# Patient Record
Sex: MALE | Race: WHITE | NOT HISPANIC OR LATINO | Employment: OTHER | URBAN - METROPOLITAN AREA
[De-identification: names, ages, dates, MRNs, and addresses within clinical notes are randomized per-mention and may not be internally consistent; named-entity substitution may affect disease eponyms.]

---

## 2019-12-31 ENCOUNTER — OFFICE VISIT (OUTPATIENT)
Dept: INTERNAL MEDICINE | Facility: CLINIC | Age: 79
End: 2019-12-31
Payer: MEDICARE

## 2019-12-31 ENCOUNTER — TELEPHONE (OUTPATIENT)
Dept: INTERNAL MEDICINE | Facility: CLINIC | Age: 79
End: 2019-12-31

## 2019-12-31 VITALS
BODY MASS INDEX: 23.83 KG/M2 | RESPIRATION RATE: 18 BRPM | WEIGHT: 175.94 LBS | HEIGHT: 72 IN | DIASTOLIC BLOOD PRESSURE: 64 MMHG | HEART RATE: 74 BPM | SYSTOLIC BLOOD PRESSURE: 151 MMHG | TEMPERATURE: 98 F

## 2019-12-31 DIAGNOSIS — M25.511 CHRONIC RIGHT SHOULDER PAIN: ICD-10-CM

## 2019-12-31 DIAGNOSIS — I73.9 PAD (PERIPHERAL ARTERY DISEASE): ICD-10-CM

## 2019-12-31 DIAGNOSIS — I65.23 CAROTID ATHEROSCLEROSIS, BILATERAL: ICD-10-CM

## 2019-12-31 DIAGNOSIS — E78.5 DYSLIPIDEMIA: ICD-10-CM

## 2019-12-31 DIAGNOSIS — G89.29 CHRONIC RIGHT SHOULDER PAIN: ICD-10-CM

## 2019-12-31 DIAGNOSIS — I10 HTN (HYPERTENSION), BENIGN: Primary | ICD-10-CM

## 2019-12-31 DIAGNOSIS — K21.9 GASTROESOPHAGEAL REFLUX DISEASE, ESOPHAGITIS PRESENCE NOT SPECIFIED: ICD-10-CM

## 2019-12-31 PROCEDURE — 99204 PR OFFICE/OUTPT VISIT, NEW, LEVL IV, 45-59 MIN: ICD-10-PCS | Mod: S$GLB,,, | Performed by: INTERNAL MEDICINE

## 2019-12-31 PROCEDURE — 1101F PR PT FALLS ASSESS DOC 0-1 FALLS W/OUT INJ PAST YR: ICD-10-PCS | Mod: CPTII,S$GLB,, | Performed by: INTERNAL MEDICINE

## 2019-12-31 PROCEDURE — 1159F PR MEDICATION LIST DOCUMENTED IN MEDICAL RECORD: ICD-10-PCS | Mod: S$GLB,,, | Performed by: INTERNAL MEDICINE

## 2019-12-31 PROCEDURE — 1126F AMNT PAIN NOTED NONE PRSNT: CPT | Mod: S$GLB,,, | Performed by: INTERNAL MEDICINE

## 2019-12-31 PROCEDURE — 99999 PR PBB SHADOW E&M-NEW PATIENT-LVL III: ICD-10-PCS | Mod: PBBFAC,,, | Performed by: INTERNAL MEDICINE

## 2019-12-31 PROCEDURE — 99499 RISK ADDL DX/OHS AUDIT: ICD-10-PCS | Mod: S$GLB,,, | Performed by: INTERNAL MEDICINE

## 2019-12-31 PROCEDURE — 1101F PT FALLS ASSESS-DOCD LE1/YR: CPT | Mod: CPTII,S$GLB,, | Performed by: INTERNAL MEDICINE

## 2019-12-31 PROCEDURE — 99204 OFFICE O/P NEW MOD 45 MIN: CPT | Mod: S$GLB,,, | Performed by: INTERNAL MEDICINE

## 2019-12-31 PROCEDURE — 99999 PR PBB SHADOW E&M-NEW PATIENT-LVL III: CPT | Mod: PBBFAC,,, | Performed by: INTERNAL MEDICINE

## 2019-12-31 PROCEDURE — 1126F PR PAIN SEVERITY QUANTIFIED, NO PAIN PRESENT: ICD-10-PCS | Mod: S$GLB,,, | Performed by: INTERNAL MEDICINE

## 2019-12-31 PROCEDURE — 1159F MED LIST DOCD IN RCRD: CPT | Mod: S$GLB,,, | Performed by: INTERNAL MEDICINE

## 2019-12-31 PROCEDURE — 99499 UNLISTED E&M SERVICE: CPT | Mod: S$GLB,,, | Performed by: INTERNAL MEDICINE

## 2019-12-31 RX ORDER — TRIAMTERENE AND HYDROCHLOROTHIAZIDE 37.5; 25 MG/1; MG/1
1 CAPSULE ORAL EVERY MORNING
COMMUNITY
End: 2020-10-21

## 2019-12-31 RX ORDER — AMOXICILLIN 500 MG
CAPSULE ORAL DAILY
COMMUNITY

## 2019-12-31 RX ORDER — OMEPRAZOLE 20 MG/1
20 CAPSULE, DELAYED RELEASE ORAL DAILY
COMMUNITY
End: 2020-12-17 | Stop reason: SDUPTHER

## 2019-12-31 RX ORDER — ASPIRIN 81 MG/1
81 TABLET ORAL DAILY
COMMUNITY

## 2019-12-31 RX ORDER — ATENOLOL 50 MG/1
50 TABLET ORAL DAILY
COMMUNITY
End: 2020-12-17 | Stop reason: SDUPTHER

## 2019-12-31 RX ORDER — ROSUVASTATIN CALCIUM 10 MG/1
10 TABLET, COATED ORAL DAILY
COMMUNITY
End: 2020-11-25 | Stop reason: DRUGHIGH

## 2019-12-31 RX ORDER — TRAMADOL HYDROCHLORIDE 50 MG/1
50 TABLET ORAL EVERY 8 HOURS PRN
Qty: 20 TABLET | Refills: 0 | Status: SHIPPED | OUTPATIENT
Start: 2019-12-31 | End: 2020-01-30 | Stop reason: SDUPTHER

## 2019-12-31 NOTE — TELEPHONE ENCOUNTER
Spoke with pt to advise of MD recommendations.    Verbalized understanding.    Please send to [facility] on [address].

## 2019-12-31 NOTE — TELEPHONE ENCOUNTER
----- Message from Carly Frederick sent at 12/31/2019 11:20 AM CST -----  Its a couple of weeks before pt can see ortho, can he be prescribed something for the pain.

## 2019-12-31 NOTE — PROGRESS NOTES
History of present illness:  79-year-old male 1st visit here at our institution here to establish primary care and follow up on a couple of chronic medical conditions.  The patient recently moved here from [place].  He reports being followed regularly there by primary care and other.  He has a history of hypertension, dyslipidemia, PA D, GERD, prior history of tobacco use.  He reports he was seen fairly recently there within the last several months and had updated laboratory data performed.  Current a primary concern is ongoing right shoulder pain for approximately 3 months.  This started after a fall during which she braced himself with his outstretched right upper extremity.  He has had right shoulder pain since then with use.  He was evaluated initially in [place] with plain x-rays and physical therapy was planned but was not executed due to his move here to [place].  There has been no change in his symptoms since onset.    Current medications:  Atenolol 50 mg daily.  Rosuvastatin 10 mg daily.  Dyazide 1 q.d..  Aspirin 81 mg daily.      Review of systems:  General: no fever, chills, generalized body aches. No unexpected weight loss.  Eyes:  No visual disturbances.  HEENT:  No hoarseness, dysphagia, ear pain.  Respiratory:  No cough, no shortness of breath.  Cardiovascular: no chest pain, palpitations, cough, exertional limb pain. No edema.  GI: no nausea, vomiting.  No abdominal pain. No change in bowel habits.  No melena, no hematochezia.  : no dysuria. No change in the color or character of the urine. No urinary frequency.  Neurologic:  No focal neurological complaints.  No headaches.  Skin:  No rashes or other concerns.  Psych:  No emotional issues    Past medical history:  Hypertension.  Dyslipidemia.  PA D, reports having stents in both lower extremities.  GERD controlled on PPI therapy.  Reports history of melanoma excision number of years ago.  Carotid atherosclerosis by history.  Adenomatous  colon polyps by patient history.    Past surgical history, family medical history social history is are noted reviewed entered and updated in the electronic medical record history sections.    Health screenings:  Colonoscopy 1 year ago.  He feels certain he has had both pneumococcal vaccines.      Physical examination:  General:  Alert appropriately groomed gentleman acute distress.  Vital signs:  Blood pressure taken manually by this examiner is 142/66.  Eyes:  Sclerae white not icteric.  HEENT:  Normocephalic.  Neck supple no masses no thyromegaly.  Lungs:  Clear to auscultation.  Cardiovascular:  Regular rate rhythm. No murmur click rub or gallop.  Carotids full bilaterally bruits.  No peripheral extremity edema no ischemic changes lower extremities.  Mental status:  Alert oriented affect mood all appropriate.      Impression:  Hypertension elevated systolic reading today though he reports home readings have been normal.  Dyslipidemia on statin therapy.  JILLIAN COLLINS history of stents both lower extremities currently asymptomatic.  GERD controlled on PPI therapy chronically.  First-degree relative with colon cancer up-to-date with surveillance colonoscopy.  History of carotid atherosclerosis.  History of melanoma excision.  Three months persistent right shoulder pain status post mechanical injury.    Plan:  Outside record release requests.  Update metabolic profile liver profile.  Referral to Sports Medicine regarding the right shoulder issue.  Return to clinic in about 8 weeks for follow-up blood pressure recheck reviewed lab and hopeful record review

## 2019-12-31 NOTE — TELEPHONE ENCOUNTER
We can give him short term Rx for tramadol with no refills.  It is not an option to continue longer term pain meds.  Need local pharmacy and will send in one time limited Rx

## 2020-01-13 ENCOUNTER — HOSPITAL ENCOUNTER (OUTPATIENT)
Dept: RADIOLOGY | Facility: HOSPITAL | Age: 80
Discharge: HOME | End: 2020-01-13
Attending: ORTHOPAEDIC SURGERY
Payer: MEDICARE

## 2020-01-13 ENCOUNTER — OFFICE VISIT (OUTPATIENT)
Dept: SPORTS MEDICINE | Facility: CLINIC | Age: 80
End: 2020-01-13
Payer: MEDICARE

## 2020-01-13 VITALS
BODY MASS INDEX: 23.7 KG/M2 | HEIGHT: 72 IN | HEART RATE: 78 BPM | WEIGHT: 175 LBS | SYSTOLIC BLOOD PRESSURE: 151 MMHG | DIASTOLIC BLOOD PRESSURE: 79 MMHG

## 2020-01-13 DIAGNOSIS — M25.511 RIGHT SHOULDER PAIN, UNSPECIFIED CHRONICITY: ICD-10-CM

## 2020-01-13 DIAGNOSIS — M25.511 RIGHT SHOULDER PAIN, UNSPECIFIED CHRONICITY: Primary | ICD-10-CM

## 2020-01-13 DIAGNOSIS — M25.511 CHRONIC RIGHT SHOULDER PAIN: ICD-10-CM

## 2020-01-13 DIAGNOSIS — G89.29 CHRONIC RIGHT SHOULDER PAIN: ICD-10-CM

## 2020-01-13 PROCEDURE — 3078F DIAST BP <80 MM HG: CPT | Mod: CPTII,S$GLB,, | Performed by: ORTHOPAEDIC SURGERY

## 2020-01-13 PROCEDURE — 3077F PR MOST RECENT SYSTOLIC BLOOD PRESSURE >= 140 MM HG: ICD-10-PCS | Mod: CPTII,S$GLB,, | Performed by: ORTHOPAEDIC SURGERY

## 2020-01-13 PROCEDURE — 73030 XR SHOULDER COMPLETE 2 OR MORE VIEWS RIGHT: ICD-10-PCS | Mod: 26,RT,, | Performed by: RADIOLOGY

## 2020-01-13 PROCEDURE — 1159F MED LIST DOCD IN RCRD: CPT | Mod: S$GLB,,, | Performed by: ORTHOPAEDIC SURGERY

## 2020-01-13 PROCEDURE — 99999 PR PBB SHADOW E&M-EST. PATIENT-LVL III: ICD-10-PCS | Mod: PBBFAC,,, | Performed by: ORTHOPAEDIC SURGERY

## 2020-01-13 PROCEDURE — 99999 PR PBB SHADOW E&M-EST. PATIENT-LVL III: CPT | Mod: PBBFAC,,, | Performed by: ORTHOPAEDIC SURGERY

## 2020-01-13 PROCEDURE — 1101F PR PT FALLS ASSESS DOC 0-1 FALLS W/OUT INJ PAST YR: ICD-10-PCS | Mod: CPTII,S$GLB,, | Performed by: ORTHOPAEDIC SURGERY

## 2020-01-13 PROCEDURE — 73030 X-RAY EXAM OF SHOULDER: CPT | Mod: 26,RT,, | Performed by: RADIOLOGY

## 2020-01-13 PROCEDURE — 3078F PR MOST RECENT DIASTOLIC BLOOD PRESSURE < 80 MM HG: ICD-10-PCS | Mod: CPTII,S$GLB,, | Performed by: ORTHOPAEDIC SURGERY

## 2020-01-13 PROCEDURE — 1101F PT FALLS ASSESS-DOCD LE1/YR: CPT | Mod: CPTII,S$GLB,, | Performed by: ORTHOPAEDIC SURGERY

## 2020-01-13 PROCEDURE — 1125F AMNT PAIN NOTED PAIN PRSNT: CPT | Mod: S$GLB,,, | Performed by: ORTHOPAEDIC SURGERY

## 2020-01-13 PROCEDURE — 1125F PR PAIN SEVERITY QUANTIFIED, PAIN PRESENT: ICD-10-PCS | Mod: S$GLB,,, | Performed by: ORTHOPAEDIC SURGERY

## 2020-01-13 PROCEDURE — 1159F PR MEDICATION LIST DOCUMENTED IN MEDICAL RECORD: ICD-10-PCS | Mod: S$GLB,,, | Performed by: ORTHOPAEDIC SURGERY

## 2020-01-13 PROCEDURE — 73030 X-RAY EXAM OF SHOULDER: CPT | Mod: TC,RT

## 2020-01-13 PROCEDURE — 99203 OFFICE O/P NEW LOW 30 MIN: CPT | Mod: S$GLB,,, | Performed by: ORTHOPAEDIC SURGERY

## 2020-01-13 PROCEDURE — 3077F SYST BP >= 140 MM HG: CPT | Mod: CPTII,S$GLB,, | Performed by: ORTHOPAEDIC SURGERY

## 2020-01-13 PROCEDURE — 99203 PR OFFICE/OUTPT VISIT, NEW, LEVL III, 30-44 MIN: ICD-10-PCS | Mod: S$GLB,,, | Performed by: ORTHOPAEDIC SURGERY

## 2020-01-13 NOTE — PROGRESS NOTES
CC: right shoulder pain, patient is retired, referred by Dr. Rishabh Mora      79 y.o. Male RHD reports that the pain is severe and not responding to any conservative care.      Pain has been present since September/October 2019 after a fall on an outstretched arm   He was originally seen by his PCP in [place] that prescribed physical therapy but he was not able to start due to his recent move to [place] about 3 weeks ago     He reports that the pain is worse with overhead activity. It also bothers him at night.  He notes his pain has worsened   He describes his pain as anterior/bicep     Is affecting ADLs.     He has taken Aleve and Tramadol for his pain     Review of Systems   Constitution: Negative. Negative for chills, fever and night sweats.   HENT: Negative for congestion and headaches.    Eyes: Negative for blurred vision, left vision loss and right vision loss.   Cardiovascular: Negative for chest pain and syncope.   Respiratory: Negative for cough and shortness of breath.    Endocrine: Negative for polydipsia, polyphagia and polyuria.   Hematologic/Lymphatic: Negative for bleeding problem. Does not bruise/bleed easily.   Skin: Negative for dry skin, itching and rash.   Musculoskeletal: Negative for falls and muscle weakness.   Gastrointestinal: Negative for abdominal pain and bowel incontinence.   Genitourinary: Negative for bladder incontinence and nocturia.   Neurological: Negative for disturbances in coordination, loss of balance and seizures.   Psychiatric/Behavioral: Negative for depression. The patient does not have insomnia.    Allergic/Immunologic: Negative for hives and persistent infections.     PAST MEDICAL HISTORY:   Past Medical History:   Diagnosis Date    Dyslipidemia     GERD (gastroesophageal reflux disease)     Hx of melanoma excision     Hypertension     PAD (peripheral artery disease)      PAST SURGICAL HISTORY:   Past Surgical History:   Procedure Laterality Date     BLEPHAROPLASTY      CATARACT EXTRACTION, BILATERAL      TONSILLECTOMY       FAMILY HISTORY:   Family History   Problem Relation Age of Onset    Diabetes Mother     Hyperlipidemia Mother     Heart disease Father     Colon cancer Sister      SOCIAL HISTORY:   Social History     Socioeconomic History    Marital status:      Spouse name: Not on file    Number of children: Not on file    Years of education: Not on file    Highest education level: Not on file   Occupational History    Not on file   Social Needs    Financial resource strain: Not on file    Food insecurity:     Worry: Not on file     Inability: Not on file    Transportation needs:     Medical: Not on file     Non-medical: Not on file   Tobacco Use    Smoking status: Former Smoker     Types: Cigarettes    Smokeless tobacco: Never Used   Substance and Sexual Activity    Alcohol use: Not on file    Drug use: Not on file    Sexual activity: Not on file   Lifestyle    Physical activity:     Days per week: Not on file     Minutes per session: Not on file    Stress: Not on file   Relationships    Social connections:     Talks on phone: Not on file     Gets together: Not on file     Attends Rastafari service: Not on file     Active member of club or organization: Not on file     Attends meetings of clubs or organizations: Not on file     Relationship status: Not on file   Other Topics Concern    Not on file   Social History Narrative    Not on file       MEDICATIONS:   Current Outpatient Medications:     aspirin (ECOTRIN) 81 MG EC tablet, Take 81 mg by mouth once daily., Disp: , Rfl:     atenolol (TENORMIN) 50 MG tablet, Take 50 mg by mouth once daily., Disp: , Rfl:     multivit-min-FA-lycopen-lutein (CENTRUM SILVER MEN) 300-600-300 mcg Tab, Take by mouth., Disp: , Rfl:     omega-3 fatty acids/fish oil (FISH OIL-OMEGA-3 FATTY ACIDS) 300-1,000 mg capsule, Take by mouth once daily., Disp: , Rfl:     omeprazole (PRILOSEC) 20 MG  capsule, Take 20 mg by mouth once daily., Disp: , Rfl:     rosuvastatin (CRESTOR) 10 MG tablet, Take 10 mg by mouth once daily., Disp: , Rfl:     traMADol (ULTRAM) 50 mg tablet, Take 1 tablet (50 mg total) by mouth every 8 (eight) hours as needed for Pain., Disp: 20 tablet, Rfl: 0    triamterene-hydrochlorothiazide 37.5-25 mg (DYAZIDE) 37.5-25 mg per capsule, Take 1 capsule by mouth every morning., Disp: , Rfl:   ALLERGIES:   Review of patient's allergies indicates:   Allergen Reactions    Clindamycin Nausea And Vomiting    Penicillins Hives       VITAL SIGNS: BP (!) 151/79   Pulse 78   Ht 6' (1.829 m)   Wt 79.4 kg (175 lb)   BMI 23.73 kg/m²      PHYSICAL EXAMINATION:  General:  The patient is alert and oriented x 3.  Mood is pleasant.  Observation of ears, eyes and nose reveal no gross abnormalities.  No labored breathing observed.  Gait is coordinated. Patient can toe walk and heel walk without difficulty.      right Shoulder / Upper Extremity Exam    OBSERVATION:     Swelling  none  Deformity  none   Discoloration  none   Scapular winging none   Scars   none  Atrophy  none    TENDERNESS / CREPITUS (T/C):          T/C      T/C   Clavicle   -/-  SUPRAspinatus    -/-     AC Jt.    -/-  INFRAspinatus  -/-    SC Jt.    -/-  Deltoid    -/-      G. Tuberosity  -/-  LH BICEP groove  +/-   Acromion:  -/-  Midline Neck   -/-     Scapular Spine -/-  Trapezium   -/-   SMA Scapula  -/-  GH jt. line - post  -/-     Scapulothoracic  -/-         ROM: (* = with pain)  Right shoulder   Left shoulder        AROM (PROM)   AROM (PROM)   FE    115° (165°)     170° (175°)     ER at 0°    45°  (65°)    60°  (65°)   ER at 90° ABD  90°  (90°)    90°  (90°)   IR at 90°  ABD   NA  (40°)     NA  (40°)      IR (spine level)   L2     T10    STRENGTH: (* = with pain) RIGHT SHOULDER  LEFT SHOULDER   SCAPTION at 0  4/5    5/5   SCAPTION at  30  5-/5    5/5    IR    5/5    5/5   ER    5/5    5/5   BICEPS   5/5    5/5   Deltoid    5/5    5/5     SIGNS:  Painful side       NEER   +    OENRIQUES  neg    GARZA   +    SPEEDS  neg     DROP ARM   neg   BELLY PRESS neg   Superior escape none    LIFT-OFF  neg   X-Body ADD    neg    MOVING VALGUS neg        STABILITY TESTING    RIGHT SHOULDER   LEFT SHOULDER     Translation     Anterior  up face     up face    Posterior  up face    up face    Sulcus   < 10mm    < 10 mm     Signs   Apprehension   neg      neg       Relocation   no change     no change      Jerk test  neg     neg    EXTREMITY NEURO-VASCULAR EXAM    Sensation grossly intact to light touch all dermatomal regions.    DTR 2+ Biceps, Triceps, BR and Negative Terrells sign   Grossly intact motor function at Elbow, Wrist and Hand   Distal pulses radial and ulnar 2+, brisk cap refill, symmetric.      NECK:  Painless FROM and spinous processes non-tender. Negative Spurlings sign.      OTHER FINDINGS:  + scapular dyskinesia  + bear hug     XRAYS:  Shoulder trauma series right,  were obtained and reviewed  No convincing fracture or dislocation is noted. The osseous structures appear well mineralized and well aligned        ASSESSMENT:   right:  1. Shoulder pain, likely cuff tear    2.  Scapular dyskinesia      ASSESSMENT:  shoulder pain.    I do think that this is likely a rotator cuff tear.    I have recommended we check an MRI of the shoulder to evaluate this.    Depending on the results of the MRI, we may consider a cortisone   injection and physical therapy and/or arthroscopic intervention and treatment   depending on what we find.

## 2020-01-16 ENCOUNTER — HOSPITAL ENCOUNTER (OUTPATIENT)
Dept: RADIOLOGY | Facility: HOSPITAL | Age: 80
Discharge: HOME | End: 2020-01-16
Attending: STUDENT IN AN ORGANIZED HEALTH CARE EDUCATION/TRAINING PROGRAM
Payer: MEDICARE

## 2020-01-16 DIAGNOSIS — M25.511 RIGHT SHOULDER PAIN, UNSPECIFIED CHRONICITY: ICD-10-CM

## 2020-01-16 PROCEDURE — 73221 MRI SHOULDER WITHOUT CONTRAST RIGHT: ICD-10-PCS | Mod: 26,RT,, | Performed by: RADIOLOGY

## 2020-01-16 PROCEDURE — 73221 MRI JOINT UPR EXTREM W/O DYE: CPT | Mod: TC,RT

## 2020-01-16 PROCEDURE — 73221 MRI JOINT UPR EXTREM W/O DYE: CPT | Mod: 26,RT,, | Performed by: RADIOLOGY

## 2020-01-22 ENCOUNTER — TELEPHONE (OUTPATIENT)
Dept: SPORTS MEDICINE | Facility: CLINIC | Age: 80
End: 2020-01-22

## 2020-01-22 NOTE — TELEPHONE ENCOUNTER
Called patient to discuss MRI results    right   1. Shoulder pain,possible RTC tear    2.  Possible insufficiency fracture greater tuberosity    MRI Right shoulder: Full thickness supraspinatus tear With retraction. SLAP, biceps tendinitis, + insufficiency fracture greater tuberosity  AC moderate hypertrophy, degenerative changes, subscapularis tear    ASSESSMENT:    Right Shoulder Rotator Cuff Tear, SLAP, biceps tendonitis.      he would benefit from an arthroscopy, given the above.       PLAN:   Right Shoulder rotator cuff tear     All questions were answered, pt will contact us for questions or concerns in the interim.  Had thorough discussion of non-operative vs operative intervention, and risks and benefits of both.     We have discussed the surgery and recovery of arthroscopic shoulder surgery. he understands that there may be limited mobility up to several weeks after surgery depending on procedures that are performed at the time of surgery.    The spectrum of treatment options were discussed with the patient, including nonoperative and operative options.  After thorough discussion, the patient has elected to undergo surgical treatment to include:    right   a. Shoulder arthroscopic rotator cuff repair   b. Shoulder arthroscopic SAD   c. Shoulder arthroscopic extensive debridement   d. Shoulder arthroscopic biceps tenodesis (vs. subpect tenodesis)   e. Shoulder arthroscopic possible microfracture   f.  Shoulder arthroscopic subscapularis repair   g.  Shoulder open reduction internal fixation greater tuberosity    The details of the surgical procedure were explained, including the location of probable incisions and a description of likely hardware and/or grafts to be used.  The patient understands the likely convalescence after surgery.  Also, we have thoroughly discussed the risks, benefits and alternatives to surgery, including, but not limited to, the risk of infection, joint stiffness, blood clot  (including DVT and/or pulmonary embolus), neurologic and vascular injury.  It was explained that, if tissue has been repaired or reconstructed, there is a chance of failure, which may require further management.  Consent form for surgery is signed and in chart.

## 2020-01-29 ENCOUNTER — TELEPHONE (OUTPATIENT)
Dept: SPORTS MEDICINE | Facility: CLINIC | Age: 80
End: 2020-01-29

## 2020-01-29 NOTE — TELEPHONE ENCOUNTER
I called the patient and answered his questions, he verbalized understanding. He was encouraged to call back with any further questions.

## 2020-01-29 NOTE — TELEPHONE ENCOUNTER
----- Message from Kerline High MA sent at 1/29/2020  1:34 PM CST -----  Contact: pt       ----- Message -----  From: Carlo Collado  Sent: 1/29/2020  12:08 PM CST  To: Fior Small Staff    Please call pt at [phone]    Patient is calling to schedule future surgery    Thank you

## 2020-01-29 NOTE — TELEPHONE ENCOUNTER
----- Message from Kayla Avina MA sent at 1/29/2020  2:40 PM CST -----  Contact: pt   I spoke to the patient and scheduled his surgery date. He has other questions concerning the surgery that I am not able to answer. Could you call him back at some point just to go over the procedure again.    Thanks,  Kayla Avina MA  Medical Assistant to Dr. Staci Childress    ----- Message -----  From: Kerline High MA  Sent: 1/29/2020   1:34 PM CST  To: Kayla Avina MA        ----- Message -----  From: Carlo Collado  Sent: 1/29/2020  12:08 PM CST  To: Fior Small Staff    Please call pt at [phone]    Patient is calling to schedule future surgery    Thank you

## 2020-01-30 DIAGNOSIS — M75.101 NONTRAUMATIC TEAR OF RIGHT ROTATOR CUFF, UNSPECIFIED TEAR EXTENT: Primary | ICD-10-CM

## 2020-01-30 DIAGNOSIS — M75.21 BICEPS TENDINITIS OF RIGHT UPPER EXTREMITY: ICD-10-CM

## 2020-01-30 DIAGNOSIS — S42.254A CLOSED NONDISPLACED FRACTURE OF GREATER TUBEROSITY OF RIGHT HUMERUS, INITIAL ENCOUNTER: ICD-10-CM

## 2020-01-30 DIAGNOSIS — S43.431A SUPERIOR GLENOID LABRUM LESION OF RIGHT SHOULDER, INITIAL ENCOUNTER: ICD-10-CM

## 2020-01-30 RX ORDER — TRAMADOL HYDROCHLORIDE 50 MG/1
50 TABLET ORAL EVERY 8 HOURS PRN
Qty: 20 TABLET | Refills: 0 | Status: SHIPPED | OUTPATIENT
Start: 2020-01-30 | End: 2020-01-31 | Stop reason: SDUPTHER

## 2020-01-30 NOTE — TELEPHONE ENCOUNTER
----- Message from Ned Hiro sent at 1/30/2020  1:09 PM CST -----  Contact: Self   Patient state he have surgery on 02/27 on the shoulder. Patient state he need clearance for surgery and was wondering if he can get the clearance on 02/19 when he see Dr. Mora for his follow up appointment. Please all and advise.      Patient is calling for an RX refill or new RX.  Is this a refill or new RX:    RX name and strength: traMADol (ULTRAM) 50 mg tablet  Directions (copy/paste from chart):  Take 1 tablet (50 mg total) by mouth every 8 (eight) hours as needed for Pain. - Oral  Is this a 30 day or 90 day RX:    Local pharmacy or mail order pharmacy:  Local  Pharmacy name and phone # (copy/paste from chart):     Comments:  Patient state he want the Rx sent to the [facility] on [address] and [address]. Please advise.

## 2020-01-31 RX ORDER — TRAMADOL HYDROCHLORIDE 50 MG/1
50 TABLET ORAL EVERY 8 HOURS PRN
Qty: 20 TABLET | Refills: 0 | Status: SHIPPED | OUTPATIENT
Start: 2020-01-31 | End: 2020-10-21

## 2020-01-31 NOTE — TELEPHONE ENCOUNTER
----- Message from Rock Franks sent at 1/31/2020  1:19 PM CST -----  Contact: Pt [phone]  The patient said you sent the traMADol (ULTRAM) 50 mg tablet to the mail order but, he wanted it sent to the local pharmacy. He couldn't cancel it because, it's already in the mail to him. He wants to know if you can call in a partial supply of 7 pills to the local pharmacy.    [facility]/pharmacy #[id number] - [place] - [address] [phone] (Phone) [phone] (Fax)

## 2020-02-10 ENCOUNTER — LAB VISIT (OUTPATIENT)
Dept: LAB | Facility: HOSPITAL | Age: 80
End: 2020-02-10
Attending: INTERNAL MEDICINE
Payer: MEDICARE

## 2020-02-10 DIAGNOSIS — I10 HTN (HYPERTENSION), BENIGN: ICD-10-CM

## 2020-02-10 DIAGNOSIS — E78.5 DYSLIPIDEMIA: ICD-10-CM

## 2020-02-10 LAB
ALBUMIN SERPL BCP-MCNC: 4 G/DL (ref 3.5–5.2)
ALP SERPL-CCNC: 55 U/L (ref 55–135)
ALT SERPL W/O P-5'-P-CCNC: 16 U/L (ref 10–44)
ANION GAP SERPL CALC-SCNC: 9 MMOL/L (ref 8–16)
AST SERPL-CCNC: 21 U/L (ref 10–40)
BILIRUB SERPL-MCNC: 1 MG/DL (ref 0.1–1)
BUN SERPL-MCNC: 36 MG/DL (ref 8–23)
CALCIUM SERPL-MCNC: 9.5 MG/DL (ref 8.7–10.5)
CHLORIDE SERPL-SCNC: 105 MMOL/L (ref 95–110)
CHOLEST SERPL-MCNC: 176 MG/DL (ref 120–199)
CHOLEST/HDLC SERPL: 2.8 {RATIO} (ref 2–5)
CO2 SERPL-SCNC: 28 MMOL/L (ref 23–29)
CREAT SERPL-MCNC: 1.6 MG/DL (ref 0.5–1.4)
EST. GFR  (AFRICAN AMERICAN): 47 ML/MIN/1.73 M^2
EST. GFR  (NON AFRICAN AMERICAN): 40.7 ML/MIN/1.73 M^2
GLUCOSE SERPL-MCNC: 104 MG/DL (ref 70–110)
HDLC SERPL-MCNC: 64 MG/DL (ref 40–75)
HDLC SERPL: 36.4 % (ref 20–50)
LDLC SERPL CALC-MCNC: 96 MG/DL (ref 63–159)
NONHDLC SERPL-MCNC: 112 MG/DL
POTASSIUM SERPL-SCNC: 4.3 MMOL/L (ref 3.5–5.1)
PROT SERPL-MCNC: 7 G/DL (ref 6–8.4)
SODIUM SERPL-SCNC: 142 MMOL/L (ref 136–145)
TRIGL SERPL-MCNC: 80 MG/DL (ref 30–150)

## 2020-02-10 PROCEDURE — 36415 COLL VENOUS BLD VENIPUNCTURE: CPT | Mod: PO

## 2020-02-10 PROCEDURE — 80061 LIPID PANEL: CPT

## 2020-02-10 PROCEDURE — 80053 COMPREHEN METABOLIC PANEL: CPT

## 2020-02-13 NOTE — ANESTHESIA PAT ROS NOTE
"                                                                                                             02/13/2020  Gus Sabillon is a 79 y.o., male.      Pre-op Assessment         Review of Systems  Anesthesia Hx:  No problems with previous Anesthesia    Social:  Alcohol Use, Former Smoker    Hematology/Oncology:  Hematology Normal   Oncology Normal     EENT/Dental:EENT/Dental Normal   Cardiovascular:   Exercise tolerance: good Hypertension  Denies Angina. PAD-stents bilat LE   Pulmonary:  Pulmonary Normal  Denies Shortness of breath.  Denies Recent URI.    Renal/:   Elevated BUN (36) on Dec labs- states a "kidney problem,b ut nothing worth treating"-moving from [place]-repeating labs in February   Hepatic/GI:   GERD    Musculoskeletal:  Musculoskeletal Normal    Neurological:  Neurology Normal    Endocrine:  Endocrine Normal    Dermatological:  Skin Normal    Psych:  Psychiatric Normal              Anesthesia Assessment: Preoperative EQUATION    Planned Procedure: Procedure(s) (LRB):  REPAIR, ROTATOR CUFF, ARTHROSCOPIC (Right)  DEBRIDEMENT, SHOULDER, ARTHROSCOPIC (Right)  FIXATION, TENDON, Biceps Tenodesis (Right)  ORIF, FRACTURE, HUMERUS, PROXIMAL (Right)  MICROFRACTURE (Right)  Requested Anesthesia Type:General  Surgeon: Staci Childress MD  Service: Orthopedics  Known or anticipated Date of Surgery:2/27/2020    Surgeon notes: reviewed    Electronic QUestionnaire Assessment completed via nurse interview with patient.        Triage considerations:     The patient has no apparent active cardiac condition (No unstable coronary Syndrome such as severe unstable angina or recent [<1 month] myocardial infarction, decompensated CHF, severe valvular   disease or significant arrhythmia)    Previous anesthesia records:No problems and Not available    Last PCP note: within 1 month , within [facility] , 2/19/2020 will be updated    2/19/2020 PCP note:  "He is considered a reasonable candidate for planned shoulder " "surgery and anesthesia"    Other important co-morbidities: GERD, HLD and HTN      Tests already available:  CBC repeated 2/19/2020   CMP 2/10/2020 BUN 36 Creatinine 1.6(not repeated)          Instructions given. (See in Nurse's note)    Optimization:           Plan:  Patient  has previously scheduled Medical Appointment:    Navigation: T                         Results will be tracked by Preop Clinic.                   "

## 2020-02-13 NOTE — DISCHARGE INSTRUCTIONS
Patient instructions given per surgical and medical guidelines.  NPO status reviewed with patient.  Patient verbalized understanding of both food (8 hours)and fluid intake (2hours)prior to surgery.  Reinforced ONLY CLEAR liquids to include water, apple juice, clear gatorade and such.  (No milk). Antibacterial scrub instructions given, per MD recommendations.  All questions answered.  Driving directions given for day of surgery. Voiced understanding that surgery will take place at the [facility] on [address].  Anticoagulants, OTC's and vitamins, and prescription medication instructions reviewed per protocol.  Nurse reviewed history as stated in chart.  Message sent to patient portal if available.

## 2020-02-19 ENCOUNTER — LAB VISIT (OUTPATIENT)
Dept: LAB | Facility: HOSPITAL | Age: 80
End: 2020-02-19
Attending: INTERNAL MEDICINE
Payer: MEDICARE

## 2020-02-19 ENCOUNTER — OFFICE VISIT (OUTPATIENT)
Dept: INTERNAL MEDICINE | Facility: CLINIC | Age: 80
End: 2020-02-19
Payer: MEDICARE

## 2020-02-19 VITALS
DIASTOLIC BLOOD PRESSURE: 40 MMHG | SYSTOLIC BLOOD PRESSURE: 110 MMHG | TEMPERATURE: 99 F | BODY MASS INDEX: 23.29 KG/M2 | RESPIRATION RATE: 19 BRPM | HEIGHT: 72 IN | HEART RATE: 76 BPM | WEIGHT: 171.94 LBS

## 2020-02-19 DIAGNOSIS — M75.100 TEAR OF ROTATOR CUFF, UNSPECIFIED LATERALITY, UNSPECIFIED TEAR EXTENT, UNSPECIFIED WHETHER TRAUMATIC: ICD-10-CM

## 2020-02-19 DIAGNOSIS — Z01.818 PREOP EXAM FOR INTERNAL MEDICINE: Primary | ICD-10-CM

## 2020-02-19 DIAGNOSIS — Z01.818 PREOP EXAM FOR INTERNAL MEDICINE: ICD-10-CM

## 2020-02-19 DIAGNOSIS — N18.30 BENIGN HYPERTENSION WITH CKD (CHRONIC KIDNEY DISEASE) STAGE III: ICD-10-CM

## 2020-02-19 DIAGNOSIS — E78.5 DYSLIPIDEMIA: ICD-10-CM

## 2020-02-19 DIAGNOSIS — I12.9 BENIGN HYPERTENSION WITH CKD (CHRONIC KIDNEY DISEASE) STAGE III: ICD-10-CM

## 2020-02-19 LAB
BASOPHILS # BLD AUTO: 0.05 K/UL (ref 0–0.2)
BASOPHILS NFR BLD: 0.6 % (ref 0–1.9)
DIFFERENTIAL METHOD: ABNORMAL
EOSINOPHIL # BLD AUTO: 0.2 K/UL (ref 0–0.5)
EOSINOPHIL NFR BLD: 2.6 % (ref 0–8)
ERYTHROCYTE [DISTWIDTH] IN BLOOD BY AUTOMATED COUNT: 12.6 % (ref 11.5–14.5)
HCT VFR BLD AUTO: 44.4 % (ref 40–54)
HGB BLD-MCNC: 13.8 G/DL (ref 14–18)
IMM GRANULOCYTES # BLD AUTO: 0.02 K/UL (ref 0–0.04)
IMM GRANULOCYTES NFR BLD AUTO: 0.2 % (ref 0–0.5)
LYMPHOCYTES # BLD AUTO: 3.4 K/UL (ref 1–4.8)
LYMPHOCYTES NFR BLD: 38.8 % (ref 18–48)
MCH RBC QN AUTO: 31.3 PG (ref 27–31)
MCHC RBC AUTO-ENTMCNC: 31.1 G/DL (ref 32–36)
MCV RBC AUTO: 101 FL (ref 82–98)
MONOCYTES # BLD AUTO: 0.9 K/UL (ref 0.3–1)
MONOCYTES NFR BLD: 10.1 % (ref 4–15)
NEUTROPHILS # BLD AUTO: 4.2 K/UL (ref 1.8–7.7)
NEUTROPHILS NFR BLD: 47.7 % (ref 38–73)
NRBC BLD-RTO: 0 /100 WBC
PLATELET # BLD AUTO: 194 K/UL (ref 150–350)
PMV BLD AUTO: 11.4 FL (ref 9.2–12.9)
RBC # BLD AUTO: 4.41 M/UL (ref 4.6–6.2)
WBC # BLD AUTO: 8.85 K/UL (ref 3.9–12.7)

## 2020-02-19 PROCEDURE — 1159F MED LIST DOCD IN RCRD: CPT | Mod: S$GLB,,, | Performed by: INTERNAL MEDICINE

## 2020-02-19 PROCEDURE — 3074F PR MOST RECENT SYSTOLIC BLOOD PRESSURE < 130 MM HG: ICD-10-PCS | Mod: CPTII,S$GLB,, | Performed by: INTERNAL MEDICINE

## 2020-02-19 PROCEDURE — 3078F PR MOST RECENT DIASTOLIC BLOOD PRESSURE < 80 MM HG: ICD-10-PCS | Mod: CPTII,S$GLB,, | Performed by: INTERNAL MEDICINE

## 2020-02-19 PROCEDURE — 1126F AMNT PAIN NOTED NONE PRSNT: CPT | Mod: S$GLB,,, | Performed by: INTERNAL MEDICINE

## 2020-02-19 PROCEDURE — 85025 COMPLETE CBC W/AUTO DIFF WBC: CPT

## 2020-02-19 PROCEDURE — 93010 EKG 12-LEAD: ICD-10-PCS | Mod: S$GLB,,, | Performed by: INTERNAL MEDICINE

## 2020-02-19 PROCEDURE — 1101F PT FALLS ASSESS-DOCD LE1/YR: CPT | Mod: CPTII,S$GLB,, | Performed by: INTERNAL MEDICINE

## 2020-02-19 PROCEDURE — 36415 COLL VENOUS BLD VENIPUNCTURE: CPT | Mod: PO

## 2020-02-19 PROCEDURE — 93010 ELECTROCARDIOGRAM REPORT: CPT | Mod: S$GLB,,, | Performed by: INTERNAL MEDICINE

## 2020-02-19 PROCEDURE — 1101F PR PT FALLS ASSESS DOC 0-1 FALLS W/OUT INJ PAST YR: ICD-10-PCS | Mod: CPTII,S$GLB,, | Performed by: INTERNAL MEDICINE

## 2020-02-19 PROCEDURE — 93005 EKG 12-LEAD: ICD-10-PCS | Mod: S$GLB,,, | Performed by: INTERNAL MEDICINE

## 2020-02-19 PROCEDURE — 3074F SYST BP LT 130 MM HG: CPT | Mod: CPTII,S$GLB,, | Performed by: INTERNAL MEDICINE

## 2020-02-19 PROCEDURE — 1126F PR PAIN SEVERITY QUANTIFIED, NO PAIN PRESENT: ICD-10-PCS | Mod: S$GLB,,, | Performed by: INTERNAL MEDICINE

## 2020-02-19 PROCEDURE — 99999 PR PBB SHADOW E&M-EST. PATIENT-LVL III: CPT | Mod: PBBFAC,,, | Performed by: INTERNAL MEDICINE

## 2020-02-19 PROCEDURE — 99999 PR PBB SHADOW E&M-EST. PATIENT-LVL III: ICD-10-PCS | Mod: PBBFAC,,, | Performed by: INTERNAL MEDICINE

## 2020-02-19 PROCEDURE — 93005 ELECTROCARDIOGRAM TRACING: CPT | Mod: S$GLB,,, | Performed by: INTERNAL MEDICINE

## 2020-02-19 PROCEDURE — 99214 OFFICE O/P EST MOD 30 MIN: CPT | Mod: S$GLB,,, | Performed by: INTERNAL MEDICINE

## 2020-02-19 PROCEDURE — 99214 PR OFFICE/OUTPT VISIT, EST, LEVL IV, 30-39 MIN: ICD-10-PCS | Mod: S$GLB,,, | Performed by: INTERNAL MEDICINE

## 2020-02-19 PROCEDURE — 1159F PR MEDICATION LIST DOCUMENTED IN MEDICAL RECORD: ICD-10-PCS | Mod: S$GLB,,, | Performed by: INTERNAL MEDICINE

## 2020-02-19 PROCEDURE — 3078F DIAST BP <80 MM HG: CPT | Mod: CPTII,S$GLB,, | Performed by: INTERNAL MEDICINE

## 2020-02-19 NOTE — PROGRESS NOTES
History of present illness:  [age] year male in today for preoperative medical evaluation for planned right laparoscopic shoulder surgery scheduled for next week with .  The patient currently states that he feels well with no chest pain, palpitations, syncope, cough, shortness of breath.  No URI symptoms.  Taking medications as directed.    Current medications:  Dyazide.  Rosuvastatin 10 mg daily.  Prilosec 20 mg daily.  Atenolol 50 mg daily.  Aspirin    Medication allergies:  Penicillin.  Clindamycin.    Review of systems:  Constitutional:  No fever chills.  HEENT:  No URI symptoms.  No hoarseness no dysphagia.  Respiratory:  No cough shortness of breath.  Cardiovascular:  No chest pain no palpitations no syncope.  No claudication.  GI:  No nausea no vomiting abdominal pain or diarrhea.  Neurologic:  No headaches or focal neurological symptoms.    Past medical history:  Hypertension  Dyslipidemia  CKD 3  PAD  GERD.    Past surgical history:  Tonsillectomy.  Blepharoplasty.  Cataract.    Social history:  Previous smoker.  No alcohol excess.    Physical examination:  General:  Pleasant alert appropriately groomed male no acute distress.  Vital signs:  Blood pressure taken manually is 124/60.  HEENT:  Normocephalic.  Neck supple no masses no thyromegaly.  Lungs:  Clear to auscultation.  Cardiovascular:  Regular rate and rhythm. Frequent ectopy.  No significant murmur.  Carotids full bilaterally bruits.  No overt ischemic changes of lower extremities.  GI:  The abdomen is soft benign no masses tenderness organomegaly.  Mental status:  Alert oriented affect mood all appropriate.    Data:  Reviewed recent chemistry profile, CBC , urinalysis and lipid profile.  ECG remarkable other than premature beats which is apparently chronic.    Impression:  79-year-old gentleman several chronic stable medical conditions including hypertension, CKD 3, PAD, dyslipidemia and GERD.  Rotator cuff  tear    Recommendation:  He is considered a reasonable candidate for planned shoulder surgery and anesthesia.

## 2020-02-21 ENCOUNTER — OFFICE VISIT (OUTPATIENT)
Dept: SPORTS MEDICINE | Facility: CLINIC | Age: 80
End: 2020-02-21
Payer: MEDICARE

## 2020-02-21 VITALS
BODY MASS INDEX: 23.16 KG/M2 | HEART RATE: 72 BPM | SYSTOLIC BLOOD PRESSURE: 123 MMHG | WEIGHT: 171 LBS | HEIGHT: 72 IN | DIASTOLIC BLOOD PRESSURE: 61 MMHG

## 2020-02-21 DIAGNOSIS — G89.18 POST-OPERATIVE PAIN: ICD-10-CM

## 2020-02-21 DIAGNOSIS — M75.101 NONTRAUMATIC TEAR OF RIGHT ROTATOR CUFF, UNSPECIFIED TEAR EXTENT: Primary | ICD-10-CM

## 2020-02-21 DIAGNOSIS — S43.431A SUPERIOR GLENOID LABRUM LESION OF RIGHT SHOULDER, INITIAL ENCOUNTER: ICD-10-CM

## 2020-02-21 DIAGNOSIS — M75.21 BICEPS TENDINITIS OF RIGHT UPPER EXTREMITY: ICD-10-CM

## 2020-02-21 PROCEDURE — 99499 UNLISTED E&M SERVICE: CPT | Mod: S$GLB,,, | Performed by: PHYSICIAN ASSISTANT

## 2020-02-21 PROCEDURE — 99999 PR PBB SHADOW E&M-EST. PATIENT-LVL IV: CPT | Mod: PBBFAC,,, | Performed by: PHYSICIAN ASSISTANT

## 2020-02-21 PROCEDURE — 99999 PR PBB SHADOW E&M-EST. PATIENT-LVL IV: ICD-10-PCS | Mod: PBBFAC,,, | Performed by: PHYSICIAN ASSISTANT

## 2020-02-21 PROCEDURE — 99499 NO LOS: ICD-10-PCS | Mod: S$GLB,,, | Performed by: PHYSICIAN ASSISTANT

## 2020-02-21 RX ORDER — PROMETHAZINE HYDROCHLORIDE 25 MG/1
25 TABLET ORAL EVERY 6 HOURS PRN
Qty: 12 TABLET | Refills: 0 | Status: SHIPPED | OUTPATIENT
Start: 2020-02-21 | End: 2020-10-21

## 2020-02-21 RX ORDER — TRAMADOL HYDROCHLORIDE 50 MG/1
50-100 TABLET ORAL EVERY 6 HOURS PRN
Qty: 21 TABLET | Refills: 0 | Status: SHIPPED | OUTPATIENT
Start: 2020-02-21 | End: 2020-10-21

## 2020-02-21 RX ORDER — OXYCODONE AND ACETAMINOPHEN 10; 325 MG/1; MG/1
TABLET ORAL
Qty: 21 TABLET | Refills: 0 | Status: SHIPPED | OUTPATIENT
Start: 2020-02-21 | End: 2020-10-21

## 2020-02-25 RX ORDER — SODIUM CHLORIDE 9 MG/ML
INJECTION, SOLUTION INTRAVENOUS CONTINUOUS
Status: CANCELLED | OUTPATIENT
Start: 2020-02-25

## 2020-02-26 ENCOUNTER — TELEPHONE (OUTPATIENT)
Dept: SPORTS MEDICINE | Facility: CLINIC | Age: 80
End: 2020-02-26

## 2020-02-26 NOTE — H&P
Gus Sabillon  is here for a completion of his perioperative paperwork. he  Is scheduled to undergo     right              a. Shoulder arthroscopic rotator cuff repair              b. Shoulder arthroscopic SAD              c. Shoulder arthroscopic extensive debridement              d. Shoulder arthroscopic biceps tenodesis (vs. subpect tenodesis)              e. Shoulder arthroscopic possible microfracture              f.  Shoulder arthroscopic subscapularis repair              g.  Shoulder open reduction internal fixation greater tuberosity on 2/27/20.      He is a healthy individual but does need clearance for this procedure which he has received from Dr. Mora, his PCP.     PAST MEDICAL HISTORY:   Past Medical History:   Diagnosis Date    CKD (chronic kidney disease) stage 3, GFR 30-59 ml/min     Dyslipidemia     GERD (gastroesophageal reflux disease)     Hx of melanoma excision     Hypertension     PAD (peripheral artery disease)      PAST SURGICAL HISTORY:   Past Surgical History:   Procedure Laterality Date    BLEPHAROPLASTY      CATARACT EXTRACTION, BILATERAL      TONSILLECTOMY       FAMILY HISTORY:   Family History   Problem Relation Age of Onset    Diabetes Mother     Hyperlipidemia Mother     Heart disease Father     Colon cancer Sister      SOCIAL HISTORY:   Social History     Socioeconomic History    Marital status:      Spouse name: Not on file    Number of children: Not on file    Years of education: Not on file    Highest education level: Not on file   Occupational History    Not on file   Social Needs    Financial resource strain: Not on file    Food insecurity:     Worry: Not on file     Inability: Not on file    Transportation needs:     Medical: Not on file     Non-medical: Not on file   Tobacco Use    Smoking status: Former Smoker     Types: Cigarettes    Smokeless tobacco: Never Used   Substance and Sexual Activity    Alcohol use: Not on file    Drug use: Not  on file    Sexual activity: Not on file   Lifestyle    Physical activity:     Days per week: Not on file     Minutes per session: Not on file    Stress: Not on file   Relationships    Social connections:     Talks on phone: Not on file     Gets together: Not on file     Attends R[employer] service: Not on file     Active member of club or organization: Not on file     Attends meetings of clubs or organizations: Not on file     Relationship status: Not on file   Other Topics Concern    Not on file   Social History Narrative    Not on file       MEDICATIONS:   Current Outpatient Medications:     aspirin (ECOTRIN) 81 MG EC tablet, Take 81 mg by mouth once daily., Disp: , Rfl:     atenolol (TENORMIN) 50 MG tablet, Take 50 mg by mouth once daily., Disp: , Rfl:     multivit-min-FA-lycopen-lutein (CENTRUM SILVER MEN) 300-600-300 mcg Tab, Take by mouth., Disp: , Rfl:     omega-3 fatty acids/fish oil (FISH OIL-OMEGA-3 FATTY ACIDS) 300-1,000 mg capsule, Take by mouth once daily., Disp: , Rfl:     omeprazole (PRILOSEC) 20 MG capsule, Take 20 mg by mouth once daily., Disp: , Rfl:     rosuvastatin (CRESTOR) 10 MG tablet, Take 10 mg by mouth once daily., Disp: , Rfl:     traMADol (ULTRAM) 50 mg tablet, Take 1 tablet (50 mg total) by mouth every 8 (eight) hours as needed for Pain., Disp: 20 tablet, Rfl: 0    triamterene-hydrochlorothiazide 37.5-25 mg (DYAZIDE) 37.5-25 mg per capsule, Take 1 capsule by mouth every morning., Disp: , Rfl:     oxyCODONE-acetaminophen (PERCOCET)  mg per tablet, Take 1 tablet by mouth every 4-6 hours as needed for pain. Take stool softener with this medication., Disp: 21 tablet, Rfl: 0    promethazine (PHENERGAN) 25 MG tablet, Take 1 tablet (25 mg total) by mouth every 6 (six) hours as needed for Nausea., Disp: 12 tablet, Rfl: 0    traMADol (ULTRAM) 50 mg tablet, Take 1-2 tablets ( mg total) by mouth every 6 (six) hours as needed., Disp: 21 tablet, Rfl: 0  ALLERGIES:   Review  of patient's allergies indicates:   Allergen Reactions    Clindamycin Nausea And Vomiting    Penicillins Hives       VITAL SIGNS: /61   Pulse 72   Ht 6' (1.829 m)   Wt 77.6 kg (171 lb)   BMI 23.19 kg/m²      Risks, indications and benefits of the surgical procedure were discussed with the patient. All questions with regard to surgery, rehab, expected return to functional activities, activities of daily living and recreational endeavors were answered to his satisfaction.    It was explained to the patient that there may be an increase in surgical risks if the patient has certain co-morbidities such as but not limited to: Obesity, Cardiovascular issues (CHF, CAD, Arrhythmias), chronic pulmonary issues, previous or current neurovascular/neurological issues, previous strokes, diabetes mellitus, previous wound healing issues, previous wound or skin infections, PVD, clotting disorders, if the patient uses chronic steroids, if the patient takes or has immune compromising medications or diseases, or has previously or currently used tobacco products.     The patient verbalized that he/she does not have any additional clotting, bleeding, or blood disorders, other than what is list in her chart on today's review.     Then a brief history and physical exam were performed.    Review of Systems   Constitution: Negative. Negative for chills, fever and night sweats.   HENT: Negative for congestion and headaches.    Eyes: Negative for blurred vision, left vision loss and right vision loss.   Cardiovascular: Negative for chest pain and syncope.   Respiratory: Negative for cough and shortness of breath.    Endocrine: Negative for polydipsia, polyphagia and polyuria.   Hematologic/Lymphatic: Negative for bleeding problem. Does not bruise/bleed easily.   Skin: Negative for dry skin, itching and rash.   Musculoskeletal: Negative for falls and muscle weakness.   Gastrointestinal: Negative for abdominal pain and bowel  incontinence.   Genitourinary: Negative for bladder incontinence and nocturia.   Neurological: Negative for disturbances in coordination, loss of balance and seizures.   Psychiatric/Behavioral: Negative for depression. The patient does not have insomnia.    Allergic/Immunologic: Negative for hives and persistent infections.     PHYSICAL EXAM:  GEN: A&Ox3, WD WN NAD  HEENT: WNL  CHEST: CTAB, no W/R/R  HEART: RRR, no M/R/G  ABD: Soft, NT ND, BS x4 QUADS  MS; See Epic  NEURO: CN II-XII intact       The surgical consent was then reviewed with the patient, who agreed with all the contents of the consent form and it was signed. he was then given the surgery packet to bring with him to surgery center for the anesthesia portion of his perioperative paperwork (if needed)   For all physicians except for Dr. Rush, we will email and possibly fax the consent forms and booking sheets to [facility].    The patient was given the opportunity to ask questions about the surgical plan and consent form, and once no other questions were asked, I proceeded with the pre-op appointment.    PHYSICAL THERAPY:  He was also instructed regarding physical therapy and will begin on likely 4 weeks post-op. He was given a copy of the original prescription to schedule. Another copy of this prescription was also faxed to [facility].    POST OP CARE:instructions were reviewed including care of the wound and dressing after surgery and when he can shower.     CRUTCHES OR WALKER: It was explained to the patient that if they are having a lower extremity surgery that they will require either a walker or crutches to ambulate safely with after surgery. It was explained that a cane or other assistive devices are not sufficient to safely ambulate with after surgery. I explained to the patient that I will place an order for them to receive either crutches or a walker after surgery to go home with. It was explained that if they have  crutches or a walker at home already, that they are REQUIRED to bring them to the hospital on the day of surgery. It was explained that if they do not have them at the hospital on the day of surgery that they WILL be provided a new pair or crutches or a walker to go home with to ensure ambulation will be safe if the patient needs to stop somewhere on the way home.      PAIN MANAGEMENT: Gus Sabillon was also given a pain management regime, which includes the TENS unit given to him by [employer] along with the education required for its use. He was also instructed regarding the Polar ice unit that will be in place after surgery and his postoperative pain medications.     PAIN MEDICATION:  Percocet 10/325mg 1 po q 4-6 hours prn pain  Ultram 50 mg Take 1-2 p.o. q.6 hours p.r.n. breakthrough pain,   Phenergan 25 mg one p.o. q.6 hours p.r.n. nausea and vomiting.    DVT prophylaxis was discussed with the patient today including risk factors for developing DVTs and history of DVTs. The patient was asked if any specific recommendations were given from the doctor/s that did pre-operative surgical clearance. The patient was then given an education sheet about DVTs and PE with warning signs and symptoms of both and steps to take if they suspect either of these.    This along with the Modified Caprini risk assessment model for VTE in general surgical patients was used to determine the patient's DVT risk.     From: Arin MARSHALL, Giorgio DA, Jasmyn MOORE, et al. Prevention of VTE in nonorthopedic surgical patients: antithrombotic therapy and prevention of thrombosis, 9th ed: American College of [employer]icians evidence-based clinical practical guidelines. Chest 2012; 141:e227S. Copyright © 2012. Reproduced with permission from the American College of Chest Physicians.    The below listed DVT prophylaxis regimen along with bilateral SHARMIN compression stockings will be used post-op. Length of treatment has been determined to be 10-42 days  post-op by the above noted Caprini assessment model.     The patient was instructed to buy and take:  Aspirin 81mg QD x 4 weeks for DVT prophylaxis starting on the morning after surgery.  Patient will also use bilateral TEDs on lower extremities, SCDs during surgery, and early ambulation post-op. If the patient was previously taking 81mg baby aspirin, they were told to not take it starting 5 days prior to surgery and to restart the 81mg aspirin after surgery.       Patient was also told to buy over the counter Prilosec medication if needed and take it once daily for GI protection as long as they are taking NSAIDs or Aspirin.    Patient denies history of seizures.     The patient was told that narcotic pain medications may make them drowsy and instructions were given to not sign legal documents, drive or operate heavy machinery, cars, or equipment while under the influence of narcotic medications. The patient was told and understands that narcotic pain medications should only be used as needed to control pain and that other options of pain control include TENs unit and ice packs/unit.     As there were no other questions to be asked, he was given my business card along with Staci Childress MD business card if he has any questions or concerns prior to surgery or in the postop period.

## 2020-02-26 NOTE — TELEPHONE ENCOUNTER
----- Message from Carlo Collado sent at 2/26/2020 12:48 PM CST -----  Contact: pt   Please call pt at [phone]    Patient is calling for the surgery arrival time    Thank you

## 2020-02-26 NOTE — H&P (VIEW-ONLY)
Gus Sabillon  is here for a completion of his perioperative paperwork. he  Is scheduled to undergo     right              a. Shoulder arthroscopic rotator cuff repair              b. Shoulder arthroscopic SAD              c. Shoulder arthroscopic extensive debridement              d. Shoulder arthroscopic biceps tenodesis (vs. subpect tenodesis)              e. Shoulder arthroscopic possible microfracture              f.  Shoulder arthroscopic subscapularis repair              g.  Shoulder open reduction internal fixation greater tuberosity on 2/27/20.      He is a healthy individual but does need clearance for this procedure which he has received from Dr. Mora, his PCP.     PAST MEDICAL HISTORY:   Past Medical History:   Diagnosis Date    CKD (chronic kidney disease) stage 3, GFR 30-59 ml/min     Dyslipidemia     GERD (gastroesophageal reflux disease)     Hx of melanoma excision     Hypertension     PAD (peripheral artery disease)      PAST SURGICAL HISTORY:   Past Surgical History:   Procedure Laterality Date    BLEPHAROPLASTY      CATARACT EXTRACTION, BILATERAL      TONSILLECTOMY       FAMILY HISTORY:   Family History   Problem Relation Age of Onset    Diabetes Mother     Hyperlipidemia Mother     Heart disease Father     Colon cancer Sister      SOCIAL HISTORY:   Social History     Socioeconomic History    Marital status:      Spouse name: Not on file    Number of children: Not on file    Years of education: Not on file    Highest education level: Not on file   Occupational History    Not on file   Social Needs    Financial resource strain: Not on file    Food insecurity:     Worry: Not on file     Inability: Not on file    Transportation needs:     Medical: Not on file     Non-medical: Not on file   Tobacco Use    Smoking status: Former Smoker     Types: Cigarettes    Smokeless tobacco: Never Used   Substance and Sexual Activity    Alcohol use: Not on file    Drug use: Not  on file    Sexual activity: Not on file   Lifestyle    Physical activity:     Days per week: Not on file     Minutes per session: Not on file    Stress: Not on file   Relationships    Social connections:     Talks on phone: Not on file     Gets together: Not on file     Attends [employer] service: Not on file     Active member of club or organization: Not on file     Attends meetings of clubs or organizations: Not on file     Relationship status: Not on file   Other Topics Concern    Not on file   Social History Narrative    Not on file       MEDICATIONS:   Current Outpatient Medications:     aspirin (ECOTRIN) 81 MG EC tablet, Take 81 mg by mouth once daily., Disp: , Rfl:     atenolol (TENORMIN) 50 MG tablet, Take 50 mg by mouth once daily., Disp: , Rfl:     multivit-min-FA-lycopen-lutein (CENTRUM SILVER MEN) 300-600-300 mcg Tab, Take by mouth., Disp: , Rfl:     omega-3 fatty acids/fish oil (FISH OIL-OMEGA-3 FATTY ACIDS) 300-1,000 mg capsule, Take by mouth once daily., Disp: , Rfl:     omeprazole (PRILOSEC) 20 MG capsule, Take 20 mg by mouth once daily., Disp: , Rfl:     rosuvastatin (CRESTOR) 10 MG tablet, Take 10 mg by mouth once daily., Disp: , Rfl:     traMADol (ULTRAM) 50 mg tablet, Take 1 tablet (50 mg total) by mouth every 8 (eight) hours as needed for Pain., Disp: 20 tablet, Rfl: 0    triamterene-hydrochlorothiazide 37.5-25 mg (DYAZIDE) 37.5-25 mg per capsule, Take 1 capsule by mouth every morning., Disp: , Rfl:     oxyCODONE-acetaminophen (PERCOCET)  mg per tablet, Take 1 tablet by mouth every 4-6 hours as needed for pain. Take stool softener with this medication., Disp: 21 tablet, Rfl: 0    promethazine (PHENERGAN) 25 MG tablet, Take 1 tablet (25 mg total) by mouth every 6 (six) hours as needed for Nausea., Disp: 12 tablet, Rfl: 0    traMADol (ULTRAM) 50 mg tablet, Take 1-2 tablets ( mg total) by mouth every 6 (six) hours as needed., Disp: 21 tablet, Rfl: 0  ALLERGIES:   Review  of patient's allergies indicates:   Allergen Reactions    Clindamycin Nausea And Vomiting    Penicillins Hives       VITAL SIGNS: /61   Pulse 72   Ht 6' (1.829 m)   Wt 77.6 kg (171 lb)   BMI 23.19 kg/m²      Risks, indications and benefits of the surgical procedure were discussed with the patient. All questions with regard to surgery, rehab, expected return to functional activities, activities of daily living and recreational endeavors were answered to his satisfaction.    It was explained to the patient that there may be an increase in surgical risks if the patient has certain co-morbidities such as but not limited to: Obesity, Cardiovascular issues (CHF, CAD, Arrhythmias), chronic pulmonary issues, previous or current neurovascular/neurological issues, previous strokes, diabetes mellitus, previous wound healing issues, previous wound or skin infections, PVD, clotting disorders, if the patient uses chronic steroids, if the patient takes or has immune compromising medications or diseases, or has previously or currently used tobacco products.     The patient verbalized that he/she does not have any additional clotting, bleeding, or blood disorders, other than what is list in her chart on today's review.     Then a brief history and physical exam were performed.    Review of Systems   Constitution: Negative. Negative for chills, fever and night sweats.   HENT: Negative for congestion and headaches.    Eyes: Negative for blurred vision, left vision loss and right vision loss.   Cardiovascular: Negative for chest pain and syncope.   Respiratory: Negative for cough and shortness of breath.    Endocrine: Negative for polydipsia, polyphagia and polyuria.   Hematologic/Lymphatic: Negative for bleeding problem. Does not bruise/bleed easily.   Skin: Negative for dry skin, itching and rash.   Musculoskeletal: Negative for falls and muscle weakness.   Gastrointestinal: Negative for abdominal pain and bowel  incontinence.   Genitourinary: Negative for bladder incontinence and nocturia.   Neurological: Negative for disturbances in coordination, loss of balance and seizures.   Psychiatric/Behavioral: Negative for depression. The patient does not have insomnia.    Allergic/Immunologic: Negative for hives and persistent infections.     PHYSICAL EXAM:  GEN: A&Ox3, WD WN NAD  HEENT: WNL  CHEST: CTAB, no W/R/R  HEART: RRR, no M/R/G  ABD: Soft, NT ND, BS x4 QUADS  MS; See Epic  NEURO: CN II-XII intact       The surgical consent was then reviewed with the patient, who agreed with all the contents of the consent form and it was signed. he was then given the surgery packet to bring with him to surgery center for the anesthesia portion of his perioperative paperwork (if needed)   For all physicians except for Dr. Rush, we will email and possibly fax the consent forms and booking sheets to [facility].    The patient was given the opportunity to ask questions about the surgical plan and consent form, and once no other questions were asked, I proceeded with the pre-op appointment.    PHYSICAL THERAPY:  He was also instructed regarding physical therapy and will begin on likely 4 weeks post-op. He was given a copy of the original prescription to schedule. Another copy of this prescription was also faxed to [facility].    POST OP CARE:instructions were reviewed including care of the wound and dressing after surgery and when he can shower.     CRUTCHES OR WALKER: It was explained to the patient that if they are having a lower extremity surgery that they will require either a walker or crutches to ambulate safely with after surgery. It was explained that a cane or other assistive devices are not sufficient to safely ambulate with after surgery. I explained to the patient that I will place an order for them to receive either crutches or a walker after surgery to go home with. It was explained that if they have  crutches or a walker at home already, that they are REQUIRED to bring them to the hospital on the day of surgery. It was explained that if they do not have them at the hospital on the day of surgery that they WILL be provided a new pair or crutches or a walker to go home with to ensure ambulation will be safe if the patient needs to stop somewhere on the way home.      PAIN MANAGEMENT: Gus Sabillon was also given a pain management regime, which includes the TENS unit given to him by [employer] along with the education required for its use. He was also instructed regarding the Polar ice unit that will be in place after surgery and his postoperative pain medications.     PAIN MEDICATION:  Percocet 10/325mg 1 po q 4-6 hours prn pain  Ultram 50 mg Take 1-2 p.o. q.6 hours p.r.n. breakthrough pain,   Phenergan 25 mg one p.o. q.6 hours p.r.n. nausea and vomiting.    DVT prophylaxis was discussed with the patient today including risk factors for developing DVTs and history of DVTs. The patient was asked if any specific recommendations were given from the doctor/s that did pre-operative surgical clearance. The patient was then given an education sheet about DVTs and PE with warning signs and symptoms of both and steps to take if they suspect either of these.    This along with the Modified Caprini risk assessment model for VTE in general surgical patients was used to determine the patient's DVT risk.     From: Arin MARSHALL, Giorgio DA, Jasmyn MOORE, et al. Prevention of VTE in nonorthopedic surgical patients: antithrombotic therapy and prevention of thrombosis, 9th ed: American College of Chest [employer]icians evidence-based clinical practical guidelines. Chest 2012; 141:e227S. Copyright © 2012. Reproduced with permission from the American College of Chest Physicians.    The below listed DVT prophylaxis regimen along with bilateral SHARMIN compression stockings will be used post-op. Length of treatment has been determined to be 10-42 days  post-op by the above noted Caprini assessment model.     The patient was instructed to buy and take:  Aspirin 81mg QD x 4 weeks for DVT prophylaxis starting on the morning after surgery.  Patient will also use bilateral TEDs on lower extremities, SCDs during surgery, and early ambulation post-op. If the patient was previously taking 81mg baby aspirin, they were told to not take it starting 5 days prior to surgery and to restart the 81mg aspirin after surgery.       Patient was also told to buy over the counter Prilosec medication if needed and take it once daily for GI protection as long as they are taking NSAIDs or Aspirin.    Patient denies history of seizures.     The patient was told that narcotic pain medications may make them drowsy and instructions were given to not sign legal documents, drive or operate heavy machinery, cars, or equipment while under the influence of narcotic medications. The patient was told and understands that narcotic pain medications should only be used as needed to control pain and that other options of pain control include TENs unit and ice packs/unit.     As there were no other questions to be asked, he was given my business card along with Staci Childress MD business card if he has any questions or concerns prior to surgery or in the postop period.

## 2020-02-27 ENCOUNTER — ANESTHESIA EVENT (OUTPATIENT)
Dept: SURGERY | Facility: HOSPITAL | Age: 80
End: 2020-02-27
Payer: MEDICARE

## 2020-02-27 ENCOUNTER — ANESTHESIA (OUTPATIENT)
Dept: SURGERY | Facility: HOSPITAL | Age: 80
End: 2020-02-27
Payer: MEDICARE

## 2020-02-27 ENCOUNTER — HOSPITAL ENCOUNTER (OUTPATIENT)
Facility: HOSPITAL | Age: 80
Discharge: HOME | End: 2020-02-27
Attending: ORTHOPAEDIC SURGERY | Admitting: ORTHOPAEDIC SURGERY
Payer: MEDICARE

## 2020-02-27 DIAGNOSIS — M75.101 NONTRAUMATIC TEAR OF RIGHT ROTATOR CUFF, UNSPECIFIED TEAR EXTENT: Primary | ICD-10-CM

## 2020-02-27 DIAGNOSIS — M75.21 BICEPS TENDINITIS OF RIGHT UPPER EXTREMITY: ICD-10-CM

## 2020-02-27 DIAGNOSIS — S43.431A SUPERIOR GLENOID LABRUM LESION OF RIGHT SHOULDER, INITIAL ENCOUNTER: ICD-10-CM

## 2020-02-27 PROCEDURE — 27800903 OPTIME MED/SURG SUP & DEVICES OTHER IMPLANTS: Performed by: ORTHOPAEDIC SURGERY

## 2020-02-27 PROCEDURE — 29999 UNLISTED PX ARTHROSCOPY: CPT | Mod: ,,, | Performed by: ORTHOPAEDIC SURGERY

## 2020-02-27 PROCEDURE — 29824 PR SHLDR ARTHROSCOP,SURG,DIS CLAVICULECTOMY: ICD-10-PCS | Mod: 51,RT,, | Performed by: ORTHOPAEDIC SURGERY

## 2020-02-27 PROCEDURE — D9220A PRA ANESTHESIA: ICD-10-PCS | Mod: ANES,,, | Performed by: ANESTHESIOLOGY

## 2020-02-27 PROCEDURE — 99900035 HC TECH TIME PER 15 MIN (STAT)

## 2020-02-27 PROCEDURE — 29827 PR SHLDR ARTHROSCOP,SURG,W/ROTAT CUFF REPR: ICD-10-PCS | Mod: 22,RT,, | Performed by: ORTHOPAEDIC SURGERY

## 2020-02-27 PROCEDURE — 25000003 PHARM REV CODE 250: Performed by: NURSE ANESTHETIST, CERTIFIED REGISTERED

## 2020-02-27 PROCEDURE — 71000015 HC POSTOP RECOV 1ST HR: Performed by: ORTHOPAEDIC SURGERY

## 2020-02-27 PROCEDURE — 37000009 HC ANESTHESIA EA ADD 15 MINS: Performed by: ORTHOPAEDIC SURGERY

## 2020-02-27 PROCEDURE — 29999 PR ARTHROSCOPIC MICROFRACTURE, ALL JOINTS: ICD-10-PCS | Mod: ,,, | Performed by: ORTHOPAEDIC SURGERY

## 2020-02-27 PROCEDURE — 94761 N-INVAS EAR/PLS OXIMETRY MLT: CPT

## 2020-02-27 PROCEDURE — 29823 SHO ARTHRS SRG XTNSV DBRDMT: CPT | Mod: 51,RT,, | Performed by: ORTHOPAEDIC SURGERY

## 2020-02-27 PROCEDURE — 31615 TRCHEOBRNCHSC EST TRACHS INC: CPT

## 2020-02-27 PROCEDURE — 25000003 PHARM REV CODE 250: Performed by: ORTHOPAEDIC SURGERY

## 2020-02-27 PROCEDURE — 63600175 PHARM REV CODE 636 W HCPCS: Performed by: PHYSICIAN ASSISTANT

## 2020-02-27 PROCEDURE — 29827 SHO ARTHRS SRG RT8TR CUF RPR: CPT | Mod: 22,RT,, | Performed by: ORTHOPAEDIC SURGERY

## 2020-02-27 PROCEDURE — 29824 SHO ARTHRS SRG DSTL CLAVICLC: CPT | Mod: 51,RT,, | Performed by: ORTHOPAEDIC SURGERY

## 2020-02-27 PROCEDURE — D9220A PRA ANESTHESIA: Mod: CRNA,,, | Performed by: NURSE ANESTHETIST, CERTIFIED REGISTERED

## 2020-02-27 PROCEDURE — C1713 ANCHOR/SCREW BN/BN,TIS/BN: HCPCS | Performed by: ORTHOPAEDIC SURGERY

## 2020-02-27 PROCEDURE — 36000711: Performed by: ORTHOPAEDIC SURGERY

## 2020-02-27 PROCEDURE — D9220A PRA ANESTHESIA: ICD-10-PCS | Mod: CRNA,,, | Performed by: NURSE ANESTHETIST, CERTIFIED REGISTERED

## 2020-02-27 PROCEDURE — 29828 PR ARTHROSCOPY SHOULDER SURGICAL BICEPS TENODESIS: ICD-10-PCS | Mod: 51,52,RT, | Performed by: ORTHOPAEDIC SURGERY

## 2020-02-27 PROCEDURE — 63600175 PHARM REV CODE 636 W HCPCS: Performed by: ORTHOPAEDIC SURGERY

## 2020-02-27 PROCEDURE — D9220A PRA ANESTHESIA: Mod: ANES,,, | Performed by: ANESTHESIOLOGY

## 2020-02-27 PROCEDURE — 29828 SHO ARTHRS SRG BICP TENODSIS: CPT | Mod: 51,52,RT, | Performed by: ORTHOPAEDIC SURGERY

## 2020-02-27 PROCEDURE — 63600175 PHARM REV CODE 636 W HCPCS

## 2020-02-27 PROCEDURE — 37000008 HC ANESTHESIA 1ST 15 MINUTES: Performed by: ORTHOPAEDIC SURGERY

## 2020-02-27 PROCEDURE — 29823 PR SHLDR ARTHROSCOP,EXTEN DEBRIDE: ICD-10-PCS | Mod: 51,RT,, | Performed by: ORTHOPAEDIC SURGERY

## 2020-02-27 PROCEDURE — 71000033 HC RECOVERY, INTIAL HOUR: Performed by: ORTHOPAEDIC SURGERY

## 2020-02-27 PROCEDURE — 36000710: Performed by: ORTHOPAEDIC SURGERY

## 2020-02-27 PROCEDURE — 27201423 OPTIME MED/SURG SUP & DEVICES STERILE SUPPLY: Performed by: ORTHOPAEDIC SURGERY

## 2020-02-27 PROCEDURE — 63600175 PHARM REV CODE 636 W HCPCS: Performed by: NURSE ANESTHETIST, CERTIFIED REGISTERED

## 2020-02-27 DEVICE — ANCHOR SU BC CORKSCREW 5.5MM: Type: IMPLANTABLE DEVICE | Site: SHOULDER | Status: FUNCTIONAL

## 2020-02-27 DEVICE — ANCHOR BIOCOMP SWVLLOK: Type: IMPLANTABLE DEVICE | Site: SHOULDER | Status: FUNCTIONAL

## 2020-02-27 RX ORDER — GLYCOPYRROLATE 0.2 MG/ML
INJECTION INTRAMUSCULAR; INTRAVENOUS
Status: DISCONTINUED | OUTPATIENT
Start: 2020-02-27 | End: 2020-02-27

## 2020-02-27 RX ORDER — NEOSTIGMINE METHYLSULFATE 1 MG/ML
INJECTION, SOLUTION INTRAVENOUS
Status: DISCONTINUED | OUTPATIENT
Start: 2020-02-27 | End: 2020-02-27

## 2020-02-27 RX ORDER — HYDROCODONE BITARTRATE AND ACETAMINOPHEN 5; 325 MG/1; MG/1
1 TABLET ORAL EVERY 4 HOURS PRN
Status: DISCONTINUED | OUTPATIENT
Start: 2020-02-27 | End: 2020-02-27 | Stop reason: HOSPADM

## 2020-02-27 RX ORDER — KETOROLAC TROMETHAMINE 30 MG/ML
INJECTION, SOLUTION INTRAMUSCULAR; INTRAVENOUS
Status: DISCONTINUED | OUTPATIENT
Start: 2020-02-27 | End: 2020-02-27 | Stop reason: HOSPADM

## 2020-02-27 RX ORDER — ROCURONIUM BROMIDE 10 MG/ML
INJECTION, SOLUTION INTRAVENOUS
Status: DISCONTINUED | OUTPATIENT
Start: 2020-02-27 | End: 2020-02-27

## 2020-02-27 RX ORDER — ONDANSETRON 4 MG/1
8 TABLET, ORALLY DISINTEGRATING ORAL EVERY 8 HOURS PRN
Status: DISCONTINUED | OUTPATIENT
Start: 2020-02-27 | End: 2020-02-27 | Stop reason: HOSPADM

## 2020-02-27 RX ORDER — FENTANYL CITRATE 50 UG/ML
INJECTION, SOLUTION INTRAMUSCULAR; INTRAVENOUS
Status: DISCONTINUED | OUTPATIENT
Start: 2020-02-27 | End: 2020-02-27

## 2020-02-27 RX ORDER — ONDANSETRON 2 MG/ML
INJECTION INTRAMUSCULAR; INTRAVENOUS
Status: DISCONTINUED | OUTPATIENT
Start: 2020-02-27 | End: 2020-02-27

## 2020-02-27 RX ORDER — EPINEPHRINE 1 MG/ML
INJECTION, SOLUTION INTRACARDIAC; INTRAMUSCULAR; INTRAVENOUS; SUBCUTANEOUS
Status: DISCONTINUED | OUTPATIENT
Start: 2020-02-27 | End: 2020-02-27 | Stop reason: HOSPADM

## 2020-02-27 RX ORDER — OXYCODONE HYDROCHLORIDE 5 MG/1
5 TABLET ORAL
Status: DISCONTINUED | OUTPATIENT
Start: 2020-02-27 | End: 2020-02-27 | Stop reason: HOSPADM

## 2020-02-27 RX ORDER — VANCOMYCIN HCL IN 5 % DEXTROSE 1G/250ML
1000 PLASTIC BAG, INJECTION (ML) INTRAVENOUS
Status: COMPLETED | OUTPATIENT
Start: 2020-02-27 | End: 2020-02-27

## 2020-02-27 RX ORDER — MORPHINE SULFATE 2 MG/ML
2 INJECTION, SOLUTION INTRAMUSCULAR; INTRAVENOUS EVERY 4 HOURS PRN
Status: DISCONTINUED | OUTPATIENT
Start: 2020-02-27 | End: 2020-02-27 | Stop reason: HOSPADM

## 2020-02-27 RX ORDER — FENTANYL CITRATE 50 UG/ML
25 INJECTION, SOLUTION INTRAMUSCULAR; INTRAVENOUS EVERY 5 MIN PRN
Status: DISCONTINUED | OUTPATIENT
Start: 2020-02-27 | End: 2020-02-27 | Stop reason: HOSPADM

## 2020-02-27 RX ORDER — KETAMINE HYDROCHLORIDE 100 MG/ML
INJECTION, SOLUTION INTRAMUSCULAR; INTRAVENOUS
Status: DISCONTINUED | OUTPATIENT
Start: 2020-02-27 | End: 2020-02-27

## 2020-02-27 RX ORDER — KETAMINE HYDROCHLORIDE 100 MG/ML
INJECTION, SOLUTION INTRAMUSCULAR; INTRAVENOUS
Status: DISCONTINUED | OUTPATIENT
Start: 2020-02-27 | End: 2020-02-27 | Stop reason: HOSPADM

## 2020-02-27 RX ORDER — PROPOFOL 10 MG/ML
VIAL (ML) INTRAVENOUS
Status: DISCONTINUED | OUTPATIENT
Start: 2020-02-27 | End: 2020-02-27

## 2020-02-27 RX ORDER — SODIUM CHLORIDE 0.9 % (FLUSH) 0.9 %
10 SYRINGE (ML) INJECTION
Status: DISCONTINUED | OUTPATIENT
Start: 2020-02-27 | End: 2020-02-27 | Stop reason: HOSPADM

## 2020-02-27 RX ORDER — MIDAZOLAM HYDROCHLORIDE 1 MG/ML
INJECTION, SOLUTION INTRAMUSCULAR; INTRAVENOUS
Status: DISCONTINUED | OUTPATIENT
Start: 2020-02-27 | End: 2020-02-27

## 2020-02-27 RX ORDER — NICARDIPINE HYDROCHLORIDE 0.2 MG/ML
INJECTION INTRAVENOUS
Status: DISCONTINUED | OUTPATIENT
Start: 2020-02-27 | End: 2020-02-27

## 2020-02-27 RX ORDER — ONDANSETRON 2 MG/ML
4 INJECTION INTRAMUSCULAR; INTRAVENOUS DAILY PRN
Status: DISCONTINUED | OUTPATIENT
Start: 2020-02-27 | End: 2020-02-27 | Stop reason: HOSPADM

## 2020-02-27 RX ORDER — LIDOCAINE HYDROCHLORIDE 20 MG/ML
INJECTION INTRAVENOUS
Status: DISCONTINUED | OUTPATIENT
Start: 2020-02-27 | End: 2020-02-27

## 2020-02-27 RX ORDER — DEXAMETHASONE SODIUM PHOSPHATE 4 MG/ML
INJECTION, SOLUTION INTRA-ARTICULAR; INTRALESIONAL; INTRAMUSCULAR; INTRAVENOUS; SOFT TISSUE
Status: DISCONTINUED | OUTPATIENT
Start: 2020-02-27 | End: 2020-02-27

## 2020-02-27 RX ORDER — PHENYLEPHRINE HYDROCHLORIDE 10 MG/ML
INJECTION INTRAVENOUS
Status: DISCONTINUED | OUTPATIENT
Start: 2020-02-27 | End: 2020-02-27

## 2020-02-27 RX ORDER — VANCOMYCIN HCL IN 5 % DEXTROSE 1G/250ML
PLASTIC BAG, INJECTION (ML) INTRAVENOUS
Status: COMPLETED
Start: 2020-02-27 | End: 2020-02-27

## 2020-02-27 RX ORDER — EPHEDRINE SULFATE 50 MG/ML
INJECTION, SOLUTION INTRAVENOUS
Status: DISCONTINUED | OUTPATIENT
Start: 2020-02-27 | End: 2020-02-27

## 2020-02-27 RX ORDER — ESMOLOL HYDROCHLORIDE 10 MG/ML
INJECTION INTRAVENOUS
Status: DISCONTINUED | OUTPATIENT
Start: 2020-02-27 | End: 2020-02-27

## 2020-02-27 RX ORDER — SODIUM CHLORIDE 9 MG/ML
INJECTION, SOLUTION INTRAVENOUS CONTINUOUS
Status: DISCONTINUED | OUTPATIENT
Start: 2020-02-27 | End: 2020-02-27 | Stop reason: HOSPADM

## 2020-02-27 RX ADMIN — ESMOLOL HYDROCHLORIDE 20 MG: 100 INJECTION, SOLUTION INTRAVENOUS at 11:02

## 2020-02-27 RX ADMIN — EPHEDRINE SULFATE 5 MG: 50 INJECTION INTRAVENOUS at 11:02

## 2020-02-27 RX ADMIN — SODIUM CHLORIDE: 0.9 INJECTION, SOLUTION INTRAVENOUS at 08:02

## 2020-02-27 RX ADMIN — NEOSTIGMINE METHYLSULFATE 3 MG: 1 INJECTION INTRAVENOUS at 12:02

## 2020-02-27 RX ADMIN — PROPOFOL 60 MG: 10 INJECTION, EMULSION INTRAVENOUS at 11:02

## 2020-02-27 RX ADMIN — GLYCOPYRROLATE 0.4 MG: 0.2 INJECTION, SOLUTION INTRAMUSCULAR; INTRAVENOUS at 12:02

## 2020-02-27 RX ADMIN — NICARDIPINE HYDROCHLORIDE 0.2 MG: 0.2 INJECTION, SOLUTION INTRAVENOUS at 11:02

## 2020-02-27 RX ADMIN — LIDOCAINE HYDROCHLORIDE 100 MG: 20 INJECTION, SOLUTION INTRAVENOUS at 10:02

## 2020-02-27 RX ADMIN — NICARDIPINE HYDROCHLORIDE 0.4 MG: 0.2 INJECTION, SOLUTION INTRAVENOUS at 11:02

## 2020-02-27 RX ADMIN — SODIUM CHLORIDE, SODIUM GLUCONATE, SODIUM ACETATE, POTASSIUM CHLORIDE, MAGNESIUM CHLORIDE, SODIUM PHOSPHATE, DIBASIC, AND POTASSIUM PHOSPHATE: .53; .5; .37; .037; .03; .012; .00082 INJECTION, SOLUTION INTRAVENOUS at 10:02

## 2020-02-27 RX ADMIN — MIDAZOLAM 2 MG: 1 INJECTION INTRAMUSCULAR; INTRAVENOUS at 10:02

## 2020-02-27 RX ADMIN — NICARDIPINE HYDROCHLORIDE 0.4 MG: 0.2 INJECTION, SOLUTION INTRAVENOUS at 12:02

## 2020-02-27 RX ADMIN — VANCOMYCIN HYDROCHLORIDE 1000 MG: 1 INJECTION, POWDER, LYOPHILIZED, FOR SOLUTION INTRAVENOUS at 08:02

## 2020-02-27 RX ADMIN — DEXAMETHASONE SODIUM PHOSPHATE 8 MG: 4 INJECTION, SOLUTION INTRAMUSCULAR; INTRAVENOUS at 10:02

## 2020-02-27 RX ADMIN — PHENYLEPHRINE HYDROCHLORIDE 150 MCG: 10 INJECTION INTRAVENOUS at 10:02

## 2020-02-27 RX ADMIN — GLYCOPYRROLATE 0.2 MG: 0.2 INJECTION, SOLUTION INTRAMUSCULAR; INTRAVENOUS at 10:02

## 2020-02-27 RX ADMIN — KETAMINE HYDROCHLORIDE 50 MG: 100 INJECTION, SOLUTION, CONCENTRATE INTRAMUSCULAR; INTRAVENOUS at 10:02

## 2020-02-27 RX ADMIN — PHENYLEPHRINE HYDROCHLORIDE 50 MCG: 10 INJECTION INTRAVENOUS at 11:02

## 2020-02-27 RX ADMIN — FENTANYL CITRATE 100 MCG: 50 INJECTION, SOLUTION INTRAMUSCULAR; INTRAVENOUS at 10:02

## 2020-02-27 RX ADMIN — PROPOFOL 40 MG: 10 INJECTION, EMULSION INTRAVENOUS at 10:02

## 2020-02-27 RX ADMIN — SODIUM CHLORIDE, SODIUM GLUCONATE, SODIUM ACETATE, POTASSIUM CHLORIDE, MAGNESIUM CHLORIDE, SODIUM PHOSPHATE, DIBASIC, AND POTASSIUM PHOSPHATE: .53; .5; .37; .037; .03; .012; .00082 INJECTION, SOLUTION INTRAVENOUS at 12:02

## 2020-02-27 RX ADMIN — ESMOLOL HYDROCHLORIDE 10 MG: 100 INJECTION, SOLUTION INTRAVENOUS at 11:02

## 2020-02-27 RX ADMIN — ROCURONIUM BROMIDE 50 MG: 10 INJECTION, SOLUTION INTRAVENOUS at 10:02

## 2020-02-27 RX ADMIN — PROPOFOL 100 MG: 10 INJECTION, EMULSION INTRAVENOUS at 10:02

## 2020-02-27 RX ADMIN — Medication 1000 MG: at 08:02

## 2020-02-27 RX ADMIN — ONDANSETRON 4 MG: 2 INJECTION INTRAMUSCULAR; INTRAVENOUS at 12:02

## 2020-02-27 NOTE — PLAN OF CARE
Patient ready to be discharged. VSS and in no distress.    Instructions given, patient verbalizes understanding. Pain assessed and addressed. Tolerates liquids by mouth with no nausea and vomiting.

## 2020-02-27 NOTE — ANESTHESIA PREPROCEDURE EVALUATION
02/27/2020  Gus Sabillon is a 79 y.o., male.    Procedure Summary     Case: [id number] Date/Time: 02/27/20 0945   Procedures:       REPAIR, ROTATOR CUFF, ARTHROSCOPIC (Right Shoulder)      DEBRIDEMENT, SHOULDER, ARTHROSCOPIC (Right )      FIXATION, TENDON, Biceps Tenodesis (Right ) - FIXATION, TENDON, Biceps Tenodesis      ORIF, FRACTURE, HUMERUS, PROXIMAL (Right Arm Upper)      MICROFRACTURE (Right ) - MICROFRACTURE   Anesthesia type: General   Diagnosis:       Nontraumatic tear of right rotator cuff, unspecified tear extent [M75.101]      Biceps tendinitis of right upper extremity [M75.21]      Superior glenoid labrum lesion of right shoulder, initial encounter [S43.575A]      Closed nondisplaced fracture of greater tuberosity of right humerus, initial encounter [S42.573A]     Patient Active Problem List   Diagnosis    HTN (hypertension), benign    Dyslipidemia    PAD (peripheral artery disease)    Gastroesophageal reflux disease    Carotid atherosclerosis, bilateral    Nontraumatic tear of right rotator cuff     Past Surgical History:   Procedure Laterality Date    BLEPHAROPLASTY      CATARACT EXTRACTION, BILATERAL      TONSILLECTOMY       Current Discharge Medication List      CONTINUE these medications which have NOT CHANGED    Details   aspirin (ECOTRIN) 81 MG EC tablet Take 81 mg by mouth once daily.      atenolol (TENORMIN) 50 MG tablet Take 50 mg by mouth once daily.      multivit-min-FA-lycopen-lutein (CENTRUM SILVER MEN) 300-600-300 mcg Tab Take by mouth.      omega-3 fatty acids/fish oil (FISH OIL-OMEGA-3 FATTY ACIDS) 300-1,000 mg capsule Take by mouth once daily.      omeprazole (PRILOSEC) 20 MG capsule Take 20 mg by mouth once daily.      rosuvastatin (CRESTOR) 10 MG tablet Take 10 mg by mouth once daily.      !! traMADol (ULTRAM) 50 mg tablet Take 1 tablet (50 mg total) by mouth  every 8 (eight) hours as needed for Pain.  Qty: 20 tablet, Refills: 0    Comments: Quantity prescribed more than 7 day supply? No      triamterene-hydrochlorothiazide 37.5-25 mg (DYAZIDE) 37.5-25 mg per capsule Take 1 capsule by mouth every morning.      oxyCODONE-acetaminophen (PERCOCET)  mg per tablet Take 1 tablet by mouth every 4-6 hours as needed for pain. Take stool softener with this medication.  Qty: 21 tablet, Refills: 0    Comments: Quantity prescribed more than 7 day supply? No  Associated Diagnoses: Nontraumatic tear of right rotator cuff, unspecified tear extent; Biceps tendinitis of right upper extremity; Superior glenoid labrum lesion of right shoulder, initial encounter; Post-operative pain      promethazine (PHENERGAN) 25 MG tablet Take 1 tablet (25 mg total) by mouth every 6 (six) hours as needed for Nausea.  Qty: 12 tablet, Refills: 0    Associated Diagnoses: Nontraumatic tear of right rotator cuff, unspecified tear extent; Biceps tendinitis of right upper extremity; Superior glenoid labrum lesion of right shoulder, initial encounter; Post-operative pain      !! traMADol (ULTRAM) 50 mg tablet Take 1-2 tablets ( mg total) by mouth every 6 (six) hours as needed.  Qty: 21 tablet, Refills: 0    Comments: Quantity prescribed more than 7 day supply? No  Associated Diagnoses: Nontraumatic tear of right rotator cuff, unspecified tear extent; Biceps tendinitis of right upper extremity; Superior glenoid labrum lesion of right shoulder, initial encounter; Post-operative pain       !! - Potential duplicate medications found. Please discuss with provider.          Anesthesia Evaluation    I have reviewed the Patient Summary Reports.    I have reviewed the Nursing Notes.   I have reviewed the Medications.     Review of Systems  Anesthesia Hx:  No problems with previous Anesthesia  Denies Family Hx of Anesthesia complications.   Denies Personal Hx of Anesthesia complications.   Social:  Alcohol Use,  "Former Smoker    Hematology/Oncology:  Hematology Normal   Oncology Normal     EENT/Dental:EENT/Dental Normal   Cardiovascular:   Exercise tolerance: good Hypertension  Denies Angina. hyperlipidemia PAD-stents bilat LE   Pulmonary:  Pulmonary Normal  Denies Shortness of breath.  Denies Recent URI.    Renal/:   Chronic Renal Disease, CRI Elevated BUN (36) on Dec labs- states a "kidney problem,b ut nothing worth treating"-moving from [place]-repeating labs in February   Hepatic/GI:   GERD    Musculoskeletal:  Musculoskeletal Normal    Neurological:  Neurology Normal    Endocrine:  Endocrine Normal    Dermatological:  Skin Normal    Psych:  Psychiatric Normal           Physical Exam  General:  Well nourished, Obesity    Airway/Jaw/Neck:  Airway Findings: Mouth Opening: Normal Tongue: Normal  General Airway Assessment: Adult  Mallampati: II  TM Distance: Normal, at least 6 cm      Dental:  Dental Findings: In tact               Anesthesia Plan  Type of Anesthesia, risks & benefits discussed:  Anesthesia Type:  general  Patient's Preference:   Intra-op Monitoring Plan: standard ASA monitors  Intra-op Monitoring Plan Comments:   Post Op Pain Control Plan: multimodal analgesia, peripheral nerve block and per primary service following discharge from PACU  Post Op Pain Control Plan Comments:   Induction:   IV  Beta Blocker:  Patient is on a Beta-Blocker and has received one dose within the past 24 hours (No further documentation required).       Informed Consent: Patient understands risks and agrees with Anesthesia plan.  Questions answered. Anesthesia consent signed with patient.  ASA Score: 2     Day of Surgery Review of History & Physical:            Ready For Surgery From Anesthesia Perspective.       "

## 2020-02-27 NOTE — OP NOTE
DATE OF PROCEDURE: 02/27/2020    SURGEON: Staci Childress M.D.    ASSISTANT: GUALBERTO Ko MD PGY6  ASSISTANT: SMA Prince      PREOPERATIVE DIAGNOSES:   right  1. Shoulder rotator cuff tear   2. Shoulder biceps tendonitis  3. Shoulder synovitis  4. Shoulder SLAP  5. Shoulder impingement, bursitis  6. Shoulder greater tuberosity insufficiency fracture    POSTOPERATIVE DIAGNOSES:   right  1. Shoulder rotator cuff tear   2. Shoulder biceps tendonitis  3. Shoulder synovitis  4. Shoulder SLAP  5. Shoulder impingement, bursitis  6. Shoulder adhesions  7. Shoulder greater tuberosity insufficiency fracture    OPERATION:   right  1. Shoulder arthroscopic rotator cuff repair 45 x 25 mm, large, double row (CPT 43927) (complex, -22 modifier)  2. Shoulder subpectoral biceps tenodesis, arthroscopic-assisted (CPT 11302)  3. Shoulder arthroscopic subacromial decompression, bursectomy   4. Shoulder arthroscopic extensive debridement (anterior, posterior glenohumeral joint, subacromial space) (CPT 28269)  5. Shoulder arthroscopic microfracture (Crimson duvet technique) (CPT 83260)  6. Shoulder arthroscopic lysis of adhesions (CPT 44955)  7. Shoulder ORIF greater tuberosity (CPT 31956)    ANESTHESIA:  General with intra-op suprascapular nerve block with local    ESTIMATED BLOOD LOSS:  Minimal.    TOURNIQUET TIME:  None.    DRAINS:  None.    COMPLICATIONS:  None.  The patient was moved to the recovery room in stable condition with compartments soft and cap refill less than a second in all digits.    COMPLEX PROCEDURE:   There was an altered surgical field. There was abnormal anatomy. There was scarring to the area, of the cuff tear. Complexity of the service was much greater than the normative procedure. There was increased time, intensity and technical difficulty of the procedure, severity of the patient's condition and mental effort required.  This was a highly complicated repair and tear pattern that required advanced arthroscopic  skill in order to safely and technically perform it.  Nevertheless the repair achieved was excellent. This tear pattern neccessistated margin convergence suture supplementation.    INDICATIONS/MEDICAL NECESSITY: The patient is a 79 y.o. year-old male who has history and physical examination findings consistent with the above.  Nonoperative versus operative options were discussed.  The risks and benefits were discussed with the patient.  The patient acknowledged understanding and wished to proceed with operative intervention.  Informed consent was obtained prior to the procedure.  Reasonable expectations and potential complications were discussed and acknowledged, including but not limited to infection, bleeding, blood clots, (DVT and/or PE), nerve injury, tear, instability, continued pain and stiffness.  They agreed and understood and wished to proceed.    EXAMINATION UNDER ANESTHESIA of the right SHOULDER: Forward elevation 175 degrees, External rotation at 0 60 degrees, External rotation at 90 90 degrees, Internal rotation at 90 60 degrees.  Translation testing: anterior grade 1, posterior grade 1, sulcus sign grade 1 corrects to 0 on external rotation.    EXAMINATION UNDER ANESTHESIA of the left SHOULDER: Forward elevation 175 degrees, External rotation at 0 60 degrees, External rotation at 90 90 degrees, Internal rotation at 90 60 degrees.  Translation testing: anterior grade 1, posterior grade 1, sulcus sign grade 1 corrects to 0 on external rotation.    PROCEDURE IN DETAIL:  After the correct operative site was marked by the operating surgeon. The patient was then taken to the operating room and placed supine on the operating room table, where the patient  underwent general anesthesia by the anesthesia team.  The patient was then rolled into the lateral decubitus position with the operative side up.  A well-padded axillary roll, beanbag and pillows were placed.  All pressure points were carefully padded and  checked.  The upper extremities and both lower extremities were placed in comfortable positions and were also well-padded.  The operative upper extremity was then prepped and draped in the usual sterile fashion.    Suprascapular nerve block was performed in the standard fashion with 5cc local anesthetic.    The Spider arm positioner was implemented with balanced suspension and appropriate landmarks were noted on the skin.  A posterior followed by venkatesh-superior portals were created and systematic examination of the joint revealed the following:      There was no evidence of any significant chondral lesions to the glenoid or humeral head.     Tenosynovitis and SLAP type II was noted to the biceps tendon on ramp sign.    There was some synovitis in the labrum that was debrided as needed.  Biceps release with auto-tenodesis was performed using thermal device.  Part of superior labrum was included to allow the biceps tendon to auto-tenodese.  Attention was then turned to the rotator cuff.  The rotator cuff undersurface was then visualized and debrided as needed using a shaver.      Attention was then turned to the subacromial space where a significant hypertrophic bursa was encountered.  The bursa itself was thickened and hypertrophic.  A lateral portal was created to assist with the bursectomy.  Subacromial decompression was completed using three-portal technique in the standard fashion with a 4.5 mm ruthann without difficulty.  The anterior osteophyte was flattened.  Confirmation of adequate resection was confirmed while viewing from the lateral portal.      Next, attention was then turned to the distal clavicle excision.  A thermal device was used to clear the soft tissue off the length of the AC joint approximately 1 cm medial to the end of the distal end of the clavicle.  The anterior portal which was established earlier was used to perform the AC resection at the level of the AC joint.  The adequacy of the  resection was confirmed while viewing from the anterior portal.  Care was taken to resect enough postero-superiorly.  Approximately 9 mm was resected.     COMPLEX PROCEDURE:   There was an altered surgical field. There was abnormal anatomy. There was scarring to the area, of the cuff tear. Complexity of the service was much greater than the normative procedure. There was increased time, intensity and technical difficulty of the procedure, severity of the patient's condition and mental effort required.  This was a highly complicated repair and tear pattern that required advanced arthroscopic skill in order to safely and technically perform it.  Nevertheless the repair achieved was excellent. This tear pattern neccessistated margin convergence suture supplementation.    The surface of the rotator cuff was then gently debrided and mobilized.  We did this using a shaver while viewing through the posterior portal and the shaver used through the lateral portal.  We were then able to mobilize the cuff tear which was located at the supraspinatus extending to the infraspinatus.  The cuff was able to be mobilized adequately laterally.  The cuff tear dimensions were: 45 mm AP and 25 mm medial to lateral.  The undersurface edge of the cuff was debrided as needed using the shaver.  A 7 mm cannula was placed in the lateral and venkatesh-superior portals.  The bony bed of the greater tuberosity was prepared with the ruthann.  This left a nice surface for the articular surface of the cuff to be repaired to and heal to.  Adequate debridement was performed, moving the arm as needed with internal/external rotation.     Shoulder arthroscopic lysis of adhesions (CPT 68772):  With the arthroscope in the subacromial space, an extensive lysis of adhesions needed to be performed with lysis of adhesions in the lateral gutter, posterior gutter, and anterior gutter where there was extensive scarring from the chronic nature of the rotator cuff tear.   The rotator cuff was retracted and there was a large tear noted.  After the lysis of adhesions, the mobility was restored to the rotator cuff tissue and shoulder.    Tactoset bone graft substitute (hydroxyapatite) was inserted in the standard fashion into site of bone marrow edema/ insufficiency fracture at greater tuberosity with MRI correlation, subsequent anchor fixation was good.    A 3 mm incision was made to place the 2 anchors through.  This was done in a central location using internal and external rotation to place the two 5.5mm Ar[employer] Bio-corkscrew anchors; one  anteriorly and one posteriorly.  The anterior anchor was placed first and the posterior anchor was placed second.  Scorpion suture-passing device was used to place two horizontal mattress sutures through the cuff starting anteriorly and proceeding posteriorly.  Next, the posterior anchor was placed and two more horizontal mattress sutures were passed.  After the two anchors were placed we had four horizontal mattress sutures proceeding from anterior to posterior that were running through the cuff.  The cuff tear was a crecentic shaped tear.  The sutures were then tied using modified Da knots proceeding from anterior to posterior.  All knots had good loop and knot security using modified Da knots.  After knots were applied from anterior to posterior, the cuff was reapproximated down to the greater tuberosity with good compression and knots on the superior side of the cuff.  The shoulder was cycled through full internal/external rotation.  No suture breakage was noted and the cuff was noted to remain nicely reapproximated throughout the entire rotation of the joint.  The scope was then placed in the joint on the articular side of the cuff.  The cuff was reapproximated nicely.     Prior to the lateral cuff fixation, 'crimson duvet' microfracture technique was performed using an awl to the greater tuberosity in the standard fashion using awl  punch device. There was confirmation of marrow elements extravasating out of the end of the microfracture holes upon decrease of pump pressure.     Double row cuff fixation was then performed using a 4.75 x 19.1 mm Swivelock bio-composite anchor x 2.  This gave good compression of the rotator cuff tissue lateral to the medial row down onto the greater tuberosity, which was previously repaired with a bur.      Suprascapular nerve block was performed in the standard fashion with 5cc local anesthetic, and 5cc subacromially for local.  Local was placed about portals and incision(s) after irrigation, as described below, was completed. The shoulder and subacromial space were then irrigated and fluid was extravasated using suction. All portals were reapproximated using inverted 4-0 Monocryl suture in the subcutaneous tissue of the portals. Mastisol and Steri-strips were placed with xeroform, 4x4s, abd pad, and Medi-pore tape.  TENS unit pads were placed which were medically necessary for pain relief.  An iceman was secured in the shoulder burden.  A sling with an abduction pillow was secured.  The patient was then moved to supine, extubated and taken to the recovery room where the patient arrived in stable condition with the compartments of the arm and forearm soft and cap refill less than a second in all digits.     POSTOPERATIVE PLAN: We will follow the arthroscopic rotator cuff repair guidelines for a large size rotator cuff tear.  We discussed with the patient's family after surgery.  The patient will remain in a sling for 6 weeks.  PT to start at 4 weeks.    Quality of tissue: good     Quality of the repair: good      Size of tear: 45 x 25 mm

## 2020-02-27 NOTE — TRANSFER OF CARE
Anesthesia Transfer of Care Note    Patient: Gus Sabillon    Procedure(s) Performed: Procedure(s) (LRB):  REPAIR, ROTATOR CUFF, ARTHROSCOPIC (Right)  EXTENSIVE DEBRIDEMENT, SHOULDER, ARTHROSCOPIC (Right)  FIXATION, TENDON, Biceps Tenodesis (Right)  ORIF, FRACTURE, GREATER TUBEROSITY (Right)  MICROFRACTURE (Right)  ARTHROSCOPY, SHOULDER, WITH SUBACROMIAL SPACE DECOMPRESSION (Right)  [id number] (Right)    Patient location: PACU    Anesthesia Type: general    Transport from OR: Transported from OR on 6-10 L/min O2 by face mask with adequate spontaneous ventilation    Post pain: adequate analgesia    Post assessment: no apparent anesthetic complications and tolerated procedure well    Post vital signs: stable    Level of consciousness: sedated    Nausea/Vomiting: no nausea/vomiting    Complications: none    Transfer of care protocol was followed      Last vitals:   Visit Vitals  BP (!) 140/63 (BP Location: Left arm, Patient Position: Lying)   Pulse 74   Temp 36.4 °C (97.5 °F) (Temporal)   Resp 18   Ht 6' (1.829 m)   Wt 79.4 kg (175 lb)   SpO2 96%   BMI 23.73 kg/m²

## 2020-02-27 NOTE — BRIEF OP NOTE
[facility]  Brief Operative Note    Surgery Date: 2/27/2020     Surgeon(s) and Role:     * Staci Childress MD - Primary    Assisting Surgeon: None    Pre-op Diagnosis:  Nontraumatic tear of right rotator cuff, unspecified tear extent [M75.101]  Biceps tendinitis of right upper extremity [M75.21]  Superior glenoid labrum lesion of right shoulder, initial encounter [S43.431A]  Closed nondisplaced fracture of greater tuberosity of right humerus, initial encounter [S42.254A]    Post-op Diagnosis:  Post-Op Diagnosis Codes:     * Nontraumatic tear of right rotator cuff, unspecified tear extent [M75.101]     * Biceps tendinitis of right upper extremity [M75.21]     * Superior glenoid labrum lesion of right shoulder, initial encounter [S43.431A]     * Closed nondisplaced fracture of greater tuberosity of right humerus, initial encounter [S42.254A]    Procedure(s) (LRB):  REPAIR, ROTATOR CUFF, ARTHROSCOPIC (Right)  EXTENSIVE DEBRIDEMENT, SHOULDER, ARTHROSCOPIC (Right)  FIXATION, TENDON, Biceps Tenodesis (Right)  ORIF, FRACTURE, GREATER TUBEROSITY (Right)  MICROFRACTURE (Right)  ARTHROSCOPY, SHOULDER, WITH SUBACROMIAL SPACE DECOMPRESSION (Right)  [id number] (Right)    Anesthesia: General    Description of the findings of the procedure(s): please op report    Estimated Blood Loss: * No values recorded between 2/27/2020 10:54 AM and 2/27/2020 12:51 PM *         Specimens:   Specimen (12h ago, onward)    None            Discharge Note    OUTCOME: Patient tolerated treatment/procedure well without complication and is now ready for discharge.    DISPOSITION: Home or Self Care    FINAL DIAGNOSIS:  Nontraumatic tear of right rotator cuff    FOLLOWUP: In clinic    DISCHARGE INSTRUCTIONS:    Discharge Procedure Orders   Diet general     Call MD for:  temperature >100.4     Call MD for:  persistent nausea and vomiting     Call MD for:  severe uncontrolled pain     Call MD for:  difficulty breathing,  headache or visual disturbances     Call MD for:  redness, tenderness, or signs of infection (pain, swelling, redness, odor or green/yellow discharge around incision site)     Call MD for:  hives     Call MD for:  persistent dizziness or light-headedness     Call MD for:  extreme fatigue     No driving, operating heavy equipment or signing legal documents while taking pain medication     Remove dressing in 72 hours

## 2020-02-28 VITALS
HEIGHT: 72 IN | HEART RATE: 86 BPM | WEIGHT: 175 LBS | OXYGEN SATURATION: 94 % | TEMPERATURE: 98 F | RESPIRATION RATE: 21 BRPM | DIASTOLIC BLOOD PRESSURE: 75 MMHG | BODY MASS INDEX: 23.7 KG/M2 | SYSTOLIC BLOOD PRESSURE: 149 MMHG

## 2020-03-02 NOTE — ANESTHESIA POSTPROCEDURE EVALUATION
Anesthesia Post Evaluation    Patient: Gus Sabillon    Procedure(s) Performed: Procedure(s) (LRB):  REPAIR, ROTATOR CUFF, ARTHROSCOPIC (Right)  EXTENSIVE DEBRIDEMENT, SHOULDER, ARTHROSCOPIC (Right)  FIXATION, TENDON, Biceps Tenodesis (Right)  ORIF, FRACTURE, GREATER TUBEROSITY (Right)  MICROFRACTURE (Right)  ARTHROSCOPY, SHOULDER, WITH SUBACROMIAL SPACE DECOMPRESSION (Right)  [id number] (Right)    Final Anesthesia Type: general    Patient location during evaluation: PACU  Patient participation: Yes- Able to Participate  Level of consciousness: awake and alert and oriented  Post-procedure vital signs: reviewed and stable  Pain management: adequate  Airway patency: patent    PONV status at discharge: No PONV  Anesthetic complications: no      Cardiovascular status: hemodynamically stable  Respiratory status: nasal cannula  Hydration status: euvolemic  Follow-up not needed.          Vitals Value Taken Time   /75 2/27/2020  2:00 PM   Temp 36.6 °C (97.8 °F) 2/27/2020  2:00 PM   Pulse 86 2/27/2020  2:00 PM   Resp 21 2/27/2020  2:00 PM   SpO2 94 % 2/27/2020  2:00 PM         Event Time     Out of Recovery 13:30:00          Pain/Alexia Score: No data recorded

## 2020-03-13 ENCOUNTER — HOSPITAL ENCOUNTER (OUTPATIENT)
Dept: RADIOLOGY | Facility: HOSPITAL | Age: 80
Discharge: HOME | End: 2020-03-13
Attending: PHYSICIAN ASSISTANT
Payer: MEDICARE

## 2020-03-13 ENCOUNTER — OFFICE VISIT (OUTPATIENT)
Dept: SPORTS MEDICINE | Facility: CLINIC | Age: 80
End: 2020-03-13
Payer: MEDICARE

## 2020-03-13 VITALS
BODY MASS INDEX: 23.7 KG/M2 | WEIGHT: 175 LBS | SYSTOLIC BLOOD PRESSURE: 133 MMHG | HEIGHT: 72 IN | DIASTOLIC BLOOD PRESSURE: 68 MMHG | HEART RATE: 71 BPM

## 2020-03-13 DIAGNOSIS — M75.101 NONTRAUMATIC TEAR OF RIGHT ROTATOR CUFF, UNSPECIFIED TEAR EXTENT: Primary | ICD-10-CM

## 2020-03-13 DIAGNOSIS — M75.101 NONTRAUMATIC TEAR OF RIGHT ROTATOR CUFF, UNSPECIFIED TEAR EXTENT: ICD-10-CM

## 2020-03-13 DIAGNOSIS — M75.21 BICEPS TENDINITIS OF RIGHT UPPER EXTREMITY: ICD-10-CM

## 2020-03-13 PROCEDURE — 73030 X-RAY EXAM OF SHOULDER: CPT | Mod: 26,RT,, | Performed by: RADIOLOGY

## 2020-03-13 PROCEDURE — 99999 PR PBB SHADOW E&M-EST. PATIENT-LVL III: CPT | Mod: PBBFAC,,, | Performed by: PHYSICIAN ASSISTANT

## 2020-03-13 PROCEDURE — 99024 POSTOP FOLLOW-UP VISIT: CPT | Mod: S$GLB,,, | Performed by: PHYSICIAN ASSISTANT

## 2020-03-13 PROCEDURE — 99024 PR POST-OP FOLLOW-UP VISIT: ICD-10-PCS | Mod: S$GLB,,, | Performed by: PHYSICIAN ASSISTANT

## 2020-03-13 PROCEDURE — 99999 PR PBB SHADOW E&M-EST. PATIENT-LVL III: ICD-10-PCS | Mod: PBBFAC,,, | Performed by: PHYSICIAN ASSISTANT

## 2020-03-13 PROCEDURE — 73030 X-RAY EXAM OF SHOULDER: CPT | Mod: TC,RT

## 2020-03-13 PROCEDURE — 73030 XR SHOULDER COMPLETE 2 OR MORE VIEWS RIGHT: ICD-10-PCS | Mod: 26,RT,, | Performed by: RADIOLOGY

## 2020-03-13 NOTE — PROGRESS NOTES
Chief Complaint: 2 week shoulder surgery f/u    HISTORY OF PRESENT ILLNESS:   Pt is here today for first post-operative followup of shoulder arthroscopy.  he is doing well.  We have reviewed patient's findings and discussed plan of care and future treatment options.      Tolerating pain well.   Wearing sling which is in place.  No issues reported.     DATE OF PROCEDURE: 02/27/2020     SURGEON: Staci Childress M.D.     OPERATION:   right  1. Shoulder arthroscopic rotator cuff repair 45 x 25 mm, large, double row (CPT 51534) (complex, -22 modifier)  2. Shoulder subpectoral biceps tenodesis, arthroscopic-assisted (CPT 85023)  3. Shoulder arthroscopic subacromial decompression, bursectomy   4. Shoulder arthroscopic extensive debridement (anterior, posterior glenohumeral joint, subacromial space) (CPT 68777)  5. Shoulder arthroscopic microfracture (Crimson duvet technique) (CPT 52957)  6. Shoulder arthroscopic lysis of adhesions (CPT 43774)  7. Shoulder ORIF greater tuberosity (CPT 87653)                                                                                  PHYSICAL EXAMINATION:     Incision sites healed well  No evidence of any erythema, infection or induration  elbow Range of motion full   Minimal effusion  2+ Radial pulses  No swelling                                                                               ASSESSMENT:                                                                                                                                               1. Status post above, doing well.                                                                                                                               PLAN:                                                                                                                                                     1. PT to start in 2 weeks; wean narcotics  2. Emphasized scapular function.  3. I have discussed return to activity in detail.  4. Patient will  see us back in 4 weeks.                                      5. All questions were answered and the patient should contact us if he  has any questions or concerns in the interim.

## 2020-03-20 ENCOUNTER — TELEPHONE (OUTPATIENT)
Dept: SPORTS MEDICINE | Facility: CLINIC | Age: 80
End: 2020-03-20

## 2020-03-24 ENCOUNTER — TELEPHONE (OUTPATIENT)
Dept: SPORTS MEDICINE | Facility: CLINIC | Age: 80
End: 2020-03-24

## 2020-03-24 NOTE — TELEPHONE ENCOUNTER
----- Message from Anamika Guillen sent at 3/24/2020  9:25 AM CDT -----  Contact: [employer]  RE-PT Gus Sabillon  MRN# [id number]   42     [employer] - looking for paperwork for approval of patients TENS unit. Today is the LAST DAY before denial.    Please contact [employer] (Alfredito) @#[phone]  RE-PT Gus Sabillon  MRN# [id number]   42  States is at least the 7th time she has tried to contact to get information    Please contact back TODAY, at your earliest opportunity, (Alfred[place]) @#[phone]    Today is the LAST DAY before denial.

## 2020-04-07 ENCOUNTER — TELEPHONE (OUTPATIENT)
Dept: REHABILITATION | Facility: HOSPITAL | Age: 80
End: 2020-04-07

## 2020-04-07 NOTE — TELEPHONE ENCOUNTER
Patient does not want to come into the Physical Therapy clinic due to concerns over COVID - 19. Patient would like to have his home exercises updated so that he is able to continue to progress independently. He has been performing his current exercises without issue and feels that he is recovering well. Exchanged e-mail information with the patient and will be sending an updated home exercise program.

## 2020-04-09 ENCOUNTER — PATIENT OUTREACH (OUTPATIENT)
Dept: ADMINISTRATIVE | Facility: OTHER | Age: 80
End: 2020-04-09

## 2020-04-10 NOTE — PROGRESS NOTES
Care Everywhere: n/a  Immunization: no profile in links  Health Maintenance: n/a  Media Review: n/a  Legacy Review: n/a  Order placed: n/a  Upcoming appts:n/a

## 2020-04-13 ENCOUNTER — DOCUMENTATION ONLY (OUTPATIENT)
Dept: SPORTS MEDICINE | Facility: CLINIC | Age: 80
End: 2020-04-13

## 2020-04-13 ENCOUNTER — TELEPHONE (OUTPATIENT)
Dept: REHABILITATION | Facility: HOSPITAL | Age: 80
End: 2020-04-13

## 2020-04-13 NOTE — PROGRESS NOTES
Chief Complaint: 6 week shoulder surgery f/u    HISTORY OF PRESENT ILLNESS:   Pt is virtual for first post-operative followup of shoulder arthroscopy.  he is doing well.  We have reviewed patient's findings and discussed plan of care and future treatment options.      Tolerating pain well.   Wearing sling which is in place.  No issues reported.     DATE OF PROCEDURE: 02/27/2020  SURGEON: Staci Childress M.D.  OPERATION:   right  1. Shoulder arthroscopic rotator cuff repair 45 x 25 mm, large, double row (CPT 10421) (complex, -22 modifier)  2. Shoulder subpectoral biceps tenodesis, arthroscopic-assisted (CPT 92932)  3. Shoulder arthroscopic subacromial decompression, bursectomy   4. Shoulder arthroscopic extensive debridement (anterior, posterior glenohumeral joint, subacromial space) (CPT 32057)  5. Shoulder arthroscopic microfracture (Crimson duvet technique) (CPT 81804)  6. Shoulder arthroscopic lysis of adhesions (CPT 92082)  7. Shoulder ORIF greater tuberosity (CPT 75389)                                                                                                                                                               ASSESSMENT:                                                                                                                                               1. Status post above, doing well.                                                                                                                               1. Continue PT virtual/HEP x 4-8 weeks ok, with weekly virtual/notes to PT  2. Emphasized scapular function.  3. I have discussed return to activity in detail.  4. Will see us back in 8-10 weeks.                                      5. All questions were answered and she should contact us if she  has any questions or concerns in the interim.

## 2020-04-14 ENCOUNTER — CLINICAL SUPPORT (OUTPATIENT)
Dept: REHABILITATION | Facility: HOSPITAL | Age: 80
End: 2020-04-14
Attending: PHYSICIAN ASSISTANT
Payer: MEDICARE

## 2020-04-14 DIAGNOSIS — M25.511 CHRONIC RIGHT SHOULDER PAIN: ICD-10-CM

## 2020-04-14 DIAGNOSIS — G89.29 CHRONIC RIGHT SHOULDER PAIN: ICD-10-CM

## 2020-04-14 DIAGNOSIS — G47.9 SLEEPING DIFFICULTY: ICD-10-CM

## 2020-04-14 DIAGNOSIS — R52 PAIN AGGRAVATED BY LIFTING: ICD-10-CM

## 2020-04-14 DIAGNOSIS — M75.101 NONTRAUMATIC TEAR OF RIGHT ROTATOR CUFF, UNSPECIFIED TEAR EXTENT: ICD-10-CM

## 2020-04-14 DIAGNOSIS — M75.21 BICEPS TENDINITIS OF RIGHT UPPER EXTREMITY: ICD-10-CM

## 2020-04-14 DIAGNOSIS — S43.431A SUPERIOR GLENOID LABRUM LESION OF RIGHT SHOULDER, INITIAL ENCOUNTER: ICD-10-CM

## 2020-04-14 DIAGNOSIS — M25.619 LIMITED RANGE OF MOTION (ROM) OF SHOULDER: ICD-10-CM

## 2020-04-14 PROCEDURE — 97110 THERAPEUTIC EXERCISES: CPT | Mod: 95 | Performed by: PHYSICAL THERAPIST

## 2020-04-14 NOTE — PLAN OF CARE
Physical Therapy Daily Treatment Note     Name: Gus Sabillon  Clinic Number: [id number]    Patient unsafe for in-person office visit due to high risk co-morbidities, probability of infection, to minimize exposure, due to pt expressing symptoms, and/or due to government mandated quarantine.  This patient: (1) was informed that telehealth visits are now available congruent with guidelines set by state practice acts & CMS; (2) initiated scheduling of this type of visit; and (3) gave verbal consent to participate in such.  The patient & provider used [employer] using both video & audio synchronous services to complete the visit.      Therapy Diagnosis:   Encounter Diagnoses   Name Primary?    Nontraumatic tear of right rotator cuff, unspecified tear extent     Biceps tendinitis of right upper extremity     Superior glenoid labrum lesion of right shoulder, initial encounter     Chronic right shoulder pain     Limited range of motion (ROM) of shoulder     Pain aggravated by lifting     Sleeping difficulty      Physician: Evangelist Khanna III, *    Visit Date: 4/14/2020  Patient Location:  Visit type: Virtual visit with synchronous audio and video through [employer]    Physician Orders: PT Eval and Treat  Medical Diagnosis:   M75.101 (ICD-10-CM) - Nontraumatic tear of right rotator cuff, unspecified tear extent   M75.21 (ICD-10-CM) - Biceps tendinitis of right upper extremity   S43.431A (ICD-10-CM) - Superior glenoid labrum lesion of right shoulder, initial encounter       Evaluation Date: 4/14/2020  Authorization Period Expiration: 2/20/2021  Plan of Care Certification Period: 5/19/2020  Visit #/Visits authorized: 1/ 1     Time Connection Initiated: 1015  Time Connection Terminated: 1040  Total Billable Time: 25 minutes    Precautions: Standard  POSTOPERATIVE PLAN: We will follow the arthroscopic rotator cuff repair guidelines for a large size rotator cuff tear.  We discussed with the patient's family after  surgery.  The patient will remain in a sling for 6 weeks.  PT to start at 4 weeks.       Subjective     Pt reports: Patient reports he received his HEP from Kyle Melchor, PT last week. He has been performing the exercises and gets relief from the pendulums. He notes not wearing the sling often around the house and letting the arm hang with relief. He notes sleeping with the sling on every night. He is pleased with his progress thus far and continues to quarantine at home.  He was compliant with home exercise program.  Response to previous treatment: relief from HEP  Functional change: No changes     Pain: 2/10  Location: right shoulder      Objective     Zeeshan received therapeutic exercises to develop ROM and flexibility for 10 minutes including:  Table slides flexion and scaption  Pendulums 4 way  Shoulder shrugs  Scapular retractions  Wrist flexion/extension/pronation/supination    Patient was demonstrated exercises and educated on proper exercise form extensively.      Home Exercises Provided and Patient Education Provided     Education provided:   - Instructed on protocol and proper exercise form. All of patient's questions were answered    Written Home Exercises Provided: yes.  Exercises were reviewed and Zeeshan was able to demonstrate them prior to the end of the session.  Zeeshan demonstrated good  understanding of the education provided.     See EMR under Patient Instructions for exercises provided 4/14/2020.    Assessment     Zeeshan is progressing well following his RCR. He has been compliant with HEP sent last week and was emailed new exercises to add to his home exercises. He notes compliance with sling at night but not wearing it during the day. We reviewed his protocol and continuing to not perform AROM or any strengthening to R arm.  Zeeshan is progressing well towards his goals.   Pt prognosis is Good.     Pt will continue to benefit from skilled outpatient physical therapy to address the deficits listed in  the problem list box on initial evaluation, provide pt/family education and to maximize pt's level of independence in the home and community environment.     Pt's spiritual, cultural and educational needs considered and pt agreeable to plan of care and goals.     Anticipated barriers to physical therapy: COVID-19    GOALS: Short Term Goals:  6 weeks  1.Report decreased right shoulder pain < / =  3/10  to increase tolerance for sleeping and ADLs at home  2. Increase PROM flexion and ER within protocol guidelines    3. Patient to sleep through the night without shoulder pain  4. Pt to tolerate HEP to improve ROM and independence with ADL's    Long Term Goals: 14 weeks  1.Report decreased R shoulder pain  < / =  0 /10  to increase tolerance for completing ADLs   2.Increase AROM to 140 deg to restore functional reach  3.Increase strength to >/= 4/5 in R shoulder to increase tolerance for ADL and work activities.  4. Pt goal: return to completing ADLs with right shoulder without pain      Plan     Progress with PROM within protocol    Armen Croft, PT

## 2020-04-14 NOTE — PROGRESS NOTES
Physical Therapy Daily Treatment Note     Name: Gus Sabillon  Clinic Number: [id number]    Patient unsafe for in-person office visit due to high risk co-morbidities, probability of infection, to minimize exposure, due to pt expressing symptoms, and/or due to government mandated quarantine.  This patient: (1) was informed that telehealth visits are now available congruent with guidelines set by state practice acts & CMS; (2) initiated scheduling of this type of visit; and (3) gave verbal consent to participate in such.  The patient & provider used Epic [employer] using both video & audio synchronous services to complete the visit.      Therapy Diagnosis:   Encounter Diagnoses   Name Primary?    Nontraumatic tear of right rotator cuff, unspecified tear extent     Biceps tendinitis of right upper extremity     Superior glenoid labrum lesion of right shoulder, initial encounter     Chronic right shoulder pain     Limited range of motion (ROM) of shoulder     Pain aggravated by lifting     Sleeping difficulty      Physician: Evangelist Khanna III, *    Visit Date: 4/14/2020  Patient Location:  Visit type: Virtual visit with synchronous audio and video through [employer]    Physician Orders: PT Eval and Treat  Medical Diagnosis:   M75.101 (ICD-10-CM) - Nontraumatic tear of right rotator cuff, unspecified tear extent   M75.21 (ICD-10-CM) - Biceps tendinitis of right upper extremity   S43.431A (ICD-10-CM) - Superior glenoid labrum lesion of right shoulder, initial encounter       Evaluation Date: 4/14/2020  Authorization Period Expiration: 2/20/2021  Plan of Care Certification Period: 5/19/2020  Visit #/Visits authorized: 1/ 1     Time Connection Initiated: 1015  Time Connection Terminated: 1040  Total Billable Time: 25 minutes    Precautions: Standard  POSTOPERATIVE PLAN: We will follow the arthroscopic rotator cuff repair guidelines for a large size rotator cuff tear.  We discussed with the patient's family after  surgery.  The patient will remain in a sling for 6 weeks.  PT to start at 4 weeks.       Subjective     Pt reports: Patient reports he received his HEP from Kyle Melchor, PT last week. He has been performing the exercises and gets relief from the pendulums. He notes not wearing the sling often around the house and letting the arm hang with relief. He notes sleeping with the sling on every night. He is pleased with his progress thus far and continues to quarantine at home.  He was compliant with home exercise program.  Response to previous treatment: relief from HEP  Functional change: No changes     Pain: 2/10  Location: right shoulder      Objective     Zeeshan received therapeutic exercises to develop ROM and flexibility for 10 minutes including:  Table slides flexion and scaption  Pendulums 4 way  Shoulder shrugs  Scapular retractions  Wrist flexion/extension/pronation/supination    Patient was demonstrated exercises and educated on proper exercise form extensively.      Home Exercises Provided and Patient Education Provided     Education provided:   - Instructed on protocol and proper exercise form. All of patient's questions were answered    Written Home Exercises Provided: yes.  Exercises were reviewed and Zeeshan was able to demonstrate them prior to the end of the session.  Zeeshan demonstrated good  understanding of the education provided.     See EMR under Patient Instructions for exercises provided 4/14/2020.    Assessment     Zeeshan is progressing well following his RCR. He has been compliant with HEP sent last week and was emailed new exercises to add to his home exercises. He notes compliance with sling at night but not wearing it during the day. We reviewed his protocol and continuing to not perform AROM or any strengthening to R arm.  Zeeshan is progressing well towards his goals.   Pt prognosis is Good.     Pt will continue to benefit from skilled outpatient physical therapy to address the deficits listed in  the problem list box on initial evaluation, provide pt/family education and to maximize pt's level of independence in the home and community environment.     Pt's spiritual, cultural and educational needs considered and pt agreeable to plan of care and goals.     Anticipated barriers to physical therapy: COVID-19    GOALS: Short Term Goals:  6 weeks  1.Report decreased right shoulder pain < / =  3/10  to increase tolerance for sleeping and ADLs at home  2. Increase PROM flexion and ER within protocol guidelines    3. Patient to sleep through the night without shoulder pain  4. Pt to tolerate HEP to improve ROM and independence with ADL's    Long Term Goals: 14 weeks  1.Report decreased R shoulder pain  < / =  0 /10  to increase tolerance for completing ADLs   2.Increase AROM to 140 deg to restore functional reach  3.Increase strength to >/= 4/5 in R shoulder to increase tolerance for ADL and work activities.  4. Pt goal: return to completing ADLs with right shoulder without pain      Plan     Progress with PROM within protocol    Armen Croft, PT

## 2020-04-29 ENCOUNTER — CLINICAL SUPPORT (OUTPATIENT)
Dept: REHABILITATION | Facility: HOSPITAL | Age: 80
End: 2020-04-29
Attending: INTERNAL MEDICINE
Payer: MEDICARE

## 2020-04-29 DIAGNOSIS — R52 PAIN AGGRAVATED BY LIFTING: ICD-10-CM

## 2020-04-29 DIAGNOSIS — M25.511 CHRONIC RIGHT SHOULDER PAIN: ICD-10-CM

## 2020-04-29 DIAGNOSIS — G89.29 CHRONIC RIGHT SHOULDER PAIN: ICD-10-CM

## 2020-04-29 DIAGNOSIS — G47.9 SLEEPING DIFFICULTY: ICD-10-CM

## 2020-04-29 DIAGNOSIS — M25.619 LIMITED RANGE OF MOTION (ROM) OF SHOULDER: ICD-10-CM

## 2020-04-29 PROCEDURE — 97110 THERAPEUTIC EXERCISES: CPT | Performed by: PHYSICAL THERAPIST

## 2020-04-29 NOTE — PROGRESS NOTES
Physical Therapy Daily Treatment Note     Name: Gus Sabillon  Clinic Number: [id number]    Patient unsafe for in-person office visit due to high risk co-morbidities, probability of infection, to minimize exposure, due to pt expressing symptoms, and/or due to government mandated quarantine.  This patient: (1) was informed that telehealth visits are now available congruent with guidelines set by state practice acts & CMS; (2) initiated scheduling of this type of visit; and (3) gave verbal consent to participate in such.  The patient & provider used Epic [employer] using both video & audio synchronous services to complete the visit.      Therapy Diagnosis:   Encounter Diagnoses   Name Primary?    Chronic right shoulder pain     Limited range of motion (ROM) of shoulder     Pain aggravated by lifting     Sleeping difficulty      Physician: Staci Childress MD    Visit Date: 4/29/2020  Patient Location:  Visit type: Virtual visit with synchronous audio and video through [employer]    Physician Orders: PT Eval and Treat  Medical Diagnosis:   M75.101 (ICD-10-CM) - Nontraumatic tear of right rotator cuff, unspecified tear extent   M75.21 (ICD-10-CM) - Biceps tendinitis of right upper extremity   S43.431A (ICD-10-CM) - Superior glenoid labrum lesion of right shoulder, initial encounter       Evaluation Date: 4/14/2020  Authorization Period Expiration: 2/20/2021  Plan of Care Certification Period: 5/19/2020  Visit #/Visits authorized: 1/ 1     Time Connection Initiated: 1106  Time Connection Terminated: 1126  Total Billable Time: 20 minutes    Precautions: Standard  POSTOPERATIVE PLAN: We will follow the arthroscopic rotator cuff repair guidelines for a large size rotator cuff tear.  We discussed with the patient's family after surgery.  The patient will remain in a sling for 6 weeks.  PT to start at 4 weeks.       Subjective     Pt reports: The last 2 weeks my shoulder has been feeling much better. The table slides were  difficult at first, but now I can do them with ease. I feel like my shoulder motion is better and hoping to start some strengthening. Really happy with progress past couple weeks. [place] staying [place]    He was compliant with home exercise program.  Response to previous treatment: difficult at first, but improved  Functional change: Improved motion, only sleeping in the sling    Pain: 2/10  Location: right shoulder      Objective     4/29: Patient performed AAROM to 75% normal flexion with assist from GABO. ER at his side 50% normal compared to other extremity.     Zeeshan received therapeutic exercises to develop ROM and flexibility for 20 minutes including:  AAROM flexion in standing  Seated ER eblows by side   Isometrics towel at wall 5 directions  Wand flexion/ER    Patient was demonstrated exercises and educated on proper exercise form extensively.      Home Exercises Provided and Patient Education Provided     Education provided:   - Importance of continuing to restore his ROM and submax contractions of muscle. Should not be doing any heavy lifting at all with the affected shoulder. How to perform isometric and AAROM exercises    Written Home Exercises Provided: yes.  Exercises were reviewed and Zeeshan was able to demonstrate them prior to the end of the session.  Zeeshan demonstrated good  understanding of the education provided.     See EMR under Patient Instructions for exercises provided 4/29/20.    Assessment     Zeeshan was progressed today to AAROM and isometrics per protocol as he will be 9 weeks s/p RCR tomorrow. He notes the shoulder feeling much better and the motion improvements he has seen from the past HEP. He is pleased with virtual visits and finds them effective, requesting another visit in 2 weeks. He was emailed an extensive HEP which he will begin today. All questions were answered and he was educated extensively on safety following his surgery.        Zeeshan is progressing well towards his goals.   Pt prognosis is Good.     Pt will continue to benefit from skilled outpatient physical therapy to address the deficits listed in the problem list box on initial evaluation, provide pt/family education and to maximize pt's level of independence in the home and community environment.     Pt's spiritual, cultural and educational needs considered and pt agreeable to plan of care and goals.     Anticipated barriers to physical therapy: COVID-19      GOALS: Short Term Goals:  6 weeks  1.Report decreased right shoulder pain < / =  3/10  to increase tolerance for sleeping and ADLs at home (met)  2. Increase PROM flexion and ER within protocol guidelines   (met)  3. Patient to sleep through the night without shoulder pain  (met)  4. Pt to tolerate HEP to improve ROM and independence with ADL's  (met)    Long Term Goals: 14 weeks  1.Report decreased R shoulder pain  < / =  0 /10  to increase tolerance for completing ADLs  (progressing, not met)  2.Increase AROM to 140 deg to restore functional reach  (progressing, not met)  3.Increase strength to >/= 4/5 in R shoulder to increase tolerance for ADL and work activities.  (progressing, not met)  4. Pt goal: return to completing ADLs with right shoulder without pain  (progressing, not met)      Plan     Progress within protocol    Armen Croft, PT

## 2020-05-11 ENCOUNTER — PATIENT MESSAGE (OUTPATIENT)
Dept: INTERNAL MEDICINE | Facility: CLINIC | Age: 80
End: 2020-05-11

## 2020-05-11 ENCOUNTER — TELEPHONE (OUTPATIENT)
Dept: REHABILITATION | Facility: HOSPITAL | Age: 80
End: 2020-05-11

## 2020-05-11 NOTE — TELEPHONE ENCOUNTER
Called and spoke with patient who reports some impingement in the shoulder. We modified his exercises to avoid above 90 degrees and will review his scapular stability on Wednesday virtual visit.

## 2020-05-12 ENCOUNTER — PATIENT MESSAGE (OUTPATIENT)
Dept: INTERNAL MEDICINE | Facility: CLINIC | Age: 80
End: 2020-05-12

## 2020-05-12 DIAGNOSIS — L98.9 SKIN LESION: Primary | ICD-10-CM

## 2020-05-12 NOTE — TELEPHONE ENCOUNTER
I cannot determine what this is on the images. If it has remained unchanged for that length of time it should be looked at, probably best by Derm.  Will enter referral if agreeable

## 2020-05-13 ENCOUNTER — CLINICAL SUPPORT (OUTPATIENT)
Dept: REHABILITATION | Facility: HOSPITAL | Age: 80
End: 2020-05-13
Attending: PHYSICIAN ASSISTANT
Payer: MEDICARE

## 2020-05-13 DIAGNOSIS — M25.619 LIMITED RANGE OF MOTION (ROM) OF SHOULDER: ICD-10-CM

## 2020-05-13 DIAGNOSIS — G89.29 CHRONIC RIGHT SHOULDER PAIN: ICD-10-CM

## 2020-05-13 DIAGNOSIS — G47.9 SLEEPING DIFFICULTY: ICD-10-CM

## 2020-05-13 DIAGNOSIS — M25.511 CHRONIC RIGHT SHOULDER PAIN: ICD-10-CM

## 2020-05-13 DIAGNOSIS — R52 PAIN AGGRAVATED BY LIFTING: ICD-10-CM

## 2020-05-13 PROCEDURE — 97110 THERAPEUTIC EXERCISES: CPT | Performed by: PHYSICAL THERAPIST

## 2020-05-13 NOTE — PROGRESS NOTES
Physical Therapy Daily Treatment Note     Name: Gus Sabillon  Clinic Number: [id number]    Patient unsafe for in-person office visit due to high risk co-morbidities, probability of infection, to minimize exposure, due to pt expressing symptoms, and/or due to government mandated quarantine.  This patient: (1) was informed that telehealth visits are now available congruent with guidelines set by state practice acts & CMS; (2) initiated scheduling of this type of visit; and (3) gave verbal consent to participate in such.  The patient & provider used Epic Suk[employer]inder using both video & audio synchronous services to complete the visit.      Therapy Diagnosis:   Encounter Diagnoses   Name Primary?    Chronic right shoulder pain     Limited range of motion (ROM) of shoulder     Pain aggravated by lifting     Sleeping difficulty      Physician: Staci Childress MD    Visit Date: 5/13/2020  Patient Location:  Visit type: Virtual visit with synchronous audio and video through [employer]    Physician Orders: PT Eval and Treat  Medical Diagnosis:   M75.101 (ICD-10-CM) - Nontraumatic tear of right rotator cuff, unspecified tear extent   M75.21 (ICD-10-CM) - Biceps tendinitis of right upper extremity   S43.431A (ICD-10-CM) - Superior glenoid labrum lesion of right shoulder, initial encounter       Evaluation Date: 4/14/2020  Authorization Period Expiration: 2/20/2021  Plan of Care Certification Period: 5/19/2020  Visit #/Visits authorized: 1/ 1     Time Connection Initiated: 1106  Time Connection Terminated: 1131  Total Billable Time: 25 minutes    Precautions: Standard  POSTOPERATIVE PLAN: We will follow the arthroscopic rotator cuff repair guidelines for a large size rotator cuff tear.  We discussed with the patient's family after surgery.  The patient will remain in a sling for 6 weeks.  PT to start at 4 weeks.       Subjective     Pt reports: I started the new exercises and got some pain on the side of my shoulder. After we  spoke Monday I adjusted how I was doing my exercises and it helped with the pain down the arm.    He was compliant with home exercise program.  Response to previous treatment:   Functional change: Improved motion in arm    Pain: 2/10  Location: right shoulder      Objective     5/13: Scaption to shoulder height with shoulder hiking. ER at his side 40 degrees, cues for scap retraction to reduce anterior shoulder pain.    Zeeshan received therapeutic exercises to develop ROM and flexibility for 25 minutes including:    Scaptions 2 x 10  ER at his side 15x  Towel slide up wall 20x  Assisted shoulder flexion with LUE assist- cues for right hand pushing down into hand     Patient was demonstrated exercises and educated on proper exercise form extensively. Difficulty with scap control but good following of cues.       Home Exercises Provided and Patient Education Provided     Education provided:   - Importance of continuing to restore his ROM and submax contractions of muscle. Should not be doing any heavy lifting at all with the affected shoulder. How to perform isometric and AAROM exercises    Written Home Exercises Provided: yes.  Exercises were reviewed and Zeeshan was able to demonstrate them prior to the end of the session.  Zeeshan demonstrated good  understanding of the education provided.     See EMR under Patient Instructions for exercises provided 4/29/20.    Assessment     Zeeshan was experiencing impingement signs due to shoulder hiking with overhead activity, he notes near to no pain following verbal cues to hold scap down towards the opposite pocket. Also sits with shoulder rounded forward and benefited from retraction cues with ER. His motion continues to improve each week and we plan to mail or  a resistance band to begin isometrics next week. Will continue with virtual visits out of an abundance of caution for patient.    Zeeshan is progressing well towards his goals.   Pt prognosis is Good.     Pt will  continue to benefit from skilled outpatient physical therapy to address the deficits listed in the problem list box on initial evaluation, provide pt/family education and to maximize pt's level of independence in the home and community environment.     Pt's spiritual, cultural and educational needs considered and pt agreeable to plan of care and goals.     Anticipated barriers to physical therapy: COVID-19      GOALS: Short Term Goals:  6 weeks  1.Report decreased right shoulder pain < / =  3/10  to increase tolerance for sleeping and ADLs at home (met)  2. Increase PROM flexion and ER within protocol guidelines   (met)  3. Patient to sleep through the night without shoulder pain  (met)  4. Pt to tolerate HEP to improve ROM and independence with ADL's  (met)    Long Term Goals: 14 weeks  1.Report decreased R shoulder pain  < / =  0 /10  to increase tolerance for completing ADLs  (progressing, not met)  2.Increase AROM to 140 deg to restore functional reach  (progressing, not met)  3.Increase strength to >/= 4/5 in R shoulder to increase tolerance for ADL and work activities.  (progressing, not met)  4. Pt goal: return to completing ADLs with right shoulder without pain  (progressing, not met)      Plan     Progress within protocol, AAROM and isometric strengthening.    Armen Croft, PT

## 2020-05-18 ENCOUNTER — PATIENT MESSAGE (OUTPATIENT)
Dept: DERMATOLOGY | Facility: CLINIC | Age: 80
End: 2020-05-18

## 2020-05-18 ENCOUNTER — PATIENT OUTREACH (OUTPATIENT)
Dept: ADMINISTRATIVE | Facility: OTHER | Age: 80
End: 2020-05-18

## 2020-05-18 ENCOUNTER — OFFICE VISIT (OUTPATIENT)
Dept: DERMATOLOGY | Facility: CLINIC | Age: 80
End: 2020-05-18
Payer: MEDICARE

## 2020-05-18 VITALS — WEIGHT: 175 LBS | BODY MASS INDEX: 23.73 KG/M2

## 2020-05-18 DIAGNOSIS — Z85.828 HISTORY OF SKIN CANCER: ICD-10-CM

## 2020-05-18 DIAGNOSIS — D48.5 NEOPLASM OF UNCERTAIN BEHAVIOR OF SKIN: Primary | ICD-10-CM

## 2020-05-18 DIAGNOSIS — L98.9 SKIN LESION: ICD-10-CM

## 2020-05-18 DIAGNOSIS — Z98.890 HISTORY OF MELANOMA EXCISION: ICD-10-CM

## 2020-05-18 DIAGNOSIS — Z85.820 HISTORY OF MELANOMA EXCISION: ICD-10-CM

## 2020-05-18 DIAGNOSIS — L85.8 KERATOACANTHOMA OF FOREARM: ICD-10-CM

## 2020-05-18 PROCEDURE — 1101F PR PT FALLS ASSESS DOC 0-1 FALLS W/OUT INJ PAST YR: ICD-10-PCS | Mod: CPTII,95,, | Performed by: DERMATOLOGY

## 2020-05-18 PROCEDURE — 99201 PR OFFICE/OUTPT VISIT,NEW,LEVL I: ICD-10-PCS | Mod: 95,,, | Performed by: DERMATOLOGY

## 2020-05-18 PROCEDURE — 1101F PT FALLS ASSESS-DOCD LE1/YR: CPT | Mod: CPTII,95,, | Performed by: DERMATOLOGY

## 2020-05-18 PROCEDURE — 1126F PR PAIN SEVERITY QUANTIFIED, NO PAIN PRESENT: ICD-10-PCS | Mod: 95,,, | Performed by: DERMATOLOGY

## 2020-05-18 PROCEDURE — 1159F MED LIST DOCD IN RCRD: CPT | Mod: 95,,, | Performed by: DERMATOLOGY

## 2020-05-18 PROCEDURE — 1126F AMNT PAIN NOTED NONE PRSNT: CPT | Mod: 95,,, | Performed by: DERMATOLOGY

## 2020-05-18 PROCEDURE — 99201 PR OFFICE/OUTPT VISIT,NEW,LEVL I: CPT | Mod: 95,,, | Performed by: DERMATOLOGY

## 2020-05-18 PROCEDURE — 1159F PR MEDICATION LIST DOCUMENTED IN MEDICAL RECORD: ICD-10-PCS | Mod: 95,,, | Performed by: DERMATOLOGY

## 2020-05-18 NOTE — LETTER
May 18, 2020      MATHEUS Mora MD  [address]  [place] [address]           [place] [facility]  [address]  [place] [address]  Phone: [phone]  Fax: [phone]          Patient: Gus Sabillon   MR Number: [id number]   YOB: 1940   Date of Visit: 5/18/2020       Dear Dr. MATHEUS Mora:    Thank you for referring Gus Sabillon to me for evaluation. Attached you will find relevant portions of my assessment and plan of care.    If you have questions, please do not hesitate to call me. I look forward to following Gus Sabillon along with you.    Sincerely,    Nettie Tang MD    Enclosure  CC:  No Recipients    If you would like to receive this communication electronically, please contact [email or web address] or [phone] to request more information on [facility] access.    For providers and/or their staff who would like to refer a patient to [facility], please contact us through our one-stop-shop provider referral line, [facility], at [phone].    If you feel you have received this communication in error or would no longer like to receive these types of communications, please e-mail [email or web address]

## 2020-05-18 NOTE — PROGRESS NOTES
Subjective:       Patient ID:  Gus Sabillon is a 79 y.o. male who presents for   Chief Complaint   Patient presents with    Skin Check     The patient location is: home  The chief complaint leading to consultation is: spot on arm    Visit type: audiovisual    Face to Face time with patient: 10min   minutes of total time spent on the encounter, which includes face to face time and non-face to face time preparing to see the patient (eg, review of tests), Obtaining and/or reviewing separately obtained history, Documenting clinical information in the electronic or other health record, Independently interpreting results (not separately reported) and communicating results to the patient/family/caregiver, or Care coordination (not separately reported).         Each patient to whom he or she provides medical services by telemedicine is:  (1) informed of the relationship between the physician and patient and the respective role of any other health care provider with respect to management of the patient; and (2) notified that he or she may decline to receive medical services by telemedicine and may withdraw from such care at any time.    Notes: moved here from [place] in December, has a history of skin cancers (multiple scc) and of melanoma (on back 2017) has not had a skin check in a while not sure when last one was  Now has a lesion on his arm for about a month which has grown rapidly and is sore to touch no tx        Review of Systems   Constitutional: Negative for fever.   Skin: Negative for itching and rash.   Hematologic/Lymphatic: Does not bruise/bleed easily.        Objective:    Physical Exam   Skin:   Areas Examined (abnormalities noted in diagram):   RUE Inspected                      Diagram Legend     Erythematous scaling macule/papule c/w actinic keratosis       Vascular papule c/w angioma      Pigmented verrucoid papule/plaque c/w seborrheic keratosis      Yellow umbilicated papule c/w sebaceous  hyperplasia      Irregularly shaped tan macule c/w lentigo     1-2 mm smooth white papules consistent with Milia      Movable subcutaneous cyst with punctum c/w epidermal inclusion cyst      Subcutaneous movable cyst c/w pilar cyst      Firm pink to brown papule c/w dermatofibroma      Pedunculated fleshy papule(s) c/w skin tag(s)      Evenly pigmented macule c/w junctional nevus     Mildly variegated pigmented, slightly irregular-bordered macule c/w mildly atypical nevus      Flesh colored to evenly pigmented papule c/w intradermal nevus       Pink pearly papule/plaque c/w basal cell carcinoma      Erythematous hyperkeratotic cursted plaque c/w SCC      Surgical scar with no sign of skin cancer recurrence      Open and closed comedones      Inflammatory papules and pustules      Verrucoid papule consistent consistent with wart     Erythematous eczematous patches and plaques     Dystrophic onycholytic nail with subungual debris c/w onychomycosis     Umbilicated papule    Erythematous-base heme-crusted tan verrucoid plaque consistent with inflamed seborrheic keratosis     Erythematous Silvery Scaling Plaque c/w Psoriasis     See annotation      Assessment / Plan:        Neoplasm of uncertain behavior of skin, 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

## 2020-05-22 ENCOUNTER — CLINICAL SUPPORT (OUTPATIENT)
Dept: REHABILITATION | Facility: HOSPITAL | Age: 80
End: 2020-05-22
Attending: PHYSICIAN ASSISTANT
Payer: MEDICARE

## 2020-05-22 DIAGNOSIS — G89.29 CHRONIC RIGHT SHOULDER PAIN: ICD-10-CM

## 2020-05-22 DIAGNOSIS — G47.9 SLEEPING DIFFICULTY: ICD-10-CM

## 2020-05-22 DIAGNOSIS — M25.511 CHRONIC RIGHT SHOULDER PAIN: ICD-10-CM

## 2020-05-22 DIAGNOSIS — R52 PAIN AGGRAVATED BY LIFTING: ICD-10-CM

## 2020-05-22 DIAGNOSIS — M25.619 LIMITED RANGE OF MOTION (ROM) OF SHOULDER: ICD-10-CM

## 2020-05-22 PROCEDURE — 97110 THERAPEUTIC EXERCISES: CPT | Mod: 95 | Performed by: PHYSICAL THERAPIST

## 2020-05-22 NOTE — PROGRESS NOTES
Physical Therapy Daily Treatment Note     Name: Gus Sabillon  Clinic Number: [id number]    Patient unsafe for in-person office visit due to high risk co-morbidities, probability of infection, to minimize exposure, due to pt expressing symptoms, and/or due to government mandated quarantine.  This patient: (1) was informed that telehealth visits are now available congruent with guidelines set by state practice acts & CMS; (2) initiated scheduling of this type of visit; and (3) gave verbal consent to participate in such.  The patient & provider used Epic Sukhjinder using both video & audio synchronous services to complete the visit.      Therapy Diagnosis:   Encounter Diagnoses   Name Primary?    Chronic right shoulder pain     Limited range of motion (ROM) of shoulder     Pain aggravated by lifting     Sleeping difficulty      Physician: Staci Childress MD    Visit Date: 5/22/2020  Patient Location:  Visit type: Virtual visit with synchronous audio and video through [email or web address]    Physician Orders: PT Eval and Treat  Medical Diagnosis:   M75.101 (ICD-10-CM) - Nontraumatic tear of right rotator cuff, unspecified tear extent   M75.21 (ICD-10-CM) - Biceps tendinitis of right upper extremity   S43.431A (ICD-10-CM) - Superior glenoid labrum lesion of right shoulder, initial encounter       Evaluation Date: 4/14/2020  Authorization Period Expiration: 6/5/2020  New Plan of Care Certification Period: 6/26/2020  Visit #/Visits authorized: 1/ 6    Time Connection Initiated: 1050  Time Connection Terminated: 1124  Total Billable Time: 34 minutes    Precautions: Standard  POSTOPERATIVE PLAN: We will follow the arthroscopic rotator cuff repair guidelines for a large size rotator cuff tear.  We discussed with the patient's family after surgery.  The patient will remain in a sling for 6 weeks.  PT to start at 4 weeks.       Subjective     Pt reports: Ever since you gave me the cue to hold my shoulder down and back the pain has gone  away with exercise. I am able to use my arm more to perform ADLs.    He was compliant with home exercise program.  Response to previous treatment: decrease shoulder pain  Functional change: Improved motion in arm    Pain: 2/10  Location: right shoulder      Objective     5/22: Scaption to shoulder height with limited hiking using visual cues at home in mirror. ER at his side 70% compared to the unaffected side. Active shoulder flexion to above the head (75%). Unable reach behind his back or head at this time.      Zeeshan received therapeutic exercises to develop ROM and flexibility for 34 minutes including:    Scaptions 2 x 10- unable perform with theraband  No money OTB 2 x 15  Rows OTB 2 x 15  IR/ER side stepping OTB 2 x 15- cues for hand position and towel under arm  Shoulder extensions OTB 2 x 15    Patient was demonstrated exercises and educated on proper exercise form extensively. Difficulty with scap control but good following of cues with mirror at home. Requires educated on hand position with new exercises to maintain thumb up towards ceiling      Home Exercises Provided and Patient Education Provided     Education provided:   - Importance of continuing to restore his ROM and submax contractions of muscle. Should not be doing any heavy lifting at all with the affected shoulder. How to perform isometric and AAROM exercises    Written Home Exercises Provided: yes.  Exercises were reviewed and Zeeshan was able to demonstrate them prior to the end of the session.  Zeeshan demonstrated good  understanding of the education provided.     See EMR under Patient Instructions for exercises provided 4/29/20.    Assessment     Zeeshan was progressed to isometric strengthening with therabands he picked up from the clinic earlier this week. He is still not being treated in the clinic for safety concerns. He follows cues well and is benefitting well from using his mirror. His AAROM has improved with towel slides, wandara, and GABO  assist. He will add a can of soup for improved strength. Isometric scap stability was added today and demonstrated virtually. He will email me with any questions or concerns until next virtual visit in 10 days.    Zeeshan is progressing well towards his goals.   Pt prognosis is Good.     Pt will continue to benefit from skilled outpatient physical therapy to address the deficits listed in the problem list box on initial evaluation, provide pt/family education and to maximize pt's level of independence in the home and community environment.     Pt's spiritual, cultural and educational needs considered and pt agreeable to plan of care and goals.     Anticipated barriers to physical therapy: COVID-19      GOALS: Short Term Goals:  6 weeks  1.Report decreased right shoulder pain < / =  3/10  to increase tolerance for sleeping and ADLs at home (met)  2. Increase PROM flexion and ER within protocol guidelines   (met)  3. Patient to sleep through the night without shoulder pain  (met)  4. Pt to tolerate HEP to improve ROM and independence with ADL's  (met)    Long Term Goals: 14 weeks  1.Report decreased R shoulder pain  < / =  0 /10  to increase tolerance for completing ADLs  (progressing, not met)  2.Increase AROM to 140 deg to restore functional reach  (progressing, not met)  3.Increase strength to >/= 4/5 in R shoulder to increase tolerance for ADL and work activities.  (progressing, not met)  4. Pt goal: return to completing ADLs with right shoulder without pain  (progressing, not met)      Plan     Progress within protocol, AAROM and isometric strengthening.    Armen Croft, PT

## 2020-06-03 ENCOUNTER — CLINICAL SUPPORT (OUTPATIENT)
Dept: REHABILITATION | Facility: HOSPITAL | Age: 80
End: 2020-06-03
Attending: PHYSICIAN ASSISTANT
Payer: MEDICARE

## 2020-06-03 DIAGNOSIS — M25.619 LIMITED RANGE OF MOTION (ROM) OF SHOULDER: ICD-10-CM

## 2020-06-03 DIAGNOSIS — M25.511 CHRONIC RIGHT SHOULDER PAIN: ICD-10-CM

## 2020-06-03 DIAGNOSIS — G47.9 SLEEPING DIFFICULTY: ICD-10-CM

## 2020-06-03 DIAGNOSIS — G89.29 CHRONIC RIGHT SHOULDER PAIN: ICD-10-CM

## 2020-06-03 DIAGNOSIS — R52 PAIN AGGRAVATED BY LIFTING: ICD-10-CM

## 2020-06-03 PROCEDURE — 97110 THERAPEUTIC EXERCISES: CPT | Performed by: PHYSICAL THERAPIST

## 2020-06-03 NOTE — PROGRESS NOTES
Physical Therapy Daily Treatment Note     Name: Gus Sabillon  Clinic Number: [id number]    Patient unsafe for in-person office visit due to high risk co-morbidities, probability of infection, to minimize exposure, due to pt expressing symptoms, and/or due to government mandated quarantine.  This patient: (1) was informed that telehealth visits are now available congruent with guidelines set by state practice acts & CMS; (2) initiated scheduling of this type of visit; and (3) gave verbal consent to participate in such.  The patient & provider used Epic Suk[employer]inder using both video & audio synchronous services to complete the visit.      Therapy Diagnosis:   Encounter Diagnoses   Name Primary?    Chronic right shoulder pain     Limited range of motion (ROM) of shoulder     Pain aggravated by lifting     Sleeping difficulty      Physician: Staci Childress MD    Visit Date: 6/3/2020  Patient Location:  Visit type: Virtual visit with synchronous audio and video through [employer]    Physician Orders: PT Eval and Treat  Medical Diagnosis:   M75.101 (ICD-10-CM) - Nontraumatic tear of right rotator cuff, unspecified tear extent   M75.21 (ICD-10-CM) - Biceps tendinitis of right upper extremity   S43.431A (ICD-10-CM) - Superior glenoid labrum lesion of right shoulder, initial encounter       Evaluation Date: 4/14/2020  Authorization Period Expiration: 6/5/2020  New Plan of Care Certification Period: 6/26/2020  Visit #/Visits authorized: 2/ 6    Time Connection Initiated: 1348  Time Connection Terminated: 1428  Total Billable Time: 40 minutes    Precautions: Standard  POSTOPERATIVE PLAN: We will follow the arthroscopic rotator cuff repair guidelines for a large size rotator cuff tear.  We discussed with the patient's family after surgery.  The patient will remain in a sling for 6 weeks.  PT to start at 4 weeks.    6/3: 13 weeks, 6 days        Subjective     Pt reports: My motion seems to be improving with the new exercises. No  "questions about the new exercises. I have a skin infection on the same arm, seeing dermatology on Friday    He was compliant with home exercise program.  Response to previous treatment: decrease shoulder pain  Functional change: Improved motion in arm    Pain: 2/10  Location: right shoulder      Objective     6/3:Able to perform overhead flexion to 80% the height compared to the LUE. Notes minimal hiking on shoulder now with overhead. Minimal pain down right arm with controlled lowering of arm. Reaches behind his head to T1 ER at his side 90% on RUE compared to LUE with dull pain. LUE behind his back to T6, RUE to L2. Notes this is the "least developed" but has drastically improved since he began using bands for strengthening.      5/22 Scaption to shoulder height with limited hiking using visual cues at home in mirror. ER at his side 70% compared to the unaffected side. Active shoulder flexion to above the head (75%). Unable reach behind his back or head at this time.      Zeeshan received therapeutic exercises to develop ROM and flexibility for 40 minutes including:    Scaptions 2 x 10- unable perform with theraband  SL ER towel under arm- using can of soup for resistance  IR/ER 2 x 15 OTB and towel under arm  Serratus press OTB 2 x 15  Barrel hugs OTB 15x  Supine serratus press OTB under shoulders 15x    NT:  No money OTB 2 x 15  Rows OTB 2 x 15  IR/ER side stepping OTB 2 x 15- cues for hand position and towel under arm  Shoulder extensions OTB 2 x 15    Patient was demonstrated exercises and educated on proper exercise form extensively. Difficulty with scap control but good following of cues with mirror at home. Requires educated on hand position with new exercises to maintain thumb up towards ceiling      Home Exercises Provided and Patient Education Provided     Education provided:   - Importance of continuing to restore his ROM and submax contractions of muscle. Should not be doing any heavy lifting at all with " the affected shoulder. How to perform light strengthening to RC     Written Home Exercises Provided: yes.  Exercises were reviewed and Zeeshan was able to demonstrate them prior to the end of the session.  Zeeshan demonstrated good  understanding of the education provided.     See EMR under Patient Instructions for exercises provided 4/29/20.    Assessment     Zeeshan is progressing very well 13 weeks s/p rotator cuff repair. He notes being very pleased with his progress the past 2 weeks since beginning isometric strengthening. Functionally, he can now reach behind his back and behind his head which he was unable to perform 2 weeks ago. He was progressed per protocol with serratus strengthening and stability, as well as very light resistance rotator cuff strengthening with theraband. He is scheduled to see MD next week and will progress with HEP in 2 weeks    Zeeshan is progressing well towards his goals.   Pt prognosis is Good.     Pt will continue to benefit from skilled outpatient physical therapy to address the deficits listed in the problem list box on initial evaluation, provide pt/family education and to maximize pt's level of independence in the home and community environment.     Pt's spiritual, cultural and educational needs considered and pt agreeable to plan of care and goals.     Anticipated barriers to physical therapy: COVID-19      GOALS: Short Term Goals:  6 weeks  1.Report decreased right shoulder pain < / =  3/10  to increase tolerance for sleeping and ADLs at home (met)  2. Increase PROM flexion and ER within protocol guidelines   (met)  3. Patient to sleep through the night without shoulder pain  (met)  4. Pt to tolerate HEP to improve ROM and independence with ADL's  (met)    Long Term Goals: 14 weeks  1.Report decreased R shoulder pain  < / =  0 /10  to increase tolerance for completing ADLs  (progressing, not met)  2.Increase AROM to 140 deg to restore functional reach  (met)  3.Increase strength to  >/= 4/5 in R shoulder to increase tolerance for ADL and work activities.  (progressing, not met)  4. Pt goal: return to completing ADLs with right shoulder without pain  (progressing, not met)      Plan     Progress within protocol, AAROM and light rotator cuff strengthening    Armen Croft, PT

## 2020-06-03 NOTE — PROGRESS NOTES
Physical Therapy Daily Treatment Note     Name: Gus Sabillon  Clinic Number: [id number]    Patient unsafe for in-person office visit due to high risk co-morbidities, probability of infection, to minimize exposure, due to pt expressing symptoms, and/or due to government mandated quarantine.  This patient: (1) was informed that telehealth visits are now available congruent with guidelines set by state practice acts & CMS; (2) initiated scheduling of this type of visit; and (3) gave verbal consent to participate in such.  The patient & provider used Epic [employer] using both video & audio synchronous services to complete the visit.      Therapy Diagnosis:   Encounter Diagnoses   Name Primary?    Chronic right shoulder pain     Limited range of motion (ROM) of shoulder     Pain aggravated by lifting     Sleeping difficulty      Physician: Staci Childress MD    Visit Date: 6/3/2020  Patient Location:  Visit type: Virtual visit with synchronous audio and video through [employer]    Physician Orders: PT Eval and Treat  Medical Diagnosis:   M75.101 (ICD-10-CM) - Nontraumatic tear of right rotator cuff, unspecified tear extent   M75.21 (ICD-10-CM) - Biceps tendinitis of right upper extremity   S43.431A (ICD-10-CM) - Superior glenoid labrum lesion of right shoulder, initial encounter       Evaluation Date: 4/14/2020  Authorization Period Expiration: 6/5/2020  New Plan of Care Certification Period: 6/26/2020  Visit #/Visits authorized: 2/ 6    Time Connection Initiated: 1348  Time Connection Terminated: 1428  Total Billable Time: 40 minutes    Precautions: Standard  POSTOPERATIVE PLAN: We will follow the arthroscopic rotator cuff repair guidelines for a large size rotator cuff tear.  We discussed with the patient's family after surgery.  The patient will remain in a sling for 6 weeks.  PT to start at 4 weeks.    6/3: 13 weeks, 6 days        Subjective     Pt reports: My motion seems to be improving with the new exercises. No  "questions about the new exercises. I have a skin infection on the same arm, seeing dermatology on Friday    He was compliant with home exercise program.  Response to previous treatment: decrease shoulder pain  Functional change: Improved motion in arm    Pain: 2/10  Location: right shoulder      Objective     6/3:Able to perform overhead flexion to 80% the height compared to the LUE. Notes minimal hiking on shoulder now with overhead. Minimal pain down right arm with controlled lowering of arm. Reaches behind his head to T1 ER at his side 90% on RUE compared to LUE with dull pain. LUE behind his back to T6, RUE to L2. Notes this is the "least developed" but has drastically improved since he began using bands for strengthening.      5/22 Scaption to shoulder height with limited hiking using visual cues at home in mirror. ER at his side 70% compared to the unaffected side. Active shoulder flexion to above the head (75%). Unable reach behind his back or head at this time.      Zeeshan received therapeutic exercises to develop ROM and flexibility for 40 minutes including:    Scaptions 2 x 10- unable perform with theraband  SL ER towel under arm- using can of soup for resistance  IR/ER 2 x 15 OTB and towel under arm  Serratus press OTB 2 x 15  Barrel hugs OTB 15x  Supine serratus press OTB under shoulders 15x    NT:  No money OTB 2 x 15  Rows OTB 2 x 15  IR/ER side stepping OTB 2 x 15- cues for hand position and towel under arm  Shoulder extensions OTB 2 x 15    Patient was demonstrated exercises and educated on proper exercise form extensively. Difficulty with scap control but good following of cues with mirror at home. Requires educated on hand position with new exercises to maintain thumb up towards ceiling      Home Exercises Provided and Patient Education Provided     Education provided:   - Importance of continuing to restore his ROM and submax contractions of muscle. Should not be doing any heavy lifting at all with " the affected shoulder. How to perform light strengthening to RC     Written Home Exercises Provided: yes.  Exercises were reviewed and Zeeshan was able to demonstrate them prior to the end of the session.  Zeeshan demonstrated good  understanding of the education provided.     See EMR under Patient Instructions for exercises provided 4/29/20.    Assessment     Zeeshan is progressing very well 13 weeks s/p rotator cuff repair. He notes being very pleased with his progress the past 2 weeks since beginning isometric strengthening. Functionally, he can now reach behind his back and behind his head which he was unable to perform 2 weeks ago. He was progressed per protocol with serratus strengthening and stability, as well as very light resistance rotator cuff strengthening with theraband. He is scheduled to see MD next week and will progress with HEP in 2 weeks    Zeeshan is progressing well towards his goals.   Pt prognosis is Good.     Pt will continue to benefit from skilled outpatient physical therapy to address the deficits listed in the problem list box on initial evaluation, provide pt/family education and to maximize pt's level of independence in the home and community environment.     Pt's spiritual, cultural and educational needs considered and pt agreeable to plan of care and goals.     Anticipated barriers to physical therapy: COVID-19      GOALS: Short Term Goals:  6 weeks  1.Report decreased right shoulder pain < / =  3/10  to increase tolerance for sleeping and ADLs at home (met)  2. Increase PROM flexion and ER within protocol guidelines   (met)  3. Patient to sleep through the night without shoulder pain  (met)  4. Pt to tolerate HEP to improve ROM and independence with ADL's  (met)    Long Term Goals: 14 weeks  1.Report decreased R shoulder pain  < / =  0 /10  to increase tolerance for completing ADLs  (progressing, not met)  2.Increase AROM to 140 deg to restore functional reach  (met)  3.Increase strength to  >/= 4/5 in R shoulder to increase tolerance for ADL and work activities.  (progressing, not met)  4. Pt goal: return to completing ADLs with right shoulder without pain  (progressing, not met)      Plan     Progress within protocol, AAROM and light rotator cuff strengthening    Armen Croft, PT

## 2020-06-04 ENCOUNTER — PATIENT OUTREACH (OUTPATIENT)
Dept: ADMINISTRATIVE | Facility: OTHER | Age: 80
End: 2020-06-04

## 2020-06-05 ENCOUNTER — OFFICE VISIT (OUTPATIENT)
Dept: DERMATOLOGY | Facility: CLINIC | Age: 80
End: 2020-06-05
Payer: MEDICARE

## 2020-06-05 VITALS — BODY MASS INDEX: 23.73 KG/M2 | WEIGHT: 175 LBS

## 2020-06-05 DIAGNOSIS — Z85.820 HISTORY OF MELANOMA EXCISION: ICD-10-CM

## 2020-06-05 DIAGNOSIS — Z98.890 HISTORY OF MELANOMA EXCISION: ICD-10-CM

## 2020-06-05 DIAGNOSIS — D18.01 CHERRY ANGIOMA: ICD-10-CM

## 2020-06-05 DIAGNOSIS — D48.5 NEOPLASM OF UNCERTAIN BEHAVIOR OF SKIN: Primary | ICD-10-CM

## 2020-06-05 DIAGNOSIS — L82.1 SEBORRHEIC KERATOSES: ICD-10-CM

## 2020-06-05 DIAGNOSIS — Z85.828 HISTORY OF SKIN CANCER: ICD-10-CM

## 2020-06-05 DIAGNOSIS — Z12.83 SKIN EXAM, SCREENING FOR CANCER: ICD-10-CM

## 2020-06-05 DIAGNOSIS — L81.4 LENTIGINES: ICD-10-CM

## 2020-06-05 PROCEDURE — 11103 PR TANGENTIAL BIOPSY, SKIN, EA ADDTL LESION: ICD-10-PCS | Mod: S$GLB,,, | Performed by: DERMATOLOGY

## 2020-06-05 PROCEDURE — 88305 TISSUE EXAM BY PATHOLOGIST: CPT | Mod: 26,,, | Performed by: PATHOLOGY

## 2020-06-05 PROCEDURE — 99214 OFFICE O/P EST MOD 30 MIN: CPT | Mod: 25,S$GLB,, | Performed by: DERMATOLOGY

## 2020-06-05 PROCEDURE — 1101F PR PT FALLS ASSESS DOC 0-1 FALLS W/OUT INJ PAST YR: ICD-10-PCS | Mod: CPTII,S$GLB,, | Performed by: DERMATOLOGY

## 2020-06-05 PROCEDURE — 99999 PR PBB SHADOW E&M-EST. PATIENT-LVL III: CPT | Mod: PBBFAC,,, | Performed by: DERMATOLOGY

## 2020-06-05 PROCEDURE — 99999 PR PBB SHADOW E&M-EST. PATIENT-LVL III: ICD-10-PCS | Mod: PBBFAC,,, | Performed by: DERMATOLOGY

## 2020-06-05 PROCEDURE — 1126F PR PAIN SEVERITY QUANTIFIED, NO PAIN PRESENT: ICD-10-PCS | Mod: S$GLB,,, | Performed by: DERMATOLOGY

## 2020-06-05 PROCEDURE — 1101F PT FALLS ASSESS-DOCD LE1/YR: CPT | Mod: CPTII,S$GLB,, | Performed by: DERMATOLOGY

## 2020-06-05 PROCEDURE — 88305 TISSUE EXAM BY PATHOLOGIST: CPT | Mod: 59 | Performed by: PATHOLOGY

## 2020-06-05 PROCEDURE — 1159F MED LIST DOCD IN RCRD: CPT | Mod: S$GLB,,, | Performed by: DERMATOLOGY

## 2020-06-05 PROCEDURE — 11103 TANGNTL BX SKIN EA SEP/ADDL: CPT | Mod: S$GLB,,, | Performed by: DERMATOLOGY

## 2020-06-05 PROCEDURE — 1159F PR MEDICATION LIST DOCUMENTED IN MEDICAL RECORD: ICD-10-PCS | Mod: S$GLB,,, | Performed by: DERMATOLOGY

## 2020-06-05 PROCEDURE — 1126F AMNT PAIN NOTED NONE PRSNT: CPT | Mod: S$GLB,,, | Performed by: DERMATOLOGY

## 2020-06-05 PROCEDURE — 99214 PR OFFICE/OUTPT VISIT, EST, LEVL IV, 30-39 MIN: ICD-10-PCS | Mod: 25,S$GLB,, | Performed by: DERMATOLOGY

## 2020-06-05 PROCEDURE — 11102 TANGNTL BX SKIN SINGLE LES: CPT | Mod: S$GLB,,, | Performed by: DERMATOLOGY

## 2020-06-05 PROCEDURE — 88305 TISSUE EXAM BY PATHOLOGIST: ICD-10-PCS | Mod: 26,,, | Performed by: PATHOLOGY

## 2020-06-05 PROCEDURE — 11102 PR TANGENTIAL BIOPSY, SKIN, SINGLE LESION: ICD-10-PCS | Mod: S$GLB,,, | Performed by: DERMATOLOGY

## 2020-06-05 NOTE — PROGRESS NOTES
"  Subjective:       Patient ID:  Gus Sabillon is a 80 y.o. male who presents for   Chief Complaint   Patient presents with    Lesion     right arm. lower hand, raised     See video visit 5/18"Notes: moved here from [place] in December, has a history of skin cancers (multiple scc) and of melanoma (on back 2017) has not had a skin check in a while not sure when last one was  Now has a lesion on his arm for about a month which has grown rapidly and is sore to touch no tx"    Here for exam and bx of lesion, also new lesion on right hand.   This is a high risk patient here to check for the development of new lesions.      Lesion  - Initial  Affected locations: right hand and right arm  Signs / symptoms: asymptomatic  Aggravated by: nothing        Review of Systems   Constitutional: Negative for fever, chills, weight loss, weight gain, fatigue, night sweats and malaise.   Skin: Negative for activity-related sunscreen use, sensitivity to antibiotic ointment and wears hat.   Hematologic/Lymphatic: Does not bruise/bleed easily.        Objective:    Physical Exam   Constitutional: He appears well-developed and well-nourished. No distress.   Neurological: He is alert and oriented to person, place, and time. He is not disoriented.   Psychiatric: He has a normal mood and affect.   Skin:   Areas Examined (abnormalities noted in diagram):   Scalp / Hair Palpated and Inspected  Head / Face Inspection Performed  Neck Inspection Performed  Chest / Axilla Inspection Performed  Abdomen Inspection Performed  Genitals / Buttocks / Groin Inspection Performed  Back Inspection Performed  RUE Inspected  LUE Inspection Performed  RLE Inspected  LLE Inspection Performed  Nails and Digits Inspection Performed              Diagram Legend     Erythematous scaling macule/papule c/w actinic keratosis       Vascular papule c/w angioma      Pigmented verrucoid papule/plaque c/w seborrheic keratosis      Yellow umbilicated papule c/w " sebaceous hyperplasia      Irregularly shaped tan macule c/w lentigo     1-2 mm smooth white papules consistent with Milia      Movable subcutaneous cyst with punctum c/w epidermal inclusion cyst      Subcutaneous movable cyst c/w pilar cyst      Firm pink to brown papule c/w dermatofibroma      Pedunculated fleshy papule(s) c/w skin tag(s)      Evenly pigmented macule c/w junctional nevus     Mildly variegated pigmented, slightly irregular-bordered macule c/w mildly atypical nevus      Flesh colored to evenly pigmented papule c/w intradermal nevus       Pink pearly papule/plaque c/w basal cell carcinoma      Erythematous hyperkeratotic cursted plaque c/w SCC      Surgical scar with no sign of skin cancer recurrence      Open and closed comedones      Inflammatory papules and pustules      Verrucoid papule consistent consistent with wart     Erythematous eczematous patches and plaques     Dystrophic onycholytic nail with subungual debris c/w onychomycosis     Umbilicated papule    Erythematous-base heme-crusted tan verrucoid plaque consistent with inflamed seborrheic keratosis     Erythematous Silvery Scaling Plaque c/w Psoriasis     See annotation      Assessment / Plan:      Pathology Orders:     Normal Orders This Visit    Specimen to Pathology, Dermatology     Questions:    Procedure Type:  Dermatology and skin neoplasms    Number of Specimens:  3    ------------------------:  -------------------------    Spec 1 Procedure:  Biopsy    Spec 1 Clinical Impression:  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 Area prepped with alcohol, anesthetized with 1% lidocaine with epinephrine. . Hemostasis achieved with monsels. No complications. Dressing applied. Wound care explained.          Right forearm  Shave biopsy performed after verbal consent including risk of infection, scar, recurrence, need for additional treatment of site. Area prepped with alcohol, anesthetized with 1% lidocaine with epinephrine. . Hemostasis achieved with monsels. No complications. Dressing applied. Wound care explained. Will need further rx if + ca          Right hand  Shave biopsy performed after verbal consent including risk of infection, scar, recurrence, need for additional treatment of site. Area prepped with alcohol, anesthetized with 1% lidocaine with epinephrine. . Hemostasis achieved with monsels. No complications. Dressing applied. Wound care explained. Will need further rx if + ca            Seborrheic keratoses  reassurance      Lentigines  The "ABCD" rules to observe pigmented lesions were reviewed.      [place] angiomas  reassurance      Skin exam, screening for cancer  Area of previous melanoma examined. Site well healed with no signs of recurrence.    Total body skin examination performed today including at least 12 points as noted in physical examination. No lesions suspicious for malignancy noted other than bx s.    History of melanoma excision  Comments:  left back 2017    History of skin cancer  Comments:  [facility] [place]             Follow up in about 6 months (around 12/5/2020).  "

## 2020-06-10 ENCOUNTER — PATIENT OUTREACH (OUTPATIENT)
Dept: ADMINISTRATIVE | Facility: OTHER | Age: 80
End: 2020-06-10

## 2020-06-10 ENCOUNTER — TELEPHONE (OUTPATIENT)
Dept: SPORTS MEDICINE | Facility: CLINIC | Age: 80
End: 2020-06-10

## 2020-06-11 LAB
FINAL PATHOLOGIC DIAGNOSIS: NORMAL
GROSS: NORMAL
MICROSCOPIC EXAM: NORMAL

## 2020-06-16 ENCOUNTER — PATIENT OUTREACH (OUTPATIENT)
Dept: ADMINISTRATIVE | Facility: OTHER | Age: 80
End: 2020-06-16

## 2020-06-17 ENCOUNTER — OFFICE VISIT (OUTPATIENT)
Dept: SPORTS MEDICINE | Facility: CLINIC | Age: 80
End: 2020-06-17
Payer: MEDICARE

## 2020-06-17 VITALS
DIASTOLIC BLOOD PRESSURE: 72 MMHG | SYSTOLIC BLOOD PRESSURE: 134 MMHG | WEIGHT: 175 LBS | HEART RATE: 73 BPM | BODY MASS INDEX: 23.7 KG/M2 | HEIGHT: 72 IN

## 2020-06-17 DIAGNOSIS — Z98.890 STATUS POST RIGHT ROTATOR CUFF REPAIR: Primary | ICD-10-CM

## 2020-06-17 PROCEDURE — 1126F PR PAIN SEVERITY QUANTIFIED, NO PAIN PRESENT: ICD-10-PCS | Mod: S$GLB,,, | Performed by: ORTHOPAEDIC SURGERY

## 2020-06-17 PROCEDURE — 3078F PR MOST RECENT DIASTOLIC BLOOD PRESSURE < 80 MM HG: ICD-10-PCS | Mod: CPTII,S$GLB,, | Performed by: ORTHOPAEDIC SURGERY

## 2020-06-17 PROCEDURE — 99999 PR PBB SHADOW E&M-EST. PATIENT-LVL III: ICD-10-PCS | Mod: PBBFAC,,, | Performed by: ORTHOPAEDIC SURGERY

## 2020-06-17 PROCEDURE — 1101F PR PT FALLS ASSESS DOC 0-1 FALLS W/OUT INJ PAST YR: ICD-10-PCS | Mod: CPTII,S$GLB,, | Performed by: ORTHOPAEDIC SURGERY

## 2020-06-17 PROCEDURE — 3075F SYST BP GE 130 - 139MM HG: CPT | Mod: CPTII,S$GLB,, | Performed by: ORTHOPAEDIC SURGERY

## 2020-06-17 PROCEDURE — 99214 PR OFFICE/OUTPT VISIT, EST, LEVL IV, 30-39 MIN: ICD-10-PCS | Mod: S$GLB,,, | Performed by: ORTHOPAEDIC SURGERY

## 2020-06-17 PROCEDURE — 1159F MED LIST DOCD IN RCRD: CPT | Mod: S$GLB,,, | Performed by: ORTHOPAEDIC SURGERY

## 2020-06-17 PROCEDURE — 1101F PT FALLS ASSESS-DOCD LE1/YR: CPT | Mod: CPTII,S$GLB,, | Performed by: ORTHOPAEDIC SURGERY

## 2020-06-17 PROCEDURE — 3078F DIAST BP <80 MM HG: CPT | Mod: CPTII,S$GLB,, | Performed by: ORTHOPAEDIC SURGERY

## 2020-06-17 PROCEDURE — 1126F AMNT PAIN NOTED NONE PRSNT: CPT | Mod: S$GLB,,, | Performed by: ORTHOPAEDIC SURGERY

## 2020-06-17 PROCEDURE — 3075F PR MOST RECENT SYSTOLIC BLOOD PRESS GE 130-139MM HG: ICD-10-PCS | Mod: CPTII,S$GLB,, | Performed by: ORTHOPAEDIC SURGERY

## 2020-06-17 PROCEDURE — 1159F PR MEDICATION LIST DOCUMENTED IN MEDICAL RECORD: ICD-10-PCS | Mod: S$GLB,,, | Performed by: ORTHOPAEDIC SURGERY

## 2020-06-17 PROCEDURE — 99999 PR PBB SHADOW E&M-EST. PATIENT-LVL III: CPT | Mod: PBBFAC,,, | Performed by: ORTHOPAEDIC SURGERY

## 2020-06-17 PROCEDURE — 99214 OFFICE O/P EST MOD 30 MIN: CPT | Mod: S$GLB,,, | Performed by: ORTHOPAEDIC SURGERY

## 2020-06-17 NOTE — PROGRESS NOTES
CC right shoulder pain    HISTORY OF PRESENT ILLNESS:   Pt is here today for first post-operative followup of his shoulder arthroscopy.  he is doing well.  We have reviewed his findings and discussed plan of care and future treatment options.          DATE OF PROCEDURE: 02/27/2020  SURGEON: Staci Childress M.D.  OPERATION:   right  1. Shoulder arthroscopic rotator cuff repair 45 x 25 mm, large, double row (CPT 72401) (complex, -22 modifier)  2. Shoulder subpectoral biceps tenodesis, arthroscopic-assisted (CPT 30955)  3. Shoulder arthroscopic subacromial decompression, bursectomy   4. Shoulder arthroscopic extensive debridement (anterior, posterior glenohumeral joint, subacromial space) (CPT 45413)  5. Shoulder arthroscopic microfracture (Crimson duvet technique) (CPT 42631)  6. Shoulder arthroscopic lysis of adhesions (CPT 16377)  7. Shoulder ORIF greater tuberosity (CPT 31802)                                   Review of Systems   Constitution: Negative. Negative for chills, fever and night sweats.   HENT: Negative for congestion and headaches.    Eyes: Negative for blurred vision, left vision loss and right vision loss.   Cardiovascular: Negative for chest pain and syncope.   Respiratory: Negative for cough and shortness of breath.    Endocrine: Negative for polydipsia, polyphagia and polyuria.   Hematologic/Lymphatic: Negative for bleeding problem. Does not bruise/bleed easily.   Skin: Negative for dry skin, itching and rash.   Musculoskeletal: Negative for falls and muscle weakness.   Gastrointestinal: Negative for abdominal pain and bowel incontinence.   Genitourinary: Negative for bladder incontinence and nocturia.   Neurological: Negative for disturbances in coordination, loss of balance and seizures.   Psychiatric/Behavioral: Negative for depression. The patient does not have insomnia.    Allergic/Immunologic: Negative for hives and persistent infections.       PAST MEDICAL HISTORY:   Past Medical History:    Diagnosis Date    CKD (chronic kidney disease) stage 3, GFR 30-59 ml/min     Dyslipidemia     GERD (gastroesophageal reflux disease)     Hx of melanoma excision     Hypertension     PAD (peripheral artery disease)      PAST SURGICAL HISTORY:   Past Surgical History:   Procedure Laterality Date    ARTHROSCOPIC DEBRIDEMENT OF SHOULDER Right 2/27/2020    Procedure: EXTENSIVE DEBRIDEMENT, SHOULDER, ARTHROSCOPIC;  Surgeon: Staci Childress MD;  Location: [facility] OR;  Service: Orthopedics;  Laterality: Right;    ARTHROSCOPIC REPAIR OF ROTATOR CUFF OF SHOULDER Right 2/27/2020    Procedure: REPAIR, ROTATOR CUFF, ARTHROSCOPIC;  Surgeon: Staci Childress MD;  Location: [facility] OR;  Service: Orthopedics;  Laterality: Right;    ARTHROSCOPY OF SHOULDER WITH DECOMPRESSION OF SUBACROMIAL SPACE Right 2/27/2020    Procedure: ARTHROSCOPY, SHOULDER, WITH SUBACROMIAL SPACE DECOMPRESSION;  Surgeon: Staci Childress MD;  Location: [facility] OR;  Service: Orthopedics;  Laterality: Right;    BLEPHAROPLASTY      CATARACT EXTRACTION, BILATERAL      FIXATION OF TENDON Right 2/27/2020    Procedure: FIXATION, TENDON, Biceps Tenodesis;  Surgeon: Staci Childress MD;  Location: [facility] OR;  Service: Orthopedics;  Laterality: Right;  FIXATION, TENDON, Biceps Tenodesis    OPEN REDUCTION AND INTERNAL FIXATION (ORIF) OF FRACTURE OF PROXIMAL HUMERUS Right 2/27/2020    Procedure: ORIF, FRACTURE, GREATER TUBEROSITY;  Surgeon: Staci Childress MD;  Location: [facility] OR;  Service: Orthopedics;  Laterality: Right;    TONSILLECTOMY       FAMILY HISTORY:   Family History   Problem Relation Age of Onset    Diabetes Mother     Hyperlipidemia Mother     Heart disease Father     Colon cancer Sister      SOCIAL HISTORY:   Social History     Socioeconomic History    Marital status:      Spouse name: Not on file    Number of children: Not on file    Years of education: Not on file    Highest education level: Not on file   Occupational History    Not on file   Social Needs     Financial resource strain: Not on file    Food insecurity     Worry: Not on file     Inability: Not on file    Transportation needs     Medical: Not on file     Non-medical: Not on file   Tobacco Use    Smoking status: Former Smoker     Types: Cigarettes    Smokeless tobacco: Never Used   Substance and Sexual Activity    Alcohol use: Not on file    Drug use: Not on file    Sexual activity: Not on file   Lifestyle    Physical activity     Days per week: Not on file     Minutes per session: Not on file    Stress: Not on file   Relationships    Social connections     Talks on phone: Not on file     Gets together: Not on file     Attends Confucianism service: Not on file     Active member of club or organization: Not on file     Attends meetings of clubs or organizations: Not on file     Relationship status: Not on file   Other Topics Concern    Not on file   Social History Narrative    Not on file       MEDICATIONS:   Current Outpatient Medications:     aspirin (ECOTRIN) 81 MG EC tablet, Take 81 mg by mouth once daily., Disp: , Rfl:     atenolol (TENORMIN) 50 MG tablet, Take 50 mg by mouth once daily., Disp: , Rfl:     multivit-min-FA-lycopen-lutein (CENTRUM SILVER MEN) 300-600-300 mcg Tab, Take by mouth., Disp: , Rfl:     omega-3 fatty acids/fish oil (FISH OIL-OMEGA-3 FATTY ACIDS) 300-1,000 mg capsule, Take by mouth once daily., Disp: , Rfl:     omeprazole (PRILOSEC) 20 MG capsule, Take 20 mg by mouth once daily., Disp: , Rfl:     oxyCODONE-acetaminophen (PERCOCET)  mg per tablet, Take 1 tablet by mouth every 4-6 hours as needed for pain. Take stool softener with this medication., Disp: 21 tablet, Rfl: 0    promethazine (PHENERGAN) 25 MG tablet, Take 1 tablet (25 mg total) by mouth every 6 (six) hours as needed for Nausea., Disp: 12 tablet, Rfl: 0    rosuvastatin (CRESTOR) 10 MG tablet, Take 10 mg by mouth once daily., Disp: , Rfl:     traMADol (ULTRAM) 50 mg tablet, Take 1 tablet (50 mg  total) by mouth every 8 (eight) hours as needed for Pain., Disp: 20 tablet, Rfl: 0    traMADol (ULTRAM) 50 mg tablet, Take 1-2 tablets ( mg total) by mouth every 6 (six) hours as needed., Disp: 21 tablet, Rfl: 0    triamterene-hydrochlorothiazide 37.5-25 mg (DYAZIDE) 37.5-25 mg per capsule, Take 1 capsule by mouth every morning., Disp: , Rfl:   ALLERGIES:   Review of patient's allergies indicates:   Allergen Reactions    Clindamycin Nausea And Vomiting    Penicillins Hives       VITAL SIGNS: /72   Pulse 73   Ht 6' (1.829 m)   Wt 79.4 kg (175 lb)   BMI 23.73 kg/m²                                                PHYSICAL EXAMINATION:     Incision sites healed well  No evidence of any erythema, infection or induration  elbow Range of motion full   No effusion  2+ DP pulse  No swelling               Forward Flexion: 110  ER: 35  IR: L5    Strength:   Scaption at 0: 5/5  Scaption at 30: 5/5  ER: 5/5                                                                    ASSESSMENT:                                                                                                                                               1. Status post above, doing well.                                                                                                                               PLAN:                                                                                                                                                     1. Continue PT  2. Emphasized scapular function.  3. I have discussed return to activity in detail.  4. he will see us back in 8 weeks.                                      5. All questions were answered and he should contact us if he  has any questions or concerns in the interim.

## 2020-06-18 ENCOUNTER — CLINICAL SUPPORT (OUTPATIENT)
Dept: REHABILITATION | Facility: HOSPITAL | Age: 80
End: 2020-06-18
Attending: PHYSICIAN ASSISTANT
Payer: MEDICARE

## 2020-06-18 DIAGNOSIS — M25.619 LIMITED RANGE OF MOTION (ROM) OF SHOULDER: ICD-10-CM

## 2020-06-18 DIAGNOSIS — G47.9 SLEEPING DIFFICULTY: ICD-10-CM

## 2020-06-18 DIAGNOSIS — G89.29 CHRONIC RIGHT SHOULDER PAIN: ICD-10-CM

## 2020-06-18 DIAGNOSIS — M25.511 CHRONIC RIGHT SHOULDER PAIN: ICD-10-CM

## 2020-06-18 DIAGNOSIS — R52 PAIN AGGRAVATED BY LIFTING: ICD-10-CM

## 2020-06-18 PROCEDURE — 97110 THERAPEUTIC EXERCISES: CPT | Mod: 95 | Performed by: PHYSICAL THERAPIST

## 2020-06-18 NOTE — PROGRESS NOTES
Physical Therapy Daily Treatment Note     Name: Gus Sabillon  Clinic Number: [id number]    Patient unsafe for in-person office visit due to high risk co-morbidities, probability of infection, to minimize exposure, due to pt expressing symptoms, and/or due to government mandated quarantine.  This patient: (1) was informed that telehealth visits are now available congruent with guidelines set by state practice acts & CMS; (2) initiated scheduling of this type of visit; and (3) gave verbal consent to participate in such.  The patient & provider used Epic S[employer] using both video & audio synchronous services to complete the visit.      Therapy Diagnosis:   Encounter Diagnoses   Name Primary?    Chronic right shoulder pain     Limited range of motion (ROM) of shoulder     Pain aggravated by lifting     Sleeping difficulty      Physician: Staci Childress MD    Visit Date: 6/3/2020  Patient Location:  Visit type: Virtual visit with synchronous audio and video through [employer]    Physician Orders: PT Eval and Treat  Medical Diagnosis:   M75.101 (ICD-10-CM) - Nontraumatic tear of right rotator cuff, unspecified tear extent   M75.21 (ICD-10-CM) - Biceps tendinitis of right upper extremity   S43.431A (ICD-10-CM) - Superior glenoid labrum lesion of right shoulder, initial encounter       Evaluation Date: 4/14/2020  Authorization Period Expiration: 6/5/2020  New Plan of Care Certification Period: 6/26/2020  Visit #/Visits authorized: 3/ 6    Time Connection Initiated: 1428  Time Connection Terminated: 1506  Total Billable Time: 38 minutes    Precautions: Standard  POSTOPERATIVE PLAN: We will follow the arthroscopic rotator cuff repair guidelines for a large size rotator cuff tear.  We discussed with the patient's family after surgery.  The patient will remain in a sling for 6 weeks.  PT to start at 4 weeks.    6/3: 16 weeks, 0 days        Subjective     Pt reports: Saw Dr. Childress yesterday and he was delighted with my  progress. Only trouble is with the bear hug exercise I fatigue quickly. Patient notes wanting to continue virtual visit and not enter the clinic at this time    He was compliant with home exercise program.  Response to previous treatment: decrease shoulder pain  Functional change: Improved motion in arm    Pain: 2/10  Location: right shoulder      Objective     6/18: Scaption with hiking on R at 120, lacks inferior glide to humerus and was educated on manual therapy helping this condition.     Zeeshan received therapeutic exercises to develop ROM and flexibility for 38 minutes including:    Overhead IR/ER walkouts OTB 2 x 15   Serratus slides up wall OTB 2 x 10  Wall clock OTB 5x  D1/D2 high to low/low to high OTB   Patient education    NT:  No money OTB 2 x 15  Rows OTB 2 x 15  IR/ER side stepping OTB 2 x 15- cues for hand position and towel under arm  Shoulder extensions OTB 2 x 15    Patient was demonstrated exercises and educated on proper exercise form extensively. Difficulty with scap control but good following of cues with mirror at home. Requires educated on hand position with new exercises to maintain thumb up towards ceiling      Home Exercises Provided and Patient Education Provided     Education provided:   - Proper overhead exercise form with new exercises, emailed new HEP    Written Home Exercises Provided: yes.  Exercises were reviewed and Zeeshan was able to demonstrate them prior to the end of the session.  Zeeshan demonstrated good  understanding of the education provided.     See EMR under Patient Instructions for exercises provided 6/18/2020.    Assessment     Zeeshan continues to do very well post operative RCR. He notes the MD was pleased with progress, hinted at returning to clinic but patient is still uncomfortable at this time. Patient was educated he could be treated in a private treatment room. Patient notes some exercises getting easier and was progressed to next level theraband. He was  introduced to light overhead isometric strengthening and wall exercises for serratus. Appears to lack inferior glide with overhead flexion.    Zeeshan is progressing well towards his goals.   Pt prognosis is Good.     Pt will continue to benefit from skilled outpatient physical therapy to address the deficits listed in the problem list box on initial evaluation, provide pt/family education and to maximize pt's level of independence in the home and community environment.     Pt's spiritual, cultural and educational needs considered and pt agreeable to plan of care and goals.     Anticipated barriers to physical therapy: COVID-19      GOALS: Short Term Goals:  6 weeks  1.Report decreased right shoulder pain < / =  3/10  to increase tolerance for sleeping and ADLs at home (met)  2. Increase PROM flexion and ER within protocol guidelines   (met)  3. Patient to sleep through the night without shoulder pain  (met)  4. Pt to tolerate HEP to improve ROM and independence with ADL's  (met)    Long Term Goals: 14 weeks  1.Report decreased R shoulder pain  < / =  0 /10  to increase tolerance for completing ADLs  (progressing, not met)  2.Increase AROM to 140 deg to restore functional reach  (met)  3.Increase strength to >/= 4/5 in R shoulder to increase tolerance for ADL and work activities.  (progressing, not met)  4. Pt goal: return to completing ADLs with right shoulder without pain  (progressing, not met)      Plan     Progress with scap stability and improving functional reach    Armen Croft, PT

## 2020-07-01 ENCOUNTER — INITIAL CONSULT (OUTPATIENT)
Dept: DERMATOLOGY | Facility: CLINIC | Age: 80
End: 2020-07-01
Payer: MEDICARE

## 2020-07-01 VITALS
WEIGHT: 175 LBS | SYSTOLIC BLOOD PRESSURE: 131 MMHG | HEART RATE: 70 BPM | HEIGHT: 72 IN | BODY MASS INDEX: 23.7 KG/M2 | DIASTOLIC BLOOD PRESSURE: 62 MMHG

## 2020-07-01 DIAGNOSIS — C44.622 SQUAMOUS CELL CARCINOMA OF RIGHT HAND: Primary | ICD-10-CM

## 2020-07-01 PROCEDURE — 1159F PR MEDICATION LIST DOCUMENTED IN MEDICAL RECORD: ICD-10-PCS | Mod: S$GLB,,, | Performed by: DERMATOLOGY

## 2020-07-01 PROCEDURE — 1159F MED LIST DOCD IN RCRD: CPT | Mod: S$GLB,,, | Performed by: DERMATOLOGY

## 2020-07-01 PROCEDURE — 1101F PR PT FALLS ASSESS DOC 0-1 FALLS W/OUT INJ PAST YR: ICD-10-PCS | Mod: CPTII,S$GLB,, | Performed by: DERMATOLOGY

## 2020-07-01 PROCEDURE — 1126F AMNT PAIN NOTED NONE PRSNT: CPT | Mod: S$GLB,,, | Performed by: DERMATOLOGY

## 2020-07-01 PROCEDURE — 3078F PR MOST RECENT DIASTOLIC BLOOD PRESSURE < 80 MM HG: ICD-10-PCS | Mod: CPTII,S$GLB,, | Performed by: DERMATOLOGY

## 2020-07-01 PROCEDURE — 3075F PR MOST RECENT SYSTOLIC BLOOD PRESS GE 130-139MM HG: ICD-10-PCS | Mod: CPTII,S$GLB,, | Performed by: DERMATOLOGY

## 2020-07-01 PROCEDURE — 99999 PR PBB SHADOW E&M-EST. PATIENT-LVL IV: CPT | Mod: PBBFAC,,, | Performed by: DERMATOLOGY

## 2020-07-01 PROCEDURE — 99214 PR OFFICE/OUTPT VISIT, EST, LEVL IV, 30-39 MIN: ICD-10-PCS | Mod: S$GLB,,, | Performed by: DERMATOLOGY

## 2020-07-01 PROCEDURE — 1101F PT FALLS ASSESS-DOCD LE1/YR: CPT | Mod: CPTII,S$GLB,, | Performed by: DERMATOLOGY

## 2020-07-01 PROCEDURE — 99999 PR PBB SHADOW E&M-EST. PATIENT-LVL IV: ICD-10-PCS | Mod: PBBFAC,,, | Performed by: DERMATOLOGY

## 2020-07-01 PROCEDURE — 3078F DIAST BP <80 MM HG: CPT | Mod: CPTII,S$GLB,, | Performed by: DERMATOLOGY

## 2020-07-01 PROCEDURE — 1126F PR PAIN SEVERITY QUANTIFIED, NO PAIN PRESENT: ICD-10-PCS | Mod: S$GLB,,, | Performed by: DERMATOLOGY

## 2020-07-01 PROCEDURE — 3075F SYST BP GE 130 - 139MM HG: CPT | Mod: CPTII,S$GLB,, | Performed by: DERMATOLOGY

## 2020-07-01 PROCEDURE — 99214 OFFICE O/P EST MOD 30 MIN: CPT | Mod: S$GLB,,, | Performed by: DERMATOLOGY

## 2020-07-01 NOTE — PROGRESS NOTES
REFERRING MD:  Nettie Tang M.D.    CHIEF COMPLAINT:  New patient being consulted for Mohs' surgery evaluation.    HISTORY OF PRESENT ILLNESS:  80 y.o. male presents with a 2.5 month(s) history of growths on the R forearm and R hand. (+) h/o melanoma and multiple NMSC.  Recently moved here from [place]. States since the biopsy the right forearm lesion has decreased in size and gotten better but not the right hand lesion.  Currently getting physical therapy for his right shoulder s/p rotator cuff surgery.    Positive for scabbing.- R hand.  Negative for crusting.  Negative for bleeding.  Negative for itching.    Biopsy consistent with squamous cell carcinoma.     No prior treatment to either sites.    Pacemaker: No  Defibrillator: No  Artificial joints: No  Artificial heart valves: No    PAST MEDICAL HISTORY:  Past Medical History:   Diagnosis Date    CKD (chronic kidney disease) stage 3, GFR 30-59 ml/min     Dyslipidemia     GERD (gastroesophageal reflux disease)     Hx of melanoma excision     Hypertension     PAD (peripheral artery disease)     Squamous cell carcinoma of skin        PAST SURGICAL HISTORY:  Past Surgical History:   Procedure Laterality Date    ARTHROSCOPIC DEBRIDEMENT OF SHOULDER Right 2/27/2020    Procedure: EXTENSIVE DEBRIDEMENT, SHOULDER, ARTHROSCOPIC;  Surgeon: Staci Childress MD;  Location: [facility] OR;  Service: Orthopedics;  Laterality: Right;    ARTHROSCOPIC REPAIR OF ROTATOR CUFF OF SHOULDER Right 2/27/2020    Procedure: REPAIR, ROTATOR CUFF, ARTHROSCOPIC;  Surgeon: Staci Childress MD;  Location: [facility] OR;  Service: Orthopedics;  Laterality: Right;    ARTHROSCOPY OF SHOULDER WITH DECOMPRESSION OF SUBACROMIAL SPACE Right 2/27/2020    Procedure: ARTHROSCOPY, SHOULDER, WITH SUBACROMIAL SPACE DECOMPRESSION;  Surgeon: Staci Childress MD;  Location: [facility] OR;  Service: Orthopedics;  Laterality: Right;    BLEPHAROPLASTY      CATARACT EXTRACTION, BILATERAL      FIXATION OF TENDON Right 2/27/2020     Procedure: FIXATION, TENDON, Biceps Tenodesis;  Surgeon: Staci Childress MD;  Location: [facility] OR;  Service: Orthopedics;  Laterality: Right;  FIXATION, TENDON, Biceps Tenodesis    OPEN REDUCTION AND INTERNAL FIXATION (ORIF) OF FRACTURE OF PROXIMAL HUMERUS Right 2/27/2020    Procedure: ORIF, FRACTURE, GREATER TUBEROSITY;  Surgeon: Staci Childress MD;  Location: [facility] OR;  Service: Orthopedics;  Laterality: Right;    TONSILLECTOMY          SOCIAL HISTORY:  Dependencies:  former smoker    PERTINENT MEDICATIONS:  See medications list.  aspirin and fish oil    ALLERGIES:  Clindamycin and Penicillins    ROS:  Skin: See HPI  Constitutional: No fatigue, fever, malaise, weight loss, or night sweats.  Cardiovascular: No chest pain, palpitations, or edema.  Respiratory: No coughing, wheezing, SOB, or sputum production.    PE:  Physical Exam   Skin:                        General: Mood and affect normal. Alert and orient X3. Normal appearance.  RUE: R hand with 9 x 10 mm pink pearly papule located 1 cm proximally from the right 3rd MCP and 1 cm radially.   R forearm with a 9 x 10 mm scaly flat plaque located 16 cm proximally from the left ulnar base of wrist and 3 cm radially.  Lymph nodes: right epitrochlear are not enlarged    IMPRESSION:  Biopsy proven squamous cell carcinoma, R forearm and R hand, path# [id number].    PLAN:  The diagnosis and the pathology report were discussed in detail with the patient. Treatment options were reviewed, including Mohs Micrographic Surgery, radiation, topical therapy, and standard excision.  After careful review of patient's history and physical exam, and after discussion of treatment options, the decision was made to perform Mohs micrographic surgery.    Scheduled patient for Mohs Micrographic Surgery.- for the R hand as it continues to grow.  Risks, benefits, and alternatives of Mohs' surgery discussed with the patient. Discussed repair options including complex closure, skin flap, skin graft  and second intention healing with the patient. Pre-operative instructions provided to the patient. Okay to stay on aspirin. Stop fish oil a week prior to procedure. Patient is currently getting physical therapy for the R shoulder and would like to wait on scheduling Mohs surgery of the R forearm as it has gotten smaller on its own.  Patient even inclined to watch it and not do anything on it at present since he said he's had so many skin cancer removals in the past.    Consulting report is sent to the consulting provider.

## 2020-07-07 ENCOUNTER — TELEPHONE (OUTPATIENT)
Dept: DERMATOLOGY | Facility: CLINIC | Age: 80
End: 2020-07-07

## 2020-07-07 NOTE — TELEPHONE ENCOUNTER
Pt was confirmed appt that is scheduled for 7/9/2020 at 12:30 for two sites per Les SCC right hand and right forearm. Pt verbally confirmed appt date and time.

## 2020-07-07 NOTE — TELEPHONE ENCOUNTER
Called pt to confirm Mohs procedure for SCC right forearm and SCC right hand for 7/9/2020 at 12:30. Did Covid 19 screening, negative. Okay to proceed with Mohs as planned.

## 2020-07-07 NOTE — TELEPHONE ENCOUNTER
----- Message from Joon Fregoso sent at 7/7/2020  1:45 PM CDT -----  Regarding: appt changes to 2        The Pt would like to change the appt to be able to have both spots taken care of.  The spot on his right hand and his right forearm.    Phone # [phone]

## 2020-07-08 ENCOUNTER — CLINICAL SUPPORT (OUTPATIENT)
Dept: REHABILITATION | Facility: HOSPITAL | Age: 80
End: 2020-07-08
Attending: PHYSICIAN ASSISTANT
Payer: MEDICARE

## 2020-07-08 DIAGNOSIS — R52 PAIN AGGRAVATED BY LIFTING: ICD-10-CM

## 2020-07-08 DIAGNOSIS — G47.9 SLEEPING DIFFICULTY: ICD-10-CM

## 2020-07-08 DIAGNOSIS — M25.619 LIMITED RANGE OF MOTION (ROM) OF SHOULDER: ICD-10-CM

## 2020-07-08 DIAGNOSIS — G89.29 CHRONIC RIGHT SHOULDER PAIN: ICD-10-CM

## 2020-07-08 DIAGNOSIS — M25.511 CHRONIC RIGHT SHOULDER PAIN: ICD-10-CM

## 2020-07-08 PROCEDURE — 97110 THERAPEUTIC EXERCISES: CPT | Mod: 95 | Performed by: PHYSICAL THERAPIST

## 2020-07-08 NOTE — PROGRESS NOTES
Physical Therapy Daily Treatment Note     Name: Gus Sabillon  Clinic Number: [id number]    Patient unsafe for in-person office visit due to high risk co-morbidities, probability of infection, to minimize exposure, due to pt expressing symptoms, and/or due to government mandated quarantine.  This patient: (1) was informed that telehealth visits are now available congruent with guidelines set by state practice acts & CMS; (2) initiated scheduling of this type of visit; and (3) gave verbal consent to participate in such.  The patient & provider used Epic [employer] using both video & audio synchronous services to complete the visit.      Therapy Diagnosis:   Encounter Diagnoses   Name Primary?    Chronic right shoulder pain     Limited range of motion (ROM) of shoulder     Pain aggravated by lifting     Sleeping difficulty      Physician: Staci Childress MD    Visit Date: 6/3/2020  Patient Location:  Visit type: Virtual visit with synchronous audio and video through [employer]    Physician Orders: PT Eval and Treat  Medical Diagnosis:   M75.101 (ICD-10-CM) - Nontraumatic tear of right rotator cuff, unspecified tear extent   M75.21 (ICD-10-CM) - Biceps tendinitis of right upper extremity   S43.431A (ICD-10-CM) - Superior glenoid labrum lesion of right shoulder, initial encounter       Evaluation Date: 4/14/2020  Authorization Period Expiration: 7/30/2020  New Plan of Care Certification Period: 8/12/2020  Visit #/Visits authorized: 2/ 12    Time Connection Initiated: 1134  Time Connection Terminated: 1155  Total Billable Time: 21 minutes    Precautions: Standard  POSTOPERATIVE PLAN: We will follow the arthroscopic rotator cuff repair guidelines for a large size rotator cuff tear.  We discussed with the patient's family after surgery.  The patient will remain in a sling for 6 weeks.  PT to start at 4 weeks.    Subjective     Pt reports: Patient reports he is doing well the past few weeks. Notes that he is using the RUE  "without thinking about it as much. Has been more busy around the house doing dishes and doing "more than an old man should." Notes a trip to [place] in august coming up. Has not been as diligent with his HEP    He was compliant with home exercise program.  Response to previous treatment: feeling stronger and using it more  Functional change: sleeping on R side, doing dishes, more independent ADLs    Pain: 0/10  Location: right shoulder      Objective     7/8: Shoulder flexion actively equal to the L seen visually on virtual. Can reach behind his head easily. Most difficulty with behind the back reaching to the back pocket. ER at his side equal to the LUE.    Zeeshan received therapeutic exercises to develop ROM and flexibility for 21 minutes including:    Sleeper stretch 20 sec 5x  Towel IR behind back 20 sec 5x  Towel slides cross body 30x    NT:  No money OTB 2 x 15  Rows OTB 2 x 15  IR/ER side stepping OTB 2 x 15- cues for hand position and towel under arm  Shoulder extensions OTB 2 x 15    Patient was demonstrated exercises and educated on proper exercise form extensively.Difficulty with IR behind the back. All questions were answered and he verbally states he is pleased with his progress    Home Exercises Provided and Patient Education Provided     Education provided:   - Importance of stretching posterior capsule to restore IR and improve reach behind his back    Written Home Exercises Provided: yes.  Exercises were reviewed and Zeeshan was able to demonstrate them prior to the end of the session.  Zeeshan demonstrated good  understanding of the education provided.     See EMR under Patient Instructions for exercises provided 6/18/2020.    Assessment     Zeeshan presents today with limited IR but his active flexion and ER at his side have improved. Minimal scap hiking noted and he reports feeling stronger. Not as compliant with HEP and educated on stretching daily to restore his IR. Emailed the new exercises and he " verbalized understanding. Will continue to progress as tolerated.    Zeeshan is progressing well towards his goals.   Pt prognosis is Good.     Pt will continue to benefit from skilled outpatient physical therapy to address the deficits listed in the problem list box on initial evaluation, provide pt/family education and to maximize pt's level of independence in the home and community environment.     Pt's spiritual, cultural and educational needs considered and pt agreeable to plan of care and goals.     Anticipated barriers to physical therapy: COVID-19      GOALS: Short Term Goals:  6 weeks  1.Report decreased right shoulder pain < / =  3/10  to increase tolerance for sleeping and ADLs at home (met)  2. Increase PROM flexion and ER within protocol guidelines   (met)  3. Patient to sleep through the night without shoulder pain  (met)  4. Pt to tolerate HEP to improve ROM and independence with ADL's  (met)    Long Term Goals: 14 weeks  1.Report decreased R shoulder pain  < / =  0 /10  to increase tolerance for completing ADLs  (met)  2.Increase AROM to 140 deg to restore functional reach  (met)  3.Increase strength to >/= 4/5 in R shoulder to increase tolerance for ADL and work activities.  (progressing, not met)  4. Pt goal: return to completing ADLs with right shoulder without pain  (met)      Plan     Certification Period: 7/8/20 to 8/12/2020  Recommended Treatment Plan: 1 times per week for 12 weeks: Manual Therapy, Moist Heat/ Ice, Neuromuscular Re-ed, Patient Education, Self Care, Therapeutic Activites and Therapeutic Exercise  Other Recommendations: modalities prn, ASTYM prn    Therapist: Armen Croft, PT, DPT    I CERTIFY THE NEED FOR THESE SERVICES FURNISHED UNDER THIS PLAN OF TREATMENT AND WHILE UNDER MY CARE    Physician's comments: ________________________________________________________________________________________________________________________________________________      Physician's Name:  ___________________________________    Armen Croft PT

## 2020-07-08 NOTE — PLAN OF CARE
Physical Therapy Daily Treatment Note     Name: Gus Sabillon  Clinic Number: [id number]    Patient unsafe for in-person office visit due to high risk co-morbidities, probability of infection, to minimize exposure, due to pt expressing symptoms, and/or due to government mandated quarantine.  This patient: (1) was informed that telehealth visits are now available congruent with guidelines set by state practice acts & CMS; (2) initiated scheduling of this type of visit; and (3) gave verbal consent to participate in such.  The patient & provider used Epic S[employer]inder using both video & audio synchronous services to complete the visit.      Therapy Diagnosis:   Encounter Diagnoses   Name Primary?    Chronic right shoulder pain     Limited range of motion (ROM) of shoulder     Pain aggravated by lifting     Sleeping difficulty      Physician: Staci Childress MD    Visit Date: 6/3/2020  Patient Location:  Visit type: Virtual visit with synchronous audio and video through [employer]    Physician Orders: PT Eval and Treat  Medical Diagnosis:   M75.101 (ICD-10-CM) - Nontraumatic tear of right rotator cuff, unspecified tear extent   M75.21 (ICD-10-CM) - Biceps tendinitis of right upper extremity   S43.431A (ICD-10-CM) - Superior glenoid labrum lesion of right shoulder, initial encounter       Evaluation Date: 4/14/2020  Authorization Period Expiration: 7/30/2020  New Plan of Care Certification Period: 8/12/2020  Visit #/Visits authorized: 2/ 12    Time Connection Initiated: 1134  Time Connection Terminated: 1155  Total Billable Time: 21 minutes    Precautions: Standard  POSTOPERATIVE PLAN: We will follow the arthroscopic rotator cuff repair guidelines for a large size rotator cuff tear.  We discussed with the patient's family after surgery.  The patient will remain in a sling for 6 weeks.  PT to start at 4 weeks.    Subjective     Pt reports: Patient reports he is doing well the past few weeks. Notes that he is using the RUE  "without thinking about it as much. Has been more busy around the house doing dishes and doing "more than an old man should." Notes a trip to [place] in august coming up. Has not been as diligent with his HEP    He was compliant with home exercise program.  Response to previous treatment: feeling stronger and using it more  Functional change: sleeping on R side, doing dishes, more independent ADLs    Pain: 0/10  Location: right shoulder      Objective     7/8: Shoulder flexion actively equal to the L seen visually on virtual. Can reach behind his head easily. Most difficulty with behind the back reaching to the back pocket. ER at his side equal to the LUE.    Zeeshan received therapeutic exercises to develop ROM and flexibility for 21 minutes including:    Sleeper stretch 20 sec 5x  Towel IR behind back 20 sec 5x  Towel slides cross body 30x    NT:  No money OTB 2 x 15  Rows OTB 2 x 15  IR/ER side stepping OTB 2 x 15- cues for hand position and towel under arm  Shoulder extensions OTB 2 x 15    Patient was demonstrated exercises and educated on proper exercise form extensively.Difficulty with IR behind the back. All questions were answered and he verbally states he is pleased with his progress    Home Exercises Provided and Patient Education Provided     Education provided:   - Importance of stretching posterior capsule to restore IR and improve reach behind his back    Written Home Exercises Provided: yes.  Exercises were reviewed and Zeeshan was able to demonstrate them prior to the end of the session.  Zeeshan demonstrated good  understanding of the education provided.     See EMR under Patient Instructions for exercises provided 6/18/2020.    Assessment     Zeeshan presents today with limited IR but his active flexion and ER at his side have improved. Minimal scap hiking noted and he reports feeling stronger. Not as compliant with HEP and educated on stretching daily to restore his IR. Emailed the new exercises and he " verbalized understanding. Will continue to progress as tolerated.    Zeeshan is progressing well towards his goals.   Pt prognosis is Good.     Pt will continue to benefit from skilled outpatient physical therapy to address the deficits listed in the problem list box on initial evaluation, provide pt/family education and to maximize pt's level of independence in the home and community environment.     Pt's spiritual, cultural and educational needs considered and pt agreeable to plan of care and goals.     Anticipated barriers to physical therapy: COVID-19      GOALS: Short Term Goals:  6 weeks  1.Report decreased right shoulder pain < / =  3/10  to increase tolerance for sleeping and ADLs at home (met)  2. Increase PROM flexion and ER within protocol guidelines   (met)  3. Patient to sleep through the night without shoulder pain  (met)  4. Pt to tolerate HEP to improve ROM and independence with ADL's  (met)    Long Term Goals: 14 weeks  1.Report decreased R shoulder pain  < / =  0 /10  to increase tolerance for completing ADLs  (met)  2.Increase AROM to 140 deg to restore functional reach  (met)  3.Increase strength to >/= 4/5 in R shoulder to increase tolerance for ADL and work activities.  (progressing, not met)  4. Pt goal: return to completing ADLs with right shoulder without pain  (met)      Plan     Certification Period: 7/8/20 to 8/12/2020  Recommended Treatment Plan: 1 times per week for 12 weeks: Manual Therapy, Moist Heat/ Ice, Neuromuscular Re-ed, Patient Education, Self Care, Therapeutic Activites and Therapeutic Exercise  Other Recommendations: modalities prn, ASTYM prn    Therapist: Armen Croft, PT, DPT    I CERTIFY THE NEED FOR THESE SERVICES FURNISHED UNDER THIS PLAN OF TREATMENT AND WHILE UNDER MY CARE    Physician's comments: ________________________________________________________________________________________________________________________________________________      Physician's Name:  ___________________________________    Armen Croft PT

## 2020-07-09 ENCOUNTER — PROCEDURE VISIT (OUTPATIENT)
Dept: DERMATOLOGY | Facility: CLINIC | Age: 80
End: 2020-07-09
Payer: MEDICARE

## 2020-07-09 VITALS
HEART RATE: 64 BPM | DIASTOLIC BLOOD PRESSURE: 66 MMHG | HEIGHT: 72 IN | BODY MASS INDEX: 23.7 KG/M2 | WEIGHT: 175 LBS | SYSTOLIC BLOOD PRESSURE: 142 MMHG

## 2020-07-09 DIAGNOSIS — C44.622 SQUAMOUS CELL CARCINOMA OF SKIN OF RIGHT ARM, INCLUDING SHOULDER: ICD-10-CM

## 2020-07-09 DIAGNOSIS — C44.622 SQUAMOUS CELL CARCINOMA OF SKIN OF RIGHT HAND AND FINGERS: Primary | ICD-10-CM

## 2020-07-09 PROCEDURE — 17313 MOHS 1 STAGE T/A/L: CPT | Mod: 51,S$GLB,, | Performed by: DERMATOLOGY

## 2020-07-09 PROCEDURE — 13121 CMPLX RPR S/A/L 2.6-7.5 CM: CPT | Mod: S$GLB,,, | Performed by: DERMATOLOGY

## 2020-07-09 PROCEDURE — 17313: ICD-10-PCS | Mod: 51,S$GLB,, | Performed by: DERMATOLOGY

## 2020-07-09 PROCEDURE — 13121 PR RECMPL WND SCALP,EXTR 2.6-7.5 CM: ICD-10-PCS | Mod: S$GLB,,, | Performed by: DERMATOLOGY

## 2020-07-09 PROCEDURE — 17312: ICD-10-PCS | Mod: S$GLB,,, | Performed by: DERMATOLOGY

## 2020-07-09 PROCEDURE — 17311: ICD-10-PCS | Mod: S$GLB,,, | Performed by: DERMATOLOGY

## 2020-07-09 PROCEDURE — 13132 CMPLX RPR F/C/C/M/N/AX/G/H/F: CPT | Mod: S$GLB,,, | Performed by: DERMATOLOGY

## 2020-07-09 PROCEDURE — 17311 MOHS 1 STAGE H/N/HF/G: CPT | Mod: S$GLB,,, | Performed by: DERMATOLOGY

## 2020-07-09 PROCEDURE — 13132 PR RECMPL WND HEAD,FAC,HAND 2.6-7.5 CM: ICD-10-PCS | Mod: S$GLB,,, | Performed by: DERMATOLOGY

## 2020-07-09 PROCEDURE — 17312 MOHS ADDL STAGE: CPT | Mod: S$GLB,,, | Performed by: DERMATOLOGY

## 2020-07-09 RX ORDER — DOXYCYCLINE HYCLATE 100 MG
100 TABLET ORAL 2 TIMES DAILY
Qty: 20 TABLET | Refills: 0 | Status: SHIPPED | OUTPATIENT
Start: 2020-07-09 | End: 2020-10-21 | Stop reason: SDUPTHER

## 2020-07-09 NOTE — PROGRESS NOTES
PROCEDURE: Mohs' Micrographic Surgery    INDICATION: Biopsy-proven skin cancer of cosmetically and functionally important areas, including head, neck, genital, hand, foot, or areas known for having difficulty in healing, such as the lower anterior legs. Tumors with aggressive clinical behavior (rapidly growing, greater than 1 cm in diameter).    REFERRING MD: Nettie Tang M.D.    CASE NUMBER:     ANESTHETIC: 3 cc 0.5% Lidocaine with Epi 1:200,000 mixed 1:1 with 0.5% Bupivacaine    SURGICAL PREP: Hibiclens    SURGEON: Les Saldana MD    ASSISTANTS: Ximena Lawson PA-C, Cathy Clifford MA and Corina Bernstein, Surg Tech    PREOPERATIVE DIAGNOSIS: squamous cell carcinoma    POSTOPERATIVE DIAGNOSIS: squamous cell carcinoma    PATHOLOGIC DIAGNOSIS: squamous cell carcinoma- invasive, well differentiated    HISTOLOGY OF SPECIMENS IN FIRST STAGE:   Tumor Type: Tumor seen. Invasive squamous cell carcinoma: Proliferation of squamous cells exhibiting atypia and infiltrating within the dermis.  Well-differentiated squamous cell carcinoma: Proliferation of squamous cells exhibiting atypia and infiltrating within the dermis.   Depth of Invasion: epidermis and dermis  Perineural Invasion: No    HISTOLOGY OF SPECIMENS IN SUBSEQUENT STAGES:  · Tumor Type: No tumor seen.    STAGES OF MOHS' SURGERY PERFORMED: 2    TUMOR-FREE PLANE ACHIEVED: Yes    HEMOSTASIS: electrocoagulation     SPECIMENS: 3 (2 in stage A and 1 in stage B)    LOCATION: right hand. Patient verified location.    INITIAL LESION SIZE: 1.0 x 1.1 cm    FINAL DEFECT SIZE: 1.3 x 1.7 cm    WOUND REPAIR/DISPOSITION: The patient tolerated Mohs' Micrographic Surgery for a squamous cell carcinoma very well. When the tumor was completely removed, a repair of the surgical defect was undertaken.       PROCEDURE: Complex Linear Repair    INDICATION: Status post Mohs' Micrographic Surgery for squamous cell carcinoma.    CASE NUMBER:     SURGEON: Les Saldana  MD    ASSISTANTS: Ximena Lawson PA-C and Mary Dias    ANESTHETIC: 1 cc 1% Lidocaine with Epinephrine 1:100,000    SURGICAL PREP: Hibiclens, prepped by Mary Dias    LOCATION: right hand    DEFECT SIZE: 1.3  1.7 cm    WOUND REPAIR/DISPOSITION:  After the patient's carcinoma had been completely removed with Mohs' Micrographic Surgery, a repair of the surgical defect was undertaken. The patient was returned to the operating suite where the area of right hand was prepped, draped, and anesthetized in the usual sterile fashion. The wound was widely undermined in all directions. Then, electrocoagulation was used to obtain meticulous hemostasis. 4-0 Vicryl buried vertical mattress sutures were placed into the subcutaneous and dermal plane to close the wound and sadie the cutaneous wound edge. Bilateral dog ears were identified and were removed by a standard Burow's triangle technique. The cutaneous wound edges were closed using interrupted 4-0 Prolene suture.    The patient tolerated the procedure well.    The area was cleaned and dressed appropriately and the patient was given wound care instructions, as well as appointment for follow-up evaluation and suture removal in two weeks. Patient already has pain medication at home and was placed on doxycycline 100 mg BID x 10 days. Advised patient to take doxycycline with food, not one hour prior to bedtime, and apply additional sunscreen/wear hat when outdoors as he is more photosensitive while taking the medication.    LENGTH OF REPAIR: 3.4 cm    Vitals:    07/09/20 1231 07/09/20 1432   BP: (!) 124/59 (!) 142/66   BP Location: Left arm Left arm   Patient Position: Sitting Lying   BP Method: Small (Automatic) Small (Automatic)   Pulse: 64 64   Weight: 79.4 kg (175 lb)    Height: 6' (1.829 m)          PROCEDURE: Mohs' Micrographic Surgery    INDICATION: Tumor with ill-defined borders.    REFERRING MD: Nettie Tang M.D.    CASE NUMBER:     ANESTHETIC:  4 cc 0.5% Lidocaine with Epi 1:200,000 mixed 1:1 with 0.5% Bupivacaine    SURGICAL PREP: Hibiclens    SURGEON: Les Saldana MD    ASSISTANTS: Ximena Lawson PA-C and Cathy Clifford MA    PREOPERATIVE DIAGNOSIS: squamous cell carcinoma    POSTOPERATIVE DIAGNOSIS: squamous cell carcinoma    PATHOLOGIC DIAGNOSIS: squamous cell carcinoma    HISTOLOGY OF SPECIMENS IN FIRST STAGE:   Tumor Type: No tumor seen.    STAGES OF MOHS' SURGERY PERFORMED: 1    TUMOR-FREE PLANE ACHIEVED: Yes    HEMOSTASIS: electrocoagulation     SPECIMENS: 2    LOCATION: right forearm. Patient verified location. Also verified with photo in Epic.    INITIAL LESION SIZE: 0.9 x 1.0 cm    FINAL DEFECT SIZE: 1.1 x 1.4 cm    WOUND REPAIR/DISPOSITION: The patient tolerated Mohs' Micrographic Surgery for a squamous cell carcinoma very well. When the tumor was completely removed, a repair of the surgical defect was undertaken.      PROCEDURE: Complex Linear Repair    INDICATION: Status post Mohs' Micrographic Surgery for squamous cell carcinoma.    CASE NUMBER:     SURGEON: Les Saldana MD    ASSISTANTS: Ximena Lawson PA-C and Corina Bernstein Surg Tech    ANESTHETIC: 2 cc 1% Lidocaine with Epinephrine 1:100,000    SURGICAL PREP: Hibiclens, prepped by Corina Bernstein Surg Tech    LOCATION: right forearm    DEFECT SIZE: 1.1 x 1.4 cm    WOUND REPAIR/DISPOSITION:  After the patient's carcinoma had been completely removed with Mohs' Micrographic Surgery, a repair of the surgical defect was undertaken. The patient was returned to the operating suite where the area of right forearm was prepped, draped, and anesthetized in the usual sterile fashion. The wound was widely undermined in all directions. Then, electrocoagulation was used to obtain meticulous hemostasis. 4-0 Vicryl buried vertical mattress sutures were placed into the subcutaneous and dermal plane to close the wound and sadie the cutaneous wound edge. Bilateral dog ears were identified and were removed  by a standard Burow's triangle technique. The cutaneous wound edges were closed using interrupted 4-0 Prolene suture.    The patient tolerated the procedure well.    The area was cleaned and dressed appropriately and the patient was given wound care instructions, as well as appointment for follow-up evaluation ans suture removal in two weeks. Patient already has pain medication at home and was placed on doxycycline 100 mg BID x 10 days. Advised patient to take doxycycline with food, not one hour prior to bedtime, and apply additional sunscreen/wear hat when outdoors as he is more photosensitive while taking the medication.    LENGTH OF REPAIR: 3.3 cm    Vitals:    07/09/20 1231 07/09/20 1432   BP: (!) 124/59 (!) 142/66   BP Location: Left arm Left arm   Patient Position: Sitting Lying   BP Method: Small (Automatic) Small (Automatic)   Pulse: 64 64   Weight: 79.4 kg (175 lb)    Height: 6' (1.829 m)

## 2020-07-23 ENCOUNTER — OFFICE VISIT (OUTPATIENT)
Dept: DERMATOLOGY | Facility: CLINIC | Age: 80
End: 2020-07-23
Payer: MEDICARE

## 2020-07-23 DIAGNOSIS — Z09 POSTOP CHECK: Primary | ICD-10-CM

## 2020-07-23 PROCEDURE — 99024 POSTOP FOLLOW-UP VISIT: CPT | Mod: S$GLB,,, | Performed by: DERMATOLOGY

## 2020-07-23 PROCEDURE — 99024 PR POST-OP FOLLOW-UP VISIT: ICD-10-PCS | Mod: S$GLB,,, | Performed by: DERMATOLOGY

## 2020-07-23 PROCEDURE — 99999 PR PBB SHADOW E&M-EST. PATIENT-LVL III: CPT | Mod: PBBFAC,,, | Performed by: DERMATOLOGY

## 2020-07-23 PROCEDURE — 99999 PR PBB SHADOW E&M-EST. PATIENT-LVL III: ICD-10-PCS | Mod: PBBFAC,,, | Performed by: DERMATOLOGY

## 2020-07-23 NOTE — PROGRESS NOTES
80 y.o. male patient is here for suture removal following Mohs' surgery.    Patient reports no problems.    WOUND PE:  The R hand and R forearm sutures intact. Wound healing well. Good skin edges. No signs or symptoms of infection.    IMPRESSION:  Healing operative site from Mohs' surgery, SCC R hand and R forearm s/p Mohs with CLC, postop day #14.    PLAN:  Sutures removed today. Steri-strips applied.  Continue wound care.  Keep moist with Aquaphor.    RTC:  In 3-6 months with Nettie Tang M.D. for skin check or sooner if new concern arises.

## 2020-07-28 ENCOUNTER — CLINICAL SUPPORT (OUTPATIENT)
Dept: REHABILITATION | Facility: HOSPITAL | Age: 80
End: 2020-07-28
Attending: PHYSICIAN ASSISTANT
Payer: MEDICARE

## 2020-07-28 DIAGNOSIS — M25.619 LIMITED RANGE OF MOTION (ROM) OF SHOULDER: ICD-10-CM

## 2020-07-28 DIAGNOSIS — M25.511 CHRONIC RIGHT SHOULDER PAIN: ICD-10-CM

## 2020-07-28 DIAGNOSIS — G47.9 SLEEPING DIFFICULTY: ICD-10-CM

## 2020-07-28 DIAGNOSIS — R52 PAIN AGGRAVATED BY LIFTING: ICD-10-CM

## 2020-07-28 DIAGNOSIS — G89.29 CHRONIC RIGHT SHOULDER PAIN: ICD-10-CM

## 2020-07-28 PROCEDURE — 97110 THERAPEUTIC EXERCISES: CPT | Mod: 95 | Performed by: PHYSICAL THERAPIST

## 2020-07-28 NOTE — PROGRESS NOTES
Physical Therapy Daily Treatment Note     Name: Gus Sabillon  Clinic Number: [id number]    Patient unsafe for in-person office visit due to high risk co-morbidities, probability of infection, to minimize exposure, due to pt expressing symptoms, and/or due to government mandated quarantine.  This patient: (1) was informed that telehealth visits are now available congruent with guidelines set by state practice acts & CMS; (2) initiated scheduling of this type of visit; and (3) gave verbal consent to participate in such.  The patient & provider used Epic [employer] using both video & audio synchronous services to complete the visit.      Therapy Diagnosis:   Encounter Diagnoses   Name Primary?    Chronic right shoulder pain     Limited range of motion (ROM) of shoulder     Pain aggravated by lifting     Sleeping difficulty      Physician: Staci Childress MD    Visit Date: 6/3/2020  Patient Location:  Visit type: Virtual visit with synchronous audio and video through [employer]    Physician Orders: PT Eval and Treat  Medical Diagnosis:   M75.101 (ICD-10-CM) - Nontraumatic tear of right rotator cuff, unspecified tear extent   M75.21 (ICD-10-CM) - Biceps tendinitis of right upper extremity   S43.431A (ICD-10-CM) - Superior glenoid labrum lesion of right shoulder, initial encounter       Evaluation Date: 4/14/2020  Authorization Period Expiration: 7/30/2020  New Plan of Care Certification Period: 8/12/2020  Visit #/Visits authorized: 3/ 12    Time Connection Initiated: 1357  Time Connection Terminated: 1421  Total Billable Time: 24 minutes    Precautions: Standard  POSTOPERATIVE PLAN: We will follow the arthroscopic rotator cuff repair guidelines for a large size rotator cuff tear.  We discussed with the patient's family after surgery.  The patient will remain in a sling for 6 weeks.  PT to start at 4 weeks.    Subjective     Pt reports: Patient had surgery on his RUE so has not been exercising per precautions the past  couple of weeks. Hard to  my progress but maybe 75% recovered. Notes going on a trip to [place] and will return to see MD 9/2. Throughout the day he is using the RUE but still favoring it    He was compliant with home exercise program.  Response to previous treatment: feeling stronger and using it more  Functional change: sleeping on R side, doing dishes, more independent ADLs    Pain: 0/10  Location: right shoulder      Objective     7/28: Visually lacking approximately 10% of overhead flexion with hiking in shoulder, most limited reach behind his back but he notes improvement with sleeper stretch. Pain down the deltoid with overhead impingement corrected with verbal cues for scap positioning    Zeeshan received therapeutic exercises to develop ROM and flexibility for 24 minutes including:    Prone extensions 20x  Prone row20x  Prone mid trap 20x T  Scaptions 20x  ER no money with cues to retraction shoulder 20x      NT:  No money OTB 2 x 15  Rows OTB 2 x 15  IR/ER side stepping OTB 2 x 15- cues for hand position and towel under arm  Shoulder extensions OTB 2 x 15    Patient was demonstrated exercises and educated on proper exercise form extensively.Patient able to replicate exercises and emailed HEP    Home Exercises Provided and Patient Education Provided     Education provided:   - Importance of stretching posterior capsule to restore IR and improve reach behind his back    Written Home Exercises Provided: yes.  Exercises were reviewed and Zeeshan was able to demonstrate them prior to the end of the session.  Zeeshan demonstrated good  understanding of the education provided.     See EMR under Patient Instructions for exercises provided 6/18/2020.    Assessment     Zeeshan has made minimal progress this past month due to his skin surgery. He will return for therapy in person on 9/2 before seeing MD later in the day. Hiking with overhead flexion and will benefit from humeral mobilizations. Given scap  stabilization HEP and he is motivated to get back to work on the shoulder    Zeeshan is progressing well towards his goals.   Pt prognosis is Good.     Pt will continue to benefit from skilled outpatient physical therapy to address the deficits listed in the problem list box on initial evaluation, provide pt/family education and to maximize pt's level of independence in the home and community environment.     Pt's spiritual, cultural and educational needs considered and pt agreeable to plan of care and goals.     Anticipated barriers to physical therapy: COVID-19      GOALS: Short Term Goals:  6 weeks  1.Report decreased right shoulder pain < / =  3/10  to increase tolerance for sleeping and ADLs at home (met)  2. Increase PROM flexion and ER within protocol guidelines   (met)  3. Patient to sleep through the night without shoulder pain  (met)  4. Pt to tolerate HEP to improve ROM and independence with ADL's  (met)    Long Term Goals: 14 weeks  1.Report decreased R shoulder pain  < / =  0 /10  to increase tolerance for completing ADLs  (met)  2.Increase AROM to 140 deg to restore functional reach  (met)  3.Increase strength to >/= 4/5 in R shoulder to increase tolerance for ADL and work activities.  (progressing, not met)  4. Pt goal: return to completing ADLs with right shoulder without pain  (met)      Plan     Certification Period: 7/8/20 to 8/12/2020  Recommended Treatment Plan: 1 times per week for 12 weeks: Manual Therapy, Moist Heat/ Ice, Neuromuscular Re-ed, Patient Education, Self Care, Therapeutic Activites and Therapeutic Exercise  Other Recommendations: modalities prn, ASTYM prn      Improve scap stability    Therapist: Armen Croft, PT, DPT

## 2020-09-01 ENCOUNTER — PATIENT OUTREACH (OUTPATIENT)
Dept: ADMINISTRATIVE | Facility: OTHER | Age: 80
End: 2020-09-01

## 2020-09-02 ENCOUNTER — OFFICE VISIT (OUTPATIENT)
Dept: SPORTS MEDICINE | Facility: CLINIC | Age: 80
End: 2020-09-02
Payer: MEDICARE

## 2020-09-02 ENCOUNTER — CLINICAL SUPPORT (OUTPATIENT)
Dept: REHABILITATION | Facility: HOSPITAL | Age: 80
End: 2020-09-02
Attending: PHYSICIAN ASSISTANT
Payer: MEDICARE

## 2020-09-02 VITALS
WEIGHT: 175 LBS | DIASTOLIC BLOOD PRESSURE: 68 MMHG | BODY MASS INDEX: 23.7 KG/M2 | SYSTOLIC BLOOD PRESSURE: 126 MMHG | HEIGHT: 72 IN | HEART RATE: 79 BPM

## 2020-09-02 DIAGNOSIS — G89.29 CHRONIC RIGHT SHOULDER PAIN: ICD-10-CM

## 2020-09-02 DIAGNOSIS — M25.511 CHRONIC RIGHT SHOULDER PAIN: ICD-10-CM

## 2020-09-02 DIAGNOSIS — M75.101 NONTRAUMATIC TEAR OF RIGHT ROTATOR CUFF, UNSPECIFIED TEAR EXTENT: Primary | ICD-10-CM

## 2020-09-02 DIAGNOSIS — G47.9 SLEEPING DIFFICULTY: ICD-10-CM

## 2020-09-02 DIAGNOSIS — R52 PAIN AGGRAVATED BY LIFTING: ICD-10-CM

## 2020-09-02 DIAGNOSIS — M25.619 LIMITED RANGE OF MOTION (ROM) OF SHOULDER: ICD-10-CM

## 2020-09-02 PROCEDURE — 99024 PR POST-OP FOLLOW-UP VISIT: ICD-10-PCS | Mod: S$GLB,,, | Performed by: ORTHOPAEDIC SURGERY

## 2020-09-02 PROCEDURE — 97110 THERAPEUTIC EXERCISES: CPT | Performed by: PHYSICAL THERAPIST

## 2020-09-02 PROCEDURE — 97140 MANUAL THERAPY 1/> REGIONS: CPT | Performed by: PHYSICAL THERAPIST

## 2020-09-02 PROCEDURE — 99999 PR PBB SHADOW E&M-EST. PATIENT-LVL III: ICD-10-PCS | Mod: PBBFAC,,, | Performed by: ORTHOPAEDIC SURGERY

## 2020-09-02 PROCEDURE — 99024 POSTOP FOLLOW-UP VISIT: CPT | Mod: S$GLB,,, | Performed by: ORTHOPAEDIC SURGERY

## 2020-09-02 PROCEDURE — 99999 PR PBB SHADOW E&M-EST. PATIENT-LVL III: CPT | Mod: PBBFAC,,, | Performed by: ORTHOPAEDIC SURGERY

## 2020-09-02 NOTE — PROGRESS NOTES
Physical Therapy Treatment Note     Name: Gus Sabillon  [facility] Number: [id number]    Therapy Diagnosis:   Encounter Diagnoses   Name Primary?    Chronic right shoulder pain     Limited range of motion (ROM) of shoulder     Pain aggravated by lifting     Sleeping difficulty      Physician: Evangelist Khanna III, *    Visit Date: 9/2/2020    Physician Orders: PT Eval and Treat  Medical Diagnosis:   M75.101 (ICD-10-CM) - Nontraumatic tear of right rotator cuff, unspecified tear extent   M75.21 (ICD-10-CM) - Biceps tendinitis of right upper extremity   S43.431A (ICD-10-CM) - Superior glenoid labrum lesion of right shoulder, initial encounter         Evaluation Date: 4/14/2020  Authorization Period Expiration: 9/2/2021  New Plan of Care Certification Period: 10/6/2020  Visit #/Visits authorized: 1/ 5    First in person visit, 7 virtuals    Time In: 1245  Time Out: 1330  Total Billable Time: 45 minutes    Precautions: Standard    Subjective     Pt reports: His shoulder is doing well. Went to [place] for 2 weeks and did not exercise much. The motion is good, but it is still not as strong as the left. Notes traveling was not too difficulty on the arm though  He was compliant with home exercise program.  Response to previous treatment: no problems reported  Functional change: Improved motion and functional use arm    Pain: 1/10  Location: right shoulder      Objective     9/2: Patient attends therapy in person for first therapy visit. Equal ER bilaterally in sitting. Limited behind the back reach to belt line compared to inferior angle on the L. Active flexion bilaterally 155 degrees. Right shoulder abduction/anterior tilt with weakness to serratus    Strength: 4/5 ER, 4-/5 flexion and abduction    Zeeshan received therapeutic exercises to develop strength, endurance, ROM and flexibility for 30 minutes including:    Sleeper stretch 5x 15 sec ea  Scaptions 2# 2 x 15  Prone row with ER 2 x 10  Ball on the wall  serratus press 20x CW/CCW    Zeeshan received the following manual therapy techniques: Joint mobilizations and Soft tissue Mobilization were applied to the: Right shoulder for 15 minutes, including:    Posterior capsule stretch cross body blocking scapula  Flexion blocking scapula  End range IR stretch with posterior humeral glide  Inferior glide arm abducted 20 deg    Home Exercises Provided and Patient Education Provided     Education provided:   - Proper exercise form, posterior capsule stretch    Written Home Exercises Provided: yes.  Exercises were reviewed and Zeeshan was able to demonstrate them prior to the end of the session.  Zeeshan demonstrated good  understanding of the education provided.     See EMR under Patient Instructions for exercises provided 9/2/2020.    Assessment     Zeeshan was most limited today with IR behind his back. Tight posterior capsule, given sleeper stretch and cross body at the wall. Non compliant HEP last few weeks with trip to [place], and has a trip planned to [facility] for his daughter's celebration of life ceremony. Will progress with scap stabilization to decrease anterior tilt to R scap    Zeeshan is progressing well towards his goals.   Pt prognosis is Good.     Pt will continue to benefit from skilled outpatient physical therapy to address the deficits listed in the problem list box on initial evaluation, provide pt/family education and to maximize pt's level of independence in the home and community environment.     Pt's spiritual, cultural and educational needs considered and pt agreeable to plan of care and goals.     Anticipated barriers to physical therapy: covid, not attending in person therapy until 6 months post op    GOALS: Short Term Goals:  6 weeks  1.Report decreased right shoulder pain < / =  3/10  to increase tolerance for sleeping and ADLs at home (met)  2. Increase PROM flexion and ER within protocol guidelines   (met)  3. Patient to sleep through the night  without shoulder pain  (met)  4. Pt to tolerate HEP to improve ROM and independence with ADL's  (met)     Long Term Goals: 14 weeks  1.Report decreased R shoulder pain  < / =  0 /10  to increase tolerance for completing ADLs  (met)  2.Increase AROM to 140 deg to restore functional reach  (met)  3.Increase strength to >/= 4/5 in R shoulder to increase tolerance for ADL and work activities.  (progressing, not met)  4. Pt goal: return to completing ADLs with right shoulder without pain  (met)    Plan     Certification Period 9/2/2020 to 10/6/2020  Recommended Treatment Plan: 2 times per week for 6 weeks: Manual Therapy, Moist Heat/ Ice, Neuromuscular Re-ed, Patient Education, Self Care, Therapeutic Activites and Therapeutic Exercise  Other Recommendations: modalities prn, ASTYM prn    Therapist: Armen Croft PT, DPT    I CERTIFY THE NEED FOR THESE SERVICES FURNISHED UNDER THIS PLAN OF TREATMENT AND WHILE UNDER MY CARE    Physician's comments: ________________________________________________________________________________________________________________________________________________      Physician's Name: ___________________________________    Armen Croft PT

## 2020-09-02 NOTE — PROGRESS NOTES
Care Everywhere:   Immunization: no profile in links  Health Maintenance: updated  Media Review:   Legacy Review:   Order placed:   Upcoming appts:

## 2020-09-02 NOTE — PROGRESS NOTES
CC right shoulder pain    HISTORY OF PRESENT ILLNESS:   Pt is here today for first post-operative followup of his shoulder arthroscopy.  he is doing well.  We have reviewed his findings and discussed plan of care and future treatment options.          [place] preop 41    DATE OF PROCEDURE: 02/27/2020  SURGEON: Staci Childress M.D.  OPERATION:   right  1. Shoulder arthroscopic rotator cuff repair 45 x 25 mm, large, double row (CPT 92083) (complex, -22 modifier)  2. Shoulder subpectoral biceps tenodesis, arthroscopic-assisted (CPT 65733)  3. Shoulder arthroscopic subacromial decompression, bursectomy   4. Shoulder arthroscopic extensive debridement (anterior, posterior glenohumeral joint, subacromial space) (CPT 81988)  5. Shoulder arthroscopic microfracture (Crimson duvet technique) (CPT 91312)  6. Shoulder arthroscopic lysis of adhesions (CPT 96396)  7. Shoulder ORIF greater tuberosity (CPT 60660)                                   Review of Systems   Constitution: Negative. Negative for chills, fever and night sweats.   HENT: Negative for congestion and headaches.    Eyes: Negative for blurred vision, left vision loss and right vision loss.   Cardiovascular: Negative for chest pain and syncope.   Respiratory: Negative for cough and shortness of breath.    Endocrine: Negative for polydipsia, polyphagia and polyuria.   Hematologic/Lymphatic: Negative for bleeding problem. Does not bruise/bleed easily.   Skin: Negative for dry skin, itching and rash.   Musculoskeletal: Negative for falls and muscle weakness.   Gastrointestinal: Negative for abdominal pain and bowel incontinence.   Genitourinary: Negative for bladder incontinence and nocturia.   Neurological: Negative for disturbances in coordination, loss of balance and seizures.   Psychiatric/Behavioral: Negative for depression. The patient does not have insomnia.    Allergic/Immunologic: Negative for hives and persistent infections.       PAST MEDICAL HISTORY:   Past Medical  History:   Diagnosis Date    CKD (chronic kidney disease) stage 3, GFR 30-59 ml/min     Dyslipidemia     GERD (gastroesophageal reflux disease)     Hx of melanoma excision     Hypertension     PAD (peripheral artery disease)     Squamous cell carcinoma of skin      PAST SURGICAL HISTORY:   Past Surgical History:   Procedure Laterality Date    ARTHROSCOPIC DEBRIDEMENT OF SHOULDER Right 2/27/2020    Procedure: EXTENSIVE DEBRIDEMENT, SHOULDER, ARTHROSCOPIC;  Surgeon: Staci Childress MD;  [facility]: [facility];  Service: Orthopedics;  Laterality: Right;    ARTHROSCOPIC REPAIR OF ROTATOR CUFF OF SHOULDER Right 2/27/2020    Procedure: REPAIR, ROTATOR CUFF, ARTHROSCOPIC;  Surgeon: Staci Childress MD;  Location: [facility];  Service: Orthopedics;  Laterality: Right;    ARTHROSCOPY OF SHOULDER WITH DECOMPRESSION OF SUBACROMIAL SPACE Right 2/27/2020    Procedure: ARTHROSCOPY, SHOULDER, WITH SUBACROMIAL SPACE DECOMPRESSION;  Surgeon: Staci Childress MD;  Location: [facility];  Service: Orthopedics;  Laterality: Right;    BLEPHAROPLASTY      CATARACT EXTRACTION, BILATERAL      FIXATION OF TENDON Right 2/27/2020    Procedure: FIXATION, TENDON, Biceps Tenodesis;  Surgeon: Staci Childress MD;  Location: [facility];  Service: Orthopedics;  Laterality: Right;  FIXATION, TENDON, Biceps Tenodesis    OPEN REDUCTION AND INTERNAL FIXATION (ORIF) OF FRACTURE OF PROXIMAL HUMERUS Right 2/27/2020    Procedure: ORIF, FRACTURE, GREATER TUBEROSITY;  Surgeon: Staci Childress MD;  Location: [facility];  Service: Orthopedics;  Laterality: Right;    TONSILLECTOMY       FAMILY HISTORY:   Family History   Problem Relation Age of Onset    Diabetes Mother     Hyperlipidemia Mother     Heart disease Father     Colon cancer Sister      SOCIAL HISTORY:   Social History     Socioeconomic History    Marital status:      Spouse name: Not on file    Number of children: Not on file    Years of education: Not on file    Highest education level: Not on file    Occupational History    Not on file   Social Needs    Financial resource strain: Not on file    Food insecurity     Worry: Not on file     Inability: Not on file    Transportation needs     Medical: Not on file     Non-medical: Not on file   Tobacco Use    Smoking status: Former Smoker     Types: Cigarettes    Smokeless tobacco: Never Used   Substance and Sexual Activity    Alcohol use: Not on file    Drug use: Not on file    Sexual activity: Not on file   Lifestyle    Physical activity     Days per week: Not on file     Minutes per session: Not on file    Stress: Not on file   Relationships    Social connections     Talks on phone: Not on file     Gets together: Not on file     Attends Zoroastrianism service: Not on file     Active member of club or organization: Not on file     Attends meetings of clubs or organizations: Not on file     Relationship status: Not on file   Other Topics Concern    Not on file   Social History Narrative    Not on file       MEDICATIONS:   Current Outpatient Medications:     aspirin (ECOTRIN) 81 MG EC tablet, Take 81 mg by mouth once daily., Disp: , Rfl:     atenolol (TENORMIN) 50 MG tablet, Take 50 mg by mouth once daily., Disp: , Rfl:     multivit-min-FA-lycopen-lutein (CENTRUM SILVER MEN) 300-600-300 mcg Tab, Take by mouth., Disp: , Rfl:     omega-3 fatty acids/fish oil (FISH OIL-OMEGA-3 FATTY ACIDS) 300-1,000 mg capsule, Take by mouth once daily., Disp: , Rfl:     omeprazole (PRILOSEC) 20 MG capsule, Take 20 mg by mouth once daily., Disp: , Rfl:     rosuvastatin (CRESTOR) 10 MG tablet, Take 10 mg by mouth once daily., Disp: , Rfl:     traMADol (ULTRAM) 50 mg tablet, Take 1-2 tablets ( mg total) by mouth every 6 (six) hours as needed., Disp: 21 tablet, Rfl: 0    triamterene-hydrochlorothiazide 37.5-25 mg (DYAZIDE) 37.5-25 mg per capsule, Take 1 capsule by mouth every morning., Disp: , Rfl:     oxyCODONE-acetaminophen (PERCOCET)  mg per tablet, Take 1  tablet by mouth every 4-6 hours as needed for pain. Take stool softener with this medication., Disp: 21 tablet, Rfl: 0    promethazine (PHENERGAN) 25 MG tablet, Take 1 tablet (25 mg total) by mouth every 6 (six) hours as needed for Nausea., Disp: 12 tablet, Rfl: 0    traMADol (ULTRAM) 50 mg tablet, Take 1 tablet (50 mg total) by mouth every 8 (eight) hours as needed for Pain., Disp: 20 tablet, Rfl: 0  ALLERGIES:   Review of patient's allergies indicates:   Allergen Reactions    Clindamycin Nausea And Vomiting    Penicillins Hives       VITAL SIGNS: /68   Pulse 79   Ht 6' (1.829 m)   Wt 79.4 kg (175 lb)   BMI 23.73 kg/m²                                                PHYSICAL EXAMINATION:     Incision sites healed well  No evidence of any erythema, infection or induration  elbow Range of motion full   No effusion  2+ DP pulse  No swelling               Forward Flexion: 120  ER: 35  IR: L5    Strength:   Scaption at 0: 5/5  Scaption at 30: 5/5  ER: 5/5                                                                    ASSESSMENT:                                                                                                                                               1. Status post above, doing well.                                                                                                                               PLAN:                                                                                                                                                     1. Continue PT  2. Emphasized scapular function.  3. I have discussed return to activity in detail.  4. he will see us back in 8 weeks.                                      5. All questions were answered and he should contact us if he  has any questions or concerns in the interim.

## 2020-09-14 ENCOUNTER — LAB VISIT (OUTPATIENT)
Dept: URGENT CARE | Facility: CLINIC | Age: 80
End: 2020-09-14
Payer: MEDICARE

## 2020-09-14 DIAGNOSIS — Z03.818 ENCOUNTER FOR OBSERVATION FOR SUSPECTED EXPOSURE TO OTHER BIOLOGICAL AGENTS RULED OUT: ICD-10-CM

## 2020-09-14 PROCEDURE — U0003 INFECTIOUS AGENT DETECTION BY NUCLEIC ACID (DNA OR RNA); SEVERE ACUTE RESPIRATORY SYNDROME CORONAVIRUS 2 (SARS-COV-2) (CORONAVIRUS DISEASE [COVID-19]), AMPLIFIED PROBE TECHNIQUE, MAKING USE OF HIGH THROUGHPUT TECHNOLOGIES AS DESCRIBED BY CMS-2020-01-R: HCPCS

## 2020-09-15 LAB — SARS-COV-2 RNA RESP QL NAA+PROBE: NOT DETECTED

## 2020-09-25 ENCOUNTER — CLINICAL SUPPORT (OUTPATIENT)
Dept: REHABILITATION | Facility: HOSPITAL | Age: 80
End: 2020-09-25
Payer: MEDICARE

## 2020-09-25 DIAGNOSIS — R52 PAIN AGGRAVATED BY LIFTING: ICD-10-CM

## 2020-09-25 DIAGNOSIS — M25.619 LIMITED RANGE OF MOTION (ROM) OF SHOULDER: ICD-10-CM

## 2020-09-25 DIAGNOSIS — G47.9 SLEEPING DIFFICULTY: ICD-10-CM

## 2020-09-25 DIAGNOSIS — M25.511 CHRONIC RIGHT SHOULDER PAIN: ICD-10-CM

## 2020-09-25 DIAGNOSIS — G89.29 CHRONIC RIGHT SHOULDER PAIN: ICD-10-CM

## 2020-09-25 PROCEDURE — 97110 THERAPEUTIC EXERCISES: CPT | Performed by: PHYSICAL THERAPIST

## 2020-09-25 PROCEDURE — 97140 MANUAL THERAPY 1/> REGIONS: CPT | Performed by: PHYSICAL THERAPIST

## 2020-09-25 NOTE — PROGRESS NOTES
Physical Therapy Treatment Note     Name: Gus Sabillon  [facility] Number: [id number]    Therapy Diagnosis:   Encounter Diagnoses   Name Primary?    Chronic right shoulder pain     Limited range of motion (ROM) of shoulder     Pain aggravated by lifting     Sleeping difficulty      Physician: Evangelist Khanna III, *    Visit Date: 9/25/2020    Physician Orders: PT Eval and Treat  Medical Diagnosis:   M75.101 (ICD-10-CM) - Nontraumatic tear of right rotator cuff, unspecified tear extent   M75.21 (ICD-10-CM) - Biceps tendinitis of right upper extremity   S43.431A (ICD-10-CM) - Superior glenoid labrum lesion of right shoulder, initial encounter         Evaluation Date: 4/14/2020  Authorization Period Expiration: 9/2/2021  New Plan of Care Certification Period: 10/6/2020  Visit #/Visits authorized: 2/ 5 7 virtuals    Time In: 1115  Time Out: 1200  Total Billable Time: 45 minutes    Precautions: Standard    Subjective     Pt reports: He felt great after going swimming. Has not done HEP due to being gone for daughters celebration of life    He was not compliant with home exercise program.  Response to previous treatment: no problems reported  Functional change: return to swimming    Pain: 1/10  Location: right shoulder      Objective     9/2: Patient attends therapy in person for first therapy visit. Equal ER bilaterally in sitting. Limited behind the back reach to belt line compared to inferior angle on the L. Active flexion bilaterally 155 degrees. Right shoulder abduction/anterior tilt with weakness to serratus    Strength: 4/5 ER, 4-/5 flexion and abduction    Zeeshan received therapeutic exercises to develop strength, endurance, ROM and flexibility for 30 minutes including:    Sidelying ER 2# 2 x 15  Sidelying shoulder flexion 2# 2 x 15  Serratus press in pushup position 2 x 15  Serratus punch GTB 2 x 15    NT  Sleeper stretch 5x 15 sec ea  Scaptions 2# 2 x 15  Prone row with ER 2 x 10  Ball on the wall  serratus press 20x CW/CCW    Zeeshan received the following manual therapy techniques: Joint mobilizations and Soft tissue Mobilization were applied to the: Right shoulder for 15 minutes, including:    Posterior capsule stretch cross body blocking scapula  Flexion blocking scapula  End range IR stretch with posterior humeral glide  Inferior glide arm abducted 20 deg    Home Exercises Provided and Patient Education Provided     Education provided:   - Proper exercise form, posterior capsule stretch. Serratus strengthening    Written Home Exercises Provided: yes.  Exercises were reviewed and Zeeshan was able to demonstrate them prior to the end of the session.  Zeeshan demonstrated good  understanding of the education provided.     See EMR under Patient Instructions for exercises provided 9/2/2020.    Assessment     Zeeshan did not show much progression from last visit due to lack of compliance. He is pain free with passive motion and responds well to tactile cues for scap retraction. He continues be weakest at serratus which was addressed in session.    Zeeshan is progressing well towards his goals.   Pt prognosis is Good.     Pt will continue to benefit from skilled outpatient physical therapy to address the deficits listed in the problem list box on initial evaluation, provide pt/family education and to maximize pt's level of independence in the home and community environment.     Pt's spiritual, cultural and educational needs considered and pt agreeable to plan of care and goals.     Anticipated barriers to physical therapy: covid, not attending in person therapy until 6 months post op    GOALS: Short Term Goals:  6 weeks  1.Report decreased right shoulder pain < / =  3/10  to increase tolerance for sleeping and ADLs at home (met)  2. Increase PROM flexion and ER within protocol guidelines   (met)  3. Patient to sleep through the night without shoulder pain  (met)  4. Pt to tolerate HEP to improve ROM and independence with  ADL's  (met)     Long Term Goals: 14 weeks  1.Report decreased R shoulder pain  < / =  0 /10  to increase tolerance for completing ADLs  (met)  2.Increase AROM to 140 deg to restore functional reach  (met)  3.Increase strength to >/= 4/5 in R shoulder to increase tolerance for ADL and work activities.  (progressing, not met)  4. Pt goal: return to completing ADLs with right shoulder without pain  (met)    Plan     Certification Period 9/2/2020 to 10/6/2020  Recommended Treatment Plan: 2 times per week for 6 weeks: Manual Therapy, Moist Heat/ Ice, Neuromuscular Re-ed, Patient Education, Self Care, Therapeutic Activites and Therapeutic Exercise  Other Recommendations: modalities prn, ASTYM prn    Improve serratus strength    Therapist: Armen Croft, PT, DPT

## 2020-10-21 ENCOUNTER — OFFICE VISIT (OUTPATIENT)
Dept: INTERNAL MEDICINE | Facility: CLINIC | Age: 80
End: 2020-10-21
Payer: MEDICARE

## 2020-10-21 ENCOUNTER — TELEPHONE (OUTPATIENT)
Dept: INTERNAL MEDICINE | Facility: CLINIC | Age: 80
End: 2020-10-21

## 2020-10-21 VITALS
SYSTOLIC BLOOD PRESSURE: 110 MMHG | TEMPERATURE: 98 F | HEART RATE: 82 BPM | BODY MASS INDEX: 23.47 KG/M2 | WEIGHT: 173.31 LBS | HEIGHT: 72 IN | DIASTOLIC BLOOD PRESSURE: 70 MMHG

## 2020-10-21 DIAGNOSIS — R51.9 HEAD ACHE: ICD-10-CM

## 2020-10-21 DIAGNOSIS — R05.9 COUGH: Primary | ICD-10-CM

## 2020-10-21 DIAGNOSIS — J01.90 ACUTE SINUSITIS, RECURRENCE NOT SPECIFIED, UNSPECIFIED LOCATION: ICD-10-CM

## 2020-10-21 PROCEDURE — 1126F AMNT PAIN NOTED NONE PRSNT: CPT | Mod: S$GLB,,, | Performed by: INTERNAL MEDICINE

## 2020-10-21 PROCEDURE — 99999 PR PBB SHADOW E&M-EST. PATIENT-LVL III: ICD-10-PCS | Mod: PBBFAC,,, | Performed by: INTERNAL MEDICINE

## 2020-10-21 PROCEDURE — 1159F MED LIST DOCD IN RCRD: CPT | Mod: S$GLB,,, | Performed by: INTERNAL MEDICINE

## 2020-10-21 PROCEDURE — 99213 OFFICE O/P EST LOW 20 MIN: CPT | Mod: S$GLB,,, | Performed by: INTERNAL MEDICINE

## 2020-10-21 PROCEDURE — 1126F PR PAIN SEVERITY QUANTIFIED, NO PAIN PRESENT: ICD-10-PCS | Mod: S$GLB,,, | Performed by: INTERNAL MEDICINE

## 2020-10-21 PROCEDURE — 3074F SYST BP LT 130 MM HG: CPT | Mod: CPTII,S$GLB,, | Performed by: INTERNAL MEDICINE

## 2020-10-21 PROCEDURE — 1159F PR MEDICATION LIST DOCUMENTED IN MEDICAL RECORD: ICD-10-PCS | Mod: S$GLB,,, | Performed by: INTERNAL MEDICINE

## 2020-10-21 PROCEDURE — 1101F PT FALLS ASSESS-DOCD LE1/YR: CPT | Mod: CPTII,S$GLB,, | Performed by: INTERNAL MEDICINE

## 2020-10-21 PROCEDURE — 99213 PR OFFICE/OUTPT VISIT, EST, LEVL III, 20-29 MIN: ICD-10-PCS | Mod: S$GLB,,, | Performed by: INTERNAL MEDICINE

## 2020-10-21 PROCEDURE — 1101F PR PT FALLS ASSESS DOC 0-1 FALLS W/OUT INJ PAST YR: ICD-10-PCS | Mod: CPTII,S$GLB,, | Performed by: INTERNAL MEDICINE

## 2020-10-21 PROCEDURE — 3078F PR MOST RECENT DIASTOLIC BLOOD PRESSURE < 80 MM HG: ICD-10-PCS | Mod: CPTII,S$GLB,, | Performed by: INTERNAL MEDICINE

## 2020-10-21 PROCEDURE — 99999 PR PBB SHADOW E&M-EST. PATIENT-LVL III: CPT | Mod: PBBFAC,,, | Performed by: INTERNAL MEDICINE

## 2020-10-21 PROCEDURE — U0003 INFECTIOUS AGENT DETECTION BY NUCLEIC ACID (DNA OR RNA); SEVERE ACUTE RESPIRATORY SYNDROME CORONAVIRUS 2 (SARS-COV-2) (CORONAVIRUS DISEASE [COVID-19]), AMPLIFIED PROBE TECHNIQUE, MAKING USE OF HIGH THROUGHPUT TECHNOLOGIES AS DESCRIBED BY CMS-2020-01-R: HCPCS

## 2020-10-21 PROCEDURE — 3074F PR MOST RECENT SYSTOLIC BLOOD PRESSURE < 130 MM HG: ICD-10-PCS | Mod: CPTII,S$GLB,, | Performed by: INTERNAL MEDICINE

## 2020-10-21 PROCEDURE — 3078F DIAST BP <80 MM HG: CPT | Mod: CPTII,S$GLB,, | Performed by: INTERNAL MEDICINE

## 2020-10-21 RX ORDER — TRIAMTERENE/HYDROCHLOROTHIAZID 37.5-25 MG
1 TABLET ORAL DAILY
COMMUNITY
Start: 2020-10-19 | End: 2020-12-17 | Stop reason: SDUPTHER

## 2020-10-21 RX ORDER — DOXYCYCLINE HYCLATE 100 MG
100 TABLET ORAL 2 TIMES DAILY
Qty: 20 TABLET | Refills: 0 | Status: SHIPPED | OUTPATIENT
Start: 2020-10-21 | End: 2020-10-31

## 2020-10-21 NOTE — PROGRESS NOTES
CC: sinusitis  HPI:  The patient is a 80 y.o. year old male who presents to the office for sinusitis.  he complains of nasal congestion, postnasal drip, rhinorrhea and headache.  Symptoms started Saturday.  he also reports green and brown nasal discharge and cough.  He denies any fever, but complains of teeth pain.  The patient has taken sinus medication.    PAST MEDICAL HISTORY:  Past Medical History:   Diagnosis Date    CKD (chronic kidney disease) stage 3, GFR 30-59 ml/min     Dyslipidemia     GERD (gastroesophageal reflux disease)     Hx of melanoma excision     Hypertension     PAD (peripheral artery disease)     Squamous cell carcinoma of skin        SURGICAL HISTORY:  Past Surgical History:   Procedure Laterality Date    ARTHROSCOPIC DEBRIDEMENT OF SHOULDER Right 2/27/2020    Procedure: EXTENSIVE DEBRIDEMENT, SHOULDER, ARTHROSCOPIC;  Surgeon: Staci Childress MD;  Location: [facility] OR;  Service: Orthopedics;  Laterality: Right;    ARTHROSCOPIC REPAIR OF ROTATOR CUFF OF SHOULDER Right 2/27/2020    Procedure: REPAIR, ROTATOR CUFF, ARTHROSCOPIC;  Surgeon: Staci Childress MD;  Location: [facility] OR;  Service: Orthopedics;  Laterality: Right;    ARTHROSCOPY OF SHOULDER WITH DECOMPRESSION OF SUBACROMIAL SPACE Right 2/27/2020    Procedure: ARTHROSCOPY, SHOULDER, WITH SUBACROMIAL SPACE DECOMPRESSION;  Surgeon: Staci Childress MD;  Location: [facility] OR;  Service: Orthopedics;  Laterality: Right;    BLEPHAROPLASTY      CATARACT EXTRACTION, BILATERAL      FIXATION OF TENDON Right 2/27/2020    Procedure: FIXATION, TENDON, Biceps Tenodesis;  Surgeon: Staci Childress MD;  Location: [facility] OR;  Service: Orthopedics;  Laterality: Right;  FIXATION, TENDON, Biceps Tenodesis    OPEN REDUCTION AND INTERNAL FIXATION (ORIF) OF FRACTURE OF PROXIMAL HUMERUS Right 2/27/2020    Procedure: ORIF, FRACTURE, GREATER TUBEROSITY;  Surgeon: Staci Childress MD;  Location: [facility] OR;  Service: Orthopedics;  Laterality: Right;    TONSILLECTOMY         MEDS:   lettrs reviewed and updated    ALLERGIES: Allergy Card reviewed and updated    SOCIAL HISTORY:   The patient is a nonsmoker.    PE:   APPEARANCE: Well nourished, well developed, in no acute distress.    EARS: TM's intact. No retraction or perforation.    NOSE: Mucosa pink. Airway clear.  Positive tenderness to the maxillary sinuses bilaterally.  MOUTH & THROAT: No tonsillar enlargement. No pharyngeal erythema or exudate. No stridor.  CHEST: Lungs clear to auscultation with unlabored respirations.  CARDIOVASCULAR: Normal S1, S2. No murmurs. No carotid bruits. No pedal edema.  ABDOMEN: Bowel sounds normal. Not distended. Soft. No tenderness or masses.   PSYCHIATRIC: The patient is oriented to person, place, and time and has a pleasant affect.        ASSESSMENT/PLAN:  Gus was seen today for cough, sore throat, sinus problem and generalized body aches.    Diagnoses and all orders for this visit:    Cough  -     COVID-19 Routine Screening; Future  -     COVID-19 Routine Screening    Acute sinusitis, recurrence not specified, unspecified location  -     prescribed doxycycline    Head ache  -     COVID-19 Routine Screening    Other orders  -     doxycycline (VIBRA-TABS) 100 MG tablet; Take 1 tablet (100 mg total) by mouth 2 (two) times daily. for 10 days

## 2020-10-22 LAB — SARS-COV-2 RNA RESP QL NAA+PROBE: NOT DETECTED

## 2020-11-13 ENCOUNTER — OFFICE VISIT (OUTPATIENT)
Dept: INTERNAL MEDICINE | Facility: CLINIC | Age: 80
End: 2020-11-13
Payer: MEDICARE

## 2020-11-13 VITALS
HEIGHT: 73 IN | RESPIRATION RATE: 12 BRPM | HEART RATE: 62 BPM | WEIGHT: 175.94 LBS | SYSTOLIC BLOOD PRESSURE: 128 MMHG | DIASTOLIC BLOOD PRESSURE: 52 MMHG | OXYGEN SATURATION: 97 % | BODY MASS INDEX: 23.32 KG/M2 | TEMPERATURE: 97 F

## 2020-11-13 DIAGNOSIS — I10 HTN (HYPERTENSION), BENIGN: ICD-10-CM

## 2020-11-13 DIAGNOSIS — R42 DIZZINESS: Primary | ICD-10-CM

## 2020-11-13 DIAGNOSIS — N52.9 ERECTILE DYSFUNCTION, UNSPECIFIED ERECTILE DYSFUNCTION TYPE: ICD-10-CM

## 2020-11-13 DIAGNOSIS — I65.23 CAROTID ATHEROSCLEROSIS, BILATERAL: ICD-10-CM

## 2020-11-13 PROCEDURE — 93010 ELECTROCARDIOGRAM REPORT: CPT | Mod: S$GLB,,, | Performed by: INTERNAL MEDICINE

## 2020-11-13 PROCEDURE — 1100F PR PT FALLS ASSESS DOC 2+ FALLS/FALL W/INJURY/YR: ICD-10-PCS | Mod: CPTII,S$GLB,, | Performed by: INTERNAL MEDICINE

## 2020-11-13 PROCEDURE — 3288F FALL RISK ASSESSMENT DOCD: CPT | Mod: CPTII,S$GLB,, | Performed by: INTERNAL MEDICINE

## 2020-11-13 PROCEDURE — 1159F MED LIST DOCD IN RCRD: CPT | Mod: S$GLB,,, | Performed by: INTERNAL MEDICINE

## 2020-11-13 PROCEDURE — 99999 PR PBB SHADOW E&M-EST. PATIENT-LVL IV: ICD-10-PCS | Mod: PBBFAC,,, | Performed by: INTERNAL MEDICINE

## 2020-11-13 PROCEDURE — 93010 EKG 12-LEAD: ICD-10-PCS | Mod: S$GLB,,, | Performed by: INTERNAL MEDICINE

## 2020-11-13 PROCEDURE — 1159F PR MEDICATION LIST DOCUMENTED IN MEDICAL RECORD: ICD-10-PCS | Mod: S$GLB,,, | Performed by: INTERNAL MEDICINE

## 2020-11-13 PROCEDURE — 3074F SYST BP LT 130 MM HG: CPT | Mod: CPTII,S$GLB,, | Performed by: INTERNAL MEDICINE

## 2020-11-13 PROCEDURE — 1100F PTFALLS ASSESS-DOCD GE2>/YR: CPT | Mod: CPTII,S$GLB,, | Performed by: INTERNAL MEDICINE

## 2020-11-13 PROCEDURE — 99214 OFFICE O/P EST MOD 30 MIN: CPT | Mod: S$GLB,,, | Performed by: INTERNAL MEDICINE

## 2020-11-13 PROCEDURE — 1126F PR PAIN SEVERITY QUANTIFIED, NO PAIN PRESENT: ICD-10-PCS | Mod: S$GLB,,, | Performed by: INTERNAL MEDICINE

## 2020-11-13 PROCEDURE — 3078F PR MOST RECENT DIASTOLIC BLOOD PRESSURE < 80 MM HG: ICD-10-PCS | Mod: CPTII,S$GLB,, | Performed by: INTERNAL MEDICINE

## 2020-11-13 PROCEDURE — 99999 PR PBB SHADOW E&M-EST. PATIENT-LVL IV: CPT | Mod: PBBFAC,,, | Performed by: INTERNAL MEDICINE

## 2020-11-13 PROCEDURE — 3288F PR FALLS RISK ASSESSMENT DOCUMENTED: ICD-10-PCS | Mod: CPTII,S$GLB,, | Performed by: INTERNAL MEDICINE

## 2020-11-13 PROCEDURE — 93005 EKG 12-LEAD: ICD-10-PCS | Mod: S$GLB,,, | Performed by: INTERNAL MEDICINE

## 2020-11-13 PROCEDURE — 93005 ELECTROCARDIOGRAM TRACING: CPT | Mod: S$GLB,,, | Performed by: INTERNAL MEDICINE

## 2020-11-13 PROCEDURE — 1126F AMNT PAIN NOTED NONE PRSNT: CPT | Mod: S$GLB,,, | Performed by: INTERNAL MEDICINE

## 2020-11-13 PROCEDURE — 3074F PR MOST RECENT SYSTOLIC BLOOD PRESSURE < 130 MM HG: ICD-10-PCS | Mod: CPTII,S$GLB,, | Performed by: INTERNAL MEDICINE

## 2020-11-13 PROCEDURE — 99214 PR OFFICE/OUTPT VISIT, EST, LEVL IV, 30-39 MIN: ICD-10-PCS | Mod: S$GLB,,, | Performed by: INTERNAL MEDICINE

## 2020-11-13 PROCEDURE — 3078F DIAST BP <80 MM HG: CPT | Mod: CPTII,S$GLB,, | Performed by: INTERNAL MEDICINE

## 2020-11-13 NOTE — PROGRESS NOTES
History of present illness:  80-year-old gentleman hypertension, dyslipidemia, PA D in today with dizziness complaint.  He reports 2 weeks ago he had a feeling of fairly typical vertigo which lasted 30 min.  This was not associated with any other symptoms such as headache, ear pain, focal neurological symptomatologies or other.  Since that time he is describing feeling just slightly and easy morning when he 1st arises out of bed.  This resolved over half an hour or so.  No further dizzy spells.  No headaches.  No focal symptoms.  No gait disturbance.  No bowel urinary continence issues.    Current medications:  All medications are noted and reviewed in the electronic medical record medication list.    Review of systems:  Constitutional:  No fever no chills no general body aches.  Respiratory:  No cough shortness of breath.  Cardiovascular:  See HPI.  No chest pain or palpitations no syncope no presyncope.  No edema.  :  He discussed his issues with erectile dysfunction.  He would like to try Cialis.    Past medical history, past surgical history, family medical history social history is are all noted and reviewed in the electronic medical record history sections.    Physical examination:  General:  Pleasant alert appropriately groomed gentleman no acute distress.  Vital signs:  All noted and reviewed.  Blood pressure 136/50.  Frequent ectopy.  HEENT:  Normocephalic.  Neck supple no masses no thyromegaly.  Lungs:  Clear to auscultation.  Cardiovascular:  Regular rate and rhythm with frequent ectopy.  No significant murmur.  Carotids full bilaterally no current bruits.  No peripheral extremity edema.  Neurologic:  No gross focal motor deficits.  Gait normal.  Negative Romberg.  Mental status:  Alert oriented affect appropriate.      Data:  Reviewed lab data from February of this year.  ECG in the office today normal other than frequent PVCs.    Impression:  Symptoms seem clinically related to an episode of vertigo  of probable inner year.  Obviously cannot rule out other CNS issue but clinically does not seen to suggest of such.  Hypertension controlled.  Patient interested in potentially decreasing his beta blocker usage due to fatigue and ED issues.    Plan:  Discussed potentially imaging his brain to rule out CNS event which seems unlikely.  He will decide that the no.  Trial of Cialis.  With his history of carotid atherosclerotic disease from his previous evaluations we will update a carotid Doppler study.  He was told at that time that he had 50-60% blockage.  Return to clinic in February 2021 for health assessment or before if needed

## 2020-11-17 ENCOUNTER — PATIENT MESSAGE (OUTPATIENT)
Dept: INTERNAL MEDICINE | Facility: CLINIC | Age: 80
End: 2020-11-17

## 2020-11-17 DIAGNOSIS — I10 HTN (HYPERTENSION), BENIGN: ICD-10-CM

## 2020-11-17 DIAGNOSIS — I49.3 FREQUENT PVCS: Primary | ICD-10-CM

## 2020-11-17 DIAGNOSIS — Z01.00 ROUTINE EYE EXAM: ICD-10-CM

## 2020-11-17 RX ORDER — TADALAFIL 20 MG/1
20 TABLET ORAL
Qty: 20 TABLET | Refills: 6 | Status: SHIPPED | OUTPATIENT
Start: 2020-11-17 | End: 2020-11-20 | Stop reason: SDUPTHER

## 2020-11-17 NOTE — TELEPHONE ENCOUNTER
Noted regarding no imaging testing.  A prescription for cialis has been sent to [facility].  He should receive Flu vaccine--advise options.  He can schedule appt with our EYE Dept for routine eye exam.  If he has Dental Ins he should check with them regarding provider options.  We are not involved with the Dental referrals.  Because of the frequent premature beats on his ECG I do not want to change his atenolol yet.  I do rec a referral to our  cardiology Dept to evaluate that issue.--ref entered.  Some labs would be reasonable at this time--bmp, tsh entered

## 2020-11-18 ENCOUNTER — PATIENT MESSAGE (OUTPATIENT)
Dept: INTERNAL MEDICINE | Facility: CLINIC | Age: 80
End: 2020-11-18

## 2020-11-18 NOTE — TELEPHONE ENCOUNTER
cialis is tadalafil (generic) , that is the medication we decided on during OV.   All pharmacies should offer the generic.  We can send the rx wherever he would like. certainly jim will give good prices

## 2020-11-20 ENCOUNTER — HOSPITAL ENCOUNTER (OUTPATIENT)
Dept: CARDIOLOGY | Facility: HOSPITAL | Age: 80
Discharge: HOME | End: 2020-11-20
Attending: INTERNAL MEDICINE
Payer: MEDICARE

## 2020-11-20 ENCOUNTER — TELEPHONE (OUTPATIENT)
Dept: INTERNAL MEDICINE | Facility: CLINIC | Age: 80
End: 2020-11-20

## 2020-11-20 DIAGNOSIS — I65.23 CAROTID ATHEROSCLEROSIS, BILATERAL: ICD-10-CM

## 2020-11-20 LAB
LEFT CBA DIAS: 10 CM/S
LEFT CBA SYS: 84 CM/S
LEFT CCA DIST DIAS: 11 CM/S
LEFT CCA DIST SYS: 100 CM/S
LEFT CCA MID DIAS: 16 CM/S
LEFT CCA MID SYS: 85 CM/S
LEFT CCA PROX DIAS: 12 CM/S
LEFT CCA PROX SYS: 58 CM/S
LEFT ECA DIAS: 25 CM/S
LEFT ECA SYS: 361 CM/S
LEFT ICA DIST DIAS: 21 CM/S
LEFT ICA DIST SYS: 103 CM/S
LEFT ICA MID DIAS: 30 CM/S
LEFT ICA MID SYS: 166 CM/S
LEFT ICA PROX DIAS: 31 CM/S
LEFT ICA PROX SYS: 386 CM/S
LEFT VERTEBRAL DIAS: 9 CM/S
LEFT VERTEBRAL SYS: 65 CM/S
OHS CV CAROTID RIGHT ICA EDV HIGHEST: 57
OHS CV CAROTID ULTRASOUND LEFT ICA/CCA RATIO: 3.86
OHS CV CAROTID ULTRASOUND RIGHT ICA/CCA RATIO: 10.71
OHS CV PV CAROTID LEFT HIGHEST CCA: 100
OHS CV PV CAROTID LEFT HIGHEST ICA: 386
OHS CV PV CAROTID RIGHT HIGHEST CCA: 108
OHS CV PV CAROTID RIGHT HIGHEST ICA: 482
OHS CV US CAROTID LEFT HIGHEST EDV: 31
RIGHT ARM DIASTOLIC BLOOD PRESSURE: 80 MMHG
RIGHT ARM SYSTOLIC BLOOD PRESSURE: 120 MMHG
RIGHT CBA DIAS: 14 CM/S
RIGHT CBA SYS: 47 CM/S
RIGHT CCA DIST DIAS: 9 CM/S
RIGHT CCA DIST SYS: 45 CM/S
RIGHT CCA MID DIAS: 9 CM/S
RIGHT CCA MID SYS: 108 CM/S
RIGHT CCA PROX DIAS: 10 CM/S
RIGHT CCA PROX SYS: 70 CM/S
RIGHT ECA DIAS: 18 CM/S
RIGHT ECA SYS: 233 CM/S
RIGHT ICA DIST DIAS: 15 CM/S
RIGHT ICA DIST SYS: 97 CM/S
RIGHT ICA MID DIAS: 57 CM/S
RIGHT ICA MID SYS: 482 CM/S
RIGHT ICA PROX DIAS: 18 CM/S
RIGHT ICA PROX SYS: 72 CM/S
RIGHT VERTEBRAL DIAS: 8 CM/S
RIGHT VERTEBRAL SYS: 65 CM/S

## 2020-11-20 PROCEDURE — 93880 EXTRACRANIAL BILAT STUDY: CPT | Mod: 26,,, | Performed by: INTERNAL MEDICINE

## 2020-11-20 PROCEDURE — 93880 EXTRACRANIAL BILAT STUDY: CPT

## 2020-11-20 PROCEDURE — 93880 CV US DOPPLER CAROTID (CUPID ONLY): ICD-10-PCS | Mod: 26,,, | Performed by: INTERNAL MEDICINE

## 2020-11-20 RX ORDER — TADALAFIL 20 MG/1
20 TABLET ORAL
Qty: 30 TABLET | Refills: 6 | Status: SHIPPED | OUTPATIENT
Start: 2020-11-20 | End: 2021-12-06 | Stop reason: SDUPTHER

## 2020-11-20 NOTE — TELEPHONE ENCOUNTER
Spoke with pt who requested to have flu vaccine on 11/21/20 at 11:15 am.    Verbalized understanding of date, time, and location.

## 2020-11-20 NOTE — TELEPHONE ENCOUNTER
----- Message from Farheen Villalobos sent at 11/20/2020  2:13 PM CST -----  Contact: [phone]  Pt is calling to schedule a Saturday flu shot. Please call and advise

## 2020-11-21 ENCOUNTER — PATIENT MESSAGE (OUTPATIENT)
Dept: INTERNAL MEDICINE | Facility: CLINIC | Age: 80
End: 2020-11-21

## 2020-11-21 ENCOUNTER — CLINICAL SUPPORT (OUTPATIENT)
Dept: INTERNAL MEDICINE | Facility: CLINIC | Age: 80
End: 2020-11-21
Payer: MEDICARE

## 2020-11-21 DIAGNOSIS — Z23 NEED FOR IMMUNIZATION AGAINST INFLUENZA: Primary | ICD-10-CM

## 2020-11-21 DIAGNOSIS — I10 HTN (HYPERTENSION), BENIGN: Primary | ICD-10-CM

## 2020-11-21 PROCEDURE — G0008 ADMIN INFLUENZA VIRUS VAC: HCPCS | Mod: S$GLB,,, | Performed by: INTERNAL MEDICINE

## 2020-11-21 PROCEDURE — 90694 VACC AIIV4 NO PRSRV 0.5ML IM: CPT | Mod: S$GLB,,, | Performed by: INTERNAL MEDICINE

## 2020-11-21 PROCEDURE — G0008 FLU VACCINE - QUADRIVALENT - ADJUVANTED: ICD-10-PCS | Mod: S$GLB,,, | Performed by: INTERNAL MEDICINE

## 2020-11-21 PROCEDURE — 90694 FLU VACCINE - QUADRIVALENT - ADJUVANTED: ICD-10-PCS | Mod: S$GLB,,, | Performed by: INTERNAL MEDICINE

## 2020-11-23 ENCOUNTER — PATIENT OUTREACH (OUTPATIENT)
Dept: ADMINISTRATIVE | Facility: OTHER | Age: 80
End: 2020-11-23

## 2020-11-23 NOTE — PROGRESS NOTES
Care Everywhere:   Immunization: updated, links delay   Health Maintenance: updated  Media Review:   Legacy Review:   Order placed:   Upcoming appts:

## 2020-11-23 NOTE — TELEPHONE ENCOUNTER
This # is essentially unchanged from earlier this year.    It reflects kidney function, sometimes related to medication.  I want him to stop the fluid pill for now.  Lab in 1 month--BMP ordered nonfasting.

## 2020-11-25 ENCOUNTER — OFFICE VISIT (OUTPATIENT)
Dept: CARDIOLOGY | Facility: CLINIC | Age: 80
End: 2020-11-25
Payer: MEDICARE

## 2020-11-25 ENCOUNTER — LAB VISIT (OUTPATIENT)
Dept: LAB | Facility: HOSPITAL | Age: 80
End: 2020-11-25
Attending: INTERNAL MEDICINE
Payer: MEDICARE

## 2020-11-25 VITALS
WEIGHT: 176.13 LBS | HEIGHT: 73 IN | DIASTOLIC BLOOD PRESSURE: 54 MMHG | BODY MASS INDEX: 23.34 KG/M2 | SYSTOLIC BLOOD PRESSURE: 115 MMHG | HEART RATE: 65 BPM

## 2020-11-25 DIAGNOSIS — I65.23 BILATERAL CAROTID ARTERY STENOSIS: ICD-10-CM

## 2020-11-25 DIAGNOSIS — I49.3 FREQUENT PVCS: ICD-10-CM

## 2020-11-25 DIAGNOSIS — K21.00 GASTROESOPHAGEAL REFLUX DISEASE WITH ESOPHAGITIS WITHOUT HEMORRHAGE: ICD-10-CM

## 2020-11-25 DIAGNOSIS — I73.9 PAD (PERIPHERAL ARTERY DISEASE): ICD-10-CM

## 2020-11-25 DIAGNOSIS — E78.5 DYSLIPIDEMIA: Primary | ICD-10-CM

## 2020-11-25 DIAGNOSIS — R42 DIZZINESS: ICD-10-CM

## 2020-11-25 DIAGNOSIS — I10 HTN (HYPERTENSION), BENIGN: ICD-10-CM

## 2020-11-25 PROBLEM — I65.29 CAROTID STENOSIS: Status: ACTIVE | Noted: 2020-11-25

## 2020-11-25 LAB
ALBUMIN SERPL BCP-MCNC: 4.1 G/DL (ref 3.5–5.2)
ALP SERPL-CCNC: 54 U/L (ref 55–135)
ALT SERPL W/O P-5'-P-CCNC: 13 U/L (ref 10–44)
ANION GAP SERPL CALC-SCNC: 8 MMOL/L (ref 8–16)
AST SERPL-CCNC: 16 U/L (ref 10–40)
BILIRUB SERPL-MCNC: 1 MG/DL (ref 0.1–1)
BUN SERPL-MCNC: 26 MG/DL (ref 8–23)
CALCIUM SERPL-MCNC: 9.2 MG/DL (ref 8.7–10.5)
CHLORIDE SERPL-SCNC: 103 MMOL/L (ref 95–110)
CO2 SERPL-SCNC: 28 MMOL/L (ref 23–29)
CREAT SERPL-MCNC: 1.5 MG/DL (ref 0.5–1.4)
EST. GFR  (AFRICAN AMERICAN): 50.5 ML/MIN/1.73 M^2
EST. GFR  (NON AFRICAN AMERICAN): 43.7 ML/MIN/1.73 M^2
GLUCOSE SERPL-MCNC: 92 MG/DL (ref 70–110)
POTASSIUM SERPL-SCNC: 4.4 MMOL/L (ref 3.5–5.1)
PROT SERPL-MCNC: 7.1 G/DL (ref 6–8.4)
SODIUM SERPL-SCNC: 139 MMOL/L (ref 136–145)

## 2020-11-25 PROCEDURE — 3288F FALL RISK ASSESSMENT DOCD: CPT | Mod: CPTII,S$GLB,, | Performed by: INTERNAL MEDICINE

## 2020-11-25 PROCEDURE — 99205 PR OFFICE/OUTPT VISIT, NEW, LEVL V, 60-74 MIN: ICD-10-PCS | Mod: S$GLB,,, | Performed by: INTERNAL MEDICINE

## 2020-11-25 PROCEDURE — 3288F PR FALLS RISK ASSESSMENT DOCUMENTED: ICD-10-PCS | Mod: CPTII,S$GLB,, | Performed by: INTERNAL MEDICINE

## 2020-11-25 PROCEDURE — 1126F AMNT PAIN NOTED NONE PRSNT: CPT | Mod: S$GLB,,, | Performed by: INTERNAL MEDICINE

## 2020-11-25 PROCEDURE — 1159F PR MEDICATION LIST DOCUMENTED IN MEDICAL RECORD: ICD-10-PCS | Mod: S$GLB,,, | Performed by: INTERNAL MEDICINE

## 2020-11-25 PROCEDURE — 3074F PR MOST RECENT SYSTOLIC BLOOD PRESSURE < 130 MM HG: ICD-10-PCS | Mod: CPTII,S$GLB,, | Performed by: INTERNAL MEDICINE

## 2020-11-25 PROCEDURE — 36415 COLL VENOUS BLD VENIPUNCTURE: CPT | Mod: PO

## 2020-11-25 PROCEDURE — 80053 COMPREHEN METABOLIC PANEL: CPT

## 2020-11-25 PROCEDURE — 1101F PR PT FALLS ASSESS DOC 0-1 FALLS W/OUT INJ PAST YR: ICD-10-PCS | Mod: CPTII,S$GLB,, | Performed by: INTERNAL MEDICINE

## 2020-11-25 PROCEDURE — 1159F MED LIST DOCD IN RCRD: CPT | Mod: S$GLB,,, | Performed by: INTERNAL MEDICINE

## 2020-11-25 PROCEDURE — 3078F PR MOST RECENT DIASTOLIC BLOOD PRESSURE < 80 MM HG: ICD-10-PCS | Mod: CPTII,S$GLB,, | Performed by: INTERNAL MEDICINE

## 2020-11-25 PROCEDURE — 3074F SYST BP LT 130 MM HG: CPT | Mod: CPTII,S$GLB,, | Performed by: INTERNAL MEDICINE

## 2020-11-25 PROCEDURE — 99999 PR PBB SHADOW E&M-EST. PATIENT-LVL IV: CPT | Mod: PBBFAC,,, | Performed by: INTERNAL MEDICINE

## 2020-11-25 PROCEDURE — 3078F DIAST BP <80 MM HG: CPT | Mod: CPTII,S$GLB,, | Performed by: INTERNAL MEDICINE

## 2020-11-25 PROCEDURE — 1101F PT FALLS ASSESS-DOCD LE1/YR: CPT | Mod: CPTII,S$GLB,, | Performed by: INTERNAL MEDICINE

## 2020-11-25 PROCEDURE — 99205 OFFICE O/P NEW HI 60 MIN: CPT | Mod: S$GLB,,, | Performed by: INTERNAL MEDICINE

## 2020-11-25 PROCEDURE — 99999 PR PBB SHADOW E&M-EST. PATIENT-LVL IV: ICD-10-PCS | Mod: PBBFAC,,, | Performed by: INTERNAL MEDICINE

## 2020-11-25 PROCEDURE — 1126F PR PAIN SEVERITY QUANTIFIED, NO PAIN PRESENT: ICD-10-PCS | Mod: S$GLB,,, | Performed by: INTERNAL MEDICINE

## 2020-11-25 RX ORDER — ROSUVASTATIN CALCIUM 20 MG/1
20 TABLET, COATED ORAL DAILY
Qty: 90 TABLET | Refills: 3 | Status: SHIPPED | OUTPATIENT
Start: 2020-11-25 | End: 2020-12-17 | Stop reason: SDUPTHER

## 2020-11-25 NOTE — PROGRESS NOTES
[facility]      Chief Complaint   Patient presents with    frequent PVCs    Abnormal ECG     11/13/2020 and Carotid US       Patient ID: Gus Sabillon is a 80 y.o. male with PAD s/p BLE PTA/stent placement, HTN, HLD, CKD, GERD, who presents for an initial appointment.  Pertinent history/events are as follows:     -Pt kindly referred by Dr. Mora for evaluation of frequent PVC's.      HPI:  Mr. Sabillon reports an episode of dizziness approximately 3 weeks ago.  Pt was walking in his home when the event occurred. He did not lose consciousness.  Reports a similar event 16 months ago while walking in his office.  He has no chest pain, SOB, or LE edema.  Formerly smoked 3-4 packs a day for15 years. Quit in 1975.  EKG on 11/13/2020 shows sinus rhythm with frequent Premature ventricular complexes.  Carotid Ultrasound on 11/20/2020 revealed 70-79% right Internal Carotid Stenosis and 70-79% left Internal Carotid Stenosis.  There is 50% bilateral ECA stenosis.    Past Medical History:   Diagnosis Date    CKD (chronic kidney disease) stage 3, GFR 30-59 ml/min     Dyslipidemia     GERD (gastroesophageal reflux disease)     Hx of melanoma excision     Hypertension     PAD (peripheral artery disease)     Squamous cell carcinoma of skin      Past Surgical History:   Procedure Laterality Date    ARTHROSCOPIC DEBRIDEMENT OF SHOULDER Right 2/27/2020    Procedure: EXTENSIVE DEBRIDEMENT, SHOULDER, ARTHROSCOPIC;  Surgeon: Staci Childress MD;  Location: [facility] OR;  Service: Orthopedics;  Laterality: Right;    ARTHROSCOPIC REPAIR OF ROTATOR CUFF OF SHOULDER Right 2/27/2020    Procedure: REPAIR, ROTATOR CUFF, ARTHROSCOPIC;  Surgeon: Staci Childress MD;  Location: [facility] OR;  Service: Orthopedics;  Laterality: Right;    ARTHROSCOPY OF SHOULDER WITH DECOMPRESSION OF SUBACROMIAL SPACE Right 2/27/2020    Procedure: ARTHROSCOPY, SHOULDER, WITH SUBACROMIAL SPACE DECOMPRESSION;  Surgeon: Staci Childress MD;  Location: [facility] OR;   Service: Orthopedics;  Laterality: Right;    BLEPHAROPLASTY      CATARACT EXTRACTION, BILATERAL      FIXATION OF TENDON Right 2/27/2020    Procedure: FIXATION, TENDON, Biceps Tenodesis;  Surgeon: Staci Childress MD;  Location: [facility] OR;  Service: Orthopedics;  Laterality: Right;  FIXATION, TENDON, Biceps Tenodesis    OPEN REDUCTION AND INTERNAL FIXATION (ORIF) OF FRACTURE OF PROXIMAL HUMERUS Right 2/27/2020    Procedure: ORIF, FRACTURE, GREATER TUBEROSITY;  Surgeon: Staci Childress MD;  Location: [facility] OR;  Service: Orthopedics;  Laterality: Right;    TONSILLECTOMY       Social History     Socioeconomic History    Marital status:      Spouse name: Not on file    Number of children: Not on file    Years of education: Not on file    Highest education level: Not on file   Occupational History    Not on file   Social Needs    Financial resource strain: Not hard at all    Food insecurity     Worry: Never true     Inability: Never true    Transportation needs     Medical: No     Non-medical: No   Tobacco Use    Smoking status: Former Smoker     Types: Cigarettes    Smokeless tobacco: Never Used   Substance and Sexual Activity    Alcohol Use     Frequency: 4 or more times a week     Drinks per session: 3 or 4     Binge frequency: Less than monthly    Drug use: Not on file    Sexual activity: Not on file   Lifestyle    Physical activity     Days per week: 0 days     Minutes per session: Not on file    Stress: Not at all   Relationships    Social connections     Talks on phone: More than three times a week     Gets together: More than three times a week     Attends Gnosticism service: Not on file     Active member of club or organization: No     Attends meetings of clubs or organizations: Never     Relationship status:    Other Topics Concern    Not on file   Social History Narrative    Not on file     Family History   Problem Relation Age of Onset    Diabetes Mother     Hyperlipidemia Mother      "Heart disease Father     Colon cancer Sister        Review of patient's allergies indicates:   Allergen Reactions    Clindamycin Nausea And Vomiting    Penicillins Hives       Medication List with Changes/Refills   Current Medications    ASPIRIN (ECOTRIN) 81 MG EC TABLET    Take 81 mg by mouth once daily.    ATENOLOL (TENORMIN) 50 MG TABLET    Take 50 mg by mouth once daily.    MULTIVIT-MIN-FA-LYCOPEN-LUTEIN (CENTRUM SILVER MEN) 300-600-300 MCG TAB    Take by mouth.    OMEGA-3 FATTY ACIDS/FISH OIL (FISH OIL-OMEGA-3 FATTY ACIDS) 300-1,000 MG CAPSULE    Take by mouth once daily.    OMEPRAZOLE (PRILOSEC) 20 MG CAPSULE    Take 20 mg by mouth once daily.    ROSUVASTATIN (CRESTOR) 10 MG TABLET    Take 10 mg by mouth once daily.    TADALAFIL (CIALIS) 20 MG TAB    Take 1 tablet (20 mg total) by mouth every 72 hours as needed.    TRIAMTERENE-HYDROCHLOROTHIAZIDE 37.5-25 MG (MAXZIDE-25) 37.5-25 MG PER TABLET    Take 1 tablet by mouth once daily.        Review of Systems  Constitution: Denies chills, fever, and sweats.  HENT: Denies headaches or blurry vision.  Cardiovascular: Denies chest pain or irregular heart beat.  Respiratory: Denies cough or shortness of breath.  Gastrointestinal: Denies abdominal pain, nausea, or vomiting.  Musculoskeletal: Denies muscle cramps.  Neurological: Denies dizziness or focal weakness.  Psychiatric/Behavioral: Normal mental status.  Hematologic/Lymphatic: Denies bleeding problem or easy bruising/bleeding.  Skin: Denies rash or suspicious lesions    Physical Examination  BP (!) 115/54   Pulse 65   Ht 6' 1" (1.854 m)   Wt 79.9 kg (176 lb 2.4 oz)   BMI 23.24 kg/m²     Constitutional: No acute distress, conversant  HEENT: Sclera anicteric, Pupils equal, round and reactive to light, extraocular motions intact, Oropharynx clear  Neck: No JVD, no carotid bruits  Cardiovascular: regular rate and rhythm, no murmur, rubs or gallops, normal S1/S2  Pulmonary: Clear to auscultation " bilaterally  Abdominal: Abdomen soft, nontender, nondistended, positive bowel sounds  Extremities: No lower extremity edema,   Pulses:  Carotid pulses are 2+ on the right side, and 2+ on the left side.  Radial pulses are 2+ on the right side, and 2+ on the left side.   Femoral pulses are 2+ on the right side, and 2+ on the left side.  Popliteal pulses are 2+ on the right side, and 2+ on the left side.   Dorsalis pedis pulses are 2+ on the right side, and 2+ on the left side.   Posterior tibial pulses are 2+ on the right side, and 2+ on the left side.    Skin: No ecchymosis, erythema, or ulcers  Psych: Alert and oriented x 3, appropriate affect  Neuro: CNII-XII intact, no focal deficits    Labs:  Most Recent Data  CBC:   Lab Results   Component Value Date    WBC 8.85 02/19/2020    HGB 13.8 (L) 02/19/2020    HCT 44.4 02/19/2020     02/19/2020     (H) 02/19/2020    RDW 12.6 02/19/2020     BMP:   Lab Results   Component Value Date     11/20/2020    K 4.3 11/20/2020     11/20/2020    CO2 26 11/20/2020    BUN 24 (H) 11/20/2020    CREATININE 1.8 (H) 11/20/2020     11/20/2020    CALCIUM 8.7 11/20/2020     LFTS;   Lab Results   Component Value Date    PROT 7.0 02/10/2020    ALBUMIN 4.0 02/10/2020    BILITOT 1.0 02/10/2020    AST 21 02/10/2020    ALKPHOS 55 02/10/2020    ALT 16 02/10/2020     COAGS: No results found for: INR, PROTIME, PTT  FLP:   Lab Results   Component Value Date    CHOL 176 02/10/2020    HDL 64 02/10/2020    LDLCALC 96.0 02/10/2020    TRIG 80 02/10/2020    CHOLHDL 36.4 02/10/2020     CARDIAC: No results found for: TROPONINI, CKMB, BNP      EKG 11/13/2020:  Sinus rhythm with frequent Premature ventricular complexes    Carotid Ultrasound 11/20/2020:  · There is 70-79% right Internal Carotid Stenosis.  · There is 70-79% left Internal Carotid Stenosis.  · >50% bilateral ECA stenosis.    Assessment/Plan:  Gus Sabillon is a 80 y.o. male with PAD s/p BLE PTA/stent placement,  HTN, HLD, CKD, GERD, who presents for an initial appointment.    1. Dizziness- Etiology unclear.  Check 30 day event monitor and echo to evaluate further.       2. Carotid Artery Disease- Mr. Sabillon has no amourosis fugax or TIA symptoms.  Dizziness is not typically related to symptomatic carotid disease.  Carotid Ultrasound on 11/20/2020 revealed 70-79% right Internal Carotid Stenosis and 70-79% left Internal Carotid Stenosis.  There is 50% bilateral ECA stenosis.  Ideally would check CTA neck to evaluate carotid disease further, but we are limited by pt's CKD with serum creatinine of 1.6 on 2/10/2020.  Continue ASA.  Increase rosuvastatin to 20 mg daily.      3. PAD- Stable.  Continue ASA.  Increase rosuvastatin to 20 mg daily.      4. HTN- Continue current medications.      5. HLD- LDL not at goal of less than 70.  Check cmp today.  ncrease rosuvastatin to 20 mg daily.      Follow up in 6 weeks    Total duration of face to face visit time 60 minutes.  Total time spent counseling greater than fifty percent of total visit time.  Counseling included discussion regarding imaging findings, diagnosis, possibilities, treatment options, risks and benefits.  The patient had many questions regarding the options and long-term effects.    Rishabh Arredondo MD, PhD  Interventional Cardiology

## 2020-12-12 ENCOUNTER — PATIENT MESSAGE (OUTPATIENT)
Dept: CARDIOLOGY | Facility: CLINIC | Age: 80
End: 2020-12-12

## 2020-12-17 ENCOUNTER — PATIENT MESSAGE (OUTPATIENT)
Dept: INTERNAL MEDICINE | Facility: CLINIC | Age: 80
End: 2020-12-17

## 2020-12-17 RX ORDER — OMEPRAZOLE 20 MG/1
20 CAPSULE, DELAYED RELEASE ORAL DAILY
Qty: 90 CAPSULE | Refills: 3 | Status: SHIPPED | OUTPATIENT
Start: 2020-12-17 | End: 2021-09-07 | Stop reason: SDUPTHER

## 2020-12-17 RX ORDER — ATENOLOL 50 MG/1
50 TABLET ORAL DAILY
Qty: 90 TABLET | Refills: 3 | Status: SHIPPED | OUTPATIENT
Start: 2020-12-17 | End: 2021-09-07 | Stop reason: SDUPTHER

## 2020-12-17 RX ORDER — TRIAMTERENE/HYDROCHLOROTHIAZID 37.5-25 MG
1 TABLET ORAL DAILY
Qty: 90 TABLET | Refills: 3 | Status: SHIPPED | OUTPATIENT
Start: 2020-12-17 | End: 2021-09-07 | Stop reason: SDUPTHER

## 2020-12-17 RX ORDER — ROSUVASTATIN CALCIUM 20 MG/1
20 TABLET, COATED ORAL DAILY
Qty: 90 TABLET | Refills: 3 | Status: SHIPPED | OUTPATIENT
Start: 2020-12-17 | End: 2021-02-24 | Stop reason: SDUPTHER

## 2020-12-22 ENCOUNTER — PATIENT MESSAGE (OUTPATIENT)
Dept: SPORTS MEDICINE | Facility: CLINIC | Age: 80
End: 2020-12-22

## 2020-12-24 ENCOUNTER — LAB VISIT (OUTPATIENT)
Dept: LAB | Facility: HOSPITAL | Age: 80
End: 2020-12-24
Attending: INTERNAL MEDICINE
Payer: MEDICARE

## 2020-12-24 DIAGNOSIS — R42 DIZZINESS: ICD-10-CM

## 2020-12-24 DIAGNOSIS — I49.3 FREQUENT PVCS: ICD-10-CM

## 2020-12-24 DIAGNOSIS — I10 HTN (HYPERTENSION), BENIGN: ICD-10-CM

## 2020-12-24 LAB
ALBUMIN SERPL BCP-MCNC: 3.7 G/DL (ref 3.5–5.2)
ALP SERPL-CCNC: 58 U/L (ref 55–135)
ALT SERPL W/O P-5'-P-CCNC: 9 U/L (ref 10–44)
ANION GAP SERPL CALC-SCNC: 8 MMOL/L (ref 8–16)
ANION GAP SERPL CALC-SCNC: 8 MMOL/L (ref 8–16)
AST SERPL-CCNC: 14 U/L (ref 10–40)
BILIRUB SERPL-MCNC: 0.9 MG/DL (ref 0.1–1)
BUN SERPL-MCNC: 26 MG/DL (ref 8–23)
BUN SERPL-MCNC: 26 MG/DL (ref 8–23)
CALCIUM SERPL-MCNC: 9.1 MG/DL (ref 8.7–10.5)
CALCIUM SERPL-MCNC: 9.1 MG/DL (ref 8.7–10.5)
CHLORIDE SERPL-SCNC: 104 MMOL/L (ref 95–110)
CHLORIDE SERPL-SCNC: 104 MMOL/L (ref 95–110)
CO2 SERPL-SCNC: 27 MMOL/L (ref 23–29)
CO2 SERPL-SCNC: 27 MMOL/L (ref 23–29)
CREAT SERPL-MCNC: 1.5 MG/DL (ref 0.5–1.4)
CREAT SERPL-MCNC: 1.5 MG/DL (ref 0.5–1.4)
EST. GFR  (AFRICAN AMERICAN): 50.5 ML/MIN/1.73 M^2
EST. GFR  (AFRICAN AMERICAN): 50.5 ML/MIN/1.73 M^2
EST. GFR  (NON AFRICAN AMERICAN): 43.7 ML/MIN/1.73 M^2
EST. GFR  (NON AFRICAN AMERICAN): 43.7 ML/MIN/1.73 M^2
GLUCOSE SERPL-MCNC: 167 MG/DL (ref 70–110)
GLUCOSE SERPL-MCNC: 167 MG/DL (ref 70–110)
POTASSIUM SERPL-SCNC: 4.3 MMOL/L (ref 3.5–5.1)
POTASSIUM SERPL-SCNC: 4.3 MMOL/L (ref 3.5–5.1)
PROT SERPL-MCNC: 6.7 G/DL (ref 6–8.4)
SODIUM SERPL-SCNC: 139 MMOL/L (ref 136–145)
SODIUM SERPL-SCNC: 139 MMOL/L (ref 136–145)

## 2020-12-24 PROCEDURE — 36415 COLL VENOUS BLD VENIPUNCTURE: CPT | Mod: PO

## 2020-12-24 PROCEDURE — 80053 COMPREHEN METABOLIC PANEL: CPT

## 2020-12-30 ENCOUNTER — HOSPITAL ENCOUNTER (OUTPATIENT)
Dept: CARDIOLOGY | Facility: HOSPITAL | Age: 80
Discharge: HOME | End: 2020-12-30
Attending: INTERNAL MEDICINE
Payer: MEDICARE

## 2020-12-30 VITALS
WEIGHT: 176 LBS | HEART RATE: 87 BPM | SYSTOLIC BLOOD PRESSURE: 140 MMHG | DIASTOLIC BLOOD PRESSURE: 70 MMHG | BODY MASS INDEX: 23.84 KG/M2 | HEIGHT: 72 IN

## 2020-12-30 DIAGNOSIS — R42 DIZZINESS: ICD-10-CM

## 2020-12-30 DIAGNOSIS — I49.3 FREQUENT PVCS: ICD-10-CM

## 2020-12-30 LAB
ASCENDING AORTA: 3.6 CM
AV INDEX (PROSTH): 0.89
AV MEAN GRADIENT: 4 MMHG
AV PEAK GRADIENT: 8 MMHG
AV VALVE AREA: 3.08 CM2
AV VELOCITY RATIO: 0.89
BSA FOR ECHO PROCEDURE: 2.01 M2
CV ECHO LV RWT: 0.48 CM
DOP CALC AO PEAK VEL: 1.4 M/S
DOP CALC AO VTI: 27.07 CM
DOP CALC LVOT AREA: 3.5 CM2
DOP CALC LVOT DIAMETER: 2.1 CM
DOP CALC LVOT PEAK VEL: 1.24 M/S
DOP CALC LVOT STROKE VOLUME: 83.36 CM3
DOP CALC RVOT PEAK VEL: 0.79 M/S
DOP CALC RVOT VTI: 16.88 CM
DOP CALCLVOT PEAK VEL VTI: 24.08 CM
E WAVE DECELERATION TIME: 183.52 MSEC
E/A RATIO: 1.3
E/E' RATIO: 12.73 M/S
ECHO LV POSTERIOR WALL: 1.1 CM (ref 0.6–1.1)
FRACTIONAL SHORTENING: 39 % (ref 28–44)
INTERVENTRICULAR SEPTUM: 1.1 CM (ref 0.6–1.1)
IVRT: 99.9 MSEC
LA MAJOR: 5.54 CM
LA MINOR: 5.56 CM
LA WIDTH: 4.08 CM
LEFT ATRIUM SIZE: 3.97 CM
LEFT ATRIUM VOLUME INDEX: 37.9 ML/M2
LEFT ATRIUM VOLUME: 76.41 CM3
LEFT INTERNAL DIMENSION IN SYSTOLE: 2.82 CM (ref 2.1–4)
LEFT VENTRICLE DIASTOLIC VOLUME INDEX: 48.99 ML/M2
LEFT VENTRICLE DIASTOLIC VOLUME: 98.85 ML
LEFT VENTRICLE MASS INDEX: 91 G/M2
LEFT VENTRICLE SYSTOLIC VOLUME INDEX: 14.8 ML/M2
LEFT VENTRICLE SYSTOLIC VOLUME: 29.95 ML
LEFT VENTRICULAR INTERNAL DIMENSION IN DIASTOLE: 4.63 CM (ref 3.5–6)
LEFT VENTRICULAR MASS: 183.11 G
LV LATERAL E/E' RATIO: 11.67 M/S
LV SEPTAL E/E' RATIO: 14 M/S
MV PEAK A VEL: 0.54 M/S
MV PEAK E VEL: 0.7 M/S
PISA TR MAX VEL: 2.79 M/S
PV MEAN GRADIENT: 2 MMHG
PV PEAK VELOCITY: 0.04 CM/S
RA MAJOR: 5.31 CM
RA PRESSURE: 3 MMHG
RA WIDTH: 3.6 CM
RIGHT VENTRICULAR END-DIASTOLIC DIMENSION: 2.19 CM
SINUS: 4.13 CM
STJ: 2.9 CM
TDI LATERAL: 0.06 M/S
TDI SEPTAL: 0.05 M/S
TDI: 0.06 M/S
TR MAX PG: 31 MMHG
TRICUSPID ANNULAR PLANE SYSTOLIC EXCURSION: 2.37 CM
TV REST PULMONARY ARTERY PRESSURE: 34 MMHG

## 2020-12-30 PROCEDURE — 93306 ECHO (CUPID ONLY): ICD-10-PCS | Mod: 26,,, | Performed by: INTERNAL MEDICINE

## 2020-12-30 PROCEDURE — 93306 TTE W/DOPPLER COMPLETE: CPT

## 2020-12-30 PROCEDURE — 93306 TTE W/DOPPLER COMPLETE: CPT | Mod: 26,,, | Performed by: INTERNAL MEDICINE

## 2021-01-03 ENCOUNTER — PATIENT OUTREACH (OUTPATIENT)
Dept: ADMINISTRATIVE | Facility: OTHER | Age: 81
End: 2021-01-03

## 2021-01-06 ENCOUNTER — OFFICE VISIT (OUTPATIENT)
Dept: CARDIOLOGY | Facility: CLINIC | Age: 81
End: 2021-01-06
Payer: MEDICARE

## 2021-01-06 VITALS
BODY MASS INDEX: 23.61 KG/M2 | SYSTOLIC BLOOD PRESSURE: 124 MMHG | WEIGHT: 178.13 LBS | HEIGHT: 73 IN | HEART RATE: 73 BPM | DIASTOLIC BLOOD PRESSURE: 58 MMHG

## 2021-01-06 DIAGNOSIS — I65.23 CAROTID ATHEROSCLEROSIS, BILATERAL: Primary | ICD-10-CM

## 2021-01-06 DIAGNOSIS — I73.9 PAD (PERIPHERAL ARTERY DISEASE): ICD-10-CM

## 2021-01-06 DIAGNOSIS — I65.23 BILATERAL CAROTID ARTERY STENOSIS: ICD-10-CM

## 2021-01-06 DIAGNOSIS — I10 HTN (HYPERTENSION), BENIGN: ICD-10-CM

## 2021-01-06 DIAGNOSIS — E78.5 DYSLIPIDEMIA: ICD-10-CM

## 2021-01-06 DIAGNOSIS — R42 DIZZINESS: ICD-10-CM

## 2021-01-06 PROCEDURE — 3078F DIAST BP <80 MM HG: CPT | Mod: CPTII,S$GLB,, | Performed by: INTERNAL MEDICINE

## 2021-01-06 PROCEDURE — 1159F MED LIST DOCD IN RCRD: CPT | Mod: S$GLB,,, | Performed by: INTERNAL MEDICINE

## 2021-01-06 PROCEDURE — 99215 PR OFFICE/OUTPT VISIT, EST, LEVL V, 40-54 MIN: ICD-10-PCS | Mod: S$GLB,,, | Performed by: INTERNAL MEDICINE

## 2021-01-06 PROCEDURE — 1126F AMNT PAIN NOTED NONE PRSNT: CPT | Mod: S$GLB,,, | Performed by: INTERNAL MEDICINE

## 2021-01-06 PROCEDURE — 1159F PR MEDICATION LIST DOCUMENTED IN MEDICAL RECORD: ICD-10-PCS | Mod: S$GLB,,, | Performed by: INTERNAL MEDICINE

## 2021-01-06 PROCEDURE — 3288F PR FALLS RISK ASSESSMENT DOCUMENTED: ICD-10-PCS | Mod: CPTII,S$GLB,, | Performed by: INTERNAL MEDICINE

## 2021-01-06 PROCEDURE — 3074F SYST BP LT 130 MM HG: CPT | Mod: CPTII,S$GLB,, | Performed by: INTERNAL MEDICINE

## 2021-01-06 PROCEDURE — 99999 PR PBB SHADOW E&M-EST. PATIENT-LVL IV: CPT | Mod: PBBFAC,,, | Performed by: INTERNAL MEDICINE

## 2021-01-06 PROCEDURE — 3288F FALL RISK ASSESSMENT DOCD: CPT | Mod: CPTII,S$GLB,, | Performed by: INTERNAL MEDICINE

## 2021-01-06 PROCEDURE — 3078F PR MOST RECENT DIASTOLIC BLOOD PRESSURE < 80 MM HG: ICD-10-PCS | Mod: CPTII,S$GLB,, | Performed by: INTERNAL MEDICINE

## 2021-01-06 PROCEDURE — 1101F PR PT FALLS ASSESS DOC 0-1 FALLS W/OUT INJ PAST YR: ICD-10-PCS | Mod: CPTII,S$GLB,, | Performed by: INTERNAL MEDICINE

## 2021-01-06 PROCEDURE — 99999 PR PBB SHADOW E&M-EST. PATIENT-LVL IV: ICD-10-PCS | Mod: PBBFAC,,, | Performed by: INTERNAL MEDICINE

## 2021-01-06 PROCEDURE — 3074F PR MOST RECENT SYSTOLIC BLOOD PRESSURE < 130 MM HG: ICD-10-PCS | Mod: CPTII,S$GLB,, | Performed by: INTERNAL MEDICINE

## 2021-01-06 PROCEDURE — 1101F PT FALLS ASSESS-DOCD LE1/YR: CPT | Mod: CPTII,S$GLB,, | Performed by: INTERNAL MEDICINE

## 2021-01-06 PROCEDURE — 1126F PR PAIN SEVERITY QUANTIFIED, NO PAIN PRESENT: ICD-10-PCS | Mod: S$GLB,,, | Performed by: INTERNAL MEDICINE

## 2021-01-06 PROCEDURE — 99215 OFFICE O/P EST HI 40 MIN: CPT | Mod: S$GLB,,, | Performed by: INTERNAL MEDICINE

## 2021-01-09 ENCOUNTER — IMMUNIZATION (OUTPATIENT)
Dept: INTERNAL MEDICINE | Facility: CLINIC | Age: 81
End: 2021-01-09
Payer: MEDICARE

## 2021-01-09 DIAGNOSIS — Z23 NEED FOR VACCINATION: ICD-10-CM

## 2021-01-09 PROCEDURE — 91300 COVID-19, MRNA, LNP-S, PF, 30 MCG/0.3 ML DOSE VACCINE: CPT | Mod: PBBFAC | Performed by: FAMILY MEDICINE

## 2021-01-30 ENCOUNTER — IMMUNIZATION (OUTPATIENT)
Dept: INTERNAL MEDICINE | Facility: CLINIC | Age: 81
End: 2021-01-30
Payer: MEDICARE

## 2021-01-30 DIAGNOSIS — Z23 NEED FOR VACCINATION: Primary | ICD-10-CM

## 2021-01-30 PROCEDURE — 0002A COVID-19, MRNA, LNP-S, PF, 30 MCG/0.3 ML DOSE VACCINE: CPT | Mod: PBBFAC | Performed by: FAMILY MEDICINE

## 2021-01-30 PROCEDURE — 91300 COVID-19, MRNA, LNP-S, PF, 30 MCG/0.3 ML DOSE VACCINE: CPT | Mod: PBBFAC | Performed by: FAMILY MEDICINE

## 2021-02-15 ENCOUNTER — LAB VISIT (OUTPATIENT)
Dept: LAB | Facility: HOSPITAL | Age: 81
End: 2021-02-15
Attending: INTERNAL MEDICINE
Payer: MEDICARE

## 2021-02-15 DIAGNOSIS — E78.5 DYSLIPIDEMIA: ICD-10-CM

## 2021-02-15 DIAGNOSIS — I65.23 CAROTID ATHEROSCLEROSIS, BILATERAL: ICD-10-CM

## 2021-02-15 PROCEDURE — 82565 ASSAY OF CREATININE: CPT

## 2021-02-15 PROCEDURE — 80061 LIPID PANEL: CPT

## 2021-02-15 PROCEDURE — 36415 COLL VENOUS BLD VENIPUNCTURE: CPT | Mod: PO

## 2021-02-16 LAB
CHOLEST SERPL-MCNC: 190 MG/DL (ref 120–199)
CHOLEST/HDLC SERPL: 3.1 {RATIO} (ref 2–5)
CREAT SERPL-MCNC: 1.5 MG/DL (ref 0.5–1.4)
EST. GFR  (AFRICAN AMERICAN): 50.5 ML/MIN/1.73 M^2
EST. GFR  (NON AFRICAN AMERICAN): 43.7 ML/MIN/1.73 M^2
HDLC SERPL-MCNC: 62 MG/DL (ref 40–75)
HDLC SERPL: 32.6 % (ref 20–50)
LDLC SERPL CALC-MCNC: 104.4 MG/DL (ref 63–159)
NONHDLC SERPL-MCNC: 128 MG/DL
TRIGL SERPL-MCNC: 118 MG/DL (ref 30–150)

## 2021-02-17 ENCOUNTER — HOSPITAL ENCOUNTER (OUTPATIENT)
Dept: RADIOLOGY | Facility: HOSPITAL | Age: 81
Discharge: HOME | End: 2021-02-17
Attending: INTERNAL MEDICINE
Payer: MEDICARE

## 2021-02-17 DIAGNOSIS — I65.23 CAROTID ATHEROSCLEROSIS, BILATERAL: ICD-10-CM

## 2021-02-17 PROCEDURE — 70498 CTA NECK: ICD-10-PCS | Mod: 26,,, | Performed by: RADIOLOGY

## 2021-02-17 PROCEDURE — 70498 CT ANGIOGRAPHY NECK: CPT | Mod: TC

## 2021-02-17 PROCEDURE — 25500020 PHARM REV CODE 255: Performed by: INTERNAL MEDICINE

## 2021-02-17 PROCEDURE — 70498 CT ANGIOGRAPHY NECK: CPT | Mod: 26,,, | Performed by: RADIOLOGY

## 2021-02-17 RX ADMIN — IOHEXOL 100 ML: 350 INJECTION, SOLUTION INTRAVENOUS at 11:02

## 2021-02-23 ENCOUNTER — PATIENT OUTREACH (OUTPATIENT)
Dept: ADMINISTRATIVE | Facility: OTHER | Age: 81
End: 2021-02-23

## 2021-02-24 ENCOUNTER — OFFICE VISIT (OUTPATIENT)
Dept: CARDIOLOGY | Facility: CLINIC | Age: 81
End: 2021-02-24
Payer: MEDICARE

## 2021-02-24 VITALS
DIASTOLIC BLOOD PRESSURE: 70 MMHG | HEART RATE: 60 BPM | WEIGHT: 175.06 LBS | SYSTOLIC BLOOD PRESSURE: 152 MMHG | BODY MASS INDEX: 23.2 KG/M2 | HEIGHT: 73 IN

## 2021-02-24 DIAGNOSIS — E78.5 DYSLIPIDEMIA: ICD-10-CM

## 2021-02-24 DIAGNOSIS — I10 HTN (HYPERTENSION), BENIGN: ICD-10-CM

## 2021-02-24 DIAGNOSIS — K21.9 GASTROESOPHAGEAL REFLUX DISEASE WITHOUT ESOPHAGITIS: ICD-10-CM

## 2021-02-24 DIAGNOSIS — I73.9 PAD (PERIPHERAL ARTERY DISEASE): ICD-10-CM

## 2021-02-24 DIAGNOSIS — I65.23 CAROTID ATHEROSCLEROSIS, BILATERAL: Primary | ICD-10-CM

## 2021-02-24 DIAGNOSIS — R42 DIZZINESS: ICD-10-CM

## 2021-02-24 PROCEDURE — 3077F PR MOST RECENT SYSTOLIC BLOOD PRESSURE >= 140 MM HG: ICD-10-PCS | Mod: CPTII,S$GLB,, | Performed by: INTERNAL MEDICINE

## 2021-02-24 PROCEDURE — 3078F PR MOST RECENT DIASTOLIC BLOOD PRESSURE < 80 MM HG: ICD-10-PCS | Mod: CPTII,S$GLB,, | Performed by: INTERNAL MEDICINE

## 2021-02-24 PROCEDURE — 3288F PR FALLS RISK ASSESSMENT DOCUMENTED: ICD-10-PCS | Mod: CPTII,S$GLB,, | Performed by: INTERNAL MEDICINE

## 2021-02-24 PROCEDURE — 1126F PR PAIN SEVERITY QUANTIFIED, NO PAIN PRESENT: ICD-10-PCS | Mod: S$GLB,,, | Performed by: INTERNAL MEDICINE

## 2021-02-24 PROCEDURE — 1101F PT FALLS ASSESS-DOCD LE1/YR: CPT | Mod: CPTII,S$GLB,, | Performed by: INTERNAL MEDICINE

## 2021-02-24 PROCEDURE — 1159F MED LIST DOCD IN RCRD: CPT | Mod: S$GLB,,, | Performed by: INTERNAL MEDICINE

## 2021-02-24 PROCEDURE — 99999 PR PBB SHADOW E&M-EST. PATIENT-LVL IV: CPT | Mod: PBBFAC,,, | Performed by: INTERNAL MEDICINE

## 2021-02-24 PROCEDURE — 3077F SYST BP >= 140 MM HG: CPT | Mod: CPTII,S$GLB,, | Performed by: INTERNAL MEDICINE

## 2021-02-24 PROCEDURE — 1101F PR PT FALLS ASSESS DOC 0-1 FALLS W/OUT INJ PAST YR: ICD-10-PCS | Mod: CPTII,S$GLB,, | Performed by: INTERNAL MEDICINE

## 2021-02-24 PROCEDURE — 1126F AMNT PAIN NOTED NONE PRSNT: CPT | Mod: S$GLB,,, | Performed by: INTERNAL MEDICINE

## 2021-02-24 PROCEDURE — 3078F DIAST BP <80 MM HG: CPT | Mod: CPTII,S$GLB,, | Performed by: INTERNAL MEDICINE

## 2021-02-24 PROCEDURE — 3288F FALL RISK ASSESSMENT DOCD: CPT | Mod: CPTII,S$GLB,, | Performed by: INTERNAL MEDICINE

## 2021-02-24 PROCEDURE — 1159F PR MEDICATION LIST DOCUMENTED IN MEDICAL RECORD: ICD-10-PCS | Mod: S$GLB,,, | Performed by: INTERNAL MEDICINE

## 2021-02-24 PROCEDURE — 99999 PR PBB SHADOW E&M-EST. PATIENT-LVL IV: ICD-10-PCS | Mod: PBBFAC,,, | Performed by: INTERNAL MEDICINE

## 2021-02-24 PROCEDURE — 99215 PR OFFICE/OUTPT VISIT, EST, LEVL V, 40-54 MIN: ICD-10-PCS | Mod: S$GLB,,, | Performed by: INTERNAL MEDICINE

## 2021-02-24 PROCEDURE — 99215 OFFICE O/P EST HI 40 MIN: CPT | Mod: S$GLB,,, | Performed by: INTERNAL MEDICINE

## 2021-02-24 RX ORDER — ROSUVASTATIN CALCIUM 40 MG/1
40 TABLET, COATED ORAL NIGHTLY
Qty: 90 TABLET | Refills: 3 | Status: SHIPPED | OUTPATIENT
Start: 2021-02-24 | End: 2021-05-05

## 2021-03-08 ENCOUNTER — TELEPHONE (OUTPATIENT)
Dept: VASCULAR SURGERY | Facility: CLINIC | Age: 81
End: 2021-03-08

## 2021-03-10 ENCOUNTER — PATIENT MESSAGE (OUTPATIENT)
Dept: CARDIOLOGY | Facility: CLINIC | Age: 81
End: 2021-03-10

## 2021-03-10 ENCOUNTER — PATIENT OUTREACH (OUTPATIENT)
Dept: ADMINISTRATIVE | Facility: HOSPITAL | Age: 81
End: 2021-03-10

## 2021-03-10 DIAGNOSIS — I65.29 STENOSIS OF CAROTID ARTERY, UNSPECIFIED LATERALITY: Primary | ICD-10-CM

## 2021-03-10 DIAGNOSIS — I77.1 STRICTURE OF ARTERY: ICD-10-CM

## 2021-03-10 DIAGNOSIS — E78.2 MIXED HYPERLIPIDEMIA: Primary | ICD-10-CM

## 2021-03-10 RX ORDER — BEMPEDOIC ACID 180 MG/1
180 TABLET, FILM COATED ORAL DAILY
Qty: 30 TABLET | Refills: 11 | Status: SHIPPED | OUTPATIENT
Start: 2021-03-10 | End: 2021-03-16 | Stop reason: SDUPTHER

## 2021-03-15 ENCOUNTER — TELEPHONE (OUTPATIENT)
Dept: CARDIOLOGY | Facility: CLINIC | Age: 81
End: 2021-03-15

## 2021-03-16 DIAGNOSIS — Z78.9 STATIN INTOLERANCE: Primary | ICD-10-CM

## 2021-03-16 DIAGNOSIS — E78.2 MIXED HYPERLIPIDEMIA: ICD-10-CM

## 2021-03-16 RX ORDER — BEMPEDOIC ACID 180 MG/1
180 TABLET, FILM COATED ORAL DAILY
Qty: 30 TABLET | Refills: 11 | Status: SHIPPED | OUTPATIENT
Start: 2021-03-16 | End: 2021-05-05 | Stop reason: SDUPTHER

## 2021-03-17 ENCOUNTER — HOSPITAL ENCOUNTER (OUTPATIENT)
Dept: VASCULAR SURGERY | Facility: CLINIC | Age: 81
Discharge: HOME | End: 2021-03-17
Attending: SURGERY
Payer: MEDICARE

## 2021-03-17 ENCOUNTER — INITIAL CONSULT (OUTPATIENT)
Dept: VASCULAR SURGERY | Facility: CLINIC | Age: 81
End: 2021-03-17
Payer: MEDICARE

## 2021-03-17 VITALS
HEIGHT: 73 IN | BODY MASS INDEX: 22.73 KG/M2 | SYSTOLIC BLOOD PRESSURE: 132 MMHG | DIASTOLIC BLOOD PRESSURE: 70 MMHG | TEMPERATURE: 99 F | HEART RATE: 60 BPM | WEIGHT: 171.5 LBS

## 2021-03-17 DIAGNOSIS — I77.1 STRICTURE OF ARTERY: ICD-10-CM

## 2021-03-17 DIAGNOSIS — I65.29 STENOSIS OF CAROTID ARTERY, UNSPECIFIED LATERALITY: ICD-10-CM

## 2021-03-17 DIAGNOSIS — I65.23 CAROTID ATHEROSCLEROSIS, BILATERAL: ICD-10-CM

## 2021-03-17 DIAGNOSIS — I73.9 PAD (PERIPHERAL ARTERY DISEASE): Primary | ICD-10-CM

## 2021-03-17 PROCEDURE — 1159F MED LIST DOCD IN RCRD: CPT | Mod: S$GLB,,, | Performed by: SURGERY

## 2021-03-17 PROCEDURE — 99204 OFFICE O/P NEW MOD 45 MIN: CPT | Mod: S$GLB,,, | Performed by: SURGERY

## 2021-03-17 PROCEDURE — 99204 PR OFFICE/OUTPT VISIT, NEW, LEVL IV, 45-59 MIN: ICD-10-PCS | Mod: S$GLB,,, | Performed by: SURGERY

## 2021-03-17 PROCEDURE — 3075F SYST BP GE 130 - 139MM HG: CPT | Mod: CPTII,S$GLB,, | Performed by: SURGERY

## 2021-03-17 PROCEDURE — 93880 EXTRACRANIAL BILAT STUDY: CPT | Mod: S$GLB,,, | Performed by: SURGERY

## 2021-03-17 PROCEDURE — 93880 PR DUPLEX SCAN EXTRACRANIAL,BILAT: ICD-10-PCS | Mod: S$GLB,,, | Performed by: SURGERY

## 2021-03-17 PROCEDURE — 99999 PR PBB SHADOW E&M-EST. PATIENT-LVL IV: ICD-10-PCS | Mod: PBBFAC,,, | Performed by: SURGERY

## 2021-03-17 PROCEDURE — 3075F PR MOST RECENT SYSTOLIC BLOOD PRESS GE 130-139MM HG: ICD-10-PCS | Mod: CPTII,S$GLB,, | Performed by: SURGERY

## 2021-03-17 PROCEDURE — 99499 UNLISTED E&M SERVICE: CPT | Mod: S$GLB,,, | Performed by: SURGERY

## 2021-03-17 PROCEDURE — 3078F DIAST BP <80 MM HG: CPT | Mod: CPTII,S$GLB,, | Performed by: SURGERY

## 2021-03-17 PROCEDURE — 1159F PR MEDICATION LIST DOCUMENTED IN MEDICAL RECORD: ICD-10-PCS | Mod: S$GLB,,, | Performed by: SURGERY

## 2021-03-17 PROCEDURE — 99999 PR PBB SHADOW E&M-EST. PATIENT-LVL IV: CPT | Mod: PBBFAC,,, | Performed by: SURGERY

## 2021-03-17 PROCEDURE — 99499 RISK ADDL DX/OHS AUDIT: ICD-10-PCS | Mod: S$GLB,,, | Performed by: SURGERY

## 2021-03-17 PROCEDURE — 3078F PR MOST RECENT DIASTOLIC BLOOD PRESSURE < 80 MM HG: ICD-10-PCS | Mod: CPTII,S$GLB,, | Performed by: SURGERY

## 2021-03-22 ENCOUNTER — PATIENT MESSAGE (OUTPATIENT)
Dept: CARDIOLOGY | Facility: CLINIC | Age: 81
End: 2021-03-22

## 2021-03-25 ENCOUNTER — TELEPHONE (OUTPATIENT)
Dept: PHARMACY | Facility: CLINIC | Age: 81
End: 2021-03-25

## 2021-03-29 ENCOUNTER — HOSPITAL ENCOUNTER (OUTPATIENT)
Dept: VASCULAR SURGERY | Facility: CLINIC | Age: 81
Discharge: HOME | End: 2021-03-29
Attending: SURGERY
Payer: MEDICARE

## 2021-03-29 DIAGNOSIS — I73.9 PAD (PERIPHERAL ARTERY DISEASE): ICD-10-CM

## 2021-03-29 PROCEDURE — 93923 UPR/LXTR ART STDY 3+ LVLS: CPT | Mod: S$GLB,,, | Performed by: SURGERY

## 2021-03-29 PROCEDURE — 93978 VASCULAR STUDY: CPT | Mod: S$GLB,,, | Performed by: SURGERY

## 2021-03-29 PROCEDURE — 93923 PR NON-INVASIVE PHYSIOLOGIC STUDY EXTREMITY 3 LEVELS: ICD-10-PCS | Mod: S$GLB,,, | Performed by: SURGERY

## 2021-03-29 PROCEDURE — 93925 PR DUPLEX LO EXTREM ART BILAT: ICD-10-PCS | Mod: S$GLB,,, | Performed by: SURGERY

## 2021-03-29 PROCEDURE — 93925 LOWER EXTREMITY STUDY: CPT | Mod: S$GLB,,, | Performed by: SURGERY

## 2021-03-29 PROCEDURE — 93978 PR DUPLEX LARGE VESSEL(S),COMPLETE: ICD-10-PCS | Mod: S$GLB,,, | Performed by: SURGERY

## 2021-03-31 ENCOUNTER — DOCUMENTATION ONLY (OUTPATIENT)
Dept: VASCULAR SURGERY | Facility: CLINIC | Age: 81
End: 2021-03-31

## 2021-03-31 ENCOUNTER — OFFICE VISIT (OUTPATIENT)
Dept: VASCULAR SURGERY | Facility: CLINIC | Age: 81
End: 2021-03-31
Payer: MEDICARE

## 2021-03-31 VITALS
BODY MASS INDEX: 23.32 KG/M2 | SYSTOLIC BLOOD PRESSURE: 148 MMHG | DIASTOLIC BLOOD PRESSURE: 67 MMHG | WEIGHT: 175.94 LBS | HEIGHT: 73 IN | HEART RATE: 64 BPM | TEMPERATURE: 98 F

## 2021-03-31 DIAGNOSIS — Z01.818 PRE-OP TESTING: ICD-10-CM

## 2021-03-31 DIAGNOSIS — I73.9 PAD (PERIPHERAL ARTERY DISEASE): Primary | ICD-10-CM

## 2021-03-31 DIAGNOSIS — I65.29 STENOSIS OF CAROTID ARTERY, UNSPECIFIED LATERALITY: Primary | ICD-10-CM

## 2021-03-31 PROCEDURE — 3288F FALL RISK ASSESSMENT DOCD: CPT | Mod: CPTII,S$GLB,, | Performed by: SURGERY

## 2021-03-31 PROCEDURE — 3078F PR MOST RECENT DIASTOLIC BLOOD PRESSURE < 80 MM HG: ICD-10-PCS | Mod: CPTII,S$GLB,, | Performed by: SURGERY

## 2021-03-31 PROCEDURE — 3078F DIAST BP <80 MM HG: CPT | Mod: CPTII,S$GLB,, | Performed by: SURGERY

## 2021-03-31 PROCEDURE — 1126F PR PAIN SEVERITY QUANTIFIED, NO PAIN PRESENT: ICD-10-PCS | Mod: S$GLB,,, | Performed by: SURGERY

## 2021-03-31 PROCEDURE — 99999 PR PBB SHADOW E&M-EST. PATIENT-LVL III: CPT | Mod: PBBFAC,,, | Performed by: SURGERY

## 2021-03-31 PROCEDURE — 99499 RISK ADDL DX/OHS AUDIT: ICD-10-PCS | Mod: S$GLB,,, | Performed by: SURGERY

## 2021-03-31 PROCEDURE — 99214 PR OFFICE/OUTPT VISIT, EST, LEVL IV, 30-39 MIN: ICD-10-PCS | Mod: S$GLB,,, | Performed by: SURGERY

## 2021-03-31 PROCEDURE — 1126F AMNT PAIN NOTED NONE PRSNT: CPT | Mod: S$GLB,,, | Performed by: SURGERY

## 2021-03-31 PROCEDURE — 99499 UNLISTED E&M SERVICE: CPT | Mod: S$GLB,,, | Performed by: SURGERY

## 2021-03-31 PROCEDURE — 1101F PR PT FALLS ASSESS DOC 0-1 FALLS W/OUT INJ PAST YR: ICD-10-PCS | Mod: CPTII,S$GLB,, | Performed by: SURGERY

## 2021-03-31 PROCEDURE — 1159F MED LIST DOCD IN RCRD: CPT | Mod: S$GLB,,, | Performed by: SURGERY

## 2021-03-31 PROCEDURE — 3077F PR MOST RECENT SYSTOLIC BLOOD PRESSURE >= 140 MM HG: ICD-10-PCS | Mod: CPTII,S$GLB,, | Performed by: SURGERY

## 2021-03-31 PROCEDURE — 3077F SYST BP >= 140 MM HG: CPT | Mod: CPTII,S$GLB,, | Performed by: SURGERY

## 2021-03-31 PROCEDURE — 3288F PR FALLS RISK ASSESSMENT DOCUMENTED: ICD-10-PCS | Mod: CPTII,S$GLB,, | Performed by: SURGERY

## 2021-03-31 PROCEDURE — 99999 PR PBB SHADOW E&M-EST. PATIENT-LVL III: ICD-10-PCS | Mod: PBBFAC,,, | Performed by: SURGERY

## 2021-03-31 PROCEDURE — 99214 OFFICE O/P EST MOD 30 MIN: CPT | Mod: S$GLB,,, | Performed by: SURGERY

## 2021-03-31 PROCEDURE — 1101F PT FALLS ASSESS-DOCD LE1/YR: CPT | Mod: CPTII,S$GLB,, | Performed by: SURGERY

## 2021-03-31 PROCEDURE — 1159F PR MEDICATION LIST DOCUMENTED IN MEDICAL RECORD: ICD-10-PCS | Mod: S$GLB,,, | Performed by: SURGERY

## 2021-04-01 ENCOUNTER — TELEPHONE (OUTPATIENT)
Dept: VASCULAR SURGERY | Facility: CLINIC | Age: 81
End: 2021-04-01

## 2021-04-07 ENCOUNTER — ANESTHESIA EVENT (OUTPATIENT)
Dept: SURGERY | Facility: HOSPITAL | Age: 81
DRG: 039 | End: 2021-04-07
Payer: MEDICARE

## 2021-04-09 ENCOUNTER — TELEPHONE (OUTPATIENT)
Dept: VASCULAR SURGERY | Facility: CLINIC | Age: 81
End: 2021-04-09

## 2021-04-16 ENCOUNTER — ANESTHESIA (OUTPATIENT)
Dept: SURGERY | Facility: HOSPITAL | Age: 81
DRG: 039 | End: 2021-04-16
Payer: MEDICARE

## 2021-04-25 ENCOUNTER — PATIENT MESSAGE (OUTPATIENT)
Dept: CARDIOLOGY | Facility: CLINIC | Age: 81
End: 2021-04-25

## 2021-04-30 ENCOUNTER — PATIENT OUTREACH (OUTPATIENT)
Dept: ADMINISTRATIVE | Facility: OTHER | Age: 81
End: 2021-04-30

## 2021-04-30 DIAGNOSIS — E78.5 DYSLIPIDEMIA: Primary | ICD-10-CM

## 2021-05-03 ENCOUNTER — LAB VISIT (OUTPATIENT)
Dept: LAB | Facility: HOSPITAL | Age: 81
End: 2021-05-03
Attending: INTERNAL MEDICINE
Payer: MEDICARE

## 2021-05-03 DIAGNOSIS — E78.5 DYSLIPIDEMIA: ICD-10-CM

## 2021-05-03 LAB
CHOLEST SERPL-MCNC: 142 MG/DL (ref 120–199)
CHOLEST/HDLC SERPL: 2.7 {RATIO} (ref 2–5)
HDLC SERPL-MCNC: 53 MG/DL (ref 40–75)
HDLC SERPL: 37.3 % (ref 20–50)
LDLC SERPL CALC-MCNC: 73 MG/DL (ref 63–159)
NONHDLC SERPL-MCNC: 89 MG/DL
TRIGL SERPL-MCNC: 80 MG/DL (ref 30–150)

## 2021-05-03 PROCEDURE — 36415 COLL VENOUS BLD VENIPUNCTURE: CPT | Mod: PO | Performed by: INTERNAL MEDICINE

## 2021-05-03 PROCEDURE — 80061 LIPID PANEL: CPT | Performed by: INTERNAL MEDICINE

## 2021-05-05 ENCOUNTER — OFFICE VISIT (OUTPATIENT)
Dept: CARDIOLOGY | Facility: CLINIC | Age: 81
End: 2021-05-05
Payer: MEDICARE

## 2021-05-05 VITALS
BODY MASS INDEX: 23.09 KG/M2 | WEIGHT: 174.19 LBS | HEART RATE: 72 BPM | SYSTOLIC BLOOD PRESSURE: 106 MMHG | OXYGEN SATURATION: 98 % | DIASTOLIC BLOOD PRESSURE: 62 MMHG | HEIGHT: 73 IN

## 2021-05-05 DIAGNOSIS — I73.9 PAD (PERIPHERAL ARTERY DISEASE): ICD-10-CM

## 2021-05-05 DIAGNOSIS — I10 HTN (HYPERTENSION), BENIGN: ICD-10-CM

## 2021-05-05 DIAGNOSIS — E78.2 MIXED HYPERLIPIDEMIA: ICD-10-CM

## 2021-05-05 DIAGNOSIS — Z78.9 STATIN INTOLERANCE: ICD-10-CM

## 2021-05-05 DIAGNOSIS — I65.23 CAROTID ATHEROSCLEROSIS, BILATERAL: Primary | ICD-10-CM

## 2021-05-05 PROCEDURE — 1159F PR MEDICATION LIST DOCUMENTED IN MEDICAL RECORD: ICD-10-PCS | Mod: S$GLB,,, | Performed by: INTERNAL MEDICINE

## 2021-05-05 PROCEDURE — 3288F FALL RISK ASSESSMENT DOCD: CPT | Mod: CPTII,S$GLB,, | Performed by: INTERNAL MEDICINE

## 2021-05-05 PROCEDURE — 1159F MED LIST DOCD IN RCRD: CPT | Mod: S$GLB,,, | Performed by: INTERNAL MEDICINE

## 2021-05-05 PROCEDURE — 1101F PT FALLS ASSESS-DOCD LE1/YR: CPT | Mod: CPTII,S$GLB,, | Performed by: INTERNAL MEDICINE

## 2021-05-05 PROCEDURE — 1126F AMNT PAIN NOTED NONE PRSNT: CPT | Mod: S$GLB,,, | Performed by: INTERNAL MEDICINE

## 2021-05-05 PROCEDURE — 1126F PR PAIN SEVERITY QUANTIFIED, NO PAIN PRESENT: ICD-10-PCS | Mod: S$GLB,,, | Performed by: INTERNAL MEDICINE

## 2021-05-05 PROCEDURE — 99999 PR PBB SHADOW E&M-EST. PATIENT-LVL IV: CPT | Mod: PBBFAC,,, | Performed by: INTERNAL MEDICINE

## 2021-05-05 PROCEDURE — 99999 PR PBB SHADOW E&M-EST. PATIENT-LVL IV: ICD-10-PCS | Mod: PBBFAC,,, | Performed by: INTERNAL MEDICINE

## 2021-05-05 PROCEDURE — 99215 PR OFFICE/OUTPT VISIT, EST, LEVL V, 40-54 MIN: ICD-10-PCS | Mod: S$GLB,,, | Performed by: INTERNAL MEDICINE

## 2021-05-05 PROCEDURE — 3288F PR FALLS RISK ASSESSMENT DOCUMENTED: ICD-10-PCS | Mod: CPTII,S$GLB,, | Performed by: INTERNAL MEDICINE

## 2021-05-05 PROCEDURE — 1101F PR PT FALLS ASSESS DOC 0-1 FALLS W/OUT INJ PAST YR: ICD-10-PCS | Mod: CPTII,S$GLB,, | Performed by: INTERNAL MEDICINE

## 2021-05-05 PROCEDURE — 99215 OFFICE O/P EST HI 40 MIN: CPT | Mod: S$GLB,,, | Performed by: INTERNAL MEDICINE

## 2021-05-05 RX ORDER — ROSUVASTATIN CALCIUM 20 MG/1
20 TABLET, COATED ORAL DAILY
COMMUNITY
Start: 2021-03-01 | End: 2021-05-05 | Stop reason: SDUPTHER

## 2021-05-05 RX ORDER — ROSUVASTATIN CALCIUM 20 MG/1
20 TABLET, COATED ORAL DAILY
Qty: 90 TABLET | Refills: 3 | Status: SHIPPED | OUTPATIENT
Start: 2021-05-05 | End: 2021-09-07 | Stop reason: SDUPTHER

## 2021-05-05 RX ORDER — BEMPEDOIC ACID 180 MG/1
180 TABLET, FILM COATED ORAL DAILY
Qty: 90 TABLET | Refills: 3 | Status: SHIPPED | OUTPATIENT
Start: 2021-05-05 | End: 2022-02-17 | Stop reason: SDUPTHER

## 2021-05-18 ENCOUNTER — PATIENT MESSAGE (OUTPATIENT)
Dept: INTERNAL MEDICINE | Facility: CLINIC | Age: 81
End: 2021-05-18

## 2021-05-25 ENCOUNTER — PATIENT MESSAGE (OUTPATIENT)
Dept: INTERNAL MEDICINE | Facility: CLINIC | Age: 81
End: 2021-05-25

## 2021-05-25 RX ORDER — PROMETHAZINE HYDROCHLORIDE AND CODEINE PHOSPHATE 6.25; 1 MG/5ML; MG/5ML
5 SOLUTION ORAL EVERY 4 HOURS PRN
Qty: 120 ML | Refills: 0 | Status: SHIPPED | OUTPATIENT
Start: 2021-05-25 | End: 2021-06-04

## 2021-05-31 ENCOUNTER — PATIENT OUTREACH (OUTPATIENT)
Dept: ADMINISTRATIVE | Facility: OTHER | Age: 81
End: 2021-05-31

## 2021-06-02 ENCOUNTER — OFFICE VISIT (OUTPATIENT)
Dept: SPORTS MEDICINE | Facility: CLINIC | Age: 81
End: 2021-06-02
Payer: MEDICARE

## 2021-06-02 ENCOUNTER — HOSPITAL ENCOUNTER (OUTPATIENT)
Dept: RADIOLOGY | Facility: HOSPITAL | Age: 81
Discharge: HOME | End: 2021-06-02
Attending: ORTHOPAEDIC SURGERY
Payer: MEDICARE

## 2021-06-02 VITALS
HEART RATE: 78 BPM | SYSTOLIC BLOOD PRESSURE: 128 MMHG | BODY MASS INDEX: 22.93 KG/M2 | HEIGHT: 73 IN | DIASTOLIC BLOOD PRESSURE: 74 MMHG | WEIGHT: 173 LBS

## 2021-06-02 DIAGNOSIS — G89.29 CHRONIC LEFT SHOULDER PAIN: Primary | ICD-10-CM

## 2021-06-02 DIAGNOSIS — M25.512 CHRONIC LEFT SHOULDER PAIN: Primary | ICD-10-CM

## 2021-06-02 DIAGNOSIS — M25.562 LEFT KNEE PAIN, UNSPECIFIED CHRONICITY: ICD-10-CM

## 2021-06-02 PROCEDURE — 99999 PR PBB SHADOW E&M-EST. PATIENT-LVL IV: CPT | Mod: PBBFAC,,, | Performed by: ORTHOPAEDIC SURGERY

## 2021-06-02 PROCEDURE — 1126F AMNT PAIN NOTED NONE PRSNT: CPT | Mod: S$GLB,,, | Performed by: ORTHOPAEDIC SURGERY

## 2021-06-02 PROCEDURE — 3288F PR FALLS RISK ASSESSMENT DOCUMENTED: ICD-10-PCS | Mod: CPTII,S$GLB,, | Performed by: ORTHOPAEDIC SURGERY

## 2021-06-02 PROCEDURE — 99214 OFFICE O/P EST MOD 30 MIN: CPT | Mod: S$GLB,,, | Performed by: ORTHOPAEDIC SURGERY

## 2021-06-02 PROCEDURE — 99999 PR PBB SHADOW E&M-EST. PATIENT-LVL IV: ICD-10-PCS | Mod: PBBFAC,,, | Performed by: ORTHOPAEDIC SURGERY

## 2021-06-02 PROCEDURE — 73030 XR SHOULDER COMPLETE 2 OR MORE VIEWS LEFT: ICD-10-PCS | Mod: 26,LT,, | Performed by: RADIOLOGY

## 2021-06-02 PROCEDURE — 1101F PR PT FALLS ASSESS DOC 0-1 FALLS W/OUT INJ PAST YR: ICD-10-PCS | Mod: CPTII,S$GLB,, | Performed by: ORTHOPAEDIC SURGERY

## 2021-06-02 PROCEDURE — 1159F PR MEDICATION LIST DOCUMENTED IN MEDICAL RECORD: ICD-10-PCS | Mod: S$GLB,,, | Performed by: ORTHOPAEDIC SURGERY

## 2021-06-02 PROCEDURE — 3288F FALL RISK ASSESSMENT DOCD: CPT | Mod: CPTII,S$GLB,, | Performed by: ORTHOPAEDIC SURGERY

## 2021-06-02 PROCEDURE — 1159F MED LIST DOCD IN RCRD: CPT | Mod: S$GLB,,, | Performed by: ORTHOPAEDIC SURGERY

## 2021-06-02 PROCEDURE — 73030 X-RAY EXAM OF SHOULDER: CPT | Mod: 26,LT,, | Performed by: RADIOLOGY

## 2021-06-02 PROCEDURE — 73030 X-RAY EXAM OF SHOULDER: CPT | Mod: TC,LT

## 2021-06-02 PROCEDURE — 99214 PR OFFICE/OUTPT VISIT, EST, LEVL IV, 30-39 MIN: ICD-10-PCS | Mod: S$GLB,,, | Performed by: ORTHOPAEDIC SURGERY

## 2021-06-02 PROCEDURE — 1126F PR PAIN SEVERITY QUANTIFIED, NO PAIN PRESENT: ICD-10-PCS | Mod: S$GLB,,, | Performed by: ORTHOPAEDIC SURGERY

## 2021-06-02 PROCEDURE — 1101F PT FALLS ASSESS-DOCD LE1/YR: CPT | Mod: CPTII,S$GLB,, | Performed by: ORTHOPAEDIC SURGERY

## 2021-06-14 ENCOUNTER — CLINICAL SUPPORT (OUTPATIENT)
Dept: REHABILITATION | Facility: HOSPITAL | Age: 81
End: 2021-06-14
Payer: MEDICARE

## 2021-06-14 DIAGNOSIS — S49.92XA ARM INJURIES, LEFT, INITIAL ENCOUNTER: ICD-10-CM

## 2021-06-14 DIAGNOSIS — M25.512 CHRONIC LEFT SHOULDER PAIN: ICD-10-CM

## 2021-06-14 DIAGNOSIS — G89.29 CHRONIC LEFT SHOULDER PAIN: ICD-10-CM

## 2021-06-14 PROCEDURE — 97161 PT EVAL LOW COMPLEX 20 MIN: CPT | Performed by: PHYSICAL THERAPIST

## 2021-06-14 PROCEDURE — 97110 THERAPEUTIC EXERCISES: CPT | Performed by: PHYSICAL THERAPIST

## 2021-06-25 ENCOUNTER — OFFICE VISIT (OUTPATIENT)
Dept: DERMATOLOGY | Facility: CLINIC | Age: 81
End: 2021-06-25
Payer: MEDICARE

## 2021-06-25 VITALS — BODY MASS INDEX: 22.82 KG/M2 | WEIGHT: 173 LBS

## 2021-06-25 DIAGNOSIS — L81.4 LENTIGINES: ICD-10-CM

## 2021-06-25 DIAGNOSIS — L82.1 SEBORRHEIC KERATOSES: ICD-10-CM

## 2021-06-25 DIAGNOSIS — Z85.820 HISTORY OF MELANOMA EXCISION: ICD-10-CM

## 2021-06-25 DIAGNOSIS — L57.0 ACTINIC KERATOSIS: Primary | ICD-10-CM

## 2021-06-25 DIAGNOSIS — Z98.890 HISTORY OF MELANOMA EXCISION: ICD-10-CM

## 2021-06-25 DIAGNOSIS — Z85.828 HISTORY OF SKIN CANCER: ICD-10-CM

## 2021-06-25 PROCEDURE — 1159F PR MEDICATION LIST DOCUMENTED IN MEDICAL RECORD: ICD-10-PCS | Mod: S$GLB,,, | Performed by: DERMATOLOGY

## 2021-06-25 PROCEDURE — 1159F MED LIST DOCD IN RCRD: CPT | Mod: S$GLB,,, | Performed by: DERMATOLOGY

## 2021-06-25 PROCEDURE — 1101F PR PT FALLS ASSESS DOC 0-1 FALLS W/OUT INJ PAST YR: ICD-10-PCS | Mod: CPTII,S$GLB,, | Performed by: DERMATOLOGY

## 2021-06-25 PROCEDURE — 17000 PR DESTRUCTION(LASER SURGERY,CRYOSURGERY,CHEMOSURGERY),PREMALIGNANT LESIONS,FIRST LESION: ICD-10-PCS | Mod: S$GLB,,, | Performed by: DERMATOLOGY

## 2021-06-25 PROCEDURE — 1126F AMNT PAIN NOTED NONE PRSNT: CPT | Mod: S$GLB,,, | Performed by: DERMATOLOGY

## 2021-06-25 PROCEDURE — 3288F FALL RISK ASSESSMENT DOCD: CPT | Mod: CPTII,S$GLB,, | Performed by: DERMATOLOGY

## 2021-06-25 PROCEDURE — 3288F PR FALLS RISK ASSESSMENT DOCUMENTED: ICD-10-PCS | Mod: CPTII,S$GLB,, | Performed by: DERMATOLOGY

## 2021-06-25 PROCEDURE — 17000 DESTRUCT PREMALG LESION: CPT | Mod: S$GLB,,, | Performed by: DERMATOLOGY

## 2021-06-25 PROCEDURE — 1101F PT FALLS ASSESS-DOCD LE1/YR: CPT | Mod: CPTII,S$GLB,, | Performed by: DERMATOLOGY

## 2021-06-25 PROCEDURE — 99999 PR PBB SHADOW E&M-EST. PATIENT-LVL III: CPT | Mod: PBBFAC,,, | Performed by: DERMATOLOGY

## 2021-06-25 PROCEDURE — 99999 PR PBB SHADOW E&M-EST. PATIENT-LVL III: ICD-10-PCS | Mod: PBBFAC,,, | Performed by: DERMATOLOGY

## 2021-06-25 PROCEDURE — 1126F PR PAIN SEVERITY QUANTIFIED, NO PAIN PRESENT: ICD-10-PCS | Mod: S$GLB,,, | Performed by: DERMATOLOGY

## 2021-06-25 PROCEDURE — 99213 PR OFFICE/OUTPT VISIT, EST, LEVL III, 20-29 MIN: ICD-10-PCS | Mod: 25,S$GLB,, | Performed by: DERMATOLOGY

## 2021-06-25 PROCEDURE — 99213 OFFICE O/P EST LOW 20 MIN: CPT | Mod: 25,S$GLB,, | Performed by: DERMATOLOGY

## 2021-07-19 DIAGNOSIS — I77.1 STRICTURE OF ARTERY: ICD-10-CM

## 2021-07-19 DIAGNOSIS — I65.29 STENOSIS OF CAROTID ARTERY, UNSPECIFIED LATERALITY: Primary | ICD-10-CM

## 2021-07-29 DIAGNOSIS — Z01.818 PRE-OP TESTING: Primary | ICD-10-CM

## 2021-08-03 ENCOUNTER — PATIENT OUTREACH (OUTPATIENT)
Dept: ADMINISTRATIVE | Facility: OTHER | Age: 81
End: 2021-08-03

## 2021-08-04 ENCOUNTER — OFFICE VISIT (OUTPATIENT)
Dept: SPORTS MEDICINE | Facility: CLINIC | Age: 81
End: 2021-08-04
Payer: MEDICARE

## 2021-08-04 VITALS — TEMPERATURE: 98 F | BODY MASS INDEX: 23.33 KG/M2 | WEIGHT: 176 LBS | HEIGHT: 73 IN

## 2021-08-04 DIAGNOSIS — M25.512 CHRONIC LEFT SHOULDER PAIN: Primary | ICD-10-CM

## 2021-08-04 DIAGNOSIS — G89.29 CHRONIC LEFT SHOULDER PAIN: Primary | ICD-10-CM

## 2021-08-04 PROCEDURE — 1101F PT FALLS ASSESS-DOCD LE1/YR: CPT | Mod: CPTII,S$GLB,, | Performed by: ORTHOPAEDIC SURGERY

## 2021-08-04 PROCEDURE — 99999 PR PBB SHADOW E&M-EST. PATIENT-LVL III: ICD-10-PCS | Mod: PBBFAC,,, | Performed by: ORTHOPAEDIC SURGERY

## 2021-08-04 PROCEDURE — 3288F PR FALLS RISK ASSESSMENT DOCUMENTED: ICD-10-PCS | Mod: CPTII,S$GLB,, | Performed by: ORTHOPAEDIC SURGERY

## 2021-08-04 PROCEDURE — 1101F PR PT FALLS ASSESS DOC 0-1 FALLS W/OUT INJ PAST YR: ICD-10-PCS | Mod: CPTII,S$GLB,, | Performed by: ORTHOPAEDIC SURGERY

## 2021-08-04 PROCEDURE — 99999 PR PBB SHADOW E&M-EST. PATIENT-LVL III: CPT | Mod: PBBFAC,,, | Performed by: ORTHOPAEDIC SURGERY

## 2021-08-04 PROCEDURE — 1159F PR MEDICATION LIST DOCUMENTED IN MEDICAL RECORD: ICD-10-PCS | Mod: CPTII,S$GLB,, | Performed by: ORTHOPAEDIC SURGERY

## 2021-08-04 PROCEDURE — 99214 PR OFFICE/OUTPT VISIT, EST, LEVL IV, 30-39 MIN: ICD-10-PCS | Mod: S$GLB,,, | Performed by: ORTHOPAEDIC SURGERY

## 2021-08-04 PROCEDURE — 1125F PR PAIN SEVERITY QUANTIFIED, PAIN PRESENT: ICD-10-PCS | Mod: CPTII,S$GLB,, | Performed by: ORTHOPAEDIC SURGERY

## 2021-08-04 PROCEDURE — 99214 OFFICE O/P EST MOD 30 MIN: CPT | Mod: S$GLB,,, | Performed by: ORTHOPAEDIC SURGERY

## 2021-08-04 PROCEDURE — 1159F MED LIST DOCD IN RCRD: CPT | Mod: CPTII,S$GLB,, | Performed by: ORTHOPAEDIC SURGERY

## 2021-08-04 PROCEDURE — 3288F FALL RISK ASSESSMENT DOCD: CPT | Mod: CPTII,S$GLB,, | Performed by: ORTHOPAEDIC SURGERY

## 2021-08-04 PROCEDURE — 1125F AMNT PAIN NOTED PAIN PRSNT: CPT | Mod: CPTII,S$GLB,, | Performed by: ORTHOPAEDIC SURGERY

## 2021-09-06 ENCOUNTER — PATIENT MESSAGE (OUTPATIENT)
Dept: INTERNAL MEDICINE | Facility: CLINIC | Age: 81
End: 2021-09-06

## 2021-09-07 RX ORDER — OMEPRAZOLE 20 MG/1
20 CAPSULE, DELAYED RELEASE ORAL DAILY
Qty: 90 CAPSULE | Refills: 3 | Status: SHIPPED | OUTPATIENT
Start: 2021-09-07 | End: 2021-12-16

## 2021-09-07 RX ORDER — ATENOLOL 50 MG/1
50 TABLET ORAL DAILY
Qty: 90 TABLET | Refills: 3 | Status: SHIPPED | OUTPATIENT
Start: 2021-09-07 | End: 2022-07-14

## 2021-09-07 RX ORDER — TRIAMTERENE/HYDROCHLOROTHIAZID 37.5-25 MG
1 TABLET ORAL DAILY
Qty: 90 TABLET | Refills: 3 | Status: SHIPPED | OUTPATIENT
Start: 2021-09-07 | End: 2022-07-14

## 2021-09-07 RX ORDER — ROSUVASTATIN CALCIUM 20 MG/1
20 TABLET, COATED ORAL DAILY
Qty: 90 TABLET | Refills: 3 | Status: SHIPPED | OUTPATIENT
Start: 2021-09-07 | End: 2022-07-14

## 2021-09-10 ENCOUNTER — TELEPHONE (OUTPATIENT)
Dept: VASCULAR SURGERY | Facility: CLINIC | Age: 81
End: 2021-09-10

## 2021-09-14 DIAGNOSIS — Z01.818 PRE-OP TESTING: Primary | ICD-10-CM

## 2021-09-27 DIAGNOSIS — Z01.818 PRE-OP TESTING: Primary | ICD-10-CM

## 2021-10-12 ENCOUNTER — LAB VISIT (OUTPATIENT)
Dept: SPORTS MEDICINE | Facility: CLINIC | Age: 81
DRG: 039 | End: 2021-10-12
Payer: MEDICARE

## 2021-10-12 ENCOUNTER — TELEPHONE (OUTPATIENT)
Dept: VASCULAR SURGERY | Facility: CLINIC | Age: 81
End: 2021-10-12

## 2021-10-12 DIAGNOSIS — Z01.818 PRE-OP TESTING: ICD-10-CM

## 2021-10-12 PROCEDURE — U0003 INFECTIOUS AGENT DETECTION BY NUCLEIC ACID (DNA OR RNA); SEVERE ACUTE RESPIRATORY SYNDROME CORONAVIRUS 2 (SARS-COV-2) (CORONAVIRUS DISEASE [COVID-19]), AMPLIFIED PROBE TECHNIQUE, MAKING USE OF HIGH THROUGHPUT TECHNOLOGIES AS DESCRIBED BY CMS-2020-01-R: HCPCS | Performed by: SURGERY

## 2021-10-12 PROCEDURE — U0005 INFEC AGEN DETEC AMPLI PROBE: HCPCS | Performed by: SURGERY

## 2021-10-13 ENCOUNTER — HOSPITAL ENCOUNTER (OUTPATIENT)
Dept: RADIOLOGY | Facility: HOSPITAL | Age: 81
Discharge: HOME | DRG: 039 | End: 2021-10-13
Attending: SURGERY
Payer: MEDICARE

## 2021-10-13 ENCOUNTER — DOCUMENTATION ONLY (OUTPATIENT)
Dept: VASCULAR SURGERY | Facility: CLINIC | Age: 81
End: 2021-10-13

## 2021-10-13 ENCOUNTER — HOSPITAL ENCOUNTER (OUTPATIENT)
Dept: CARDIOLOGY | Facility: CLINIC | Age: 81
Discharge: HOME | DRG: 039 | End: 2021-10-13
Payer: MEDICARE

## 2021-10-13 ENCOUNTER — HOSPITAL ENCOUNTER (OUTPATIENT)
Dept: VASCULAR SURGERY | Facility: CLINIC | Age: 81
Discharge: HOME | DRG: 039 | End: 2021-10-13
Attending: SURGERY
Payer: MEDICARE

## 2021-10-13 ENCOUNTER — OFFICE VISIT (OUTPATIENT)
Dept: VASCULAR SURGERY | Facility: CLINIC | Age: 81
DRG: 039 | End: 2021-10-13
Payer: MEDICARE

## 2021-10-13 VITALS
HEIGHT: 73 IN | DIASTOLIC BLOOD PRESSURE: 65 MMHG | SYSTOLIC BLOOD PRESSURE: 144 MMHG | TEMPERATURE: 99 F | HEART RATE: 73 BPM | BODY MASS INDEX: 23.09 KG/M2 | WEIGHT: 174.19 LBS

## 2021-10-13 DIAGNOSIS — I65.23 BILATERAL CAROTID ARTERY STENOSIS: ICD-10-CM

## 2021-10-13 DIAGNOSIS — I65.29 STENOSIS OF CAROTID ARTERY, UNSPECIFIED LATERALITY: ICD-10-CM

## 2021-10-13 DIAGNOSIS — I77.1 STRICTURE OF ARTERY: ICD-10-CM

## 2021-10-13 DIAGNOSIS — Z01.818 PRE-OP TESTING: ICD-10-CM

## 2021-10-13 DIAGNOSIS — Z01.818 PRE-OP TESTING: Primary | ICD-10-CM

## 2021-10-13 DIAGNOSIS — I65.29 STENOSIS OF CAROTID ARTERY, UNSPECIFIED LATERALITY: Primary | ICD-10-CM

## 2021-10-13 LAB
SARS-COV-2 RNA RESP QL NAA+PROBE: NOT DETECTED
SARS-COV-2- CYCLE NUMBER: NORMAL

## 2021-10-13 PROCEDURE — 1159F MED LIST DOCD IN RCRD: CPT | Mod: CPTII,S$GLB,, | Performed by: SURGERY

## 2021-10-13 PROCEDURE — 93010 ELECTROCARDIOGRAM REPORT: CPT | Mod: S$GLB,,, | Performed by: INTERNAL MEDICINE

## 2021-10-13 PROCEDURE — 1159F PR MEDICATION LIST DOCUMENTED IN MEDICAL RECORD: ICD-10-PCS | Mod: CPTII,S$GLB,, | Performed by: SURGERY

## 2021-10-13 PROCEDURE — 3288F FALL RISK ASSESSMENT DOCD: CPT | Mod: CPTII,S$GLB,, | Performed by: SURGERY

## 2021-10-13 PROCEDURE — 99999 PR PBB SHADOW E&M-EST. PATIENT-LVL III: ICD-10-PCS | Mod: PBBFAC,,, | Performed by: SURGERY

## 2021-10-13 PROCEDURE — 93010 EKG 12-LEAD: ICD-10-PCS | Mod: S$GLB,,, | Performed by: INTERNAL MEDICINE

## 2021-10-13 PROCEDURE — 3077F PR MOST RECENT SYSTOLIC BLOOD PRESSURE >= 140 MM HG: ICD-10-PCS | Mod: CPTII,S$GLB,, | Performed by: SURGERY

## 2021-10-13 PROCEDURE — 1101F PR PT FALLS ASSESS DOC 0-1 FALLS W/OUT INJ PAST YR: ICD-10-PCS | Mod: CPTII,S$GLB,, | Performed by: SURGERY

## 2021-10-13 PROCEDURE — 93880 EXTRACRANIAL BILAT STUDY: CPT | Mod: S$GLB,,, | Performed by: SURGERY

## 2021-10-13 PROCEDURE — 71046 X-RAY EXAM CHEST 2 VIEWS: CPT | Mod: 26,,, | Performed by: RADIOLOGY

## 2021-10-13 PROCEDURE — 3078F PR MOST RECENT DIASTOLIC BLOOD PRESSURE < 80 MM HG: ICD-10-PCS | Mod: CPTII,S$GLB,, | Performed by: SURGERY

## 2021-10-13 PROCEDURE — 93005 EKG 12-LEAD: ICD-10-PCS | Mod: S$GLB,,, | Performed by: SURGERY

## 2021-10-13 PROCEDURE — 3077F SYST BP >= 140 MM HG: CPT | Mod: CPTII,S$GLB,, | Performed by: SURGERY

## 2021-10-13 PROCEDURE — 99999 PR PBB SHADOW E&M-EST. PATIENT-LVL III: CPT | Mod: PBBFAC,,, | Performed by: SURGERY

## 2021-10-13 PROCEDURE — 93005 ELECTROCARDIOGRAM TRACING: CPT | Mod: S$GLB,,, | Performed by: SURGERY

## 2021-10-13 PROCEDURE — 1126F AMNT PAIN NOTED NONE PRSNT: CPT | Mod: CPTII,S$GLB,, | Performed by: SURGERY

## 2021-10-13 PROCEDURE — 93880 PR DUPLEX SCAN EXTRACRANIAL,BILAT: ICD-10-PCS | Mod: S$GLB,,, | Performed by: SURGERY

## 2021-10-13 PROCEDURE — 99214 PR OFFICE/OUTPT VISIT, EST, LEVL IV, 30-39 MIN: ICD-10-PCS | Mod: S$GLB,,, | Performed by: SURGERY

## 2021-10-13 PROCEDURE — 3288F PR FALLS RISK ASSESSMENT DOCUMENTED: ICD-10-PCS | Mod: CPTII,S$GLB,, | Performed by: SURGERY

## 2021-10-13 PROCEDURE — 71046 XR CHEST PA AND LATERAL: ICD-10-PCS | Mod: 26,,, | Performed by: RADIOLOGY

## 2021-10-13 PROCEDURE — 3078F DIAST BP <80 MM HG: CPT | Mod: CPTII,S$GLB,, | Performed by: SURGERY

## 2021-10-13 PROCEDURE — 99214 OFFICE O/P EST MOD 30 MIN: CPT | Mod: S$GLB,,, | Performed by: SURGERY

## 2021-10-13 PROCEDURE — 71046 X-RAY EXAM CHEST 2 VIEWS: CPT | Mod: TC

## 2021-10-13 PROCEDURE — 99499 UNLISTED E&M SERVICE: CPT | Mod: S$GLB,,, | Performed by: SURGERY

## 2021-10-13 PROCEDURE — 99499 RISK ADDL DX/OHS AUDIT: ICD-10-PCS | Mod: S$GLB,,, | Performed by: SURGERY

## 2021-10-13 PROCEDURE — 1101F PT FALLS ASSESS-DOCD LE1/YR: CPT | Mod: CPTII,S$GLB,, | Performed by: SURGERY

## 2021-10-13 PROCEDURE — 1126F PR PAIN SEVERITY QUANTIFIED, NO PAIN PRESENT: ICD-10-PCS | Mod: CPTII,S$GLB,, | Performed by: SURGERY

## 2021-10-14 ENCOUNTER — TELEPHONE (OUTPATIENT)
Dept: VASCULAR SURGERY | Facility: CLINIC | Age: 81
End: 2021-10-14

## 2021-10-15 ENCOUNTER — HOSPITAL ENCOUNTER (INPATIENT)
Facility: HOSPITAL | Age: 81
LOS: 1 days | Discharge: HOME | DRG: 039 | End: 2021-10-16
Attending: SURGERY | Admitting: SURGERY
Payer: MEDICARE

## 2021-10-15 DIAGNOSIS — I65.21 STENOSIS OF RIGHT CAROTID ARTERY: Primary | ICD-10-CM

## 2021-10-15 DIAGNOSIS — I65.29 CAROTID ARTERY STENOSIS: ICD-10-CM

## 2021-10-15 LAB
POC ACTIVATED CLOTTING TIME K: 125 SEC (ref 74–137)
POC ACTIVATED CLOTTING TIME K: 208 SEC (ref 74–137)
POC ACTIVATED CLOTTING TIME K: 213 SEC (ref 74–137)
POC ACTIVATED CLOTTING TIME K: 224 SEC (ref 74–137)
POC ACTIVATED CLOTTING TIME K: 241 SEC (ref 74–137)
SAMPLE: ABNORMAL
SAMPLE: NORMAL

## 2021-10-15 PROCEDURE — 20600001 HC STEP DOWN PRIVATE ROOM

## 2021-10-15 PROCEDURE — 25000003 PHARM REV CODE 250: Performed by: STUDENT IN AN ORGANIZED HEALTH CARE EDUCATION/TRAINING PROGRAM

## 2021-10-15 PROCEDURE — 36620 INSERTION CATHETER ARTERY: CPT | Mod: 59,,, | Performed by: ANESTHESIOLOGY

## 2021-10-15 PROCEDURE — 63600175 PHARM REV CODE 636 W HCPCS: Performed by: STUDENT IN AN ORGANIZED HEALTH CARE EDUCATION/TRAINING PROGRAM

## 2021-10-15 PROCEDURE — 71000033 HC RECOVERY, INTIAL HOUR: Performed by: SURGERY

## 2021-10-15 PROCEDURE — 36000707: Performed by: SURGERY

## 2021-10-15 PROCEDURE — 36000706: Performed by: SURGERY

## 2021-10-15 PROCEDURE — 37000008 HC ANESTHESIA 1ST 15 MINUTES: Performed by: SURGERY

## 2021-10-15 PROCEDURE — 27201037 HC PRESSURE MONITORING SET UP

## 2021-10-15 PROCEDURE — 63600175 PHARM REV CODE 636 W HCPCS: Performed by: ANESTHESIOLOGY

## 2021-10-15 PROCEDURE — 71000016 HC POSTOP RECOV ADDL HR: Performed by: SURGERY

## 2021-10-15 PROCEDURE — 25000003 PHARM REV CODE 250: Performed by: SURGERY

## 2021-10-15 PROCEDURE — 35301 RECHANNELING OF ARTERY: CPT | Mod: RT,,, | Performed by: SURGERY

## 2021-10-15 PROCEDURE — 63600175 PHARM REV CODE 636 W HCPCS: Performed by: SURGERY

## 2021-10-15 PROCEDURE — 94799 UNLISTED PULMONARY SVC/PX: CPT

## 2021-10-15 PROCEDURE — C1768 GRAFT, VASCULAR: HCPCS | Performed by: SURGERY

## 2021-10-15 PROCEDURE — C1729 CATH, DRAINAGE: HCPCS | Performed by: SURGERY

## 2021-10-15 PROCEDURE — 35301 PR THROMBOENDARTECTMY NECK,NECK INCIS: ICD-10-PCS | Mod: RT,,, | Performed by: SURGERY

## 2021-10-15 PROCEDURE — D9220A PRA ANESTHESIA: Mod: ,,, | Performed by: ANESTHESIOLOGY

## 2021-10-15 PROCEDURE — 71000015 HC POSTOP RECOV 1ST HR: Performed by: SURGERY

## 2021-10-15 PROCEDURE — 37000009 HC ANESTHESIA EA ADD 15 MINS: Performed by: SURGERY

## 2021-10-15 PROCEDURE — D9220A PRA ANESTHESIA: ICD-10-PCS | Mod: ,,, | Performed by: ANESTHESIOLOGY

## 2021-10-15 PROCEDURE — A4216 STERILE WATER/SALINE, 10 ML: HCPCS | Performed by: STUDENT IN AN ORGANIZED HEALTH CARE EDUCATION/TRAINING PROGRAM

## 2021-10-15 PROCEDURE — 27201423 OPTIME MED/SURG SUP & DEVICES STERILE SUPPLY: Performed by: SURGERY

## 2021-10-15 PROCEDURE — 36620 ARTERIAL: ICD-10-PCS | Mod: 59,,, | Performed by: ANESTHESIOLOGY

## 2021-10-15 PROCEDURE — 94761 N-INVAS EAR/PLS OXIMETRY MLT: CPT

## 2021-10-15 DEVICE — GRAFT VAS GUARD 0.8X8CM BOVINE: Type: IMPLANTABLE DEVICE | Site: CAROTID | Status: FUNCTIONAL

## 2021-10-15 RX ORDER — LIDOCAINE HYDROCHLORIDE 10 MG/ML
1 INJECTION, SOLUTION EPIDURAL; INFILTRATION; INTRACAUDAL; PERINEURAL ONCE
Status: DISCONTINUED | OUTPATIENT
Start: 2021-10-15 | End: 2021-10-15 | Stop reason: HOSPADM

## 2021-10-15 RX ORDER — LIDOCAINE HYDROCHLORIDE 10 MG/ML
INJECTION, SOLUTION EPIDURAL; INFILTRATION; INTRACAUDAL; PERINEURAL
Status: DISCONTINUED | OUTPATIENT
Start: 2021-10-15 | End: 2021-10-15 | Stop reason: HOSPADM

## 2021-10-15 RX ORDER — CEFAZOLIN SODIUM 1 G/3ML
2 INJECTION, POWDER, FOR SOLUTION INTRAMUSCULAR; INTRAVENOUS
Status: COMPLETED | OUTPATIENT
Start: 2021-10-15 | End: 2021-10-16

## 2021-10-15 RX ORDER — OXYCODONE HYDROCHLORIDE 5 MG/1
5 TABLET ORAL EVERY 4 HOURS PRN
Status: DISCONTINUED | OUTPATIENT
Start: 2021-10-15 | End: 2021-10-16 | Stop reason: HOSPADM

## 2021-10-15 RX ORDER — HEPARIN SODIUM 1000 [USP'U]/ML
INJECTION, SOLUTION INTRAVENOUS; SUBCUTANEOUS
Status: DISCONTINUED | OUTPATIENT
Start: 2021-10-15 | End: 2021-10-15

## 2021-10-15 RX ORDER — MUPIROCIN 20 MG/G
OINTMENT TOPICAL 2 TIMES DAILY
Status: DISCONTINUED | OUTPATIENT
Start: 2021-10-15 | End: 2021-10-16 | Stop reason: HOSPADM

## 2021-10-15 RX ORDER — ONDANSETRON 2 MG/ML
4 INJECTION INTRAMUSCULAR; INTRAVENOUS EVERY 12 HOURS PRN
Status: DISCONTINUED | OUTPATIENT
Start: 2021-10-15 | End: 2021-10-15 | Stop reason: HOSPADM

## 2021-10-15 RX ORDER — LIDOCAINE HYDROCHLORIDE 20 MG/ML
INJECTION, SOLUTION EPIDURAL; INFILTRATION; INTRACAUDAL; PERINEURAL
Status: DISCONTINUED | OUTPATIENT
Start: 2021-10-15 | End: 2021-10-15

## 2021-10-15 RX ORDER — ATORVASTATIN CALCIUM 20 MG/1
80 TABLET, FILM COATED ORAL DAILY
Status: CANCELLED | OUTPATIENT
Start: 2021-10-15

## 2021-10-15 RX ORDER — NAPROXEN SODIUM 220 MG/1
81 TABLET, FILM COATED ORAL DAILY
Status: CANCELLED | OUTPATIENT
Start: 2021-10-15

## 2021-10-15 RX ORDER — SODIUM CHLORIDE 0.9 % (FLUSH) 0.9 %
10 SYRINGE (ML) INJECTION
Status: DISCONTINUED | OUTPATIENT
Start: 2021-10-15 | End: 2021-10-16 | Stop reason: HOSPADM

## 2021-10-15 RX ORDER — PROTAMINE SULFATE 10 MG/ML
INJECTION, SOLUTION INTRAVENOUS
Status: DISCONTINUED | OUTPATIENT
Start: 2021-10-15 | End: 2021-10-15

## 2021-10-15 RX ORDER — ASPIRIN 81 MG/1
81 TABLET ORAL DAILY
Status: DISCONTINUED | OUTPATIENT
Start: 2021-10-15 | End: 2021-10-16 | Stop reason: HOSPADM

## 2021-10-15 RX ORDER — PANTOPRAZOLE SODIUM 40 MG/1
40 TABLET, DELAYED RELEASE ORAL DAILY
Refills: 3 | Status: DISCONTINUED | OUTPATIENT
Start: 2021-10-16 | End: 2021-10-16 | Stop reason: HOSPADM

## 2021-10-15 RX ORDER — ATORVASTATIN CALCIUM 20 MG/1
80 TABLET, FILM COATED ORAL DAILY
Status: DISCONTINUED | OUTPATIENT
Start: 2021-10-15 | End: 2021-10-16 | Stop reason: HOSPADM

## 2021-10-15 RX ORDER — HEPARIN SODIUM 1000 [USP'U]/ML
INJECTION, SOLUTION INTRAVENOUS; SUBCUTANEOUS
Status: DISCONTINUED | OUTPATIENT
Start: 2021-10-15 | End: 2021-10-15 | Stop reason: HOSPADM

## 2021-10-15 RX ORDER — ACETAMINOPHEN 325 MG/1
650 TABLET ORAL EVERY 4 HOURS PRN
Status: DISCONTINUED | OUTPATIENT
Start: 2021-10-15 | End: 2021-10-16 | Stop reason: HOSPADM

## 2021-10-15 RX ORDER — CEFAZOLIN SODIUM 1 G/3ML
INJECTION, POWDER, FOR SOLUTION INTRAMUSCULAR; INTRAVENOUS
Status: DISCONTINUED | OUTPATIENT
Start: 2021-10-15 | End: 2021-10-15

## 2021-10-15 RX ORDER — PROPOFOL 10 MG/ML
VIAL (ML) INTRAVENOUS CONTINUOUS PRN
Status: DISCONTINUED | OUTPATIENT
Start: 2021-10-15 | End: 2021-10-15

## 2021-10-15 RX ORDER — OXYCODONE HYDROCHLORIDE 10 MG/1
10 TABLET ORAL EVERY 4 HOURS PRN
Status: DISCONTINUED | OUTPATIENT
Start: 2021-10-15 | End: 2021-10-16 | Stop reason: HOSPADM

## 2021-10-15 RX ORDER — MUPIROCIN 20 MG/G
OINTMENT TOPICAL
Status: DISCONTINUED | OUTPATIENT
Start: 2021-10-15 | End: 2021-10-15 | Stop reason: HOSPADM

## 2021-10-15 RX ORDER — SODIUM CITRATE AND CITRIC ACID MONOHYDRATE 334; 500 MG/5ML; MG/5ML
30 SOLUTION ORAL
Status: COMPLETED | OUTPATIENT
Start: 2021-10-15 | End: 2021-10-15

## 2021-10-15 RX ORDER — DEXMEDETOMIDINE HYDROCHLORIDE 100 UG/ML
INJECTION, SOLUTION INTRAVENOUS
Status: DISCONTINUED | OUTPATIENT
Start: 2021-10-15 | End: 2021-10-15

## 2021-10-15 RX ORDER — HYDROMORPHONE HYDROCHLORIDE 1 MG/ML
0.2 INJECTION, SOLUTION INTRAMUSCULAR; INTRAVENOUS; SUBCUTANEOUS EVERY 5 MIN PRN
Status: DISCONTINUED | OUTPATIENT
Start: 2021-10-15 | End: 2021-10-15 | Stop reason: HOSPADM

## 2021-10-15 RX ORDER — FENTANYL CITRATE 50 UG/ML
INJECTION, SOLUTION INTRAMUSCULAR; INTRAVENOUS
Status: DISCONTINUED | OUTPATIENT
Start: 2021-10-15 | End: 2021-10-15

## 2021-10-15 RX ORDER — PROPOFOL 10 MG/ML
VIAL (ML) INTRAVENOUS
Status: DISCONTINUED | OUTPATIENT
Start: 2021-10-15 | End: 2021-10-15

## 2021-10-15 RX ORDER — ROPIVACAINE HYDROCHLORIDE 5 MG/ML
INJECTION, SOLUTION EPIDURAL; INFILTRATION; PERINEURAL
Status: COMPLETED | OUTPATIENT
Start: 2021-10-15 | End: 2021-10-15

## 2021-10-15 RX ORDER — ONDANSETRON 2 MG/ML
4 INJECTION INTRAMUSCULAR; INTRAVENOUS EVERY 6 HOURS PRN
Status: DISCONTINUED | OUTPATIENT
Start: 2021-10-15 | End: 2021-10-16 | Stop reason: HOSPADM

## 2021-10-15 RX ORDER — ACETAMINOPHEN 500 MG
1000 TABLET ORAL
Status: COMPLETED | OUTPATIENT
Start: 2021-10-15 | End: 2021-10-15

## 2021-10-15 RX ORDER — CLINDAMYCIN PHOSPHATE 900 MG/50ML
900 INJECTION, SOLUTION INTRAVENOUS
Status: DISCONTINUED | OUTPATIENT
Start: 2021-10-15 | End: 2021-10-15 | Stop reason: HOSPADM

## 2021-10-15 RX ORDER — ATENOLOL 50 MG/1
50 TABLET ORAL DAILY
Status: DISCONTINUED | OUTPATIENT
Start: 2021-10-16 | End: 2021-10-16 | Stop reason: HOSPADM

## 2021-10-15 RX ADMIN — FENTANYL CITRATE 50 MCG: 50 INJECTION INTRAMUSCULAR; INTRAVENOUS at 07:10

## 2021-10-15 RX ADMIN — PROTAMINE SULFATE 5 MG: 10 INJECTION, SOLUTION INTRAVENOUS at 09:10

## 2021-10-15 RX ADMIN — HEPARIN SODIUM 2000 UNITS: 1000 INJECTION, SOLUTION INTRAVENOUS; SUBCUTANEOUS at 08:10

## 2021-10-15 RX ADMIN — FENTANYL CITRATE 25 MCG: 50 INJECTION INTRAMUSCULAR; INTRAVENOUS at 07:10

## 2021-10-15 RX ADMIN — PROPOFOL 20 MG: 10 INJECTION, EMULSION INTRAVENOUS at 08:10

## 2021-10-15 RX ADMIN — LIDOCAINE HYDROCHLORIDE 20 MG: 20 INJECTION, SOLUTION EPIDURAL; INFILTRATION; INTRACAUDAL at 07:10

## 2021-10-15 RX ADMIN — HEPARIN SODIUM 7000 UNITS: 1000 INJECTION, SOLUTION INTRAVENOUS; SUBCUTANEOUS at 08:10

## 2021-10-15 RX ADMIN — CEFAZOLIN 2 G: 330 INJECTION, POWDER, FOR SOLUTION INTRAMUSCULAR; INTRAVENOUS at 07:10

## 2021-10-15 RX ADMIN — ACETAMINOPHEN 650 MG: 325 TABLET ORAL at 11:10

## 2021-10-15 RX ADMIN — DEXMEDETOMIDINE HYDROCHLORIDE 8 MCG: 100 INJECTION, SOLUTION, CONCENTRATE INTRAVENOUS at 07:10

## 2021-10-15 RX ADMIN — CEFAZOLIN 2 G: 330 INJECTION, POWDER, FOR SOLUTION INTRAMUSCULAR; INTRAVENOUS at 03:10

## 2021-10-15 RX ADMIN — HEPARIN SODIUM 1000 UNITS: 1000 INJECTION, SOLUTION INTRAVENOUS; SUBCUTANEOUS at 09:10

## 2021-10-15 RX ADMIN — MUPIROCIN: 20 OINTMENT TOPICAL at 11:10

## 2021-10-15 RX ADMIN — ACETAMINOPHEN 1000 MG: 500 TABLET ORAL at 06:10

## 2021-10-15 RX ADMIN — PROPOFOL 20 MG: 10 INJECTION, EMULSION INTRAVENOUS at 07:10

## 2021-10-15 RX ADMIN — Medication 25 MCG/KG/MIN: at 08:10

## 2021-10-15 RX ADMIN — ACETAMINOPHEN 650 MG: 325 TABLET ORAL at 03:10

## 2021-10-15 RX ADMIN — MUPIROCIN: 20 OINTMENT TOPICAL at 08:10

## 2021-10-15 RX ADMIN — SODIUM CHLORIDE 0.25 MCG/KG/MIN: 9 INJECTION, SOLUTION INTRAVENOUS at 08:10

## 2021-10-15 RX ADMIN — PROPOFOL 20 MG: 10 INJECTION, EMULSION INTRAVENOUS at 09:10

## 2021-10-15 RX ADMIN — SODIUM CITRATE AND CITRIC ACID MONOHYDRATE 30 ML: 500; 334 SOLUTION ORAL at 06:10

## 2021-10-15 RX ADMIN — PROTAMINE SULFATE 20 MG: 10 INJECTION, SOLUTION INTRAVENOUS at 09:10

## 2021-10-15 RX ADMIN — ACETAMINOPHEN 650 MG: 325 TABLET ORAL at 08:10

## 2021-10-15 RX ADMIN — ATORVASTATIN CALCIUM 80 MG: 20 TABLET, FILM COATED ORAL at 08:10

## 2021-10-15 RX ADMIN — ROPIVACAINE HYDROCHLORIDE 30 ML: 5 INJECTION, SOLUTION EPIDURAL; INFILTRATION; PERINEURAL at 07:10

## 2021-10-15 RX ADMIN — ASPIRIN 81 MG: 81 TABLET, COATED ORAL at 11:10

## 2021-10-15 RX ADMIN — SODIUM CHLORIDE: 9 INJECTION, SOLUTION INTRAVENOUS at 06:10

## 2021-10-15 RX ADMIN — CEFAZOLIN 2 G: 330 INJECTION, POWDER, FOR SOLUTION INTRAMUSCULAR; INTRAVENOUS at 11:10

## 2021-10-15 RX ADMIN — DEXMEDETOMIDINE HYDROCHLORIDE 0.2 MCG/KG/HR: 100 INJECTION, SOLUTION, CONCENTRATE INTRAVENOUS at 07:10

## 2021-10-16 VITALS
HEIGHT: 73 IN | TEMPERATURE: 98 F | HEART RATE: 83 BPM | BODY MASS INDEX: 23.19 KG/M2 | RESPIRATION RATE: 18 BRPM | SYSTOLIC BLOOD PRESSURE: 135 MMHG | DIASTOLIC BLOOD PRESSURE: 53 MMHG | WEIGHT: 175 LBS | OXYGEN SATURATION: 99 %

## 2021-10-16 LAB
ANION GAP SERPL CALC-SCNC: 14 MMOL/L (ref 8–16)
BASOPHILS # BLD AUTO: 0.04 K/UL (ref 0–0.2)
BASOPHILS NFR BLD: 0.4 % (ref 0–1.9)
BUN SERPL-MCNC: 17 MG/DL (ref 8–23)
CALCIUM SERPL-MCNC: 8.4 MG/DL (ref 8.7–10.5)
CHLORIDE SERPL-SCNC: 108 MMOL/L (ref 95–110)
CO2 SERPL-SCNC: 19 MMOL/L (ref 23–29)
CREAT SERPL-MCNC: 1.2 MG/DL (ref 0.5–1.4)
DIFFERENTIAL METHOD: ABNORMAL
EOSINOPHIL # BLD AUTO: 0.2 K/UL (ref 0–0.5)
EOSINOPHIL NFR BLD: 1.5 % (ref 0–8)
ERYTHROCYTE [DISTWIDTH] IN BLOOD BY AUTOMATED COUNT: 13.2 % (ref 11.5–14.5)
EST. GFR  (AFRICAN AMERICAN): >60 ML/MIN/1.73 M^2
EST. GFR  (NON AFRICAN AMERICAN): 56.8 ML/MIN/1.73 M^2
GLUCOSE SERPL-MCNC: 115 MG/DL (ref 70–110)
HCT VFR BLD AUTO: 34.7 % (ref 40–54)
HGB BLD-MCNC: 11.6 G/DL (ref 14–18)
IMM GRANULOCYTES # BLD AUTO: 0.03 K/UL (ref 0–0.04)
IMM GRANULOCYTES NFR BLD AUTO: 0.3 % (ref 0–0.5)
LYMPHOCYTES # BLD AUTO: 2.8 K/UL (ref 1–4.8)
LYMPHOCYTES NFR BLD: 28.6 % (ref 18–48)
MCH RBC QN AUTO: 31.4 PG (ref 27–31)
MCHC RBC AUTO-ENTMCNC: 33.4 G/DL (ref 32–36)
MCV RBC AUTO: 94 FL (ref 82–98)
MONOCYTES # BLD AUTO: 0.9 K/UL (ref 0.3–1)
MONOCYTES NFR BLD: 8.9 % (ref 4–15)
NEUTROPHILS # BLD AUTO: 5.9 K/UL (ref 1.8–7.7)
NEUTROPHILS NFR BLD: 60.3 % (ref 38–73)
NRBC BLD-RTO: 0 /100 WBC
PLATELET # BLD AUTO: 177 K/UL (ref 150–450)
PMV BLD AUTO: 11.5 FL (ref 9.2–12.9)
POTASSIUM SERPL-SCNC: 3.5 MMOL/L (ref 3.5–5.1)
RBC # BLD AUTO: 3.69 M/UL (ref 4.6–6.2)
SODIUM SERPL-SCNC: 141 MMOL/L (ref 136–145)
WBC # BLD AUTO: 9.79 K/UL (ref 3.9–12.7)

## 2021-10-16 PROCEDURE — 25000003 PHARM REV CODE 250: Performed by: STUDENT IN AN ORGANIZED HEALTH CARE EDUCATION/TRAINING PROGRAM

## 2021-10-16 PROCEDURE — 80048 BASIC METABOLIC PNL TOTAL CA: CPT | Performed by: STUDENT IN AN ORGANIZED HEALTH CARE EDUCATION/TRAINING PROGRAM

## 2021-10-16 PROCEDURE — 25000003 PHARM REV CODE 250

## 2021-10-16 PROCEDURE — 85025 COMPLETE CBC W/AUTO DIFF WBC: CPT | Performed by: STUDENT IN AN ORGANIZED HEALTH CARE EDUCATION/TRAINING PROGRAM

## 2021-10-16 PROCEDURE — 63600175 PHARM REV CODE 636 W HCPCS: Performed by: STUDENT IN AN ORGANIZED HEALTH CARE EDUCATION/TRAINING PROGRAM

## 2021-10-16 PROCEDURE — 36415 COLL VENOUS BLD VENIPUNCTURE: CPT | Performed by: STUDENT IN AN ORGANIZED HEALTH CARE EDUCATION/TRAINING PROGRAM

## 2021-10-16 RX ORDER — HYDROCODONE BITARTRATE AND ACETAMINOPHEN 5; 325 MG/1; MG/1
1 TABLET ORAL EVERY 6 HOURS PRN
Qty: 15 TABLET | Refills: 0 | Status: SHIPPED | OUTPATIENT
Start: 2021-10-16 | End: 2021-12-16

## 2021-10-16 RX ORDER — HYDROCODONE BITARTRATE AND ACETAMINOPHEN 5; 325 MG/1; MG/1
1 TABLET ORAL EVERY 6 HOURS PRN
Qty: 15 TABLET | Refills: 0 | OUTPATIENT
Start: 2021-10-16 | End: 2021-10-16 | Stop reason: HOSPADM

## 2021-10-16 RX ORDER — HYDROCODONE BITARTRATE AND ACETAMINOPHEN 5; 325 MG/1; MG/1
1 TABLET ORAL EVERY 6 HOURS PRN
Qty: 15 TABLET | Refills: 0 | Status: SHIPPED | OUTPATIENT
Start: 2021-10-16 | End: 2021-10-16 | Stop reason: HOSPADM

## 2021-10-16 RX ORDER — POTASSIUM CHLORIDE 20 MEQ/1
40 TABLET, EXTENDED RELEASE ORAL ONCE
Status: COMPLETED | OUTPATIENT
Start: 2021-10-16 | End: 2021-10-16

## 2021-10-16 RX ORDER — HYDROCODONE BITARTRATE AND ACETAMINOPHEN 5; 325 MG/1; MG/1
1 TABLET ORAL EVERY 6 HOURS PRN
Qty: 15 TABLET | Refills: 0 | Status: SHIPPED | OUTPATIENT
Start: 2021-10-16 | End: 2021-10-16 | Stop reason: SDUPTHER

## 2021-10-16 RX ADMIN — ATENOLOL 50 MG: 50 TABLET ORAL at 08:10

## 2021-10-16 RX ADMIN — POTASSIUM CHLORIDE 40 MEQ: 1500 TABLET, EXTENDED RELEASE ORAL at 08:10

## 2021-10-16 RX ADMIN — ASPIRIN 81 MG: 81 TABLET, COATED ORAL at 08:10

## 2021-10-16 RX ADMIN — PANTOPRAZOLE SODIUM 40 MG: 40 TABLET, DELAYED RELEASE ORAL at 08:10

## 2021-10-16 RX ADMIN — MUPIROCIN: 20 OINTMENT TOPICAL at 08:10

## 2021-10-16 RX ADMIN — OXYCODONE 5 MG: 5 TABLET ORAL at 03:10

## 2021-10-16 RX ADMIN — OXYCODONE 10 MG: 10 TABLET ORAL at 08:10

## 2021-10-16 RX ADMIN — CEFAZOLIN 2 G: 330 INJECTION, POWDER, FOR SOLUTION INTRAMUSCULAR; INTRAVENOUS at 08:10

## 2021-10-18 DIAGNOSIS — I77.1 STRICTURE OF ARTERY: ICD-10-CM

## 2021-10-18 DIAGNOSIS — Z98.890 S/P CAROTID ENDARTERECTOMY: Primary | ICD-10-CM

## 2021-11-22 ENCOUNTER — HOSPITAL ENCOUNTER (OUTPATIENT)
Dept: VASCULAR SURGERY | Facility: CLINIC | Age: 81
Discharge: HOME | End: 2021-11-22
Attending: SURGERY
Payer: MEDICARE

## 2021-11-22 DIAGNOSIS — I77.1 STRICTURE OF ARTERY: ICD-10-CM

## 2021-11-22 DIAGNOSIS — Z98.890 S/P CAROTID ENDARTERECTOMY: ICD-10-CM

## 2021-11-22 PROCEDURE — 93880 PR DUPLEX SCAN EXTRACRANIAL,BILAT: ICD-10-PCS | Mod: S$GLB,,, | Performed by: SURGERY

## 2021-11-22 PROCEDURE — 93880 EXTRACRANIAL BILAT STUDY: CPT | Mod: S$GLB,,, | Performed by: SURGERY

## 2021-11-24 ENCOUNTER — OFFICE VISIT (OUTPATIENT)
Dept: VASCULAR SURGERY | Facility: CLINIC | Age: 81
End: 2021-11-24
Payer: MEDICARE

## 2021-11-24 VITALS
SYSTOLIC BLOOD PRESSURE: 142 MMHG | DIASTOLIC BLOOD PRESSURE: 60 MMHG | WEIGHT: 171.94 LBS | HEIGHT: 73 IN | TEMPERATURE: 99 F | HEART RATE: 75 BPM | BODY MASS INDEX: 22.79 KG/M2

## 2021-11-24 DIAGNOSIS — Z98.890 S/P CAROTID ENDARTERECTOMY: Primary | ICD-10-CM

## 2021-11-24 DIAGNOSIS — R60.0 LEG EDEMA, LEFT: ICD-10-CM

## 2021-11-24 DIAGNOSIS — I77.1 STRICTURE OF ARTERY: ICD-10-CM

## 2021-11-24 DIAGNOSIS — I73.9 PAD (PERIPHERAL ARTERY DISEASE): ICD-10-CM

## 2021-11-24 PROCEDURE — 99999 PR PBB SHADOW E&M-EST. PATIENT-LVL III: CPT | Mod: PBBFAC,,, | Performed by: SURGERY

## 2021-11-24 PROCEDURE — 99499 UNLISTED E&M SERVICE: CPT | Mod: S$GLB,,, | Performed by: SURGERY

## 2021-11-24 PROCEDURE — 99024 PR POST-OP FOLLOW-UP VISIT: ICD-10-PCS | Mod: S$GLB,,, | Performed by: SURGERY

## 2021-11-24 PROCEDURE — 99999 PR PBB SHADOW E&M-EST. PATIENT-LVL III: ICD-10-PCS | Mod: PBBFAC,,, | Performed by: SURGERY

## 2021-11-24 PROCEDURE — 99499 RISK ADDL DX/OHS AUDIT: ICD-10-PCS | Mod: S$GLB,,, | Performed by: SURGERY

## 2021-11-24 PROCEDURE — 99024 POSTOP FOLLOW-UP VISIT: CPT | Mod: S$GLB,,, | Performed by: SURGERY

## 2021-11-30 ENCOUNTER — PATIENT OUTREACH (OUTPATIENT)
Dept: ADMINISTRATIVE | Facility: HOSPITAL | Age: 81
End: 2021-11-30
Payer: MEDICARE

## 2021-11-30 ENCOUNTER — PATIENT MESSAGE (OUTPATIENT)
Dept: ADMINISTRATIVE | Facility: HOSPITAL | Age: 81
End: 2021-11-30
Payer: MEDICARE

## 2021-12-01 ENCOUNTER — TELEPHONE (OUTPATIENT)
Dept: INTERNAL MEDICINE | Facility: CLINIC | Age: 81
End: 2021-12-01
Payer: MEDICARE

## 2021-12-01 ENCOUNTER — PATIENT OUTREACH (OUTPATIENT)
Dept: ADMINISTRATIVE | Facility: HOSPITAL | Age: 81
End: 2021-12-01
Payer: MEDICARE

## 2021-12-02 ENCOUNTER — TELEPHONE (OUTPATIENT)
Dept: INTERNAL MEDICINE | Facility: CLINIC | Age: 81
End: 2021-12-02
Payer: MEDICARE

## 2021-12-02 DIAGNOSIS — E78.5 DYSLIPIDEMIA: ICD-10-CM

## 2021-12-02 DIAGNOSIS — I10 HTN (HYPERTENSION), BENIGN: Primary | ICD-10-CM

## 2021-12-06 ENCOUNTER — PATIENT MESSAGE (OUTPATIENT)
Dept: INTERNAL MEDICINE | Facility: CLINIC | Age: 81
End: 2021-12-06
Payer: MEDICARE

## 2021-12-06 RX ORDER — TADALAFIL 20 MG/1
20 TABLET ORAL
Qty: 30 TABLET | Refills: 6 | Status: SHIPPED | OUTPATIENT
Start: 2021-12-06 | End: 2021-12-08

## 2021-12-07 ENCOUNTER — PATIENT OUTREACH (OUTPATIENT)
Dept: ADMINISTRATIVE | Facility: OTHER | Age: 81
End: 2021-12-07
Payer: MEDICARE

## 2021-12-08 ENCOUNTER — OFFICE VISIT (OUTPATIENT)
Dept: SPORTS MEDICINE | Facility: CLINIC | Age: 81
End: 2021-12-08
Payer: MEDICARE

## 2021-12-08 ENCOUNTER — PATIENT MESSAGE (OUTPATIENT)
Dept: INTERNAL MEDICINE | Facility: CLINIC | Age: 81
End: 2021-12-08
Payer: MEDICARE

## 2021-12-08 ENCOUNTER — LAB VISIT (OUTPATIENT)
Dept: LAB | Facility: HOSPITAL | Age: 81
End: 2021-12-08
Attending: INTERNAL MEDICINE
Payer: MEDICARE

## 2021-12-08 VITALS
SYSTOLIC BLOOD PRESSURE: 145 MMHG | DIASTOLIC BLOOD PRESSURE: 75 MMHG | BODY MASS INDEX: 22.66 KG/M2 | WEIGHT: 171 LBS | HEART RATE: 76 BPM | HEIGHT: 73 IN

## 2021-12-08 DIAGNOSIS — M25.512 CHRONIC LEFT SHOULDER PAIN: Primary | ICD-10-CM

## 2021-12-08 DIAGNOSIS — G89.29 CHRONIC LEFT SHOULDER PAIN: Primary | ICD-10-CM

## 2021-12-08 DIAGNOSIS — I10 HTN (HYPERTENSION), BENIGN: ICD-10-CM

## 2021-12-08 DIAGNOSIS — E78.5 DYSLIPIDEMIA: ICD-10-CM

## 2021-12-08 LAB
ALBUMIN SERPL BCP-MCNC: 4.1 G/DL (ref 3.5–5.2)
ALP SERPL-CCNC: 48 U/L (ref 55–135)
ALT SERPL W/O P-5'-P-CCNC: 10 U/L (ref 10–44)
ANION GAP SERPL CALC-SCNC: 13 MMOL/L (ref 8–16)
AST SERPL-CCNC: 20 U/L (ref 10–40)
BASOPHILS # BLD AUTO: 0.06 K/UL (ref 0–0.2)
BASOPHILS NFR BLD: 0.6 % (ref 0–1.9)
BILIRUB SERPL-MCNC: 0.8 MG/DL (ref 0.1–1)
BUN SERPL-MCNC: 33 MG/DL (ref 8–23)
CALCIUM SERPL-MCNC: 9.9 MG/DL (ref 8.7–10.5)
CHLORIDE SERPL-SCNC: 101 MMOL/L (ref 95–110)
CHOLEST SERPL-MCNC: 161 MG/DL (ref 120–199)
CHOLEST/HDLC SERPL: 2.9 {RATIO} (ref 2–5)
CO2 SERPL-SCNC: 25 MMOL/L (ref 23–29)
CREAT SERPL-MCNC: 1.6 MG/DL (ref 0.5–1.4)
DIFFERENTIAL METHOD: ABNORMAL
EOSINOPHIL # BLD AUTO: 0.3 K/UL (ref 0–0.5)
EOSINOPHIL NFR BLD: 2.4 % (ref 0–8)
ERYTHROCYTE [DISTWIDTH] IN BLOOD BY AUTOMATED COUNT: 12.9 % (ref 11.5–14.5)
EST. GFR  (AFRICAN AMERICAN): 46.3 ML/MIN/1.73 M^2
EST. GFR  (NON AFRICAN AMERICAN): 40.1 ML/MIN/1.73 M^2
GLUCOSE SERPL-MCNC: 110 MG/DL (ref 70–110)
HCT VFR BLD AUTO: 40.4 % (ref 40–54)
HDLC SERPL-MCNC: 56 MG/DL (ref 40–75)
HDLC SERPL: 34.8 % (ref 20–50)
HGB BLD-MCNC: 13.3 G/DL (ref 14–18)
IMM GRANULOCYTES # BLD AUTO: 0.04 K/UL (ref 0–0.04)
IMM GRANULOCYTES NFR BLD AUTO: 0.4 % (ref 0–0.5)
LDLC SERPL CALC-MCNC: 87.6 MG/DL (ref 63–159)
LYMPHOCYTES # BLD AUTO: 5.5 K/UL (ref 1–4.8)
LYMPHOCYTES NFR BLD: 52.6 % (ref 18–48)
MCH RBC QN AUTO: 32 PG (ref 27–31)
MCHC RBC AUTO-ENTMCNC: 32.9 G/DL (ref 32–36)
MCV RBC AUTO: 97 FL (ref 82–98)
MONOCYTES # BLD AUTO: 0.7 K/UL (ref 0.3–1)
MONOCYTES NFR BLD: 6.7 % (ref 4–15)
NEUTROPHILS # BLD AUTO: 3.9 K/UL (ref 1.8–7.7)
NEUTROPHILS NFR BLD: 37.3 % (ref 38–73)
NONHDLC SERPL-MCNC: 105 MG/DL
NRBC BLD-RTO: 0 /100 WBC
PLATELET # BLD AUTO: 279 K/UL (ref 150–450)
PMV BLD AUTO: 11.1 FL (ref 9.2–12.9)
POTASSIUM SERPL-SCNC: 4.1 MMOL/L (ref 3.5–5.1)
PROT SERPL-MCNC: 7.2 G/DL (ref 6–8.4)
RBC # BLD AUTO: 4.15 M/UL (ref 4.6–6.2)
SODIUM SERPL-SCNC: 139 MMOL/L (ref 136–145)
TRIGL SERPL-MCNC: 87 MG/DL (ref 30–150)
TSH SERPL DL<=0.005 MIU/L-ACNC: 1.54 UIU/ML (ref 0.4–4)
WBC # BLD AUTO: 10.4 K/UL (ref 3.9–12.7)

## 2021-12-08 PROCEDURE — 99214 OFFICE O/P EST MOD 30 MIN: CPT | Mod: S$GLB,,, | Performed by: ORTHOPAEDIC SURGERY

## 2021-12-08 PROCEDURE — 36415 COLL VENOUS BLD VENIPUNCTURE: CPT | Mod: PO | Performed by: INTERNAL MEDICINE

## 2021-12-08 PROCEDURE — 99999 PR PBB SHADOW E&M-EST. PATIENT-LVL III: CPT | Mod: PBBFAC,,, | Performed by: ORTHOPAEDIC SURGERY

## 2021-12-08 PROCEDURE — 80061 LIPID PANEL: CPT | Performed by: INTERNAL MEDICINE

## 2021-12-08 PROCEDURE — 99999 PR PBB SHADOW E&M-EST. PATIENT-LVL III: ICD-10-PCS | Mod: PBBFAC,,, | Performed by: ORTHOPAEDIC SURGERY

## 2021-12-08 PROCEDURE — 80053 COMPREHEN METABOLIC PANEL: CPT | Performed by: INTERNAL MEDICINE

## 2021-12-08 PROCEDURE — 85025 COMPLETE CBC W/AUTO DIFF WBC: CPT | Performed by: INTERNAL MEDICINE

## 2021-12-08 PROCEDURE — 84443 ASSAY THYROID STIM HORMONE: CPT | Performed by: INTERNAL MEDICINE

## 2021-12-08 PROCEDURE — 99214 PR OFFICE/OUTPT VISIT, EST, LEVL IV, 30-39 MIN: ICD-10-PCS | Mod: S$GLB,,, | Performed by: ORTHOPAEDIC SURGERY

## 2021-12-08 RX ORDER — TADALAFIL 5 MG/1
TABLET ORAL
Qty: 20 TABLET | Refills: 6 | Status: SHIPPED | OUTPATIENT
Start: 2021-12-08 | End: 2021-12-09 | Stop reason: SDUPTHER

## 2021-12-09 ENCOUNTER — IMMUNIZATION (OUTPATIENT)
Dept: INTERNAL MEDICINE | Facility: CLINIC | Age: 81
End: 2021-12-09
Payer: MEDICARE

## 2021-12-09 DIAGNOSIS — Z23 NEED FOR VACCINATION: Primary | ICD-10-CM

## 2021-12-09 PROCEDURE — 0004A COVID-19, MRNA, LNP-S, PF, 30 MCG/0.3 ML DOSE VACCINE: CPT | Mod: PBBFAC | Performed by: INTERNAL MEDICINE

## 2021-12-09 RX ORDER — TADALAFIL 5 MG/1
5 TABLET ORAL DAILY
Qty: 30 TABLET | Refills: 6 | Status: SHIPPED | OUTPATIENT
Start: 2021-12-09 | End: 2022-08-01 | Stop reason: SDUPTHER

## 2021-12-16 ENCOUNTER — OFFICE VISIT (OUTPATIENT)
Dept: INTERNAL MEDICINE | Facility: CLINIC | Age: 81
End: 2021-12-16
Payer: MEDICARE

## 2021-12-16 VITALS
WEIGHT: 170.44 LBS | OXYGEN SATURATION: 99 % | BODY MASS INDEX: 22.59 KG/M2 | SYSTOLIC BLOOD PRESSURE: 128 MMHG | DIASTOLIC BLOOD PRESSURE: 62 MMHG | TEMPERATURE: 98 F | HEIGHT: 73 IN | HEART RATE: 72 BPM

## 2021-12-16 DIAGNOSIS — Z85.828 HISTORY OF SKIN CANCER: ICD-10-CM

## 2021-12-16 DIAGNOSIS — I10 HTN (HYPERTENSION), BENIGN: ICD-10-CM

## 2021-12-16 DIAGNOSIS — K21.9 GASTROESOPHAGEAL REFLUX DISEASE, UNSPECIFIED WHETHER ESOPHAGITIS PRESENT: ICD-10-CM

## 2021-12-16 DIAGNOSIS — Z00.00 ANNUAL PHYSICAL EXAM: Primary | ICD-10-CM

## 2021-12-16 DIAGNOSIS — E78.2 MIXED HYPERLIPIDEMIA: ICD-10-CM

## 2021-12-16 PROCEDURE — 99214 OFFICE O/P EST MOD 30 MIN: CPT | Mod: GC,S$GLB,,

## 2021-12-16 PROCEDURE — 99214 PR OFFICE/OUTPT VISIT, EST, LEVL IV, 30-39 MIN: ICD-10-PCS | Mod: GC,S$GLB,,

## 2021-12-16 PROCEDURE — 99999 PR PBB SHADOW E&M-EST. PATIENT-LVL V: CPT | Mod: PBBFAC,GC,,

## 2021-12-16 PROCEDURE — 99999 PR PBB SHADOW E&M-EST. PATIENT-LVL V: ICD-10-PCS | Mod: PBBFAC,GC,,

## 2021-12-16 RX ORDER — OMEPRAZOLE 20 MG/1
40 CAPSULE, DELAYED RELEASE ORAL DAILY
Qty: 90 CAPSULE | Refills: 3 | Status: SHIPPED | OUTPATIENT
Start: 2021-12-16 | End: 2022-05-24 | Stop reason: SDUPTHER

## 2022-01-05 ENCOUNTER — HOSPITAL ENCOUNTER (OUTPATIENT)
Dept: RADIOLOGY | Facility: HOSPITAL | Age: 82
Discharge: HOME | End: 2022-01-05
Attending: STUDENT IN AN ORGANIZED HEALTH CARE EDUCATION/TRAINING PROGRAM
Payer: MEDICARE

## 2022-01-05 DIAGNOSIS — M25.512 CHRONIC LEFT SHOULDER PAIN: ICD-10-CM

## 2022-01-05 DIAGNOSIS — G89.29 CHRONIC LEFT SHOULDER PAIN: ICD-10-CM

## 2022-01-05 PROCEDURE — 73221 MRI SHOULDER WITHOUT CONTRAST LEFT: ICD-10-PCS | Mod: 26,LT,, | Performed by: RADIOLOGY

## 2022-01-05 PROCEDURE — 73221 MRI JOINT UPR EXTREM W/O DYE: CPT | Mod: TC,LT

## 2022-01-05 PROCEDURE — 73221 MRI JOINT UPR EXTREM W/O DYE: CPT | Mod: 26,LT,, | Performed by: RADIOLOGY

## 2022-01-07 ENCOUNTER — TELEPHONE (OUTPATIENT)
Dept: ORTHOPEDICS | Facility: HOSPITAL | Age: 82
End: 2022-01-07
Payer: MEDICARE

## 2022-01-07 NOTE — TELEPHONE ENCOUNTER
Called patient to discuss L shoulder MRI results.    MRI Left shoulder: Full thickness supraspinatus tear without atrophy or retraction. SLAP, biceps tendinitis.     ASSESSMENT:    Left Shoulder Rotator Cuff Tear, SLAP, biceps tendonitis.      he would benefit from an arthroscopy, given the above.       PLAN:   Left Shoulder rotator cuff tear     Had thorough discussion of non-operative vs operative intervention, and risks and benefits of both.     We have discussed the surgery and recovery of arthroscopic shoulder surgery. he understands that there may be limited mobility up to several weeks after surgery depending on procedures that are performed at the time of surgery.    The spectrum of treatment options were discussed with the patient, including nonoperative and operative options.  After thorough discussion, the patient has elected to not undergo surgical treatment to include:    left   a. Shoulder arthroscopic rotator cuff repair   b. Shoulder arthroscopic SAD   c. Shoulder arthroscopic extensive debridement   d. Shoulder arthroscopic biceps tenodesis (vs. subpect tenodesis)   e. Shoulder arthroscopic possible microfracture   f.  Shoulder arthroscopic possible subscapularis repair   g. Shoulder arthroscopic possible suprascapular nerve decompression    The details of the surgical procedure were explained, including the location of probable incisions and a description of likely hardware and/or grafts to be used.  The patient understands the likely convalescence after surgery.  Also, we have thoroughly discussed the risks, benefits and alternatives to surgery, including, but not limited to, the risk of infection, joint stiffness, blood clot (including DVT and/or pulmonary embolus), neurologic and vascular injury.  It was explained that, if tissue has been repaired or reconstructed, there is a chance of failure, which may require further management.  Consent form for surgery is signed and in chart.    These risks  include but are not limited to: bleeding, infection, scarring, re-tear of repair, irreparability of the tear, damage to neurovascular structures, damage to cartilage, stiffness, blood clots, pulmonary embolism, swelling, compartment syndrome, need for further surgery, and the risks of anesthesia. She verbalized her understanding of these risks and wished to proceed with surgery.   Time was spent face-to-face with the patient during this encounter on counseling about treatment options including surgery and coordination of her care for preoperative visits, surgery and post-operative rehab.     Patient elected to not undergo surgery at this time and undergo HEP. Risks of foregoing surgery were reviewed. He will call with any questions or concerns.

## 2022-01-12 ENCOUNTER — HOSPITAL ENCOUNTER (OUTPATIENT)
Dept: VASCULAR SURGERY | Facility: CLINIC | Age: 82
Discharge: HOME | End: 2022-01-12
Attending: SURGERY
Payer: MEDICARE

## 2022-01-12 ENCOUNTER — OFFICE VISIT (OUTPATIENT)
Dept: VASCULAR SURGERY | Facility: CLINIC | Age: 82
End: 2022-01-12
Payer: MEDICARE

## 2022-01-12 VITALS
WEIGHT: 176.38 LBS | TEMPERATURE: 98 F | DIASTOLIC BLOOD PRESSURE: 62 MMHG | BODY MASS INDEX: 23.37 KG/M2 | HEART RATE: 66 BPM | SYSTOLIC BLOOD PRESSURE: 144 MMHG | HEIGHT: 73 IN

## 2022-01-12 DIAGNOSIS — Z98.890 S/P CAROTID ENDARTERECTOMY: ICD-10-CM

## 2022-01-12 DIAGNOSIS — I77.1 STRICTURE OF ARTERY: ICD-10-CM

## 2022-01-12 DIAGNOSIS — I73.9 PAD (PERIPHERAL ARTERY DISEASE): ICD-10-CM

## 2022-01-12 DIAGNOSIS — R60.0 LEG EDEMA, LEFT: ICD-10-CM

## 2022-01-12 DIAGNOSIS — I65.23 BILATERAL CAROTID ARTERY STENOSIS: ICD-10-CM

## 2022-01-12 DIAGNOSIS — I73.9 PAD (PERIPHERAL ARTERY DISEASE): Primary | ICD-10-CM

## 2022-01-12 DIAGNOSIS — Z98.890 S/P CAROTID ENDARTERECTOMY: Primary | ICD-10-CM

## 2022-01-12 PROCEDURE — 3078F DIAST BP <80 MM HG: CPT | Mod: CPTII,S$GLB,, | Performed by: SURGERY

## 2022-01-12 PROCEDURE — 99499 RISK ADDL DX/OHS AUDIT: ICD-10-PCS | Mod: S$GLB,,, | Performed by: SURGERY

## 2022-01-12 PROCEDURE — 1101F PT FALLS ASSESS-DOCD LE1/YR: CPT | Mod: CPTII,S$GLB,, | Performed by: SURGERY

## 2022-01-12 PROCEDURE — 93925 PR DUPLEX LO EXTREM ART BILAT: ICD-10-PCS | Mod: S$GLB,,, | Performed by: SURGERY

## 2022-01-12 PROCEDURE — 99024 PR POST-OP FOLLOW-UP VISIT: ICD-10-PCS | Mod: S$GLB,,, | Performed by: SURGERY

## 2022-01-12 PROCEDURE — 93970 PR US DUPLEX, UPPER OR LOWER EXT VENOUS,COMPLETE BILAT: ICD-10-PCS | Mod: S$GLB,,, | Performed by: SURGERY

## 2022-01-12 PROCEDURE — 3288F FALL RISK ASSESSMENT DOCD: CPT | Mod: CPTII,S$GLB,, | Performed by: SURGERY

## 2022-01-12 PROCEDURE — 99999 PR PBB SHADOW E&M-EST. PATIENT-LVL III: ICD-10-PCS | Mod: PBBFAC,,, | Performed by: SURGERY

## 2022-01-12 PROCEDURE — 3077F PR MOST RECENT SYSTOLIC BLOOD PRESSURE >= 140 MM HG: ICD-10-PCS | Mod: CPTII,S$GLB,, | Performed by: SURGERY

## 2022-01-12 PROCEDURE — 99999 PR PBB SHADOW E&M-EST. PATIENT-LVL III: CPT | Mod: PBBFAC,,, | Performed by: SURGERY

## 2022-01-12 PROCEDURE — 3288F PR FALLS RISK ASSESSMENT DOCUMENTED: ICD-10-PCS | Mod: CPTII,S$GLB,, | Performed by: SURGERY

## 2022-01-12 PROCEDURE — 93925 LOWER EXTREMITY STUDY: CPT | Mod: S$GLB,,, | Performed by: SURGERY

## 2022-01-12 PROCEDURE — 1125F AMNT PAIN NOTED PAIN PRSNT: CPT | Mod: CPTII,S$GLB,, | Performed by: SURGERY

## 2022-01-12 PROCEDURE — 93970 EXTREMITY STUDY: CPT | Mod: S$GLB,,, | Performed by: SURGERY

## 2022-01-12 PROCEDURE — 1159F MED LIST DOCD IN RCRD: CPT | Mod: CPTII,S$GLB,, | Performed by: SURGERY

## 2022-01-12 PROCEDURE — 1101F PR PT FALLS ASSESS DOC 0-1 FALLS W/OUT INJ PAST YR: ICD-10-PCS | Mod: CPTII,S$GLB,, | Performed by: SURGERY

## 2022-01-12 PROCEDURE — 93880 EXTRACRANIAL BILAT STUDY: CPT | Mod: S$GLB,,, | Performed by: SURGERY

## 2022-01-12 PROCEDURE — 3077F SYST BP >= 140 MM HG: CPT | Mod: CPTII,S$GLB,, | Performed by: SURGERY

## 2022-01-12 PROCEDURE — 99499 UNLISTED E&M SERVICE: CPT | Mod: S$GLB,,, | Performed by: SURGERY

## 2022-01-12 PROCEDURE — 99024 POSTOP FOLLOW-UP VISIT: CPT | Mod: S$GLB,,, | Performed by: SURGERY

## 2022-01-12 PROCEDURE — 1125F PR PAIN SEVERITY QUANTIFIED, PAIN PRESENT: ICD-10-PCS | Mod: CPTII,S$GLB,, | Performed by: SURGERY

## 2022-01-12 PROCEDURE — 3078F PR MOST RECENT DIASTOLIC BLOOD PRESSURE < 80 MM HG: ICD-10-PCS | Mod: CPTII,S$GLB,, | Performed by: SURGERY

## 2022-01-12 PROCEDURE — 1159F PR MEDICATION LIST DOCUMENTED IN MEDICAL RECORD: ICD-10-PCS | Mod: CPTII,S$GLB,, | Performed by: SURGERY

## 2022-01-12 PROCEDURE — 93880 PR DUPLEX SCAN EXTRACRANIAL,BILAT: ICD-10-PCS | Mod: S$GLB,,, | Performed by: SURGERY
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
## 2022-01-19 ENCOUNTER — PATIENT OUTREACH (OUTPATIENT)
Dept: ADMINISTRATIVE | Facility: OTHER | Age: 82
End: 2022-01-19
Payer: MEDICARE

## 2022-01-19 NOTE — PROGRESS NOTES
Health Maintenance Due   Topic Date Due    Pneumococcal Vaccines (Age 65+) (1 of 4 - PCV13) Never done    TETANUS VACCINE  Never done    Shingles Vaccine (1 of 2) Never done     Updates were requested from care everywhere.  Chart was reviewed for overdue Proactive [facility] Encounters (MIKAYLA) topics (CRS, Breast Cancer Screening, Eye exam)  Health Maintenance has been updated.  LINKS immunization registry triggered.  Immunizations were reconciled.

## 2022-01-20 ENCOUNTER — OFFICE VISIT (OUTPATIENT)
Dept: GASTROENTEROLOGY | Facility: CLINIC | Age: 82
End: 2022-01-20
Payer: MEDICARE

## 2022-01-20 VITALS — WEIGHT: 176.56 LBS | BODY MASS INDEX: 23.4 KG/M2 | HEIGHT: 73 IN

## 2022-01-20 DIAGNOSIS — R13.12 OROPHARYNGEAL DYSPHAGIA: ICD-10-CM

## 2022-01-20 DIAGNOSIS — K21.9 GASTROESOPHAGEAL REFLUX DISEASE, UNSPECIFIED WHETHER ESOPHAGITIS PRESENT: Primary | ICD-10-CM

## 2022-01-20 DIAGNOSIS — R19.5 LOOSE STOOLS: ICD-10-CM

## 2022-01-20 PROCEDURE — 1159F MED LIST DOCD IN RCRD: CPT | Mod: CPTII,S$GLB,, | Performed by: INTERNAL MEDICINE

## 2022-01-20 PROCEDURE — 99999 PR PBB SHADOW E&M-EST. PATIENT-LVL III: ICD-10-PCS | Mod: PBBFAC,,, | Performed by: INTERNAL MEDICINE

## 2022-01-20 PROCEDURE — 1126F PR PAIN SEVERITY QUANTIFIED, NO PAIN PRESENT: ICD-10-PCS | Mod: CPTII,S$GLB,, | Performed by: INTERNAL MEDICINE

## 2022-01-20 PROCEDURE — 99999 PR PBB SHADOW E&M-EST. PATIENT-LVL III: CPT | Mod: PBBFAC,,, | Performed by: INTERNAL MEDICINE

## 2022-01-20 PROCEDURE — 99204 PR OFFICE/OUTPT VISIT, NEW, LEVL IV, 45-59 MIN: ICD-10-PCS | Mod: S$GLB,,, | Performed by: INTERNAL MEDICINE

## 2022-01-20 PROCEDURE — 1159F PR MEDICATION LIST DOCUMENTED IN MEDICAL RECORD: ICD-10-PCS | Mod: CPTII,S$GLB,, | Performed by: INTERNAL MEDICINE

## 2022-01-20 PROCEDURE — 99204 OFFICE O/P NEW MOD 45 MIN: CPT | Mod: S$GLB,,, | Performed by: INTERNAL MEDICINE

## 2022-01-20 PROCEDURE — 1126F AMNT PAIN NOTED NONE PRSNT: CPT | Mod: CPTII,S$GLB,, | Performed by: INTERNAL MEDICINE

## 2022-01-20 NOTE — PROGRESS NOTES
GASTROENTEROLOGY CLINIC NOTE    Reason for visit: The primary encounter diagnosis was Gastroesophageal reflux disease, unspecified whether esophagitis present. Diagnoses of Oropharyngeal dysphagia and Loose stools were also pertinent to this visit.  Referring provider/PCP: DEB Mora MD    HPI:  Gus Sabillon is a 81 y.o. male here today for reflux, dysphagia, new patient.    Ongoing symptoms that are intermittent in nature not very severe.  Ongoing for years.  He states initially he thought the trouble swallowing was after his 1st EGD maybe 10 years ago.  He states symptoms have been very intermittent.  It feels like some things stick in the back of the throat, there is some associated coughing and sometimes feels like choking at times.  He points to below the mandible area.  He does not regurgitate but he does have reflux symptoms.  He is currently on Prilosec 20 mg twice a day.  He does feel like this helps his reflux symptoms to a certain degree    Also mentions early AM diarrhea, usually loose stool. Then forms up later in day. No blood. No significant new meds. Doesn't take heavy nsaids. Does drink about 3 -4 glasses wine / night, wants to try to reduce. No vomiting.  Also mentions early am leg cramping and trying to drink more water.    Hx of PUD and gerd. Hx of tonsillectomy.     Prior Endoscopy:  EGD: / Colon:  > 10 years ago    (Portions of this note were dictated using voice recognition software and may contain dictation related errors in spelling/grammar/syntax not found on text review)    ROS    Past Medical History: has a past medical history of CKD (chronic kidney disease) stage 3, GFR 30-59 ml/min, Dyslipidemia, GERD (gastroesophageal reflux disease), melanoma excision, Hypertension, PAD (peripheral artery disease), and Squamous cell carcinoma of skin.    Past Surgical History: has a past surgical history that includes Cataract extraction, bilateral; Tonsillectomy; Blepharoplasty;  "Arthroscopic repair of rotator cuff of shoulder (Right, 2/27/2020); Arthroscopic debridement of shoulder (Right, 2/27/2020); Fixation of tendon (Right, 2/27/2020); Open reduction and internal fixation (ORIF) of fracture of proximal humerus (Right, 2/27/2020); Arthroscopy of shoulder with decompression of subacromial space (Right, 2/27/2020); and Carotid endarterectomy (Right, 10/15/2021).    Family History:family history includes Colon cancer in his sister; Diabetes in his mother; Heart disease in his father; Hyperlipidemia in his mother.    Allergies:   Review of patient's allergies indicates:   Allergen Reactions    Clindamycin Nausea And Vomiting    Penicillins Hives       Social History: reports that he has quit smoking. His smoking use included cigarettes. He has never used smokeless tobacco. He reports current alcohol use.    Home medications:   Current Outpatient Medications on File Prior to Visit   Medication Sig Dispense Refill    aspirin (ECOTRIN) 81 MG EC tablet Take 81 mg by mouth once daily.      atenoloL (TENORMIN) 50 MG tablet Take 1 tablet (50 mg total) by mouth once daily. 90 tablet 3    bempedoic acid (NEXLETOL) 180 mg Tab Take 1 tablet (180 mg) by mouth once daily. 90 tablet 3    multivit-min-FA-lycopen-lutein 300-600-300 mcg Tab Take by mouth.      omega-3 fatty acids/fish oil (FISH OIL-OMEGA-3 FATTY ACIDS) 300-1,000 mg capsule Take by mouth once daily.      omeprazole (PRILOSEC) 20 MG capsule Take 2 capsules (40 mg total) by mouth once daily. 90 capsule 3    rosuvastatin (CRESTOR) 20 MG tablet Take 1 tablet (20 mg total) by mouth once daily. 90 tablet 3    tadalafiL (CIALIS) 5 MG tablet Take 1 tablet (5 mg total) by mouth once daily. 30 tablet 6    triamterene-hydrochlorothiazide 37.5-25 mg (MAXZIDE-25) 37.5-25 mg per tablet Take 1 tablet by mouth once daily. 90 tablet 3     No current facility-administered medications on file prior to visit.       Vital signs:  Ht 6' 1" (1.854 m)   " Wt 80.1 kg (176 lb 9.4 oz)   BMI 23.30 kg/m²     Physical Exam    I have reviewed prior labs, imaging, notes from last month    Assessment:  1. Gastroesophageal reflux disease, unspecified whether esophagitis present    2. Oropharyngeal dysphagia    3. Loose stools      Ongoing and chronic reflux now on full-dose PPI and doing somewhat better.  He also has dysphagia, it is difficult to say if there is an oropharyngeal component of this is esophageal but ultimately it seems more oropharyngeal given the choking and coughing episodes.  Ultimately I offered to plan an EGD but he wishes were he now to avoid invasive procedures. I offered to refer to speech for further oropharyngeal eval but he will defer for now.    Plan:       Continue full dose prilosec    Offered to refer to speech for possible oropharyngeal dysphagia but he wants to wait    Use pickle juice for cramps    Try metamucil at night  Limit etoh intake at night.      RTC prn    Villa Lange MD  [facility] [place]

## 2022-01-27 ENCOUNTER — TELEPHONE (OUTPATIENT)
Dept: DERMATOLOGY | Facility: CLINIC | Age: 82
End: 2022-01-27
Payer: MEDICARE

## 2022-02-14 ENCOUNTER — PES CALL (OUTPATIENT)
Dept: ADMINISTRATIVE | Facility: CLINIC | Age: 82
End: 2022-02-14
Payer: MEDICARE

## 2022-03-16 ENCOUNTER — OFFICE VISIT (OUTPATIENT)
Dept: INTERNAL MEDICINE | Facility: CLINIC | Age: 82
End: 2022-03-16
Payer: MEDICARE

## 2022-03-16 VITALS
HEIGHT: 73 IN | HEART RATE: 83 BPM | OXYGEN SATURATION: 97 % | DIASTOLIC BLOOD PRESSURE: 72 MMHG | SYSTOLIC BLOOD PRESSURE: 114 MMHG | BODY MASS INDEX: 22.9 KG/M2 | WEIGHT: 172.81 LBS

## 2022-03-16 DIAGNOSIS — I65.21 STENOSIS OF RIGHT CAROTID ARTERY: ICD-10-CM

## 2022-03-16 DIAGNOSIS — I10 HTN (HYPERTENSION), BENIGN: ICD-10-CM

## 2022-03-16 DIAGNOSIS — E78.2 MIXED HYPERLIPIDEMIA: ICD-10-CM

## 2022-03-16 DIAGNOSIS — I73.9 PAD (PERIPHERAL ARTERY DISEASE): ICD-10-CM

## 2022-03-16 DIAGNOSIS — N18.30 STAGE 3 CHRONIC KIDNEY DISEASE, UNSPECIFIED WHETHER STAGE 3A OR 3B CKD: ICD-10-CM

## 2022-03-16 DIAGNOSIS — Z00.00 ENCOUNTER FOR PREVENTIVE HEALTH EXAMINATION: Primary | ICD-10-CM

## 2022-03-16 DIAGNOSIS — K21.9 GASTROESOPHAGEAL REFLUX DISEASE, UNSPECIFIED WHETHER ESOPHAGITIS PRESENT: ICD-10-CM

## 2022-03-16 DIAGNOSIS — I65.23 CAROTID ATHEROSCLEROSIS, BILATERAL: ICD-10-CM

## 2022-03-16 DIAGNOSIS — Z98.890 S/P CAROTID ENDARTERECTOMY: ICD-10-CM

## 2022-03-16 DIAGNOSIS — I70.0 AORTIC ATHEROSCLEROSIS: ICD-10-CM

## 2022-03-16 PROBLEM — N18.9 CKD (CHRONIC KIDNEY DISEASE): Status: ACTIVE | Noted: 2022-03-16

## 2022-03-16 PROBLEM — M25.619 LIMITED RANGE OF MOTION (ROM) OF SHOULDER: Status: RESOLVED | Noted: 2020-04-14 | Resolved: 2022-03-16

## 2022-03-16 PROBLEM — R52 PAIN AGGRAVATED BY LIFTING: Status: RESOLVED | Noted: 2020-04-14 | Resolved: 2022-03-16

## 2022-03-16 PROBLEM — S49.92XA: Status: RESOLVED | Noted: 2021-06-14 | Resolved: 2022-03-16

## 2022-03-16 PROBLEM — R42 DIZZINESS: Status: RESOLVED | Noted: 2020-11-25 | Resolved: 2022-03-16

## 2022-03-16 PROCEDURE — 99999 PR PBB SHADOW E&M-EST. PATIENT-LVL IV: CPT | Mod: PBBFAC,,, | Performed by: NURSE PRACTITIONER

## 2022-03-16 PROCEDURE — 3074F SYST BP LT 130 MM HG: CPT | Mod: CPTII,S$GLB,, | Performed by: NURSE PRACTITIONER

## 2022-03-16 PROCEDURE — 1101F PR PT FALLS ASSESS DOC 0-1 FALLS W/OUT INJ PAST YR: ICD-10-PCS | Mod: CPTII,S$GLB,, | Performed by: NURSE PRACTITIONER

## 2022-03-16 PROCEDURE — 1170F PR FUNCTIONAL STATUS ASSESSED: ICD-10-PCS | Mod: CPTII,S$GLB,, | Performed by: NURSE PRACTITIONER

## 2022-03-16 PROCEDURE — 1170F FXNL STATUS ASSESSED: CPT | Mod: CPTII,S$GLB,, | Performed by: NURSE PRACTITIONER

## 2022-03-16 PROCEDURE — 3078F PR MOST RECENT DIASTOLIC BLOOD PRESSURE < 80 MM HG: ICD-10-PCS | Mod: CPTII,S$GLB,, | Performed by: NURSE PRACTITIONER

## 2022-03-16 PROCEDURE — 1160F RVW MEDS BY RX/DR IN RCRD: CPT | Mod: CPTII,S$GLB,, | Performed by: NURSE PRACTITIONER

## 2022-03-16 PROCEDURE — 99999 PR PBB SHADOW E&M-EST. PATIENT-LVL IV: ICD-10-PCS | Mod: PBBFAC,,, | Performed by: NURSE PRACTITIONER

## 2022-03-16 PROCEDURE — 1160F PR REVIEW ALL MEDS BY PRESCRIBER/CLIN PHARMACIST DOCUMENTED: ICD-10-PCS | Mod: CPTII,S$GLB,, | Performed by: NURSE PRACTITIONER

## 2022-03-16 PROCEDURE — 1126F PR PAIN SEVERITY QUANTIFIED, NO PAIN PRESENT: ICD-10-PCS | Mod: CPTII,S$GLB,, | Performed by: NURSE PRACTITIONER

## 2022-03-16 PROCEDURE — 1126F AMNT PAIN NOTED NONE PRSNT: CPT | Mod: CPTII,S$GLB,, | Performed by: NURSE PRACTITIONER

## 2022-03-16 PROCEDURE — G0439 PPPS, SUBSEQ VISIT: HCPCS | Mod: S$GLB,,, | Performed by: NURSE PRACTITIONER

## 2022-03-16 PROCEDURE — 1101F PT FALLS ASSESS-DOCD LE1/YR: CPT | Mod: CPTII,S$GLB,, | Performed by: NURSE PRACTITIONER

## 2022-03-16 PROCEDURE — G0439 PR MEDICARE ANNUAL WELLNESS SUBSEQUENT VISIT: ICD-10-PCS | Mod: S$GLB,,, | Performed by: NURSE PRACTITIONER

## 2022-03-16 PROCEDURE — 1159F PR MEDICATION LIST DOCUMENTED IN MEDICAL RECORD: ICD-10-PCS | Mod: CPTII,S$GLB,, | Performed by: NURSE PRACTITIONER

## 2022-03-16 PROCEDURE — 3074F PR MOST RECENT SYSTOLIC BLOOD PRESSURE < 130 MM HG: ICD-10-PCS | Mod: CPTII,S$GLB,, | Performed by: NURSE PRACTITIONER

## 2022-03-16 PROCEDURE — 3288F PR FALLS RISK ASSESSMENT DOCUMENTED: ICD-10-PCS | Mod: CPTII,S$GLB,, | Performed by: NURSE PRACTITIONER

## 2022-03-16 PROCEDURE — 3078F DIAST BP <80 MM HG: CPT | Mod: CPTII,S$GLB,, | Performed by: NURSE PRACTITIONER

## 2022-03-16 PROCEDURE — 1159F MED LIST DOCD IN RCRD: CPT | Mod: CPTII,S$GLB,, | Performed by: NURSE PRACTITIONER

## 2022-03-16 PROCEDURE — 3288F FALL RISK ASSESSMENT DOCD: CPT | Mod: CPTII,S$GLB,, | Performed by: NURSE PRACTITIONER

## 2022-03-16 NOTE — PROGRESS NOTES
"  Gus Sabillon presented for a  Medicare AWV and comprehensive Health Risk Assessment today. The following components were reviewed and updated:    · Medical history  · Family History  · Social history  · Allergies and Current Medications  · Health Risk Assessment  · Health Maintenance  · Care Team         ** See Completed Assessments for Annual Wellness Visit within the encounter summary.**         The following assessments were completed:  · Living Situation  · CAGE  · Depression Screening  · Timed Get Up and Go  · Whisper Test  · Cognitive Function Screening      ·   · Nutrition Screening  · ADL Screening  · PAQ Screening        Vitals:    03/16/22 1406 03/16/22 1414   BP:  114/72   BP Location:  Right arm   Patient Position:  Sitting   Pulse:  83   SpO2:  97%   Weight: 78.4 kg (172 lb 13.5 oz)    Height: 6' 1" (1.854 m)      Body mass index is 22.8 kg/m².  Physical Exam  Vitals and nursing note reviewed.   Constitutional:       Appearance: He is well-developed.   HENT:      Head: Normocephalic.   Cardiovascular:      Rate and Rhythm: Normal rate and regular rhythm.   Pulmonary:      Effort: Pulmonary effort is normal.      Breath sounds: Normal breath sounds.   Abdominal:      General: Bowel sounds are normal.      Palpations: Abdomen is soft.   Musculoskeletal:         General: Normal range of motion.   Skin:     General: Skin is warm and dry.   Neurological:      Mental Status: He is alert and oriented to person, place, and time.      Motor: No abnormal muscle tone.   Psychiatric:         Mood and Affect: Mood normal.               Diagnoses and health risks identified today and associated recommendations/orders:    1. Encounter for preventive health examination  Here for Health Risk Assessment/Annual Wellness Visit.  Health maintenance reviewed and updated. Follow up in one year.  Reports he obtained pneumonia and tetanus vaccines in [place].  Discussed Shingrix - he declines at present.    2. HTN " (hypertension), benign  Chronic, stable on current medications. Followed by PCP.    3. Aortic atherosclerosis  Chronic, stable on current medications. Noted CTA neck 2/17/21. Followed by PCP, Cardiology.    4. Carotid atherosclerosis, bilateral  Chronic, stable on current medications/S/P CEA. Followed by PCP, Cardiology, Vascular Surgery.    5. Stenosis of right carotid artery  S/P CEA. Followed by PCP, Vascular Surgery, Cardiology.    6. S/P carotid endarterectomy  Stable. Followed by Vascular Surgery.    7. PAD (peripheral artery disease)  Chronic, stable on current medications. Followed by PCP.    8. Mixed hyperlipidemia  Chronic, stable on current medications. Followed by PCP.    9. Gastroesophageal reflux disease, unspecified whether esophagitis present  Chronic, stable on current medication. Followed by PCP, Gastroenterology.    10. Stage 3 chronic kidney disease, unspecified whether stage 3a or 3b CKD  Chronic, stable on current medications. Followed by PCP.      Provided Gus with a 5-10 year written screening schedule and personal prevention plan. Recommendations were developed using the USPSTF age appropriate recommendations. Education, counseling, and referrals were provided as needed. After Visit Summary printed and given to patient which includes a list of additional screenings\tests needed.    Follow up in about 9 months (around 12/16/2022).with PCP    Corinne Norman NP

## 2022-03-16 NOTE — PATIENT INSTRUCTIONS
Counseling and Referral of Other Preventative  (Italic type indicates deductible and co-insurance are waived)    Patient Name: Gus Sabillon  Today's Date: 3/16/2022    Health Maintenance         Date Due Completion Date    Pneumococcal Vaccines (Age 65+) (1 of 4 - PCV13) Never done ---    TETANUS VACCINE Never done ---    Shingles Vaccine (1 of 2) Never done ---    Lipid Panel 12/08/2022 12/8/2021          No orders of the defined types were placed in this encounter.    The following information is provided to all patients.  This information is to help you find resources for any of the problems found today that may be affecting your health:                Living healthy guide: www.[facility].[place].[facility]      Understanding Diabetes: www.diabetes.org      Eating healthy: www.cdc.gov/healthyweight      CDC home safety checklist: www.cdc.gov/steadi/patient.html      Agency on Aging: www.[place]      Alcoholics anonymous (AA): www.aa.org      Physical Activity: www.epi.nih.gov/xc0ntcz      Tobacco use: www.quitGleeMasterusla.org       Counseling and Referral of Other Preventative  (Italic type indicates deductible and co-insurance are waived)    Patient Name: Gus Sabillon  Today's Date: 3/16/2022    Health Maintenance       Date Due Completion Date    Pneumococcal Vaccines (Age 65+) (1 of 4 - PCV13) Never done ---    TETANUS VACCINE Never done ---    Shingles Vaccine (1 of 2) Never done ---    Lipid Panel 12/08/2022 12/8/2021        No orders of the defined types were placed in this encounter.    The following information is provided to all patients.  This information is to help you find resources for any of the problems found today that may be affecting your health:                Living healthy guide: www.[employer].[email or web address].[facility]      Understanding Diabetes: www.diabetes.org      Eating healthy: www.cdc.gov/healthyweight      CDC home safety checklist: www.cdc.gov/steadi/patient.html      Agency on Aging:  www.goea.[place].[facility]      Alcoholics anonymous (AA): www.aa.org      Physical Activity: www.epi.nih.gov/fm7equi      Tobacco use: www.quitwithusla.org

## 2022-03-16 NOTE — PROGRESS NOTES
I offered to discuss advanced care planning, including how to pick a person who would make decisions for you if you were unable to make them for yourself, called a health care power of , and what kind of decisions you might make such as use of life sustaining treatments such as ventilators and tube feeding when faced with a life limiting illness recorded on a living will that they will need to know. (How you want to be cared for as you near the end of your natural life)     X  Patient has advanced directives written and agrees to provide copies to the institution.

## 2022-04-13 ENCOUNTER — OFFICE VISIT (OUTPATIENT)
Dept: DERMATOLOGY | Facility: CLINIC | Age: 82
End: 2022-04-13
Payer: MEDICARE

## 2022-04-13 DIAGNOSIS — Z12.83 SKIN CANCER SCREENING: Primary | ICD-10-CM

## 2022-04-13 DIAGNOSIS — Z85.820 HISTORY OF MELANOMA: ICD-10-CM

## 2022-04-13 DIAGNOSIS — D48.5 NEOPLASM OF UNCERTAIN BEHAVIOR OF SKIN: ICD-10-CM

## 2022-04-13 DIAGNOSIS — L57.0 AK (ACTINIC KERATOSIS): ICD-10-CM

## 2022-04-13 DIAGNOSIS — D18.01 CHERRY ANGIOMA: ICD-10-CM

## 2022-04-13 DIAGNOSIS — L82.1 SK (SEBORRHEIC KERATOSIS): ICD-10-CM

## 2022-04-13 DIAGNOSIS — L72.0 EIC (EPIDERMAL INCLUSION CYST): ICD-10-CM

## 2022-04-13 DIAGNOSIS — L81.4 LENTIGINES: ICD-10-CM

## 2022-04-13 DIAGNOSIS — L57.0 MULTIPLE ACTINIC KERATOSES: ICD-10-CM

## 2022-04-13 PROCEDURE — 1101F PR PT FALLS ASSESS DOC 0-1 FALLS W/OUT INJ PAST YR: ICD-10-PCS | Mod: CPTII,S$GLB,, | Performed by: DERMATOLOGY

## 2022-04-13 PROCEDURE — 11102 PR TANGENTIAL BIOPSY, SKIN, SINGLE LESION: ICD-10-PCS | Mod: S$GLB,,, | Performed by: DERMATOLOGY

## 2022-04-13 PROCEDURE — 17003 DESTRUCT PREMALG LES 2-14: CPT | Mod: 59,S$GLB,, | Performed by: DERMATOLOGY

## 2022-04-13 PROCEDURE — 11103 TANGNTL BX SKIN EA SEP/ADDL: CPT | Mod: S$GLB,,, | Performed by: DERMATOLOGY

## 2022-04-13 PROCEDURE — 88305 TISSUE EXAM BY PATHOLOGIST: CPT | Mod: 26,,, | Performed by: PATHOLOGY

## 2022-04-13 PROCEDURE — 1126F AMNT PAIN NOTED NONE PRSNT: CPT | Mod: CPTII,S$GLB,, | Performed by: DERMATOLOGY

## 2022-04-13 PROCEDURE — 11102 TANGNTL BX SKIN SINGLE LES: CPT | Mod: S$GLB,,, | Performed by: DERMATOLOGY

## 2022-04-13 PROCEDURE — 1160F PR REVIEW ALL MEDS BY PRESCRIBER/CLIN PHARMACIST DOCUMENTED: ICD-10-PCS | Mod: CPTII,S$GLB,, | Performed by: DERMATOLOGY

## 2022-04-13 PROCEDURE — 17000 PR DESTRUCTION(LASER SURGERY,CRYOSURGERY,CHEMOSURGERY),PREMALIGNANT LESIONS,FIRST LESION: ICD-10-PCS | Mod: 59,S$GLB,, | Performed by: DERMATOLOGY

## 2022-04-13 PROCEDURE — 99999 PR PBB SHADOW E&M-EST. PATIENT-LVL III: ICD-10-PCS | Mod: PBBFAC,,, | Performed by: DERMATOLOGY

## 2022-04-13 PROCEDURE — 1101F PT FALLS ASSESS-DOCD LE1/YR: CPT | Mod: CPTII,S$GLB,, | Performed by: DERMATOLOGY

## 2022-04-13 PROCEDURE — 88342 IMHCHEM/IMCYTCHM 1ST ANTB: CPT | Mod: 26,,, | Performed by: PATHOLOGY

## 2022-04-13 PROCEDURE — 99999 PR PBB SHADOW E&M-EST. PATIENT-LVL III: CPT | Mod: PBBFAC,,, | Performed by: DERMATOLOGY

## 2022-04-13 PROCEDURE — 1126F PR PAIN SEVERITY QUANTIFIED, NO PAIN PRESENT: ICD-10-PCS | Mod: CPTII,S$GLB,, | Performed by: DERMATOLOGY

## 2022-04-13 PROCEDURE — 11103 PR TANGENTIAL BIOPSY, SKIN, EA ADDTL LESION: ICD-10-PCS | Mod: S$GLB,,, | Performed by: DERMATOLOGY

## 2022-04-13 PROCEDURE — 17000 DESTRUCT PREMALG LESION: CPT | Mod: 59,S$GLB,, | Performed by: DERMATOLOGY

## 2022-04-13 PROCEDURE — 88342 IMHCHEM/IMCYTCHM 1ST ANTB: CPT | Mod: 59 | Performed by: PATHOLOGY

## 2022-04-13 PROCEDURE — 88342 CHG IMMUNOCYTOCHEMISTRY: ICD-10-PCS | Mod: 26,,, | Performed by: PATHOLOGY

## 2022-04-13 PROCEDURE — 99214 OFFICE O/P EST MOD 30 MIN: CPT | Mod: 25,S$GLB,, | Performed by: DERMATOLOGY

## 2022-04-13 PROCEDURE — 1159F PR MEDICATION LIST DOCUMENTED IN MEDICAL RECORD: ICD-10-PCS | Mod: CPTII,S$GLB,, | Performed by: DERMATOLOGY

## 2022-04-13 PROCEDURE — 3288F FALL RISK ASSESSMENT DOCD: CPT | Mod: CPTII,S$GLB,, | Performed by: DERMATOLOGY

## 2022-04-13 PROCEDURE — 17003 DESTRUCTION, PREMALIGNANT LESIONS; SECOND THROUGH 14 LESIONS: ICD-10-PCS | Mod: 59,S$GLB,, | Performed by: DERMATOLOGY

## 2022-04-13 PROCEDURE — 99214 PR OFFICE/OUTPT VISIT, EST, LEVL IV, 30-39 MIN: ICD-10-PCS | Mod: 25,S$GLB,, | Performed by: DERMATOLOGY

## 2022-04-13 PROCEDURE — 1159F MED LIST DOCD IN RCRD: CPT | Mod: CPTII,S$GLB,, | Performed by: DERMATOLOGY

## 2022-04-13 PROCEDURE — 88305 TISSUE EXAM BY PATHOLOGIST: ICD-10-PCS | Mod: 26,,, | Performed by: PATHOLOGY

## 2022-04-13 PROCEDURE — 1160F RVW MEDS BY RX/DR IN RCRD: CPT | Mod: CPTII,S$GLB,, | Performed by: DERMATOLOGY

## 2022-04-13 PROCEDURE — 88305 TISSUE EXAM BY PATHOLOGIST: CPT | Mod: 59 | Performed by: PATHOLOGY

## 2022-04-13 PROCEDURE — 3288F PR FALLS RISK ASSESSMENT DOCUMENTED: ICD-10-PCS | Mod: CPTII,S$GLB,, | Performed by: DERMATOLOGY

## 2022-04-13 RX ORDER — FLUOROURACIL 50 MG/G
CREAM TOPICAL
Qty: 40 G | Refills: 1 | Status: SHIPPED | OUTPATIENT
Start: 2022-04-13

## 2022-04-13 NOTE — PATIENT INSTRUCTIONS
Shave Biopsy Wound Care    Your doctor has performed a shave biopsy today.  A band aid and vaseline ointment has been placed over the site.  This should remain in place for NO LONGER THAN 48 hours.  It is fine to remove the bandaid after 24 hours, if the area is no longer bleeding. It is recommended that you keep the area dry (do not wet)) for the first 24 hours.  After 24 hours, wash the area with warm soap and water and apply Vaseline jelly.  Many patients prefer to use Neosporin or Bacitracin ointment.  This is acceptable; however, know that you can develop an allergy to this medication even if you have used it safely for years.  It is important to keep the area moist.  Letting it dry out and get air slows healing time, and will worsen the scar.        If you notice increasing redness, tenderness, pain, or yellow drainage at the biopsy site, please notify your doctor.  These are signs of an infection.    If your biopsy site is bleeding, apply firm pressure for 15 minutes straight.  Repeat for another 15 minutes, if it is still bleeding.   If the surgical site continues to bleed, then please contact your doctor.      For [facility] users:   You will receive your biopsy results in [facility] as soon as they are available. Please be assured that your physician/provider will review your results and will then determine what further treatment, evaluation, or planning is required. You should be contacted by your physician's/provider's office within 5 business days of receiving your results; If not, please reach out to directly. This is one more way [employer] [facility] putting you first.     [facility] [place] [address]/ [phone] FAX/ [email or web address]         CRYOSURGERY      Your doctor has used a method called cryosurgery to treat your skin condition. Cryosurgery refers to the use of very cold substances to treat a variety of skin conditions such as warts, pre-skin cancers, molluscum  contagiosum, sun spots, and several benign growths. The substance we use in cryosurgery is liquid nitrogen and is so cold (-195 degrees Celsius) that is burns when administered.     Following treatment in the office, the skin may immediately burn and become red. You may find the area around the lesion is affected as well. It is sometimes necessary to treat not only the lesion, but a small area of the surrounding normal skin to achieve a good response.     A blister, and even a blood filled blister, may form after treatment.   This is a normal response. If the blister is painful, it is acceptable to sterilize a needle and with rubbing alcohol and gently pop the blister. It is important that you gently wash the area with soap and warm water as the blister fluid may contain wart virus if a wart was treated. Do no remove the roof of the blister.     The area treated can take anywhere from 1-3 weeks to heal. Healing time depends on the kind skin lesion treated, the location, and how aggressively the lesion was treated. It is recommended that the areas treated are covered with Vaseline or bacitracin ointment and a band-aid. If a band-aid is not practical, just ointment applied several times per day will do. Keeping these areas moist will speed the healing time.    Treatment with liquid nitrogen can leave a scar. In dark skin, it may be a light or dark scar, in light skin it may be a white or pink scar. These will generally fade with time, but may never go away completely.     If you have any concerns after your treatment, please feel free to call the office.       [facility] [place] [address]/ [phone] FAX/ [email or web address]         Sun Protection      The [facility] would like to remind you of the importance of sun protection all year round and particularly during the summer when the suns rays are the strongest. It has been proven that both acute and chronic sun  exposure damages our cells and leads to skin cancer. Beyond skin cancer, the sun causes 90% of the symptoms of premature skin aging, including wrinkles, lentigines (brown spots), and thin, easily bruised skin. Proper sun protection can help prevent these unwanted conditions.    Many patients report that they dont go in the sun. It has been shown that the average person receives 18 hours of incidental sun exposure per week during activities such as walking through parking lots, driving, or sitting next to windows. This accumulates to several bad sunburns per year!    In choosing sunscreen, you want one that protects against both UVA and UVB rays (broad spectrum). It is recommended that you use one of SPF 30 or higher. It is important to apply the sunscreen about 20 minutes prior to sun exposure. Most sunscreens are chemical sunscreens and a reaction must take place in the skin so that they are effective. If they are applied and then you are immediately exposed to the sun or start sweating, this reaction has not had time to take place and you are therefore unprotected. Sunscreen needs to be reapplied every 2 hours if you are participating in water sports or sweating. We recommend Elta MD or CeraVe sunscreens for daily use; however there are many options and it is most important for you to find one that you will use on a consistent basis.    If you have sensitive skin, you may do best with a sunscreen that contains only physical blockers in the active ingredient section. The only physical blockers available in the [place] currently are titanium dioxide or zinc oxide. These are typically thicker and harder to apply, however they afford very good protection. Neutrogena Sensitive Skin, Blue Lizard Sensitive Skin (pink top) or Neutrogena Pure and Free are popular ones.     Aside from sunscreen, clothes with UV protection (UPF), wide brimmed hats, and sunglasses are other means of sun protection that we recommend.      Based  on a recent study (6/2021) and out of an abundance of caution, we are recommending that you AVOID the following sunscreens as they may contain the carcinogen, benzene:    Spray and gel sunscreens  Any [facility] or [facility] brands as well as Max Block and TopCare brands   Neutrogena Ultra Sheer Dry-touch Water Resistant Sunscreen LOTION SPF 70   Neutrogena Sheer Zinc Dry-touch Face Sunscreen LOTION SPF 50   5.   Aveeno Baby Continuous Protection Sensitive Skin Sunscreen LOTION - Broad Spectrum SPF 50    Please note that Benzene is not an ingredient or the degradation product of any ingredient in any sunscreen. This study suggested that the findings are a result of contamination in the manufacturing process. At this point, we don't know how effectively Benzene gets through the skin, if it gets absorbed systemically, and what effects it may have.     We do know that ultraviolet radiation is a well-established carcinogen. Please use daily sun protection/avoidance and use of at least SPF 30, broad-spectrum sunscreen not listed above.                       [facility] - DERMATOLOGY [address]  [address]  [facility] [address]  Dept: [phone]  Dept Fax: [phone]

## 2022-04-13 NOTE — PROGRESS NOTES
Subjective:       Patient ID:  Gus Sabillon is a 81 y.o. male who presents for   Chief Complaint   Patient presents with    Skin Check     TBSE     HPI  Pt of Dr. Tang here today for TBSE.   Has a hx of NMSC (multiple SCCs) and melanoma on back in 2017. Melanoma only required excision for treatment.  Pt c/o dry, scaly areas on arms x yrs. Asymptomatic and no prior treatment.    Review of Systems   Constitutional: Negative for fever, chills, weight loss, weight gain, fatigue, night sweats and malaise.   Skin: Positive for activity-related sunscreen use. Negative for daily sunscreen use and recent sunburn.   Hematologic/Lymphatic: Does not bruise/bleed easily.        Objective:    Physical Exam   Constitutional: He appears well-developed and well-nourished. No distress.   Lymphadenopathy: No supraclavicular adenopathy is present.     He has no cervical adenopathy.     He has no axillary adenopathy.     He has no inguinal adenopathy.   Neurological: He is alert and oriented to person, place, and time. He is not disoriented.   Psychiatric: He has a normal mood and affect.   Skin:   Areas Examined (abnormalities noted in diagram):   Scalp / Hair Palpated and Inspected  Head / Face Inspection Performed  Neck Inspection Performed  Chest / Axilla Inspection Performed  Abdomen Inspection Performed  Genitals / Buttocks / Groin Inspection Performed  Back Inspection Performed  RUE Inspected  LUE Inspection Performed  RLE Inspected  LLE Inspection Performed  Nails and Digits Inspection Performed                               Diagram Legend     Erythematous scaling macule/papule c/w actinic keratosis       Vascular papule c/w angioma      Pigmented verrucoid papule/plaque c/w seborrheic keratosis      Yellow umbilicated papule c/w sebaceous hyperplasia      Irregularly shaped tan macule c/w lentigo     1-2 mm smooth white papules consistent with Milia      Movable subcutaneous cyst with punctum c/w epidermal inclusion  cyst      Subcutaneous movable cyst c/w pilar cyst      Firm pink to brown papule c/w dermatofibroma      Pedunculated fleshy papule(s) c/w skin tag(s)      Evenly pigmented macule c/w junctional nevus     Mildly variegated pigmented, slightly irregular-bordered macule c/w mildly atypical nevus      Flesh colored to evenly pigmented papule c/w intradermal nevus       Pink pearly papule/plaque c/w basal cell carcinoma      Erythematous hyperkeratotic cursted plaque c/w SCC      Surgical scar with no sign of skin cancer recurrence      Open and closed comedones      Inflammatory papules and pustules      Verrucoid papule consistent consistent with wart     Erythematous eczematous patches and plaques     Dystrophic onycholytic nail with subungual debris c/w onychomycosis     Umbilicated papule    Erythematous-base heme-crusted tan verrucoid plaque consistent with inflamed seborrheic keratosis     Erythematous Silvery Scaling Plaque c/w Psoriasis     See annotation      Assessment / Plan:    Neoplasm of uncertain behavior of skin  Shave biopsy procedure note:    Shave biopsy performed after verbal consent including risk of infection, scar, recurrence, need for additional treatment of site. Area prepped with alcohol, anesthetized with approximately 1.0cc of 1% lidocaine with epinephrine. Lesional tissue shaved with razor blade. Hemostasis achieved with application of aluminum chloride followed by hyfrecation. No complications. Dressing applied. Wound care explained.    -     Specimen to Pathology, Dermatology    Pathology Orders:     Normal Orders This Visit    Specimen to Pathology, Dermatology     Comments:    Number of Specimens:->2  ------------------------->-------------------------  Spec 1 Procedure:->Biopsy  Spec 1 Clinical Impression:->r/o MM vs atypical nevus vs  other  Spec 1 Source:->upper chest  ------------------------->-------------------------  Spec 2 Procedure:->Biopsy  Spec 2 Clinical Impression:->r/o MM  vs atypical nevus vs  other  Spec 2 Source:->R upper back    Questions:    Procedure Type: Dermatology and skin neoplasms    Number of Specimens: 2    ------------------------: -------------------------    Spec 1 Procedure: Biopsy    Spec 1 Clinical Impression: r/o MM vs atypical nevus vs other    Spec 1 Source: upper chest    ------------------------: -------------------------    Spec 2 Procedure: Biopsy    Spec 2 Clinical Impression: r/o MM vs atypical nevus vs other    Spec 2 Source: R upper back    Release to patient:         Skin cancer screening  History of melanoma  No LAD.  Total body skin examination performed today including at least 12 points as noted in physical examination. Suspicious lesions noted above.  Patient instructed in importance of daily broad spectrum sunscreen use with spf at least 30. Sun avoidance and topical protection/protective clothing discussed.    Lentigines  These are benign sun spots which should be monitored for changes. Patient instructed in importance of daily broad spectrum sunscreen use with spf at least 30. Sun avoidance and topical protection/protective clothing discussed.    [place] angioma  This is a benign vascular lesion. Reassurance given. No treatment required. Treatment of benign, asymptomatic lesions may be considered cosmetic.    SK (seborrheic keratosis)  These are benign inherited growths without a malignant potential. Reassurance given to patient. No treatment is necessary.   Treatment of benign, asymptomatic lesions may be considered cosmetic.  Warned about risk of hypo- or hyperpigmentation with treatment and risk of recurrence.    AK (actinic keratosis)  Cryosurgery Procedure Note    Verbal consent from the patient is obtained including, but not limited to, risk of hypopigmentation/hyperpigmentation, scar, recurrence of lesion. The patient is aware of the precancerous quality and need for treatment of these lesions. Liquid nitrogen cryosurgery is applied to the  8 actinic keratoses, as detailed in the physical exam, to produce a freeze injury. The patient is aware that blisters may form and is instructed on wound care with gentle cleansing and use of vaseline ointment to keep moist until healed. The patient is supplied a handout on cryosurgery and is instructed to call if lesions do not completely resolve.    EIC (epidermal inclusion cyst)  This is a benign cyst of the hair follicle. Reassurance provided. No treatment is necessary unless it is symptomatic.     Multiple actinic keratoses  -     fluorouraciL (EFUDEX) 5 % cream; AAA on both arms BID x 2-3 weeks. Stop if blistered, oozing, or bleeding. Use daily sun protection.  Dispense: 40 g; Refill: 1  Counseled extensively regarding the proper use and side effects of efudex. Pt will discontinue use if areas begin to ulcerate, bleed, blister, etc.  Patient instructed in importance of daily broad spectrum sunscreen use with spf at least 30. Sun avoidance and topical protection/protective clothing discussed.      Follow up in about 6 months (around 10/13/2022) for skin check or sooner for any concerns.

## 2022-04-14 ENCOUNTER — PATIENT MESSAGE (OUTPATIENT)
Dept: DERMATOLOGY | Facility: CLINIC | Age: 82
End: 2022-04-14
Payer: MEDICARE

## 2022-04-21 LAB
FINAL PATHOLOGIC DIAGNOSIS: NORMAL
GROSS: NORMAL
Lab: NORMAL
MICROSCOPIC EXAM: NORMAL

## 2022-05-23 ENCOUNTER — PATIENT MESSAGE (OUTPATIENT)
Dept: INTERNAL MEDICINE | Facility: CLINIC | Age: 82
End: 2022-05-23
Payer: MEDICARE

## 2022-05-23 ENCOUNTER — OFFICE VISIT (OUTPATIENT)
Dept: SPORTS MEDICINE | Facility: CLINIC | Age: 82
End: 2022-05-23
Payer: MEDICARE

## 2022-05-23 VITALS
BODY MASS INDEX: 22.89 KG/M2 | DIASTOLIC BLOOD PRESSURE: 65 MMHG | WEIGHT: 172.69 LBS | HEIGHT: 73 IN | SYSTOLIC BLOOD PRESSURE: 129 MMHG | HEART RATE: 68 BPM

## 2022-05-23 DIAGNOSIS — M25.512 CHRONIC LEFT SHOULDER PAIN: Primary | ICD-10-CM

## 2022-05-23 DIAGNOSIS — G89.29 CHRONIC LEFT SHOULDER PAIN: Primary | ICD-10-CM

## 2022-05-23 PROCEDURE — 1125F AMNT PAIN NOTED PAIN PRSNT: CPT | Mod: CPTII,S$GLB,, | Performed by: ORTHOPAEDIC SURGERY

## 2022-05-23 PROCEDURE — 99214 OFFICE O/P EST MOD 30 MIN: CPT | Mod: S$GLB,,, | Performed by: ORTHOPAEDIC SURGERY

## 2022-05-23 PROCEDURE — 1125F PR PAIN SEVERITY QUANTIFIED, PAIN PRESENT: ICD-10-PCS | Mod: CPTII,S$GLB,, | Performed by: ORTHOPAEDIC SURGERY

## 2022-05-23 PROCEDURE — 1100F PR PT FALLS ASSESS DOC 2+ FALLS/FALL W/INJURY/YR: ICD-10-PCS | Mod: CPTII,S$GLB,, | Performed by: ORTHOPAEDIC SURGERY

## 2022-05-23 PROCEDURE — 99999 PR PBB SHADOW E&M-EST. PATIENT-LVL III: CPT | Mod: PBBFAC,,, | Performed by: ORTHOPAEDIC SURGERY

## 2022-05-23 PROCEDURE — 3288F FALL RISK ASSESSMENT DOCD: CPT | Mod: CPTII,S$GLB,, | Performed by: ORTHOPAEDIC SURGERY

## 2022-05-23 PROCEDURE — 1159F MED LIST DOCD IN RCRD: CPT | Mod: CPTII,S$GLB,, | Performed by: ORTHOPAEDIC SURGERY

## 2022-05-23 PROCEDURE — 99214 PR OFFICE/OUTPT VISIT, EST, LEVL IV, 30-39 MIN: ICD-10-PCS | Mod: S$GLB,,, | Performed by: ORTHOPAEDIC SURGERY

## 2022-05-23 PROCEDURE — 3078F DIAST BP <80 MM HG: CPT | Mod: CPTII,S$GLB,, | Performed by: ORTHOPAEDIC SURGERY

## 2022-05-23 PROCEDURE — 3074F SYST BP LT 130 MM HG: CPT | Mod: CPTII,S$GLB,, | Performed by: ORTHOPAEDIC SURGERY

## 2022-05-23 PROCEDURE — 99999 PR PBB SHADOW E&M-EST. PATIENT-LVL III: ICD-10-PCS | Mod: PBBFAC,,, | Performed by: ORTHOPAEDIC SURGERY

## 2022-05-23 PROCEDURE — 1100F PTFALLS ASSESS-DOCD GE2>/YR: CPT | Mod: CPTII,S$GLB,, | Performed by: ORTHOPAEDIC SURGERY

## 2022-05-23 PROCEDURE — 1159F PR MEDICATION LIST DOCUMENTED IN MEDICAL RECORD: ICD-10-PCS | Mod: CPTII,S$GLB,, | Performed by: ORTHOPAEDIC SURGERY

## 2022-05-23 PROCEDURE — 3074F PR MOST RECENT SYSTOLIC BLOOD PRESSURE < 130 MM HG: ICD-10-PCS | Mod: CPTII,S$GLB,, | Performed by: ORTHOPAEDIC SURGERY

## 2022-05-23 PROCEDURE — 3288F PR FALLS RISK ASSESSMENT DOCUMENTED: ICD-10-PCS | Mod: CPTII,S$GLB,, | Performed by: ORTHOPAEDIC SURGERY

## 2022-05-23 PROCEDURE — 3078F PR MOST RECENT DIASTOLIC BLOOD PRESSURE < 80 MM HG: ICD-10-PCS | Mod: CPTII,S$GLB,, | Performed by: ORTHOPAEDIC SURGERY

## 2022-05-23 NOTE — PROGRESS NOTES
CC: left Shoulder pain. Referral: prior patient    81 y.o. RHD Male reports left shoulder atraumatic pain.   Returns to clinic for f/u of left shoulder pain. He has done some PT but had to stop due to need for carotid artery occlusion treated surgically by Dr. Farooq.   Has not been able to do HEP. Intermittent pain. No medication needs right now. Returned to pre-therapy level of function.     Left shoulder fell may 15, 2022, pain since feels like is worse: SANE 20 now        SANE:    Left shoulder: 50 previously, 75, 50 12/8    Right shoulder: 100    DATE OF PROCEDURE: 02/27/2020  SURGEON: Staci Childress M.D.  OPERATION:   right  1. Shoulder arthroscopic rotator cuff repair 45 x 25 mm, large, double row (CPT 10594) (complex, -22 modifier)  2. Shoulder subpectoral biceps tenodesis, arthroscopic-assisted (CPT 31697)  3. Shoulder arthroscopic subacromial decompression, bursectomy   4. Shoulder arthroscopic extensive debridement (anterior, posterior glenohumeral joint, subacromial space) (CPT 79777)  5. Shoulder arthroscopic microfracture (Crimson duvet technique) (CPT 49416)  6. Shoulder arthroscopic lysis of adhesions (CPT 97198)  7. Shoulder ORIF greater tuberosity (CPT 46992)      PAST MEDICAL HISTORY:   Past Medical History:   Diagnosis Date    CKD (chronic kidney disease) stage 3, GFR 30-59 ml/min     Dyslipidemia     GERD (gastroesophageal reflux disease)     Hx of melanoma excision     Hypertension     Melanoma     PAD (peripheral artery disease)     Squamous cell carcinoma of skin      PAST SURGICAL HISTORY:   Past Surgical History:   Procedure Laterality Date    ARTHROSCOPIC DEBRIDEMENT OF SHOULDER Right 2/27/2020    Procedure: EXTENSIVE DEBRIDEMENT, SHOULDER, ARTHROSCOPIC;  Surgeon: Staci Childress MD;  Location: [facility];  Service: Orthopedics;  Laterality: Right;    ARTHROSCOPIC REPAIR OF ROTATOR CUFF OF SHOULDER Right 2/27/2020    Procedure: REPAIR, ROTATOR CUFF, ARTHROSCOPIC;  Surgeon: Staci Childress MD;   Location: [facility] OR;  Service: Orthopedics;  Laterality: Right;    ARTHROSCOPY OF SHOULDER WITH DECOMPRESSION OF SUBACROMIAL SPACE Right 2020    Procedure: ARTHROSCOPY, SHOULDER, WITH SUBACROMIAL SPACE DECOMPRESSION;  Surgeon: Staci Childress MD;  Location: [facility] OR;  Service: Orthopedics;  Laterality: Right;    BLEPHAROPLASTY      CAROTID ENDARTERECTOMY Right 10/15/2021    Procedure: ENDARTERECTOMY-CAROTID;  Surgeon: Imer Farooq MD;  Location: [address];  Service: Peripheral Vascular;  Laterality: Right;  AWAKE CERVICAL BLOCK    CATARACT EXTRACTION, BILATERAL      FIXATION OF TENDON Right 2020    Procedure: FIXATION, TENDON, Biceps Tenodesis;  Surgeon: Staci Childress MD;  Location: [facility] OR;  Service: Orthopedics;  Laterality: Right;  FIXATION, TENDON, Biceps Tenodesis    OPEN REDUCTION AND INTERNAL FIXATION (ORIF) OF FRACTURE OF PROXIMAL HUMERUS Right 2020    Procedure: ORIF, FRACTURE, GREATER TUBEROSITY;  Surgeon: Staci Childress MD;  Location: [facility] OR;  Service: Orthopedics;  Laterality: Right;    TONSILLECTOMY       FAMILY HISTORY:   Family History   Problem Relation Age of Onset    Diabetes Mother     Hyperlipidemia Mother     Heart disease Father     Cancer Sister         ? colon or uterine    Colon cancer Sister      SOCIAL HISTORY:   Social History     Socioeconomic History    Marital status:    Tobacco Use    Smoking status: Former Smoker     Types: Cigarettes     Quit date:      Years since quittin.4    Smokeless tobacco: Never Used   Substance and Sexual Activity    Alcohol use: Yes     Alcohol/week: 14.0 standard drinks     Types: 14 Glasses of wine per week     Comment: 2 glasses of wine daily    Drug use: Never    Sexual activity: Not Currently     Social Determinants of Health     Financial Resource Strain: Low Risk     Difficulty of Paying Living Expenses: Not hard at all   Food Insecurity: No Food Insecurity    Worried About Running Out of Food in the  Last Year: Never true    Ran Out of Food in the Last Year: Never true   Transportation Needs: No Transportation Needs    Lack of Transportation (Medical): No    Lack of Transportation (Non-Medical): No   Physical Activity: Sufficiently Active    Days of Exercise per Week: 6 days    Minutes of Exercise per Session: 40 min   Stress: No Stress Concern Present    Feeling of Stress : Not at all   Social Connections: Socially Isolated    Frequency of Communication with Friends and Family: Once a week    Frequency of Social Gatherings with Friends and Family: Once a week    Attends Temple Services: Never    Active Member of Clubs or Organizations: No    Attends Club or Organization Meetings: Never    Marital Status:    Housing Stability: Low Risk     Unable to Pay for Housing in the Last Year: No    Number of Places Lived in the Last Year: 1    Unstable Housing in the Last Year: No       MEDICATIONS:   Current Outpatient Medications:     aspirin (ECOTRIN) 81 MG EC tablet, Take 81 mg by mouth once daily., Disp: , Rfl:     atenoloL (TENORMIN) 50 MG tablet, Take 1 tablet (50 mg total) by mouth once daily., Disp: 90 tablet, Rfl: 3    bempedoic acid (NEXLETOL) 180 mg Tab, Take 1 tablet (180 mg) by mouth once daily., Disp: 90 tablet, Rfl: 3    collagen/biotin/ascorbic acid (COLLAGEN 1500 PLUS C ORAL), Take by mouth Daily. 11 G of powder daily, Disp: , Rfl:     fluorouraciL (EFUDEX) 5 % cream, AAA on both arms BID x 2-3 weeks. Stop if blistered, oozing, or bleeding. Use daily sun protection., Disp: 40 g, Rfl: 1    multivit-min-FA-lycopen-lutein 300-600-300 mcg Tab, Take by mouth., Disp: , Rfl:     omega-3 fatty acids/fish oil (FISH OIL-OMEGA-3 FATTY ACIDS) 300-1,000 mg capsule, Take by mouth once daily., Disp: , Rfl:     omeprazole (PRILOSEC) 20 MG capsule, Take 2 capsules (40 mg total) by mouth once daily., Disp: 90 capsule, Rfl: 3    rosuvastatin (CRESTOR) 20 MG tablet, Take 1 tablet (20 mg  "total) by mouth once daily., Disp: 90 tablet, Rfl: 3    tadalafiL (CIALIS) 5 MG tablet, Take 1 tablet (5 mg total) by mouth once daily., Disp: 30 tablet, Rfl: 6    triamterene-hydrochlorothiazide 37.5-25 mg (MAXZIDE-25) 37.5-25 mg per tablet, Take 1 tablet by mouth once daily., Disp: 90 tablet, Rfl: 3  ALLERGIES:   Review of patient's allergies indicates:   Allergen Reactions    Clindamycin Nausea And Vomiting    Penicillins Hives       VITAL SIGNS: /65   Pulse 68   Ht 6' 1" (1.854 m)   Wt 78.3 kg (172 lb 11.2 oz)   BMI 22.79 kg/m²      Review of Systems   Constitution: Negative. Negative for chills, fever and night sweats.   HENT: Negative for congestion and headaches.    Eyes: Negative for blurred vision, left vision loss and right vision loss.   Cardiovascular: Negative for chest pain and syncope.   Respiratory: Negative for cough and shortness of breath.    Endocrine: Negative for polydipsia, polyphagia and polyuria.   Hematologic/Lymphatic: Negative for bleeding problem. Does not bruise/bleed easily.   Skin: Negative for dry skin, itching and rash.   Musculoskeletal: Negative for falls and muscle weakness.   Gastrointestinal: Negative for abdominal pain and bowel incontinence.   Genitourinary: Negative for bladder incontinence and nocturia.   Neurological: Negative for disturbances in coordination, loss of balance and seizures.   Psychiatric/Behavioral: Negative for depression. The patient does not have insomnia.    Allergic/Immunologic: Negative for hives and persistent infections.       PHYSICAL EXAMINATION:  General:  The patient is alert and oriented x 3.  Mood is pleasant.  Observation of ears, eyes and nose reveal no gross abnormalities.  HEENT: NCAT, sclera nonicteric  Lungs: Respirations are equal and unlabored.  Gait is coordinated. Patient can toe walk and heel walk without difficulty.  Cardiovascular: There are no swelling or varicosities present.   Lymphatic: Negative for adenopathy "       left Shoulder / Upper Extremity Exam    OBSERVATION:     Swelling  none  Deformity  none   Discoloration  none   Scapular winging none   Scars   none  Atrophy  none    TENDERNESS / CREPITUS (T/C):          T/C      T/C   Clavicle   -/-  SUPRAspinatus    -/-   AC Jt.    +/-  INFRAspinatus  -/-   SC Jt.    -/-  Deltoid    -/-   G. Tuberosity  -/-  LH BICEP groove  +/-   Acromion:  -/-  Midline Neck   -/-   Scapular Spine -/-  Trapezium   -/-   SMA Scapula  -/-  GH jt. line - post  -/-   Scapulothoracic  -/-         ROM: (* = with pain)  Right shoulder   Left shoulder        AROM (PROM)   AROM (PROM)   FE    170° (175°)     170° (175°)     ER at 0°    60°  (65°)    60°  (65°)   ER at 90° ABD  90°  (90°)    90°  (90°)   IR at 90°  ABD   NA  (40°)     NA  (40°)      IR (spine level)   T10     T10    STRENGTH: (* = with pain) RIGHT SHOULDER  LEFT SHOULDER   SCAPTION at 0   5/5    3/5*    SCAPTION at 30   5/5    4/5*    IR    5/5    5/5   ER    5/5    5/5   BICEPS   5/5    5/5   Deltoid    5/5    5/5     SIGNS:  Painful side       NEER   +   OENRIQUES  +   GARZA   +   SPEEDS  +   DROP ARM   +   BELLY PRESS Neg   Superior escape none    LIFT-OFF  Neg   X-Body ADD    neg    MOVING VALGUS Neg      STABILITY TESTING    RIGHT SHOULDER   LEFT SHOULDER       Translation    Anterior  up face     up face    Posterior  up face    up face    Sulcus   < 10mm    < 10 mm    Signs    Apprehension   neg      Neg    Relocation   no change     no change    Jerk test  neg     Neg      EXTREMITY NEURO-VASCULAR EXAM    Sensation grossly intact to light touch all dermatomal regions.    DTR 2+ Biceps, Triceps, BR and Negative Terrells sign   Grossly intact motor function at Elbow, Wrist and Hand   Distal pulses radial and ulnar 2+, brisk cap refill, symmetric.      NECK:  Painless FROM and spinous processes non-tender. Negative Spurlings sign.      OTHER FINDINGS:  Scapular dyskinesia    XRAYS:  Shoulder trauma series left,  were  ordered and reviewed by me. No convincing fracture or dislocation is noted. The osseous structures appear well mineralized and well aligned. Mild osteoarthritis noted.    1. Shoulder pain, left     Plan:       ASSESSMENT:  Left shoulder pain. Scapular dyskinesia. Concern for worsening rotator cuff tear. S/p right RCR doing well    - Progression of symptoms and debility, will order MRI,  R/o cuff tear propgagation s/p fall  -Continue physical therapy for cuff strengthening & periscapular stabilization as able  -NSAIDs prn, must be cleared by vascular surgery       All questions answered    right   1. Shoulder pain,possible RTC tear    2.  Possible insufficiency fracture greater tuberosity     MRI Right shoulder: Full thickness supraspinatus tear With retraction. SLAP, biceps tendinitis, + insufficiency fracture greater tuberosity  AC moderate hypertrophy, degenerative changes, subscapularis tear     ASSESSMENT:    Right Shoulder Rotator Cuff Tear, SLAP, biceps tendonitis.       he would benefit from an arthroscopy, given the above.         PLAN:   left Shoulder rotator cuff tear      All questions were answered, pt will contact us for questions or concerns in the interim.  Had thorough discussion of non-operative vs operative intervention, and risks and benefits of both.      We have discussed the surgery and recovery of arthroscopic shoulder surgery. he understands that there may be limited mobility up to several weeks after surgery depending on procedures that are performed at the time of surgery.     The spectrum of treatment options were discussed with the patient, including nonoperative and operative options.  After thorough discussion, the patient has elected to undergo surgical treatment to include:     left              a. Shoulder arthroscopic rotator cuff repair with cuffmend              b. Shoulder arthroscopic SAD   c'. Shoulder arthroscopic DCE              c. Shoulder arthroscopic extensive  debridement              d. Shoulder arthroscopic biceps tenodesis (vs. subpect tenodesis)              e. Shoulder arthroscopic possible microfracture              f.  Shoulder arthroscopic possible subscapularis repair              g.  Shoulder open reduction internal fixation greater tuberosity     The details of the surgical procedure were explained, including the location of probable incisions and a description of likely hardware and/or grafts to be used.  The patient understands the likely convalescence after surgery.  Also, we have thoroughly discussed the risks, benefits and alternatives to surgery, including, but not limited to, the risk of infection, joint stiffness, blood clot (including DVT and/or pulmonary embolus), neurologic and vascular injury.  It was explained that, if tissue has been repaired or reconstructed, there is a chance of failure, which may require further management.  Consent form for surgery is signed and in chart.

## 2022-05-24 DIAGNOSIS — K21.9 GASTROESOPHAGEAL REFLUX DISEASE, UNSPECIFIED WHETHER ESOPHAGITIS PRESENT: ICD-10-CM

## 2022-05-24 RX ORDER — OMEPRAZOLE 20 MG/1
40 CAPSULE, DELAYED RELEASE ORAL DAILY
Qty: 180 CAPSULE | Refills: 3 | Status: SHIPPED | OUTPATIENT
Start: 2022-05-24 | End: 2023-04-26

## 2022-05-24 NOTE — TELEPHONE ENCOUNTER
Faxed rx.  Refilled 90 x 3  In December.  [facility] requesting 90 day supply.    lov 12/1/21  Annual mukesh/ ellyn

## 2022-05-24 NOTE — TELEPHONE ENCOUNTER
Not for insurance purposes but if surgery is planned and if he needs medical clearance we will need to see him

## 2022-05-27 ENCOUNTER — PATIENT MESSAGE (OUTPATIENT)
Dept: VASCULAR SURGERY | Facility: CLINIC | Age: 82
End: 2022-05-27
Payer: MEDICARE

## 2022-05-27 ENCOUNTER — PATIENT MESSAGE (OUTPATIENT)
Dept: INTERNAL MEDICINE | Facility: CLINIC | Age: 82
End: 2022-05-27
Payer: MEDICARE

## 2022-05-27 ENCOUNTER — PATIENT MESSAGE (OUTPATIENT)
Dept: SURGERY | Facility: HOSPITAL | Age: 82
End: 2022-05-27
Payer: MEDICARE

## 2022-05-27 DIAGNOSIS — M19.019 ACROMIOCLAVICULAR JOINT ARTHRITIS, UNSPECIFIED LATERALITY: ICD-10-CM

## 2022-05-27 DIAGNOSIS — M75.102 NONTRAUMATIC TEAR OF LEFT ROTATOR CUFF, UNSPECIFIED TEAR EXTENT: Primary | ICD-10-CM

## 2022-05-27 DIAGNOSIS — S43.432A SUPERIOR GLENOID LABRUM LESION OF LEFT SHOULDER, INITIAL ENCOUNTER: ICD-10-CM

## 2022-05-27 DIAGNOSIS — M75.20 BICEPS TENDINITIS, UNSPECIFIED LATERALITY: ICD-10-CM

## 2022-05-27 DIAGNOSIS — S42.255A CLOSED NONDISPLACED FRACTURE OF GREATER TUBEROSITY OF LEFT HUMERUS, INITIAL ENCOUNTER: ICD-10-CM

## 2022-06-02 ENCOUNTER — OFFICE VISIT (OUTPATIENT)
Dept: INTERNAL MEDICINE | Facility: CLINIC | Age: 82
End: 2022-06-02
Payer: MEDICARE

## 2022-06-02 ENCOUNTER — LAB VISIT (OUTPATIENT)
Dept: LAB | Facility: HOSPITAL | Age: 82
End: 2022-06-02
Attending: INTERNAL MEDICINE
Payer: MEDICARE

## 2022-06-02 ENCOUNTER — PATIENT MESSAGE (OUTPATIENT)
Dept: INTERNAL MEDICINE | Facility: CLINIC | Age: 82
End: 2022-06-02
Payer: MEDICARE

## 2022-06-02 VITALS
RESPIRATION RATE: 18 BRPM | SYSTOLIC BLOOD PRESSURE: 130 MMHG | HEIGHT: 73 IN | TEMPERATURE: 98 F | HEART RATE: 62 BPM | WEIGHT: 171.75 LBS | DIASTOLIC BLOOD PRESSURE: 62 MMHG | BODY MASS INDEX: 22.76 KG/M2 | OXYGEN SATURATION: 97 %

## 2022-06-02 DIAGNOSIS — I73.9 PAD (PERIPHERAL ARTERY DISEASE): ICD-10-CM

## 2022-06-02 DIAGNOSIS — I10 HTN (HYPERTENSION), BENIGN: ICD-10-CM

## 2022-06-02 DIAGNOSIS — Z01.818 PREOP EXAM FOR INTERNAL MEDICINE: Primary | ICD-10-CM

## 2022-06-02 DIAGNOSIS — Z01.818 PREOP EXAM FOR INTERNAL MEDICINE: ICD-10-CM

## 2022-06-02 DIAGNOSIS — I65.23 CAROTID ATHEROSCLEROSIS, BILATERAL: ICD-10-CM

## 2022-06-02 LAB
ANION GAP SERPL CALC-SCNC: 9 MMOL/L (ref 8–16)
BASOPHILS # BLD AUTO: 0.04 K/UL (ref 0–0.2)
BASOPHILS NFR BLD: 0.5 % (ref 0–1.9)
BUN SERPL-MCNC: 37 MG/DL (ref 8–23)
CALCIUM SERPL-MCNC: 9.5 MG/DL (ref 8.7–10.5)
CHLORIDE SERPL-SCNC: 103 MMOL/L (ref 95–110)
CO2 SERPL-SCNC: 28 MMOL/L (ref 23–29)
CREAT SERPL-MCNC: 1.4 MG/DL (ref 0.5–1.4)
DIFFERENTIAL METHOD: ABNORMAL
EOSINOPHIL # BLD AUTO: 0.2 K/UL (ref 0–0.5)
EOSINOPHIL NFR BLD: 2.9 % (ref 0–8)
ERYTHROCYTE [DISTWIDTH] IN BLOOD BY AUTOMATED COUNT: 12.8 % (ref 11.5–14.5)
EST. GFR  (AFRICAN AMERICAN): 54.1 ML/MIN/1.73 M^2
EST. GFR  (NON AFRICAN AMERICAN): 46.8 ML/MIN/1.73 M^2
GLUCOSE SERPL-MCNC: 89 MG/DL (ref 70–110)
HCT VFR BLD AUTO: 40.1 % (ref 40–54)
HGB BLD-MCNC: 12.9 G/DL (ref 14–18)
IMM GRANULOCYTES # BLD AUTO: 0.03 K/UL (ref 0–0.04)
IMM GRANULOCYTES NFR BLD AUTO: 0.4 % (ref 0–0.5)
LYMPHOCYTES # BLD AUTO: 3.8 K/UL (ref 1–4.8)
LYMPHOCYTES NFR BLD: 50.3 % (ref 18–48)
MCH RBC QN AUTO: 31.9 PG (ref 27–31)
MCHC RBC AUTO-ENTMCNC: 32.2 G/DL (ref 32–36)
MCV RBC AUTO: 99 FL (ref 82–98)
MONOCYTES # BLD AUTO: 0.6 K/UL (ref 0.3–1)
MONOCYTES NFR BLD: 7.8 % (ref 4–15)
NEUTROPHILS # BLD AUTO: 2.9 K/UL (ref 1.8–7.7)
NEUTROPHILS NFR BLD: 38.1 % (ref 38–73)
NRBC BLD-RTO: 0 /100 WBC
PLATELET # BLD AUTO: 229 K/UL (ref 150–450)
PMV BLD AUTO: 11.6 FL (ref 9.2–12.9)
POTASSIUM SERPL-SCNC: 4.4 MMOL/L (ref 3.5–5.1)
RBC # BLD AUTO: 4.05 M/UL (ref 4.6–6.2)
SODIUM SERPL-SCNC: 140 MMOL/L (ref 136–145)
WBC # BLD AUTO: 7.59 K/UL (ref 3.9–12.7)

## 2022-06-02 PROCEDURE — 1101F PT FALLS ASSESS-DOCD LE1/YR: CPT | Mod: CPTII,S$GLB,, | Performed by: INTERNAL MEDICINE

## 2022-06-02 PROCEDURE — 93005 EKG 12-LEAD: ICD-10-PCS | Mod: S$GLB,,, | Performed by: INTERNAL MEDICINE

## 2022-06-02 PROCEDURE — 99214 OFFICE O/P EST MOD 30 MIN: CPT | Mod: S$GLB,,, | Performed by: INTERNAL MEDICINE

## 2022-06-02 PROCEDURE — 80048 BASIC METABOLIC PNL TOTAL CA: CPT | Performed by: INTERNAL MEDICINE

## 2022-06-02 PROCEDURE — 99499 RISK ADDL DX/OHS AUDIT: ICD-10-PCS | Mod: S$GLB,,, | Performed by: INTERNAL MEDICINE

## 2022-06-02 PROCEDURE — 85025 COMPLETE CBC W/AUTO DIFF WBC: CPT | Performed by: INTERNAL MEDICINE

## 2022-06-02 PROCEDURE — 3075F PR MOST RECENT SYSTOLIC BLOOD PRESS GE 130-139MM HG: ICD-10-PCS | Mod: CPTII,S$GLB,, | Performed by: INTERNAL MEDICINE

## 2022-06-02 PROCEDURE — 93010 ELECTROCARDIOGRAM REPORT: CPT | Mod: S$GLB,,, | Performed by: INTERNAL MEDICINE

## 2022-06-02 PROCEDURE — 3078F DIAST BP <80 MM HG: CPT | Mod: CPTII,S$GLB,, | Performed by: INTERNAL MEDICINE

## 2022-06-02 PROCEDURE — 93005 ELECTROCARDIOGRAM TRACING: CPT | Mod: S$GLB,,, | Performed by: INTERNAL MEDICINE

## 2022-06-02 PROCEDURE — 3288F PR FALLS RISK ASSESSMENT DOCUMENTED: ICD-10-PCS | Mod: CPTII,S$GLB,, | Performed by: INTERNAL MEDICINE

## 2022-06-02 PROCEDURE — 3075F SYST BP GE 130 - 139MM HG: CPT | Mod: CPTII,S$GLB,, | Performed by: INTERNAL MEDICINE

## 2022-06-02 PROCEDURE — 3288F FALL RISK ASSESSMENT DOCD: CPT | Mod: CPTII,S$GLB,, | Performed by: INTERNAL MEDICINE

## 2022-06-02 PROCEDURE — 1126F AMNT PAIN NOTED NONE PRSNT: CPT | Mod: CPTII,S$GLB,, | Performed by: INTERNAL MEDICINE

## 2022-06-02 PROCEDURE — 36415 COLL VENOUS BLD VENIPUNCTURE: CPT | Mod: PO | Performed by: INTERNAL MEDICINE

## 2022-06-02 PROCEDURE — 99999 PR PBB SHADOW E&M-EST. PATIENT-LVL IV: ICD-10-PCS | Mod: PBBFAC,,, | Performed by: INTERNAL MEDICINE

## 2022-06-02 PROCEDURE — 93010 EKG 12-LEAD: ICD-10-PCS | Mod: S$GLB,,, | Performed by: INTERNAL MEDICINE

## 2022-06-02 PROCEDURE — 1126F PR PAIN SEVERITY QUANTIFIED, NO PAIN PRESENT: ICD-10-PCS | Mod: CPTII,S$GLB,, | Performed by: INTERNAL MEDICINE

## 2022-06-02 PROCEDURE — 3078F PR MOST RECENT DIASTOLIC BLOOD PRESSURE < 80 MM HG: ICD-10-PCS | Mod: CPTII,S$GLB,, | Performed by: INTERNAL MEDICINE

## 2022-06-02 PROCEDURE — 1159F MED LIST DOCD IN RCRD: CPT | Mod: CPTII,S$GLB,, | Performed by: INTERNAL MEDICINE

## 2022-06-02 PROCEDURE — 99214 PR OFFICE/OUTPT VISIT, EST, LEVL IV, 30-39 MIN: ICD-10-PCS | Mod: S$GLB,,, | Performed by: INTERNAL MEDICINE

## 2022-06-02 PROCEDURE — 99999 PR PBB SHADOW E&M-EST. PATIENT-LVL IV: CPT | Mod: PBBFAC,,, | Performed by: INTERNAL MEDICINE

## 2022-06-02 PROCEDURE — 99499 UNLISTED E&M SERVICE: CPT | Mod: S$GLB,,, | Performed by: INTERNAL MEDICINE

## 2022-06-02 PROCEDURE — 1101F PR PT FALLS ASSESS DOC 0-1 FALLS W/OUT INJ PAST YR: ICD-10-PCS | Mod: CPTII,S$GLB,, | Performed by: INTERNAL MEDICINE

## 2022-06-02 PROCEDURE — 1159F PR MEDICATION LIST DOCUMENTED IN MEDICAL RECORD: ICD-10-PCS | Mod: CPTII,S$GLB,, | Performed by: INTERNAL MEDICINE

## 2022-06-02 NOTE — PROGRESS NOTES
History of present illness:  82-year-old gentleman in today for preoperative medical evaluation for planned right rotator cuff arthroscopic surgery scheduled for June 21, 2022--Dr Childress.  Patient has a history of hypertension, PA D, dyslipidemia, CKD.  He reports currently overall doing well.  No chest pain, palpitations, syncope.  No cough no shortness of breath.  No PND, no orthopnea.  Takes all medications as directed.  He has had several orthopedic procedures in the last couple of years without difficulty.      Current medications:  Atenolol 50 mg daily.  Rosuvastatin 20 mg daily.  bempedoic acid 180 mg daily.  Prilosec 40mg daily.  Dyazide one q.d..  Aspirin 81 mg daily    Review of systems:  Constitutional:  No fever no chills no generalized body aches.  HEENT:  Normocephalic.  Neck supple no masses no thyromegaly.  Respiratory:  No cough no shortness of breath.  Cardiovascular:  No chest pain, no palpitations, no syncope.  No PND.  No orthopnea  GI:  No nausea no vomiting abdominal pain.  Neurologic:  No new focal neurological symptomatologies.      Physical examination:  General:  Alert male no acute distress.  Vital signs:  Blood pressure 130/62.  Pulse 60 and regular.  HEENT:  Normocephalic.  Neck supple no masses no thyromegaly.  Lungs:  Clear to auscultation.  Cardiovascular:  Regular rate rhythm.  Occasional ectopy.  1/6 systolic murmur.  No JVD.  No significant peripheral extremity edema.  No overt ischemic changes of lower extremities.  Abdomen:  Soft benign no masses no tenderness no organomegaly.  Neurologic:  No gross focal motor deficits.  Gait normal.  Mental status:  Alert oriented affect mood all appropriate.    Data:  CBC and basic metabolic profile reviewed.  ECG reviewed    Revised cardiovascular risk index 0.4%.  Mets greater than equal to 4.        Impression:  82-year-old gentleman with several chronic medical conditions all of which are stable.    PA D clinically stable.    Carotid  atherosclerotic disease stable.    Hypertension controlled.    Dyslipidemia.    CKD three stable        Plan:  As per evaluation and assessed data it is reasonable to proceed with the planned arthroscopic shoulder surgery

## 2022-06-06 ENCOUNTER — PATIENT MESSAGE (OUTPATIENT)
Dept: SURGERY | Facility: HOSPITAL | Age: 82
End: 2022-06-06
Payer: MEDICARE

## 2022-06-06 RX ORDER — KETOCONAZOLE 20 MG/G
CREAM TOPICAL 2 TIMES DAILY
Qty: 30 G | Refills: 2 | Status: SHIPPED | OUTPATIENT
Start: 2022-06-06

## 2022-06-13 ENCOUNTER — HOSPITAL ENCOUNTER (OUTPATIENT)
Dept: RADIOLOGY | Facility: HOSPITAL | Age: 82
Discharge: HOME | End: 2022-06-13
Attending: ORTHOPAEDIC SURGERY
Payer: MEDICARE

## 2022-06-13 ENCOUNTER — OFFICE VISIT (OUTPATIENT)
Dept: SPORTS MEDICINE | Facility: CLINIC | Age: 82
End: 2022-06-13
Payer: MEDICARE

## 2022-06-13 VITALS
HEART RATE: 70 BPM | DIASTOLIC BLOOD PRESSURE: 62 MMHG | WEIGHT: 171 LBS | SYSTOLIC BLOOD PRESSURE: 130 MMHG | HEIGHT: 73 IN | TEMPERATURE: 97 F | BODY MASS INDEX: 22.66 KG/M2

## 2022-06-13 DIAGNOSIS — M75.102 NONTRAUMATIC TEAR OF LEFT ROTATOR CUFF, UNSPECIFIED TEAR EXTENT: Primary | ICD-10-CM

## 2022-06-13 DIAGNOSIS — M25.512 CHRONIC LEFT SHOULDER PAIN: ICD-10-CM

## 2022-06-13 DIAGNOSIS — G89.29 CHRONIC LEFT SHOULDER PAIN: ICD-10-CM

## 2022-06-13 PROCEDURE — 1160F PR REVIEW ALL MEDS BY PRESCRIBER/CLIN PHARMACIST DOCUMENTED: ICD-10-PCS | Mod: CPTII,S$GLB,, | Performed by: PHYSICIAN ASSISTANT

## 2022-06-13 PROCEDURE — 3075F SYST BP GE 130 - 139MM HG: CPT | Mod: CPTII,S$GLB,, | Performed by: PHYSICIAN ASSISTANT

## 2022-06-13 PROCEDURE — 1126F AMNT PAIN NOTED NONE PRSNT: CPT | Mod: CPTII,S$GLB,, | Performed by: PHYSICIAN ASSISTANT

## 2022-06-13 PROCEDURE — 1101F PT FALLS ASSESS-DOCD LE1/YR: CPT | Mod: CPTII,S$GLB,, | Performed by: PHYSICIAN ASSISTANT

## 2022-06-13 PROCEDURE — 99499 NO LOS: ICD-10-PCS | Mod: S$GLB,,, | Performed by: PHYSICIAN ASSISTANT

## 2022-06-13 PROCEDURE — 99999 PR PBB SHADOW E&M-EST. PATIENT-LVL IV: CPT | Mod: PBBFAC,,, | Performed by: PHYSICIAN ASSISTANT

## 2022-06-13 PROCEDURE — 3078F PR MOST RECENT DIASTOLIC BLOOD PRESSURE < 80 MM HG: ICD-10-PCS | Mod: CPTII,S$GLB,, | Performed by: PHYSICIAN ASSISTANT

## 2022-06-13 PROCEDURE — 3288F PR FALLS RISK ASSESSMENT DOCUMENTED: ICD-10-PCS | Mod: CPTII,S$GLB,, | Performed by: PHYSICIAN ASSISTANT

## 2022-06-13 PROCEDURE — 1126F PR PAIN SEVERITY QUANTIFIED, NO PAIN PRESENT: ICD-10-PCS | Mod: CPTII,S$GLB,, | Performed by: PHYSICIAN ASSISTANT

## 2022-06-13 PROCEDURE — 1101F PR PT FALLS ASSESS DOC 0-1 FALLS W/OUT INJ PAST YR: ICD-10-PCS | Mod: CPTII,S$GLB,, | Performed by: PHYSICIAN ASSISTANT

## 2022-06-13 PROCEDURE — 3288F FALL RISK ASSESSMENT DOCD: CPT | Mod: CPTII,S$GLB,, | Performed by: PHYSICIAN ASSISTANT

## 2022-06-13 PROCEDURE — 73221 MRI JOINT UPR EXTREM W/O DYE: CPT | Mod: 26,LT,, | Performed by: RADIOLOGY

## 2022-06-13 PROCEDURE — 3075F PR MOST RECENT SYSTOLIC BLOOD PRESS GE 130-139MM HG: ICD-10-PCS | Mod: CPTII,S$GLB,, | Performed by: PHYSICIAN ASSISTANT

## 2022-06-13 PROCEDURE — 1160F RVW MEDS BY RX/DR IN RCRD: CPT | Mod: CPTII,S$GLB,, | Performed by: PHYSICIAN ASSISTANT

## 2022-06-13 PROCEDURE — 99499 UNLISTED E&M SERVICE: CPT | Mod: S$GLB,,, | Performed by: PHYSICIAN ASSISTANT

## 2022-06-13 PROCEDURE — 1159F PR MEDICATION LIST DOCUMENTED IN MEDICAL RECORD: ICD-10-PCS | Mod: CPTII,S$GLB,, | Performed by: PHYSICIAN ASSISTANT

## 2022-06-13 PROCEDURE — 1159F MED LIST DOCD IN RCRD: CPT | Mod: CPTII,S$GLB,, | Performed by: PHYSICIAN ASSISTANT

## 2022-06-13 PROCEDURE — 3078F DIAST BP <80 MM HG: CPT | Mod: CPTII,S$GLB,, | Performed by: PHYSICIAN ASSISTANT

## 2022-06-13 PROCEDURE — 73221 MRI JOINT UPR EXTREM W/O DYE: CPT | Mod: TC,LT

## 2022-06-13 PROCEDURE — 99999 PR PBB SHADOW E&M-EST. PATIENT-LVL IV: ICD-10-PCS | Mod: PBBFAC,,, | Performed by: PHYSICIAN ASSISTANT

## 2022-06-13 PROCEDURE — 73221 MRI SHOULDER WITHOUT CONTRAST LEFT: ICD-10-PCS | Mod: 26,LT,, | Performed by: RADIOLOGY

## 2022-06-13 RX ORDER — PREGABALIN 75 MG/1
75 CAPSULE ORAL
Status: CANCELLED | OUTPATIENT
Start: 2022-06-13 | End: 2022-06-13

## 2022-06-13 RX ORDER — PROMETHAZINE HYDROCHLORIDE 25 MG/1
25 TABLET ORAL EVERY 6 HOURS PRN
Qty: 8 TABLET | Refills: 0 | Status: SHIPPED | OUTPATIENT
Start: 2022-06-13 | End: 2023-12-06

## 2022-06-13 RX ORDER — TRAMADOL HYDROCHLORIDE 50 MG/1
50-100 TABLET ORAL EVERY 6 HOURS PRN
Qty: 20 TABLET | Refills: 0 | Status: SHIPPED | OUTPATIENT
Start: 2022-06-13 | End: 2023-12-06

## 2022-06-13 RX ORDER — SODIUM CHLORIDE 9 MG/ML
INJECTION, SOLUTION INTRAVENOUS CONTINUOUS
Status: CANCELLED | OUTPATIENT
Start: 2022-06-13

## 2022-06-13 RX ORDER — OXYCODONE AND ACETAMINOPHEN 10; 325 MG/1; MG/1
TABLET ORAL
Qty: 15 TABLET | Refills: 0 | Status: SHIPPED | OUTPATIENT
Start: 2022-06-13 | End: 2023-12-06

## 2022-06-13 NOTE — H&P (VIEW-ONLY)
Gus Sabillon  is here for a completion of his perioperative paperwork. he  Is scheduled to undergo     left              a. Shoulder arthroscopic rotator cuff repair with cuffmend              b. Shoulder arthroscopic SAD              c'. Shoulder arthroscopic DCE              c. Shoulder arthroscopic extensive debridement              d. Shoulder arthroscopic biceps tenodesis (vs. subpect tenodesis)              e. Shoulder arthroscopic possible microfracture              f.  Shoulder arthroscopic possible subscapularis repair              g.  Shoulder open reduction internal fixation greater tuberosity on 6/21/22.     [facility] sleeve.     He is a healthy individual but does need clearance for this procedure which he has received from PCP, Dr. Mora.     PAST MEDICAL HISTORY:   Past Medical History:   Diagnosis Date    CKD (chronic kidney disease) stage 3, GFR 30-59 ml/min     Dyslipidemia     GERD (gastroesophageal reflux disease)     Hx of melanoma excision     Hypertension     Melanoma     PAD (peripheral artery disease)     Squamous cell carcinoma of skin      PAST SURGICAL HISTORY:   Past Surgical History:   Procedure Laterality Date    ARTHROSCOPIC DEBRIDEMENT OF SHOULDER Right 2/27/2020    Procedure: EXTENSIVE DEBRIDEMENT, SHOULDER, ARTHROSCOPIC;  Surgeon: Staci Childress MD;  Location: [facility] OR;  Service: Orthopedics;  Laterality: Right;    ARTHROSCOPIC REPAIR OF ROTATOR CUFF OF SHOULDER Right 2/27/2020    Procedure: REPAIR, ROTATOR CUFF, ARTHROSCOPIC;  Surgeon: Staci Childress MD;  Location: [facility] OR;  Service: Orthopedics;  Laterality: Right;    ARTHROSCOPY OF SHOULDER WITH DECOMPRESSION OF SUBACROMIAL SPACE Right 2/27/2020    Procedure: ARTHROSCOPY, SHOULDER, WITH SUBACROMIAL SPACE DECOMPRESSION;  Surgeon: Staci Childress MD;  Location: [facility] OR;  Service: Orthopedics;  Laterality: Right;    BLEPHAROPLASTY      CAROTID ENDARTERECTOMY Right 10/15/2021    Procedure: ENDARTERECTOMY-CAROTID;  Surgeon:  Imer Farooq MD;  Location: [address];  Service: Peripheral Vascular;  Laterality: Right;  AWAKE CERVICAL BLOCK    CATARACT EXTRACTION, BILATERAL      FIXATION OF TENDON Right 2020    Procedure: FIXATION, TENDON, Biceps Tenodesis;  Surgeon: Staci Childress MD;  Location: [facility] OR;  Service: Orthopedics;  Laterality: Right;  FIXATION, TENDON, Biceps Tenodesis    OPEN REDUCTION AND INTERNAL FIXATION (ORIF) OF FRACTURE OF PROXIMAL HUMERUS Right 2020    Procedure: ORIF, FRACTURE, GREATER TUBEROSITY;  Surgeon: Staci Childress MD;  Location: [facility] OR;  Service: Orthopedics;  Laterality: Right;    TONSILLECTOMY       FAMILY HISTORY:   Family History   Problem Relation Age of Onset    Diabetes Mother     Hyperlipidemia Mother     Heart disease Father     Cancer Sister         ? colon or uterine    Colon cancer Sister      SOCIAL HISTORY:   Social History     Socioeconomic History    Marital status:    Tobacco Use    Smoking status: Former Smoker     Types: Cigarettes     Quit date:      Years since quittin.4    Smokeless tobacco: Never Used   Substance and Sexual Activity    Alcohol use: Yes     Alcohol/week: 14.0 standard drinks     Types: 14 Glasses of wine per week     Comment: 2 glasses of wine daily    Drug use: Never    Sexual activity: Not Currently     Social Determinants of Health     Financial Resource Strain: Low Risk     Difficulty of Paying Living Expenses: Not hard at all   Food Insecurity: No Food Insecurity    Worried About Running Out of Food in the Last Year: Never true    Ran Out of Food in the Last Year: Never true   Transportation Needs: No Transportation Needs    Lack of Transportation (Medical): No    Lack of Transportation (Non-Medical): No   Physical Activity: Sufficiently Active    Days of Exercise per Week: 6 days    Minutes of Exercise per Session: 30 min   Stress: No Stress Concern Present    Feeling of Stress : Only a little   Social Connections:  Moderately Isolated    Frequency of Communication with Friends and Family: More than three times a week    Frequency of Social Gatherings with Friends and Family: More than three times a week    Attends Shinto Services: Never    Active Member of Clubs or Organizations: No    Attends Club or Organization Meetings: Never    Marital Status:    Housing Stability: Low Risk     Unable to Pay for Housing in the Last Year: No    Number of Places Lived in the Last Year: 1    Unstable Housing in the Last Year: No       MEDICATIONS:   Current Outpatient Medications:     aspirin (ECOTRIN) 81 MG EC tablet, Take 81 mg by mouth once daily., Disp: , Rfl:     atenoloL (TENORMIN) 50 MG tablet, Take 1 tablet (50 mg total) by mouth once daily., Disp: 90 tablet, Rfl: 3    bempedoic acid (NEXLETOL) 180 mg Tab, Take 1 tablet (180 mg) by mouth once daily., Disp: 90 tablet, Rfl: 3    collagen/biotin/ascorbic acid (COLLAGEN 1500 PLUS C ORAL), Take by mouth Daily. 11 G of powder daily, Disp: , Rfl:     fluorouraciL (EFUDEX) 5 % cream, AAA on both arms BID x 2-3 weeks. Stop if blistered, oozing, or bleeding. Use daily sun protection., Disp: 40 g, Rfl: 1    ketoconazole (NIZORAL) 2 % cream, Apply topically 2 (two) times daily., Disp: 30 g, Rfl: 2    multivit-min-FA-lycopen-lutein 300-600-300 mcg Tab, Take by mouth., Disp: , Rfl:     omega-3 fatty acids/fish oil (FISH OIL-OMEGA-3 FATTY ACIDS) 300-1,000 mg capsule, Take by mouth once daily., Disp: , Rfl:     omeprazole (PRILOSEC) 20 MG capsule, Take 2 capsules (40 mg total) by mouth once daily., Disp: 180 capsule, Rfl: 3    rosuvastatin (CRESTOR) 20 MG tablet, Take 1 tablet (20 mg total) by mouth once daily., Disp: 90 tablet, Rfl: 3    tadalafiL (CIALIS) 5 MG tablet, Take 1 tablet (5 mg total) by mouth once daily., Disp: 30 tablet, Rfl: 6    triamterene-hydrochlorothiazide 37.5-25 mg (MAXZIDE-25) 37.5-25 mg per tablet, Take 1 tablet by mouth once daily., Disp: 90  "tablet, Rfl: 3    oxyCODONE-acetaminophen (PERCOCET)  mg per tablet, Take 1 tablet by mouth every 4-6 hours as needed for pain. Take stool softener with this medication., Disp: 15 tablet, Rfl: 0    promethazine (PHENERGAN) 25 MG tablet, Take 1 tablet (25 mg total) by mouth every 6 (six) hours as needed for Nausea., Disp: 8 tablet, Rfl: 0    traMADoL (ULTRAM) 50 mg tablet, Take 1-2 tablets ( mg total) by mouth every 6 (six) hours as needed for Pain., Disp: 20 tablet, Rfl: 0  ALLERGIES:   Review of patient's allergies indicates:   Allergen Reactions    Clindamycin Nausea And Vomiting    Penicillins Hives       VITAL SIGNS: /62 (BP Location: Left arm, Patient Position: Sitting, BP Method: Large (Automatic))   Pulse 70   Temp 97.1 °F (36.2 °C) (Oral)   Ht 6' 1" (1.854 m)   Wt 77.6 kg (171 lb)   BMI 22.56 kg/m²      Risks, indications and benefits of the surgical procedure were discussed with the patient. All questions with regard to surgery, rehab, expected return to functional activities, activities of daily living and recreational endeavors were answered to his satisfaction.    It was explained to the patient that there may be an increase in surgical risks if the patient has certain co-morbidities such as but not limited to: Obesity, Cardiovascular issues (CHF, CAD, Arrhythmias), chronic pulmonary issues, previous or current neurovascular/neurological issues, previous strokes, diabetes mellitus, previous wound healing issues, previous wound or skin infections, PVD, clotting disorders, if the patient uses chronic steroids, if the patient takes or has immune compromising medications or diseases, or has previously or currently used tobacco products.     The patient verbalized that he/she does not have any additional clotting, bleeding, or blood disorders, other than what is list in her chart on today's review.     Then a brief history and physical exam were performed.    Review of Systems "   Constitution: Negative. Negative for chills, fever and night sweats.   HENT: Negative for congestion and headaches.    Eyes: Negative for blurred vision, left vision loss and right vision loss.   Cardiovascular: Negative for chest pain and syncope.   Respiratory: Negative for cough and shortness of breath.    Endocrine: Negative for polydipsia, polyphagia and polyuria.   Hematologic/Lymphatic: Negative for bleeding problem. Does not bruise/bleed easily.   Skin: Negative for dry skin, itching and rash.   Musculoskeletal: Negative for falls and muscle weakness.   Gastrointestinal: Negative for abdominal pain and bowel incontinence.   Genitourinary: Negative for bladder incontinence and nocturia.   Neurological: Negative for disturbances in coordination, loss of balance and seizures.   Psychiatric/Behavioral: Negative for depression. The patient does not have insomnia.    Allergic/Immunologic: Negative for hives and persistent infections.     PHYSICAL EXAM:  GEN: A&Ox3, WD WN NAD  HEENT: WNL  CHEST: CTAB, no W/R/R  HEART: RRR, no M/R/G  ABD: Soft, NT ND, BS x4 QUADS  MS; See Epic  NEURO: CN II-XII intact       The surgical consent was then reviewed with the patient, who agreed with all the contents of the consent form and it was signed. he was then given the surgery packet to bring with him to surgery center for the anesthesia portion of his perioperative paperwork (if needed)   For all physicians except for Dr. Rush, we will email and possibly fax the consent forms and booking sheets to [facility].    The patient was given the opportunity to ask questions about the surgical plan and consent form, and once no other questions were asked, I proceeded with the pre-op appointment.    PHYSICAL THERAPY:  He was also instructed regarding physical therapy and will begin on  Likely 4 weeks post-op. He was given a copy of the original prescription to schedule. Another copy of this prescription was also faxed  to [facility].    POST OP CARE:instructions were reviewed including care of the wound and dressing after surgery and when he can shower. Patient was told not sleep or lay on there surgical extremity following surgery as this could cause repair damage, tissue damage, or nerve injury.    An extensive amount of time was spent on discussion of the following information based on what type of surgery the patient was having. Patient expressed understanding of the material below:    Shoulder surgery or upper extremity surgery requiring post-op sling:  It was explained to the patient that they should remove their arm from the sling approximately 6 times per day to do full elbow ROM (flexion and extension) and full supination and pronation of the elbow for approximately 5 minutes at a time to help prevent elbow stiffness, nerve pain or problems, or nerve injury. They were told to contact us if they begin having numbness and tingling of there surgical extremity that persists longer then 1 day without relief.     Extremity surgery requiring a splint:   It was explained to patient on how to properly elevated position there extremity to prevent pressure ulcers from occurring. I made sure that the patient understood that that surgical site may be numb following surgery and prevent them from feeling pressure pain that they would normal feel if a pressure injury was occurring. Pressure ulcers and there causes were discussed with the patient today.     Post-operative splint:  It was explain to the patient that they can contact us at anytime if they feel that there is a problem with their splint or under their splint that needs evaluation. If there is concern, questions, or discomfort with the splint then they can present to either our clinic or the [facility] ED for removal, evaluation, and replacement of the splint.    CRUTCHES OR WALKER: It was explained to the patient that if they are having a lower extremity  surgery that they will require either a walker or crutches to ambulate safely with after surgery. It was explained that a cane or other assistive devices are not sufficient to safely ambulate with after surgery. I explained to the patient that I will place an order for them to receive either crutches or a walker after surgery to go home with. It was explained that if they have crutches or a walker at home already, that they are REQUIRED to bring them to the hospital on the day of surgery. It was explained that if they do not have them at the hospital on the day of surgery that they WILL be provided a new pair or crutches or a walker to go home with to ensure ambulation will be safe if the patient needs to stop somewhere on the way home.      PAIN MANAGEMENT: Gus Sabillon was also given a pain management regime, which includes the TENS unit given to him by [facility] along with the education required for its use. He was also instructed regarding the Polar ice unit that will be in place after surgery and his postoperative pain medications.     PAIN MEDICATION:  Percocet 10/325mg 1 po q 4-6 hours prn pain  Ultram 50 mg Take 1-2 p.o. q.6 hours p.r.n. breakthrough pain,   Phenergan 25 mg one p.o. q.6 hours p.r.n. nausea and vomiting.    DVT prophylaxis was discussed with the patient today including risk factors for developing DVTs and history of DVTs. The patient was asked if any specific recommendations were given from the doctor/s that did pre-operative surgical clearance. The patient was then given an education sheet about DVTs and PE with warning signs and symptoms of both and steps to take if they suspect either of these.    This along with the Modified Caprini risk assessment model for VTE in general surgical patients was used to determine the patient's DVT risk.     From: Arin MARSHALL, Giorgio DA, Jasmyn SM, et al. Prevention of VTE in nonorthopedic surgical patients: antithrombotic therapy and prevention of  thrombosis, 9th ed: American College of Chest Physicians evidence-based clinical practical guidelines. Chest 2012; 141:e227S. Copyright © 2012. Reproduced with permission from the American College of Chest Physicians.    The below listed DVT prophylaxis regimen along with bilateral SHARMIN compression stockings will be used post-op. Length of treatment has been determined to be 10-42 days post-op by the above noted Caprini assessment model.     The patient was instructed to buy and take:  Aspirin 81mg QD x 4 weeks for DVT prophylaxis starting on the morning after surgery.  Patient will also use bilateral TEDs on lower extremities, SCDs during surgery, and early ambulation post-op. If the patient was previously taking 81mg baby aspirin, they were told to not take it starting 5 days prior to surgery and to restart the 81mg aspirin after surgery.       Patient was also told to buy over the counter Prilosec medication if needed and take it once daily for GI protection as long as they are taking NSAIDs or Aspirin.    Patient denies history of seizures.     The patient was told that narcotic pain medications may make them drowsy and instructions were given to not sign legal documents, drive or operate heavy machinery, cars, or equipment while under the influence of narcotic medications. The patient was told and understands that narcotic pain medications should only be used as needed to control pain and that other options of pain control include TENs unit and ice packs/unit.     As there were no other questions to be asked, he was given my business card along with Staci Childress MD business card if he has any questions or concerns prior to surgery or in the postop period.

## 2022-06-15 ENCOUNTER — PATIENT MESSAGE (OUTPATIENT)
Dept: PREADMISSION TESTING | Facility: HOSPITAL | Age: 82
End: 2022-06-15
Payer: MEDICARE

## 2022-06-15 ENCOUNTER — PATIENT MESSAGE (OUTPATIENT)
Dept: SPORTS MEDICINE | Facility: CLINIC | Age: 82
End: 2022-06-15
Payer: MEDICARE

## 2022-06-15 NOTE — ANESTHESIA PAT ROS NOTE
06/15/2022  Gus Sabillon is a 82 y.o., male.  All information is gathered per Chart review via Epic system only.  Pre-op Assessment          Review of Systems  Anesthesia Hx:  No problems with previous Anesthesia 2/2020 [facility]  Method of Intubation: Direct laryngoscopy Inserted by: Respiratory Therapist Airway Device: Endotracheal Tube Mask Ventilation: Easy Intubated: Postinduction Blade: Cheryl #4 Airway Device Size: 7.5 Style: Cuffed Cuff Inflation: Minimal occlusive pressure Inflation Amount (mL): 6 Placement Verified By: Auscultation;Capnometry;ETT Condensation Grade: Grade I Complicating Factors: Anterior larynx Findings Post-Intubation: Positive EtCO2;Bilateral breath sounds;Atraumatic/Condition of teeth unchanged Depth of Insertion (cm): 24 Secured at: Lips Complications: Soft tissue , bottom lip small abrasion  Breath  Denies Personal Hx of Anesthesia complications.   Social:  Alcohol Use, Former Smoker ~14 drinks a week   Cardiovascular:   Hypertension hyperlipidemia  Peripheral Arterial Disease  PAD-stents bilat LE  Carotoid Artery Disease, s/p carotid endarectomy  Other Cardiac Conditions R carotid endartectomy 10/2021  Renal/:   Chronic Renal Disease, CRI ~stage 3   Hepatic/GI:   GERD       Planned Procedure: Procedure(s) (LRB):  REPAIR, ROTATOR CUFF, ARTHROSCOPIC - DISPENSE POLAR SLEEVE (Left)  DEBRIDEMENT, SHOULDER, ARTHROSCOPIC (Left)  ARTHROSCOPY, SHOULDER, WITH DISTAL CLAVICLE EXCISION (Left)  ARTHROSCOPY,SHOULDER,WITH BICEPS TENODESIS (Left)  ORIF, FRACTURE, HUMERUS (Left)  MICROFRACTURE (Left)  Requested Anesthesia Type:General  Surgeon: Staci Childress MD  Service: Orthopedics  Known or anticipated Date of Surgery:6/21/2022    Previous anesthesia records:GETA, MAC and No problems    Last PCP note: within 1 month , within [facility]   Cleared for surgery    Subspecialty notes:  Dermatology, Vascular Surgery    Past Surgical History:   Procedure Laterality Date    ARTHROSCOPIC DEBRIDEMENT OF SHOULDER Right 2/27/2020    Procedure: EXTENSIVE DEBRIDEMENT, SHOULDER, ARTHROSCOPIC;  Surgeon: Staci Childress MD;  Location: [facility] OR;  Service: Orthopedics;  Laterality: Right;    ARTHROSCOPIC REPAIR OF ROTATOR CUFF OF SHOULDER Right 2/27/2020    Procedure: REPAIR, ROTATOR CUFF, ARTHROSCOPIC;  Surgeon: Staci Childress MD;  Location: [facility] OR;  Service: Orthopedics;  Laterality: Right;    ARTHROSCOPY OF SHOULDER WITH DECOMPRESSION OF SUBACROMIAL SPACE Right 2/27/2020    Procedure: ARTHROSCOPY, SHOULDER, WITH SUBACROMIAL SPACE DECOMPRESSION;  Surgeon: Staci Childress MD;  Location: [facility] OR;  Service: Orthopedics;  Laterality: Right;    BLEPHAROPLASTY      CAROTID ENDARTERECTOMY Right 10/15/2021    Procedure: ENDARTERECTOMY-CAROTID;  Surgeon: Imer Farooq MD;  Location: [address];  Service: Peripheral Vascular;  Laterality: Right;  AWAKE CERVICAL BLOCK    CATARACT EXTRACTION, BILATERAL      FIXATION OF TENDON Right 2/27/2020    Procedure: FIXATION, TENDON, Biceps Tenodesis;  Surgeon: Staci Childress MD;  Location: [facility] OR;  Service: Orthopedics;  Laterality: Right;  FIXATION, TENDON, Biceps Tenodesis    OPEN REDUCTION AND INTERNAL FIXATION (ORIF) OF FRACTURE OF PROXIMAL HUMERUS Right 2/27/2020    Procedure: ORIF, FRACTURE, GREATER TUBEROSITY;  Surgeon: Staci Childress MD;  Location: [facility] OR;  Service: Orthopedics;  Laterality: Right;    TONSILLECTOMY       Past Medical History:   Diagnosis Date    CKD (chronic kidney disease) stage 3, GFR 30-59 ml/min     Dyslipidemia     GERD (gastroesophageal reflux disease)     Hx of melanoma excision     Hypertension     Melanoma     PAD (peripheral artery disease)     Squamous cell carcinoma of skin          6/22/22  BUN 37, GFR 54    6/2/22  Vent. Rate : 058 BPM     Atrial Rate : 058 BPM      P-R Int : 212 ms          QRS Dur : 094 ms       QT Int : 434 ms        P-R-T Axes : 039 -08 059 degrees      QTc Int : 426 ms   Sinus bradycardia with 1st degree A-V block with occasional Premature   ventricular complexes   Otherwise normal ECG   When compared with ECG of 13-OCT-2021 11:16,   Premature ventricular complexes are now Present   T wave amplitude has decreased in Lateral leads     11/2020 TTE  · The left ventricle is normal in size with concentric remodeling and normal systolic function. The estimated ejection fraction is 60%.  · Normal right ventricular size with normal right ventricular systolic function.  · Indeterminate left ventricular diastolic function.  · Mild left atrial enlargement.  · Mild aortic regurgitation.  · Trivial pericardial effusion.  · The estimated PA systolic pressure is 34 mmHg.  · Normal central venous pressure (3 mmHg).    6'1  171#  vaccinated

## 2022-06-21 ENCOUNTER — ANESTHESIA (OUTPATIENT)
Dept: SURGERY | Facility: HOSPITAL | Age: 82
End: 2022-06-21
Payer: MEDICARE

## 2022-06-21 ENCOUNTER — HOSPITAL ENCOUNTER (OUTPATIENT)
Facility: HOSPITAL | Age: 82
Discharge: HOME | End: 2022-06-21
Attending: ORTHOPAEDIC SURGERY | Admitting: ORTHOPAEDIC SURGERY
Payer: MEDICARE

## 2022-06-21 ENCOUNTER — ANESTHESIA EVENT (OUTPATIENT)
Dept: SURGERY | Facility: HOSPITAL | Age: 82
End: 2022-06-21
Payer: MEDICARE

## 2022-06-21 VITALS
TEMPERATURE: 97 F | HEIGHT: 73 IN | HEART RATE: 77 BPM | WEIGHT: 170 LBS | OXYGEN SATURATION: 97 % | BODY MASS INDEX: 22.53 KG/M2 | DIASTOLIC BLOOD PRESSURE: 57 MMHG | SYSTOLIC BLOOD PRESSURE: 140 MMHG | RESPIRATION RATE: 26 BRPM

## 2022-06-21 DIAGNOSIS — M75.102 NONTRAUMATIC TEAR OF LEFT ROTATOR CUFF, UNSPECIFIED TEAR EXTENT: Primary | ICD-10-CM

## 2022-06-21 PROCEDURE — 71000015 HC POSTOP RECOV 1ST HR: Performed by: ORTHOPAEDIC SURGERY

## 2022-06-21 PROCEDURE — 37000009 HC ANESTHESIA EA ADD 15 MINS: Performed by: ORTHOPAEDIC SURGERY

## 2022-06-21 PROCEDURE — D9220A PRA ANESTHESIA: Mod: ANES,,, | Performed by: ANESTHESIOLOGY

## 2022-06-21 PROCEDURE — 27201423 OPTIME MED/SURG SUP & DEVICES STERILE SUPPLY: Performed by: ORTHOPAEDIC SURGERY

## 2022-06-21 PROCEDURE — 29826 SHO ARTHRS SRG DECOMPRESSION: CPT | Mod: LT,,, | Performed by: ORTHOPAEDIC SURGERY

## 2022-06-21 PROCEDURE — 29827 SHO ARTHRS SRG RT8TR CUF RPR: CPT | Mod: 22,LT,, | Performed by: ORTHOPAEDIC SURGERY

## 2022-06-21 PROCEDURE — 71000039 HC RECOVERY, EACH ADD'L HOUR: Performed by: ORTHOPAEDIC SURGERY

## 2022-06-21 PROCEDURE — 36000711: Performed by: ORTHOPAEDIC SURGERY

## 2022-06-21 PROCEDURE — 25000003 PHARM REV CODE 250: Performed by: ANESTHESIOLOGY

## 2022-06-21 PROCEDURE — 25000003 PHARM REV CODE 250: Performed by: NURSE ANESTHETIST, CERTIFIED REGISTERED

## 2022-06-21 PROCEDURE — 63600175 PHARM REV CODE 636 W HCPCS: Performed by: ORTHOPAEDIC SURGERY

## 2022-06-21 PROCEDURE — 25000003 PHARM REV CODE 250: Performed by: PHYSICIAN ASSISTANT

## 2022-06-21 PROCEDURE — 36000710: Performed by: ORTHOPAEDIC SURGERY

## 2022-06-21 PROCEDURE — 71000033 HC RECOVERY, INTIAL HOUR: Performed by: ORTHOPAEDIC SURGERY

## 2022-06-21 PROCEDURE — 29824 SHO ARTHRS SRG DSTL CLAVICLC: CPT | Mod: 51,LT,, | Performed by: ORTHOPAEDIC SURGERY

## 2022-06-21 PROCEDURE — 63600175 PHARM REV CODE 636 W HCPCS: Performed by: PHYSICIAN ASSISTANT

## 2022-06-21 PROCEDURE — 99900035 HC TECH TIME PER 15 MIN (STAT)

## 2022-06-21 PROCEDURE — D9220A PRA ANESTHESIA: ICD-10-PCS | Mod: ANES,,, | Performed by: ANESTHESIOLOGY

## 2022-06-21 PROCEDURE — 29999 PR ARTHROSCOPIC MICROFRACTURE, ALL JOINTS: ICD-10-PCS | Mod: ,,, | Performed by: ORTHOPAEDIC SURGERY

## 2022-06-21 PROCEDURE — D9220A PRA ANESTHESIA: Mod: CRNA,,, | Performed by: NURSE ANESTHETIST, CERTIFIED REGISTERED

## 2022-06-21 PROCEDURE — 37000008 HC ANESTHESIA 1ST 15 MINUTES: Performed by: ORTHOPAEDIC SURGERY

## 2022-06-21 PROCEDURE — 63600175 PHARM REV CODE 636 W HCPCS: Performed by: NURSE ANESTHETIST, CERTIFIED REGISTERED

## 2022-06-21 PROCEDURE — 25000003 PHARM REV CODE 250: Performed by: STUDENT IN AN ORGANIZED HEALTH CARE EDUCATION/TRAINING PROGRAM

## 2022-06-21 PROCEDURE — C1713 ANCHOR/SCREW BN/BN,TIS/BN: HCPCS | Performed by: ORTHOPAEDIC SURGERY

## 2022-06-21 PROCEDURE — 29828 PR ARTHROSCOPY SHOULDER SURGICAL BICEPS TENODESIS: ICD-10-PCS | Mod: 52,51,LT, | Performed by: ORTHOPAEDIC SURGERY

## 2022-06-21 PROCEDURE — 94761 N-INVAS EAR/PLS OXIMETRY MLT: CPT

## 2022-06-21 PROCEDURE — 29826 PR SHLDR ARTHROSCOP,PART ACROMIOPLAS: ICD-10-PCS | Mod: LT,,, | Performed by: ORTHOPAEDIC SURGERY

## 2022-06-21 PROCEDURE — 29824 PR SHLDR ARTHROSCOP,SURG,DIS CLAVICULECTOMY: ICD-10-PCS | Mod: 51,LT,, | Performed by: ORTHOPAEDIC SURGERY

## 2022-06-21 PROCEDURE — D9220A PRA ANESTHESIA: ICD-10-PCS | Mod: CRNA,,, | Performed by: NURSE ANESTHETIST, CERTIFIED REGISTERED

## 2022-06-21 PROCEDURE — 29999 UNLISTED PX ARTHROSCOPY: CPT | Mod: ,,, | Performed by: ORTHOPAEDIC SURGERY

## 2022-06-21 PROCEDURE — 25000003 PHARM REV CODE 250: Performed by: ORTHOPAEDIC SURGERY

## 2022-06-21 PROCEDURE — 29828 SHO ARTHRS SRG BICP TENODSIS: CPT | Mod: 52,51,LT, | Performed by: ORTHOPAEDIC SURGERY

## 2022-06-21 PROCEDURE — 29827 PR SHLDR ARTHROSCOP,SURG,W/ROTAT CUFF REPR: ICD-10-PCS | Mod: 22,LT,, | Performed by: ORTHOPAEDIC SURGERY

## 2022-06-21 DEVICE — ANCHOR SWIVELOCK KNTLS BLUE #2: Type: IMPLANTABLE DEVICE | Site: SHOULDER | Status: FUNCTIONAL

## 2022-06-21 DEVICE — ANCHOR TISSUETAK TENDON: Type: IMPLANTABLE DEVICE | Site: SHOULDER | Status: FUNCTIONAL

## 2022-06-21 DEVICE — PATCH ARTHROFLEX 1.5X20X25MM: Type: IMPLANTABLE DEVICE | Site: SHOULDER | Status: FUNCTIONAL

## 2022-06-21 DEVICE — ANCHOR SU BC CORKSCREW 5.5MM: Type: IMPLANTABLE DEVICE | Site: SHOULDER | Status: FUNCTIONAL

## 2022-06-21 RX ORDER — HALOPERIDOL 5 MG/ML
0.5 INJECTION INTRAMUSCULAR EVERY 10 MIN PRN
Status: DISCONTINUED | OUTPATIENT
Start: 2022-06-21 | End: 2022-06-21 | Stop reason: HOSPADM

## 2022-06-21 RX ORDER — DEXMEDETOMIDINE HYDROCHLORIDE 100 UG/ML
INJECTION, SOLUTION INTRAVENOUS
Status: DISCONTINUED | OUTPATIENT
Start: 2022-06-21 | End: 2022-06-21

## 2022-06-21 RX ORDER — LIDOCAINE HYDROCHLORIDE 10 MG/ML
INJECTION, SOLUTION INTRAVENOUS
Status: DISCONTINUED | OUTPATIENT
Start: 2022-06-21 | End: 2022-06-21

## 2022-06-21 RX ORDER — VANCOMYCIN HCL IN 5 % DEXTROSE 1G/250ML
1000 PLASTIC BAG, INJECTION (ML) INTRAVENOUS
Status: COMPLETED | OUTPATIENT
Start: 2022-06-21 | End: 2022-06-21

## 2022-06-21 RX ORDER — EPHEDRINE SULFATE 50 MG/ML
INJECTION, SOLUTION INTRAVENOUS
Status: DISCONTINUED | OUTPATIENT
Start: 2022-06-21 | End: 2022-06-21

## 2022-06-21 RX ORDER — ONDANSETRON 2 MG/ML
4 INJECTION INTRAMUSCULAR; INTRAVENOUS EVERY 12 HOURS PRN
Status: DISCONTINUED | OUTPATIENT
Start: 2022-06-21 | End: 2022-06-21 | Stop reason: HOSPADM

## 2022-06-21 RX ORDER — OXYCODONE HYDROCHLORIDE 5 MG/1
10 TABLET ORAL EVERY 4 HOURS PRN
Status: DISCONTINUED | OUTPATIENT
Start: 2022-06-21 | End: 2022-06-21 | Stop reason: HOSPADM

## 2022-06-21 RX ORDER — PROPOFOL 10 MG/ML
VIAL (ML) INTRAVENOUS
Status: DISCONTINUED | OUTPATIENT
Start: 2022-06-21 | End: 2022-06-21

## 2022-06-21 RX ORDER — ONDANSETRON 2 MG/ML
INJECTION INTRAMUSCULAR; INTRAVENOUS
Status: DISCONTINUED | OUTPATIENT
Start: 2022-06-21 | End: 2022-06-21

## 2022-06-21 RX ORDER — PROMETHAZINE HYDROCHLORIDE 25 MG/1
25 TABLET ORAL EVERY 6 HOURS PRN
Status: DISCONTINUED | OUTPATIENT
Start: 2022-06-21 | End: 2022-06-21 | Stop reason: HOSPADM

## 2022-06-21 RX ORDER — FENTANYL CITRATE 50 UG/ML
INJECTION, SOLUTION INTRAMUSCULAR; INTRAVENOUS
Status: DISCONTINUED | OUTPATIENT
Start: 2022-06-21 | End: 2022-06-21

## 2022-06-21 RX ORDER — HYDROMORPHONE HYDROCHLORIDE 1 MG/ML
0.2 INJECTION, SOLUTION INTRAMUSCULAR; INTRAVENOUS; SUBCUTANEOUS EVERY 5 MIN PRN
Status: DISCONTINUED | OUTPATIENT
Start: 2022-06-21 | End: 2022-06-21 | Stop reason: HOSPADM

## 2022-06-21 RX ORDER — SODIUM CHLORIDE 0.9 % (FLUSH) 0.9 %
10 SYRINGE (ML) INJECTION
Status: DISCONTINUED | OUTPATIENT
Start: 2022-06-21 | End: 2022-06-21 | Stop reason: HOSPADM

## 2022-06-21 RX ORDER — DEXAMETHASONE SODIUM PHOSPHATE 4 MG/ML
INJECTION, SOLUTION INTRA-ARTICULAR; INTRALESIONAL; INTRAMUSCULAR; INTRAVENOUS; SOFT TISSUE
Status: DISCONTINUED | OUTPATIENT
Start: 2022-06-21 | End: 2022-06-21

## 2022-06-21 RX ORDER — PHENYLEPHRINE HYDROCHLORIDE 10 MG/ML
INJECTION INTRAVENOUS
Status: DISCONTINUED | OUTPATIENT
Start: 2022-06-21 | End: 2022-06-21

## 2022-06-21 RX ORDER — OXYCODONE HYDROCHLORIDE 5 MG/1
5 TABLET ORAL
Status: DISCONTINUED | OUTPATIENT
Start: 2022-06-21 | End: 2022-06-21 | Stop reason: HOSPADM

## 2022-06-21 RX ORDER — TRAMADOL HYDROCHLORIDE 50 MG/1
100 TABLET ORAL EVERY 6 HOURS PRN
Status: DISCONTINUED | OUTPATIENT
Start: 2022-06-21 | End: 2022-06-21 | Stop reason: HOSPADM

## 2022-06-21 RX ORDER — EPINEPHRINE 1 MG/ML
INJECTION, SOLUTION INTRACARDIAC; INTRAMUSCULAR; INTRAVENOUS; SUBCUTANEOUS
Status: DISCONTINUED | OUTPATIENT
Start: 2022-06-21 | End: 2022-06-21 | Stop reason: HOSPADM

## 2022-06-21 RX ORDER — ROPIVACAINE HYDROCHLORIDE 5 MG/ML
INJECTION, SOLUTION EPIDURAL; INFILTRATION; PERINEURAL
Status: DISCONTINUED | OUTPATIENT
Start: 2022-06-21 | End: 2022-06-21 | Stop reason: HOSPADM

## 2022-06-21 RX ORDER — KETAMINE HCL IN 0.9 % NACL 50 MG/5 ML
SYRINGE (ML) INTRAVENOUS
Status: DISCONTINUED | OUTPATIENT
Start: 2022-06-21 | End: 2022-06-21

## 2022-06-21 RX ORDER — NEOSTIGMINE METHYLSULFATE 1 MG/ML
INJECTION, SOLUTION INTRAVENOUS
Status: DISCONTINUED | OUTPATIENT
Start: 2022-06-21 | End: 2022-06-21

## 2022-06-21 RX ORDER — KETOROLAC TROMETHAMINE 30 MG/ML
INJECTION, SOLUTION INTRAMUSCULAR; INTRAVENOUS
Status: DISCONTINUED | OUTPATIENT
Start: 2022-06-21 | End: 2022-06-21 | Stop reason: HOSPADM

## 2022-06-21 RX ORDER — PREGABALIN 75 MG/1
75 CAPSULE ORAL ONCE
Status: COMPLETED | OUTPATIENT
Start: 2022-06-21 | End: 2022-06-21

## 2022-06-21 RX ORDER — MORPHINE SULFATE 2 MG/ML
2 INJECTION, SOLUTION INTRAMUSCULAR; INTRAVENOUS EVERY 10 MIN PRN
Status: DISCONTINUED | OUTPATIENT
Start: 2022-06-21 | End: 2022-06-21 | Stop reason: HOSPADM

## 2022-06-21 RX ORDER — METHOCARBAMOL 500 MG/1
1000 TABLET, FILM COATED ORAL ONCE
Status: COMPLETED | OUTPATIENT
Start: 2022-06-21 | End: 2022-06-21

## 2022-06-21 RX ORDER — PREGABALIN 75 MG/1
75 CAPSULE ORAL
Status: COMPLETED | OUTPATIENT
Start: 2022-06-21 | End: 2022-06-21

## 2022-06-21 RX ORDER — FAMOTIDINE 10 MG/ML
INJECTION INTRAVENOUS
Status: DISCONTINUED | OUTPATIENT
Start: 2022-06-21 | End: 2022-06-21

## 2022-06-21 RX ORDER — CARBOXYMETHYLCELLULOSE SODIUM 5 MG/ML
SOLUTION/ DROPS OPHTHALMIC
Status: DISCONTINUED | OUTPATIENT
Start: 2022-06-21 | End: 2022-06-21

## 2022-06-21 RX ORDER — SODIUM CHLORIDE 9 MG/ML
INJECTION, SOLUTION INTRAVENOUS CONTINUOUS
Status: DISCONTINUED | OUTPATIENT
Start: 2022-06-21 | End: 2022-06-21 | Stop reason: HOSPADM

## 2022-06-21 RX ORDER — KETAMINE HYDROCHLORIDE 100 MG/ML
INJECTION, SOLUTION INTRAMUSCULAR; INTRAVENOUS
Status: DISCONTINUED | OUTPATIENT
Start: 2022-06-21 | End: 2022-06-21 | Stop reason: HOSPADM

## 2022-06-21 RX ORDER — ROCURONIUM BROMIDE 10 MG/ML
INJECTION, SOLUTION INTRAVENOUS
Status: DISCONTINUED | OUTPATIENT
Start: 2022-06-21 | End: 2022-06-21

## 2022-06-21 RX ADMIN — DEXMEDETOMIDINE HYDROCHLORIDE 20 MCG: 100 INJECTION, SOLUTION, CONCENTRATE INTRAVENOUS at 07:06

## 2022-06-21 RX ADMIN — SODIUM CHLORIDE, SODIUM GLUCONATE, SODIUM ACETATE, POTASSIUM CHLORIDE, MAGNESIUM CHLORIDE, SODIUM PHOSPHATE, DIBASIC, AND POTASSIUM PHOSPHATE: .53; .5; .37; .037; .03; .012; .00082 INJECTION, SOLUTION INTRAVENOUS at 07:06

## 2022-06-21 RX ADMIN — ROCURONIUM BROMIDE 50 MG: 10 INJECTION, SOLUTION INTRAVENOUS at 07:06

## 2022-06-21 RX ADMIN — FAMOTIDINE 20 MG: 10 INJECTION, SOLUTION INTRAVENOUS at 07:06

## 2022-06-21 RX ADMIN — FENTANYL CITRATE 50 MCG: 50 INJECTION, SOLUTION INTRAMUSCULAR; INTRAVENOUS at 06:06

## 2022-06-21 RX ADMIN — FENTANYL CITRATE 50 MCG: 50 INJECTION, SOLUTION INTRAMUSCULAR; INTRAVENOUS at 07:06

## 2022-06-21 RX ADMIN — NEOSTIGMINE METHYLSULFATE 4 MG: 1 INJECTION INTRAVENOUS at 09:06

## 2022-06-21 RX ADMIN — PREGABALIN 75 MG: 75 CAPSULE ORAL at 10:06

## 2022-06-21 RX ADMIN — Medication 20 MG: at 07:06

## 2022-06-21 RX ADMIN — LIDOCAINE HYDROCHLORIDE 100 MG: 10 INJECTION, SOLUTION INTRAVENOUS at 07:06

## 2022-06-21 RX ADMIN — DEXAMETHASONE SODIUM PHOSPHATE 8 MG: 4 INJECTION, SOLUTION INTRAMUSCULAR; INTRAVENOUS at 07:06

## 2022-06-21 RX ADMIN — OXYCODONE 5 MG: 5 TABLET ORAL at 10:06

## 2022-06-21 RX ADMIN — EPHEDRINE SULFATE 10 MG: 50 INJECTION INTRAVENOUS at 09:06

## 2022-06-21 RX ADMIN — GLYCOPYRROLATE 0.4 MG: 0.2 INJECTION, SOLUTION INTRAMUSCULAR; INTRAVITREAL at 09:06

## 2022-06-21 RX ADMIN — VANCOMYCIN HYDROCHLORIDE 1000 MG: 1 INJECTION, POWDER, LYOPHILIZED, FOR SOLUTION INTRAVENOUS at 06:06

## 2022-06-21 RX ADMIN — METHOCARBAMOL 1000 MG: 500 TABLET ORAL at 10:06

## 2022-06-21 RX ADMIN — SODIUM CHLORIDE: 9 INJECTION, SOLUTION INTRAVENOUS at 06:06

## 2022-06-21 RX ADMIN — EPHEDRINE SULFATE 10 MG: 50 INJECTION INTRAVENOUS at 07:06

## 2022-06-21 RX ADMIN — GLYCOPYRROLATE 0.2 MG: 0.2 INJECTION, SOLUTION INTRAMUSCULAR; INTRAVITREAL at 09:06

## 2022-06-21 RX ADMIN — PHENYLEPHRINE HYDROCHLORIDE 100 MCG: 10 INJECTION INTRAVENOUS at 07:06

## 2022-06-21 RX ADMIN — CARBOXYMETHYLCELLULOSE SODIUM 2 DROP: 5 SOLUTION/ DROPS OPHTHALMIC at 07:06

## 2022-06-21 RX ADMIN — SODIUM CHLORIDE: 0.9 INJECTION, SOLUTION INTRAVENOUS at 06:06

## 2022-06-21 RX ADMIN — PREGABALIN 75 MG: 75 CAPSULE ORAL at 05:06

## 2022-06-21 RX ADMIN — ONDANSETRON 4 MG: 2 INJECTION, SOLUTION INTRAMUSCULAR; INTRAVENOUS at 07:06

## 2022-06-21 RX ADMIN — PROPOFOL 180 MG: 10 INJECTION, EMULSION INTRAVENOUS at 07:06

## 2022-06-21 NOTE — ANESTHESIA POSTPROCEDURE EVALUATION
Anesthesia Post Evaluation    Patient: Gus Sabillon    Procedure(s) Performed: Procedure(s) (LRB):  REPAIR, ROTATOR CUFF, ARTHROSCOPIC WITH CUFF MEND (Left)  EXTENSIVE DEBRIDEMENT, SHOULDER, ARTHROSCOPIC (Left)  ARTHROSCOPY, SHOULDER, WITH DISTAL CLAVICLE EXCISION (Left)  ARTHROSCOPY,SHOULDER,WITH BICEPS TENODESIS (Left)  ORIF, FRACTURE, HUMERUS, GREATER TUBEROSITY (Left)  MICROFRACTURE (Left)  BURSECTOMY (Left)  [id number]  ARTHROSCOPY, SHOULDER, WITH SUBACROMIAL  DECOMPRESSION (Left)  ARTHROSCOPY, SHOULDER WITH LABRAL REPAIR (Left)    Final Anesthesia Type: general      Patient location during evaluation: PACU  Patient participation: Yes- Able to Participate  Level of consciousness: awake and alert  Post-procedure vital signs: reviewed and stable  Pain management: adequate  Airway patency: patent    PONV status at discharge: No PONV  Anesthetic complications: no      Cardiovascular status: blood pressure returned to baseline  Respiratory status: unassisted  Hydration status: euvolemic  Follow-up not needed.          Vitals Value Taken Time   /74 06/21/22 1147   Temp 97.2 06/21/22 1003   Pulse 82 06/21/22 1157   Resp 16 06/21/22 1157   SpO2 97 % 06/21/22 1157   Vitals shown include unvalidated device data.      Event Time   Out of Recovery 11:09:00         Pain/Alexia Score: Pain Rating Prior to Med Admin: 2 (6/21/2022 10:20 AM)  Pain Rating Post Med Admin: 2 (6/21/2022 11:43 AM)  Alexia Score: 10 (6/21/2022 11:03 AM)

## 2022-06-21 NOTE — PLAN OF CARE
VSS. Patient able to tolerate oral liquids. Patient reports tolerable pain level for discharge. Patient/family received home medication per bedside delivery. Dressing intact. Polar care intact, power adapter placed with patient belongings. Thigh TEDs intact. Patient instructed not to wear SHARMIN hose without wearing closed-back shoes or  socks due to increased risk of falls, verbalized understanding. Sling on patient. No distress noted. Discharge instructions reviewed with patient and family, verbalized understanding. Patient states he would like to rest for 20 min and then attempt to get dressed for discharge, will continue to monitor.

## 2022-06-21 NOTE — BRIEF OP NOTE
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]    Post-op Diagnosis:  Post-Op Diagnosis Codes:     * Nontraumatic tear of left rotator cuff, unspecified tear extent [M75.102]     * Biceps tendinitis, unspecified laterality [M75.20]     * Closed nondisplaced fracture of greater tuberosity of left humerus, initial encounter [S42.255A]     * Superior glenoid labrum lesion of left shoulder, initial encounter [S43.432A]     * Acromioclavicular joint arthritis, unspecified laterality [M19.019]    Procedure(s) (LRB):  REPAIR, ROTATOR CUFF, ARTHROSCOPIC - [facility] (Left)  DEBRIDEMENT, SHOULDER, ARTHROSCOPIC (Left)  ARTHROSCOPY, SHOULDER, WITH DISTAL CLAVICLE EXCISION (Left)  ARTHROSCOPY,SHOULDER,WITH BICEPS TENODESIS (Left)  ORIF, FRACTURE, HUMERUS (Left)  MICROFRACTURE (Left)    Anesthesia: General    Operative Findings:      * Nontraumatic tear of left rotator cuff, unspecified tear extent [M75.102]     * Biceps tendinitis, unspecified laterality [M75.20]     * Closed nondisplaced fracture of greater tuberosity of left humerus, initial encounter [S42.255A]     * Superior glenoid labrum lesion of left shoulder, initial encounter [S43.432A]     * Acromioclavicular joint arthritis, 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    DISPOSITION: Home or Self Care    FINAL DIAGNOSIS:  Nontraumatic tear of left rotator cuff    FOLLOWUP: In clinic    DISCHARGE INSTRUCTIONS:    Discharge Procedure Orders   Diet general     Call MD for:  temperature >100.4     Call MD for:  persistent nausea and vomiting     Call MD for:  severe uncontrolled pain     Call MD for:  difficulty breathing, headache or visual disturbances     Call MD for:  redness, tenderness, or signs of infection (pain, swelling, redness, odor or green/yellow discharge around incision site)     Call MD for:  hives     Call MD for:  persistent dizziness or light-headedness

## 2022-06-21 NOTE — ANESTHESIA PROCEDURE NOTES
Intubation    Date/Time: 6/21/2022 7:05 AM  Performed by: Ashley Maradiaga CRNA  Authorized by: Jaime Silvestre Jr., MD     Intubation:     Induction:  Intravenous    Intubated:  Postinduction    Mask Ventilation:  Easy mask    Attempts:  1    Attempted By:  CRNA    Method of Intubation:  Direct    Blade:  Baldwin 2    Laryngeal View Grade: Grade I - full view of cords      Difficult Airway Encountered?: No      Complications:  None    Airway Device:  Oral endotracheal tube    Airway Device Size:  7.5    Style/Cuff Inflation:  Cuffed    Inflation Amount (mL):  6    Tube secured:  21    Secured at:  The lips    Placement Verified By:  Capnometry    Complicating Factors:  None    Findings Post-Intubation:  BS equal bilateral

## 2022-06-21 NOTE — DISCHARGE INSTRUCTIONS
[email or web address] CARE CUBE COLD THERAPY SYSTEM    The [employer] Care Cube Cold Therapy System is simple and reliable. It is easy to use, compact design makes it great for home use. With the addition of ice and water, you will enjoy 6-8 hours of effortless cold therapy. Proper use requires an insulation barrier between the pad and the patient's skin.  Instructions on how to use the [employer] Care Cube Cold Therapy System Below:

## 2022-06-21 NOTE — OP NOTE
DATE OF PROCEDURE: 06/21/2022    SURGEON: Staci Childress M.D.    ASSISTANT: STACY Mcgovern DO PGY6  ASSISTANT: SMA Jake      PREOPERATIVE DIAGNOSES:   left  1. Shoulder rotator cuff tear   2. Shoulder biceps tendonitis  3. Shoulder synovitis  4. Shoulder SLAP  5. Shoulder impingement, bursitis  6. Shoulder AC arthritis  7. Shoulder greater tuberosity insufficiency fracture    POSTOPERATIVE DIAGNOSES:   left  1. Shoulder rotator cuff tear   2. Shoulder biceps tendonitis  3. Shoulder synovitis  4. Shoulder SLAP  5. Shoulder impingement, bursitis  6. Shoulder AC arthritis  7. Shoulder adhesions  8. Shoulder greater tuberosity insufficiency fracture    OPERATION:   left  1. Shoulder arthroscopic rotator cuff repair 50 x 45 mm, massive, double row (CPT 96077) with CuffMend ()  (complex -22 modifier)  2. Shoulder arthroscopic biceps tenodesis (CPT 13867)  3. Shoulder arthroscopic subacromial decompression, bursectomy   4. Shoulder arthroscopic extensive debridement (anterior, posterior glenohumeral joint, subacromial space) (CPT 91677)  5. Shoulder arthroscopic labral debridement (CPT 55292)  6. Shoulder arthroscopic microfracture (Crimson duvet technique) (CPT 33011)  7. Shoulder arthroscopic lysis of adhesions (CPT 76556)  8. Shoulder arthroscopic distal clavicle excision (CPT 56928)  9. Shoulder ORIF greater tuberosity (CPT 87534)    ANESTHESIA:  General with intra-op suprascapular nerve block with local    ESTIMATED BLOOD LOSS:  Minimal.    TOURNIQUET TIME:  None.    DRAINS:  None.    COMPLICATIONS:  None.  The patient was moved to the recovery room in stable condition with compartments soft and cap refill less than a second in all digits.    COMPLEX PROCEDURE:   There was an altered surgical field. There was abnormal anatomy. There was scarring to the area, with the massive tear that was thin, retracted and scarred in significantly. Complexity of the service was much greater than the normative procedure. There was  increased time, intensity and technical difficulty of the procedure, severity of the patient's condition and mental effort required.  This was a highly complicated repair and tear pattern that required advanced arthroscopic skill in order to safely and technically perform it.  Nevertheless the repair achieved was excellent. Biologic augmentation with Cuffmend and application of biologic implant for soft tissue reinforcement for compromised rotator cuff tissue was necessary.    INDICATIONS/MEDICAL NECESSITY: The patient is a 82 y.o. year-old male who has history and physical examination findings consistent with the above.  Nonoperative versus operative options were discussed.  The risks and benefits were discussed with the patient.  The patient acknowledged understanding and wished to proceed with operative intervention.  Informed consent was obtained prior to the procedure.  Reasonable expectations and potential complications were discussed and acknowledged, including but not limited to infection, bleeding, blood clots, (DVT and/or PE), nerve injury, tear, instability, continued pain and stiffness.  They agreed and understood and wished to proceed.    EXAMINATION UNDER ANESTHESIA of the left SHOULDER: Forward elevation 175 degrees, External rotation at 0 60 degrees, External rotation at 90 90 degrees, Internal rotation at 90 60 degrees.  Translation testing: anterior grade 1, posterior grade 1, sulcus sign grade 1 corrects to 0 on external rotation.    EXAMINATION UNDER ANESTHESIA of the right SHOULDER: Forward elevation 175 degrees, External rotation at 0 60 degrees, External rotation at 90 90 degrees, Internal rotation at 90 60 degrees.  Translation testing: anterior grade 1, posterior grade 1, sulcus sign grade 1 corrects to 0 on external rotation.    PROCEDURE IN DETAIL:  After the correct operative site was marked by the operating surgeon. The patient was then taken to the operating room and placed supine on  the operating room table, where the patient  underwent general anesthesia by the anesthesia team.  The patient was then rolled into the lateral decubitus position with the operative side up.  A well-padded axillary roll, beanbag and pillows were placed.  All pressure points were carefully padded and checked.  The upper extremities and both lower extremities were placed in comfortable positions and were also well-padded.  The operative upper extremity was then prepped and draped in the usual sterile fashion.    Suprascapular nerve block was performed in the standard fashion with 5cc local anesthetic.    The Spider arm positioner was implemented with balanced suspension and appropriate landmarks were noted on the skin.  A posterior followed by venkatesh-superior portals were created and systematic examination of the joint revealed the following:      There was no evidence of any significant chondral lesions to the glenoid or humeral head.     Tenosynovitis and SLAP type II was noted to the biceps tendon on ramp sign.    There was some synovitis and tearing to the labrum that was debrided as needed superiorly and anteriorly.  Biceps auto-tenodesis was performed using thermal device.  Part of superior labrum was included to allow the biceps tendon to auto-tenodese.  Attention was then turned to the rotator cuff.  The rotator cuff undersurface was then visualized and debrided as needed using a shaver.     Attention was then turned to the subacromial space where a significant hypertrophic bursa was encountered.  The bursa itself was thickened and hypertrophic.  A lateral portal was created to assist with the bursectomy.  Subacromial decompression was completed using three-portal technique in the standard fashion with a 4.5 mm ruthann without difficulty.  The anterior osteophyte was flattened.  Confirmation of adequate resection was confirmed while viewing from the lateral portal.      Next, attention was then turned to the  distal clavicle excision.  A thermal device was used to clear the soft tissue off the length of the AC joint approximately 1 cm medial to the end of the distal end of the clavicle.  The anterior portal which was established earlier was used to perform the AC resection at the level of the AC joint.  The adequacy of the resection was confirmed while viewing from the anterior portal.  Care was taken to resect enough postero-superiorly.  Approximately 9 mm was resected.     COMPLEX PROCEDURE:   There was an altered surgical field. There was abnormal anatomy. There was scarring to the area, with the massive tear that was thin, retracted and scarred in significantly. Complexity of the service was much greater than the normative procedure. There was increased time, intensity and technical difficulty of the procedure, severity of the patient's condition and mental effort required.  This was a highly complicated repair and tear pattern that required advanced arthroscopic skill in order to safely and technically perform it.  Nevertheless the repair achieved was excellent. Biologic augmentation with Cuffmend and application of biologic implant for soft tissue reinforcement for compromised rotator cuff tissue was necessary.    The surface of the rotator cuff was then gently debrided and mobilized.  We did this using a shaver while viewing through the posterior portal and the shaver used through the lateral portal.  We were then able to mobilize the cuff tear which was located at the supraspinatus extending to the infraspinatus.  The cuff was able to be mobilized adequately laterally.  The cuff tear dimensions were: 50 mm AP and 45 mm medial to lateral.  The undersurface edge of the cuff was debrided as needed using the shaver.  A 7 mm cannula was placed in the lateral and venkatesh-superior portals.  The bony bed of the greater tuberosity was prepared with the ruthann.  This left a nice surface for the articular surface of the cuff  to be repaired to and heal to.  Adequate debridement was performed, moving the arm as needed with internal/external rotation.      Shoulder arthroscopic lysis of adhesions (CPT 08708):  With the arthroscope in the subacromial space, an extensive lysis of adhesions needed to be performed with lysis of adhesions in the lateral gutter, posterior gutter, and anterior gutter where there was extensive scarring from the chronic nature of the rotator cuff tear.  The rotator cuff was retracted and there was a massive tear noted.  After the lysis of adhesions, the mobility was restored to the rotator cuff tissue and shoulder.    Separate incision was made and Tactoset bone graft substitute (hydroxyapatite) was inserted in the standard fashion into site of bone marrow edema/ insufficiency fracture at greater tuberosity with MRI correlation, subsequent anchor fixation was good. This was done to improve strength of fixation.    A 3 mm incision was made to place the 2 anchors through.  This was done in a central location using internal and external rotation to place the two 5.5mm Arthrex Bio-corkscrew anchors; one  anteriorly and one posteriorly.  The anterior anchor was placed first and the posterior anchor was placed second.  Scorpion suture-passing device was used to place two horizontal mattress sutures through the cuff starting anteriorly and proceeding posteriorly.  Next, the posterior anchor was placed and two more horizontal mattress sutures were passed.  After the two anchors were placed we had four horizontal mattress sutures proceeding from anterior to posterior that were running through the cuff.  The cuff tear was a L/crescentic shaped tear.  The sutures were then tied using modified Da knots proceeding from anterior to posterior.  All knots had good loop and knot security using modified Da knots.  After knots were applied from anterior to posterior, the cuff was reapproximated down to the greater tuberosity  with good compression and knots on the superior side of the cuff.  The shoulder was cycled through full internal/external rotation.  No suture breakage was noted and the cuff was noted to remain nicely reapproximated throughout the entire rotation of the joint.  The scope was then placed in the joint on the articular side of the cuff.  The cuff was reapproximated nicely.     Prior to the lateral cuff fixation, 'crimson duvet' microfracture technique was performed using an awl to the greater tuberosity in the standard fashion using awl punch device. There was confirmation of marrow elements extravasating out of the end of the microfracture holes upon decrease of pump pressure.     Double row cuff fixation was then performed using a 4.75 x 19.1 mm Swivelock bio-composite anchor x 2.  This gave good compression of the rotator cuff tissue lateral to the medial row down onto the greater tuberosity, which was previously prepared with a bur.      Passport cannula was placed into the lateral portal and the CuffMend device was introduced and deployed to augment and reinforce the compromised thin torn rotator cuff tissue.  PDS staples were placed to stabilize the graft to the native tendon.  Repair stitch and Cuffmend link were shuttled and pulled through the knotless Swivelock mechanism of the lateral row, securing the graft laterally with appropriate tension    Suprascapular nerve block was performed in the standard fashion with 5cc local anesthetic, and 5cc subacromially for local.  Local was placed about portals and incision(s) after irrigation, as described below, was completed. The shoulder and subacromial space were then irrigated and fluid was extravasated using suction. All portals were reapproximated using inverted 4-0 Monocryl suture in the subcutaneous tissue of the portals. Mastisol and Steri-strips were placed with xeroform, 4x4s, abd pad, and Medi-pore tape.  TENS unit pads were placed which were medically  necessary for pain relief.  An iceman was secured in the shoulder burden.  A sling with an abduction pillow was secured.  The patient was then moved to supine, extubated and taken to the recovery room where the patient arrived in stable condition with the compartments of the arm and forearm soft and cap refill less than a second in all digits.     POSTOPERATIVE PLAN: We will follow the arthroscopic rotator cuff repair guidelines for a massive size rotator cuff tear.  We discussed with the patient's family after surgery.  The patient will remain in a sling for 8 weeks.  PT to start at 6 weeks.    Quality of tissue: fair to good     Quality of the repair: fair to good      Size of tear: 50 x 45 mm

## 2022-06-21 NOTE — ANESTHESIA PREPROCEDURE EVALUATION
06/21/2022  Pre-operative evaluation for Procedure(s) (LRB):  REPAIR, ROTATOR CUFF, ARTHROSCOPIC - DISPENSE [facility] CARE (Left)  DEBRIDEMENT, SHOULDER, ARTHROSCOPIC (Left)  ARTHROSCOPY, SHOULDER, WITH DISTAL CLAVICLE EXCISION (Left)  ARTHROSCOPY,SHOULDER,WITH BICEPS TENODESIS (Left)  ORIF, FRACTURE, HUMERUS (Left)  MICROFRACTURE (Left)    Gus Sabillon is a 82 y.o. male hx of htn, gerd, pad (s/p cea), ckdiii    · The left ventricle is normal in size with concentric remodeling and normal systolic function. The estimated ejection fraction is 60%.  · Normal right ventricular size with normal right ventricular systolic function.  · Indeterminate left ventricular diastolic function.  · Mild left atrial enlargement.  · Mild aortic regurgitation.  · Trivial pericardial effusion.  · The estimated PA systolic pressure is 34 mmHg.  · Normal central venous pressure (3 mmHg).        Patient Active Problem List   Diagnosis    HTN (hypertension), benign    PAD (peripheral artery disease)    Gastroesophageal reflux disease    Carotid atherosclerosis, bilateral    Nontraumatic tear of right rotator cuff    Chronic right shoulder pain    Sleeping difficulty    Mixed hyperlipidemia    Chronic left shoulder pain    Carotid artery stenosis    S/P carotid endarterectomy    Aortic atherosclerosis    CKD (chronic kidney disease)       Review of patient's allergies indicates:   Allergen Reactions    Clindamycin Nausea And Vomiting    Penicillins Hives       No current facility-administered medications on file prior to encounter.     Current Outpatient Medications on File Prior to Encounter   Medication Sig Dispense Refill    aspirin (ECOTRIN) 81 MG EC tablet Take 81 mg by mouth once daily.      atenoloL (TENORMIN) 50 MG tablet Take 1 tablet (50 mg total) by mouth once daily. 90 tablet 3    bempedoic acid  (NEXLETOL) 180 mg Tab Take 1 tablet (180 mg) by mouth once daily. 90 tablet 3    collagen/biotin/ascorbic acid (COLLAGEN 1500 PLUS C ORAL) Take by mouth Daily. 11 G of powder daily      fluorouraciL (EFUDEX) 5 % cream AAA on both arms BID x 2-3 weeks. Stop if blistered, oozing, or bleeding. Use daily sun protection. 40 g 1    multivit-min-FA-lycopen-lutein 300-600-300 mcg Tab Take by mouth.      omega-3 fatty acids/fish oil (FISH OIL-OMEGA-3 FATTY ACIDS) 300-1,000 mg capsule Take by mouth once daily.      omeprazole (PRILOSEC) 20 MG capsule Take 2 capsules (40 mg total) by mouth once daily. 180 capsule 3    rosuvastatin (CRESTOR) 20 MG tablet Take 1 tablet (20 mg total) by mouth once daily. 90 tablet 3    tadalafiL (CIALIS) 5 MG tablet Take 1 tablet (5 mg total) by mouth once daily. 30 tablet 6    triamterene-hydrochlorothiazide 37.5-25 mg (MAXZIDE-25) 37.5-25 mg per tablet Take 1 tablet by mouth once daily. 90 tablet 3       Past Surgical History:   Procedure Laterality Date    ARTHROSCOPIC DEBRIDEMENT OF SHOULDER Right 2/27/2020    Procedure: EXTENSIVE DEBRIDEMENT, SHOULDER, ARTHROSCOPIC;  Surgeon: Staci Childress MD;  Location: [facility] OR;  Service: Orthopedics;  Laterality: Right;    ARTHROSCOPIC REPAIR OF ROTATOR CUFF OF SHOULDER Right 2/27/2020    Procedure: REPAIR, ROTATOR CUFF, ARTHROSCOPIC;  Surgeon: Staci Childress MD;  Location: [facility] OR;  Service: Orthopedics;  Laterality: Right;    ARTHROSCOPY OF SHOULDER WITH DECOMPRESSION OF SUBACROMIAL SPACE Right 2/27/2020    Procedure: ARTHROSCOPY, SHOULDER, WITH SUBACROMIAL SPACE DECOMPRESSION;  Surgeon: Staci Childress MD;  Location: [facility] OR;  Service: Orthopedics;  Laterality: Right;    BLEPHAROPLASTY      CAROTID ENDARTERECTOMY Right 10/15/2021    Procedure: ENDARTERECTOMY-CAROTID;  Surgeon: Imer Farooq MD;  Location: [address];  Service: Peripheral Vascular;  Laterality: Right;  AWAKE CERVICAL BLOCK    CATARACT EXTRACTION, BILATERAL      FIXATION OF  TENDON Right 2020    Procedure: FIXATION, TENDON, Biceps Tenodesis;  Surgeon: Staci Childress MD;  Location: [facility] OR;  Service: Orthopedics;  Laterality: Right;  FIXATION, TENDON, Biceps Tenodesis    OPEN REDUCTION AND INTERNAL FIXATION (ORIF) OF FRACTURE OF PROXIMAL HUMERUS Right 2020    Procedure: ORIF, FRACTURE, GREATER TUBEROSITY;  Surgeon: Staci Childress MD;  Location: [facility] OR;  Service: Orthopedics;  Laterality: Right;    TONSILLECTOMY         Social History     Socioeconomic History    Marital status:    Tobacco Use    Smoking status: Former Smoker     Types: Cigarettes     Quit date:      Years since quittin.4    Smokeless tobacco: Never Used   Substance and Sexual Activity    Alcohol use: Yes     Alcohol/week: 14.0 standard drinks     Types: 14 Glasses of wine per week     Comment: 2 glasses of wine daily    Drug use: Never    Sexual activity: Not Currently     Social Determinants of Health     Financial Resource Strain: Low Risk     Difficulty of Paying Living Expenses: Not hard at all   Food Insecurity: No Food Insecurity    Worried About Running Out of Food in the Last Year: Never true    Ran Out of Food in the Last Year: Never true   Transportation Needs: No Transportation Needs    Lack of Transportation (Medical): No    Lack of Transportation (Non-Medical): No   Physical Activity: Sufficiently Active    Days of Exercise per Week: 6 days    Minutes of Exercise per Session: 30 min   Stress: No Stress Concern Present    Feeling of Stress : Only a little   Social Connections: Moderately Isolated    Frequency of Communication with Friends and Family: More than three times a week    Frequency of Social Gatherings with Friends and Family: More than three times a week    Attends Confucianism Services: Never    Active Member of Clubs or Organizations: No    Attends Club or Organization Meetings: Never    Marital Status:    Housing Stability: Low Risk     Unable to  Pay for Housing in the Last Year: No    Number of Places Lived in the Last Year: 1    Unstable Housing in the Last Year: No         CBC: No results for input(s): WBC, RBC, HGB, HCT, PLT, MCV, MCH, MCHC in the last 72 hours.    CMP: No results for input(s): NA, K, CL, CO2, BUN, CREATININE, GLU, MG, PHOS, CALCIUM, ALBUMIN, PROT, ALKPHOS, ALT, AST, BILITOT in the last 72 hours.    INR  No results for input(s): PT, INR, PROTIME, APTT in the last 72 hours.        Diagnostic Studies:      EKG:  Sinus bradycardia with 1st degree A-V block with occasional Premature   ventricular complexes   Otherwise normal ECG   When compared with ECG of 13-OCT-2021 11:16,   Premature ventricular complexes are now Present   T wave amplitude has decreased in Lateral leads   Confirmed by Lee Almonte MD ([id number]) on 6/2/2022 2:15:33 PM     2D Echo:  No results found for this or any previous visit.        Pre-op Assessment    I have reviewed the Patient Summary Reports.     I have reviewed the Nursing Notes. I have reviewed the NPO Status.   I have reviewed the Medications.     Review of Systems  Anesthesia Hx:  No problems with previous Anesthesia  History of prior surgery of interest to airway management or planning: Denies Family Hx of Anesthesia complications.   Denies Personal Hx of Anesthesia complications.   Hematology/Oncology:         -- Denies Anemia:   Cardiovascular:   Exercise tolerance: good Hypertension Denies CAD.    Denies CABG/stent.  Denies Dysrhythmias.  ECG has been reviewed.    Pulmonary:   Denies COPD.  Denies Asthma.  Denies Sleep Apnea.    Renal/:   Chronic Renal Disease, CRI    Hepatic/GI:   GERD, well controlled Denies Liver Disease.    Neurological:   TIA, (prior to CEA) Denies Seizures.    Endocrine:   Denies Diabetes.        Physical Exam  General: Well nourished and Cooperative    Airway:  Mallampati: II / I  Mouth Opening: Normal  TM Distance: Normal  Tongue: Normal  Neck ROM: Normal  ROM    Dental:  Intact    Chest/Lungs:  Clear to auscultation, Normal Respiratory Rate    Heart:  Rate: Normal  Rhythm: Regular Rhythm        Anesthesia Plan  Type of Anesthesia, risks & benefits discussed:    Anesthesia Type: Gen ETT  Intra-op Monitoring Plan: Standard ASA Monitors  Post Op Pain Control Plan: multimodal analgesia  Induction:  IV  Airway Plan: Direct, Post-Induction  Informed Consent: Informed consent signed with the Patient and all parties understand the risks and agree with anesthesia plan.  All questions answered.   ASA Score: 3  Day of Surgery Review of History & Physical: H&P Update referred to the surgeon/provider.    Ready For Surgery From Anesthesia Perspective.     .

## 2022-07-08 ENCOUNTER — OFFICE VISIT (OUTPATIENT)
Dept: SPORTS MEDICINE | Facility: CLINIC | Age: 82
End: 2022-07-08
Payer: MEDICARE

## 2022-07-08 ENCOUNTER — HOSPITAL ENCOUNTER (OUTPATIENT)
Dept: RADIOLOGY | Facility: HOSPITAL | Age: 82
Discharge: HOME | End: 2022-07-08
Attending: PHYSICIAN ASSISTANT
Payer: MEDICARE

## 2022-07-08 VITALS
WEIGHT: 169 LBS | BODY MASS INDEX: 22.4 KG/M2 | DIASTOLIC BLOOD PRESSURE: 62 MMHG | SYSTOLIC BLOOD PRESSURE: 118 MMHG | HEART RATE: 72 BPM | HEIGHT: 73 IN

## 2022-07-08 DIAGNOSIS — M25.512 LEFT SHOULDER PAIN, UNSPECIFIED CHRONICITY: Primary | ICD-10-CM

## 2022-07-08 DIAGNOSIS — M25.512 LEFT SHOULDER PAIN, UNSPECIFIED CHRONICITY: ICD-10-CM

## 2022-07-08 PROCEDURE — 73030 X-RAY EXAM OF SHOULDER: CPT | Mod: TC,LT

## 2022-07-08 PROCEDURE — 1159F PR MEDICATION LIST DOCUMENTED IN MEDICAL RECORD: ICD-10-PCS | Mod: CPTII,S$GLB,, | Performed by: PHYSICIAN ASSISTANT

## 2022-07-08 PROCEDURE — 99999 PR PBB SHADOW E&M-EST. PATIENT-LVL IV: ICD-10-PCS | Mod: PBBFAC,,, | Performed by: PHYSICIAN ASSISTANT

## 2022-07-08 PROCEDURE — 1126F AMNT PAIN NOTED NONE PRSNT: CPT | Mod: CPTII,S$GLB,, | Performed by: PHYSICIAN ASSISTANT

## 2022-07-08 PROCEDURE — 99999 PR PBB SHADOW E&M-EST. PATIENT-LVL IV: CPT | Mod: PBBFAC,,, | Performed by: PHYSICIAN ASSISTANT

## 2022-07-08 PROCEDURE — 1160F RVW MEDS BY RX/DR IN RCRD: CPT | Mod: CPTII,S$GLB,, | Performed by: PHYSICIAN ASSISTANT

## 2022-07-08 PROCEDURE — 3288F FALL RISK ASSESSMENT DOCD: CPT | Mod: CPTII,S$GLB,, | Performed by: PHYSICIAN ASSISTANT

## 2022-07-08 PROCEDURE — 1101F PR PT FALLS ASSESS DOC 0-1 FALLS W/OUT INJ PAST YR: ICD-10-PCS | Mod: CPTII,S$GLB,, | Performed by: PHYSICIAN ASSISTANT

## 2022-07-08 PROCEDURE — 73030 X-RAY EXAM OF SHOULDER: CPT | Mod: 26,LT,, | Performed by: RADIOLOGY

## 2022-07-08 PROCEDURE — 3078F PR MOST RECENT DIASTOLIC BLOOD PRESSURE < 80 MM HG: ICD-10-PCS | Mod: CPTII,S$GLB,, | Performed by: PHYSICIAN ASSISTANT

## 2022-07-08 PROCEDURE — 3288F PR FALLS RISK ASSESSMENT DOCUMENTED: ICD-10-PCS | Mod: CPTII,S$GLB,, | Performed by: PHYSICIAN ASSISTANT

## 2022-07-08 PROCEDURE — 1159F MED LIST DOCD IN RCRD: CPT | Mod: CPTII,S$GLB,, | Performed by: PHYSICIAN ASSISTANT

## 2022-07-08 PROCEDURE — 1126F PR PAIN SEVERITY QUANTIFIED, NO PAIN PRESENT: ICD-10-PCS | Mod: CPTII,S$GLB,, | Performed by: PHYSICIAN ASSISTANT

## 2022-07-08 PROCEDURE — 3074F PR MOST RECENT SYSTOLIC BLOOD PRESSURE < 130 MM HG: ICD-10-PCS | Mod: CPTII,S$GLB,, | Performed by: PHYSICIAN ASSISTANT

## 2022-07-08 PROCEDURE — 99024 PR POST-OP FOLLOW-UP VISIT: ICD-10-PCS | Mod: S$GLB,,, | Performed by: PHYSICIAN ASSISTANT

## 2022-07-08 PROCEDURE — 99024 POSTOP FOLLOW-UP VISIT: CPT | Mod: S$GLB,,, | Performed by: PHYSICIAN ASSISTANT

## 2022-07-08 PROCEDURE — 1160F PR REVIEW ALL MEDS BY PRESCRIBER/CLIN PHARMACIST DOCUMENTED: ICD-10-PCS | Mod: CPTII,S$GLB,, | Performed by: PHYSICIAN ASSISTANT

## 2022-07-08 PROCEDURE — 73030 XR SHOULDER COMPLETE 2 OR MORE VIEWS LEFT: ICD-10-PCS | Mod: 26,LT,, | Performed by: RADIOLOGY

## 2022-07-08 PROCEDURE — 3078F DIAST BP <80 MM HG: CPT | Mod: CPTII,S$GLB,, | Performed by: PHYSICIAN ASSISTANT

## 2022-07-08 PROCEDURE — 1101F PT FALLS ASSESS-DOCD LE1/YR: CPT | Mod: CPTII,S$GLB,, | Performed by: PHYSICIAN ASSISTANT

## 2022-07-08 PROCEDURE — 3074F SYST BP LT 130 MM HG: CPT | Mod: CPTII,S$GLB,, | Performed by: PHYSICIAN ASSISTANT

## 2022-07-14 NOTE — PROGRESS NOTES
Chief Complaint: left shoulder pain    HISTORY OF PRESENT ILLNESS:   Pt is here today for post-operative followup of shoulder arthroscopy.  he is doing well.  We have reviewed patient's findings and discussed plan of care and future treatment options.      Tolerating pain well.   Wearing sling which is in place.  No issues reported.     Rates his pain at a 0/10 on VAS    DATE OF PROCEDURE: 06/21/2022  SURGEON: Staci Childress M.D.  OPERATION:   left  1. Shoulder arthroscopic rotator cuff repair 50 x 45 mm, massive, double row (CPT 07699) with CuffMend ()  (complex -22 modifier)  2. Shoulder arthroscopic biceps tenodesis (CPT 40956)  3. Shoulder arthroscopic subacromial decompression, bursectomy   4. Shoulder arthroscopic extensive debridement (anterior, posterior glenohumeral joint, subacromial space) (CPT 86601)  5. Shoulder arthroscopic labral debridement (CPT 64130)  6. Shoulder arthroscopic microfracture (Crimson duvet technique) (CPT 36133)  7. Shoulder arthroscopic lysis of adhesions (CPT 13675)  8. Shoulder arthroscopic distal clavicle excision (CPT 63097)  9. Shoulder ORIF greater tuberosity (CPT 30901)                                                                                  PHYSICAL EXAMINATION:     Incision sites healed well  No evidence of any erythema, infection or induration  elbow Range of motion full   Minimal effusion  2+ Radial pulses  No swelling                                                                               ASSESSMENT:                                                                                                                                               1. Status post above, doing well.                                                                                                                               PLAN:                                                                                                                                                     1. PT to  start 6 weeks after surgery; wean narcotics  2. Emphasized scapular function.  3. I have discussed return to activity in detail.  4. Patient will see us back in 4 weeks.                                      5. All questions were answered and the patient should contact us if he  has any questions or concerns in the interim.

## 2022-08-03 ENCOUNTER — OFFICE VISIT (OUTPATIENT)
Dept: SPORTS MEDICINE | Facility: CLINIC | Age: 82
End: 2022-08-03
Payer: MEDICARE

## 2022-08-03 ENCOUNTER — CLINICAL SUPPORT (OUTPATIENT)
Dept: REHABILITATION | Facility: HOSPITAL | Age: 82
End: 2022-08-03
Attending: PHYSICIAN ASSISTANT
Payer: MEDICARE

## 2022-08-03 VITALS
BODY MASS INDEX: 22.66 KG/M2 | DIASTOLIC BLOOD PRESSURE: 66 MMHG | HEIGHT: 73 IN | HEART RATE: 70 BPM | WEIGHT: 171 LBS | SYSTOLIC BLOOD PRESSURE: 117 MMHG

## 2022-08-03 DIAGNOSIS — R29.898 DECREASED STRENGTH OF UPPER EXTREMITY: ICD-10-CM

## 2022-08-03 DIAGNOSIS — M25.612 DECREASED ROM OF LEFT SHOULDER: ICD-10-CM

## 2022-08-03 DIAGNOSIS — Z98.890 STATUS POST LEFT ROTATOR CUFF REPAIR: Primary | ICD-10-CM

## 2022-08-03 DIAGNOSIS — M75.102 NONTRAUMATIC TEAR OF LEFT ROTATOR CUFF, UNSPECIFIED TEAR EXTENT: ICD-10-CM

## 2022-08-03 PROCEDURE — 1125F PR PAIN SEVERITY QUANTIFIED, PAIN PRESENT: ICD-10-PCS | Mod: CPTII,S$GLB,, | Performed by: ORTHOPAEDIC SURGERY

## 2022-08-03 PROCEDURE — 99999 PR PBB SHADOW E&M-EST. PATIENT-LVL III: ICD-10-PCS | Mod: PBBFAC,,, | Performed by: ORTHOPAEDIC SURGERY

## 2022-08-03 PROCEDURE — 3078F DIAST BP <80 MM HG: CPT | Mod: CPTII,S$GLB,, | Performed by: ORTHOPAEDIC SURGERY

## 2022-08-03 PROCEDURE — 1159F MED LIST DOCD IN RCRD: CPT | Mod: CPTII,S$GLB,, | Performed by: ORTHOPAEDIC SURGERY

## 2022-08-03 PROCEDURE — 1125F AMNT PAIN NOTED PAIN PRSNT: CPT | Mod: CPTII,S$GLB,, | Performed by: ORTHOPAEDIC SURGERY

## 2022-08-03 PROCEDURE — 99024 POSTOP FOLLOW-UP VISIT: CPT | Mod: S$GLB,,, | Performed by: ORTHOPAEDIC SURGERY

## 2022-08-03 PROCEDURE — 3078F PR MOST RECENT DIASTOLIC BLOOD PRESSURE < 80 MM HG: ICD-10-PCS | Mod: CPTII,S$GLB,, | Performed by: ORTHOPAEDIC SURGERY

## 2022-08-03 PROCEDURE — 99999 PR PBB SHADOW E&M-EST. PATIENT-LVL III: CPT | Mod: PBBFAC,,, | Performed by: ORTHOPAEDIC SURGERY

## 2022-08-03 PROCEDURE — 3074F PR MOST RECENT SYSTOLIC BLOOD PRESSURE < 130 MM HG: ICD-10-PCS | Mod: CPTII,S$GLB,, | Performed by: ORTHOPAEDIC SURGERY

## 2022-08-03 PROCEDURE — 3074F SYST BP LT 130 MM HG: CPT | Mod: CPTII,S$GLB,, | Performed by: ORTHOPAEDIC SURGERY

## 2022-08-03 PROCEDURE — 3288F PR FALLS RISK ASSESSMENT DOCUMENTED: ICD-10-PCS | Mod: CPTII,S$GLB,, | Performed by: ORTHOPAEDIC SURGERY

## 2022-08-03 PROCEDURE — 97110 THERAPEUTIC EXERCISES: CPT

## 2022-08-03 PROCEDURE — 1159F PR MEDICATION LIST DOCUMENTED IN MEDICAL RECORD: ICD-10-PCS | Mod: CPTII,S$GLB,, | Performed by: ORTHOPAEDIC SURGERY

## 2022-08-03 PROCEDURE — 97161 PT EVAL LOW COMPLEX 20 MIN: CPT

## 2022-08-03 PROCEDURE — 1101F PR PT FALLS ASSESS DOC 0-1 FALLS W/OUT INJ PAST YR: ICD-10-PCS | Mod: CPTII,S$GLB,, | Performed by: ORTHOPAEDIC SURGERY

## 2022-08-03 PROCEDURE — 1101F PT FALLS ASSESS-DOCD LE1/YR: CPT | Mod: CPTII,S$GLB,, | Performed by: ORTHOPAEDIC SURGERY

## 2022-08-03 PROCEDURE — 3288F FALL RISK ASSESSMENT DOCD: CPT | Mod: CPTII,S$GLB,, | Performed by: ORTHOPAEDIC SURGERY

## 2022-08-03 PROCEDURE — 99024 PR POST-OP FOLLOW-UP VISIT: ICD-10-PCS | Mod: S$GLB,,, | Performed by: ORTHOPAEDIC SURGERY

## 2022-08-03 NOTE — PLAN OF CARE
[facility]  Physical Therapy Initial Evaluation    Name: Gus Sabillon  Clinic Number: [id number]    Therapy Diagnosis:   Encounter Diagnoses   Name Primary?    Nontraumatic tear of left rotator cuff, unspecified tear extent     Decreased ROM of left shoulder     Decreased strength of upper extremity      Physician: Evangelist Khanna III, *    Physician Orders: PT Eval and Treat   Medical Diagnosis from Referral: Nontraumatic tear of left rotator cuff, unspecified tear extent [M75.102]  Evaluation Date: 8/3/2022  Authorization Period Expiration: 8/31/2022  Plan of Care Expiration: 12/31/2022  Visit # / Visits authorized: 1/ 1    Time In: 1:00pm  Time Out: 2:00pm  Total Billable Time: 60 minutes    Precautions: Standard     DOS: 6/21/2022  Surgeon: Dr. Childress  Precautions: POSTOPERATIVE PLAN: We will follow the arthroscopic rotator cuff repair guidelines for a massive size rotator cuff tear.  We discussed with the patient's family after surgery.  The patient will remain in a sling for 8 weeks.  PT to start at 6 weeks.    Subjective   Date of onset: May 2022  History of current condition - Zeeshan reports: falling from a 2 step ladder after experiencing dizziness where he hit his L shoulder and felt immediate pain. This is patients only reported fall in past 6 months. Diagnosed with rotator cuff tear and underwent repair on 6/21/2022. He has a previous surgical history of rotator cuff repair on R side back in 2020, which he reports a full recovery from. Patient is retired and hobbies include computer work with horse handicapping and going for walks everyday. Comes from appointment with Dr. Childress who discharged the abduction pillow. Patient also states he is leaving for [place] next week for 1 month.            Past Medical History:   Diagnosis Date    CKD (chronic kidney disease) stage 3, GFR 30-59 ml/min     Dyslipidemia     GERD (gastroesophageal reflux disease)     Hx of  melanoma excision     Hypertension     Melanoma     PAD (peripheral artery disease)     Squamous cell carcinoma of skin      Gus Sabillon  has a past surgical history that includes Cataract extraction, bilateral; Tonsillectomy; Blepharoplasty; Arthroscopic repair of rotator cuff of shoulder (Right, 2/27/2020); Arthroscopic debridement of shoulder (Right, 2/27/2020); Fixation of tendon (Right, 2/27/2020); Open reduction and internal fixation (ORIF) of fracture of proximal humerus (Right, 2/27/2020); Arthroscopy of shoulder with decompression of subacromial space (Right, 2/27/2020); Carotid endarterectomy (Right, 10/15/2021); Arthroscopic repair of rotator cuff of shoulder (Left, 6/21/2022); Arthroscopic debridement of shoulder (Left, 6/21/2022); Arthroscopy of shoulder with removal of distal clavicle (Left, 6/21/2022); arthroscopy,shoulder,with biceps tenodesis (Left, 6/21/2022); ORIF humerus fracture (Left, 6/21/2022); Excision of bursa (Left, 6/21/2022); Arthroscopy of shoulder with decompression of subacromial space (Left, 6/21/2022); and Shoulder arthroscopy (Left, 6/21/2022).    Gus has a current medication list which includes the following prescription(s): aspirin, atenolol, nexletol, collagen/biotin/ascorbic acid, fluorouracil, ketoconazole, multivit-min-fa-lycopen-lutein, fish oil-omega-3 fatty acids, omeprazole, oxycodone-acetaminophen, promethazine, rosuvastatin, tadalafil, tramadol, and triamterene-hydrochlorothiazide 37.5-25 mg.    Review of patient's allergies indicates:   Allergen Reactions    Clindamycin Nausea And Vomiting    Penicillins Hives        Imaging, MRI studies: 5/23/2022    Impression:     1. Full-thickness, full width tear of the supraspinatus with tendinous retraction to the level of the glenoid.  2. Near complete tear of the infraspinatus tendon.  3. Early delaminating tear of the subscapularis tendon.  4. Teres minor tendinosis.  5. Intra-articular tendinosis and medial  subluxation of the long head biceps tendon concerning for biceps pulley lesion.  6. Synovitis and mild subacromial/subdeltoid bursitis with slightly heterogeneous joint effusion, possibly reflecting component of hemarthrosis in light of history.  7. Additional findings detailed above.    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 L shoulder superior compared to R.     Passive Range of Motion:   Shoulder Left   Flexion 90   Abduction 75   ER at 20 20   IR 60      Active Range of Motion:   Shoulder Right Left   Flexion 150 NT   Abduction 120 NT   ER at 0 50 NT   ER at 90 C4 NT   IR (behind back) L3 NT     Upper Extremity Strength:  Formal MMT not performed 2/2 PO week 6     Joint Mobility: limited as expected post-op.    Palpation:  TTP lateral and superior shoulder as expected post-op.              CMS Impairment/Limitation/Restriction for FOTO  Survey    Therapist reviewed FOTO scores for Gus Sabillon on 8/3/2022.   FOTO documents entered into [employer] - see Media section.    Limitation Score: see media%  Category: Other         TREATMENT   Treatment Time In: 1:30pm  Treatment Time Out: 2:00pm  Total Treatment time separate from Evaluation: 30 minutes    Zeeshan received therapeutic exercises to develop strength, endurance, ROM and flexibility for 30 minutes including:    scap retractions  Pendulums  Table slides - flexion / scaption   Cane ER  Elbow  flexion       Home Exercises and Patient Education Provided    Education provided re: sling use, activity modifications, goals for therapy, role of therapy for care, exercises/HEP    Written Home Exercises Provided: yes.  Exercises were reviewed and Zeeshan was able to demonstrate them prior to the end of the session.   Pt received a written copy of exercises to perform at home. Zeeshan demonstrated good  understanding of the education provided.     See EMR under patient instructions for exercises given.   Assessment   Gus is a 82 y.o. male referred to outpatient Physical Therapy with a medical diagnosis of Nontraumatic tear of left rotator cuff, unspecified tear extent [M75.102]. Pt presents with decreased L shoulder PROM, AROM, shoulder and scapular strength, poor posture, and difficulty with ADLs. Patient is going out of town to [place] for 1 month, provided patient my email address for communication with progress and exercises until he returns.      Pt prognosis is Fair.   Pt will benefit from skilled outpatient Physical Therapy to address the deficits stated above and in the chart below, provide pt/family education, and to maximize pt's level of independence.     Plan of care discussed with patient: Yes  Pt's spiritual, cultural and educational needs considered and patient is agreeable to the plan of care and goals as stated below:     Anticipated Barriers for therapy: age, chronic RC tears    Medical Necessity is demonstrated by the following  History  Co-morbidities and personal factors that may impact the plan of care Co-morbidities:   see medical chart    Personal Factors:   no deficits     low   Examination  Body Structures and Functions, activity limitations and participation restrictions that may impact the plan of care Body Regions:   upper extremities    Body Systems:    gross symmetry  ROM  strength  gross coordinated movement    Participation Restrictions:   none    Activity limitations:    Learning and applying knowledge  no deficits    General Tasks and Commands  no deficits    Communication  no deficits    Mobility  no deficits    Self care  no deficits    Domestic Life  no deficits    Interactions/Relationships  no deficits    Life Areas  no deficits    Community and Social Life  no deficits         low   Clinical Presentation stable and uncomplicated low   Decision Making/ Complexity Score: low     Goals:  GOALS: Short Term Goals:  8 weeks  1.Report decreased L shoulder pain < / =  2/10  to increase tolerance for PROM by therapist.   2. Increase PROM to equivalent to contralateral side in all directions.    3. Increased strength by 1/3 MMT grade in shoulder flexion to increase tolerance for ADL and work activities.  4. Pt to tolerate HEP to improve ROM and independence with ADL's    Long Term Goals: 24 weeks  1.Report decreased L shoulder pain  < / =  0 /10  to increase tolerance for HEP.   2.Increase AROM to equivalent to contralateral side in all directions.   3.Increase strength to >/= 4/5 in shoulder flexion to increase tolerance for ADL and work activities.  4. Pt goal: complete overhead tasks without difficulty to resume PLOF.       Plan   Plan of care Certification: 8/3/2022 to 12/31/2022.    Outpatient Physical Therapy 1 times weekly for 24 weeks to include the following interventions: Electrical Stimulation NMES, Manual Therapy, Moist Heat/ Ice, Neuromuscular Re-ed, Patient Education, Therapeutic Activities, Therapeutic Exercise and dry needling, kinesiotape..     Bairon Cotto, PT

## 2022-08-03 NOTE — PROGRESS NOTES
Chief Complaint: left shoulder pain    HISTORY OF PRESENT ILLNESS:   Pt is here today for post-operative followup of shoulder arthroscopy.  he is doing well.  We have reviewed patient's findings and discussed plan of care and future treatment options. Is scheduled to start physical therapy today with Will.    Tolerating pain well  No longer wearing sling.  No issues reported.     Rates his pain at a 0/10 on VAS    SANE 8/3/22: 60%      DATE OF PROCEDURE: 06/21/2022  SURGEON: Staci Childress M.D.  OPERATION:   left  1. Shoulder arthroscopic rotator cuff repair 50 x 45 mm, massive, double row (CPT 56352) with CuffMend ()  (complex -22 modifier)  2. Shoulder arthroscopic biceps tenodesis (CPT 21012)  3. Shoulder arthroscopic subacromial decompression, bursectomy   4. Shoulder arthroscopic extensive debridement (anterior, posterior glenohumeral joint, subacromial space) (CPT 86565)  5. Shoulder arthroscopic labral debridement (CPT 99484)  6. Shoulder arthroscopic microfracture (Crimson duvet technique) (CPT 28770)  7. Shoulder arthroscopic lysis of adhesions (CPT 64256)  8. Shoulder arthroscopic distal clavicle excision (CPT 54885)  9. Shoulder ORIF greater tuberosity (CPT 60389)                                                                                  PHYSICAL EXAMINATION:     Incision sites healed well  No evidence of any erythema, infection or induration  elbow Range of motion full   Minimal effusion  2+ Radial pulses  No swelling                                                                               ASSESSMENT:                                                                  Status post left rotator cuff repair, doing well    PLAN:  Being physical therapy today (6 weeks postop)  Sling for 2 more weeks (total of 8 weeks)  Emphasized scapular function  Patient will see us back in 3 months  All questions were answered and the patient should contact us if he has any questions or concerns in the interim

## 2022-09-26 ENCOUNTER — OFFICE VISIT (OUTPATIENT)
Dept: SPORTS MEDICINE | Facility: CLINIC | Age: 82
End: 2022-09-26
Payer: MEDICARE

## 2022-09-26 VITALS — BODY MASS INDEX: 22.53 KG/M2 | HEIGHT: 73 IN | WEIGHT: 170 LBS

## 2022-09-26 DIAGNOSIS — Z98.890 STATUS POST LEFT ROTATOR CUFF REPAIR: Primary | ICD-10-CM

## 2022-09-26 PROCEDURE — 1125F PR PAIN SEVERITY QUANTIFIED, PAIN PRESENT: ICD-10-PCS | Mod: CPTII,S$GLB,, | Performed by: ORTHOPAEDIC SURGERY

## 2022-09-26 PROCEDURE — 1101F PT FALLS ASSESS-DOCD LE1/YR: CPT | Mod: CPTII,S$GLB,, | Performed by: ORTHOPAEDIC SURGERY

## 2022-09-26 PROCEDURE — 99024 PR POST-OP FOLLOW-UP VISIT: ICD-10-PCS | Mod: S$GLB,,, | Performed by: ORTHOPAEDIC SURGERY

## 2022-09-26 PROCEDURE — 99999 PR PBB SHADOW E&M-EST. PATIENT-LVL II: CPT | Mod: PBBFAC,,, | Performed by: ORTHOPAEDIC SURGERY

## 2022-09-26 PROCEDURE — 3288F FALL RISK ASSESSMENT DOCD: CPT | Mod: CPTII,S$GLB,, | Performed by: ORTHOPAEDIC SURGERY

## 2022-09-26 PROCEDURE — 1125F AMNT PAIN NOTED PAIN PRSNT: CPT | Mod: CPTII,S$GLB,, | Performed by: ORTHOPAEDIC SURGERY

## 2022-09-26 PROCEDURE — 99999 PR PBB SHADOW E&M-EST. PATIENT-LVL II: ICD-10-PCS | Mod: PBBFAC,,, | Performed by: ORTHOPAEDIC SURGERY

## 2022-09-26 PROCEDURE — 1101F PR PT FALLS ASSESS DOC 0-1 FALLS W/OUT INJ PAST YR: ICD-10-PCS | Mod: CPTII,S$GLB,, | Performed by: ORTHOPAEDIC SURGERY

## 2022-09-26 PROCEDURE — 3288F PR FALLS RISK ASSESSMENT DOCUMENTED: ICD-10-PCS | Mod: CPTII,S$GLB,, | Performed by: ORTHOPAEDIC SURGERY

## 2022-09-26 PROCEDURE — 99024 POSTOP FOLLOW-UP VISIT: CPT | Mod: S$GLB,,, | Performed by: ORTHOPAEDIC SURGERY

## 2022-09-26 NOTE — PROGRESS NOTES
Chief Complaint: left shoulder pain    HISTORY OF PRESENT ILLNESS:   Pt is here today for 3 month post-operative followup of shoulder arthroscopy.  he is doing well.      We have reviewed patient's findings and discussed plan of care and future treatment options.   He had a 1 month break from physical therapy because he had to fly across country.    Tolerating pain well  No issues reported.     Rates his pain at a 0/10 on VAS    SANE 8/3/22: 60%      DATE OF PROCEDURE: 06/21/2022  SURGEON: Staci Childress M.D.  OPERATION:   left  1. Shoulder arthroscopic rotator cuff repair 50 x 45 mm, massive, double row (CPT 53196) with CuffMend ()  (complex -22 modifier)  2. Shoulder arthroscopic biceps tenodesis (CPT 08940)  3. Shoulder arthroscopic subacromial decompression, bursectomy   4. Shoulder arthroscopic extensive debridement (anterior, posterior glenohumeral joint, subacromial space) (CPT 64343)  5. Shoulder arthroscopic labral debridement (CPT 55855)  6. Shoulder arthroscopic microfracture (Crimson duvet technique) (CPT 24438)  7. Shoulder arthroscopic lysis of adhesions (CPT 94858)  8. Shoulder arthroscopic distal clavicle excision (CPT 16659)  9. Shoulder ORIF greater tuberosity (CPT 51331)                                                                                  PHYSICAL EXAMINATION:     Incision sites healed well  No evidence of any erythema, infection or induration  elbow Range of motion full   Minimal effusion  2+ Radial pulses  No swelling    FE 45 (90)  ER 60  IR T10    Scaption 4/5 at 0, 4+/5 at 30                                                                                 ASSESSMENT:                                                                  Status post left rotator cuff repair, doing well    PLAN:  Continue physical therapy with Armen   Emphasized scapular function  Patient will see us back in 6 weeks  All questions were answered and the patient should contact us if he has any questions or  concerns in the interim

## 2022-09-27 ENCOUNTER — CLINICAL SUPPORT (OUTPATIENT)
Dept: REHABILITATION | Facility: HOSPITAL | Age: 82
End: 2022-09-27
Attending: PHYSICIAN ASSISTANT
Payer: MEDICARE

## 2022-09-27 DIAGNOSIS — R29.898 DECREASED STRENGTH OF UPPER EXTREMITY: ICD-10-CM

## 2022-09-27 DIAGNOSIS — M25.612 DECREASED ROM OF LEFT SHOULDER: Primary | ICD-10-CM

## 2022-09-27 PROCEDURE — 97112 NEUROMUSCULAR REEDUCATION: CPT | Performed by: PHYSICAL THERAPIST

## 2022-09-27 PROCEDURE — 97110 THERAPEUTIC EXERCISES: CPT | Performed by: PHYSICAL THERAPIST

## 2022-09-27 NOTE — PROGRESS NOTES
Physical Therapy Daily Treatment Note     Name: Gus Sabillon  [facility] Number: [id number]    Therapy Diagnosis:   Encounter Diagnoses   Name Primary?    Decreased ROM of left shoulder Yes    Decreased strength of upper extremity      Physician: Evangelist Khanna III, *    Visit Date: 9/27/2022    Physician Orders: PT Eval and Treat   Medical Diagnosis from Referral: Nontraumatic tear of left rotator cuff, unspecified tear extent [M75.102]  Evaluation Date: 8/3/2022  Authorization Period Expiration: 8/31/2022  Plan of Care Expiration: 12/31/2022  Visit # / Visits authorized: 1/ 1    Time In: 1400  Time Out: 1500  Total Billable Time: 30 minutes    Precautions: Standard    DOS: 6/21/2022  Surgeon: Dr. Childress  Precautions: POSTOPERATIVE PLAN: We will follow the arthroscopic rotator cuff repair guidelines for a massive size rotator cuff tear.  We discussed with the patient's family after surgery.  The patient will remain in a sling for 8 weeks.  PT to start at 6 weeks.    Subjective     Pt reports: I was in [place] the past month. No pain in the shoulder but I cannot use it. No ability to lift the arm at all.  He was compliant with home exercise program.  Response to previous treatment: improved motion  Functional change: unable use LUE     Pain: 2/10  Location: left shoulder      Objective     Zeeshan received therapeutic exercises to develop strength, endurance, ROM, and flexibility for 25 minutes including:    Sidelying ER 1# 3 x 15  IR/ER walkouts OTB 2 x 10  Assisted scaptions OTB 2 x 10    Zeeshan received the following manual therapy techniques: Joint mobilizations were applied to the: Left shoulder for 10 minutes, including:    Gr I-II oscillations for relaxation  Gr III flexion   Gr II inferior mob at 20 and 90 deg      Zeeshan participated in neuromuscular re-education activities to improve: Sense, Proprioception, and Posture for 25 minutes. The following activities were included:    Landmine press 1# 2 x  15  Prone row 5# 2 x 15  Prone extension 3 x 10    Home Exercises Provided and Patient Education Provided     Education provided:   - importance of cuff strengthening     Written Home Exercises Provided: Patient instructed to cont prior HEP.  Exercises were reviewed and Zeeshan was able to demonstrate them prior to the end of the session.  Zeeshan demonstrated good  understanding of the education provided.     See EMR under Patient Instructions for exercises provided 9/27/2022.    Assessment     Zeeshan progressed strengthening to rotator cuff today. Passive motion is excellent, but he lacks arthorkinematics to scapula with excessive and early upward rotation, increased humeral head glide and weakness to the lower trap/serratus.     Zeeshan Is progressing well towards his goals.   Pt prognosis is Good.     Pt will continue to benefit from skilled outpatient physical therapy to address the deficits listed in the problem list box on initial evaluation, provide pt/family education and to maximize pt's level of independence in the home and community environment.     Pt's spiritual, cultural and educational needs considered and pt agreeable to plan of care and goals.    Anticipated barriers to physical therapy: [facility]     Goals:  GOALS: Short Term Goals:  8 weeks(Progressing, not met)  1.Report decreased L shoulder pain < / =  2/10  to increase tolerance for PROM by therapist.   2. Increase PROM to equivalent to contralateral side in all directions.    3. Increased strength by 1/3 MMT grade in shoulder flexion to increase tolerance for ADL and work activities.  4. Pt to tolerate HEP to improve ROM and independence with ADL's     Long Term Goals: 24 weeks(Progressing, not met)  1.Report decreased L shoulder pain  < / =  0 /10  to increase tolerance for HEP.   2.Increase AROM to equivalent to contralateral side in all directions.   3.Increase strength to >/= 4/5 in shoulder flexion to increase tolerance for ADL and work  activities.  4. Pt goal: complete overhead tasks without difficulty to resume PLOF.     Plan     Plan of care Certification: 8/3/2022 to 12/31/2022.    Improve rotator cuff strength    Armen Croft, PT

## 2022-09-29 ENCOUNTER — TELEPHONE (OUTPATIENT)
Dept: VASCULAR SURGERY | Facility: CLINIC | Age: 82
End: 2022-09-29
Payer: MEDICARE

## 2022-09-29 DIAGNOSIS — I73.9 PAD (PERIPHERAL ARTERY DISEASE): Primary | ICD-10-CM

## 2022-09-29 NOTE — TELEPHONE ENCOUNTER
Spoke with the pt and informed per Dr Farooq if the pain is that severe he should report to the ED and if not he can be seen in clinic tomorrow by one of the providers in clinic.Pt verbalized that he would prefer to come to clinic tomorrow as the pain is not severe.Appt scheduled and confirmed with the pt and he verbalized understanding of information received.

## 2022-09-29 NOTE — TELEPHONE ENCOUNTER
Received a call from the pt c/o aching and sharp pain in the left leg when lying down the past three nights.Pt also verbalized that it's hard for him to change positions when this happens.The pain eventually goes away approximatelt 15 minutes after he's able to get up. Pt denies any numbness or tingling of ext.Message sent to Dr Farooq.

## 2022-09-30 ENCOUNTER — OFFICE VISIT (OUTPATIENT)
Dept: VASCULAR SURGERY | Facility: CLINIC | Age: 82
End: 2022-09-30
Payer: MEDICARE

## 2022-09-30 ENCOUNTER — HOSPITAL ENCOUNTER (OUTPATIENT)
Dept: VASCULAR SURGERY | Facility: CLINIC | Age: 82
Discharge: HOME | End: 2022-09-30
Attending: SURGERY
Payer: MEDICARE

## 2022-09-30 VITALS
SYSTOLIC BLOOD PRESSURE: 137 MMHG | HEIGHT: 73 IN | DIASTOLIC BLOOD PRESSURE: 64 MMHG | TEMPERATURE: 98 F | HEART RATE: 63 BPM | WEIGHT: 169.75 LBS | BODY MASS INDEX: 22.5 KG/M2

## 2022-09-30 DIAGNOSIS — I73.9 PAD (PERIPHERAL ARTERY DISEASE): ICD-10-CM

## 2022-09-30 DIAGNOSIS — I73.9 PAD (PERIPHERAL ARTERY DISEASE): Primary | ICD-10-CM

## 2022-09-30 PROCEDURE — 93923 UPR/LXTR ART STDY 3+ LVLS: CPT | Mod: S$GLB,,, | Performed by: SURGERY

## 2022-09-30 PROCEDURE — 99999 PR PBB SHADOW E&M-EST. PATIENT-LVL IV: CPT | Mod: PBBFAC,,, | Performed by: SURGERY

## 2022-09-30 PROCEDURE — 99214 OFFICE O/P EST MOD 30 MIN: CPT | Mod: S$GLB,,, | Performed by: SURGERY

## 2022-09-30 PROCEDURE — 1159F PR MEDICATION LIST DOCUMENTED IN MEDICAL RECORD: ICD-10-PCS | Mod: CPTII,S$GLB,, | Performed by: SURGERY

## 2022-09-30 PROCEDURE — 93923 PR NON-INVASIVE PHYSIOLOGIC STUDY EXTREMITY 3 LEVELS: ICD-10-PCS | Mod: S$GLB,,, | Performed by: SURGERY

## 2022-09-30 PROCEDURE — 3075F SYST BP GE 130 - 139MM HG: CPT | Mod: CPTII,S$GLB,, | Performed by: SURGERY

## 2022-09-30 PROCEDURE — 1101F PT FALLS ASSESS-DOCD LE1/YR: CPT | Mod: CPTII,S$GLB,, | Performed by: SURGERY

## 2022-09-30 PROCEDURE — 3075F PR MOST RECENT SYSTOLIC BLOOD PRESS GE 130-139MM HG: ICD-10-PCS | Mod: CPTII,S$GLB,, | Performed by: SURGERY

## 2022-09-30 PROCEDURE — 3078F PR MOST RECENT DIASTOLIC BLOOD PRESSURE < 80 MM HG: ICD-10-PCS | Mod: CPTII,S$GLB,, | Performed by: SURGERY

## 2022-09-30 PROCEDURE — 1101F PR PT FALLS ASSESS DOC 0-1 FALLS W/OUT INJ PAST YR: ICD-10-PCS | Mod: CPTII,S$GLB,, | Performed by: SURGERY

## 2022-09-30 PROCEDURE — 99999 PR PBB SHADOW E&M-EST. PATIENT-LVL IV: ICD-10-PCS | Mod: PBBFAC,,, | Performed by: SURGERY

## 2022-09-30 PROCEDURE — 1126F AMNT PAIN NOTED NONE PRSNT: CPT | Mod: CPTII,S$GLB,, | Performed by: SURGERY

## 2022-09-30 PROCEDURE — 1159F MED LIST DOCD IN RCRD: CPT | Mod: CPTII,S$GLB,, | Performed by: SURGERY

## 2022-09-30 PROCEDURE — 1160F RVW MEDS BY RX/DR IN RCRD: CPT | Mod: CPTII,S$GLB,, | Performed by: SURGERY

## 2022-09-30 PROCEDURE — 1126F PR PAIN SEVERITY QUANTIFIED, NO PAIN PRESENT: ICD-10-PCS | Mod: CPTII,S$GLB,, | Performed by: SURGERY

## 2022-09-30 PROCEDURE — 3288F FALL RISK ASSESSMENT DOCD: CPT | Mod: CPTII,S$GLB,, | Performed by: SURGERY

## 2022-09-30 PROCEDURE — 3288F PR FALLS RISK ASSESSMENT DOCUMENTED: ICD-10-PCS | Mod: CPTII,S$GLB,, | Performed by: SURGERY

## 2022-09-30 PROCEDURE — 1160F PR REVIEW ALL MEDS BY PRESCRIBER/CLIN PHARMACIST DOCUMENTED: ICD-10-PCS | Mod: CPTII,S$GLB,, | Performed by: SURGERY

## 2022-09-30 PROCEDURE — 3078F DIAST BP <80 MM HG: CPT | Mod: CPTII,S$GLB,, | Performed by: SURGERY

## 2022-09-30 PROCEDURE — 99214 PR OFFICE/OUTPT VISIT, EST, LEVL IV, 30-39 MIN: ICD-10-PCS | Mod: S$GLB,,, | Performed by: SURGERY

## 2022-09-30 NOTE — PROGRESS NOTES
Gus Sabillon  09/30/2022    HPI:  Patient is a 82 y.o. male with a h/o HTN, HLD, PAD, prior tobacco use, CKD stage III, who presents for follow up on asymptomatic right carotid artery stenosis > 90% - doing very well  Noting some claudication 3-4 blocks, L > R calf claudication. At this time, noticing some occasional PRINGLE - not always    Prior [facility] in [place], [place] in late 1990s    S/p  10/15/21  R CEA, awake/cervical block     Findings/Key Components:  >90% R ICA plaque with significant atheromatous debris  Neurologically intact; tolerated the R ICA plaque   ASA, statin rx     I initially met him for of R > 90% asymptomatic carotid stenosis. Has had previous u/s of carotids which he states were always under 60% stenosis until his most recent one on 11/20/2020 which showed high-grade R carotid stenosis.  He was last seen back in March, 2021 at which time he had planned on proceeding with a Right CEA. However, this had to get delayed due to some work conflicts. He then had family issues to attend to along with rumaricane Jenny and so has only now been able to get this rescheduled. He reports no new symptoms since that prior visit. Denies any episodes of arm/leg weakness/numbness, facial droop, of amourosis fugax    Able to walk 1 flight of stairs without any angina or PRINGLE although reports he has started to notice getting short of breath after 4-5 blocks. He admits that he feels claudication symptoms restrict him more than SOB.     In [place] > 10 yrs ago: Has b/l stents in his lower extremities placed 10 years ago. Denies any blood thinners. Takes baby aspirin and crestor.     Denies MI/stroke history.  Tobacco use: Former smoker, 15 years of 3-4 ppd. Quit 40 years ago.     PMD is Dr SUMI Mora  Asked to see by Dr Arredondo    Works actively - horse racing  Used to live in [place], [place]    9/30/2022:  This is a patient followed by Dr. Farooq,  who last saw him in January 2022.   He presents of with a 3 day history of  left upper calf numbness that extends to the knee proximally, and sometimes to the forefoot.  Seems to be made worse by laying down.  Given his history of atherosclerotic disease, with be seen on an expedited basis to ensure that this was not from severe PAD.   Does report history of sciatica in the past but this on the contralateral leg.    Past Medical History:   Diagnosis Date    CKD (chronic kidney disease) stage 3, GFR 30-59 ml/min     Dyslipidemia     GERD (gastroesophageal reflux disease)     Hx of melanoma excision     Hypertension     Melanoma     PAD (peripheral artery disease)     Squamous cell carcinoma of skin      Past Surgical History:   Procedure Laterality Date    ARTHROSCOPIC DEBRIDEMENT OF SHOULDER Right 2/27/2020    Procedure: EXTENSIVE DEBRIDEMENT, SHOULDER, ARTHROSCOPIC;  Surgeon: Staci Childress MD;  Location: [facility] OR;  Service: Orthopedics;  Laterality: Right;    ARTHROSCOPIC DEBRIDEMENT OF SHOULDER Left 6/21/2022    Procedure: EXTENSIVE DEBRIDEMENT, SHOULDER, ARTHROSCOPIC;  Surgeon: Staci Childress MD;  Location: [facility] OR;  Service: Orthopedics;  Laterality: Left;    ARTHROSCOPIC REPAIR OF ROTATOR CUFF OF SHOULDER Right 2/27/2020    Procedure: REPAIR, ROTATOR CUFF, ARTHROSCOPIC;  Surgeon: Staci Childress MD;  Location: [facility] OR;  Service: Orthopedics;  Laterality: Right;    ARTHROSCOPIC REPAIR OF ROTATOR CUFF OF SHOULDER Left 6/21/2022    Procedure: REPAIR, ROTATOR CUFF, ARTHROSCOPIC WITH CUFF MEND;  Surgeon: Staci Childress MD;  Location: [facility] OR;  Service: Orthopedics;  Laterality: Left;    ARTHROSCOPY OF SHOULDER WITH DECOMPRESSION OF SUBACROMIAL SPACE Right 2/27/2020    Procedure: ARTHROSCOPY, SHOULDER, WITH SUBACROMIAL SPACE DECOMPRESSION;  Surgeon: Staci Childress MD;  Location: [facility] OR;  Service: Orthopedics;  Laterality: Right;    ARTHROSCOPY OF SHOULDER WITH DECOMPRESSION OF SUBACROMIAL SPACE Left 6/21/2022    Procedure: ARTHROSCOPY, SHOULDER, WITH SUBACROMIAL  DECOMPRESSION;  Surgeon: Staci Childress MD;   Location: [facility] OR;  Service: Orthopedics;  Laterality: Left;    ARTHROSCOPY OF SHOULDER WITH REMOVAL OF DISTAL CLAVICLE Left 6/21/2022    Procedure: ARTHROSCOPY, SHOULDER, WITH DISTAL CLAVICLE EXCISION;  Surgeon: Staci Childress MD;  Location: [facility] OR;  Service: Orthopedics;  Laterality: Left;    ARTHROSCOPY,SHOULDER,WITH BICEPS TENODESIS Left 6/21/2022    Procedure: ARTHROSCOPY,SHOULDER,WITH BICEPS TENODESIS;  Surgeon: Staci Childress MD;  Location: [facility] OR;  Service: Orthopedics;  Laterality: Left;    BLEPHAROPLASTY      CAROTID ENDARTERECTOMY Right 10/15/2021    Procedure: ENDARTERECTOMY-CAROTID;  Surgeon: Imer Farooq MD;  Location: [facility] OR [address] [place] [address];  Service: Peripheral Vascular;  Laterality: Right;  AWAKE CERVICAL BLOCK    CATARACT EXTRACTION, BILATERAL      EXCISION OF BURSA Left 6/21/2022    Procedure: BURSECTOMY;  Surgeon: Staci Childress MD;  Location: [facility] OR;  Service: Orthopedics;  Laterality: Left;    FIXATION OF TENDON Right 2/27/2020    Procedure: FIXATION, TENDON, Biceps Tenodesis;  Surgeon: Staci Childress MD;  Location: [facility] OR;  Service: Orthopedics;  Laterality: Right;  FIXATION, TENDON, Biceps Tenodesis    OPEN REDUCTION AND INTERNAL FIXATION (ORIF) OF FRACTURE OF PROXIMAL HUMERUS Right 2/27/2020    Procedure: ORIF, FRACTURE, GREATER TUBEROSITY;  Surgeon: Staci Childress MD;  Location: [facility] OR;  Service: Orthopedics;  Laterality: Right;    ORIF HUMERUS FRACTURE Left 6/21/2022    Procedure: ORIF, FRACTURE, HUMERUS, GREATER TUBEROSITY;  Surgeon: Staci Childress MD;  Location: [facility] OR;  Service: Orthopedics;  Laterality: Left;    SHOULDER ARTHROSCOPY Left 6/21/2022    Procedure: ARTHROSCOPY, SHOULDER WITH LABRAL REPAIR;  Surgeon: Staci Childress MD;  Location: [facility] OR;  Service: Orthopedics;  Laterality: Left;    TONSILLECTOMY       Family History   Problem Relation Age of Onset    Diabetes Mother     Hyperlipidemia Mother     Heart disease Father     Cancer Sister         ? colon or uterine    Colon cancer Sister       Social History     Socioeconomic History    Marital status:    Tobacco Use    Smoking status: Former     Types: Cigarettes     Quit date:      Years since quittin.7    Smokeless tobacco: Never   Substance and Sexual Activity    Alcohol use: Yes     Alcohol/week: 14.0 standard drinks     Types: 14 Glasses of wine per week     Comment: 2 glasses of wine daily    Drug use: Never    Sexual activity: Not Currently     Social Determinants of Health     Financial Resource Strain: Low Risk     Difficulty of Paying Living Expenses: Not hard at all   Food Insecurity: No Food Insecurity    Worried About Running Out of Food in the Last Year: Never true    Ran Out of Food in the Last Year: Never true   Transportation Needs: No Transportation Needs    Lack of Transportation (Medical): No    Lack of Transportation (Non-Medical): No   Physical Activity: Sufficiently Active    Days of Exercise per Week: 6 days    Minutes of Exercise per Session: 30 min   Stress: No Stress Concern Present    Feeling of Stress : Only a little   Social Connections: Moderately Isolated    Frequency of Communication with Friends and Family: More than three times a week    Frequency of Social Gatherings with Friends and Family: More than three times a week    Attends Rastafarian Services: Never    Active Member of Clubs or Organizations: No    Attends Club or Organization Meetings: Never    Marital Status:    Housing Stability: Low Risk     Unable to Pay for Housing in the Last Year: No    Number of Places Lived in the Last Year: 1    Unstable Housing in the Last Year: No       Current Outpatient Medications:     aspirin (ECOTRIN) 81 MG EC tablet, Take 81 mg by mouth once daily., Disp: , Rfl:     atenoloL (TENORMIN) 50 MG tablet, TAKE 1 TABLET BY MOUTH ONCE DAILY, Disp: 90 tablet, Rfl: 3    bempedoic acid (NEXLETOL) 180 mg Tab, Take 1 tablet (180 mg) by mouth once daily., Disp: 90 tablet, Rfl: 3    collagen/biotin/ascorbic acid  (COLLAGEN 1500 PLUS C ORAL), Take by mouth Daily. 11 G of powder daily, Disp: , Rfl:     fluorouraciL (EFUDEX) 5 % cream, AAA on both arms BID x 2-3 weeks. Stop if blistered, oozing, or bleeding. Use daily sun protection., Disp: 40 g, Rfl: 1    ketoconazole (NIZORAL) 2 % cream, Apply topically 2 (two) times daily., Disp: 30 g, Rfl: 2    multivit-min-FA-lycopen-lutein 300-600-300 mcg Tab, Take by mouth., Disp: , Rfl:     omega-3 fatty acids/fish oil (FISH OIL-OMEGA-3 FATTY ACIDS) 300-1,000 mg capsule, Take by mouth once daily., Disp: , Rfl:     omeprazole (PRILOSEC) 20 MG capsule, Take 2 capsules (40 mg total) by mouth once daily., Disp: 180 capsule, Rfl: 3    oxyCODONE-acetaminophen (PERCOCET)  mg per tablet, Take 1 tablet by mouth every 4-6 hours as needed for pain. Take stool softener with this medication., Disp: 15 tablet, Rfl: 0    promethazine (PHENERGAN) 25 MG tablet, Take 1 tablet (25 mg total) by mouth every 6 (six) hours as needed for Nausea., Disp: 8 tablet, Rfl: 0    rosuvastatin (CRESTOR) 20 MG tablet, TAKE 1 TABLET BY MOUTH ONCE DAILY, Disp: 90 tablet, Rfl: 3    tadalafiL (CIALIS) 5 MG tablet, Take 1 tablet (5 mg total) by mouth once daily., Disp: 30 tablet, Rfl: 6    traMADoL (ULTRAM) 50 mg tablet, Take 1-2 tablets ( mg total) by mouth every 6 (six) hours as needed for Pain., Disp: 20 tablet, Rfl: 0    triamterene-hydrochlorothiazide 37.5-25 mg (MAXZIDE-25) 37.5-25 mg per tablet, TAKE 1 TABLET BY MOUTH ONCE DAILY, Disp: 90 tablet, Rfl: 3    REVIEW OF SYSTEMS:  General: negative; ENT: negative; Allergy and Immunology: negative; Hematological and Lymphatic: Negative; Endocrine: negative; Respiratory: no cough, shortness of breath, or wheezing; Cardiovascular: no chest pain or dyspnea on exertion; Gastrointestinal: no abdominal pain/back, change in bowel habits, or bloody stools; Genito-Urinary: no dysuria, trouble voiding, or hematuria; Musculoskeletal: non-pitting left lower extremity  edema  Neurological: no TIA or stroke symptoms; Psychiatric: no nervousness, anxiety or depression.    PHYSICAL EXAM:   Right Arm BP - Sittin/61 (22 0940)  Left Arm BP - Sittin/64 (22 0940)   Vitals:    22 0939   BP: 137/64   Pulse: 63   Temp: 98.2 °F (36.8 °C)       General appearance:  Alert, well-appearing, and in no distress.  Oriented to person, place, and time   Neurological: Normal speech, no focal findings noted; CN II - XII grossly intact           Musculoskeletal: Digits/nail without cyanosis/clubbing.  Normal muscle strength/tone.                 Neck: Supple, no significant adenopathy; thyroid is not enlarged                  R neck incision - well-healed     L soft carotid bruits can be auscultated. Equal bilaterally.                 Chest:  Clear to auscultation, no wheezes, rales or rhonchi, symmetric air entry     No use of accessory muscles             Cardiac: Normal rate and regular rhythm, S1 and S2 normal; PMI non-displaced          Abdomen: Soft, nontender, nondistended, no masses or organomegaly     No rebound tenderness noted; bowel sounds normal     Pulsatile aortic mass is non-palpable.     No groin adenopathy    Vascular:  Femoral pulses are 2+, pedal pulses are not palpable there were excellent Doppler signals bilaterally  Extremity:  With feet are pink and well perfused with good cap refill, are symmetric, no ulcerations petechiae or cyanosis      No results found for: HGBA1C  IMAGING:  ABIs today are 0.9 to right, 0.94 left.  PVRs are a robust at all levels suggesting minimal occlusive disease    IMP:   Excellent arterial perfusion to left leg.  I have reassured him that his symptomatology is not ischemic in origin    PLAN;   If the symptoms do not resolve, I suggest that he follow-up with his primary care physician to evaluate musculoskeletal or neurologic etiologies of his left leg numbness  Keep scheduled follow-up appointment with Dr. Jorge JONAS.  Joon Moyer III, MD, FACS  Professor and Chief, Vascular and Endovascular Surgery

## 2022-10-06 ENCOUNTER — CLINICAL SUPPORT (OUTPATIENT)
Dept: REHABILITATION | Facility: HOSPITAL | Age: 82
End: 2022-10-06
Payer: MEDICARE

## 2022-10-06 DIAGNOSIS — M25.612 DECREASED ROM OF LEFT SHOULDER: Primary | ICD-10-CM

## 2022-10-06 DIAGNOSIS — R29.898 DECREASED STRENGTH OF UPPER EXTREMITY: ICD-10-CM

## 2022-10-06 PROCEDURE — 97110 THERAPEUTIC EXERCISES: CPT | Performed by: PHYSICAL THERAPIST

## 2022-10-06 PROCEDURE — 97112 NEUROMUSCULAR REEDUCATION: CPT | Performed by: PHYSICAL THERAPIST

## 2022-10-07 NOTE — PROGRESS NOTES
"  Physical Therapy Daily Treatment Note     Name: Gus Sabillon  Clinic Number: [id number]    Therapy Diagnosis:   Encounter Diagnoses   Name Primary?    Decreased ROM of left shoulder Yes    Decreased strength of upper extremity      Physician: Evangelist Khanna III, *    Visit Date: 10/6/2022    Physician Orders: PT Eval and Treat   Medical Diagnosis from Referral: Nontraumatic tear of left rotator cuff, unspecified tear extent [M75.102]  Evaluation Date: 8/3/2022  Authorization Period Expiration: 8/31/2022  Plan of Care Expiration: 12/31/2022  Visit # / Visits authorized: 2/ 12    Time In: 1600  Time Out: 1700  Total Billable Time: 30 minutes    Precautions: Standard    DOS: 6/21/2022  Surgeon: Dr. Childress  Precautions: POSTOPERATIVE PLAN: We will follow the arthroscopic rotator cuff repair guidelines for a massive size rotator cuff tear.  We discussed with the patient's family after surgery.  The patient will remain in a sling for 8 weeks.  PT to start at 6 weeks.    Subjective     Pt reports: Maybe a little more motion. The exercises got fatigue at the end but weren't too hard  He was compliant with home exercise program.  Response to previous treatment: slight improvement in motion, just very weak   Functional change: unable use LUE     Pain: 2/10  Location: left shoulder      Objective     Zeeshan received therapeutic exercises to develop strength, endurance, ROM, and flexibility for 40 minutes including:    UBE 4'/4' for scap mechanics and cardio endurance training   Sidelying ER 1# 3 x 15  Sidelying flexion 1# 2 x 15  IR/ER peach TB 2 x 10  Row GTB 2 x 15 3" hold    Zeeshan received the following manual therapy techniques: Joint mobilizations were applied to the: Left shoulder for 0 minutes, including:    Gr I-II oscillations for relaxation  Gr III flexion   Gr II inferior mob at 20 and 90 deg      Zeeshan participated in neuromuscular re-education activities to improve: Sense, Proprioception, and Posture for 20 " "minutes. The following activities were included:    Landmine press 2.5# 2 x 15  Hitch hiker lift off 3" hold 2 x 10  Foam roll serratus slide 2 x 10    Home Exercises Provided and Patient Education Provided     Education provided:   - importance of cuff strengthening     Written Home Exercises Provided: Patient instructed to cont prior HEP.  Exercises were reviewed and Zeeshan was able to demonstrate them prior to the end of the session.  Zeeshan demonstrated good  understanding of the education provided.     See EMR under Patient Instructions for exercises provided 9/27/2022.    Assessment     Emphasis continued to be on strengthening of the rotator cuff and scap stability. Hitch hiker really improved his low trap activation and scaption ability. Unable to reach forward to perform serratus exercise, limited with wall slide exercise as well. Progressed from isometric cuff to IR/ER with peach band as tolerated today    Zeeshan Is progressing well towards his goals.   Pt prognosis is Good.     Pt will continue to benefit from skilled outpatient physical therapy to address the deficits listed in the problem list box on initial evaluation, provide pt/family education and to maximize pt's level of independence in the home and community environment.     Pt's spiritual, cultural and educational needs considered and pt agreeable to plan of care and goals.    Anticipated barriers to physical therapy: [facility]     Goals:  GOALS: Short Term Goals:  8 weeks(Progressing, not met)  1.Report decreased L shoulder pain < / =  2/10  to increase tolerance for PROM by therapist.   2. Increase PROM to equivalent to contralateral side in all directions.    3. Increased strength by 1/3 MMT grade in shoulder flexion to increase tolerance for ADL and work activities.  4. Pt to tolerate HEP to improve ROM and independence with ADL's     Long Term Goals: 24 weeks(Progressing, not met)  1.Report decreased L shoulder pain  < / =  0 /10  to " increase tolerance for HEP.   2.Increase AROM to equivalent to contralateral side in all directions.   3.Increase strength to >/= 4/5 in shoulder flexion to increase tolerance for ADL and work activities.  4. Pt goal: complete overhead tasks without difficulty to resume PLOF.     Plan     Plan of care Certification: 8/3/2022 to 12/31/2022.    Improve rotator cuff strength    Armen Croft, PT

## 2022-10-11 ENCOUNTER — CLINICAL SUPPORT (OUTPATIENT)
Dept: REHABILITATION | Facility: HOSPITAL | Age: 82
End: 2022-10-11
Payer: MEDICARE

## 2022-10-11 DIAGNOSIS — R29.898 DECREASED STRENGTH OF UPPER EXTREMITY: ICD-10-CM

## 2022-10-11 DIAGNOSIS — M25.612 DECREASED ROM OF LEFT SHOULDER: Primary | ICD-10-CM

## 2022-10-11 PROCEDURE — 97112 NEUROMUSCULAR REEDUCATION: CPT | Performed by: PHYSICAL THERAPIST

## 2022-10-11 PROCEDURE — 97110 THERAPEUTIC EXERCISES: CPT | Performed by: PHYSICAL THERAPIST

## 2022-10-11 NOTE — PROGRESS NOTES
"  Physical Therapy Daily Treatment Note     Name: Gus Sabillon  Clinic Number: [id number]    Therapy Diagnosis:   Encounter Diagnoses   Name Primary?    Decreased ROM of left shoulder Yes    Decreased strength of upper extremity      Physician: Evangelist Khanna III, *    Visit Date: 10/11/2022    Physician Orders: PT Eval and Treat   Medical Diagnosis from Referral: Nontraumatic tear of left rotator cuff, unspecified tear extent [M75.102]  Evaluation Date: 8/3/2022  Authorization Period Expiration: 8/31/2022  Plan of Care Expiration: 12/31/2022  Visit # / Visits authorized: 3/ 12    Time In: 1500  Time Out: 1600  Total Billable Time: 30 minutes    Precautions: Standard    DOS: 6/21/2022  Surgeon: Dr. Childress  Precautions: POSTOPERATIVE PLAN: We will follow the arthroscopic rotator cuff repair guidelines for a massive size rotator cuff tear.  We discussed with the patient's family after surgery.  The patient will remain in a sling for 8 weeks.  PT to start at 6 weeks.    Subjective     Pt reports: Maybe a little more motion. The exercises got fatigue at the end but weren't too hard  He was compliant with home exercise program.  Response to previous treatment: slight improvement in motion, just very weak   Functional change: unable use LUE     Pain: 2/10  Location: left shoulder      Objective     Zeeshan received therapeutic exercises to develop strength, endurance, ROM, and flexibility for 40 minutes including:    UBE 4'/4' for scap mechanics and cardio endurance training   IR/ER peach TB 2 x 10  Row with ER YTB 2 x 15 3" hold    Zeeshan received the following manual therapy techniques: Joint mobilizations were applied to the: Left shoulder for 0 minutes, including:    Gr I-II oscillations for relaxation  Gr III flexion   Gr II inferior mob at 20 and 90 deg      Zeeshan participated in neuromuscular re-education activities to improve: Sense, Proprioception, and Posture for 20 minutes. The following activities were " included:    Landmine press 2.5# 2 x 15  Wand press 3# 2 x 10    Home Exercises Provided and Patient Education Provided     Education provided:   - importance of cuff strengthening     Written Home Exercises Provided: Patient instructed to cont prior HEP.  Exercises were reviewed and Zeeshan was able to demonstrate them prior to the end of the session.  Zeeshan demonstrated good  understanding of the education provided.     See EMR under Patient Instructions for exercises provided 9/27/2022.    Assessment   Improved rotator cuff strength with ER today, doing it at home regularly. Added ER to row for continued strengthening. Attempted scaption and press wit CC assist today    Zeeshan Is progressing well towards his goals.   Pt prognosis is Good.     Pt will continue to benefit from skilled outpatient physical therapy to address the deficits listed in the problem list box on initial evaluation, provide pt/family education and to maximize pt's level of independence in the home and community environment.     Pt's spiritual, cultural and educational needs considered and pt agreeable to plan of care and goals.    Anticipated barriers to physical therapy: [facility]     Goals:  GOALS: Short Term Goals:  8 weeks(Progressing, not met)  1.Report decreased L shoulder pain < / =  2/10  to increase tolerance for PROM by therapist.   2. Increase PROM to equivalent to contralateral side in all directions.    3. Increased strength by 1/3 MMT grade in shoulder flexion to increase tolerance for ADL and work activities.  4. Pt to tolerate HEP to improve ROM and independence with ADL's     Long Term Goals: 24 weeks(Progressing, not met)  1.Report decreased L shoulder pain  < / =  0 /10  to increase tolerance for HEP.   2.Increase AROM to equivalent to contralateral side in all directions.   3.Increase strength to >/= 4/5 in shoulder flexion to increase tolerance for ADL and work activities.  4. Pt goal: complete overhead tasks without  difficulty to resume PLOF.     Plan     Plan of care Certification: 8/3/2022 to 12/31/2022.    Improve rotator cuff strength    Armen Croft, PT

## 2022-10-14 ENCOUNTER — TELEPHONE (OUTPATIENT)
Dept: INTERNAL MEDICINE | Facility: CLINIC | Age: 82
End: 2022-10-14
Payer: MEDICARE

## 2022-10-14 NOTE — TELEPHONE ENCOUNTER
----- Message from Farheen Dawndaux sent at 10/14/2022  2:21 PM CDT -----  Contact: [phone] Patient  Caller is requesting an earlier appointment then we can schedule.  Caller is requesting a message be sent to the provider.  If this is for urgent care symptoms, did you offer other providers at this location, providers at other locations, or [facility]? (yes, no, n/a):  No  If this is for the patients physical, did you offer to schedule next available and put on wait list, or to see NP or PA for their physical?  (yes, no, n/a):  yes  When is the next available appointment with their provider: 04/12/2023   Reason for the appointment:  physical  Patient preference of timeframe to be scheduled:  after 01/02/2023  Would the patient like a call back, or a response through their [facility] portal?:   call back  Comments:

## 2022-10-17 NOTE — LETTER
January 14, 2020      MATHEUS Mora MD  [address]  [place] [address]           [place] [address]  [facility] [address]  Phone: [phone]          Patient: Gus Sabillon   MR Number: [id number]   YOB: 1940   Date of Visit: 1/13/2020       Dear Dr. MATHEUS Mora:    Thank you for referring Gus Sabillon to me for evaluation. Attached you will find relevant portions of my assessment and plan of care.    If you have questions, please do not hesitate to call me. I look forward to following Gus Sabillon along with you.    Sincerely,    Staci Childress MD    Enclosure  CC:  No Recipients    If you would like to receive this communication electronically, please contact [email or web address] or [phone] to request more information on [employer] Link access.    For providers and/or their staff who would like to refer a patient to [facility], please contact us through our one-stop-shop provider referral line, [facility], at [phone].    If you feel you have received this communication in error or would no longer like to receive these types of communications, please e-mail [email or web address]         
Oropharyngeal swallow was clinically judged to be WFL for regular solids and thin liquids. However, aspiration precautions encouraged given observed impulsivity putting pt at increased risk for aspiration and its negative sequela.

## 2022-10-18 ENCOUNTER — CLINICAL SUPPORT (OUTPATIENT)
Dept: REHABILITATION | Facility: HOSPITAL | Age: 82
End: 2022-10-18
Payer: MEDICARE

## 2022-10-18 DIAGNOSIS — M25.612 DECREASED ROM OF LEFT SHOULDER: Primary | ICD-10-CM

## 2022-10-18 DIAGNOSIS — R29.898 DECREASED STRENGTH OF UPPER EXTREMITY: ICD-10-CM

## 2022-10-18 PROCEDURE — 97140 MANUAL THERAPY 1/> REGIONS: CPT | Performed by: PHYSICAL THERAPIST

## 2022-10-18 PROCEDURE — 97112 NEUROMUSCULAR REEDUCATION: CPT | Performed by: PHYSICAL THERAPIST

## 2022-10-18 NOTE — PROGRESS NOTES
Physical Therapy Daily Treatment Note     Name: Gus Sabillon  Clinic Number: [id number]    Therapy Diagnosis:   Encounter Diagnoses   Name Primary?    Decreased ROM of left shoulder Yes    Decreased strength of upper extremity      Physician: Evangelist Khanna III, *    Visit Date: 10/18/2022    Physician Orders: PT Eval and Treat   Medical Diagnosis from Referral: Nontraumatic tear of left rotator cuff, unspecified tear extent [M75.102]  Evaluation Date: 8/3/2022  Authorization Period Expiration: 8/31/2022  Plan of Care Expiration: 12/31/2022  Visit # / Visits authorized: 4/ 12    Time In: 1500  Time Out: 1600  Total Billable Time: 30 minutes    Precautions: Standard    DOS: 6/21/2022  Surgeon: Dr. Childress  Precautions: POSTOPERATIVE PLAN: We will follow the arthroscopic rotator cuff repair guidelines for a massive size rotator cuff tear.  We discussed with the patient's family after surgery.  The patient will remain in a sling for 8 weeks.  PT to start at 6 weeks.    Subjective     Pt reports: I can almost reach out and close the car door. It's gotten stronger this week    He was compliant with home exercise program.  Response to previous treatment: slight improvement in motion, just very weak   Functional change: unable use LUE     Pain: 2/10  Location: left shoulder      Objective     Zeeshan received therapeutic exercises to develop strength, endurance, ROM, and flexibility for 20 minutes including:    UBE 5' level 5 for scap mechanics and cardio endurance training   Sidelying ER 1# 3 x 8  Sidelying flexion with pole 2 x 10    Zeeshan received the following manual therapy techniques: Joint mobilizations were applied to the: Left shoulder for 20 minutes, including:    Gr I-II oscillations for relaxation  Gr III flexion   Gr II inferior mob at 20 and 90 deg    Rhythmic stab flexion and IR/ER    Zeeshan participated in neuromuscular re-education activities to improve: Sense, Proprioception, and Posture for 20  minutes. The following activities were included:    Landmine press 3# 2 x 10  Wand press 3# 2 x 10  Supine ER OTB 2 x 10    Home Exercises Provided and Patient Education Provided     Education provided:   - importance of cuff strengthening     Written Home Exercises Provided: Patient instructed to cont prior HEP.  Exercises were reviewed and Zeeshan was able to demonstrate them prior to the end of the session.  Zeeshan demonstrated good  understanding of the education provided.     See EMR under Patient Instructions for exercises provided 9/27/2022.    Assessment     Zeeshan progressed strengthening today on UBE with resistance. Pain with landmine press due to impingement. Improved motion following supine ER with OTB. Patient increased strength with wand press     Zeeshan Is progressing well towards his goals.   Pt prognosis is Good.     Pt will continue to benefit from skilled outpatient physical therapy to address the deficits listed in the problem list box on initial evaluation, provide pt/family education and to maximize pt's level of independence in the home and community environment.     Pt's spiritual, cultural and educational needs considered and pt agreeable to plan of care and goals.    Anticipated barriers to physical therapy: [facility]     Goals:  GOALS: Short Term Goals:  8 weeks(Progressing, not met)  1.Report decreased L shoulder pain < / =  2/10  to increase tolerance for PROM by therapist.   2. Increase PROM to equivalent to contralateral side in all directions.    3. Increased strength by 1/3 MMT grade in shoulder flexion to increase tolerance for ADL and work activities.  4. Pt to tolerate HEP to improve ROM and independence with ADL's     Long Term Goals: 24 weeks(Progressing, not met)  1.Report decreased L shoulder pain  < / =  0 /10  to increase tolerance for HEP.   2.Increase AROM to equivalent to contralateral side in all directions.   3.Increase strength to >/= 4/5 in shoulder flexion to  increase tolerance for ADL and work activities.  4. Pt goal: complete overhead tasks without difficulty to resume PLOF.     Plan     Plan of care Certification: 8/3/2022 to 12/31/2022.    Improve rotator cuff strength    Armen Croft, PT

## 2022-10-24 ENCOUNTER — PATIENT MESSAGE (OUTPATIENT)
Dept: SPORTS MEDICINE | Facility: CLINIC | Age: 82
End: 2022-10-24
Payer: MEDICARE

## 2022-10-26 ENCOUNTER — OFFICE VISIT (OUTPATIENT)
Dept: SPORTS MEDICINE | Facility: CLINIC | Age: 82
End: 2022-10-26
Payer: MEDICARE

## 2022-10-26 VITALS
DIASTOLIC BLOOD PRESSURE: 60 MMHG | SYSTOLIC BLOOD PRESSURE: 145 MMHG | WEIGHT: 175 LBS | HEIGHT: 72 IN | BODY MASS INDEX: 23.7 KG/M2 | HEART RATE: 70 BPM

## 2022-10-26 DIAGNOSIS — Z98.890 STATUS POST LEFT ROTATOR CUFF REPAIR: Primary | ICD-10-CM

## 2022-10-26 PROCEDURE — 1125F PR PAIN SEVERITY QUANTIFIED, PAIN PRESENT: ICD-10-PCS | Mod: CPTII,S$GLB,, | Performed by: ORTHOPAEDIC SURGERY

## 2022-10-26 PROCEDURE — 99999 PR PBB SHADOW E&M-EST. PATIENT-LVL III: CPT | Mod: PBBFAC,,, | Performed by: ORTHOPAEDIC SURGERY

## 2022-10-26 PROCEDURE — 3288F PR FALLS RISK ASSESSMENT DOCUMENTED: ICD-10-PCS | Mod: CPTII,S$GLB,, | Performed by: ORTHOPAEDIC SURGERY

## 2022-10-26 PROCEDURE — 99214 OFFICE O/P EST MOD 30 MIN: CPT | Mod: S$GLB,,, | Performed by: ORTHOPAEDIC SURGERY

## 2022-10-26 PROCEDURE — 3288F FALL RISK ASSESSMENT DOCD: CPT | Mod: CPTII,S$GLB,, | Performed by: ORTHOPAEDIC SURGERY

## 2022-10-26 PROCEDURE — 1125F AMNT PAIN NOTED PAIN PRSNT: CPT | Mod: CPTII,S$GLB,, | Performed by: ORTHOPAEDIC SURGERY

## 2022-10-26 PROCEDURE — 99999 PR PBB SHADOW E&M-EST. PATIENT-LVL III: ICD-10-PCS | Mod: PBBFAC,,, | Performed by: ORTHOPAEDIC SURGERY

## 2022-10-26 PROCEDURE — 3077F SYST BP >= 140 MM HG: CPT | Mod: CPTII,S$GLB,, | Performed by: ORTHOPAEDIC SURGERY

## 2022-10-26 PROCEDURE — 1101F PR PT FALLS ASSESS DOC 0-1 FALLS W/OUT INJ PAST YR: ICD-10-PCS | Mod: CPTII,S$GLB,, | Performed by: ORTHOPAEDIC SURGERY

## 2022-10-26 PROCEDURE — 1101F PT FALLS ASSESS-DOCD LE1/YR: CPT | Mod: CPTII,S$GLB,, | Performed by: ORTHOPAEDIC SURGERY

## 2022-10-26 PROCEDURE — 1159F MED LIST DOCD IN RCRD: CPT | Mod: CPTII,S$GLB,, | Performed by: ORTHOPAEDIC SURGERY

## 2022-10-26 PROCEDURE — 3078F DIAST BP <80 MM HG: CPT | Mod: CPTII,S$GLB,, | Performed by: ORTHOPAEDIC SURGERY

## 2022-10-26 PROCEDURE — 99214 PR OFFICE/OUTPT VISIT, EST, LEVL IV, 30-39 MIN: ICD-10-PCS | Mod: S$GLB,,, | Performed by: ORTHOPAEDIC SURGERY

## 2022-10-26 PROCEDURE — 1159F PR MEDICATION LIST DOCUMENTED IN MEDICAL RECORD: ICD-10-PCS | Mod: CPTII,S$GLB,, | Performed by: ORTHOPAEDIC SURGERY

## 2022-10-26 PROCEDURE — 3078F PR MOST RECENT DIASTOLIC BLOOD PRESSURE < 80 MM HG: ICD-10-PCS | Mod: CPTII,S$GLB,, | Performed by: ORTHOPAEDIC SURGERY

## 2022-10-26 PROCEDURE — 3077F PR MOST RECENT SYSTOLIC BLOOD PRESSURE >= 140 MM HG: ICD-10-PCS | Mod: CPTII,S$GLB,, | Performed by: ORTHOPAEDIC SURGERY

## 2022-10-26 NOTE — PROGRESS NOTES
Chief Complaint: left shoulder pain    HISTORY OF PRESENT ILLNESS:   Pt is here today for 4 month post-operative followup of shoulder arthroscopy.    1 week ago at Physical Therapy, he was lifting an exercise pole about his shoulder when he felt excruciating lateral shoulder pain.  He now has severe shoulder pain when he tries to reach over head.  No pain at rest. No night pain.    Pain 10/10      DATE OF PROCEDURE: 06/21/2022  SURGEON: Staci Childress M.D.  OPERATION:   left  1. Shoulder arthroscopic rotator cuff repair 50 x 45 mm, massive, double row (CPT 99352) with CuffMend ()  (complex -22 modifier)  2. Shoulder arthroscopic biceps tenodesis (CPT 05474)  3. Shoulder arthroscopic subacromial decompression, bursectomy   4. Shoulder arthroscopic extensive debridement (anterior, posterior glenohumeral joint, subacromial space) (CPT 09804)  5. Shoulder arthroscopic labral debridement (CPT 06327)  6. Shoulder arthroscopic microfracture (Crimson duvet technique) (CPT 47066)  7. Shoulder arthroscopic lysis of adhesions (CPT 53640)  8. Shoulder arthroscopic distal clavicle excision (CPT 74951)  9. Shoulder ORIF greater tuberosity (CPT 02581)    Review of Systems   Constitution: Negative. Negative for chills, fever and night sweats.   HENT: Negative for congestion and headaches.    Eyes: Negative for blurred vision, left vision loss and right vision loss.   Cardiovascular: Negative for chest pain and syncope.   Respiratory: Negative for cough and shortness of breath.    Endocrine: Negative for polydipsia, polyphagia and polyuria.   Hematologic/Lymphatic: Negative for bleeding problem. Does not bruise/bleed easily.   Skin: Negative for dry skin, itching and rash.   Musculoskeletal: Negative for falls and muscle weakness.   Gastrointestinal: Negative for abdominal pain and bowel incontinence.   Genitourinary: Negative for bladder incontinence and nocturia.   Neurological: Negative for disturbances in coordination, loss of  balance and seizures.   Psychiatric/Behavioral: Negative for depression. The patient does not have insomnia.    Allergic/Immunologic: Negative for hives and persistent infections.     PAST MEDICAL HISTORY:   Past Medical History:   Diagnosis Date    CKD (chronic kidney disease) stage 3, GFR 30-59 ml/min     Dyslipidemia     GERD (gastroesophageal reflux disease)     Hx of melanoma excision     Hypertension     Melanoma     PAD (peripheral artery disease)     Squamous cell carcinoma of skin      PAST SURGICAL HISTORY:   Past Surgical History:   Procedure Laterality Date    ARTHROSCOPIC DEBRIDEMENT OF SHOULDER Right 2/27/2020    Procedure: EXTENSIVE DEBRIDEMENT, SHOULDER, ARTHROSCOPIC;  Surgeon: Staci Childress MD;  Location: [facility] OR;  Service: Orthopedics;  Laterality: Right;    ARTHROSCOPIC DEBRIDEMENT OF SHOULDER Left 6/21/2022    Procedure: EXTENSIVE DEBRIDEMENT, SHOULDER, ARTHROSCOPIC;  Surgeon: Stcai Childress MD;  Location: [facility] OR;  Service: Orthopedics;  Laterality: Left;    ARTHROSCOPIC REPAIR OF ROTATOR CUFF OF SHOULDER Right 2/27/2020    Procedure: REPAIR, ROTATOR CUFF, ARTHROSCOPIC;  Surgeon: Staci Childress MD;  Location: [facility] OR;  Service: Orthopedics;  Laterality: Right;    ARTHROSCOPIC REPAIR OF ROTATOR CUFF OF SHOULDER Left 6/21/2022    Procedure: REPAIR, ROTATOR CUFF, ARTHROSCOPIC WITH CUFF MEND;  Surgeon: Staci Childress MD;  Location: [facility] OR;  Service: Orthopedics;  Laterality: Left;    ARTHROSCOPY OF SHOULDER WITH DECOMPRESSION OF SUBACROMIAL SPACE Right 2/27/2020    Procedure: ARTHROSCOPY, SHOULDER, WITH SUBACROMIAL SPACE DECOMPRESSION;  Surgeon: Staci Childress MD;  Location: [facility] OR;  Service: Orthopedics;  Laterality: Right;    ARTHROSCOPY OF SHOULDER WITH DECOMPRESSION OF SUBACROMIAL SPACE Left 6/21/2022    Procedure: ARTHROSCOPY, SHOULDER, WITH SUBACROMIAL  DECOMPRESSION;  Surgeon: Staci Childress MD;  Location: [facility] OR;  Service: Orthopedics;  Laterality: Left;    ARTHROSCOPY OF SHOULDER WITH REMOVAL OF DISTAL  CLAVICLE Left 6/21/2022    Procedure: ARTHROSCOPY, SHOULDER, WITH DISTAL CLAVICLE EXCISION;  Surgeon: Staci Childress MD;  Location: [facility] OR;  Service: Orthopedics;  Laterality: Left;    ARTHROSCOPY,SHOULDER,WITH BICEPS TENODESIS Left 6/21/2022    Procedure: ARTHROSCOPY,SHOULDER,WITH BICEPS TENODESIS;  Surgeon: Staci Childress MD;  Location: [facility] OR;  Service: Orthopedics;  Laterality: Left;    BLEPHAROPLASTY      CAROTID ENDARTERECTOMY Right 10/15/2021    Procedure: ENDARTERECTOMY-CAROTID;  Surgeon: Imer Farooq MD;  Location: [facility] OR [address] [place] [address];  Service: Peripheral Vascular;  Laterality: Right;  AWAKE CERVICAL BLOCK    CATARACT EXTRACTION, BILATERAL      EXCISION OF BURSA Left 6/21/2022    Procedure: BURSECTOMY;  Surgeon: Staci Childress MD;  Location: [facility] OR;  Service: Orthopedics;  Laterality: Left;    FIXATION OF TENDON Right 2/27/2020    Procedure: FIXATION, TENDON, Biceps Tenodesis;  Surgeon: Staci Childress MD;  Location: [facility] OR;  Service: Orthopedics;  Laterality: Right;  FIXATION, TENDON, Biceps Tenodesis    OPEN REDUCTION AND INTERNAL FIXATION (ORIF) OF FRACTURE OF PROXIMAL HUMERUS Right 2/27/2020    Procedure: ORIF, FRACTURE, GREATER TUBEROSITY;  Surgeon: Staci Childress MD;  Location: [facility] OR;  Service: Orthopedics;  Laterality: Right;    ORIF HUMERUS FRACTURE Left 6/21/2022    Procedure: ORIF, FRACTURE, HUMERUS, GREATER TUBEROSITY;  Surgeon: Staci Childress MD;  Location: [facility] OR;  Service: Orthopedics;  Laterality: Left;    SHOULDER ARTHROSCOPY Left 6/21/2022    Procedure: ARTHROSCOPY, SHOULDER WITH LABRAL REPAIR;  Surgeon: tSaci Childress MD;  Location: [facility] OR;  Service: Orthopedics;  Laterality: Left;    TONSILLECTOMY       FAMILY HISTORY:   Family History   Problem Relation Age of Onset    Diabetes Mother     Hyperlipidemia Mother     Heart disease Father     Cancer Sister         ? colon or uterine    Colon cancer Sister      SOCIAL HISTORY:   Social History     Socioeconomic History    Marital status:    Tobacco  Use    Smoking status: Former     Types: Cigarettes     Quit date:      Years since quittin.8    Smokeless tobacco: Never   Substance and Sexual Activity    Alcohol use: Yes     Alcohol/week: 14.0 standard drinks     Types: 14 Glasses of wine per week     Comment: 2 glasses of wine daily    Drug use: Never    Sexual activity: Not Currently     Social Determinants of Health     Financial Resource Strain: Low Risk     Difficulty of Paying Living Expenses: Not hard at all   Food Insecurity: No Food Insecurity    Worried About Running Out of Food in the Last Year: Never true    Ran Out of Food in the Last Year: Never true   Transportation Needs: No Transportation Needs    Lack of Transportation (Medical): No    Lack of Transportation (Non-Medical): No   Physical Activity: Sufficiently Active    Days of Exercise per Week: 6 days    Minutes of Exercise per Session: 30 min   Stress: No Stress Concern Present    Feeling of Stress : Only a little   Social Connections: Moderately Isolated    Frequency of Communication with Friends and Family: More than three times a week    Frequency of Social Gatherings with Friends and Family: More than three times a week    Attends Yarsani Services: Never    Active Member of Clubs or Organizations: No    Attends Club or Organization Meetings: Never    Marital Status:    Housing Stability: Low Risk     Unable to Pay for Housing in the Last Year: No    Number of Places Lived in the Last Year: 1    Unstable Housing in the Last Year: No       MEDICATIONS:   Current Outpatient Medications:     aspirin (ECOTRIN) 81 MG EC tablet, Take 81 mg by mouth once daily., Disp: , Rfl:     atenoloL (TENORMIN) 50 MG tablet, TAKE 1 TABLET BY MOUTH ONCE DAILY, Disp: 90 tablet, Rfl: 3    bempedoic acid (NEXLETOL) 180 mg Tab, Take 1 tablet (180 mg) by mouth once daily., Disp: 90 tablet, Rfl: 3    collagen/biotin/ascorbic acid (COLLAGEN 1500 PLUS C ORAL), Take by mouth Daily. 11 G of powder  daily, Disp: , Rfl:     fluorouraciL (EFUDEX) 5 % cream, AAA on both arms BID x 2-3 weeks. Stop if blistered, oozing, or bleeding. Use daily sun protection., Disp: 40 g, Rfl: 1    ketoconazole (NIZORAL) 2 % cream, Apply topically 2 (two) times daily., Disp: 30 g, Rfl: 2    multivit-min-FA-lycopen-lutein 300-600-300 mcg Tab, Take by mouth., Disp: , Rfl:     omega-3 fatty acids/fish oil (FISH OIL-OMEGA-3 FATTY ACIDS) 300-1,000 mg capsule, Take by mouth once daily., Disp: , Rfl:     omeprazole (PRILOSEC) 20 MG capsule, Take 2 capsules (40 mg total) by mouth once daily., Disp: 180 capsule, Rfl: 3    oxyCODONE-acetaminophen (PERCOCET)  mg per tablet, Take 1 tablet by mouth every 4-6 hours as needed for pain. Take stool softener with this medication., Disp: 15 tablet, Rfl: 0    promethazine (PHENERGAN) 25 MG tablet, Take 1 tablet (25 mg total) by mouth every 6 (six) hours as needed for Nausea., Disp: 8 tablet, Rfl: 0    rosuvastatin (CRESTOR) 20 MG tablet, TAKE 1 TABLET BY MOUTH ONCE DAILY, Disp: 90 tablet, Rfl: 3    tadalafiL (CIALIS) 5 MG tablet, Take 1 tablet (5 mg total) by mouth once daily., Disp: 30 tablet, Rfl: 6    traMADoL (ULTRAM) 50 mg tablet, Take 1-2 tablets ( mg total) by mouth every 6 (six) hours as needed for Pain., Disp: 20 tablet, Rfl: 0    triamterene-hydrochlorothiazide 37.5-25 mg (MAXZIDE-25) 37.5-25 mg per tablet, TAKE 1 TABLET BY MOUTH ONCE DAILY, Disp: 90 tablet, Rfl: 3  ALLERGIES:   Review of patient's allergies indicates:   Allergen Reactions    Clindamycin Nausea And Vomiting    Penicillins Hives       VITAL SIGNS: BP (!) 145/60   Pulse 70   Ht 6' (1.829 m)   Wt 79.4 kg (175 lb)   BMI 23.73 kg/m²                                                                                          PHYSICAL EXAMINATION:     Incision sites healed well  No evidence of any erythema, infection or induration  elbow Range of motion full   Minimal effusion  2+ Radial pulses  No swelling    FE 30  (100)  ER 60  IR T10    Scaption 4/5 at 0, 4+/5 at 30    I made the decision to obtain old records of the patient including previous notes and imaging. I independently reviewed and interpreted the radiographs and/or MRIs today as well as prior imaging.                                                                                   ASSESSMENT:                                                                  Status post left rotator cuff repair, possible retear of rotator cuff    PLAN:  MRI left shoulder to evaluate for retear of rotator cuff  Will follow up with patient after MRI results    All questions were answered and the patient should contact us if he has any questions or concerns in the interim

## 2022-11-14 ENCOUNTER — HOSPITAL ENCOUNTER (OUTPATIENT)
Dept: RADIOLOGY | Facility: HOSPITAL | Age: 82
Discharge: HOME | End: 2022-11-14
Attending: STUDENT IN AN ORGANIZED HEALTH CARE EDUCATION/TRAINING PROGRAM
Payer: MEDICARE

## 2022-11-14 DIAGNOSIS — Z98.890 STATUS POST LEFT ROTATOR CUFF REPAIR: ICD-10-CM

## 2022-11-14 PROCEDURE — 73221 MRI JOINT UPR EXTREM W/O DYE: CPT | Mod: 26,LT,, | Performed by: RADIOLOGY

## 2022-11-14 PROCEDURE — 73221 MRI SHOULDER WITHOUT CONTRAST LEFT: ICD-10-PCS | Mod: 26,LT,, | Performed by: RADIOLOGY

## 2022-11-14 PROCEDURE — 73221 MRI JOINT UPR EXTREM W/O DYE: CPT | Mod: TC,LT

## 2022-11-16 ENCOUNTER — TELEPHONE (OUTPATIENT)
Dept: SPORTS MEDICINE | Facility: CLINIC | Age: 82
End: 2022-11-16
Payer: MEDICARE

## 2022-11-16 NOTE — TELEPHONE ENCOUNTER
I called the patient twice with no answer, I left a voicemail encouraging him to return the call to discuss scheduling a follow up appointment for him to see Dr. Childress for re-evaluation and discussing his MRI results.

## 2022-11-17 ENCOUNTER — PATIENT MESSAGE (OUTPATIENT)
Dept: SPORTS MEDICINE | Facility: CLINIC | Age: 82
End: 2022-11-17
Payer: MEDICARE

## 2022-11-21 ENCOUNTER — OFFICE VISIT (OUTPATIENT)
Dept: SPORTS MEDICINE | Facility: CLINIC | Age: 82
End: 2022-11-21
Payer: MEDICARE

## 2022-11-21 VITALS
HEIGHT: 73 IN | BODY MASS INDEX: 23.39 KG/M2 | DIASTOLIC BLOOD PRESSURE: 63 MMHG | HEART RATE: 77 BPM | SYSTOLIC BLOOD PRESSURE: 128 MMHG | WEIGHT: 176.5 LBS

## 2022-11-21 DIAGNOSIS — Z98.890 STATUS POST LEFT ROTATOR CUFF REPAIR: Primary | ICD-10-CM

## 2022-11-21 PROCEDURE — 3074F SYST BP LT 130 MM HG: CPT | Mod: CPTII,S$GLB,, | Performed by: ORTHOPAEDIC SURGERY

## 2022-11-21 PROCEDURE — 1100F PR PT FALLS ASSESS DOC 2+ FALLS/FALL W/INJURY/YR: ICD-10-PCS | Mod: CPTII,S$GLB,, | Performed by: ORTHOPAEDIC SURGERY

## 2022-11-21 PROCEDURE — 99999 PR PBB SHADOW E&M-EST. PATIENT-LVL III: ICD-10-PCS | Mod: PBBFAC,,, | Performed by: ORTHOPAEDIC SURGERY

## 2022-11-21 PROCEDURE — 99214 OFFICE O/P EST MOD 30 MIN: CPT | Mod: S$GLB,,, | Performed by: ORTHOPAEDIC SURGERY

## 2022-11-21 PROCEDURE — 1160F PR REVIEW ALL MEDS BY PRESCRIBER/CLIN PHARMACIST DOCUMENTED: ICD-10-PCS | Mod: CPTII,S$GLB,, | Performed by: ORTHOPAEDIC SURGERY

## 2022-11-21 PROCEDURE — 1159F MED LIST DOCD IN RCRD: CPT | Mod: CPTII,S$GLB,, | Performed by: ORTHOPAEDIC SURGERY

## 2022-11-21 PROCEDURE — 3078F DIAST BP <80 MM HG: CPT | Mod: CPTII,S$GLB,, | Performed by: ORTHOPAEDIC SURGERY

## 2022-11-21 PROCEDURE — 99214 PR OFFICE/OUTPT VISIT, EST, LEVL IV, 30-39 MIN: ICD-10-PCS | Mod: S$GLB,,, | Performed by: ORTHOPAEDIC SURGERY

## 2022-11-21 PROCEDURE — 3288F FALL RISK ASSESSMENT DOCD: CPT | Mod: CPTII,S$GLB,, | Performed by: ORTHOPAEDIC SURGERY

## 2022-11-21 PROCEDURE — 3288F PR FALLS RISK ASSESSMENT DOCUMENTED: ICD-10-PCS | Mod: CPTII,S$GLB,, | Performed by: ORTHOPAEDIC SURGERY

## 2022-11-21 PROCEDURE — 1126F PR PAIN SEVERITY QUANTIFIED, NO PAIN PRESENT: ICD-10-PCS | Mod: CPTII,S$GLB,, | Performed by: ORTHOPAEDIC SURGERY

## 2022-11-21 PROCEDURE — 1126F AMNT PAIN NOTED NONE PRSNT: CPT | Mod: CPTII,S$GLB,, | Performed by: ORTHOPAEDIC SURGERY

## 2022-11-21 PROCEDURE — 1160F RVW MEDS BY RX/DR IN RCRD: CPT | Mod: CPTII,S$GLB,, | Performed by: ORTHOPAEDIC SURGERY

## 2022-11-21 PROCEDURE — 3074F PR MOST RECENT SYSTOLIC BLOOD PRESSURE < 130 MM HG: ICD-10-PCS | Mod: CPTII,S$GLB,, | Performed by: ORTHOPAEDIC SURGERY

## 2022-11-21 PROCEDURE — 1159F PR MEDICATION LIST DOCUMENTED IN MEDICAL RECORD: ICD-10-PCS | Mod: CPTII,S$GLB,, | Performed by: ORTHOPAEDIC SURGERY

## 2022-11-21 PROCEDURE — 1100F PTFALLS ASSESS-DOCD GE2>/YR: CPT | Mod: CPTII,S$GLB,, | Performed by: ORTHOPAEDIC SURGERY

## 2022-11-21 PROCEDURE — 3078F PR MOST RECENT DIASTOLIC BLOOD PRESSURE < 80 MM HG: ICD-10-PCS | Mod: CPTII,S$GLB,, | Performed by: ORTHOPAEDIC SURGERY

## 2022-11-21 PROCEDURE — 99999 PR PBB SHADOW E&M-EST. PATIENT-LVL III: CPT | Mod: PBBFAC,,, | Performed by: ORTHOPAEDIC SURGERY

## 2022-11-21 NOTE — PROGRESS NOTES
Chief Complaint: left shoulder pain    HISTORY OF PRESENT ILLNESS:   Pt is here today for 5 month post-operative followup of shoulder arthroscopy and obtaining an MRI after a recent injury when working w PT.    To review from his last visit on 10/26/22:   1 week ago at Physical Therapy, he was lifting an exercise pole about his shoulder when he felt excruciating lateral shoulder pain.    Today during this visit:  He now has L shoulder pain when he tries to reach over head, but has improved since his last visit.  No pain at rest, but is now having night pain.  Also has R trapezial pain that is more problematic than his L shoulder pain. Described as a dull ache. Improves with warm compress. Doesn't bother him during day.    Pain 8/10 at its worst, but currently 0/10 at rest.     Preop R shoulder: 20  Postop R shoulder: 30    DATE OF PROCEDURE: 06/21/2022  SURGEON: Staci Childress M.D.  OPERATION:   left  1. Shoulder arthroscopic rotator cuff repair 50 x 45 mm, massive, double row (CPT 27825) with CuffMend ()  (complex -22 modifier)  2. Shoulder arthroscopic biceps tenodesis (CPT 82335)  3. Shoulder arthroscopic subacromial decompression, bursectomy   4. Shoulder arthroscopic extensive debridement (anterior, posterior glenohumeral joint, subacromial space) (CPT 02764)  5. Shoulder arthroscopic labral debridement (CPT 55367)  6. Shoulder arthroscopic microfracture (Crimson duvet technique) (CPT 94480)  7. Shoulder arthroscopic lysis of adhesions (CPT 91873)  8. Shoulder arthroscopic distal clavicle excision (CPT 37629)  9. Shoulder ORIF greater tuberosity (CPT 81450)    Review of Systems   Constitution: Negative. Negative for chills, fever and night sweats.   HENT: Negative for congestion and headaches.    Eyes: Negative for blurred vision, left vision loss and right vision loss.   Cardiovascular: Negative for chest pain and syncope.   Respiratory: Negative for cough and shortness of breath.    Endocrine: Negative for  polydipsia, polyphagia and polyuria.   Hematologic/Lymphatic: Negative for bleeding problem. Does not bruise/bleed easily.   Skin: Negative for dry skin, itching and rash.   Musculoskeletal: Negative for falls and muscle weakness.   Gastrointestinal: Negative for abdominal pain and bowel incontinence.   Genitourinary: Negative for bladder incontinence and nocturia.   Neurological: Negative for disturbances in coordination, loss of balance and seizures.   Psychiatric/Behavioral: Negative for depression. The patient does not have insomnia.    Allergic/Immunologic: Negative for hives and persistent infections.     PAST MEDICAL HISTORY:   Past Medical History:   Diagnosis Date    CKD (chronic kidney disease) stage 3, GFR 30-59 ml/min     Dyslipidemia     GERD (gastroesophageal reflux disease)     Hx of melanoma excision     Hypertension     Melanoma     PAD (peripheral artery disease)     Squamous cell carcinoma of skin      PAST SURGICAL HISTORY:   Past Surgical History:   Procedure Laterality Date    ARTHROSCOPIC DEBRIDEMENT OF SHOULDER Right 2/27/2020    Procedure: EXTENSIVE DEBRIDEMENT, SHOULDER, ARTHROSCOPIC;  Surgeon: Staci Childress MD;  Location: [facility] OR;  Service: Orthopedics;  Laterality: Right;    ARTHROSCOPIC DEBRIDEMENT OF SHOULDER Left 6/21/2022    Procedure: EXTENSIVE DEBRIDEMENT, SHOULDER, ARTHROSCOPIC;  Surgeon: Staci Childress MD;  Location: [facility] OR;  Service: Orthopedics;  Laterality: Left;    ARTHROSCOPIC REPAIR OF ROTATOR CUFF OF SHOULDER Right 2/27/2020    Procedure: REPAIR, ROTATOR CUFF, ARTHROSCOPIC;  Surgeon: Staci Childress MD;  Location: [facility] OR;  Service: Orthopedics;  Laterality: Right;    ARTHROSCOPIC REPAIR OF ROTATOR CUFF OF SHOULDER Left 6/21/2022    Procedure: REPAIR, ROTATOR CUFF, ARTHROSCOPIC WITH CUFF MEND;  Surgeon: Staci Childress MD;  Location: [facility] OR;  Service: Orthopedics;  Laterality: Left;    ARTHROSCOPY OF SHOULDER WITH DECOMPRESSION OF SUBACROMIAL SPACE Right 2/27/2020    Procedure:  ARTHROSCOPY, SHOULDER, WITH SUBACROMIAL SPACE DECOMPRESSION;  Surgeon: Staci Childress MD;  Location: [facility] OR;  Service: Orthopedics;  Laterality: Right;    ARTHROSCOPY OF SHOULDER WITH DECOMPRESSION OF SUBACROMIAL SPACE Left 6/21/2022    Procedure: ARTHROSCOPY, SHOULDER, WITH SUBACROMIAL  DECOMPRESSION;  Surgeon: Staci Childress MD;  Location: [facility] OR;  Service: Orthopedics;  Laterality: Left;    ARTHROSCOPY OF SHOULDER WITH REMOVAL OF DISTAL CLAVICLE Left 6/21/2022    Procedure: ARTHROSCOPY, SHOULDER, WITH DISTAL CLAVICLE EXCISION;  Surgeon: Staci Childress MD;  Location: [facility] OR;  Service: Orthopedics;  Laterality: Left;    ARTHROSCOPY,SHOULDER,WITH BICEPS TENODESIS Left 6/21/2022    Procedure: ARTHROSCOPY,SHOULDER,WITH BICEPS TENODESIS;  Surgeon: Staci Childress MD;  Location: [facility] OR;  Service: Orthopedics;  Laterality: Left;    BLEPHAROPLASTY      CAROTID ENDARTERECTOMY Right 10/15/2021    Procedure: ENDARTERECTOMY-CAROTID;  Surgeon: Imer Farooq MD;  Location: [address];  Service: Peripheral Vascular;  Laterality: Right;  AWAKE CERVICAL BLOCK    CATARACT EXTRACTION, BILATERAL      EXCISION OF BURSA Left 6/21/2022    Procedure: BURSECTOMY;  Surgeon: Staci Childress MD;  Location: [facility] OR;  Service: Orthopedics;  Laterality: Left;    FIXATION OF TENDON Right 2/27/2020    Procedure: FIXATION, TENDON, Biceps Tenodesis;  Surgeon: Staci Childress MD;  Location: [facility] OR;  Service: Orthopedics;  Laterality: Right;  FIXATION, TENDON, Biceps Tenodesis    OPEN REDUCTION AND INTERNAL FIXATION (ORIF) OF FRACTURE OF PROXIMAL HUMERUS Right 2/27/2020    Procedure: ORIF, FRACTURE, GREATER TUBEROSITY;  Surgeon: Staci Childress MD;  Location: [facility] OR;  Service: Orthopedics;  Laterality: Right;    ORIF HUMERUS FRACTURE Left 6/21/2022    Procedure: ORIF, FRACTURE, HUMERUS, GREATER TUBEROSITY;  Surgeon: Staci Childress MD;  Location: [facility] OR;  Service: Orthopedics;  Laterality: Left;    SHOULDER ARTHROSCOPY Left 6/21/2022    Procedure: ARTHROSCOPY,  SHOULDER WITH LABRAL REPAIR;  Surgeon: Staci Childress MD;  Location: [facility];  Service: Orthopedics;  Laterality: Left;    TONSILLECTOMY       FAMILY HISTORY:   Family History   Problem Relation Age of Onset    Diabetes Mother     Hyperlipidemia Mother     Heart disease Father     Cancer Sister         ? colon or uterine    Colon cancer Sister      SOCIAL HISTORY:   Social History     Socioeconomic History    Marital status:    Tobacco Use    Smoking status: Former     Types: Cigarettes     Quit date:      Years since quittin.9    Smokeless tobacco: Never   Substance and Sexual Activity    Alcohol use: Yes     Alcohol/week: 14.0 standard drinks     Types: 14 Glasses of wine per week     Comment: 2 glasses of wine daily    Drug use: Never    Sexual activity: Not Currently     Social Determinants of Health     Financial Resource Strain: Low Risk     Difficulty of Paying Living Expenses: Not hard at all   Food Insecurity: No Food Insecurity    Worried About Running Out of Food in the Last Year: Never true    Ran Out of Food in the Last Year: Never true   Transportation Needs: No Transportation Needs    Lack of Transportation (Medical): No    Lack of Transportation (Non-Medical): No   Physical Activity: Sufficiently Active    Days of Exercise per Week: 6 days    Minutes of Exercise per Session: 30 min   Stress: No Stress Concern Present    Feeling of Stress : Only a little   Social Connections: Moderately Isolated    Frequency of Communication with Friends and Family: More than three times a week    Frequency of Social Gatherings with Friends and Family: More than three times a week    Attends Anabaptism Services: Never    Active Member of Clubs or Organizations: No    Attends Club or Organization Meetings: Never    Marital Status:    Housing Stability: Low Risk     Unable to Pay for Housing in the Last Year: No    Number of Places Lived in the Last Year: 1    Unstable Housing in the Last Year: No        MEDICATIONS:   Current Outpatient Medications:     aspirin (ECOTRIN) 81 MG EC tablet, Take 81 mg by mouth once daily., Disp: , Rfl:     atenoloL (TENORMIN) 50 MG tablet, TAKE 1 TABLET BY MOUTH ONCE DAILY, Disp: 90 tablet, Rfl: 3    bempedoic acid (NEXLETOL) 180 mg Tab, Take 1 tablet (180 mg) by mouth once daily., Disp: 90 tablet, Rfl: 3    collagen/biotin/ascorbic acid (COLLAGEN 1500 PLUS C ORAL), Take by mouth Daily. 11 G of powder daily, Disp: , Rfl:     fluorouraciL (EFUDEX) 5 % cream, AAA on both arms BID x 2-3 weeks. Stop if blistered, oozing, or bleeding. Use daily sun protection., Disp: 40 g, Rfl: 1    ketoconazole (NIZORAL) 2 % cream, Apply topically 2 (two) times daily., Disp: 30 g, Rfl: 2    multivit-min-FA-lycopen-lutein 300-600-300 mcg Tab, Take by mouth., Disp: , Rfl:     omega-3 fatty acids/fish oil (FISH OIL-OMEGA-3 FATTY ACIDS) 300-1,000 mg capsule, Take by mouth once daily., Disp: , Rfl:     omeprazole (PRILOSEC) 20 MG capsule, Take 2 capsules (40 mg total) by mouth once daily., Disp: 180 capsule, Rfl: 3    rosuvastatin (CRESTOR) 20 MG tablet, TAKE 1 TABLET BY MOUTH ONCE DAILY, Disp: 90 tablet, Rfl: 3    tadalafiL (CIALIS) 5 MG tablet, Take 1 tablet (5 mg total) by mouth once daily., Disp: 30 tablet, Rfl: 6    triamterene-hydrochlorothiazide 37.5-25 mg (MAXZIDE-25) 37.5-25 mg per tablet, TAKE 1 TABLET BY MOUTH ONCE DAILY, Disp: 90 tablet, Rfl: 3    oxyCODONE-acetaminophen (PERCOCET)  mg per tablet, Take 1 tablet by mouth every 4-6 hours as needed for pain. Take stool softener with this medication. (Patient not taking: Reported on 11/21/2022), Disp: 15 tablet, Rfl: 0    promethazine (PHENERGAN) 25 MG tablet, Take 1 tablet (25 mg total) by mouth every 6 (six) hours as needed for Nausea. (Patient not taking: Reported on 11/21/2022), Disp: 8 tablet, Rfl: 0    traMADoL (ULTRAM) 50 mg tablet, Take 1-2 tablets ( mg total) by mouth every 6 (six) hours as needed for Pain. (Patient not  "taking: Reported on 11/21/2022), Disp: 20 tablet, Rfl: 0  ALLERGIES:   Review of patient's allergies indicates:   Allergen Reactions    Clindamycin Nausea And Vomiting    Penicillins Hives       VITAL SIGNS: /63 (BP Location: Right arm, Patient Position: Sitting, BP Method: Medium (Automatic))   Pulse 77   Ht 6' 1" (1.854 m)   Wt 80.1 kg (176 lb 8 oz)   BMI 23.29 kg/m²                                                                                 PHYSICAL EXAMINATION:     Incision sites healed well  No evidence of any erythema, infection or induration  elbow Range of motion full   Minimal effusion  2+ Radial pulses  No swelling    FE 30 (100)  ER 60  IR T10    Scaption 4/5 at 0, 4+/5 at 30    MRI L shoulder obtained on 11/14/22:   1. Full-thickness retear of the supraspinatus tendon with retraction and medial displacement of the augmentation patch.  2. Tendinosis and extensive undersurface tearing of the infraspinatus tendon.  Tendinosis and interstitial tear of the subscapularis tendon.  3. Moderate rotator cuff muscle atrophy.  4. Sequela of prior cement/bone graft injection noted within the proximal humerus shaft. 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  5. Small glenohumeral effusion with synovitis and subacromial subdeltoid bursitis.                                                                          ASSESSMENT:                                                                  Status post left rotator cuff repair, with retear of rotator cuff.    PLAN:  Discussed conservative vs nonconservative options which would include arthroscopic tuberoplasty.   All questions were answered and the patient should contact us if he has any questions or concerns in the interim.         "

## 2022-11-30 ENCOUNTER — CLINICAL SUPPORT (OUTPATIENT)
Dept: REHABILITATION | Facility: HOSPITAL | Age: 82
End: 2022-11-30
Payer: MEDICARE

## 2022-11-30 DIAGNOSIS — M25.612 DECREASED ROM OF LEFT SHOULDER: Primary | ICD-10-CM

## 2022-11-30 DIAGNOSIS — Z98.890 STATUS POST LEFT ROTATOR CUFF REPAIR: ICD-10-CM

## 2022-11-30 DIAGNOSIS — R29.898 DECREASED STRENGTH OF UPPER EXTREMITY: ICD-10-CM

## 2022-11-30 PROCEDURE — 97110 THERAPEUTIC EXERCISES: CPT | Performed by: PHYSICAL THERAPIST

## 2022-11-30 PROCEDURE — 97140 MANUAL THERAPY 1/> REGIONS: CPT | Performed by: PHYSICAL THERAPIST

## 2022-11-30 NOTE — PROGRESS NOTES
Physical Therapy Daily Treatment Note     Name: Gus Sabillon  [facility] Number: [id number]    Therapy Diagnosis:   Encounter Diagnoses   Name Primary?    Status post left rotator cuff repair     Decreased ROM of left shoulder Yes    Decreased strength of upper extremity      Physician: Evangelist Khanna III, *    Visit Date: 11/30/2022    Physician Orders: PT Eval and Treat   Medical Diagnosis from Referral: Nontraumatic tear of left rotator cuff, unspecified tear extent [M75.102]  Evaluation Date: 8/3/2022  Authorization Period Expiration: 8/31/2022  Plan of Care Expiration: 12/31/2022  Visit # / Visits authorized: 5/ 12    Time In: 1000  Time Out: 1100  Total Billable Time: 30 minutes    Precautions: Standard    DOS: 6/21/2022  Surgeon: Dr. Childress  Precautions: POSTOPERATIVE PLAN: We will follow the arthroscopic rotator cuff repair guidelines for a massive size rotator cuff tear.  We discussed with the patient's family after surgery.  The patient will remain in a sling for 8 weeks.  PT to start at 6 weeks.    MRI L shoulder obtained on 11/14/22:   1. Full-thickness retear of the supraspinatus tendon with retraction and medial displacement of the augmentation patch.  2. Tendinosis and extensive undersurface tearing of the infraspinatus tendon.  Tendinosis and interstitial tear of the subscapularis tendon.  3. Moderate rotator cuff muscle atrophy.  4. Sequela of prior cement/bone graft injection noted within the proximal humerus shaft. 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  5. Small glenohumeral effusion with synovitis and subacromial subdeltoid bursitis.    Subjective     Pt reports: I had another MRI and it showed a retear. Going to try anterior deltoid protocol     He was compliant with home exercise program.  Response to previous treatment: slight improvement in motion, just very weak   Functional change:  "unable use LUE     Pain: 2/10  Location: left shoulder      Objective     Zeeshan received therapeutic exercises to develop strength, endurance, ROM, and flexibility for 45 minutes including:    Supine clasped hand flexion 2 x 10  Cable cord adduction with assisted abduction 3# side/front 3 x 10 each  Hitch hiker press down arm extended 30x 3" hold    Zeeshan received the following manual therapy techniques: Joint mobilizations were applied to the: Left shoulder for 15 minutes, including:    Gr I-II oscillations for relaxation  Gr III flexion   Gr II inferior mob at 20 and 90 deg    Rhythmic stab flexion and IR/ER    Zeeshan participated in neuromuscular re-education activities to improve: Sense, Proprioception, and Posture for 0 minutes. The following activities were included:    Landmine press 3# 2 x 10  Wand press 3# 2 x 10  Supine ER OTB 2 x 10    Home Exercises Provided and Patient Education Provided     Education provided:   - importance of cuff strengthening     Written Home Exercises Provided: Patient instructed to cont prior HEP.  Exercises were reviewed and Zeeshan was able to demonstrate them prior to the end of the session.  Zeeshan demonstrated good  understanding of the education provided.     See EMR under Patient Instructions for exercises provided 9/27/2022.    Assessment     Began an anterior deltoid strengthening program for massive rotator cuff repairs, provided to me by Dr. Childress. He tolerated flexion well in supine pain free and will perform abduction with band over the door for deltoid strengthening    Zeeshan Is progressing well towards his goals.   Pt prognosis is Good.     Pt will continue to benefit from skilled outpatient physical therapy to address the deficits listed in the problem list box on initial evaluation, provide pt/family education and to maximize pt's level of independence in the home and community environment.     Pt's spiritual, cultural and educational needs considered and pt agreeable " to plan of care and goals.    Anticipated barriers to physical therapy: [facility]     Goals:  GOALS: Short Term Goals:  8 weeks(Progressing, not met)  1.Report decreased L shoulder pain < / =  2/10  to increase tolerance for PROM by therapist.   2. Increase PROM to equivalent to contralateral side in all directions.    3. Increased strength by 1/3 MMT grade in shoulder flexion to increase tolerance for ADL and work activities.  4. Pt to tolerate HEP to improve ROM and independence with ADL's     Long Term Goals: 24 weeks(Progressing, not met)  1.Report decreased L shoulder pain  < / =  0 /10  to increase tolerance for HEP.   2.Increase AROM to equivalent to contralateral side in all directions.   3.Increase strength to >/= 4/5 in shoulder flexion to increase tolerance for ADL and work activities.  4. Pt goal: complete overhead tasks without difficulty to resume PLOF.     Plan     Plan of care Certification: 8/3/2022 to 12/31/2022.    Improve rotator cuff strength    Armen Croft, PT

## 2022-12-14 ENCOUNTER — CLINICAL SUPPORT (OUTPATIENT)
Dept: REHABILITATION | Facility: HOSPITAL | Age: 82
End: 2022-12-14
Payer: MEDICARE

## 2022-12-14 DIAGNOSIS — R29.898 DECREASED STRENGTH OF UPPER EXTREMITY: ICD-10-CM

## 2022-12-14 DIAGNOSIS — M25.612 DECREASED ROM OF LEFT SHOULDER: Primary | ICD-10-CM

## 2022-12-14 PROCEDURE — 97140 MANUAL THERAPY 1/> REGIONS: CPT | Performed by: PHYSICAL THERAPIST

## 2022-12-14 PROCEDURE — 97110 THERAPEUTIC EXERCISES: CPT | Performed by: PHYSICAL THERAPIST

## 2022-12-14 NOTE — PROGRESS NOTES
Physical Therapy Daily Treatment Note     Name: Gus Sabillon  [facility] Number: [id number]    Therapy Diagnosis:   Encounter Diagnoses   Name Primary?    Decreased ROM of left shoulder Yes    Decreased strength of upper extremity      Physician: Evangelist Khanna III, *    Visit Date: 12/14/2022    Physician Orders: PT Eval and Treat   Medical Diagnosis from Referral: Nontraumatic tear of left rotator cuff, unspecified tear extent [M75.102]  Evaluation Date: 8/3/2022  Authorization Period Expiration: 8/31/2022  Plan of Care Expiration: 12/31/2022  Visit # / Visits authorized: 6/ 12    Time In: 1100  Time Out: 1200  Total Billable Time: 30 minutes    Precautions: Standard    DOS: 6/21/2022  Surgeon: Dr. Childress  Precautions: POSTOPERATIVE PLAN: We will follow the arthroscopic rotator cuff repair guidelines for a massive size rotator cuff tear.  We discussed with the patient's family after surgery.  The patient will remain in a sling for 8 weeks.  PT to start at 6 weeks.    MRI L shoulder obtained on 11/14/22:   1. Full-thickness retear of the supraspinatus tendon with retraction and medial displacement of the augmentation patch.  2. Tendinosis and extensive undersurface tearing of the infraspinatus tendon.  Tendinosis and interstitial tear of the subscapularis tendon.  3. Moderate rotator cuff muscle atrophy.  4. Sequela of prior cement/bone graft injection noted within the proximal humerus shaft. 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  5. Small glenohumeral effusion with synovitis and subacromial subdeltoid bursitis.    Subjective     Pt reports: I am slowly getting a little more motion, taking my time knowing its a long recovery from the study     He was compliant with home exercise program.  Response to previous treatment: slight improvement in motion, just very weak   Functional change: unable use LUE      Pain: 2/10  Location: left shoulder      Objective     Zeeshan received therapeutic exercises to develop strength, endurance, ROM, and flexibility for 45 minutes including:    Supine clasped hand flexion 2 x 10  Rows GTB 2 x 15 to body  Tricep ext BTB 2 x 10  Scap ext step backs GTB 2 x 10  Sidelying abd 2 x 10  Pulley assisted abd/flexion 5# 2 x 15    Zeeshan received the following manual therapy techniques: Joint mobilizations were applied to the: Left shoulder for 15 minutes, including:    Gr I-II oscillations for relaxation  Gr III flexion   Gr II inferior mob at 20 and 90 deg    Rhythmic stab flexion and IR/ER    Zeeshan participated in neuromuscular re-education activities to improve: Sense, Proprioception, and Posture for 0 minutes. The following activities were included:        Home Exercises Provided and Patient Education Provided     Education provided:   - importance of cuff strengthening     Written Home Exercises Provided: Patient instructed to cont prior HEP.  Exercises were reviewed and Zeeshan was able to demonstrate them prior to the end of the session.  Zeeshan demonstrated good  understanding of the education provided.     See EMR under Patient Instructions for exercises provided 9/27/2022.    Assessment     Progressed strengthening today, passive motion remains excellent and decreased pain with ADL. Active motion is slowly improving and we remain in pain free ROM     Zeeshan Is progressing well towards his goals.   Pt prognosis is Good.     Pt will continue to benefit from skilled outpatient physical therapy to address the deficits listed in the problem list box on initial evaluation, provide pt/family education and to maximize pt's level of independence in the home and community environment.     Pt's spiritual, cultural and educational needs considered and pt agreeable to plan of care and goals.    Anticipated barriers to physical therapy: [facility]     Goals:  GOALS: Short Term Goals:  8  weeks(Progressing, not met)  1.Report decreased L shoulder pain < / =  2/10  to increase tolerance for PROM by therapist.   2. Increase PROM to equivalent to contralateral side in all directions.    3. Increased strength by 1/3 MMT grade in shoulder flexion to increase tolerance for ADL and work activities.  4. Pt to tolerate HEP to improve ROM and independence with ADL's     Long Term Goals: 24 weeks(Progressing, not met)  1.Report decreased L shoulder pain  < / =  0 /10  to increase tolerance for HEP.   2.Increase AROM to equivalent to contralateral side in all directions.   3.Increase strength to >/= 4/5 in shoulder flexion to increase tolerance for ADL and work activities.  4. Pt goal: complete overhead tasks without difficulty to resume PLOF.     Plan     Plan of care Certification: 8/3/2022 to 12/31/2022.    Improve rotator cuff strength    Armen Croft, PT

## 2022-12-28 ENCOUNTER — CLINICAL SUPPORT (OUTPATIENT)
Dept: REHABILITATION | Facility: HOSPITAL | Age: 82
End: 2022-12-28
Payer: MEDICARE

## 2022-12-28 DIAGNOSIS — M25.612 DECREASED ROM OF LEFT SHOULDER: Primary | ICD-10-CM

## 2022-12-28 DIAGNOSIS — R29.898 DECREASED STRENGTH OF UPPER EXTREMITY: ICD-10-CM

## 2022-12-28 PROCEDURE — 97110 THERAPEUTIC EXERCISES: CPT | Performed by: PHYSICAL THERAPIST

## 2022-12-28 PROCEDURE — 97140 MANUAL THERAPY 1/> REGIONS: CPT | Performed by: PHYSICAL THERAPIST

## 2022-12-28 NOTE — PROGRESS NOTES
Physical Therapy Daily Treatment Note     Name: Gus Sabillon  [facility] Number: [id number]    Therapy Diagnosis:   Encounter Diagnoses   Name Primary?    Decreased ROM of left shoulder Yes    Decreased strength of upper extremity      Physician: Evangelist Khanna III, *    Visit Date: 12/28/2022    Physician Orders: PT Eval and Treat   Medical Diagnosis from Referral: Nontraumatic tear of left rotator cuff, unspecified tear extent [M75.102]  Evaluation Date: 8/3/2022  Authorization Period Expiration: 8/31/2022  Plan of Care Expiration: 12/31/2022  Visit # / Visits authorized: 7/ 12    Time In: 1120  Time Out: 1205  Total Billable Time: 45 minutes    Precautions: Standard    DOS: 6/21/2022  Surgeon: Dr. Childress  Precautions: POSTOPERATIVE PLAN: We will follow the arthroscopic rotator cuff repair guidelines for a massive size rotator cuff tear.  We discussed with the patient's family after surgery.  The patient will remain in a sling for 8 weeks.  PT to start at 6 weeks.    MRI L shoulder obtained on 11/14/22:   1. Full-thickness retear of the supraspinatus tendon with retraction and medial displacement of the augmentation patch.  2. Tendinosis and extensive undersurface tearing of the infraspinatus tendon.  Tendinosis and interstitial tear of the subscapularis tendon.  3. Moderate rotator cuff muscle atrophy.  4. Sequela of prior cement/bone graft injection noted within the proximal humerus shaft. 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  5. Small glenohumeral effusion with synovitis and subacromial subdeltoid bursitis.    Subjective     Pt reports: My shoulder is slowly getting a little better, small motion at a time and I am being cautious to keep it below 90 degrees. Less pain    He was compliant with home exercise program.  Response to previous treatment: slight improvement in motion, just very weak  "  Functional change: unable use LUE     Pain: 2/10  Location: left shoulder      Objective     Zeeshan received therapeutic exercises to develop strength, endurance, ROM, and flexibility for 30 minutes including:    Supine clasped hand flexion 2 table clicks 2 x 10  Sidelying abd 2 x 10  Pulley assisted abd/flexion 3# 2 x 15  Hitch hiker 20x 3" hold    Zeeshan received the following manual therapy techniques: Joint mobilizations were applied to the: Left shoulder for 15 minutes, including:    Gr I-II oscillations for relaxation  Gr III flexion   Gr II inferior mob at 20 and 90 deg    Rhythmic stab flexion and IR/ER  Sidelying iso holds 20 deg abd     Zeeshan participated in neuromuscular re-education activities to improve: Sense, Proprioception, and Posture for 0 minutes. The following activities were included:        Home Exercises Provided and Patient Education Provided     Education provided:   - importance of cuff strengthening     Written Home Exercises Provided: Patient instructed to cont prior HEP.  Exercises were reviewed and Zeeshan was able to demonstrate them prior to the end of the session.  Zeeshan demonstrated good  understanding of the education provided.     See EMR under Patient Instructions for exercises provided 9/27/2022.    Assessment     Zeeshan's motion is improving slowly in the clinic. Progressed to table up 2 levels without any pain. Remain in pain free range to improve on strength and motion.     Zeeshan Is progressing well towards his goals.   Pt prognosis is Good.     Pt will continue to benefit from skilled outpatient physical therapy to address the deficits listed in the problem list box on initial evaluation, provide pt/family education and to maximize pt's level of independence in the home and community environment.     Pt's spiritual, cultural and educational needs considered and pt agreeable to plan of care and goals.    Anticipated barriers to physical therapy: [place] trips "     Goals:  GOALS: Short Term Goals:  8 weeks(Progressing, not met)  1.Report decreased L shoulder pain < / =  2/10  to increase tolerance for PROM by therapist.   2. Increase PROM to equivalent to contralateral side in all directions.    3. Increased strength by 1/3 MMT grade in shoulder flexion to increase tolerance for ADL and work activities.  4. Pt to tolerate HEP to improve ROM and independence with ADL's     Long Term Goals: 24 weeks(Progressing, not met)  1.Report decreased L shoulder pain  < / =  0 /10  to increase tolerance for HEP.   2.Increase AROM to equivalent to contralateral side in all directions.   3.Increase strength to >/= 4/5 in shoulder flexion to increase tolerance for ADL and work activities.  4. Pt goal: complete overhead tasks without difficulty to resume PLOF.     Plan     Plan of care Certification: 8/3/2022 to 12/31/2022.    Improve rotator cuff strength    Armen Croft, PT

## 2023-01-05 ENCOUNTER — HOSPITAL ENCOUNTER (OUTPATIENT)
Dept: RADIOLOGY | Facility: HOSPITAL | Age: 83
Discharge: HOME | End: 2023-01-05
Attending: INTERNAL MEDICINE
Payer: MEDICARE

## 2023-01-05 ENCOUNTER — OFFICE VISIT (OUTPATIENT)
Dept: INTERNAL MEDICINE | Facility: CLINIC | Age: 83
End: 2023-01-05
Payer: MEDICARE

## 2023-01-05 VITALS
WEIGHT: 180.13 LBS | SYSTOLIC BLOOD PRESSURE: 118 MMHG | OXYGEN SATURATION: 98 % | HEIGHT: 73 IN | DIASTOLIC BLOOD PRESSURE: 58 MMHG | TEMPERATURE: 98 F | HEART RATE: 83 BPM | BODY MASS INDEX: 23.87 KG/M2

## 2023-01-05 DIAGNOSIS — R06.02 SOB (SHORTNESS OF BREATH) ON EXERTION: ICD-10-CM

## 2023-01-05 DIAGNOSIS — I10 HTN (HYPERTENSION), BENIGN: ICD-10-CM

## 2023-01-05 DIAGNOSIS — I65.23 CAROTID ATHEROSCLEROSIS, BILATERAL: ICD-10-CM

## 2023-01-05 DIAGNOSIS — I73.9 PAD (PERIPHERAL ARTERY DISEASE): ICD-10-CM

## 2023-01-05 DIAGNOSIS — M25.572 LEFT ANKLE PAIN, UNSPECIFIED CHRONICITY: ICD-10-CM

## 2023-01-05 DIAGNOSIS — Z00.00 ENCOUNTER FOR PREVENTIVE HEALTH EXAMINATION: Primary | ICD-10-CM

## 2023-01-05 DIAGNOSIS — K21.9 GASTROESOPHAGEAL REFLUX DISEASE, UNSPECIFIED WHETHER ESOPHAGITIS PRESENT: ICD-10-CM

## 2023-01-05 DIAGNOSIS — N18.30 STAGE 3 CHRONIC KIDNEY DISEASE, UNSPECIFIED WHETHER STAGE 3A OR 3B CKD: ICD-10-CM

## 2023-01-05 DIAGNOSIS — E78.5 DYSLIPIDEMIA: ICD-10-CM

## 2023-01-05 DIAGNOSIS — I71.40 ABDOMINAL AORTIC ANEURYSM (AAA) WITHOUT RUPTURE, UNSPECIFIED PART: ICD-10-CM

## 2023-01-05 PROCEDURE — 3288F FALL RISK ASSESSMENT DOCD: CPT | Mod: CPTII,S$GLB,, | Performed by: INTERNAL MEDICINE

## 2023-01-05 PROCEDURE — 99999 PR PBB SHADOW E&M-EST. PATIENT-LVL IV: ICD-10-PCS | Mod: PBBFAC,,, | Performed by: INTERNAL MEDICINE

## 2023-01-05 PROCEDURE — 71046 XR CHEST PA AND LATERAL: ICD-10-PCS | Mod: 26,,, | Performed by: RADIOLOGY

## 2023-01-05 PROCEDURE — 71046 X-RAY EXAM CHEST 2 VIEWS: CPT | Mod: TC,PO

## 2023-01-05 PROCEDURE — 93005 EKG 12-LEAD: ICD-10-PCS | Mod: S$GLB,,, | Performed by: INTERNAL MEDICINE

## 2023-01-05 PROCEDURE — 99397 PER PM REEVAL EST PAT 65+ YR: CPT | Mod: GZ,S$GLB,, | Performed by: INTERNAL MEDICINE

## 2023-01-05 PROCEDURE — 1159F PR MEDICATION LIST DOCUMENTED IN MEDICAL RECORD: ICD-10-PCS | Mod: CPTII,S$GLB,, | Performed by: INTERNAL MEDICINE

## 2023-01-05 PROCEDURE — 99397 PR PREVENTIVE VISIT,EST,65 & OVER: ICD-10-PCS | Mod: GZ,S$GLB,, | Performed by: INTERNAL MEDICINE

## 2023-01-05 PROCEDURE — 1126F AMNT PAIN NOTED NONE PRSNT: CPT | Mod: CPTII,S$GLB,, | Performed by: INTERNAL MEDICINE

## 2023-01-05 PROCEDURE — 3078F DIAST BP <80 MM HG: CPT | Mod: CPTII,S$GLB,, | Performed by: INTERNAL MEDICINE

## 2023-01-05 PROCEDURE — 1101F PT FALLS ASSESS-DOCD LE1/YR: CPT | Mod: CPTII,S$GLB,, | Performed by: INTERNAL MEDICINE

## 2023-01-05 PROCEDURE — 71046 X-RAY EXAM CHEST 2 VIEWS: CPT | Mod: 26,,, | Performed by: RADIOLOGY

## 2023-01-05 PROCEDURE — 3074F PR MOST RECENT SYSTOLIC BLOOD PRESSURE < 130 MM HG: ICD-10-PCS | Mod: CPTII,S$GLB,, | Performed by: INTERNAL MEDICINE

## 2023-01-05 PROCEDURE — 3078F PR MOST RECENT DIASTOLIC BLOOD PRESSURE < 80 MM HG: ICD-10-PCS | Mod: CPTII,S$GLB,, | Performed by: INTERNAL MEDICINE

## 2023-01-05 PROCEDURE — 3288F PR FALLS RISK ASSESSMENT DOCUMENTED: ICD-10-PCS | Mod: CPTII,S$GLB,, | Performed by: INTERNAL MEDICINE

## 2023-01-05 PROCEDURE — 1126F PR PAIN SEVERITY QUANTIFIED, NO PAIN PRESENT: ICD-10-PCS | Mod: CPTII,S$GLB,, | Performed by: INTERNAL MEDICINE

## 2023-01-05 PROCEDURE — 1159F MED LIST DOCD IN RCRD: CPT | Mod: CPTII,S$GLB,, | Performed by: INTERNAL MEDICINE

## 2023-01-05 PROCEDURE — 93010 ELECTROCARDIOGRAM REPORT: CPT | Mod: S$GLB,,, | Performed by: INTERNAL MEDICINE

## 2023-01-05 PROCEDURE — 3074F SYST BP LT 130 MM HG: CPT | Mod: CPTII,S$GLB,, | Performed by: INTERNAL MEDICINE

## 2023-01-05 PROCEDURE — 99999 PR PBB SHADOW E&M-EST. PATIENT-LVL IV: CPT | Mod: PBBFAC,,, | Performed by: INTERNAL MEDICINE

## 2023-01-05 PROCEDURE — 93005 ELECTROCARDIOGRAM TRACING: CPT | Mod: S$GLB,,, | Performed by: INTERNAL MEDICINE

## 2023-01-05 PROCEDURE — 1101F PR PT FALLS ASSESS DOC 0-1 FALLS W/OUT INJ PAST YR: ICD-10-PCS | Mod: CPTII,S$GLB,, | Performed by: INTERNAL MEDICINE

## 2023-01-05 PROCEDURE — 93010 EKG 12-LEAD: ICD-10-PCS | Mod: S$GLB,,, | Performed by: INTERNAL MEDICINE

## 2023-01-05 NOTE — PROGRESS NOTES
History of present illness:   82-year-old gentleman in today for general health assessment.      Current medications:  Medications all noted and reviewed.      Review of systems:  General: no fever, chills, generalized body aches. No unexpected weight loss.  Eyes:  No visual disturbances.  HEENT:  No hoarseness, dysphagia, ear pain.  Respiratory:  He reports in the last month or so has had some mild feeling of shortness of breath.  It bothers him mostly at night when he is lying in bed.  It helps to sit up.  No increased cough.  No significant increase in extremity edema.  Cardiovascular: no chest pain, palpitations, cough, exertional limb pain.  See respiratory above.  GI: no nausea, vomiting.  No abdominal pain.  For week or so he had diarrhea a couple of weeks ago.  No nausea no vomiting.  No fever.  No travel.  No antibiotics.  No other family members with same.  No melena, no hematochezia.  : no dysuria. No change in the color or character of the urine. No urinary frequency.  Musculoskeletal:  Reports a couple weeks ago developed pain swelling in his left ankle.  No trauma.  No definitive history of gout.  It is improved but still remains a bit uncomfortable.  Neurologic:  No focal neurological complaints.  No headaches.  Skin:  No rashes or other concerns.  Psych:  No emotional issues    Health screenings:  Eye exam is due.  He had the pneumococcal vaccines in [place].      Physical examination:  GENERAL:  Alert, appropriately groomed, no acute distress.  VS:  Blood pressure 118/60.  Other vital signs normal.  EYES: sclerae white ,nonicteric. PERRL.  HEENT:  Normocephalic. Ear canals and tympanic membranes normal. Mouth and pharynx normal. No thyromegaly. Trachea midline and freely mobile.  LUNGS:  Clear to ascultation and normal to percussion.  CARDIOVASCULAR:  Normal heart sounds.  No significant murmur. Carotids full bilaterally without bruit.   .  No abdominal bruit.  Trace distal lower extremity  edema.  No overt ischemic changes of the lower extremities.  GI: the abdomen is soft, no distension. No masses , tenderness, organomegaly.    : scrotum, testicles and penis normal.   LYMPHATIC:  No axillary, inguinal , cervical adenopathy.  MUSCULOSKELETAL:  Range of motion, stability and strength of the right and left upper and lower extremities normal.  Left ankle slight fullness.  No warmth or redness.  No significant tenderness to palpation or range of motion  NEUROLOGIC:  DTR's normal. No gross motor or sensory deficits apparent, gait normal.  SKIN:  No rashes.   MS:  Alert, oriented , affect and mood all appropriate      Impression:  82-year-old gentleman with several chronic medical issues in a couple of new concerns.      Hypertension controlled.      PA D lower extremities and also carotid atherosclerotic disease clinically appears to be stable.    Status post carotid endarterectomy.    Dyslipidemia pharmacologic therapy.      Small abdominal aortic aneurysm noted on 2021 ultrasound.      Recent mild shortness of breath primarily at night possible paroxysmal nocturnal dyspnea.      Recent acute inflammation left ankle possible acute gout versus other.  Clinically improved.    Chronic kidney disease stage 3.          Plan:   Lab data today to include CBC, chemistry profile, TSH, urinalysis, brain natriuretic peptide, uric acid.  Will schedule lipids on another fasting date.    Chest x-ray today.    ECG today.  Reviewed lab and data for further disposition.

## 2023-01-06 ENCOUNTER — TELEPHONE (OUTPATIENT)
Dept: INTERNAL MEDICINE | Facility: CLINIC | Age: 83
End: 2023-01-06
Payer: MEDICARE

## 2023-01-06 DIAGNOSIS — R79.89 ELEVATED BRAIN NATRIURETIC PEPTIDE (BNP) LEVEL: Primary | ICD-10-CM

## 2023-01-06 DIAGNOSIS — N18.30 BENIGN HYPERTENSION WITH CKD (CHRONIC KIDNEY DISEASE) STAGE III: ICD-10-CM

## 2023-01-06 DIAGNOSIS — I12.9 BENIGN HYPERTENSION WITH CKD (CHRONIC KIDNEY DISEASE) STAGE III: ICD-10-CM

## 2023-01-06 DIAGNOSIS — D53.9 MACROCYTIC ANEMIA: ICD-10-CM

## 2023-01-06 RX ORDER — FUROSEMIDE 20 MG/1
20 TABLET ORAL DAILY
Qty: 30 TABLET | Refills: 0 | Status: SHIPPED | OUTPATIENT
Start: 2023-01-06 | End: 2023-01-11 | Stop reason: SDUPTHER

## 2023-01-06 NOTE — TELEPHONE ENCOUNTER
----- Message from MATHEUS Mora MD sent at 1/6/2023  2:08 PM CST -----  Trina--  I spoke with pt.  Needs 2d echo order processed.  Needs ordered bmp in 1 week.  Advise to stop his current triamterene-hctz once he starts the new fluid pill furosemide.

## 2023-01-06 NOTE — TELEPHONE ENCOUNTER
Phone staff states that you wanted him to see Cardiology but there isn't a referral, is pt already established ?

## 2023-01-06 NOTE — TELEPHONE ENCOUNTER
----- Message from Edwin Varghese sent at 1/6/2023  3:11 PM CST -----  Good afternoon,    Pt stated that Dr. Rishabh Mora wanted him to see Cardiology but there isn't a referral. Please assist.    Thanks

## 2023-01-06 NOTE — TELEPHONE ENCOUNTER
I spoke with the patient today regarding results from lab data and others.  Noted brain natriuretic peptide elevated over 2000.  Chest x-ray unremarkable.  ECG with no acute changes.  Other lab parameters fairly stable from previous.  We will begin furosemide 20 mg daily and he will give us a status report with weight in one week.  Scheduled 2D echocardiogram with CF D.  Basic metabolic profile in one week.  Recommended he schedule a follow-up visit with his cardiologist Dr. Arredondo.

## 2023-01-06 NOTE — TELEPHONE ENCOUNTER
Labs have been scheduled . Echo sent to coordinators for scheduling , pt instructed to stop his current triamterene-hctz once he starts the new fluid pill furosemide.pt FLAKO.

## 2023-01-09 NOTE — TELEPHONE ENCOUNTER
Informed scheduling staff that pt is already established with cardiology. Info given to have pt scheduled .

## 2023-01-10 ENCOUNTER — HOSPITAL ENCOUNTER (OUTPATIENT)
Dept: CARDIOLOGY | Facility: HOSPITAL | Age: 83
Discharge: HOME | End: 2023-01-10
Attending: INTERNAL MEDICINE
Payer: MEDICARE

## 2023-01-10 VITALS — BODY MASS INDEX: 23.86 KG/M2 | HEIGHT: 73 IN | WEIGHT: 180 LBS

## 2023-01-10 DIAGNOSIS — R79.89 ELEVATED BRAIN NATRIURETIC PEPTIDE (BNP) LEVEL: ICD-10-CM

## 2023-01-10 LAB
AV INDEX (PROSTH): 0.67
AV MEAN GRADIENT: 3 MMHG
AV PEAK GRADIENT: 6 MMHG
AV REGURGITATION PRESSURE HALF TIME: 353.89 MS
AV VALVE AREA: 2.5 CM2
AV VELOCITY RATIO: 0.66
BSA FOR ECHO PROCEDURE: 2.05 M2
CV ECHO LV RWT: 1.01 CM
DOP CALC AO PEAK VEL: 1.19 M/S
DOP CALC AO VTI: 23.6 CM
DOP CALC LVOT AREA: 3.8 CM2
DOP CALC LVOT DIAMETER: 2.19 CM
DOP CALC LVOT PEAK VEL: 0.78 M/S
DOP CALC LVOT STROKE VOLUME: 59.11 CM3
DOP CALC MV VTI: 16.1 CM
DOP CALCLVOT PEAK VEL VTI: 15.7 CM
E WAVE DECELERATION TIME: 176.9 MSEC
E/A RATIO: 1.88
E/E' RATIO: 12.15 M/S
ECHO LV POSTERIOR WALL: 2.16 CM (ref 0.6–1.1)
EJECTION FRACTION: 65 %
FRACTIONAL SHORTENING: 2 % (ref 28–44)
INTERVENTRICULAR SEPTUM: 1.94 CM (ref 0.6–1.1)
IVC DIAMETER: 2.1 CM
LA MAJOR: 6.01 CM
LA MINOR: 6.07 CM
LA WIDTH: 4.1 CM
LEFT ATRIUM SIZE: 4.79 CM
LEFT ATRIUM VOLUME INDEX MOD: 36.3 ML/M2
LEFT ATRIUM VOLUME INDEX: 48.9 ML/M2
LEFT ATRIUM VOLUME MOD: 74.69 CM3
LEFT ATRIUM VOLUME: 100.82 CM3
LEFT INTERNAL DIMENSION IN SYSTOLE: 4.2 CM (ref 2.1–4)
LEFT VENTRICLE DIASTOLIC VOLUME INDEX: 50.21 ML/M2
LEFT VENTRICLE DIASTOLIC VOLUME: 103.44 ML
LEFT VENTRICLE MASS INDEX: 206 G/M2
LEFT VENTRICLE SYSTOLIC VOLUME INDEX: 23.1 ML/M2
LEFT VENTRICLE SYSTOLIC VOLUME: 47.58 ML
LEFT VENTRICULAR INTERNAL DIMENSION IN DIASTOLE: 4.27 CM (ref 3.5–6)
LEFT VENTRICULAR MASS: 423.69 G
LV LATERAL E/E' RATIO: 11.29 M/S
LV SEPTAL E/E' RATIO: 13.17 M/S
LVOT MG: 1.23 MMHG
LVOT MV: 0.53 CM/S
MV MEAN GRADIENT: 2 MMHG
MV PEAK A VEL: 0.42 M/S
MV PEAK E VEL: 0.79 M/S
MV PEAK GRADIENT: 6 MMHG
MV STENOSIS PRESSURE HALF TIME: 85 MS
MV VALVE AREA BY CONTINUITY EQUATION: 3.67 CM2
MV VALVE AREA P 1/2 METHOD: 2.59 CM2
PISA AR MAX VEL: 3.63 M/S
PISA MRMAX VEL: 4.8 M/S
PISA TR MAX VEL: 2.79 M/S
RA MAJOR: 6.11 CM
RA PRESSURE: 8 MMHG
RA WIDTH: 3.9 CM
SINUS: 4.1 CM
TDI LATERAL: 0.07 M/S
TDI SEPTAL: 0.06 M/S
TDI: 0.07 M/S
TR MAX PG: 31 MMHG
TV REST PULMONARY ARTERY PRESSURE: 39 MMHG

## 2023-01-10 PROCEDURE — 93306 TTE W/DOPPLER COMPLETE: CPT

## 2023-01-10 PROCEDURE — 93306 ECHO (CUPID ONLY): ICD-10-PCS | Mod: 26,,, | Performed by: INTERNAL MEDICINE

## 2023-01-10 PROCEDURE — 93306 TTE W/DOPPLER COMPLETE: CPT | Mod: 26,,, | Performed by: INTERNAL MEDICINE

## 2023-01-11 ENCOUNTER — CLINICAL SUPPORT (OUTPATIENT)
Dept: REHABILITATION | Facility: HOSPITAL | Age: 83
End: 2023-01-11
Payer: MEDICARE

## 2023-01-11 ENCOUNTER — OFFICE VISIT (OUTPATIENT)
Dept: CARDIOLOGY | Facility: CLINIC | Age: 83
End: 2023-01-11
Payer: MEDICARE

## 2023-01-11 VITALS
BODY MASS INDEX: 23.19 KG/M2 | RESPIRATION RATE: 20 BRPM | DIASTOLIC BLOOD PRESSURE: 62 MMHG | WEIGHT: 175 LBS | SYSTOLIC BLOOD PRESSURE: 134 MMHG | HEART RATE: 76 BPM | HEIGHT: 73 IN

## 2023-01-11 DIAGNOSIS — I70.0 AORTIC ATHEROSCLEROSIS: ICD-10-CM

## 2023-01-11 DIAGNOSIS — I50.31 ACUTE DIASTOLIC CONGESTIVE HEART FAILURE: Primary | ICD-10-CM

## 2023-01-11 DIAGNOSIS — I71.40 ABDOMINAL AORTIC ANEURYSM (AAA) WITHOUT RUPTURE, UNSPECIFIED PART: ICD-10-CM

## 2023-01-11 DIAGNOSIS — I65.23 CAROTID ATHEROSCLEROSIS, BILATERAL: ICD-10-CM

## 2023-01-11 DIAGNOSIS — I73.9 PAD (PERIPHERAL ARTERY DISEASE): ICD-10-CM

## 2023-01-11 DIAGNOSIS — Z98.890 S/P CAROTID ENDARTERECTOMY: ICD-10-CM

## 2023-01-11 DIAGNOSIS — M25.612 DECREASED ROM OF LEFT SHOULDER: Primary | ICD-10-CM

## 2023-01-11 DIAGNOSIS — E78.2 MIXED HYPERLIPIDEMIA: ICD-10-CM

## 2023-01-11 DIAGNOSIS — N18.30 STAGE 3 CHRONIC KIDNEY DISEASE, UNSPECIFIED WHETHER STAGE 3A OR 3B CKD: ICD-10-CM

## 2023-01-11 DIAGNOSIS — R29.898 DECREASED STRENGTH OF UPPER EXTREMITY: ICD-10-CM

## 2023-01-11 PROCEDURE — 1159F MED LIST DOCD IN RCRD: CPT | Mod: CPTII,S$GLB,, | Performed by: INTERNAL MEDICINE

## 2023-01-11 PROCEDURE — 1126F PR PAIN SEVERITY QUANTIFIED, NO PAIN PRESENT: ICD-10-PCS | Mod: CPTII,S$GLB,, | Performed by: INTERNAL MEDICINE

## 2023-01-11 PROCEDURE — 3078F PR MOST RECENT DIASTOLIC BLOOD PRESSURE < 80 MM HG: ICD-10-PCS | Mod: CPTII,S$GLB,, | Performed by: INTERNAL MEDICINE

## 2023-01-11 PROCEDURE — 97140 MANUAL THERAPY 1/> REGIONS: CPT | Performed by: PHYSICAL THERAPIST

## 2023-01-11 PROCEDURE — 3078F DIAST BP <80 MM HG: CPT | Mod: CPTII,S$GLB,, | Performed by: INTERNAL MEDICINE

## 2023-01-11 PROCEDURE — 3288F FALL RISK ASSESSMENT DOCD: CPT | Mod: CPTII,S$GLB,, | Performed by: INTERNAL MEDICINE

## 2023-01-11 PROCEDURE — 3288F PR FALLS RISK ASSESSMENT DOCUMENTED: ICD-10-PCS | Mod: CPTII,S$GLB,, | Performed by: INTERNAL MEDICINE

## 2023-01-11 PROCEDURE — 99999 PR PBB SHADOW E&M-EST. PATIENT-LVL IV: ICD-10-PCS | Mod: PBBFAC,,, | Performed by: INTERNAL MEDICINE

## 2023-01-11 PROCEDURE — 99999 PR PBB SHADOW E&M-EST. PATIENT-LVL IV: CPT | Mod: PBBFAC,,, | Performed by: INTERNAL MEDICINE

## 2023-01-11 PROCEDURE — 1160F PR REVIEW ALL MEDS BY PRESCRIBER/CLIN PHARMACIST DOCUMENTED: ICD-10-PCS | Mod: CPTII,S$GLB,, | Performed by: INTERNAL MEDICINE

## 2023-01-11 PROCEDURE — 97110 THERAPEUTIC EXERCISES: CPT | Performed by: PHYSICAL THERAPIST

## 2023-01-11 PROCEDURE — 99214 OFFICE O/P EST MOD 30 MIN: CPT | Mod: S$GLB,,, | Performed by: INTERNAL MEDICINE

## 2023-01-11 PROCEDURE — 3075F SYST BP GE 130 - 139MM HG: CPT | Mod: CPTII,S$GLB,, | Performed by: INTERNAL MEDICINE

## 2023-01-11 PROCEDURE — 99214 PR OFFICE/OUTPT VISIT, EST, LEVL IV, 30-39 MIN: ICD-10-PCS | Mod: S$GLB,,, | Performed by: INTERNAL MEDICINE

## 2023-01-11 PROCEDURE — 1159F PR MEDICATION LIST DOCUMENTED IN MEDICAL RECORD: ICD-10-PCS | Mod: CPTII,S$GLB,, | Performed by: INTERNAL MEDICINE

## 2023-01-11 PROCEDURE — 3075F PR MOST RECENT SYSTOLIC BLOOD PRESS GE 130-139MM HG: ICD-10-PCS | Mod: CPTII,S$GLB,, | Performed by: INTERNAL MEDICINE

## 2023-01-11 PROCEDURE — 1101F PR PT FALLS ASSESS DOC 0-1 FALLS W/OUT INJ PAST YR: ICD-10-PCS | Mod: CPTII,S$GLB,, | Performed by: INTERNAL MEDICINE

## 2023-01-11 PROCEDURE — 1101F PT FALLS ASSESS-DOCD LE1/YR: CPT | Mod: CPTII,S$GLB,, | Performed by: INTERNAL MEDICINE

## 2023-01-11 PROCEDURE — 1160F RVW MEDS BY RX/DR IN RCRD: CPT | Mod: CPTII,S$GLB,, | Performed by: INTERNAL MEDICINE

## 2023-01-11 PROCEDURE — 1126F AMNT PAIN NOTED NONE PRSNT: CPT | Mod: CPTII,S$GLB,, | Performed by: INTERNAL MEDICINE

## 2023-01-11 RX ORDER — FUROSEMIDE 40 MG/1
40 TABLET ORAL DAILY
Qty: 90 TABLET | Refills: 3 | Status: SHIPPED | OUTPATIENT
Start: 2023-01-11 | End: 2023-02-15

## 2023-01-11 NOTE — PROGRESS NOTES
HISTORY:    82-year-old male with a history of hypertension, hyperlipidemia, CKD, PAD status post bilateral lower extremity PI, carotid stenosis s/p R CEA '21, and GERD presenting for initial evaluation by me.    The patient is referred by Dr. Mora for a recent h/o SOB. Pt reports symptoms of orthopnea, PND, and le edema. Also, had decreased exercise capacity. He was started on furosemide 20x1 last Friday and symptoms have improved. Orthopnea resolved. No PND. Edema improved significantly.    No chest pain. The patient denies any previous history of myocardial infarction, coronary artery disease, stroke, congestive heart failure, or cardiomyopathy.    He currently tolerates asa 81x1, atenolol 50x1, rosuvastatin 20x1, bempedoic acid 180x1, and furosemide 20x1. Was previously on triamterene-hctz that was stopped last week.      MEDICATIONS:      Current Outpatient Medications:     aspirin (ECOTRIN) 81 MG EC tablet, Take 81 mg by mouth once daily., Disp: , Rfl:     atenoloL (TENORMIN) 50 MG tablet, TAKE 1 TABLET BY MOUTH ONCE DAILY, Disp: 90 tablet, Rfl: 3    bempedoic acid (NEXLETOL) 180 mg Tab, Take 1 tablet (180 mg) by mouth once daily., Disp: 90 tablet, Rfl: 3    collagen/biotin/ascorbic acid (COLLAGEN 1500 PLUS C ORAL), Take by mouth Daily. 11 G of powder daily, Disp: , Rfl:     fluorouraciL (EFUDEX) 5 % cream, AAA on both arms BID x 2-3 weeks. Stop if blistered, oozing, or bleeding. Use daily sun protection., Disp: 40 g, Rfl: 1    ketoconazole (NIZORAL) 2 % cream, Apply topically 2 (two) times daily., Disp: 30 g, Rfl: 2    multivit-min-FA-lycopen-lutein 300-600-300 mcg Tab, Take by mouth., Disp: , Rfl:     omega-3 fatty acids/fish oil (FISH OIL-OMEGA-3 FATTY ACIDS) 300-1,000 mg capsule, Take by mouth once daily., Disp: , Rfl:     omeprazole (PRILOSEC) 20 MG capsule, Take 2 capsules (40 mg total) by mouth once daily., Disp: 180 capsule, Rfl: 3    oxyCODONE-acetaminophen (PERCOCET)  mg per tablet, Take 1  tablet by mouth every 4-6 hours as needed for pain. Take stool softener with this medication., Disp: 15 tablet, Rfl: 0    promethazine (PHENERGAN) 25 MG tablet, Take 1 tablet (25 mg total) by mouth every 6 (six) hours as needed for Nausea., Disp: 8 tablet, Rfl: 0    rosuvastatin (CRESTOR) 20 MG tablet, TAKE 1 TABLET BY MOUTH ONCE DAILY, Disp: 90 tablet, Rfl: 3    tadalafiL (CIALIS) 5 MG tablet, Take 1 tablet (5 mg total) by mouth once daily., Disp: 30 tablet, Rfl: 6    traMADoL (ULTRAM) 50 mg tablet, Take 1-2 tablets ( mg total) by mouth every 6 (six) hours as needed for Pain., Disp: 20 tablet, Rfl: 0    furosemide (LASIX) 40 MG tablet, Take 1 tablet (40 mg total) by mouth once daily., Disp: 90 tablet, Rfl: 3    sacubitriL-valsartan (ENTRESTO) 24-26 mg per tablet, Take 1 tablet by mouth 2 (two) times daily., Disp: 60 tablet, Rfl: 3      PHYSICAL EXAM:    Vitals:    01/11/23 1531   BP: 134/62   Pulse: 76   Resp: 20       NAD, A+Ox3.  Mild jvd, no bruit.  RRR nml s1,s2. No murmurs.  CTA B no wheezes or crackles.  2+ B radial and 1+ B DP/PT. No edema.    LABS/STUDIES (imaging reviewed during clinic visit):    January 2023 hemoglobin 11.9 with platelets of 287.  Creatinine 1.6 with a BUN of 29.  Albumin 3.8.  BNP 2000.  TSH normal.  2021  LDL 87 and HDL 56.  ECG January 2023 demonstrates sinus rhythm with no Q-waves or ST changes.  PVCs noted.    TTE January 2023 demonstrates normal LV size and systolic function.  LVH is present.  EF 65%.  RV enlargement with normal function.  Moderate AI, mild-to-moderate MR.  CVP 8 with estimated PASP of 39.  Carotid 2022 patent right carotid endarterectomy patch with no evidence of restenosis.  Left ICA demonstrates less than 50% stenosis.    DL/PVR September 2022 demonstrates normal DL with unremarkable PVR waveforms bilaterally.    Abd us 2021 AAA 3.3x3.3    ASSESSMENT & PLAN:    1. Acute diastolic congestive heart failure    2. Carotid atherosclerosis, bilateral    3.  Aortic atherosclerosis    4. Abdominal aortic aneurysm (AAA) without rupture, unspecified part    5. Mixed hyperlipidemia    6. PAD (peripheral artery disease)    7. S/P carotid endarterectomy    8. Stage 3 chronic kidney disease, unspecified whether stage 3a or 3b CKD        Orders Placed This Encounter    Basic Metabolic Panel    sacubitriL-valsartan (ENTRESTO) 24-26 mg per tablet    furosemide (LASIX) 40 MG tablet        Patient with diastolic heart failure. New diagnosis. Elevated BNP and some volume overload on exam. Improving with low dose diuretic. Cont atenolol 50x1. Will start sacubitril-valsartan 24-26x2. Increase furosemide to 40x1. Recheck BMP in 2 weeks. CKD noted.     No h/o CAD. Will re-evaluate once euvolemic.    Bps reasonable on current regimen.    LDL 87 in '21 on rosuvastatin and bempedoic acid. Follow-up lipid panel.     PVD and managed by vas surgery for R CEA and AAA. Unremarkable ABIs recently. No claudication.        Karrie Sarabia MD

## 2023-01-12 NOTE — PROGRESS NOTES
Physical Therapy Daily Treatment Note     Name: Gus Sabillon  [facility] Number: [id number]    Therapy Diagnosis:   Encounter Diagnoses   Name Primary?    Decreased ROM of left shoulder Yes    Decreased strength of upper extremity      Physician: Evangelist Khanna III, *    Visit Date: 1/11/2023    Physician Orders: PT Eval and Treat   Medical Diagnosis from Referral: Nontraumatic tear of left rotator cuff, unspecified tear extent [M75.102]  Evaluation Date: 8/3/2022  Authorization Period Expiration: 11/21/2023  Plan of Care Expiration: 4/11/2023  Visit # / Visits authorized: 2/ 12    Time In: 1108  Time Out: 1200  Total Billable Time: 52 minutes    Precautions: Standard    DOS: 6/21/2022  Surgeon: Dr. Childress  Precautions: POSTOPERATIVE PLAN: We will follow the arthroscopic rotator cuff repair guidelines for a massive size rotator cuff tear.  We discussed with the patient's family after surgery.  The patient will remain in a sling for 8 weeks.  PT to start at 6 weeks.    MRI L shoulder obtained on 11/14/22:   1. Full-thickness retear of the supraspinatus tendon with retraction and medial displacement of the augmentation patch.  2. Tendinosis and extensive undersurface tearing of the infraspinatus tendon.  Tendinosis and interstitial tear of the subscapularis tendon.  3. Moderate rotator cuff muscle atrophy.  4. Sequela of prior cement/bone graft injection noted within the proximal humerus shaft. 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  5. Small glenohumeral effusion with synovitis and subacromial subdeltoid bursitis.    Subjective     Pt reports: I had a heart scare the other day so not doing my exercises as often. When I was doing the exercises it was feeling better    He was compliant with home exercise program.  Response to previous treatment: slight improvement in motion, just very weak   Functional change:  "unable use LUE     Pain: 2/10  Location: left shoulder      Objective     Zeeshan received therapeutic exercises to develop strength, endurance, ROM, and flexibility for 32 minutes including:    Supine clasped hand flexion 2 x 10  Sidelying abd iso hold 2 x 10  YTB add/abd 2 x15  Row GTB 2 x 10 3"  Abd isometrics arm by side 20x 5" hold    Zeeshan received the following manual therapy techniques: Joint mobilizations were applied to the: Left shoulder for 20 minutes, including:    Gr I-II oscillations for relaxation  Gr III flexion   Gr II inferior mob at 20 and 90 deg    Rhythmic stab flexion and IR/ER  Sidelying iso holds 20 deg abd     Zeeshan participated in neuromuscular re-education activities to improve: Sense, Proprioception, and Posture for 0 minutes. The following activities were included:        Home Exercises Provided and Patient Education Provided     Education provided:   - importance of cuff strengthening     Written Home Exercises Provided: Patient instructed to cont prior HEP.  Exercises were reviewed and Zeeshan was able to demonstrate them prior to the end of the session.  Zeeshan demonstrated good  understanding of the education provided.     See EMR under Patient Instructions for exercises provided 9/27/2022.    Assessment     Progressed his deltoid stabilization today and he reports relief with activation of the deltoid. Pain free using band abd/add today. Will continue to progress as tolerated    Zeeshan Is progressing well towards his goals.   Pt prognosis is Good.     Pt will continue to benefit from skilled outpatient physical therapy to address the deficits listed in the problem list box on initial evaluation, provide pt/family education and to maximize pt's level of independence in the home and community environment.     Pt's spiritual, cultural and educational needs considered and pt agreeable to plan of care and goals.    Anticipated barriers to physical therapy: [facility]     Goals:  GOALS: " Short Term Goals:  8 weeks(Progressing, not met)  1.Report decreased L shoulder pain < / =  2/10  to increase tolerance for PROM by therapist.   2. Increase PROM to equivalent to contralateral side in all directions.    3. Increased strength by 1/3 MMT grade in shoulder flexion to increase tolerance for ADL and work activities.  4. Pt to tolerate HEP to improve ROM and independence with ADL's     Long Term Goals: 24 weeks(Progressing, not met)  1.Report decreased L shoulder pain  < / =  0 /10  to increase tolerance for HEP.   2.Increase AROM to equivalent to contralateral side in all directions.   3.Increase strength to >/= 4/5 in shoulder flexion to increase tolerance for ADL and work activities.  4. Pt goal: complete overhead tasks without difficulty to resume PLOF.     Plan     Certification Period: 1/11/2023 to 4/11/2023  Recommended Treatment Plan: 1 times per week for 12 weeks: Manual Therapy, Moist Heat/ Ice, Neuromuscular Re-ed, Patient Education, Self Care, Therapeutic Activities, and Therapeutic Exercise  Other Recommendations: modalities prn, ASTYM prn, Dry Needling prn    Therapist: Armen Croft, PT, DPT    I CERTIFY THE NEED FOR THESE SERVICES FURNISHED UNDER THIS PLAN OF TREATMENT AND WHILE UNDER MY CARE    Physician's comments: ________________________________________________________________________________________________________________________________________________      Physician's Name: ___________________________________

## 2023-01-12 NOTE — PLAN OF CARE
Physical Therapy Daily Treatment Note     Name: Gus Sabillon  [facility] Number: [id number]    Therapy Diagnosis:   Encounter Diagnoses   Name Primary?    Decreased ROM of left shoulder Yes    Decreased strength of upper extremity      Physician: Evangelist Khanna III, *    Visit Date: 1/11/2023    Physician Orders: PT Eval and Treat   Medical Diagnosis from Referral: Nontraumatic tear of left rotator cuff, unspecified tear extent [M75.102]  Evaluation Date: 8/3/2022  Authorization Period Expiration: 11/21/2023  Plan of Care Expiration: 4/11/2023  Visit # / Visits authorized: 2/ 12    Time In: 1108  Time Out: 1200  Total Billable Time: 52 minutes    Precautions: Standard    DOS: 6/21/2022  Surgeon: Dr. Childress  Precautions: POSTOPERATIVE PLAN: We will follow the arthroscopic rotator cuff repair guidelines for a massive size rotator cuff tear.  We discussed with the patient's family after surgery.  The patient will remain in a sling for 8 weeks.  PT to start at 6 weeks.    MRI L shoulder obtained on 11/14/22:   1. Full-thickness retear of the supraspinatus tendon with retraction and medial displacement of the augmentation patch.  2. Tendinosis and extensive undersurface tearing of the infraspinatus tendon.  Tendinosis and interstitial tear of the subscapularis tendon.  3. Moderate rotator cuff muscle atrophy.  4. Sequela of prior cement/bone graft injection noted within the proximal humerus shaft. 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  5. Small glenohumeral effusion with synovitis and subacromial subdeltoid bursitis.    Subjective     Pt reports: I had a heart scare the other day so not doing my exercises as often. When I was doing the exercises it was feeling better    He was compliant with home exercise program.  Response to previous treatment: slight improvement in motion, just very weak   Functional change:  "unable use LUE     Pain: 2/10  Location: left shoulder      Objective     Zeeshan received therapeutic exercises to develop strength, endurance, ROM, and flexibility for 32 minutes including:    Supine clasped hand flexion 2 x 10  Sidelying abd iso hold 2 x 10  YTB add/abd 2 x15  Row GTB 2 x 10 3"  Abd isometrics arm by side 20x 5" hold    Zeeshan received the following manual therapy techniques: Joint mobilizations were applied to the: Left shoulder for 20 minutes, including:    Gr I-II oscillations for relaxation  Gr III flexion   Gr II inferior mob at 20 and 90 deg    Rhythmic stab flexion and IR/ER  Sidelying iso holds 20 deg abd     Zeeshan participated in neuromuscular re-education activities to improve: Sense, Proprioception, and Posture for 0 minutes. The following activities were included:        Home Exercises Provided and Patient Education Provided     Education provided:   - importance of cuff strengthening     Written Home Exercises Provided: Patient instructed to cont prior HEP.  Exercises were reviewed and Zeeshan was able to demonstrate them prior to the end of the session.  Zeeshan demonstrated good  understanding of the education provided.     See EMR under Patient Instructions for exercises provided 9/27/2022.    Assessment     Progressed his deltoid stabilization today and he reports relief with activation of the deltoid. Pain free using band abd/add today. Will continue to progress as tolerated    Zeeshan Is progressing well towards his goals.   Pt prognosis is Good.     Pt will continue to benefit from skilled outpatient physical therapy to address the deficits listed in the problem list box on initial evaluation, provide pt/family education and to maximize pt's level of independence in the home and community environment.     Pt's spiritual, cultural and educational needs considered and pt agreeable to plan of care and goals.    Anticipated barriers to physical therapy: [facility]     Goals:  GOALS: " Short Term Goals:  8 weeks(Progressing, not met)  1.Report decreased L shoulder pain < / =  2/10  to increase tolerance for PROM by therapist.   2. Increase PROM to equivalent to contralateral side in all directions.    3. Increased strength by 1/3 MMT grade in shoulder flexion to increase tolerance for ADL and work activities.  4. Pt to tolerate HEP to improve ROM and independence with ADL's     Long Term Goals: 24 weeks(Progressing, not met)  1.Report decreased L shoulder pain  < / =  0 /10  to increase tolerance for HEP.   2.Increase AROM to equivalent to contralateral side in all directions.   3.Increase strength to >/= 4/5 in shoulder flexion to increase tolerance for ADL and work activities.  4. Pt goal: complete overhead tasks without difficulty to resume PLOF.     Plan     Certification Period: 1/11/2023 to 4/11/2023  Recommended Treatment Plan: 1 times per week for 12 weeks: Manual Therapy, Moist Heat/ Ice, Neuromuscular Re-ed, Patient Education, Self Care, Therapeutic Activities, and Therapeutic Exercise  Other Recommendations: modalities prn, ASTYM prn, Dry Needling prn    Therapist: Armen Croft, PT, DPT    I CERTIFY THE NEED FOR THESE SERVICES FURNISHED UNDER THIS PLAN OF TREATMENT AND WHILE UNDER MY CARE    Physician's comments: ________________________________________________________________________________________________________________________________________________      Physician's Name: ___________________________________

## 2023-01-13 ENCOUNTER — LAB VISIT (OUTPATIENT)
Dept: LAB | Facility: HOSPITAL | Age: 83
End: 2023-01-13
Attending: INTERNAL MEDICINE
Payer: MEDICARE

## 2023-01-13 DIAGNOSIS — E78.5 DYSLIPIDEMIA: ICD-10-CM

## 2023-01-13 DIAGNOSIS — D53.9 MACROCYTIC ANEMIA: ICD-10-CM

## 2023-01-13 DIAGNOSIS — I12.9 BENIGN HYPERTENSION WITH CKD (CHRONIC KIDNEY DISEASE) STAGE III: ICD-10-CM

## 2023-01-13 DIAGNOSIS — N18.30 BENIGN HYPERTENSION WITH CKD (CHRONIC KIDNEY DISEASE) STAGE III: ICD-10-CM

## 2023-01-13 LAB
ANION GAP SERPL CALC-SCNC: 14 MMOL/L (ref 8–16)
BUN SERPL-MCNC: 36 MG/DL (ref 8–23)
CALCIUM SERPL-MCNC: 8.8 MG/DL (ref 8.7–10.5)
CHLORIDE SERPL-SCNC: 104 MMOL/L (ref 95–110)
CHOLEST SERPL-MCNC: 148 MG/DL (ref 120–199)
CHOLEST/HDLC SERPL: 3.2 {RATIO} (ref 2–5)
CO2 SERPL-SCNC: 24 MMOL/L (ref 23–29)
CREAT SERPL-MCNC: 1.5 MG/DL (ref 0.5–1.4)
EST. GFR  (NO RACE VARIABLE): 46.5 ML/MIN/1.73 M^2
GLUCOSE SERPL-MCNC: 106 MG/DL (ref 70–110)
HDLC SERPL-MCNC: 46 MG/DL (ref 40–75)
HDLC SERPL: 31.1 % (ref 20–50)
LDLC SERPL CALC-MCNC: 88.6 MG/DL (ref 63–159)
NONHDLC SERPL-MCNC: 102 MG/DL
POTASSIUM SERPL-SCNC: 3.7 MMOL/L (ref 3.5–5.1)
SODIUM SERPL-SCNC: 142 MMOL/L (ref 136–145)
TRIGL SERPL-MCNC: 67 MG/DL (ref 30–150)
VIT B12 SERPL-MCNC: 521 PG/ML (ref 210–950)

## 2023-01-13 PROCEDURE — 80048 BASIC METABOLIC PNL TOTAL CA: CPT | Performed by: INTERNAL MEDICINE

## 2023-01-13 PROCEDURE — 82607 VITAMIN B-12: CPT | Performed by: INTERNAL MEDICINE

## 2023-01-13 PROCEDURE — 36415 COLL VENOUS BLD VENIPUNCTURE: CPT | Mod: PO | Performed by: INTERNAL MEDICINE

## 2023-01-13 PROCEDURE — 80061 LIPID PANEL: CPT | Performed by: INTERNAL MEDICINE

## 2023-01-16 ENCOUNTER — PATIENT MESSAGE (OUTPATIENT)
Dept: CARDIOLOGY | Facility: CLINIC | Age: 83
End: 2023-01-16
Payer: MEDICARE

## 2023-01-17 ENCOUNTER — PES CALL (OUTPATIENT)
Dept: ADMINISTRATIVE | Facility: CLINIC | Age: 83
End: 2023-01-17
Payer: MEDICARE

## 2023-01-23 ENCOUNTER — PATIENT MESSAGE (OUTPATIENT)
Dept: CARDIOLOGY | Facility: CLINIC | Age: 83
End: 2023-01-23
Payer: MEDICARE

## 2023-01-23 ENCOUNTER — OFFICE VISIT (OUTPATIENT)
Dept: SPORTS MEDICINE | Facility: CLINIC | Age: 83
End: 2023-01-23
Payer: MEDICARE

## 2023-01-23 VITALS
BODY MASS INDEX: 23.19 KG/M2 | SYSTOLIC BLOOD PRESSURE: 122 MMHG | HEART RATE: 79 BPM | DIASTOLIC BLOOD PRESSURE: 70 MMHG | HEIGHT: 73 IN | WEIGHT: 175 LBS

## 2023-01-23 DIAGNOSIS — I50.31 ACUTE DIASTOLIC CONGESTIVE HEART FAILURE: Primary | ICD-10-CM

## 2023-01-23 DIAGNOSIS — Z98.890 STATUS POST LEFT ROTATOR CUFF REPAIR: Primary | ICD-10-CM

## 2023-01-23 PROCEDURE — 3074F SYST BP LT 130 MM HG: CPT | Mod: CPTII,S$GLB,, | Performed by: ORTHOPAEDIC SURGERY

## 2023-01-23 PROCEDURE — 3078F DIAST BP <80 MM HG: CPT | Mod: CPTII,S$GLB,, | Performed by: ORTHOPAEDIC SURGERY

## 2023-01-23 PROCEDURE — 3078F PR MOST RECENT DIASTOLIC BLOOD PRESSURE < 80 MM HG: ICD-10-PCS | Mod: CPTII,S$GLB,, | Performed by: ORTHOPAEDIC SURGERY

## 2023-01-23 PROCEDURE — 99999 PR PBB SHADOW E&M-EST. PATIENT-LVL II: ICD-10-PCS | Mod: PBBFAC,,, | Performed by: ORTHOPAEDIC SURGERY

## 2023-01-23 PROCEDURE — 1101F PR PT FALLS ASSESS DOC 0-1 FALLS W/OUT INJ PAST YR: ICD-10-PCS | Mod: CPTII,S$GLB,, | Performed by: ORTHOPAEDIC SURGERY

## 2023-01-23 PROCEDURE — 99999 PR PBB SHADOW E&M-EST. PATIENT-LVL II: CPT | Mod: PBBFAC,,, | Performed by: ORTHOPAEDIC SURGERY

## 2023-01-23 PROCEDURE — 3074F PR MOST RECENT SYSTOLIC BLOOD PRESSURE < 130 MM HG: ICD-10-PCS | Mod: CPTII,S$GLB,, | Performed by: ORTHOPAEDIC SURGERY

## 2023-01-23 PROCEDURE — 1126F PR PAIN SEVERITY QUANTIFIED, NO PAIN PRESENT: ICD-10-PCS | Mod: CPTII,S$GLB,, | Performed by: ORTHOPAEDIC SURGERY

## 2023-01-23 PROCEDURE — 1101F PT FALLS ASSESS-DOCD LE1/YR: CPT | Mod: CPTII,S$GLB,, | Performed by: ORTHOPAEDIC SURGERY

## 2023-01-23 PROCEDURE — 3288F PR FALLS RISK ASSESSMENT DOCUMENTED: ICD-10-PCS | Mod: CPTII,S$GLB,, | Performed by: ORTHOPAEDIC SURGERY

## 2023-01-23 PROCEDURE — 3288F FALL RISK ASSESSMENT DOCD: CPT | Mod: CPTII,S$GLB,, | Performed by: ORTHOPAEDIC SURGERY

## 2023-01-23 PROCEDURE — 99214 OFFICE O/P EST MOD 30 MIN: CPT | Mod: S$GLB,,, | Performed by: ORTHOPAEDIC SURGERY

## 2023-01-23 PROCEDURE — 1126F AMNT PAIN NOTED NONE PRSNT: CPT | Mod: CPTII,S$GLB,, | Performed by: ORTHOPAEDIC SURGERY

## 2023-01-23 PROCEDURE — 99214 PR OFFICE/OUTPT VISIT, EST, LEVL IV, 30-39 MIN: ICD-10-PCS | Mod: S$GLB,,, | Performed by: ORTHOPAEDIC SURGERY

## 2023-01-23 NOTE — PROGRESS NOTES
"Chief Complaint: left shoulder pain    HISTORY OF PRESENT ILLNESS:   Pt is here today for post-operative followup of shoulder arthroscopy and rotator cuff repair 6/21/22. During PT 10/2022 he was lifting an exercise pole and re-tore his cuff. An MRI was obtained and confirmed the re-tear. Options were discussed and he opted for PT for deltoid strengthening, did not want another surgery. He is not having any pain at rest but he has lost a lot of active motion due to weakness. He can only abduct/elevate shoulder at max 45 degrees due to weakness and not having any pain with this. Is not interested in surgery. Using RUE for reaching. Can carry with LUE as long as he doesn't have to abduct/elevate arm. Denies difficulty with ADLs. His right trapezial pain has resolved.     LELAND JEIMY shoulder: "0 for activity but 100 at rest"      DATE OF PROCEDURE: 06/21/2022  SURGEON: Staci Childress M.D.  OPERATION:   left  1. Shoulder arthroscopic rotator cuff repair 50 x 45 mm, massive, double row (CPT 90193) with CuffMend ()  (complex -22 modifier)  2. Shoulder arthroscopic biceps tenodesis (CPT 74034)  3. Shoulder arthroscopic subacromial decompression, bursectomy   4. Shoulder arthroscopic extensive debridement (anterior, posterior glenohumeral joint, subacromial space) (CPT 95187)  5. Shoulder arthroscopic labral debridement (CPT 80133)  6. Shoulder arthroscopic microfracture (Crimson duvet technique) (CPT 55550)  7. Shoulder arthroscopic lysis of adhesions (CPT 44806)  8. Shoulder arthroscopic distal clavicle excision (CPT 07810)  9. Shoulder ORIF greater tuberosity (CPT 48226)    Review of Systems   Constitution: Negative. Negative for chills, fever and night sweats.   HENT: Negative for congestion and headaches.    Eyes: Negative for blurred vision, left vision loss and right vision loss.   Cardiovascular: Negative for chest pain and syncope.   Respiratory: Negative for cough and shortness of breath.    Endocrine: Negative for " polydipsia, polyphagia and polyuria.   Hematologic/Lymphatic: Negative for bleeding problem. Does not bruise/bleed easily.   Skin: Negative for dry skin, itching and rash.   Musculoskeletal: Negative for falls and muscle weakness.   Gastrointestinal: Negative for abdominal pain and bowel incontinence.   Genitourinary: Negative for bladder incontinence and nocturia.   Neurological: Negative for disturbances in coordination, loss of balance and seizures.   Psychiatric/Behavioral: Negative for depression. The patient does not have insomnia.    Allergic/Immunologic: Negative for hives and persistent infections.     PAST MEDICAL HISTORY:   Past Medical History:   Diagnosis Date    CKD (chronic kidney disease) stage 3, GFR 30-59 ml/min     Dyslipidemia     GERD (gastroesophageal reflux disease)     Hx of melanoma excision     Hypertension     Melanoma     PAD (peripheral artery disease)     Squamous cell carcinoma of skin      PAST SURGICAL HISTORY:   Past Surgical History:   Procedure Laterality Date    ARTHROSCOPIC DEBRIDEMENT OF SHOULDER Right 2/27/2020    Procedure: EXTENSIVE DEBRIDEMENT, SHOULDER, ARTHROSCOPIC;  Surgeon: Staci Childress MD;  Location: [facility] OR;  Service: Orthopedics;  Laterality: Right;    ARTHROSCOPIC DEBRIDEMENT OF SHOULDER Left 6/21/2022    Procedure: EXTENSIVE DEBRIDEMENT, SHOULDER, ARTHROSCOPIC;  Surgeon: Staci Childress MD;  Location: [facility] OR;  Service: Orthopedics;  Laterality: Left;    ARTHROSCOPIC REPAIR OF ROTATOR CUFF OF SHOULDER Right 2/27/2020    Procedure: REPAIR, ROTATOR CUFF, ARTHROSCOPIC;  Surgeon: Staci Childress MD;  Location: [facility] OR;  Service: Orthopedics;  Laterality: Right;    ARTHROSCOPIC REPAIR OF ROTATOR CUFF OF SHOULDER Left 6/21/2022    Procedure: REPAIR, ROTATOR CUFF, ARTHROSCOPIC WITH CUFF MEND;  Surgeon: Staci Childress MD;  Location: [facility] OR;  Service: Orthopedics;  Laterality: Left;    ARTHROSCOPY OF SHOULDER WITH DECOMPRESSION OF SUBACROMIAL SPACE Right 2/27/2020    Procedure:  ARTHROSCOPY, SHOULDER, WITH SUBACROMIAL SPACE DECOMPRESSION;  Surgeon: Staci Childress MD;  Location: [facility] OR;  Service: Orthopedics;  Laterality: Right;    ARTHROSCOPY OF SHOULDER WITH DECOMPRESSION OF SUBACROMIAL SPACE Left 6/21/2022    Procedure: ARTHROSCOPY, SHOULDER, WITH SUBACROMIAL  DECOMPRESSION;  Surgeon: Staci Childress MD;  Location: [facility] OR;  Service: Orthopedics;  Laterality: Left;    ARTHROSCOPY OF SHOULDER WITH REMOVAL OF DISTAL CLAVICLE Left 6/21/2022    Procedure: ARTHROSCOPY, SHOULDER, WITH DISTAL CLAVICLE EXCISION;  Surgeon: Staci Childress MD;  Location: [facility] OR;  Service: Orthopedics;  Laterality: Left;    ARTHROSCOPY,SHOULDER,WITH BICEPS TENODESIS Left 6/21/2022    Procedure: ARTHROSCOPY,SHOULDER,WITH BICEPS TENODESIS;  Surgeon: Staci Childress MD;  Location: [facility] OR;  Service: Orthopedics;  Laterality: Left;    BLEPHAROPLASTY      CAROTID ENDARTERECTOMY Right 10/15/2021    Procedure: ENDARTERECTOMY-CAROTID;  Surgeon: Imer Farooq MD;  Location: [address];  Service: Peripheral Vascular;  Laterality: Right;  AWAKE CERVICAL BLOCK    CATARACT EXTRACTION, BILATERAL      EXCISION OF BURSA Left 6/21/2022    Procedure: BURSECTOMY;  Surgeon: Staci Childress MD;  Location: [facility] OR;  Service: Orthopedics;  Laterality: Left;    FIXATION OF TENDON Right 2/27/2020    Procedure: FIXATION, TENDON, Biceps Tenodesis;  Surgeon: Staci Childress MD;  Location: [facility] OR;  Service: Orthopedics;  Laterality: Right;  FIXATION, TENDON, Biceps Tenodesis    OPEN REDUCTION AND INTERNAL FIXATION (ORIF) OF FRACTURE OF PROXIMAL HUMERUS Right 2/27/2020    Procedure: ORIF, FRACTURE, GREATER TUBEROSITY;  Surgeon: Staci Childress MD;  Location: [facility] OR;  Service: Orthopedics;  Laterality: Right;    ORIF HUMERUS FRACTURE Left 6/21/2022    Procedure: ORIF, FRACTURE, HUMERUS, GREATER TUBEROSITY;  Surgeon: Staci Childress MD;  Location: [facility] OR;  Service: Orthopedics;  Laterality: Left;    SHOULDER ARTHROSCOPY Left 6/21/2022    Procedure: ARTHROSCOPY,  SHOULDER WITH LABRAL REPAIR;  Surgeon: Staci Childress MD;  Location: [facility];  Service: Orthopedics;  Laterality: Left;    TONSILLECTOMY       FAMILY HISTORY:   Family History   Problem Relation Age of Onset    Diabetes Mother     Hyperlipidemia Mother     Heart disease Father     Cancer Sister         ? colon or uterine    Colon cancer Sister      SOCIAL HISTORY:   Social History     Socioeconomic History    Marital status:    Tobacco Use    Smoking status: Former     Types: Cigarettes     Quit date:      Years since quittin.0    Smokeless tobacco: Never   Substance and Sexual Activity    Alcohol use: Yes     Alcohol/week: 14.0 standard drinks     Types: 14 Glasses of wine per week     Comment: 2 glasses of wine daily    Drug use: Never    Sexual activity: Not Currently     Social Determinants of Health     Financial Resource Strain: Low Risk     Difficulty of Paying Living Expenses: Not hard at all   Food Insecurity: No Food Insecurity    Worried About Running Out of Food in the Last Year: Never true    Ran Out of Food in the Last Year: Never true   Transportation Needs: No Transportation Needs    Lack of Transportation (Medical): No    Lack of Transportation (Non-Medical): No   Physical Activity: Inactive    Days of Exercise per Week: 0 days    Minutes of Exercise per Session: 30 min   Stress: No Stress Concern Present    Feeling of Stress : Only a little   Social Connections: Moderately Isolated    Frequency of Communication with Friends and Family: More than three times a week    Frequency of Social Gatherings with Friends and Family: More than three times a week    Attends Jain Services: Never    Active Member of Clubs or Organizations: No    Attends Club or Organization Meetings: Never    Marital Status:    Housing Stability: Low Risk     Unable to Pay for Housing in the Last Year: No    Number of Places Lived in the Last Year: 1    Unstable Housing in the Last Year: No        MEDICATIONS:   Current Outpatient Medications:     aspirin (ECOTRIN) 81 MG EC tablet, Take 81 mg by mouth once daily., Disp: , Rfl:     atenoloL (TENORMIN) 50 MG tablet, TAKE 1 TABLET BY MOUTH ONCE DAILY, Disp: 90 tablet, Rfl: 3    bempedoic acid (NEXLETOL) 180 mg Tab, Take 1 tablet (180 mg) by mouth once daily., Disp: 90 tablet, Rfl: 3    collagen/biotin/ascorbic acid (COLLAGEN 1500 PLUS C ORAL), Take by mouth Daily. 11 G of powder daily, Disp: , Rfl:     fluorouraciL (EFUDEX) 5 % cream, AAA on both arms BID x 2-3 weeks. Stop if blistered, oozing, or bleeding. Use daily sun protection., Disp: 40 g, Rfl: 1    furosemide (LASIX) 40 MG tablet, Take 1 tablet (40 mg total) by mouth once daily., Disp: 90 tablet, Rfl: 3    ketoconazole (NIZORAL) 2 % cream, Apply topically 2 (two) times daily., Disp: 30 g, Rfl: 2    multivit-min-FA-lycopen-lutein 300-600-300 mcg Tab, Take by mouth., Disp: , Rfl:     omega-3 fatty acids/fish oil (FISH OIL-OMEGA-3 FATTY ACIDS) 300-1,000 mg capsule, Take by mouth once daily., Disp: , Rfl:     omeprazole (PRILOSEC) 20 MG capsule, Take 2 capsules (40 mg total) by mouth once daily., Disp: 180 capsule, Rfl: 3    oxyCODONE-acetaminophen (PERCOCET)  mg per tablet, Take 1 tablet by mouth every 4-6 hours as needed for pain. Take stool softener with this medication., Disp: 15 tablet, Rfl: 0    promethazine (PHENERGAN) 25 MG tablet, Take 1 tablet (25 mg total) by mouth every 6 (six) hours as needed for Nausea., Disp: 8 tablet, Rfl: 0    rosuvastatin (CRESTOR) 20 MG tablet, TAKE 1 TABLET BY MOUTH ONCE DAILY, Disp: 90 tablet, Rfl: 3    sacubitriL-valsartan (ENTRESTO) 24-26 mg per tablet, Take 1 tablet by mouth 2 (two) times daily., Disp: 60 tablet, Rfl: 3    tadalafiL (CIALIS) 5 MG tablet, Take 1 tablet (5 mg total) by mouth once daily., Disp: 30 tablet, Rfl: 6    traMADoL (ULTRAM) 50 mg tablet, Take 1-2 tablets ( mg total) by mouth every 6 (six) hours as needed for Pain., Disp: 20  "tablet, Rfl: 0  ALLERGIES:   Review of patient's allergies indicates:   Allergen Reactions    Clindamycin Nausea And Vomiting    Penicillins Hives       VITAL SIGNS: /70   Pulse 79   Ht 6' 1" (1.854 m)   Wt 79.4 kg (175 lb)   BMI 23.09 kg/m²                                                                                 PHYSICAL EXAMINATION:     Incision sites healed well  No evidence of any erythema, infection or induration  elbow Range of motion full   Minimal effusion  2+ Radial pulses  No swelling    FE 45 (145)  ER 20  IR T10    Scaption n/a     MRI L shoulder obtained on 11/14/22:   1. Full-thickness retear of the supraspinatus tendon with retraction and medial displacement of the augmentation patch.  2. Tendinosis and extensive undersurface tearing of the infraspinatus tendon.  Tendinosis and interstitial tear of the subscapularis tendon.  3. Moderate rotator cuff muscle atrophy.  4. Sequela of prior cement/bone graft injection noted within the proximal humerus shaft. 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  5. Small glenohumeral effusion with synovitis and subacromial subdeltoid bursitis.                                                                          ASSESSMENT:                                                                  Status post left rotator cuff repair, with retear of rotator cuff.    PLAN:  Discussed conservative vs nonconservative options. Patient would like to continue with physical therapy at this time. He is not interested in surgery at this time.  All questions were answered and the patient should contact us if he has any questions or concerns in the interim.           "

## 2023-01-24 NOTE — TELEPHONE ENCOUNTER
Spoke with patient who is overall doing better from a heart failure perspective, however, he did have some orthostatic symptoms today.  We will decrease his furosemide from 40 x 1 to 20 x 1 and see if this affects those symptoms.  He has follow-up labs tomorrow.

## 2023-01-25 ENCOUNTER — LAB VISIT (OUTPATIENT)
Dept: LAB | Facility: HOSPITAL | Age: 83
End: 2023-01-25
Attending: INTERNAL MEDICINE
Payer: MEDICARE

## 2023-01-25 ENCOUNTER — CLINICAL SUPPORT (OUTPATIENT)
Dept: REHABILITATION | Facility: HOSPITAL | Age: 83
End: 2023-01-25
Payer: MEDICARE

## 2023-01-25 DIAGNOSIS — I50.31 ACUTE DIASTOLIC CONGESTIVE HEART FAILURE: ICD-10-CM

## 2023-01-25 DIAGNOSIS — R29.898 DECREASED STRENGTH OF UPPER EXTREMITY: ICD-10-CM

## 2023-01-25 DIAGNOSIS — M25.612 DECREASED ROM OF LEFT SHOULDER: Primary | ICD-10-CM

## 2023-01-25 LAB
ANION GAP SERPL CALC-SCNC: 11 MMOL/L (ref 8–16)
BUN SERPL-MCNC: 33 MG/DL (ref 8–23)
CALCIUM SERPL-MCNC: 7.9 MG/DL (ref 8.7–10.5)
CHLORIDE SERPL-SCNC: 108 MMOL/L (ref 95–110)
CO2 SERPL-SCNC: 27 MMOL/L (ref 23–29)
CREAT SERPL-MCNC: 1.6 MG/DL (ref 0.5–1.4)
EST. GFR  (NO RACE VARIABLE): 43 ML/MIN/1.73 M^2
GLUCOSE SERPL-MCNC: 118 MG/DL (ref 70–110)
POTASSIUM SERPL-SCNC: 3.8 MMOL/L (ref 3.5–5.1)
SODIUM SERPL-SCNC: 146 MMOL/L (ref 136–145)

## 2023-01-25 PROCEDURE — 36415 COLL VENOUS BLD VENIPUNCTURE: CPT | Mod: PO | Performed by: INTERNAL MEDICINE

## 2023-01-25 PROCEDURE — 80048 BASIC METABOLIC PNL TOTAL CA: CPT | Performed by: INTERNAL MEDICINE

## 2023-01-25 PROCEDURE — 97110 THERAPEUTIC EXERCISES: CPT | Performed by: PHYSICAL THERAPIST

## 2023-01-25 PROCEDURE — 97140 MANUAL THERAPY 1/> REGIONS: CPT | Performed by: PHYSICAL THERAPIST

## 2023-01-26 ENCOUNTER — PATIENT MESSAGE (OUTPATIENT)
Dept: CARDIOLOGY | Facility: CLINIC | Age: 83
End: 2023-01-26
Payer: MEDICARE

## 2023-01-26 DIAGNOSIS — I50.31 ACUTE DIASTOLIC CONGESTIVE HEART FAILURE: Primary | ICD-10-CM

## 2023-01-26 RX ORDER — SPIRONOLACTONE 25 MG/1
25 TABLET ORAL DAILY
Qty: 90 TABLET | Refills: 3 | Status: SHIPPED | OUTPATIENT
Start: 2023-01-26 | End: 2023-02-09 | Stop reason: SDUPTHER

## 2023-01-26 NOTE — PROGRESS NOTES
Physical Therapy Daily Treatment Note     Name: Gus Sabillon  [facility] Number: [id number]    Therapy Diagnosis:   Encounter Diagnoses   Name Primary?    Decreased ROM of left shoulder Yes    Decreased strength of upper extremity      Physician: Evangelist Khanna III, *    Visit Date: 1/25/2023    Physician Orders: PT Eval and Treat   Medical Diagnosis from Referral: Nontraumatic tear of left rotator cuff, unspecified tear extent [M75.102]  Evaluation Date: 8/3/2022  Authorization Period Expiration: 11/21/2023  Plan of Care Expiration: 4/11/2023  Visit # / Visits authorized: 3/ 12    Time In: 1105  Time Out: 1200  Total Billable Time: 55 minutes    Precautions: Standard    DOS: 6/21/2022  Surgeon: Dr. Childress  Precautions: POSTOPERATIVE PLAN: We will follow the arthroscopic rotator cuff repair guidelines for a massive size rotator cuff tear.  We discussed with the patient's family after surgery.  The patient will remain in a sling for 8 weeks.  PT to start at 6 weeks.    MRI L shoulder obtained on 11/14/22:   1. Full-thickness retear of the supraspinatus tendon with retraction and medial displacement of the augmentation patch.  2. Tendinosis and extensive undersurface tearing of the infraspinatus tendon.  Tendinosis and interstitial tear of the subscapularis tendon.  3. Moderate rotator cuff muscle atrophy.  4. Sequela of prior cement/bone graft injection noted within the proximal humerus shaft. 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  5. Small glenohumeral effusion with synovitis and subacromial subdeltoid bursitis.    Subjective     Pt reports: I continue to have the same motion deficits. He reports mostly focused on his heart     He was compliant with home exercise program.  Response to previous treatment: slight improvement in motion, just very weak   Functional change: unable use LUE     Pain:  "2/10  Location: left shoulder      Objective     Zeeshan received therapeutic exercises to develop strength, endurance, ROM, and flexibility for 25 minutes including:    Supine clasped hand flexion propped 2 clicks 2 x 10  Bicep curl on YTB 2 x 15  Abd isometrics arm by side 20x 5" hold    Zeeshan received the following manual therapy techniques: Joint mobilizations were applied to the: Left shoulder for 20 minutes, including:    Gr I-II oscillations for relaxation  Gr III flexion   Gr II inferior mob at 20 and 90 deg    Rhythmic stab flexion and IR/ER  Sidelying iso holds 20 deg abd     Zeeshan participated in neuromuscular re-education activities to improve: Sense, Proprioception, and Posture for 10 minutes. The following activities were included:    Hitch hiker 2 levels     Home Exercises Provided and Patient Education Provided     Education provided:   - importance of cuff strengthening     Written Home Exercises Provided: Patient instructed to cont prior HEP.  Exercises were reviewed and Zeeshan was able to demonstrate them prior to the end of the session.  Zeeshan demonstrated good  understanding of the education provided.     See EMR under Patient Instructions for exercises provided 9/27/2022.    Assessment     Zeeshan progressed bicep and low trap isometrics. He will progress activation to scap stabilization and continue clasped hand flexion.     Zeeshan Is progressing well towards his goals.   Pt prognosis is Good.     Pt will continue to benefit from skilled outpatient physical therapy to address the deficits listed in the problem list box on initial evaluation, provide pt/family education and to maximize pt's level of independence in the home and community environment.     Pt's spiritual, cultural and educational needs considered and pt agreeable to plan of care and goals.    Anticipated barriers to physical therapy: [facility]     Goals:  GOALS: Short Term Goals:  8 weeks(Progressing, not met)  1.Report " decreased L shoulder pain < / =  2/10  to increase tolerance for PROM by therapist.   2. Increase PROM to equivalent to contralateral side in all directions.    3. Increased strength by 1/3 MMT grade in shoulder flexion to increase tolerance for ADL and work activities.  4. Pt to tolerate HEP to improve ROM and independence with ADL's     Long Term Goals: 24 weeks(Progressing, not met)  1.Report decreased L shoulder pain  < / =  0 /10  to increase tolerance for HEP.   2.Increase AROM to equivalent to contralateral side in all directions.   3.Increase strength to >/= 4/5 in shoulder flexion to increase tolerance for ADL and work activities.  4. Pt goal: complete overhead tasks without difficulty to resume PLOF.     Plan     Certification Period: 1/11/2023 to 4/11/2023    Improve deltoid strength and AAROM    Therapist: Armen Croft, PT, DPT

## 2023-01-26 NOTE — TELEPHONE ENCOUNTER
Patient w newly dx'd HFpEF. Had initial positive response to furosemide and low dose entresto.    Started having some dizziness/spaciness and furosemide reduced to 20x1. Still having spaciness with a progression of le swelling now and stomach bloating. Unclear if this is progression heart failure or other process.    Pt does not want to go to ED. Will reduce atenolol to 25x1. Cont low dose entresto and furosemide 20x1. Start spironolactone 25x1. Repeat labs next week with BMP/BNP.    If this does not work, and SOB/le edema progresses pt understands he should go to the ED.

## 2023-01-30 ENCOUNTER — NURSE TRIAGE (OUTPATIENT)
Dept: ADMINISTRATIVE | Facility: CLINIC | Age: 83
End: 2023-01-30
Payer: MEDICARE

## 2023-01-30 ENCOUNTER — PATIENT MESSAGE (OUTPATIENT)
Dept: CARDIOLOGY | Facility: CLINIC | Age: 83
End: 2023-01-30
Payer: MEDICARE

## 2023-01-30 ENCOUNTER — HOSPITAL ENCOUNTER (INPATIENT)
Facility: HOSPITAL | Age: 83
LOS: 4 days | Discharge: HOME | DRG: 291 | End: 2023-02-03
Attending: EMERGENCY MEDICINE | Admitting: FAMILY MEDICINE
Payer: MEDICARE

## 2023-01-30 DIAGNOSIS — R06.02 SHORTNESS OF BREATH: ICD-10-CM

## 2023-01-30 DIAGNOSIS — N18.30 STAGE 3 CHRONIC KIDNEY DISEASE, UNSPECIFIED WHETHER STAGE 3A OR 3B CKD: ICD-10-CM

## 2023-01-30 DIAGNOSIS — I50.9 ACUTE ON CHRONIC CONGESTIVE HEART FAILURE, UNSPECIFIED HEART FAILURE TYPE: Primary | ICD-10-CM

## 2023-01-30 LAB
ALBUMIN SERPL BCP-MCNC: 4 G/DL (ref 3.5–5.2)
ALP SERPL-CCNC: 54 U/L (ref 55–135)
ALT SERPL W/O P-5'-P-CCNC: 16 U/L (ref 10–44)
ANION GAP SERPL CALC-SCNC: 15 MMOL/L (ref 8–16)
AST SERPL-CCNC: 23 U/L (ref 10–40)
BASOPHILS # BLD AUTO: 0.05 K/UL (ref 0–0.2)
BASOPHILS NFR BLD: 0.4 % (ref 0–1.9)
BILIRUB SERPL-MCNC: 0.6 MG/DL (ref 0.1–1)
BNP SERPL-MCNC: 3279 PG/ML (ref 0–99)
BUN SERPL-MCNC: 31 MG/DL (ref 8–23)
CALCIUM SERPL-MCNC: 8.6 MG/DL (ref 8.7–10.5)
CHLORIDE SERPL-SCNC: 106 MMOL/L (ref 95–110)
CO2 SERPL-SCNC: 23 MMOL/L (ref 23–29)
CREAT SERPL-MCNC: 1.5 MG/DL (ref 0.5–1.4)
DIFFERENTIAL METHOD: ABNORMAL
EOSINOPHIL # BLD AUTO: 0.2 K/UL (ref 0–0.5)
EOSINOPHIL NFR BLD: 2 % (ref 0–8)
ERYTHROCYTE [DISTWIDTH] IN BLOOD BY AUTOMATED COUNT: 13.8 % (ref 11.5–14.5)
EST. GFR  (NO RACE VARIABLE): 46.5 ML/MIN/1.73 M^2
ESTIMATED AVG GLUCOSE: 120 MG/DL (ref 68–131)
FERRITIN SERPL-MCNC: 39 NG/ML (ref 20–300)
GLUCOSE SERPL-MCNC: 100 MG/DL (ref 70–110)
HBA1C MFR BLD: 5.8 % (ref 4–5.6)
HCT VFR BLD AUTO: 37.8 % (ref 40–54)
HCV AB SERPL QL IA: NORMAL
HGB BLD-MCNC: 12.1 G/DL (ref 14–18)
HIV 1+2 AB+HIV1 P24 AG SERPL QL IA: NORMAL
IMM GRANULOCYTES # BLD AUTO: 0.04 K/UL (ref 0–0.04)
IMM GRANULOCYTES NFR BLD AUTO: 0.4 % (ref 0–0.5)
IRON SERPL-MCNC: 46 UG/DL (ref 45–160)
LYMPHOCYTES # BLD AUTO: 4.2 K/UL (ref 1–4.8)
LYMPHOCYTES NFR BLD: 36.8 % (ref 18–48)
MCH RBC QN AUTO: 31.2 PG (ref 27–31)
MCHC RBC AUTO-ENTMCNC: 32 G/DL (ref 32–36)
MCV RBC AUTO: 97 FL (ref 82–98)
MONOCYTES # BLD AUTO: 1.1 K/UL (ref 0.3–1)
MONOCYTES NFR BLD: 9.5 % (ref 4–15)
NEUTROPHILS # BLD AUTO: 5.7 K/UL (ref 1.8–7.7)
NEUTROPHILS NFR BLD: 50.9 % (ref 38–73)
NRBC BLD-RTO: 0 /100 WBC
PLATELET # BLD AUTO: 206 K/UL (ref 150–450)
PMV BLD AUTO: 11.8 FL (ref 9.2–12.9)
POTASSIUM SERPL-SCNC: 3.7 MMOL/L (ref 3.5–5.1)
PROT SERPL-MCNC: 7.3 G/DL (ref 6–8.4)
RBC # BLD AUTO: 3.88 M/UL (ref 4.6–6.2)
SATURATED IRON: 12 % (ref 20–50)
SODIUM SERPL-SCNC: 144 MMOL/L (ref 136–145)
TOTAL IRON BINDING CAPACITY: 389 UG/DL (ref 250–450)
TRANSFERRIN SERPL-MCNC: 263 MG/DL (ref 200–375)
TROPONIN I SERPL DL<=0.01 NG/ML-MCNC: 0.11 NG/ML (ref 0–0.03)
TROPONIN I SERPL DL<=0.01 NG/ML-MCNC: 0.12 NG/ML (ref 0–0.03)
TSH SERPL DL<=0.005 MIU/L-ACNC: 2.28 UIU/ML (ref 0.4–4)
WBC # BLD AUTO: 11.29 K/UL (ref 3.9–12.7)

## 2023-01-30 PROCEDURE — 63600175 PHARM REV CODE 636 W HCPCS: Performed by: STUDENT IN AN ORGANIZED HEALTH CARE EDUCATION/TRAINING PROGRAM

## 2023-01-30 PROCEDURE — 84443 ASSAY THYROID STIM HORMONE: CPT | Performed by: STUDENT IN AN ORGANIZED HEALTH CARE EDUCATION/TRAINING PROGRAM

## 2023-01-30 PROCEDURE — 93010 EKG 12-LEAD: ICD-10-PCS | Mod: ,,, | Performed by: INTERNAL MEDICINE

## 2023-01-30 PROCEDURE — 82728 ASSAY OF FERRITIN: CPT | Performed by: STUDENT IN AN ORGANIZED HEALTH CARE EDUCATION/TRAINING PROGRAM

## 2023-01-30 PROCEDURE — 20600001 HC STEP DOWN PRIVATE ROOM

## 2023-01-30 PROCEDURE — 86803 HEPATITIS C AB TEST: CPT | Performed by: PHYSICIAN ASSISTANT

## 2023-01-30 PROCEDURE — 93005 ELECTROCARDIOGRAM TRACING: CPT

## 2023-01-30 PROCEDURE — 99285 EMERGENCY DEPT VISIT HI MDM: CPT | Mod: 25

## 2023-01-30 PROCEDURE — 83880 ASSAY OF NATRIURETIC PEPTIDE: CPT

## 2023-01-30 PROCEDURE — 84484 ASSAY OF TROPONIN QUANT: CPT

## 2023-01-30 PROCEDURE — 94761 N-INVAS EAR/PLS OXIMETRY MLT: CPT

## 2023-01-30 PROCEDURE — 83036 HEMOGLOBIN GLYCOSYLATED A1C: CPT | Performed by: STUDENT IN AN ORGANIZED HEALTH CARE EDUCATION/TRAINING PROGRAM

## 2023-01-30 PROCEDURE — 80053 COMPREHEN METABOLIC PANEL: CPT

## 2023-01-30 PROCEDURE — 93010 ELECTROCARDIOGRAM REPORT: CPT | Mod: ,,, | Performed by: INTERNAL MEDICINE

## 2023-01-30 PROCEDURE — 99285 PR EMERGENCY DEPT VISIT,LEVEL V: ICD-10-PCS | Mod: ,,, | Performed by: EMERGENCY MEDICINE

## 2023-01-30 PROCEDURE — 27000207 HC ISOLATION

## 2023-01-30 PROCEDURE — 99285 EMERGENCY DEPT VISIT HI MDM: CPT | Mod: ,,, | Performed by: EMERGENCY MEDICINE

## 2023-01-30 PROCEDURE — 87389 HIV-1 AG W/HIV-1&-2 AB AG IA: CPT | Performed by: PHYSICIAN ASSISTANT

## 2023-01-30 PROCEDURE — 84466 ASSAY OF TRANSFERRIN: CPT | Performed by: STUDENT IN AN ORGANIZED HEALTH CARE EDUCATION/TRAINING PROGRAM

## 2023-01-30 PROCEDURE — 85025 COMPLETE CBC W/AUTO DIFF WBC: CPT

## 2023-01-30 RX ORDER — ACETAMINOPHEN 325 MG/1
650 TABLET ORAL EVERY 6 HOURS PRN
Status: DISCONTINUED | OUTPATIENT
Start: 2023-01-30 | End: 2023-02-03 | Stop reason: HOSPADM

## 2023-01-30 RX ORDER — HEPARIN SODIUM 5000 [USP'U]/ML
5000 INJECTION, SOLUTION INTRAVENOUS; SUBCUTANEOUS EVERY 8 HOURS
Status: DISCONTINUED | OUTPATIENT
Start: 2023-01-30 | End: 2023-02-03 | Stop reason: HOSPADM

## 2023-01-30 RX ORDER — ATORVASTATIN CALCIUM 40 MG/1
80 TABLET, FILM COATED ORAL DAILY
Status: DISCONTINUED | OUTPATIENT
Start: 2023-01-31 | End: 2023-02-03 | Stop reason: HOSPADM

## 2023-01-30 RX ORDER — ONDANSETRON 2 MG/ML
4 INJECTION INTRAMUSCULAR; INTRAVENOUS EVERY 8 HOURS PRN
Status: DISCONTINUED | OUTPATIENT
Start: 2023-01-30 | End: 2023-02-03 | Stop reason: HOSPADM

## 2023-01-30 RX ORDER — SODIUM CHLORIDE 0.9 % (FLUSH) 0.9 %
10 SYRINGE (ML) INJECTION
Status: DISCONTINUED | OUTPATIENT
Start: 2023-01-30 | End: 2023-02-03 | Stop reason: HOSPADM

## 2023-01-30 RX ORDER — ATENOLOL 25 MG/1
25 TABLET ORAL DAILY
Status: DISCONTINUED | OUTPATIENT
Start: 2023-01-31 | End: 2023-02-03 | Stop reason: HOSPADM

## 2023-01-30 RX ORDER — FUROSEMIDE 10 MG/ML
80 INJECTION INTRAMUSCULAR; INTRAVENOUS ONCE
Status: COMPLETED | OUTPATIENT
Start: 2023-01-30 | End: 2023-01-30

## 2023-01-30 RX ORDER — ASPIRIN 81 MG/1
81 TABLET ORAL DAILY
Status: DISCONTINUED | OUTPATIENT
Start: 2023-01-31 | End: 2023-02-03 | Stop reason: HOSPADM

## 2023-01-30 RX ORDER — TALC
6 POWDER (GRAM) TOPICAL NIGHTLY PRN
Status: DISCONTINUED | OUTPATIENT
Start: 2023-01-30 | End: 2023-02-03 | Stop reason: HOSPADM

## 2023-01-30 RX ORDER — SPIRONOLACTONE 25 MG/1
25 TABLET ORAL DAILY
Status: DISCONTINUED | OUTPATIENT
Start: 2023-01-31 | End: 2023-02-03 | Stop reason: HOSPADM

## 2023-01-30 RX ORDER — SIMETHICONE 80 MG
1 TABLET,CHEWABLE ORAL 3 TIMES DAILY PRN
Status: DISCONTINUED | OUTPATIENT
Start: 2023-01-30 | End: 2023-02-03 | Stop reason: HOSPADM

## 2023-01-30 RX ORDER — PANTOPRAZOLE SODIUM 40 MG/1
40 TABLET, DELAYED RELEASE ORAL DAILY
Status: DISCONTINUED | OUTPATIENT
Start: 2023-01-31 | End: 2023-02-03 | Stop reason: HOSPADM

## 2023-01-30 RX ADMIN — FUROSEMIDE 80 MG: 10 INJECTION, SOLUTION INTRAMUSCULAR; INTRAVENOUS at 07:01

## 2023-01-30 RX ADMIN — HEPARIN SODIUM 5000 UNITS: 5000 INJECTION INTRAVENOUS; SUBCUTANEOUS at 10:01

## 2023-01-30 NOTE — ED PROVIDER NOTES
Encounter Date: 1/30/2023       History     Chief Complaint   Patient presents with    Leg Swelling     X2 weeks. Pt. Rx-ed meds by cards. Pt. States he is still having leg swelling and was told to come in.      82-year-old male PMH of CKD, dyslipidemia, GERD, hypertension, melanoma , PAD(bilateral LE stent placement) with newly diagnosed HFpEF presents to the emergency department due to continued orthopnea and lower extremity edema.  Patient reports being placed on several new medications such as Entresto furosemide atenolol and spironolactone, however this caused his blood pressure to drop and has not been able to assist with his lower extremity swelling.  Patient spoke with his cardiologist today about continuation of symptoms who recommended he come to the emergency department.  Patient is also experiencing diarrhea.  He denies chest pain or  fever.    Review of patient's allergies indicates:   Allergen Reactions    Clindamycin Nausea And Vomiting    Penicillins Hives     Past Medical History:   Diagnosis Date    CKD (chronic kidney disease) stage 3, GFR 30-59 ml/min     Dyslipidemia     GERD (gastroesophageal reflux disease)     Hx of melanoma excision     Hypertension     Melanoma     PAD (peripheral artery disease)     Squamous cell carcinoma of skin      Past Surgical History:   Procedure Laterality Date    ARTHROSCOPIC DEBRIDEMENT OF SHOULDER Right 2/27/2020    Procedure: EXTENSIVE DEBRIDEMENT, SHOULDER, ARTHROSCOPIC;  Surgeon: Staci Childress MD;  Location: [facility] OR;  Service: Orthopedics;  Laterality: Right;    ARTHROSCOPIC DEBRIDEMENT OF SHOULDER Left 6/21/2022    Procedure: EXTENSIVE DEBRIDEMENT, SHOULDER, ARTHROSCOPIC;  Surgeon: Staci Childress MD;  Location: [facility] OR;  Service: Orthopedics;  Laterality: Left;    ARTHROSCOPIC REPAIR OF ROTATOR CUFF OF SHOULDER Right 2/27/2020    Procedure: REPAIR, ROTATOR CUFF, ARTHROSCOPIC;  Surgeon: Staci Childress MD;  Location: [facility] OR;  Service: Orthopedics;  Laterality: Right;     ARTHROSCOPIC REPAIR OF ROTATOR CUFF OF SHOULDER Left 6/21/2022    Procedure: REPAIR, ROTATOR CUFF, ARTHROSCOPIC WITH CUFF MEND;  Surgeon: Staci Childress MD;  Location: [facility] OR;  Service: Orthopedics;  Laterality: Left;    ARTHROSCOPY OF SHOULDER WITH DECOMPRESSION OF SUBACROMIAL SPACE Right 2/27/2020    Procedure: ARTHROSCOPY, SHOULDER, WITH SUBACROMIAL SPACE DECOMPRESSION;  Surgeon: Staci Childress MD;  Location: [facility] OR;  Service: Orthopedics;  Laterality: Right;    ARTHROSCOPY OF SHOULDER WITH DECOMPRESSION OF SUBACROMIAL SPACE Left 6/21/2022    Procedure: ARTHROSCOPY, SHOULDER, WITH SUBACROMIAL  DECOMPRESSION;  Surgeon: Staci Childress MD;  Location: [facility] OR;  Service: Orthopedics;  Laterality: Left;    ARTHROSCOPY OF SHOULDER WITH REMOVAL OF DISTAL CLAVICLE Left 6/21/2022    Procedure: ARTHROSCOPY, SHOULDER, WITH DISTAL CLAVICLE EXCISION;  Surgeon: Staci Childress MD;  Location: [facility] OR;  Service: Orthopedics;  Laterality: Left;    ARTHROSCOPY,SHOULDER,WITH BICEPS TENODESIS Left 6/21/2022    Procedure: ARTHROSCOPY,SHOULDER,WITH BICEPS TENODESIS;  Surgeon: Staci Childress MD;  Location: [facility] OR;  Service: Orthopedics;  Laterality: Left;    BLEPHAROPLASTY      CAROTID ENDARTERECTOMY Right 10/15/2021    Procedure: ENDARTERECTOMY-CAROTID;  Surgeon: Imer Farooq MD;  Location: [address];  Service: Peripheral Vascular;  Laterality: Right;  AWAKE CERVICAL BLOCK    CATARACT EXTRACTION, BILATERAL      EXCISION OF BURSA Left 6/21/2022    Procedure: BURSECTOMY;  Surgeon: Staci Childress MD;  Location: [facility] OR;  Service: Orthopedics;  Laterality: Left;    FIXATION OF TENDON Right 2/27/2020    Procedure: FIXATION, TENDON, Biceps Tenodesis;  Surgeon: Staci Childress MD;  Location: [facility] OR;  Service: Orthopedics;  Laterality: Right;  FIXATION, TENDON, Biceps Tenodesis    OPEN REDUCTION AND INTERNAL FIXATION (ORIF) OF FRACTURE OF PROXIMAL HUMERUS Right 2/27/2020    Procedure: ORIF, FRACTURE, GREATER TUBEROSITY;  Surgeon: Staci Childress MD;   Location: [facility] OR;  Service: Orthopedics;  Laterality: Right;    ORIF HUMERUS FRACTURE Left 2022    Procedure: ORIF, FRACTURE, HUMERUS, GREATER TUBEROSITY;  Surgeon: Staci Childress MD;  Location: [facility] OR;  Service: Orthopedics;  Laterality: Left;    SHOULDER ARTHROSCOPY Left 2022    Procedure: ARTHROSCOPY, SHOULDER WITH LABRAL REPAIR;  Surgeon: Staci Childress MD;  Location: [facility] OR;  Service: Orthopedics;  Laterality: Left;    TONSILLECTOMY       Family History   Problem Relation Age of Onset    Diabetes Mother     Hyperlipidemia Mother     Heart disease Father     Cancer Sister         ? colon or uterine    Colon cancer Sister      Social History     Tobacco Use    Smoking status: Former     Types: Cigarettes     Quit date:      Years since quittin.1    Smokeless tobacco: Never   Substance Use Topics    Alcohol use: Yes     Alcohol/week: 14.0 standard drinks     Types: 14 Glasses of wine per week     Comment: 2 glasses of wine daily    Drug use: Never     Review of Systems   Constitutional:  Negative for fever.   HENT:  Negative for sore throat.    Respiratory:  Negative for shortness of breath.         Orthopnea    Cardiovascular:  Positive for leg swelling. Negative for chest pain.   Gastrointestinal:  Positive for diarrhea. Negative for nausea.   Genitourinary:  Negative for dysuria.   Musculoskeletal:  Negative for back pain.   Skin:  Negative for rash.   Allergic/Immunologic: Negative for immunocompromised state.   Neurological:  Negative for weakness.   Hematological:  Does not bruise/bleed easily.     Physical Exam     Initial Vitals [23 1157]   BP Pulse Resp Temp SpO2   110/65 66 18 98.5 °F (36.9 °C) 98 %      MAP       --         Physical Exam    Vitals reviewed.  Constitutional: He appears well-developed and well-nourished.   HENT:   Head: Normocephalic and atraumatic.   Eyes: Conjunctivae and EOM are normal.   Neck:   Normal range of motion.  Cardiovascular:  Normal rate, regular  rhythm and normal heart sounds.           DP pulse w/ doppler    Pulmonary/Chest: Breath sounds normal. No respiratory distress. He has no wheezes.   Abdominal: Abdomen is soft. He exhibits no distension. There is no abdominal tenderness.   Musculoskeletal:         General: Edema present. No tenderness. Normal range of motion.      Cervical back: Normal range of motion.      Comments: Bilateral LE edema, however mildly more edematous on  Left      Neurological: He is alert and oriented to person, place, and time.   Skin: Skin is warm and dry.   Psychiatric: He has a normal mood and affect. Thought content normal.       ED Course   Procedures  Labs Reviewed   CBC W/ AUTO DIFFERENTIAL - Abnormal; Notable for the following components:       Result Value    RBC 3.88 (*)     Hemoglobin 12.1 (*)     Hematocrit 37.8 (*)     MCH 31.2 (*)     Mono # 1.1 (*)     All other components within normal limits   COMPREHENSIVE METABOLIC PANEL - Abnormal; Notable for the following components:    BUN 31 (*)     Creatinine 1.5 (*)     Calcium 8.6 (*)     Alkaline Phosphatase 54 (*)     eGFR 46.5 (*)     All other components within normal limits   TROPONIN I - Abnormal; Notable for the following components:    Troponin I 0.118 (*)     All other components within normal limits   B-TYPE NATRIURETIC PEPTIDE - Abnormal; Notable for the following components:    BNP 3,279 (*)     All other components within normal limits   TROPONIN I - Abnormal; Notable for the following components:    Troponin I 0.106 (*)     All other components within normal limits   HEMOGLOBIN A1C - Abnormal; Notable for the following components:    Hemoglobin A1C 5.8 (*)     All other components within normal limits    Narrative:     If not completed within last 6 months   IRON AND TIBC - Abnormal; Notable for the following components:    Saturated Iron 12 (*)     All other components within normal limits    Narrative:     If not completed within last 6 months    CLOSTRIDIUM DIFFICILE   HIV 1 / 2 ANTIBODY    Narrative:     Release to patient->Immediate   HEPATITIS C ANTIBODY    Narrative:     Release to patient->Immediate   TSH    Narrative:     If not completed within last 6 months   FERRITIN    Narrative:     If not completed within last 6 months          Imaging Results              X-Ray Chest AP Portable (Final result)  Result time 01/30/23 16:12:59      Final result by Emory Tinsley MD (01/30/23 16:12:59)                   Impression:      No detrimental change or radiographic acute intrathoracic process seen on this single view.      Electronically signed by: Emory Tinsley MD  Date:    01/30/2023  Time:    16:12               Narrative:    EXAMINATION:  XR CHEST AP PORTABLE    CLINICAL HISTORY:  CHF;    TECHNIQUE:  Single frontal view of the chest was performed.    COMPARISON:  Chest radiograph 01/05/2023    FINDINGS:  Patient is slightly rotated.  Mediastinal structures are midline.  There is grossly similar calcification and tortuosity of the aorta.  Cardiac silhouette is mildly enlarged similar to prior without evidence of failure.  Pulmonary vasculature and hilar contours are within normal limits.    Similar bibasilar minimal platelike scarring versus atelectasis.  The lungs are otherwise well expanded without consolidation, pleural effusion or pneumothorax.    No acute osseous process seen.  PA and lateral views can be obtained.                                       Medications   sodium chloride 0.9% flush 10 mL (has no administration in time range)   heparin (porcine) injection 5,000 Units (5,000 Units Subcutaneous Given 1/31/23 0627)   ondansetron injection 4 mg (has no administration in time range)   aspirin EC tablet 81 mg (has no administration in time range)   atenoloL tablet 25 mg (has no administration in time range)   pantoprazole EC tablet 40 mg (has no administration in time range)   atorvastatin tablet 80 mg (has no administration in time range)    spironolactone tablet 25 mg (has no administration in time range)   acetaminophen tablet 650 mg (has no administration in time range)   melatonin tablet 6 mg (has no administration in time range)   simethicone chewable tablet 80 mg (has no administration in time range)   potassium chloride SA CR tablet 40 mEq (40 mEq Oral Given 1/31/23 0512)   magnesium oxide tablet 400 mg (has no administration in time range)   furosemide injection 80 mg (80 mg Intravenous Given 1/30/23 1958)   magnesium sulfate 2g in water 50mL IVPB (premix) (0 g Intravenous Stopped 1/31/23 0708)     Medical Decision Making:   Initial Assessment:   82-year-old male presents with orthopnea and lower extremity edema  Differential Diagnosis:   Differential diagnosis include:  Heart failure exacerbation,PAD ACS, Pneumonia   Clinical Tests:   Lab Tests: Ordered  Radiological Study: Ordered  ED Management:  Lab and Imaging ordered to rule out heart failure exacerbation  Also considered possible DVT however due to bilateral edema presentation less likely I will initially began with cardiac workup and follow up with US as needed  Vital signs remained stable patient is breathing on room air  EKG per my interpretation 72 bpm sinus rhythm w/ new T-wave inversions in inferior leads, from previous EKG w/ non specific T wave. PVCs also noted   Troponin midly bumped at .106, w/ BNP elevated from previous at 3,279.   Based on pt presentation and result this is consistent with a worsening HF presentation from when the pt was initially seen.   I spoke with Hospital Medicine, regarding lab findings and p labt presentation - agreed to admit to their service with anticipation of IV diuretics   Case discussed with SP                ED Course as of 01/31/23 0906 Mon Jan 30, 2023   1620 BNP(!): 3,279 [CR]      ED Course User Index  [CR] Sharon Mercado PA-C                 Clinical Impression:   Final diagnoses:  [R06.02] Shortness of breath  [I50.9] Acute on  chronic congestive heart failure, unspecified heart failure type (Primary)        ED Disposition Condition    Admit Stable                Sharon Mercado PA-C  01/31/23 0906

## 2023-01-30 NOTE — TELEPHONE ENCOUNTER
Please read nurse triage note , I contacted pt he did not answer . Lvm instructing him as well to go to ED.

## 2023-01-30 NOTE — ED NOTES
"Patient identifiers verified and correct for vikki virk  C/C: Pt states "I had an ECHO that said my L ventricle is swollen and my legs are swollen. I also feel SOB"  APPEARANCE: awake and alert in no acute distress.  SKIN: warm, dry and intact. No breakdown or bruising.  MUSCULOSKELETAL: Patient moving all extremities spontaneously, no obvious swelling or deformities noted. Ambulates independently.  RESPIRATORY: c/o shortness of breath. Respirations unlabored.   CARDIAC: Denies CP, 2+ distal pulses; peripheral edema bilaterally to lower extremities  ABDOMEN: soft, non-tender, and non-distended, Denies N/V. C/o diarrhea   : voids spontaneously, denies difficulty  Neurologic: AAO x 4; follows commands equal strength in all extremities; denies numbness/tingling. Denies dizziness    "

## 2023-01-30 NOTE — TELEPHONE ENCOUNTER
"Gus calling c/o "heart problems, SOB, states medications causing lower bp readings." Newly diagnosed heart failure pt. Gus states he was told to go check in the hospital but would prefer not to go thru ED. Asking if there is another way. Triager explained triage process and protocol and offered triage of symptoms. Gus agreed. Gus states main symptoms is SOB. Advised pt per triage protocol to call  now. Gus v/u. Triager unsure if pt will follow care advice recommendation to call  now as he asked to call his doctor and if he could go to [facility] himself. Triager informed pt that a high priority message will be sent to physician now. Instructed pt not to drive self or wait to go to hospital as this would be a delay in care. Triager reiterated care advice to gus to call  now. V/u.   Reason for Disposition   Slow, shallow and weak breathing    Additional Information   Negative: SEVERE difficulty breathing (e.g., struggling for each breath, speaks in single words, pulse > 120)   Negative: Breathing stopped and hasn't returned   Negative: Choking on something   Negative: Bluish (or gray) lips or face   Negative: Difficult to awaken or acting confused (e.g., disoriented, slurred speech)   Negative: Passed out (i.e., fainted, collapsed and was not responding)   Negative: Wheezing started suddenly after medicine, an allergic food, or bee sting   Negative: Stridor    Protocols used: Breathing Difficulty-A-OH    "

## 2023-01-30 NOTE — ED TRIAGE NOTES
"Pt states "I had an ECHO that said my L ventricle is swollen and my legs are swollen. I also feel SOB"  "

## 2023-01-31 DIAGNOSIS — R06.02 SOB (SHORTNESS OF BREATH): Primary | ICD-10-CM

## 2023-01-31 PROBLEM — R79.89 TROPONIN LEVEL ELEVATED: Status: ACTIVE | Noted: 2023-01-31

## 2023-01-31 PROBLEM — I50.33 ACUTE ON CHRONIC DIASTOLIC HEART FAILURE: Status: ACTIVE | Noted: 2023-01-11

## 2023-01-31 PROBLEM — E87.6 HYPOKALEMIA: Status: ACTIVE | Noted: 2023-01-31

## 2023-01-31 LAB
ANION GAP SERPL CALC-SCNC: 15 MMOL/L (ref 8–16)
BASOPHILS # BLD AUTO: 0.04 K/UL (ref 0–0.2)
BASOPHILS NFR BLD: 0.5 % (ref 0–1.9)
BUN SERPL-MCNC: 31 MG/DL (ref 8–23)
CALCIUM SERPL-MCNC: 7.8 MG/DL (ref 8.7–10.5)
CHLORIDE SERPL-SCNC: 106 MMOL/L (ref 95–110)
CO2 SERPL-SCNC: 23 MMOL/L (ref 23–29)
CREAT SERPL-MCNC: 1.7 MG/DL (ref 0.5–1.4)
DIFFERENTIAL METHOD: ABNORMAL
EOSINOPHIL # BLD AUTO: 0.3 K/UL (ref 0–0.5)
EOSINOPHIL NFR BLD: 3.6 % (ref 0–8)
ERYTHROCYTE [DISTWIDTH] IN BLOOD BY AUTOMATED COUNT: 13.8 % (ref 11.5–14.5)
EST. GFR  (NO RACE VARIABLE): 40 ML/MIN/1.73 M^2
GLUCOSE SERPL-MCNC: 111 MG/DL (ref 70–110)
HCT VFR BLD AUTO: 33.2 % (ref 40–54)
HGB BLD-MCNC: 10.7 G/DL (ref 14–18)
IMM GRANULOCYTES # BLD AUTO: 0.03 K/UL (ref 0–0.04)
IMM GRANULOCYTES NFR BLD AUTO: 0.4 % (ref 0–0.5)
LYMPHOCYTES # BLD AUTO: 3.8 K/UL (ref 1–4.8)
LYMPHOCYTES NFR BLD: 44.1 % (ref 18–48)
MAGNESIUM SERPL-MCNC: <0.7 MG/DL (ref 1.6–2.6)
MCH RBC QN AUTO: 30.9 PG (ref 27–31)
MCHC RBC AUTO-ENTMCNC: 32.2 G/DL (ref 32–36)
MCV RBC AUTO: 96 FL (ref 82–98)
MONOCYTES # BLD AUTO: 0.8 K/UL (ref 0.3–1)
MONOCYTES NFR BLD: 8.8 % (ref 4–15)
NEUTROPHILS # BLD AUTO: 3.6 K/UL (ref 1.8–7.7)
NEUTROPHILS NFR BLD: 42.6 % (ref 38–73)
NRBC BLD-RTO: 0 /100 WBC
PLATELET # BLD AUTO: 181 K/UL (ref 150–450)
PMV BLD AUTO: 12.2 FL (ref 9.2–12.9)
POTASSIUM SERPL-SCNC: 3 MMOL/L (ref 3.5–5.1)
POTASSIUM SERPL-SCNC: 3.8 MMOL/L (ref 3.5–5.1)
RBC # BLD AUTO: 3.46 M/UL (ref 4.6–6.2)
SODIUM SERPL-SCNC: 144 MMOL/L (ref 136–145)
WBC # BLD AUTO: 8.54 K/UL (ref 3.9–12.7)

## 2023-01-31 PROCEDURE — 25000003 PHARM REV CODE 250: Performed by: FAMILY MEDICINE

## 2023-01-31 PROCEDURE — 99232 SBSQ HOSP IP/OBS MODERATE 35: CPT | Mod: ,,, | Performed by: FAMILY MEDICINE

## 2023-01-31 PROCEDURE — 25000003 PHARM REV CODE 250: Performed by: STUDENT IN AN ORGANIZED HEALTH CARE EDUCATION/TRAINING PROGRAM

## 2023-01-31 PROCEDURE — 85025 COMPLETE CBC W/AUTO DIFF WBC: CPT | Performed by: STUDENT IN AN ORGANIZED HEALTH CARE EDUCATION/TRAINING PROGRAM

## 2023-01-31 PROCEDURE — 84132 ASSAY OF SERUM POTASSIUM: CPT | Performed by: FAMILY MEDICINE

## 2023-01-31 PROCEDURE — 63600175 PHARM REV CODE 636 W HCPCS: Performed by: STUDENT IN AN ORGANIZED HEALTH CARE EDUCATION/TRAINING PROGRAM

## 2023-01-31 PROCEDURE — 80048 BASIC METABOLIC PNL TOTAL CA: CPT | Performed by: STUDENT IN AN ORGANIZED HEALTH CARE EDUCATION/TRAINING PROGRAM

## 2023-01-31 PROCEDURE — 99232 PR SUBSEQUENT HOSPITAL CARE,LEVL II: ICD-10-PCS | Mod: ,,, | Performed by: FAMILY MEDICINE

## 2023-01-31 PROCEDURE — 25000003 PHARM REV CODE 250: Performed by: INTERNAL MEDICINE

## 2023-01-31 PROCEDURE — 27000207 HC ISOLATION

## 2023-01-31 PROCEDURE — 63600175 PHARM REV CODE 636 W HCPCS: Performed by: INTERNAL MEDICINE

## 2023-01-31 PROCEDURE — 20600001 HC STEP DOWN PRIVATE ROOM

## 2023-01-31 PROCEDURE — 83735 ASSAY OF MAGNESIUM: CPT | Performed by: STUDENT IN AN ORGANIZED HEALTH CARE EDUCATION/TRAINING PROGRAM

## 2023-01-31 PROCEDURE — 36415 COLL VENOUS BLD VENIPUNCTURE: CPT | Performed by: STUDENT IN AN ORGANIZED HEALTH CARE EDUCATION/TRAINING PROGRAM

## 2023-01-31 PROCEDURE — 99223 PR INITIAL HOSPITAL CARE,LEVL III: ICD-10-PCS | Mod: ,,, | Performed by: STUDENT IN AN ORGANIZED HEALTH CARE EDUCATION/TRAINING PROGRAM

## 2023-01-31 PROCEDURE — 36415 COLL VENOUS BLD VENIPUNCTURE: CPT | Performed by: FAMILY MEDICINE

## 2023-01-31 PROCEDURE — 99223 1ST HOSP IP/OBS HIGH 75: CPT | Mod: ,,, | Performed by: STUDENT IN AN ORGANIZED HEALTH CARE EDUCATION/TRAINING PROGRAM

## 2023-01-31 RX ORDER — LANOLIN ALCOHOL/MO/W.PET/CERES
400 CREAM (GRAM) TOPICAL DAILY
Status: DISCONTINUED | OUTPATIENT
Start: 2023-01-31 | End: 2023-02-03 | Stop reason: HOSPADM

## 2023-01-31 RX ORDER — MAGNESIUM SULFATE HEPTAHYDRATE 40 MG/ML
2 INJECTION, SOLUTION INTRAVENOUS ONCE
Status: COMPLETED | OUTPATIENT
Start: 2023-01-31 | End: 2023-01-31

## 2023-01-31 RX ORDER — POTASSIUM CHLORIDE 20 MEQ/1
40 TABLET, EXTENDED RELEASE ORAL
Status: COMPLETED | OUTPATIENT
Start: 2023-01-31 | End: 2023-01-31

## 2023-01-31 RX ADMIN — HEPARIN SODIUM 5000 UNITS: 5000 INJECTION INTRAVENOUS; SUBCUTANEOUS at 10:01

## 2023-01-31 RX ADMIN — ATENOLOL 25 MG: 25 TABLET ORAL at 09:01

## 2023-01-31 RX ADMIN — POTASSIUM CHLORIDE 40 MEQ: 1500 TABLET, EXTENDED RELEASE ORAL at 05:01

## 2023-01-31 RX ADMIN — Medication 400 MG: at 09:01

## 2023-01-31 RX ADMIN — MAGNESIUM SULFATE 2 G: 2 INJECTION INTRAVENOUS at 05:01

## 2023-01-31 RX ADMIN — ASPIRIN 81 MG: 81 TABLET, COATED ORAL at 09:01

## 2023-01-31 RX ADMIN — HEPARIN SODIUM 5000 UNITS: 5000 INJECTION INTRAVENOUS; SUBCUTANEOUS at 02:01

## 2023-01-31 RX ADMIN — HEPARIN SODIUM 5000 UNITS: 5000 INJECTION INTRAVENOUS; SUBCUTANEOUS at 06:01

## 2023-01-31 RX ADMIN — POTASSIUM CHLORIDE 40 MEQ: 1500 TABLET, EXTENDED RELEASE ORAL at 09:01

## 2023-01-31 RX ADMIN — ATORVASTATIN CALCIUM 80 MG: 40 TABLET, FILM COATED ORAL at 09:01

## 2023-01-31 RX ADMIN — PANTOPRAZOLE SODIUM 40 MG: 40 TABLET, DELAYED RELEASE ORAL at 09:01

## 2023-01-31 RX ADMIN — SPIRONOLACTONE 25 MG: 25 TABLET, FILM COATED ORAL at 09:01

## 2023-01-31 NOTE — ASSESSMENT & PLAN NOTE
Patient normotensive in the emergency department.  We will resume home antihypertensive regimen with some modifications.  - decreased atenolol given his heart failure exacerbation  - resume spironolactone 25 mg daily

## 2023-01-31 NOTE — PLAN OF CARE
Patient has requested that any necessary prescriptions be sent to bedside delivery and he would like to get hard copy prescriptions as he uses [facility].        Zuleika Joseph RNCM  Case   Ext [phone]

## 2023-01-31 NOTE — SUBJECTIVE & OBJECTIVE
Interval History:   Patient was admitted for acute CHF decompensation. Stated that due to hypotension, his diuretics was placed on hold and had recently started on entresto. Stated feeling much better, with improvement of B/l lower ext edema, dyspnea on exertion. Also notion of diarrhea, loose stool. Denies CP, nor palpitation, no N/V    Review of Systems   Constitutional:  Positive for activity change and fatigue. Negative for appetite change, chills and fever.   HENT:  Negative for congestion.    Respiratory:  Negative for cough, chest tightness, shortness of breath and wheezing.    Cardiovascular:  Positive for leg swelling. Negative for chest pain and palpitations.   Gastrointestinal:  Positive for diarrhea. Negative for abdominal pain, nausea and vomiting.   Genitourinary:  Negative for difficulty urinating.   Musculoskeletal:  Negative for gait problem and joint swelling.   Skin:  Negative for rash.   Neurological:  Negative for dizziness, speech difficulty, weakness and headaches.   Psychiatric/Behavioral:  Negative for agitation, confusion and hallucinations.    All other systems reviewed and are negative.  Objective:     Vital Signs (Most Recent):  Temp: 98.2 °F (36.8 °C) (01/31/23 0838)  Pulse: 74 (01/31/23 1114)  Resp: 18 (01/31/23 0838)  BP: (!) 116/59 (01/31/23 0838)  SpO2: (!) 93 % (01/31/23 0838)   Vital Signs (24h Range):  Temp:  [97.7 °F (36.5 °C)-98.5 °F (36.9 °C)] 98.2 °F (36.8 °C)  Pulse:  [66-85] 74  Resp:  [16-19] 18  SpO2:  [93 %-100 %] 93 %  BP: (110-142)/(56-74) 116/59     Weight: 80.8 kg (178 lb 2.1 oz)  Body mass index is 23.5 kg/m².    Intake/Output Summary (Last 24 hours) at 1/31/2023 1126  Last data filed at 1/31/2023 0917  Gross per 24 hour   Intake 200 ml   Output 1300 ml   Net -1100 ml      Physical Exam  Vitals and nursing note reviewed.   Constitutional:       General: He is not in acute distress.     Appearance: He is not toxic-appearing.   HENT:      Head: Normocephalic and  atraumatic.      Nose: No congestion.   Eyes:      Extraocular Movements: Extraocular movements intact.      Pupils: Pupils are equal, round, and reactive to light.   Cardiovascular:      Rate and Rhythm: Normal rate and regular rhythm.      Heart sounds: No murmur heard.    No friction rub. No gallop.   Pulmonary:      Effort: Pulmonary effort is normal. No respiratory distress.      Breath sounds: Normal breath sounds. No wheezing or rales.   Abdominal:      General: Bowel sounds are normal. There is no distension.      Palpations: Abdomen is soft.      Tenderness: There is no abdominal tenderness. There is no guarding or rebound.   Musculoskeletal:         General: Swelling present. No tenderness.      Cervical back: No rigidity or tenderness.      Right lower leg: Edema present.      Left lower leg: Edema present.      Comments: Trace ankle edema   Skin:     Findings: No erythema or rash.   Neurological:      General: No focal deficit present.      Mental Status: He is alert and oriented to person, place, and time.      Cranial Nerves: No cranial nerve deficit.      Motor: No weakness.      Gait: Gait normal.   Psychiatric:         Thought Content: Thought content normal.       Significant Labs: All pertinent labs within the past 24 hours have been reviewed.  A1C:   Recent Labs   Lab 01/30/23 2000   HGBA1C 5.8*     BMP:   Recent Labs   Lab 01/31/23  0255   *      K 3.0*      CO2 23   BUN 31*   CREATININE 1.7*   CALCIUM 7.8*   MG <0.7*     CBC:   Recent Labs   Lab 01/30/23  1511 01/31/23  0255   WBC 11.29 8.54   HGB 12.1* 10.7*   HCT 37.8* 33.2*    181     Troponin:   Recent Labs   Lab 01/30/23  1511 01/30/23  1742   TROPONINI 0.118* 0.106*     TSH:   Recent Labs   Lab 01/30/23 2000   TSH 2.276     Recent Lab Results         01/31/23  0255   01/30/23 2000 01/30/23  1742   01/30/23  1511        Albumin       4.0       Alkaline Phosphatase       54       ALT       16       Anion Gap  15       15       AST       23       Baso # 0.04       0.05       Basophil % 0.5       0.4       BILIRUBIN TOTAL       0.6  Comment: For infants and newborns, interpretation of results should be based  on gestational age, weight and in agreement with clinical  observations.    Premature Infant recommended reference ranges:  Up to 24 hours.............<8.0 mg/dL  Up to 48 hours............<12.0 mg/dL  3-5 days..................<15.0 mg/dL  6-29 days.................<15.0 mg/dL         BNP       3,279  Comment: Values of less than 100 pg/ml are consistent with non-CHF populations.       BUN 31       31       Calcium 7.8       8.6       Chloride 106       106       CO2 23       23       Creatinine 1.7       1.5       Differential Method Automated       Automated       eGFR 40.0       46.5       Eos # 0.3       0.2       Eosinophil % 3.6       2.0       Estimated Avg Glucose   120           Ferritin   39           Glucose 111       100       Gran # (ANC) 3.6       5.7       Gran % 42.6       50.9       Hematocrit 33.2       37.8       Hemoglobin 10.7       12.1       Hemoglobin A1C External   5.8  Comment: ADA Screening Guidelines:  5.7-6.4%  Consistent with prediabetes  >or=6.5%  Consistent with diabetes    High levels of fetal hemoglobin interfere with the HbA1C  assay. Heterozygous hemoglobin variants (HbS, HgC, etc)do  not significantly interfere with this assay.   However, presence of multiple variants may affect accuracy.             Hepatitis C Ab       Non-reactive       HIV 1/2 Ag/Ab       Non-reactive       Immature Grans (Abs) 0.03  Comment: Mild elevation in immature granulocytes is non specific and   can be seen in a variety of conditions including stress response,   acute inflammation, trauma and pregnancy. Correlation with other   laboratory and clinical findings is essential.         0.04  Comment: Mild elevation in immature granulocytes is non specific and   can be seen in a variety of conditions  including stress response,   acute inflammation, trauma and pregnancy. Correlation with other   laboratory and clinical findings is essential.         Immature Granulocytes 0.4       0.4       Iron   46           Lymph # 3.8       4.2       Lymph % 44.1       36.8       Magnesium <0.7  Comment: *Critical value notification by la__ with confirmation of receipt to  Lissette Tolentino RN___ at  Date_[id number]___Time_0410___               MCH 30.9       31.2       MCHC 32.2       32.0       MCV 96       97       Mono # 0.8       1.1       Mono % 8.8       9.5       MPV 12.2       11.8       nRBC 0       0       Platelets 181       206       Potassium 3.0       3.7       PROTEIN TOTAL       7.3       RBC 3.46       3.88       RDW 13.8       13.8       Saturated Iron   12           Sodium 144       144       TIBC   389           Transferrin   263           Troponin I     0.106  Comment: The reference interval for Troponin I represents the 99th percentile   cutoff   for our facility and is consistent with 3rd generation assay   performance.     0.118  Comment: The reference interval for Troponin I represents the 99th percentile   cutoff   for our facility and is consistent with 3rd generation assay   performance.         TSH   2.276           WBC 8.54       11.29               Significant Imaging: I have reviewed all pertinent imaging results/findings within the past 24 hours.

## 2023-01-31 NOTE — ASSESSMENT & PLAN NOTE
Patient presenting with volume overload given increased lower extremity edema, paroxysmal nocturnal dyspnea and decreased exercise tolerance.  JVD and bibasilar crackles noted on examination.  BNP elevated.  Patient with recent diagnosis of heart failure with preserved ejection fraction. Last transthoracic echocardiogram (1/2023) with EF 65%.Patient without increase urine output on oral diuretics which is likely the cause of his volume overload and exacerbation; will need better titration while inpatient.  - begin IV furosemide 80 mg daily  - strict intake and output; goal of net negative two liters daily  - replace all electrolytes keeping K>4 and Mg>2  - cardiac monitoring/telemetry  - decreased home atenolol dose to 25 mg daily given exacerbation  - resume spironolactone 25 mg daily  - holding patient's home sacubitril-valsartan; unclear why patient is on this medication given his preserved ejection fraction

## 2023-01-31 NOTE — SUBJECTIVE & OBJECTIVE
Past Medical History:   Diagnosis Date    CKD (chronic kidney disease) stage 3, GFR 30-59 ml/min     Dyslipidemia     GERD (gastroesophageal reflux disease)     Hx of melanoma excision     Hypertension     Melanoma     PAD (peripheral artery disease)     Squamous cell carcinoma of skin        Past Surgical History:   Procedure Laterality Date    ARTHROSCOPIC DEBRIDEMENT OF SHOULDER Right 2/27/2020    Procedure: EXTENSIVE DEBRIDEMENT, SHOULDER, ARTHROSCOPIC;  Surgeon: Staci Childress MD;  Location: [facility] OR;  Service: Orthopedics;  Laterality: Right;    ARTHROSCOPIC DEBRIDEMENT OF SHOULDER Left 6/21/2022    Procedure: EXTENSIVE DEBRIDEMENT, SHOULDER, ARTHROSCOPIC;  Surgeon: Staci Childress MD;  Location: [facility] OR;  Service: Orthopedics;  Laterality: Left;    ARTHROSCOPIC REPAIR OF ROTATOR CUFF OF SHOULDER Right 2/27/2020    Procedure: REPAIR, ROTATOR CUFF, ARTHROSCOPIC;  Surgeon: Staci Childress MD;  Location: [facility] OR;  Service: Orthopedics;  Laterality: Right;    ARTHROSCOPIC REPAIR OF ROTATOR CUFF OF SHOULDER Left 6/21/2022    Procedure: REPAIR, ROTATOR CUFF, ARTHROSCOPIC WITH CUFF MEND;  Surgeon: Staci Childress MD;  Location: [facility] OR;  Service: Orthopedics;  Laterality: Left;    ARTHROSCOPY OF SHOULDER WITH DECOMPRESSION OF SUBACROMIAL SPACE Right 2/27/2020    Procedure: ARTHROSCOPY, SHOULDER, WITH SUBACROMIAL SPACE DECOMPRESSION;  Surgeon: Staci Childress MD;  Location: [facility] OR;  Service: Orthopedics;  Laterality: Right;    ARTHROSCOPY OF SHOULDER WITH DECOMPRESSION OF SUBACROMIAL SPACE Left 6/21/2022    Procedure: ARTHROSCOPY, SHOULDER, WITH SUBACROMIAL  DECOMPRESSION;  Surgeon: Staci Childress MD;  Location: [facility] OR;  Service: Orthopedics;  Laterality: Left;    ARTHROSCOPY OF SHOULDER WITH REMOVAL OF DISTAL CLAVICLE Left 6/21/2022    Procedure: ARTHROSCOPY, SHOULDER, WITH DISTAL CLAVICLE EXCISION;  Surgeon: Staci Childress MD;  Location: [facility] OR;  Service: Orthopedics;  Laterality: Left;    ARTHROSCOPY,SHOULDER,WITH BICEPS TENODESIS Left  6/21/2022    Procedure: ARTHROSCOPY,SHOULDER,WITH BICEPS TENODESIS;  Surgeon: Staci Childress MD;  Location: [facility] OR;  Service: Orthopedics;  Laterality: Left;    BLEPHAROPLASTY      CAROTID ENDARTERECTOMY Right 10/15/2021    Procedure: ENDARTERECTOMY-CAROTID;  Surgeon: Imer Farooq MD;  Location: [facility] OR [address] [place] [address];  Service: Peripheral Vascular;  Laterality: Right;  AWAKE CERVICAL BLOCK    CATARACT EXTRACTION, BILATERAL      EXCISION OF BURSA Left 6/21/2022    Procedure: BURSECTOMY;  Surgeon: Staci Childress MD;  Location: [facility] OR;  Service: Orthopedics;  Laterality: Left;    FIXATION OF TENDON Right 2/27/2020    Procedure: FIXATION, TENDON, Biceps Tenodesis;  Surgeon: Staci Childress MD;  Location: [facility] OR;  Service: Orthopedics;  Laterality: Right;  FIXATION, TENDON, Biceps Tenodesis    OPEN REDUCTION AND INTERNAL FIXATION (ORIF) OF FRACTURE OF PROXIMAL HUMERUS Right 2/27/2020    Procedure: ORIF, FRACTURE, GREATER TUBEROSITY;  Surgeon: Staci Childress MD;  Location: [facility] OR;  Service: Orthopedics;  Laterality: Right;    ORIF HUMERUS FRACTURE Left 6/21/2022    Procedure: ORIF, FRACTURE, HUMERUS, GREATER TUBEROSITY;  Surgeon: Staci Childress MD;  Location: [facility] OR;  Service: Orthopedics;  Laterality: Left;    SHOULDER ARTHROSCOPY Left 6/21/2022    Procedure: ARTHROSCOPY, SHOULDER WITH LABRAL REPAIR;  Surgeon: Staci Cihldress MD;  Location: [facility] OR;  Service: Orthopedics;  Laterality: Left;    TONSILLECTOMY         Review of patient's allergies indicates:   Allergen Reactions    Clindamycin Nausea And Vomiting    Penicillins Hives       No current facility-administered medications on file prior to encounter.     Current Outpatient Medications on File Prior to Encounter   Medication Sig    aspirin (ECOTRIN) 81 MG EC tablet Take 81 mg by mouth once daily.    atenoloL (TENORMIN) 50 MG tablet TAKE 1 TABLET BY MOUTH ONCE DAILY    bempedoic acid (NEXLETOL) 180 mg Tab Take 1 tablet (180 mg) by mouth once daily.    collagen/biotin/ascorbic acid  (COLLAGEN 1500 PLUS C ORAL) Take by mouth Daily. 11 G of powder daily    fluorouraciL (EFUDEX) 5 % cream AAA on both arms BID x 2-3 weeks. Stop if blistered, oozing, or bleeding. Use daily sun protection.    furosemide (LASIX) 40 MG tablet Take 1 tablet (40 mg total) by mouth once daily.    ketoconazole (NIZORAL) 2 % cream Apply topically 2 (two) times daily.    multivit-min-FA-lycopen-lutein 300-600-300 mcg Tab Take by mouth.    omega-3 fatty acids/fish oil (FISH OIL-OMEGA-3 FATTY ACIDS) 300-1,000 mg capsule Take by mouth once daily.    omeprazole (PRILOSEC) 20 MG capsule Take 2 capsules (40 mg total) by mouth once daily.    oxyCODONE-acetaminophen (PERCOCET)  mg per tablet Take 1 tablet by mouth every 4-6 hours as needed for pain. Take stool softener with this medication.    promethazine (PHENERGAN) 25 MG tablet Take 1 tablet (25 mg total) by mouth every 6 (six) hours as needed for Nausea.    rosuvastatin (CRESTOR) 20 MG tablet TAKE 1 TABLET BY MOUTH ONCE DAILY    sacubitriL-valsartan (ENTRESTO) 24-26 mg per tablet Take 1 tablet by mouth 2 (two) times daily.    spironolactone (ALDACTONE) 25 MG tablet Take 1 tablet (25 mg total) by mouth once daily.    tadalafiL (CIALIS) 5 MG tablet Take 1 tablet (5 mg total) by mouth once daily.    traMADoL (ULTRAM) 50 mg tablet Take 1-2 tablets ( mg total) by mouth every 6 (six) hours as needed for Pain.     Family History       Problem Relation (Age of Onset)    Cancer Sister    Colon cancer Sister    Diabetes Mother    Heart disease Father    Hyperlipidemia Mother          Tobacco Use    Smoking status: Former     Types: Cigarettes     Quit date:      Years since quittin.1    Smokeless tobacco: Never   Substance and Sexual Activity    Alcohol use: Yes     Alcohol/week: 14.0 standard drinks     Types: 14 Glasses of wine per week     Comment: 2 glasses of wine daily    Drug use: Never    Sexual activity: Not Currently     Review of Systems   Constitutional:   Positive for activity change and fatigue. Negative for appetite change, chills and fever.   HENT:  Negative for congestion, postnasal drip, rhinorrhea, sneezing, sore throat and tinnitus.    Eyes:  Negative for photophobia and visual disturbance.   Respiratory:  Negative for cough, choking, chest tightness and shortness of breath.    Cardiovascular:  Positive for leg swelling. Negative for chest pain and palpitations.   Gastrointestinal:  Positive for abdominal distention, diarrhea, nausea and vomiting. Negative for abdominal pain, anal bleeding, blood in stool and constipation.   Endocrine: Negative for polyphagia and polyuria.   Genitourinary:  Negative for difficulty urinating, dysuria, frequency, hematuria and urgency.   Musculoskeletal:  Negative for arthralgias and myalgias.   Skin:  Negative for rash and wound.   Neurological:  Negative for dizziness, seizures, numbness and headaches.   Psychiatric/Behavioral:  Negative for agitation, confusion and hallucinations. The patient is not nervous/anxious.    Objective:     Vital Signs (Most Recent):  Temp: 98.5 °F (36.9 °C) (01/30/23 1157)  Pulse: 67 (01/30/23 1830)  Resp: 19 (01/30/23 1830)  BP: 138/61 (01/30/23 1730)  SpO2: 98 % (01/30/23 1830) Vital Signs (24h Range):  Temp:  [98.5 °F (36.9 °C)] 98.5 °F (36.9 °C)  Pulse:  [66-73] 67  Resp:  [17-19] 19  SpO2:  [97 %-100 %] 98 %  BP: (110-142)/(61-74) 138/61     Weight: 79.4 kg (175 lb)  Body mass index is 23.09 kg/m².    Physical Exam  Vitals reviewed. Exam conducted with a chaperone present.   Constitutional:       General: He is not in acute distress.     Appearance: Normal appearance. He is normal weight. He is not ill-appearing, toxic-appearing or diaphoretic.      Comments: Pleasant man in no acute distress. Cooperative with examination and conversant with interview.   HENT:      Head: Normocephalic and atraumatic.      Right Ear: External ear normal.      Left Ear: External ear normal.      Nose: Nose  normal. No congestion or rhinorrhea.      Mouth/Throat:      Mouth: Mucous membranes are moist.      Pharynx: Oropharynx is clear.   Eyes:      General: No scleral icterus.     Extraocular Movements: Extraocular movements intact.      Conjunctiva/sclera: Conjunctivae normal.      Pupils: Pupils are equal, round, and reactive to light.   Neck:      Comments: JVD noted at ear lobe.  Cardiovascular:      Rate and Rhythm: Normal rate and regular rhythm.      Pulses: Normal pulses.      Heart sounds: Normal heart sounds. No murmur heard.    No friction rub. No gallop.   Pulmonary:      Effort: Pulmonary effort is normal. No accessory muscle usage or prolonged expiration.      Breath sounds: Examination of the right-lower field reveals wheezing and rhonchi. Examination of the left-lower field reveals wheezing and rhonchi. Wheezing and rhonchi present. No decreased breath sounds.   Abdominal:      General: Abdomen is flat. Bowel sounds are normal. There is no distension.      Palpations: Abdomen is soft. There is no mass.      Tenderness: There is no abdominal tenderness. There is no guarding or rebound.      Hernia: No hernia is present.   Musculoskeletal:         General: Normal range of motion.      Cervical back: Neck supple.      Right lower leg: Edema present.      Left lower leg: Edema present.   Skin:     General: Skin is warm.   Neurological:      General: No focal deficit present.      Mental Status: He is alert and oriented to person, place, and time. Mental status is at baseline.      Sensory: No sensory deficit.      Motor: No weakness.   Psychiatric:         Mood and Affect: Mood normal.         Behavior: Behavior normal.         Thought Content: Thought content normal.         Judgment: Judgment normal.         CRANIAL NERVES     CN III, IV, VI   Pupils are equal, round, and reactive to light.     Significant Labs: All pertinent labs within the past 24 hours have been reviewed.  CBC:   Recent Labs   Lab  01/30/23  1511   WBC 11.29   HGB 12.1*   HCT 37.8*        CMP:   Recent Labs   Lab 01/30/23  1511      K 3.7      CO2 23      BUN 31*   CREATININE 1.5*   CALCIUM 8.6*   PROT 7.3   ALBUMIN 4.0   BILITOT 0.6   ALKPHOS 54*   AST 23   ALT 16   ANIONGAP 15       Significant Imaging: I have reviewed all pertinent imaging results/findings within the past 24 hours.

## 2023-01-31 NOTE — ASSESSMENT & PLAN NOTE
Symptoms stable.  Patient on omeprazole as outpatient; not on formulary.  We will begin pantoprazole.

## 2023-01-31 NOTE — CONSULTS
Food & Nutrition Education    Diet Education: Fluid and Salt restriction    Time Spent: 10 minutes    Learners: Patient and spouse    Handouts provided: Low Sodium Nutrition Therapy, Fluid-Restricted Nutrition Therapy    Comments: Discussed importance of a low sodium diet. Reviewed high sodium foods that should be avoided. Food labels, salt free seasonings, and recommended sodium intake reviewed. Encouraged healthy, fresh foods that are low in sodium that are good for consumption. Fluid intake and conversions discussed. Foods considered fluids were reviewed and encouraged to monitor. General healthy diet encouraged. All questions/concerns were addressed.    Follow-Up: 2/7/2023    Please Re-consult as needed.    Thanks!

## 2023-01-31 NOTE — PLAN OF CARE
[facility] - Cardiology Stepdown  Initial Discharge Assessment       Primary Care Provider: DEB Mora MD    Admission Diagnosis: Shortness of breath [R06.02]  Acute on chronic congestive heart failure, unspecified heart failure type [I50.9]    Admission Date: 1/30/2023  Expected Discharge Date: 2/3/2023    Discharge Barriers Identified: None    Payor: [employer]ARE / Plan: [employer] 65 / Product Type: Medicare Advantage /     Extended Emergency Contact Information  Primary Emergency Contact: Moon Sabillon  Mobile Phone: [phone]  Relation: Spouse  Preferred language: English   needed? No    Discharge Plan A: Home  Discharge Plan B: Home Health      [id number] - [place], [place] - [address]  [address]  [place] [address]  Phone: [phone] Fax: [phone]    [facility] - [address] [address]  Phone: [phone] Fax: [phone]    [id number] - [place], [place] - [address] [address]  Phone: [phone] Fax: [phone]      Initial Assessment (most recent)       Adult Discharge Assessment - 01/31/23 1156          Discharge Assessment    Assessment Type Discharge Planning Assessment     Confirmed/corrected address, phone number and insurance Yes     Confirmed Demographics Correct on Facesheet     Source of Information patient;family   Moon Sabillon at bedside ([phone])    Does patient/caregiver understand observation status No   Admitted as inpatient    Was observation education provided? No     Communicated BARB with patient/caregiver Yes     Reason For Admission Acute on chronic diastolic heart failure     People in Home spouse   Moon (wife)    Facility Arrived From: HOME - Instructed by Dr Sarabia (Cardiologist) to go to ED     Do you have help at home or someone to  help you manage your care at home? Yes     Who are your caregiver(s) and their phone number(s)? Moon Ramandeep at bedside ([phone])     Prior to hospitilization cognitive status: Alert/Oriented;No Deficits     Current cognitive status: No Deficits;Alert/Oriented     Walking or Climbing Stairs --   Independent with all ADLs    Dressing/Bathing --   Independent with ADLs    Home Accessibility stairs to enter home   Lives with wife in single family 2 story home with MBR on 1st level. 2 steps to point of entry    Number of Stairs, Main Entrance two     Surface of Stairs, Main Entrance concrete     Home Layout Able to live on 1st floor     Equipment Currently Used at Home shower chair   has walk in shower with shower chair available    Readmission within 30 days? No     Patient currently being followed by outpatient case management? No     Do you currently have service(s) that help you manage your care at home? No     Do you take prescription medications? Yes     Do you have prescription coverage? Yes     Coverage Payor:  [employer] - [employer] 65     Do you have any problems affording any of your prescribed medications? TBD     Is the patient taking medications as prescribed? yes     Who is going to help you get home at discharge? Moon Ramandeep ([phone])     How do you get to doctors appointments? family or friend will provide;car, drives self     Are you on dialysis? No     Do you take coumadin? No   reports taking baby ASA daily    Discharge Plan A Home     Discharge Plan B Home Health     DME Needed Upon Discharge  none     Discharge Plan discussed with: Spouse/sig other;Patient     Name(s) and Number(s) Moonanthony Sabillon ([phone])     Discharge Barriers Identified None        OTHER    Name(s) of People in Home Moonanthony Sabillon ([phone])                   This CM met with patient and wife in room 355 to complete DPA - questions answered - contact numbers provided  - Use [facility]REFERR[facility]ARM[facility] for any necessary discharge medications. Independent with ADLs - does not use DME or in home equipment. Is not on HD, BTs or home oxygen. Moon Sabillon ([phone]) to be primary source of help / wife states that son and grandchildren live close by and assist when necessary and transportation home at time of discharge. Will continue to follow for course of hospitalization. Discharge plan discussed / patient and wife agreeable to home health. Patient is current with [facility].      Zuleika Joseph RNCM  Case   Ext [phone]

## 2023-01-31 NOTE — ASSESSMENT & PLAN NOTE
Patient's troponin elevated with initial reading of 0.118 and repeat at 0.106.  Patient without complaints of chest pain or chest discomfort. This is likely secondary to demand ischemia from his heart failure exacerbation.

## 2023-01-31 NOTE — HOSPITAL COURSE
On admission, noted to have elevated BNP greater >3000 and an elevated troponin of 0.118 with repeat at 0.106; latter likely due to demand ischemia.  Initiated on IV diuresis for heart failure exacerbation.  Gradual improvement in symptoms and BNP.  Renal function largely at baseline throughout diuresis.  Echo repeated with findings as below:  The left ventricle is normal in size with concentric hypertrophy and normal systolic function.  The estimated ejection fraction is 65%.  Indeterminate left ventricular diastolic function.  Moderate right ventricular enlargement with normal right ventricular systolic function.  Severe left atrial enlargement.  Right atrial enlargement.  Mild-to-moderate mitral regurgitation.  Moderate aortic regurgitation.  Moderate to severe pulmonic regurgitation    Patient euvolemic 2/2.  Transition to p.o. Lasix.  However, with onset of left ankle pain.  Left ankle x-ray unremarkable for acute fracture or dislocation or bone destruction.  Left ankle MRI without contrast remarkable for Medial ankle tendon tenosynovitis and Tibiotalar joint effusion.  Orthopedic surgery consulted.  Aspiration ankle was recommended, however despite extensive counseling regarding risks of deferring, patient refused.  Upon further counseling, patient notes improvement in pain and ambulation with supportive care including pain control, elevation, ice pack.  He is amenable to continue these interventions at home. Patient without leukocytosis or fevers and apart from mildly elevated inflammatory markers, no signs or symptoms to suggest systemic infection.  Return precautions advised and patient verbalizes understanding of any signs or symptoms that would necessitate immediate return to the ER and risks of not proceeding with joint aspiration.  Due to CKD and heart failure, deferring NSAID therapy.  Also discussed with patient, hesitant to initiate any steroidal therapy if potentially gout as septic joint not ruled  out.  Advised continuing Tylenol and supportive care as patient noted improvement.  Ambulatory referral was placed to Nephrology and Cardiology.  PCP follow-up in place.  Patient discharged on 02/03 in stable condition.    Vitals:    02/03/23 0806 02/03/23 1124 02/03/23 1248 02/03/23 1533   BP: 135/65 (!) 111/59     BP Location: Left arm Right arm     Patient Position: Lying Lying     Pulse: 71 73 74 85   Resp: 14 16     Temp: 98.8 °F (37.1 °C) 97.6 °F (36.4 °C)     TempSrc: Oral Oral     SpO2: 97% 97%     Weight:       Height:         Physical Exam  Vitals and nursing note reviewed.   Constitutional:       General: He is not in acute distress.     Appearance: He is not toxic-appearing or diaphoretic.   HENT:      Head: Normocephalic and atraumatic.      Right Ear: External ear normal.      Left Ear: External ear normal.      Nose: No congestion.   Eyes:      General: No scleral icterus.        Right eye: No discharge.         Left eye: No discharge.   Cardiovascular:      Rate and Rhythm: Normal rate and rhythm.     Heart sounds: No murmur heard.    No friction rub. No gallop.   Pulmonary:      Effort: Pulmonary effort is normal. No respiratory distress.      Breath sounds: No rales.   Chest:      Chest wall: No tenderness.   Abdominal:      General: Bowel sounds are normal. There is no distension.      Palpations: Abdomen is soft.      Tenderness: There is no abdominal tenderness. There is no guarding.   Musculoskeletal:         General:  Tenderness on superior site of ankle; pain with range of movement improved compared to 02/03     Cervical back: No rigidity or tenderness.      Right lower leg: Edema (improved) present.      Left lower leg: Edema (improved) present.   Skin:     Findings: No erythema or rash.   Neurological:      General: No focal deficit present.      Mental Status: He is alert and oriented to person, place, and time.      Cranial Nerves: No cranial nerve deficit.      Motor: No weakness.       Gait: Gait normal.   Psychiatric:         Behavior: Behavior normal.

## 2023-01-31 NOTE — PLAN OF CARE
Patient resting comfortably in bed at this time in no acute distress. Patient has no complaints and reports improvement in BLE swelling. Vital signs are stable. Safety measures in place and call bell within reach.     Problem: Adult Inpatient Plan of Care  Goal: Plan of Care Review  Outcome: Ongoing, Progressing  Goal: Patient-Specific Goal (Individualized)  Outcome: Ongoing, Progressing  Goal: Absence of Hospital-Acquired Illness or Injury  Outcome: Ongoing, Progressing  Goal: Optimal Comfort and Wellbeing  Outcome: Ongoing, Progressing  Goal: Readiness for Transition of Care  Outcome: Ongoing, Progressing     Problem: Infection  Goal: Absence of Infection Signs and Symptoms  Outcome: Ongoing, Progressing     Problem: Fall Injury Risk  Goal: Absence of Fall and Fall-Related Injury  Outcome: Ongoing, Progressing

## 2023-01-31 NOTE — ASSESSMENT & PLAN NOTE
Patient presenting with volume overload given increased lower extremity edema, paroxysmal nocturnal dyspnea and decreased exercise tolerance.  JVD and bibasilar crackles noted on examination.  BNP elevated.  Patient with recent diagnosis of heart failure with preserved ejection fraction. Last transthoracic echocardiogram (1/2023) with EF 65%.Patient without increase urine output on oral diuretics which is likely the cause of his volume overload and exacerbation; will need better titration while inpatient.  - cont IV furosemide 80 mg daily will consider deescalate in AM  - strict intake and output; goal of net negative two liters daily  - replace all electrolytes keeping K>4 and Mg>2  - cardiac monitoring/telemetry  - decreased home atenolol dose to 25 mg daily given exacerbation  - resume spironolactone 25 mg daily  - holding patient's home sacubitril-valsartan; unclear why patient is on this medication given his preserved ejection fraction

## 2023-01-31 NOTE — PROGRESS NOTES
"[facility]) nurse navigator notified of H[facility] candidate in need of education and introduction to 4-6 week program.      PT aao x 3 while lying in bed. Introduced self to pt as [facility] nurse navigator.     Patient given "[facility] Home Care Guide" which includes "Daily weight and symptom tracker".  Encouraged pt  to review information.      Reviewed the following key points of H[facility] program with pt and family:   1.) Take your medications as directed.    2.) Weight yourself daily   3.) Follow low salt and limited fluid diet.    4.) Stop smoking and start exercising   5.) Go to your appointments and call your team.      Pt reminded to follow Symptom tracker and to call at the onset of symptoms according to tracker.     Reviewed plan for follow up once discharged to include phone calls, in person and virtual visits to assist pt optimizing their heart failure medication regimen and encouraging healthy lifestyle modifications.  Reminded pt that program will assist them over the next 4-6 weeks and then patient will be transferred to long term care provider .  Reminded pt how to contact HFT[facility] navigator via phone and or via [employer].     Pt instructed appointment will be printed on hospital discharge paperwork.     Pt also reminded RN will call 48-72 hours after discharge to check on them.     PT can verbalize read back of information given.  Encouraged pt and family to read over information often and contact team with any questions or concerns.    "

## 2023-01-31 NOTE — HPI
Gus Sabillon is an 82-year-old man with a past medical history of primary hypertension, hyperlipidemia, HFpEF, carotid stenosis s/p CEA, PAD, GERD, who presents to the emergency department with increased lower extremity edema and shortness of breath.  The patient carries a new diagnosis of congestive heart failure.  He was recently started on oral furosemide 20 mg daily by his primary care provider and has been compliant with the medication.  The patient and his wife, who is present at the bedside, say that the patient has had a reduced exercise tolerance for the past several weeks.  The patient states that he usually enjoys walking with his wife every night but is now having to stop after 1 block because he is short of breath.  He also notes increased lower extremity edema.  The patient does not weigh himself daily so is not aware of any changes in his weight, however he does note increased bloating and abdominal distention.  He describes paroxysmal nocturnal dyspnea.  The patient denies any chest pain or chest discomfort.  He recently established care with a cardiologist who increased his oral furosemide to 40 mg daily.  The patient reports compliance with this medication but also states that he has not had increased urine output from the increased dose.  The patient is compliant with a low-sodium diet, however, he does not appear to be restricting his fluid intake.  He denies any abdominal pain or nausea, but the patient says he has been having frequent episodes of loose and watery bowel movements.  The patient reports multiple episodes each day.    In the emergency department, the patient was noted to be hemodynamically stable.  He had an elevated BNP greater >3000 and an elevated troponin of 0.118 with repeat at 0.106. The patient was admitted to Hospital Medicine for further management.

## 2023-01-31 NOTE — H&P
[facility]  History & Physical    Patient Name: Gus Sabillon  MRN: [id number]  Patient Class: IP- Inpatient  Admission Date: 1/30/2023  Attending Physician: Forrest Webster MD   Primary Care Provider: DEB Mora MD    Patient information was obtained from patient, spouse/SO, past medical records and ER records.     Subjective:     Principal Problem:Acute on chronic diastolic heart failure    Chief Complaint:   Chief Complaint   Patient presents with    Leg Swelling     X2 weeks. Pt. Rx-ed meds by cards. Pt. States he is still having leg swelling and was told to come in.         HPI: Gus Sabillon is an 82-year-old man with a past medical history of primary hypertension, hyperlipidemia, HFpEF, carotid stenosis s/p CEA, PAD, GERD, who presents to the emergency department with increased lower extremity edema and shortness of breath.  The patient carries a new diagnosis of congestive heart failure.  He was recently started on oral furosemide 20 mg daily by his primary care provider and has been compliant with the medication.  The patient and his wife, who is present at the bedside, say that the patient has had a reduced exercise tolerance for the past several weeks.  The patient states that he usually enjoys walking with his wife every night but is now having to stop after 1 block because he is short of breath.  He also notes increased lower extremity edema.  The patient does not weigh himself daily so is not aware of any changes in his weight, however he does note increased bloating and abdominal distention.  He describes paroxysmal nocturnal dyspnea.  The patient denies any chest pain or chest discomfort.  He recently established care with a cardiologist who increased his oral furosemide to 40 mg daily.  The patient reports compliance with this medication but also states that he has not had increased urine output from the increased dose.  The patient is compliant  with a low-sodium diet, however, he does not appear to be restricting his fluid intake.  He denies any abdominal pain or nausea, but the patient says he has been having frequent episodes of loose and watery bowel movements.  The patient reports multiple episodes each day.    In the emergency department, the patient was noted to be hemodynamically stable.  He had an elevated BNP greater >3000 and an elevated troponin of 0.118 with repeat at 0.106. The patient was admitted to Hospital Medicine for further management.      Past Medical History:   Diagnosis Date    CKD (chronic kidney disease) stage 3, GFR 30-59 ml/min     Dyslipidemia     GERD (gastroesophageal reflux disease)     Hx of melanoma excision     Hypertension     Melanoma     PAD (peripheral artery disease)     Squamous cell carcinoma of skin        Past Surgical History:   Procedure Laterality Date    ARTHROSCOPIC DEBRIDEMENT OF SHOULDER Right 2/27/2020    Procedure: EXTENSIVE DEBRIDEMENT, SHOULDER, ARTHROSCOPIC;  Surgeon: Staci Childress MD;  Location: [facility] OR;  Service: Orthopedics;  Laterality: Right;    ARTHROSCOPIC DEBRIDEMENT OF SHOULDER Left 6/21/2022    Procedure: EXTENSIVE DEBRIDEMENT, SHOULDER, ARTHROSCOPIC;  Surgeon: Staci Childress MD;  Location: [facility] OR;  Service: Orthopedics;  Laterality: Left;    ARTHROSCOPIC REPAIR OF ROTATOR CUFF OF SHOULDER Right 2/27/2020    Procedure: REPAIR, ROTATOR CUFF, ARTHROSCOPIC;  Surgeon: Staci Childress MD;  Location: [facility] OR;  Service: Orthopedics;  Laterality: Right;    ARTHROSCOPIC REPAIR OF ROTATOR CUFF OF SHOULDER Left 6/21/2022    Procedure: REPAIR, ROTATOR CUFF, ARTHROSCOPIC WITH CUFF MEND;  Surgeon: Staci Childress MD;  Location: [facility] OR;  Service: Orthopedics;  Laterality: Left;    ARTHROSCOPY OF SHOULDER WITH DECOMPRESSION OF SUBACROMIAL SPACE Right 2/27/2020    Procedure: ARTHROSCOPY, SHOULDER, WITH SUBACROMIAL SPACE DECOMPRESSION;  Surgeon: Staci Childress MD;  Location: [facility] OR;  Service: Orthopedics;   Laterality: Right;    ARTHROSCOPY OF SHOULDER WITH DECOMPRESSION OF SUBACROMIAL SPACE Left 6/21/2022    Procedure: ARTHROSCOPY, SHOULDER, WITH SUBACROMIAL  DECOMPRESSION;  Surgeon: Staci Childress MD;  Location: [facility] OR;  Service: Orthopedics;  Laterality: Left;    ARTHROSCOPY OF SHOULDER WITH REMOVAL OF DISTAL CLAVICLE Left 6/21/2022    Procedure: ARTHROSCOPY, SHOULDER, WITH DISTAL CLAVICLE EXCISION;  Surgeon: Staci Childress MD;  Location: [facility] OR;  Service: Orthopedics;  Laterality: Left;    ARTHROSCOPY,SHOULDER,WITH BICEPS TENODESIS Left 6/21/2022    Procedure: ARTHROSCOPY,SHOULDER,WITH BICEPS TENODESIS;  Surgeon: Staci Childress MD;  Location: [facility] OR;  Service: Orthopedics;  Laterality: Left;    BLEPHAROPLASTY      CAROTID ENDARTERECTOMY Right 10/15/2021    Procedure: ENDARTERECTOMY-CAROTID;  Surgeon: Imer Farooq MD;  Location: [facility] OR [address] [place] [address];  Service: Peripheral Vascular;  Laterality: Right;  AWAKE CERVICAL BLOCK    CATARACT EXTRACTION, BILATERAL      EXCISION OF BURSA Left 6/21/2022    Procedure: BURSECTOMY;  Surgeon: Staci Childress MD;  Location: [facility] OR;  Service: Orthopedics;  Laterality: Left;    FIXATION OF TENDON Right 2/27/2020    Procedure: FIXATION, TENDON, Biceps Tenodesis;  Surgeon: Staci Childress MD;  Location: [facility] OR;  Service: Orthopedics;  Laterality: Right;  FIXATION, TENDON, Biceps Tenodesis    OPEN REDUCTION AND INTERNAL FIXATION (ORIF) OF FRACTURE OF PROXIMAL HUMERUS Right 2/27/2020    Procedure: ORIF, FRACTURE, GREATER TUBEROSITY;  Surgeon: Staci Childress MD;  Location: [facility] OR;  Service: Orthopedics;  Laterality: Right;    ORIF HUMERUS FRACTURE Left 6/21/2022    Procedure: ORIF, FRACTURE, HUMERUS, GREATER TUBEROSITY;  Surgeon: Staci Childress MD;  Location: [facility] OR;  Service: Orthopedics;  Laterality: Left;    SHOULDER ARTHROSCOPY Left 6/21/2022    Procedure: ARTHROSCOPY, SHOULDER WITH LABRAL REPAIR;  Surgeon: Staci Childress MD;  Location: [facility] OR;  Service: Orthopedics;  Laterality: Left;     TONSILLECTOMY         Review of patient's allergies indicates:   Allergen Reactions    Clindamycin Nausea And Vomiting    Penicillins Hives       No current facility-administered medications on file prior to encounter.     Current Outpatient Medications on File Prior to Encounter   Medication Sig    aspirin (ECOTRIN) 81 MG EC tablet Take 81 mg by mouth once daily.    atenoloL (TENORMIN) 50 MG tablet TAKE 1 TABLET BY MOUTH ONCE DAILY    bempedoic acid (NEXLETOL) 180 mg Tab Take 1 tablet (180 mg) by mouth once daily.    collagen/biotin/ascorbic acid (COLLAGEN 1500 PLUS C ORAL) Take by mouth Daily. 11 G of powder daily    fluorouraciL (EFUDEX) 5 % cream AAA on both arms BID x 2-3 weeks. Stop if blistered, oozing, or bleeding. Use daily sun protection.    furosemide (LASIX) 40 MG tablet Take 1 tablet (40 mg total) by mouth once daily.    ketoconazole (NIZORAL) 2 % cream Apply topically 2 (two) times daily.    multivit-min-FA-lycopen-lutein 300-600-300 mcg Tab Take by mouth.    omega-3 fatty acids/fish oil (FISH OIL-OMEGA-3 FATTY ACIDS) 300-1,000 mg capsule Take by mouth once daily.    omeprazole (PRILOSEC) 20 MG capsule Take 2 capsules (40 mg total) by mouth once daily.    oxyCODONE-acetaminophen (PERCOCET)  mg per tablet Take 1 tablet by mouth every 4-6 hours as needed for pain. Take stool softener with this medication.    promethazine (PHENERGAN) 25 MG tablet Take 1 tablet (25 mg total) by mouth every 6 (six) hours as needed for Nausea.    rosuvastatin (CRESTOR) 20 MG tablet TAKE 1 TABLET BY MOUTH ONCE DAILY    sacubitriL-valsartan (ENTRESTO) 24-26 mg per tablet Take 1 tablet by mouth 2 (two) times daily.    spironolactone (ALDACTONE) 25 MG tablet Take 1 tablet (25 mg total) by mouth once daily.    tadalafiL (CIALIS) 5 MG tablet Take 1 tablet (5 mg total) by mouth once daily.    traMADoL (ULTRAM) 50 mg tablet Take 1-2 tablets ( mg total) by mouth every 6 (six) hours as needed for Pain.      Family History       Problem Relation (Age of Onset)    Cancer Sister    Colon cancer Sister    Diabetes Mother    Heart disease Father    Hyperlipidemia Mother          Tobacco Use    Smoking status: Former     Types: Cigarettes     Quit date:      Years since quittin.1    Smokeless tobacco: Never   Substance and Sexual Activity    Alcohol use: Yes     Alcohol/week: 14.0 standard drinks     Types: 14 Glasses of wine per week     Comment: 2 glasses of wine daily    Drug use: Never    Sexual activity: Not Currently     Review of Systems   Constitutional:  Positive for activity change and fatigue. Negative for appetite change, chills and fever.   HENT:  Negative for congestion, postnasal drip, rhinorrhea, sneezing, sore throat and tinnitus.    Eyes:  Negative for photophobia and visual disturbance.   Respiratory:  Negative for cough, choking, chest tightness and shortness of breath.    Cardiovascular:  Positive for leg swelling. Negative for chest pain and palpitations.   Gastrointestinal:  Positive for abdominal distention, diarrhea, nausea and vomiting. Negative for abdominal pain, anal bleeding, blood in stool and constipation.   Endocrine: Negative for polyphagia and polyuria.   Genitourinary:  Negative for difficulty urinating, dysuria, frequency, hematuria and urgency.   Musculoskeletal:  Negative for arthralgias and myalgias.   Skin:  Negative for rash and wound.   Neurological:  Negative for dizziness, seizures, numbness and headaches.   Psychiatric/Behavioral:  Negative for agitation, confusion and hallucinations. The patient is not nervous/anxious.    Objective:     Vital Signs (Most Recent):  Temp: 98.5 °F (36.9 °C) (23 1157)  Pulse: 67 (23 1830)  Resp: 19 (23 1830)  BP: 138/61 (23 1730)  SpO2: 98 % (23 1830) Vital Signs (24h Range):  Temp:  [98.5 °F (36.9 °C)] 98.5 °F (36.9 °C)  Pulse:  [66-73] 67  Resp:  [17-19] 19  SpO2:  [97 %-100 %] 98 %  BP:  (110-142)/(61-74) 138/61     Weight: 79.4 kg (175 lb)  Body mass index is 23.09 kg/m².    Physical Exam  Vitals reviewed. Exam conducted with a chaperone present.   Constitutional:       General: He is not in acute distress.     Appearance: Normal appearance. He is normal weight. He is not ill-appearing, toxic-appearing or diaphoretic.      Comments: Pleasant man in no acute distress. Cooperative with examination and conversant with interview.   HENT:      Head: Normocephalic and atraumatic.      Right Ear: External ear normal.      Left Ear: External ear normal.      Nose: Nose normal. No congestion or rhinorrhea.      Mouth/Throat:      Mouth: Mucous membranes are moist.      Pharynx: Oropharynx is clear.   Eyes:      General: No scleral icterus.     Extraocular Movements: Extraocular movements intact.      Conjunctiva/sclera: Conjunctivae normal.      Pupils: Pupils are equal, round, and reactive to light.   Neck:      Comments: JVD noted at ear lobe.  Cardiovascular:      Rate and Rhythm: Normal rate and regular rhythm.      Pulses: Normal pulses.      Heart sounds: Normal heart sounds. No murmur heard.    No friction rub. No gallop.   Pulmonary:      Effort: Pulmonary effort is normal. No accessory muscle usage or prolonged expiration.      Breath sounds: Examination of the right-lower field reveals wheezing and rhonchi. Examination of the left-lower field reveals wheezing and rhonchi. Wheezing and rhonchi present. No decreased breath sounds.   Abdominal:      General: Abdomen is flat. Bowel sounds are normal. There is no distension.      Palpations: Abdomen is soft. There is no mass.      Tenderness: There is no abdominal tenderness. There is no guarding or rebound.      Hernia: No hernia is present.   Musculoskeletal:         General: Normal range of motion.      Cervical back: Neck supple.      Right lower leg: Edema present.      Left lower leg: Edema present.   Skin:     General: Skin is warm.    Neurological:      General: No focal deficit present.      Mental Status: He is alert and oriented to person, place, and time. Mental status is at baseline.      Sensory: No sensory deficit.      Motor: No weakness.   Psychiatric:         Mood and Affect: Mood normal.         Behavior: Behavior normal.         Thought Content: Thought content normal.         Judgment: Judgment normal.         CRANIAL NERVES     CN III, IV, VI   Pupils are equal, round, and reactive to light.     Significant Labs: All pertinent labs within the past 24 hours have been reviewed.  CBC:   Recent Labs   Lab 01/30/23  1511   WBC 11.29   HGB 12.1*   HCT 37.8*        CMP:   Recent Labs   Lab 01/30/23  1511      K 3.7      CO2 23      BUN 31*   CREATININE 1.5*   CALCIUM 8.6*   PROT 7.3   ALBUMIN 4.0   BILITOT 0.6   ALKPHOS 54*   AST 23   ALT 16   ANIONGAP 15       Significant Imaging: I have reviewed all pertinent imaging results/findings within the past 24 hours.    Assessment/Plan:     * Acute on chronic diastolic heart failure  Patient presenting with volume overload given increased lower extremity edema, paroxysmal nocturnal dyspnea and decreased exercise tolerance.  JVD and bibasilar crackles noted on examination.  BNP elevated.  Patient with recent diagnosis of heart failure with preserved ejection fraction. Last transthoracic echocardiogram (1/2023) with EF 65%.Patient without increase urine output on oral diuretics which is likely the cause of his volume overload and exacerbation; will need better titration while inpatient.  - begin IV furosemide 80 mg daily  - strict intake and output; goal of net negative two liters daily  - replace all electrolytes keeping K>4 and Mg>2  - cardiac monitoring/telemetry  - decreased home atenolol dose to 25 mg daily given exacerbation  - resume spironolactone 25 mg daily  - holding patient's home sacubitril-valsartan; unclear why patient is on this medication given his  preserved ejection fraction    Troponin level elevated  Patient's troponin elevated with initial reading of 0.118 and repeat at 0.106.  Patient without complaints of chest pain or chest discomfort. This is likely secondary to demand ischemia from his heart failure exacerbation.      Carotid atherosclerosis, bilateral  Patient with history of carotid atherosclerosis.  Patient had right carotid endarterectomy.  Resume home aspirin and statin.      Gastroesophageal reflux disease  Symptoms stable.  Patient on omeprazole as outpatient; not on formulary.  We will begin pantoprazole.      HTN (hypertension), benign  Patient normotensive in the emergency department.  We will resume home antihypertensive regimen with some modifications.  - decreased atenolol given his heart failure exacerbation  - resume spironolactone 25 mg daily        VTE Risk Mitigation (From admission, onward)         Ordered     heparin (porcine) injection 5,000 Units  Every 8 hours         01/30/23 1918     IP VTE HIGH RISK PATIENT  Once         01/30/23 1918     Place sequential compression device  Until discontinued         01/30/23 1918               Code Status: FULL    Abdullahi Combs MD  Department of Hospital Medicine   [facility]

## 2023-01-31 NOTE — ASSESSMENT & PLAN NOTE
Patient normotensive in the emergency department.  We will resume home antihypertensive regimen with some modifications.  - decreased atenolol given his heart failure exacerbation  - resume spironolactone 25 mg daily  -Entresto on hold, outpatient cardiology follow up

## 2023-01-31 NOTE — ASSESSMENT & PLAN NOTE
Patient with history of carotid atherosclerosis.  Patient had right carotid endarterectomy.  Resume home aspirin and statin.

## 2023-02-01 PROBLEM — E83.42 HYPOMAGNESEMIA: Status: ACTIVE | Noted: 2023-02-01

## 2023-02-01 LAB
ANION GAP SERPL CALC-SCNC: 10 MMOL/L (ref 8–16)
ANION GAP SERPL CALC-SCNC: 13 MMOL/L (ref 8–16)
BASOPHILS # BLD AUTO: 0.05 K/UL (ref 0–0.2)
BASOPHILS NFR BLD: 0.6 % (ref 0–1.9)
BUN SERPL-MCNC: 32 MG/DL (ref 8–23)
BUN SERPL-MCNC: 36 MG/DL (ref 8–23)
CALCIUM SERPL-MCNC: 7.7 MG/DL (ref 8.7–10.5)
CALCIUM SERPL-MCNC: 8.1 MG/DL (ref 8.7–10.5)
CHLORIDE SERPL-SCNC: 110 MMOL/L (ref 95–110)
CHLORIDE SERPL-SCNC: 113 MMOL/L (ref 95–110)
CO2 SERPL-SCNC: 21 MMOL/L (ref 23–29)
CO2 SERPL-SCNC: 24 MMOL/L (ref 23–29)
CREAT SERPL-MCNC: 1.8 MG/DL (ref 0.5–1.4)
CREAT SERPL-MCNC: 1.8 MG/DL (ref 0.5–1.4)
DIFFERENTIAL METHOD: ABNORMAL
EOSINOPHIL # BLD AUTO: 0.3 K/UL (ref 0–0.5)
EOSINOPHIL NFR BLD: 3.5 % (ref 0–8)
ERYTHROCYTE [DISTWIDTH] IN BLOOD BY AUTOMATED COUNT: 13.8 % (ref 11.5–14.5)
EST. GFR  (NO RACE VARIABLE): 37.3 ML/MIN/1.73 M^2
EST. GFR  (NO RACE VARIABLE): 37.3 ML/MIN/1.73 M^2
GLUCOSE SERPL-MCNC: 101 MG/DL (ref 70–110)
GLUCOSE SERPL-MCNC: 137 MG/DL (ref 70–110)
HCT VFR BLD AUTO: 32.5 % (ref 40–54)
HGB BLD-MCNC: 10.4 G/DL (ref 14–18)
IMM GRANULOCYTES # BLD AUTO: 0.02 K/UL (ref 0–0.04)
IMM GRANULOCYTES NFR BLD AUTO: 0.2 % (ref 0–0.5)
LYMPHOCYTES # BLD AUTO: 3.6 K/UL (ref 1–4.8)
LYMPHOCYTES NFR BLD: 39.8 % (ref 18–48)
MAGNESIUM SERPL-MCNC: 0.8 MG/DL (ref 1.6–2.6)
MAGNESIUM SERPL-MCNC: 1.3 MG/DL (ref 1.6–2.6)
MAGNESIUM SERPL-MCNC: 1.4 MG/DL (ref 1.6–2.6)
MCH RBC QN AUTO: 31 PG (ref 27–31)
MCHC RBC AUTO-ENTMCNC: 32 G/DL (ref 32–36)
MCV RBC AUTO: 97 FL (ref 82–98)
MONOCYTES # BLD AUTO: 0.8 K/UL (ref 0.3–1)
MONOCYTES NFR BLD: 8.6 % (ref 4–15)
NEUTROPHILS # BLD AUTO: 4.3 K/UL (ref 1.8–7.7)
NEUTROPHILS NFR BLD: 47.3 % (ref 38–73)
NRBC BLD-RTO: 0 /100 WBC
PLATELET # BLD AUTO: 175 K/UL (ref 150–450)
PMV BLD AUTO: 11.5 FL (ref 9.2–12.9)
POTASSIUM SERPL-SCNC: 3.5 MMOL/L (ref 3.5–5.1)
POTASSIUM SERPL-SCNC: 4.1 MMOL/L (ref 3.5–5.1)
RBC # BLD AUTO: 3.35 M/UL (ref 4.6–6.2)
SODIUM SERPL-SCNC: 144 MMOL/L (ref 136–145)
SODIUM SERPL-SCNC: 147 MMOL/L (ref 136–145)
WBC # BLD AUTO: 9.09 K/UL (ref 3.9–12.7)

## 2023-02-01 PROCEDURE — 25000003 PHARM REV CODE 250: Performed by: INTERNAL MEDICINE

## 2023-02-01 PROCEDURE — 25000003 PHARM REV CODE 250: Performed by: STUDENT IN AN ORGANIZED HEALTH CARE EDUCATION/TRAINING PROGRAM

## 2023-02-01 PROCEDURE — 97165 OT EVAL LOW COMPLEX 30 MIN: CPT

## 2023-02-01 PROCEDURE — 97161 PT EVAL LOW COMPLEX 20 MIN: CPT

## 2023-02-01 PROCEDURE — 63600175 PHARM REV CODE 636 W HCPCS: Performed by: FAMILY MEDICINE

## 2023-02-01 PROCEDURE — 36415 COLL VENOUS BLD VENIPUNCTURE: CPT | Performed by: FAMILY MEDICINE

## 2023-02-01 PROCEDURE — 63600175 PHARM REV CODE 636 W HCPCS: Performed by: STUDENT IN AN ORGANIZED HEALTH CARE EDUCATION/TRAINING PROGRAM

## 2023-02-01 PROCEDURE — 85025 COMPLETE CBC W/AUTO DIFF WBC: CPT | Performed by: STUDENT IN AN ORGANIZED HEALTH CARE EDUCATION/TRAINING PROGRAM

## 2023-02-01 PROCEDURE — 99232 SBSQ HOSP IP/OBS MODERATE 35: CPT | Mod: ,,, | Performed by: FAMILY MEDICINE

## 2023-02-01 PROCEDURE — 94761 N-INVAS EAR/PLS OXIMETRY MLT: CPT

## 2023-02-01 PROCEDURE — 80048 BASIC METABOLIC PNL TOTAL CA: CPT | Mod: 91 | Performed by: STUDENT IN AN ORGANIZED HEALTH CARE EDUCATION/TRAINING PROGRAM

## 2023-02-01 PROCEDURE — 25000003 PHARM REV CODE 250: Performed by: FAMILY MEDICINE

## 2023-02-01 PROCEDURE — 36415 COLL VENOUS BLD VENIPUNCTURE: CPT | Performed by: STUDENT IN AN ORGANIZED HEALTH CARE EDUCATION/TRAINING PROGRAM

## 2023-02-01 PROCEDURE — 99232 PR SUBSEQUENT HOSPITAL CARE,LEVL II: ICD-10-PCS | Mod: ,,, | Performed by: FAMILY MEDICINE

## 2023-02-01 PROCEDURE — 20600001 HC STEP DOWN PRIVATE ROOM

## 2023-02-01 PROCEDURE — 83735 ASSAY OF MAGNESIUM: CPT | Performed by: FAMILY MEDICINE

## 2023-02-01 PROCEDURE — 83735 ASSAY OF MAGNESIUM: CPT | Mod: 91 | Performed by: STUDENT IN AN ORGANIZED HEALTH CARE EDUCATION/TRAINING PROGRAM

## 2023-02-01 RX ORDER — MAGNESIUM SULFATE HEPTAHYDRATE 40 MG/ML
2 INJECTION, SOLUTION INTRAVENOUS ONCE
Status: COMPLETED | OUTPATIENT
Start: 2023-02-01 | End: 2023-02-02

## 2023-02-01 RX ORDER — POTASSIUM CHLORIDE 20 MEQ/1
40 TABLET, EXTENDED RELEASE ORAL
Status: COMPLETED | OUTPATIENT
Start: 2023-02-01 | End: 2023-02-01

## 2023-02-01 RX ORDER — MAGNESIUM SULFATE HEPTAHYDRATE 40 MG/ML
2 INJECTION, SOLUTION INTRAVENOUS ONCE
Status: COMPLETED | OUTPATIENT
Start: 2023-02-01 | End: 2023-02-01

## 2023-02-01 RX ORDER — FUROSEMIDE 10 MG/ML
20 INJECTION INTRAMUSCULAR; INTRAVENOUS
Status: DISCONTINUED | OUTPATIENT
Start: 2023-02-01 | End: 2023-02-01

## 2023-02-01 RX ORDER — FUROSEMIDE 10 MG/ML
40 INJECTION INTRAMUSCULAR; INTRAVENOUS
Status: DISCONTINUED | OUTPATIENT
Start: 2023-02-01 | End: 2023-02-02

## 2023-02-01 RX ORDER — DIPHENHYDRAMINE HCL 25 MG
25 CAPSULE ORAL EVERY 6 HOURS PRN
Status: DISCONTINUED | OUTPATIENT
Start: 2023-02-01 | End: 2023-02-03 | Stop reason: HOSPADM

## 2023-02-01 RX ADMIN — Medication 400 MG: at 09:02

## 2023-02-01 RX ADMIN — FUROSEMIDE 40 MG: 10 INJECTION, SOLUTION INTRAMUSCULAR; INTRAVENOUS at 09:02

## 2023-02-01 RX ADMIN — ATENOLOL 25 MG: 25 TABLET ORAL at 09:02

## 2023-02-01 RX ADMIN — MAGNESIUM SULFATE 2 G: 2 INJECTION INTRAVENOUS at 10:02

## 2023-02-01 RX ADMIN — HEPARIN SODIUM 5000 UNITS: 5000 INJECTION INTRAVENOUS; SUBCUTANEOUS at 06:02

## 2023-02-01 RX ADMIN — ACETAMINOPHEN 650 MG: 325 TABLET ORAL at 10:02

## 2023-02-01 RX ADMIN — DIPHENHYDRAMINE HYDROCHLORIDE, ZINC ACETATE: 2; .1 CREAM TOPICAL at 05:02

## 2023-02-01 RX ADMIN — HEPARIN SODIUM 5000 UNITS: 5000 INJECTION INTRAVENOUS; SUBCUTANEOUS at 02:02

## 2023-02-01 RX ADMIN — POTASSIUM CHLORIDE 40 MEQ: 1500 TABLET, EXTENDED RELEASE ORAL at 10:02

## 2023-02-01 RX ADMIN — ASPIRIN 81 MG: 81 TABLET, COATED ORAL at 09:02

## 2023-02-01 RX ADMIN — SPIRONOLACTONE 25 MG: 25 TABLET, FILM COATED ORAL at 09:02

## 2023-02-01 RX ADMIN — ATORVASTATIN CALCIUM 80 MG: 40 TABLET, FILM COATED ORAL at 09:02

## 2023-02-01 RX ADMIN — POTASSIUM CHLORIDE 40 MEQ: 1500 TABLET, EXTENDED RELEASE ORAL at 05:02

## 2023-02-01 RX ADMIN — HEPARIN SODIUM 5000 UNITS: 5000 INJECTION INTRAVENOUS; SUBCUTANEOUS at 09:02

## 2023-02-01 RX ADMIN — FUROSEMIDE 20 MG: 10 INJECTION, SOLUTION INTRAMUSCULAR; INTRAVENOUS at 09:02

## 2023-02-01 RX ADMIN — PANTOPRAZOLE SODIUM 40 MG: 40 TABLET, DELAYED RELEASE ORAL at 09:02

## 2023-02-01 NOTE — ASSESSMENT & PLAN NOTE
-controlled, low normal  - decreased atenolol given his heart failure exacerbation  - resume spironolactone 25 mg daily  -Entresto on hold, outpatient cardiology follow up

## 2023-02-01 NOTE — PLAN OF CARE
Problem: Physical Therapy  Goal: Physical Therapy Goal  Description: PT evaluation complete - no acute needs at this time.   Outcome: Met

## 2023-02-01 NOTE — PROGRESS NOTES
[facility] - Cardiology [facility] Medicine  Progress Note    Patient Name: Gus Sabillon  MRN: [id number]  Patient Class: IP- Inpatient   Admission Date: 1/30/2023  Length of Stay: 2 days  Attending Physician: Forrest Webster MD  Primary Care Provider: DEB Mora MD        Subjective:     Principal Problem:Acute on chronic diastolic heart failure        HPI:  Gus Sabillon is an 82-year-old man with a past medical history of primary hypertension, hyperlipidemia, HFpEF, carotid stenosis s/p CEA, PAD, GERD, who presents to the emergency department with increased lower extremity edema and shortness of breath.  The patient carries a new diagnosis of congestive heart failure.  He was recently started on oral furosemide 20 mg daily by his primary care provider and has been compliant with the medication.  The patient and his wife, who is present at the bedside, say that the patient has had a reduced exercise tolerance for the past several weeks.  The patient states that he usually enjoys walking with his wife every night but is now having to stop after 1 block because he is short of breath.  He also notes increased lower extremity edema.  The patient does not weigh himself daily so is not aware of any changes in his weight, however he does note increased bloating and abdominal distention.  He describes paroxysmal nocturnal dyspnea.  The patient denies any chest pain or chest discomfort.  He recently established care with a cardiologist who increased his oral furosemide to 40 mg daily.  The patient reports compliance with this medication but also states that he has not had increased urine output from the increased dose.  The patient is compliant with a low-sodium diet, however, he does not appear to be restricting his fluid intake.  He denies any abdominal pain or nausea, but the patient says he has been having frequent episodes of loose and watery bowel movements.  The patient reports multiple episodes  each day.    In the emergency department, the patient was noted to be hemodynamically stable.  He had an elevated BNP greater >3000 and an elevated troponin of 0.118 with repeat at 0.106. The patient was admitted to Hospital Medicine for further management.      Overview/Hospital Course:  elevated BNP greater >3000 and an elevated troponin of 0.118 with repeat at 0.106.   The left ventricle is normal in size with concentric hypertrophy and normal systolic function.  The estimated ejection fraction is 65%.  Indeterminate left ventricular diastolic function.  Moderate right ventricular enlargement with normal right ventricular systolic function.  Severe left atrial enlargement.  Right atrial enlargement.  Mild-to-moderate mitral regurgitation.  Moderate aortic regurgitation.  Moderate to severe pulmonic regurgitation      Interval History:   Stated was not able to sleep last night due to SOB, with PND and feels that he was having difficulty to breath due to fluid overload. He stated he is not a big fluid drinker and has been trying to drink less possible. With improvement of B/L lower ext edema and episode of bradycardia. Denies CP, no N/V, nor headache.    Review of Systems   Constitutional:  Positive for fatigue. Negative for activity change, appetite change, chills and fever.   HENT:  Negative for congestion and sore throat.    Respiratory:  Positive for shortness of breath. Negative for cough, chest tightness, wheezing and stridor.    Cardiovascular:  Positive for leg swelling. Negative for chest pain and palpitations.   Gastrointestinal:  Negative for abdominal pain, blood in stool, constipation, diarrhea, nausea and vomiting.   Genitourinary:  Negative for difficulty urinating.   Musculoskeletal:  Negative for arthralgias, back pain and gait problem.   Skin:  Negative for pallor and rash.   Neurological:  Negative for dizziness, speech difficulty, weakness and headaches.   Psychiatric/Behavioral:  Negative for  agitation, confusion and hallucinations.    All other systems reviewed and are negative.  Objective:     Vital Signs (Most Recent):  Temp: 97.9 °F (36.6 °C) (02/01/23 0747)  Pulse: (!) 46 (02/01/23 0747)  Resp: 18 (02/01/23 0747)  BP: (!) 164/70 (02/01/23 0747)  SpO2: (!) 94 % (02/01/23 0747)   Vital Signs (24h Range):  Temp:  [97.5 °F (36.4 °C)-98.3 °F (36.8 °C)] 97.9 °F (36.6 °C)  Pulse:  [] 46  Resp:  [16-18] 18  SpO2:  [94 %-98 %] 94 %  BP: (123-164)/(58-70) 164/70     Weight: 79.9 kg (176 lb 2.4 oz)  Body mass index is 23.24 kg/m².    Intake/Output Summary (Last 24 hours) at 2/1/2023 1124  Last data filed at 2/1/2023 0900  Gross per 24 hour   Intake 677 ml   Output 1025 ml   Net -348 ml      Physical Exam  Vitals and nursing note reviewed.   Constitutional:       General: He is not in acute distress.     Appearance: He is not toxic-appearing.   HENT:      Head: Normocephalic and atraumatic.      Nose: No congestion.   Eyes:      Extraocular Movements: Extraocular movements intact.      Pupils: Pupils are equal, round, and reactive to light.   Cardiovascular:      Rate and Rhythm: Bradycardia present. Rhythm irregular.      Heart sounds: No murmur heard.    No friction rub. No gallop.   Pulmonary:      Effort: Pulmonary effort is normal. No respiratory distress.      Breath sounds: No rales.   Chest:      Chest wall: No tenderness.   Abdominal:      General: Bowel sounds are normal. There is no distension.      Palpations: Abdomen is soft.      Tenderness: There is no abdominal tenderness. There is no guarding.   Musculoskeletal:         General: Swelling present. No tenderness.      Cervical back: No rigidity or tenderness.      Right lower leg: Edema present.      Left lower leg: Edema present.   Skin:     Findings: No erythema or rash.   Neurological:      General: No focal deficit present.      Mental Status: He is alert and oriented to person, place, and time.      Cranial Nerves: No cranial nerve  deficit.      Motor: No weakness.      Gait: Gait normal.   Psychiatric:         Thought Content: Thought content normal.       Significant Labs: All pertinent labs within the past 24 hours have been reviewed.  BMP:   Recent Labs   Lab 02/01/23 0335 02/01/23  0735     --    *  --    K 3.5  --    *  --    CO2 21*  --    BUN 32*  --    CREATININE 1.8*  --    CALCIUM 7.7*  --    MG  --  0.8*     CBC:   Recent Labs   Lab 01/30/23  1511 01/31/23 0255 02/01/23 0335   WBC 11.29 8.54 9.09   HGB 12.1* 10.7* 10.4*   HCT 37.8* 33.2* 32.5*    181 175     CMP:   Recent Labs   Lab 01/30/23  1511 01/31/23 0255 01/31/23  1352 02/01/23  0335    144  --  147*   K 3.7 3.0* 3.8 3.5    106  --  113*   CO2 23 23  --  21*    111*  --  101   BUN 31* 31*  --  32*   CREATININE 1.5* 1.7*  --  1.8*   CALCIUM 8.6* 7.8*  --  7.7*   PROT 7.3  --   --   --    ALBUMIN 4.0  --   --   --    BILITOT 0.6  --   --   --    ALKPHOS 54*  --   --   --    AST 23  --   --   --    ALT 16  --   --   --    ANIONGAP 15 15  --  13     Recent Lab Results         02/01/23 0735   02/01/23 0335 01/31/23  1352        Anion Gap   13         Baso #   0.05         Basophil %   0.6         BUN   32         Calcium   7.7         Chloride   113         CO2   21         Creatinine   1.8         Differential Method   Automated         eGFR   37.3         Eos #   0.3         Eosinophil %   3.5         Glucose   101         Gran # (ANC)   4.3         Gran %   47.3         Hematocrit   32.5         Hemoglobin   10.4         Immature Grans (Abs)   0.02  Comment: Mild elevation in immature granulocytes is non specific and   can be seen in a variety of conditions including stress response,   acute inflammation, trauma and pregnancy. Correlation with other   laboratory and clinical findings is essential.           Immature Granulocytes   0.2         Lymph #   3.6         Lymph %   39.8         Magnesium 0.8  Comment: *Critical  value notification by RAMP with confirmation of receipt to   DEZ VEE RN at  Date2/1/2383Krpn4:34AM             MCH   31.0         MCHC   32.0         MCV   97         Mono #   0.8         Mono %   8.6         MPV   11.5         nRBC   0         Platelets   175         Potassium   3.5   3.8       RBC   3.35         RDW   13.8         Sodium   147         WBC   9.09                 Significant Imaging: I have reviewed all pertinent imaging results/findings within the past 24 hours.      Assessment/Plan:      * Acute on chronic diastolic heart failure  Patient presenting with volume overload given increased lower extremity edema, paroxysmal nocturnal dyspnea and decreased exercise tolerance.  JVD and bibasilar crackles noted on examination.  BNP elevated.  Patient with recent diagnosis of heart failure with preserved ejection fraction. Last transthoracic echocardiogram (1/2023) with EF 65%.Patient without increase urine output on oral diuretics which is likely the cause of his volume overload and exacerbation; will need better titration while inpatient.  - cont IV furosemide 40 mg BID  - strict intake and output; goal of net negative two liters daily  - replace all electrolytes keeping K>4 and Mg>2  - cardiac monitoring/telemetry  - decreased home atenolol dose to 25 mg daily given exacerbation  - resume spironolactone 25 mg daily  - holding patient's home sacubitril-valsartan; unclear why patient is on this medication given his preserved ejection fraction    Hypomagnesemia  -Improved cont replacement      Hypokalemia  -low normal  -cont replacement      Troponin level elevated  Patient's troponin elevated with initial reading of 0.118 and repeat at 0.106.  Patient without complaints of chest pain or chest discomfort. This is likely secondary to demand ischemia from his heart failure exacerbation.      Decreased strength of upper extremity  Will get PT/OT and assess exercise tolerance      CKD (chronic kidney  disease)  -Not at baseline  -Cont diuresis and monitor kidney function  -replace electrolytes      Mixed hyperlipidemia  -cont statin      Chronic right shoulder pain  -pain management and monitor      Carotid atherosclerosis, bilateral  Patient with history of carotid atherosclerosis.  Patient had right carotid endarterectomy.  Resume home aspirin and statin.      Gastroesophageal reflux disease  Symptoms stable.  Patient on omeprazole as outpatient; not on formulary.  We will begin pantoprazole.      HTN (hypertension), benign  -controlled, low normal  - decreased atenolol given his heart failure exacerbation  - resume spironolactone 25 mg daily  -Entresto on hold, outpatient cardiology follow up      VTE Risk Mitigation (From admission, onward)         Ordered     heparin (porcine) injection 5,000 Units  Every 8 hours         01/30/23 1918     IP VTE HIGH RISK PATIENT  Once         01/30/23 1918     Place sequential compression device  Until discontinued         01/30/23 1918                Discharge Planning   BARB: 2/3/2023     Code Status: Full Code   Is the patient medically ready for discharge?:     Reason for patient still in hospital (select all that apply): Patient trending condition and Treatment  Discharge Plan A: Home            Time spent >30 minutes      Forrest Webster MD  Department of Hospital Medicine   [facility] - Cardiology Stepdown

## 2023-02-01 NOTE — PROGRESS NOTES
[facility] - Cardiology [facility] Medicine  Progress Note    Patient Name: Gus Sabillon  MRN: [id number]  Patient Class: IP- Inpatient   Admission Date: 1/30/2023  Length of Stay: 1 days  Attending Physician: Forrest Webster MD  Primary Care Provider: DEB Mora MD        Subjective:     Principal Problem:Acute on chronic diastolic heart failure        HPI:  Gus Sabillon is an 82-year-old man with a past medical history of primary hypertension, hyperlipidemia, HFpEF, carotid stenosis s/p CEA, PAD, GERD, who presents to the emergency department with increased lower extremity edema and shortness of breath.  The patient carries a new diagnosis of congestive heart failure.  He was recently started on oral furosemide 20 mg daily by his primary care provider and has been compliant with the medication.  The patient and his wife, who is present at the bedside, say that the patient has had a reduced exercise tolerance for the past several weeks.  The patient states that he usually enjoys walking with his wife every night but is now having to stop after 1 block because he is short of breath.  He also notes increased lower extremity edema.  The patient does not weigh himself daily so is not aware of any changes in his weight, however he does note increased bloating and abdominal distention.  He describes paroxysmal nocturnal dyspnea.  The patient denies any chest pain or chest discomfort.  He recently established care with a cardiologist who increased his oral furosemide to 40 mg daily.  The patient reports compliance with this medication but also states that he has not had increased urine output from the increased dose.  The patient is compliant with a low-sodium diet, however, he does not appear to be restricting his fluid intake.  He denies any abdominal pain or nausea, but the patient says he has been having frequent episodes of loose and watery bowel movements.  The patient reports multiple episodes  each day.    In the emergency department, the patient was noted to be hemodynamically stable.  He had an elevated BNP greater >3000 and an elevated troponin of 0.118 with repeat at 0.106. The patient was admitted to Hospital Medicine for further management.      Overview/Hospital Course:  elevated BNP greater >3000 and an elevated troponin of 0.118 with repeat at 0.106.         Interval History:   Patient was admitted for acute CHF decompensation. Stated that due to hypotension, his diuretics was placed on hold and had recently started on entresto. Stated feeling much better, with improvement of B/l lower ext edema, dyspnea on exertion. Also notion of diarrhea, loose stool. Denies CP, nor palpitation, no N/V    Review of Systems   Constitutional:  Positive for activity change and fatigue. Negative for appetite change, chills and fever.   HENT:  Negative for congestion.    Respiratory:  Negative for cough, chest tightness, shortness of breath and wheezing.    Cardiovascular:  Positive for leg swelling. Negative for chest pain and palpitations.   Gastrointestinal:  Positive for diarrhea. Negative for abdominal pain, nausea and vomiting.   Genitourinary:  Negative for difficulty urinating.   Musculoskeletal:  Negative for gait problem and joint swelling.   Skin:  Negative for rash.   Neurological:  Negative for dizziness, speech difficulty, weakness and headaches.   Psychiatric/Behavioral:  Negative for agitation, confusion and hallucinations.    All other systems reviewed and are negative.  Objective:     Vital Signs (Most Recent):  Temp: 98.2 °F (36.8 °C) (01/31/23 0838)  Pulse: 74 (01/31/23 1114)  Resp: 18 (01/31/23 0838)  BP: (!) 116/59 (01/31/23 0838)  SpO2: (!) 93 % (01/31/23 0838)   Vital Signs (24h Range):  Temp:  [97.7 °F (36.5 °C)-98.5 °F (36.9 °C)] 98.2 °F (36.8 °C)  Pulse:  [66-85] 74  Resp:  [16-19] 18  SpO2:  [93 %-100 %] 93 %  BP: (110-142)/(56-74) 116/59     Weight: 80.8 kg (178 lb 2.1 oz)  Body mass  index is 23.5 kg/m².    Intake/Output Summary (Last 24 hours) at 1/31/2023 1126  Last data filed at 1/31/2023 0917  Gross per 24 hour   Intake 200 ml   Output 1300 ml   Net -1100 ml      Physical Exam  Vitals and nursing note reviewed.   Constitutional:       General: He is not in acute distress.     Appearance: He is not toxic-appearing.   HENT:      Head: Normocephalic and atraumatic.      Nose: No congestion.   Eyes:      Extraocular Movements: Extraocular movements intact.      Pupils: Pupils are equal, round, and reactive to light.   Cardiovascular:      Rate and Rhythm: Normal rate and regular rhythm.      Heart sounds: No murmur heard.    No friction rub. No gallop.   Pulmonary:      Effort: Pulmonary effort is normal. No respiratory distress.      Breath sounds: Normal breath sounds. No wheezing or rales.   Abdominal:      General: Bowel sounds are normal. There is no distension.      Palpations: Abdomen is soft.      Tenderness: There is no abdominal tenderness. There is no guarding or rebound.   Musculoskeletal:         General: Swelling present. No tenderness.      Cervical back: No rigidity or tenderness.      Right lower leg: Edema present.      Left lower leg: Edema present.      Comments: Trace ankle edema   Skin:     Findings: No erythema or rash.   Neurological:      General: No focal deficit present.      Mental Status: He is alert and oriented to person, place, and time.      Cranial Nerves: No cranial nerve deficit.      Motor: No weakness.      Gait: Gait normal.   Psychiatric:         Thought Content: Thought content normal.       Significant Labs: All pertinent labs within the past 24 hours have been reviewed.  A1C:   Recent Labs   Lab 01/30/23 2000   HGBA1C 5.8*     BMP:   Recent Labs   Lab 01/31/23 0255   *      K 3.0*      CO2 23   BUN 31*   CREATININE 1.7*   CALCIUM 7.8*   MG <0.7*     CBC:   Recent Labs   Lab 01/30/23  1511 01/31/23  0255   WBC 11.29 8.54   HGB 12.1*  10.7*   HCT 37.8* 33.2*    181     Troponin:   Recent Labs   Lab 01/30/23  1511 01/30/23  1742   TROPONINI 0.118* 0.106*     TSH:   Recent Labs   Lab 01/30/23 2000   TSH 2.276     Recent Lab Results         01/31/23  0255   01/30/23 2000 01/30/23  1742   01/30/23  1511        Albumin       4.0       Alkaline Phosphatase       54       ALT       16       Anion Gap 15       15       AST       23       Baso # 0.04       0.05       Basophil % 0.5       0.4       BILIRUBIN TOTAL       0.6  Comment: For infants and newborns, interpretation of results should be based  on gestational age, weight and in agreement with clinical  observations.    Premature Infant recommended reference ranges:  Up to 24 hours.............<8.0 mg/dL  Up to 48 hours............<12.0 mg/dL  3-5 days..................<15.0 mg/dL  6-29 days.................<15.0 mg/dL         BNP       3,279  Comment: Values of less than 100 pg/ml are consistent with non-CHF populations.       BUN 31       31       Calcium 7.8       8.6       Chloride 106       106       CO2 23       23       Creatinine 1.7       1.5       Differential Method Automated       Automated       eGFR 40.0       46.5       Eos # 0.3       0.2       Eosinophil % 3.6       2.0       Estimated Avg Glucose   120           Ferritin   39           Glucose 111       100       Gran # (ANC) 3.6       5.7       Gran % 42.6       50.9       Hematocrit 33.2       37.8       Hemoglobin 10.7       12.1       Hemoglobin A1C External   5.8  Comment: ADA Screening Guidelines:  5.7-6.4%  Consistent with prediabetes  >or=6.5%  Consistent with diabetes    High levels of fetal hemoglobin interfere with the HbA1C  assay. Heterozygous hemoglobin variants (HbS, HgC, etc)do  not significantly interfere with this assay.   However, presence of multiple variants may affect accuracy.             Hepatitis C Ab       Non-reactive       HIV 1/2 Ag/Ab       Non-reactive       Immature Grans (Abs)  0.03  Comment: Mild elevation in immature granulocytes is non specific and   can be seen in a variety of conditions including stress response,   acute inflammation, trauma and pregnancy. Correlation with other   laboratory and clinical findings is essential.         0.04  Comment: Mild elevation in immature granulocytes is non specific and   can be seen in a variety of conditions including stress response,   acute inflammation, trauma and pregnancy. Correlation with other   laboratory and clinical findings is essential.         Immature Granulocytes 0.4       0.4       Iron   46           Lymph # 3.8       4.2       Lymph % 44.1       36.8       Magnesium <0.7  Comment: *Critical value notification by la__ with confirmation of receipt to  Lissette Tolentino RN___ at  Date_[id number]___Time_0410___               MCH 30.9       31.2       MCHC 32.2       32.0       MCV 96       97       Mono # 0.8       1.1       Mono % 8.8       9.5       MPV 12.2       11.8       nRBC 0       0       Platelets 181       206       Potassium 3.0       3.7       PROTEIN TOTAL       7.3       RBC 3.46       3.88       RDW 13.8       13.8       Saturated Iron   12           Sodium 144       144       TIBC   389           Transferrin   263           Troponin I     0.106  Comment: The reference interval for Troponin I represents the 99th percentile   cutoff   for our facility and is consistent with 3rd generation assay   performance.     0.118  Comment: The reference interval for Troponin I represents the 99th percentile   cutoff   for our facility and is consistent with 3rd generation assay   performance.         TSH   2.276           WBC 8.54       11.29               Significant Imaging: I have reviewed all pertinent imaging results/findings within the past 24 hours.      Assessment/Plan:      * Acute on chronic diastolic heart failure  Patient presenting with volume overload given increased lower extremity edema, paroxysmal nocturnal  dyspnea and decreased exercise tolerance.  JVD and bibasilar crackles noted on examination.  BNP elevated.  Patient with recent diagnosis of heart failure with preserved ejection fraction. Last transthoracic echocardiogram (1/2023) with EF 65%.Patient without increase urine output on oral diuretics which is likely the cause of his volume overload and exacerbation; will need better titration while inpatient.  - cont IV furosemide 80 mg daily will consider deescalate in AM  - strict intake and output; goal of net negative two liters daily  - replace all electrolytes keeping K>4 and Mg>2  - cardiac monitoring/telemetry  - decreased home atenolol dose to 25 mg daily given exacerbation  - resume spironolactone 25 mg daily  - holding patient's home sacubitril-valsartan; unclear why patient is on this medication given his preserved ejection fraction    Hypokalemia  -cont replacement      Troponin level elevated  Patient's troponin elevated with initial reading of 0.118 and repeat at 0.106.  Patient without complaints of chest pain or chest discomfort. This is likely secondary to demand ischemia from his heart failure exacerbation.      Decreased strength of upper extremity  Will get PT/OT and assess exercise tolerance      CKD (chronic kidney disease)  -Not at baseline  -Cont diuresis and monitor kidney function  -replace electrolytes      Chronic right shoulder pain  -pain management and monitor      Carotid atherosclerosis, bilateral  Patient with history of carotid atherosclerosis.  Patient had right carotid endarterectomy.  Resume home aspirin and statin.      Gastroesophageal reflux disease  Symptoms stable.  Patient on omeprazole as outpatient; not on formulary.  We will begin pantoprazole.      HTN (hypertension), benign  Patient normotensive in the emergency department.  We will resume home antihypertensive regimen with some modifications.  - decreased atenolol given his heart failure exacerbation  - resume  spironolactone 25 mg daily  -Entresto on hold, outpatient cardiology follow up        VTE Risk Mitigation (From admission, onward)         Ordered     heparin (porcine) injection 5,000 Units  Every 8 hours         01/30/23 1918     IP VTE HIGH RISK PATIENT  Once         01/30/23 1918     Place sequential compression device  Until discontinued         01/30/23 1918                Discharge Planning   BARB: 2/3/2023     Code Status: Full Code   Is the patient medically ready for discharge?:     Reason for patient still in hospital (select all that apply): Patient trending condition, Treatment and Consult recommendations  Discharge Plan A: Home        Time spent 50 minutes          Forrest Webster MD  Department of Hospital Medicine   [facility] - Cardiology Stepdown

## 2023-02-01 NOTE — PLAN OF CARE
OT evaluation completed. Patient is presenting at functional baseline and is appropriate for discharge from acute skilled OT services.     Problem: Occupational Therapy  Goal: Occupational Therapy Goal  Outcome: Met

## 2023-02-01 NOTE — PT/OT/SLP EVAL
"Occupational Therapy   Evaluation and Discharge Note    Name: Gus Sabillon  MRN: [id number]  Admitting Diagnosis: Acute on chronic diastolic heart failure  Recent Surgery: * No surgery found *      Recommendations:     Discharge Recommendations: home  Discharge Equipment Recommendations: none  Barriers to discharge:  None    Assessment:     Gus Sabillon is a 82 y.o. male with a medical diagnosis of Acute on chronic diastolic heart failure. At this time, patient is functioning at their prior level of function and does not require further acute OT services.     Patient is independent in sit>stand transfers, functional mobility of household distance, toilet transfers and ADLs. Expressed no concerns/needs at this time and that edema has decreased. Education provided on maintain mobility while admitted and once discharged home.    Plan:     During this hospitalization, patient does not require further acute OT services.  Please re-consult if situation changes.    Plan of Care Reviewed with: patient    Subjective   "I set the bathroom up for me to shower soon."    Chief Complaint: fatigue  Patient/Family Comments/goals: to return [facility] at [facility]    Occupational Profile:  Living Environment: lives with spouse in 2SH 1 LUCIA; tub/shower and walk-in shower (primary) access with grab bar and shower chair  Previous level of function: Independent  Roles and Routines: Drives, active, walks daily  Equipment Used at home: grab bar, shower chair, walker, rolling (RW for spouse s/p hip surgery)  Assistance upon Discharge: Spouse, recently had hip surgery    Pain/Comfort:  Pain Rating 1: 0/10  Pain Rating Post-Intervention 1: 0/10    Patients cultural, spiritual, Baptism conflicts given the current situation: no    Objective:     Communicated with: NUrse prior to session.  Patient found  seated on bedside sofa  with Other (comments) (no active lines) upon OT entry to room.    General Precautions: Standard, fall  Orthopedic " Precautions: N/A  Braces: N/A  Respiratory Status: Room air     Occupational Performance:    Bed Mobility:    Not assessed    Functional Mobility/Transfers:  Patient completed Sit <> Stand Transfer with independence  with  no assistive device   Patient completed Toilet Transfer Step Transfer technique with independence with  no AD  Functional Mobility: Patient completed functional mobility of household distance ~30ft with independence using no AD    Activities of Daily Living:  Toileting: independence to facilitate toileting tasks  Declined additional needs at this time    Cognitive/Visual Perceptual:  Cognitive/Psychosocial Skills:     -       Oriented to: Person, Place, Time, and Situation   -       Follows Commands/attention:Follows multistep  commands  -       Communication: clear/fluent  -       Memory: No Deficits noted  -       Safety awareness/insight to disability: intact   -       Mood/Affect/Coping skills/emotional control: Appropriate to situation  Visual/Perceptual:      -Intact glasses    Physical Exam:  Balance:    -       Good+ static/dynamic stance  Upper Extremity Range of Motion:     -       Right Upper Extremity: WFL  -       Left Upper Extremity: WFL  Upper Extremity Strength:    -       Right Upper Extremity: WFL  -       Left Upper Extremity: WFL   Strength:    -       Right Upper Extremity: WFL  -       Left Upper Extremity: WFL  Fine Motor Coordination:    -       Intact  Gross motor coordination:   WFL    AMPAC 6 Click ADL:  AMPAC Total Score: 24    Treatment & Education:  -Pt education on OT role and POC.  -Importance of E/OOB activity with staff assistance, emphasis on daily participation  -Safety during functional transfer and mobility ensured  -Patient provided with education on importance of Bilateral UB/LB integration during functional tasks for improvement in functional performance.   -Education provided/reviewed, questions answered within OT scope of practice.   -Patient  demonstrates understanding and learning this date.         Patient left ambulatory in room/kelley with all lines intact and call button in reach    GOALS:   Multidisciplinary Problems       Occupational Therapy Goals       Not on file              Multidisciplinary Problems (Resolved)          Problem: Occupational Therapy    Goal Priority Disciplines Outcome Interventions   Occupational Therapy Goal   (Resolved)     OT, PT/OT Met                        History:     Past Medical History:   Diagnosis Date    CKD (chronic kidney disease) stage 3, GFR 30-59 ml/min     Dyslipidemia     GERD (gastroesophageal reflux disease)     Hx of melanoma excision     Hypertension     Melanoma     PAD (peripheral artery disease)     Squamous cell carcinoma of skin          Past Surgical History:   Procedure Laterality Date    ARTHROSCOPIC DEBRIDEMENT OF SHOULDER Right 2/27/2020    Procedure: EXTENSIVE DEBRIDEMENT, SHOULDER, ARTHROSCOPIC;  Surgeon: Staci Childress MD;  Location: [facility] OR;  Service: Orthopedics;  Laterality: Right;    ARTHROSCOPIC DEBRIDEMENT OF SHOULDER Left 6/21/2022    Procedure: EXTENSIVE DEBRIDEMENT, SHOULDER, ARTHROSCOPIC;  Surgeon: Staci Childress MD;  Location: [facility] OR;  Service: Orthopedics;  Laterality: Left;    ARTHROSCOPIC REPAIR OF ROTATOR CUFF OF SHOULDER Right 2/27/2020    Procedure: REPAIR, ROTATOR CUFF, ARTHROSCOPIC;  Surgeon: Staci Childress MD;  Location: [facility] OR;  Service: Orthopedics;  Laterality: Right;    ARTHROSCOPIC REPAIR OF ROTATOR CUFF OF SHOULDER Left 6/21/2022    Procedure: REPAIR, ROTATOR CUFF, ARTHROSCOPIC WITH CUFF MEND;  Surgeon: Staci Childress MD;  Location: [facility] OR;  Service: Orthopedics;  Laterality: Left;    ARTHROSCOPY OF SHOULDER WITH DECOMPRESSION OF SUBACROMIAL SPACE Right 2/27/2020    Procedure: ARTHROSCOPY, SHOULDER, WITH SUBACROMIAL SPACE DECOMPRESSION;  Surgeon: Staci Childress MD;  Location: [facility] OR;  Service: Orthopedics;  Laterality: Right;    ARTHROSCOPY OF SHOULDER WITH DECOMPRESSION OF  SUBACROMIAL SPACE Left 6/21/2022    Procedure: ARTHROSCOPY, SHOULDER, WITH SUBACROMIAL  DECOMPRESSION;  Surgeon: Staci Childress MD;  Location: [facility] OR;  Service: Orthopedics;  Laterality: Left;    ARTHROSCOPY OF SHOULDER WITH REMOVAL OF DISTAL CLAVICLE Left 6/21/2022    Procedure: ARTHROSCOPY, SHOULDER, WITH DISTAL CLAVICLE EXCISION;  Surgeon: Staci Childress MD;  Location: [facility] OR;  Service: Orthopedics;  Laterality: Left;    ARTHROSCOPY,SHOULDER,WITH BICEPS TENODESIS Left 6/21/2022    Procedure: ARTHROSCOPY,SHOULDER,WITH BICEPS TENODESIS;  Surgeon: Staci Childress MD;  Location: [facility] OR;  Service: Orthopedics;  Laterality: Left;    BLEPHAROPLASTY      CAROTID ENDARTERECTOMY Right 10/15/2021    Procedure: ENDARTERECTOMY-CAROTID;  Surgeon: Imer Farooq MD;  Location: [facility] OR [address] [place] [address];  Service: Peripheral Vascular;  Laterality: Right;  AWAKE CERVICAL BLOCK    CATARACT EXTRACTION, BILATERAL      EXCISION OF BURSA Left 6/21/2022    Procedure: BURSECTOMY;  Surgeon: Staci Childress MD;  Location: [facility] OR;  Service: Orthopedics;  Laterality: Left;    FIXATION OF TENDON Right 2/27/2020    Procedure: FIXATION, TENDON, Biceps Tenodesis;  Surgeon: Staci Childress MD;  Location: [facility] OR;  Service: Orthopedics;  Laterality: Right;  FIXATION, TENDON, Biceps Tenodesis    OPEN REDUCTION AND INTERNAL FIXATION (ORIF) OF FRACTURE OF PROXIMAL HUMERUS Right 2/27/2020    Procedure: ORIF, FRACTURE, GREATER TUBEROSITY;  Surgeon: Staci Childress MD;  Location: [facility] OR;  Service: Orthopedics;  Laterality: Right;    ORIF HUMERUS FRACTURE Left 6/21/2022    Procedure: ORIF, FRACTURE, HUMERUS, GREATER TUBEROSITY;  Surgeon: Staci Childress MD;  Location: [facility] OR;  Service: Orthopedics;  Laterality: Left;    SHOULDER ARTHROSCOPY Left 6/21/2022    Procedure: ARTHROSCOPY, SHOULDER WITH LABRAL REPAIR;  Surgeon: Staci Childress MD;  Location: [facility] OR;  Service: Orthopedics;  Laterality: Left;    TONSILLECTOMY         Time Tracking:     OT Date of Treatment: 02/01/23  OT Start  Time: 0935  OT Stop Time: 0943  OT Total Time (min): 8 min    Billable Minutes:Evaluation 8    2/1/2023

## 2023-02-01 NOTE — SUBJECTIVE & OBJECTIVE
Interval History:   Stated was not able to sleep last night due to SOB, with PND and feels that he was having difficulty to breath due to fluid overload. He stated he is not a big fluid drinker and has been trying to drink less possible. With improvement of B/L lower ext edema and episode of bradycardia. Denies CP, no N/V, nor headache.    Review of Systems   Constitutional:  Positive for fatigue. Negative for activity change, appetite change, chills and fever.   HENT:  Negative for congestion and sore throat.    Respiratory:  Positive for shortness of breath. Negative for cough, chest tightness, wheezing and stridor.    Cardiovascular:  Positive for leg swelling. Negative for chest pain and palpitations.   Gastrointestinal:  Negative for abdominal pain, blood in stool, constipation, diarrhea, nausea and vomiting.   Genitourinary:  Negative for difficulty urinating.   Musculoskeletal:  Negative for arthralgias, back pain and gait problem.   Skin:  Negative for pallor and rash.   Neurological:  Negative for dizziness, speech difficulty, weakness and headaches.   Psychiatric/Behavioral:  Negative for agitation, confusion and hallucinations.    All other systems reviewed and are negative.  Objective:     Vital Signs (Most Recent):  Temp: 97.9 °F (36.6 °C) (02/01/23 0747)  Pulse: (!) 46 (02/01/23 0747)  Resp: 18 (02/01/23 0747)  BP: (!) 164/70 (02/01/23 0747)  SpO2: (!) 94 % (02/01/23 0747)   Vital Signs (24h Range):  Temp:  [97.5 °F (36.4 °C)-98.3 °F (36.8 °C)] 97.9 °F (36.6 °C)  Pulse:  [] 46  Resp:  [16-18] 18  SpO2:  [94 %-98 %] 94 %  BP: (123-164)/(58-70) 164/70     Weight: 79.9 kg (176 lb 2.4 oz)  Body mass index is 23.24 kg/m².    Intake/Output Summary (Last 24 hours) at 2/1/2023 1124  Last data filed at 2/1/2023 0900  Gross per 24 hour   Intake 677 ml   Output 1025 ml   Net -348 ml      Physical Exam  Vitals and nursing note reviewed.   Constitutional:       General: He is not in acute distress.      Appearance: He is not toxic-appearing.   HENT:      Head: Normocephalic and atraumatic.      Nose: No congestion.   Eyes:      Extraocular Movements: Extraocular movements intact.      Pupils: Pupils are equal, round, and reactive to light.   Cardiovascular:      Rate and Rhythm: Bradycardia present. Rhythm irregular.      Heart sounds: No murmur heard.    No friction rub. No gallop.   Pulmonary:      Effort: Pulmonary effort is normal. No respiratory distress.      Breath sounds: No rales.   Chest:      Chest wall: No tenderness.   Abdominal:      General: Bowel sounds are normal. There is no distension.      Palpations: Abdomen is soft.      Tenderness: There is no abdominal tenderness. There is no guarding.   Musculoskeletal:         General: Swelling present. No tenderness.      Cervical back: No rigidity or tenderness.      Right lower leg: Edema present.      Left lower leg: Edema present.   Skin:     Findings: No erythema or rash.   Neurological:      General: No focal deficit present.      Mental Status: He is alert and oriented to person, place, and time.      Cranial Nerves: No cranial nerve deficit.      Motor: No weakness.      Gait: Gait normal.   Psychiatric:         Thought Content: Thought content normal.       Significant Labs: All pertinent labs within the past 24 hours have been reviewed.  BMP:   Recent Labs   Lab 02/01/23  0335 02/01/23  0735     --    *  --    K 3.5  --    *  --    CO2 21*  --    BUN 32*  --    CREATININE 1.8*  --    CALCIUM 7.7*  --    MG  --  0.8*     CBC:   Recent Labs   Lab 01/30/23  1511 01/31/23  0255 02/01/23  0335   WBC 11.29 8.54 9.09   HGB 12.1* 10.7* 10.4*   HCT 37.8* 33.2* 32.5*    181 175     CMP:   Recent Labs   Lab 01/30/23  1511 01/31/23  0255 01/31/23  1352 02/01/23  0335    144  --  147*   K 3.7 3.0* 3.8 3.5    106  --  113*   CO2 23 23  --  21*    111*  --  101   BUN 31* 31*  --  32*   CREATININE 1.5* 1.7*  --  1.8*    CALCIUM 8.6* 7.8*  --  7.7*   PROT 7.3  --   --   --    ALBUMIN 4.0  --   --   --    BILITOT 0.6  --   --   --    ALKPHOS 54*  --   --   --    AST 23  --   --   --    ALT 16  --   --   --    ANIONGAP 15 15  --  13     Recent Lab Results         02/01/23  0735   02/01/23  0335   01/31/23  1352        Anion Gap   13         Baso #   0.05         Basophil %   0.6         BUN   32         Calcium   7.7         Chloride   113         CO2   21         Creatinine   1.8         Differential Method   Automated         eGFR   37.3         Eos #   0.3         Eosinophil %   3.5         Glucose   101         Gran # (ANC)   4.3         Gran %   47.3         Hematocrit   32.5         Hemoglobin   10.4         Immature Grans (Abs)   0.02  Comment: Mild elevation in immature granulocytes is non specific and   can be seen in a variety of conditions including stress response,   acute inflammation, trauma and pregnancy. Correlation with other   laboratory and clinical findings is essential.           Immature Granulocytes   0.2         Lymph #   3.6         Lymph %   39.8         Magnesium 0.8  Comment: *Critical value notification by [facility] with confirmation of receipt to   DEZ VEE RN at  Date2/1/2360Utht0:34AM             MCH   31.0         MCHC   32.0         MCV   97         Mono #   0.8         Mono %   8.6         MPV   11.5         nRBC   0         Platelets   175         Potassium   3.5   3.8       RBC   3.35         RDW   13.8         Sodium   147         WBC   9.09                 Significant Imaging: I have reviewed all pertinent imaging results/findings within the past 24 hours.

## 2023-02-01 NOTE — PT/OT/SLP EVAL
"Physical Therapy Evaluation & Discharge    Patient Name:  Gus Sabillon   MRN:  [id number]  Admit Date: 1/30/2023  Admitting Diagnosis:  Acute on chronic diastolic heart failure  Length of Stay: 2 days  Recent Surgery: * No surgery found *      Recommendations:     Discharge Recommendations:  home   Discharge Equipment Recommendations: none   Barriers to discharge: None    Assessment:     Gus Sabillon is a 82 y.o. male admitted with a medical diagnosis of Acute on chronic diastolic heart failure. Medical history includes hypertension. Today he was able to perform all functional mobility without assistance. At this time, patient is at their functional baseline and acute PT services are not indicated. PT will discontinue orders. See detailed evaluation below:    Problem List: impaired endurance    Plan:     Discharge PT orders. Please continue to encourage patient mobility.    Subjective   Communicated with RN prior to session.  Patient found up in chair upon PT entry to room, agreeable to evaluation. "I'm doing ok"    Chief Complaint: Leg Swelling (X2 weeks. Pt. Rx-ed meds by cards. Pt. States he is still having leg swelling and was told to come in. )    Patient/Family Comments/goals: to feel better  Pain/Comfort:  Pain Rating 1: 0/10  Pain Rating Post-Intervention 1: 0/10    Living Environment:  Patient lives with his wife in a 2 story home with a ramped entrance. His bedroom and bathroom are on the first floor.    Prior Level of Function:   Patient reports being independent with mobility & with ADLs. Patient uses DME as follows: none    Roles/Repsonsibilities:   Work: Retired. Drive: yes. Managing Medicines/Managing Home: yes.     Patient reports they will have assistance from his wife upon discharge.    Objective:   Patient found with: telemetry     General Precautions: Standard, fall   Orthopedic Precautions:N/A   Braces: N/A   Oxygen Device: Room Air  Vitals: BP (!) 164/70 (BP Location: Left arm, " "Patient Position: Sitting)   Pulse 69   Temp 97.9 °F (36.6 °C) (Oral)   Resp 18   Ht 6' 1" (1.854 m)   Wt 79.9 kg (176 lb 2.4 oz)   SpO2 96%   BMI 23.24 kg/m²     Exams:  Cognition:   Alert and Cooperative  AxOx4  Command following: Follows multistep  commands  Fluency: clear/fluent  Hearing: Intact  Vision:  Intact visual fields  Skin Integrity: intact  Sensation: intact  Coordination: intact  LE Strength:  L Lower Extremity: WFL  R Lower Extremity: WFL  LE ROM:  L Lower Extremity: WFL  R Lower Extremity: WFL      Outcome Measures:  AM-PAC 6 CLICK MOBILITY  Turning over in bed (including adjusting bedclothes, sheets and blankets)?: 4  Sitting down on and standing up from a chair with arms (e.g., wheelchair, bedside commode, etc.): 4  Moving from lying on back to sitting on the side of the bed?: 4  Moving to and from a bed to a chair (including a wheelchair)?: 4  Need to walk in hospital room?: 4  Climbing 3-5 steps with a railing?: 4  Basic Mobility Total Score: 24     Functional Mobility:    Transfers:   Sit <> Stand Transfer: supervision with no assistive device   Standing Tolerance: supervision with no assistive device   Deviations: none noted  Bed <> Chair Transfer: Step Transfer technique with supervision with no assistive device    Gait:   Patient ambulated: ~100 feet   Patient required: supervision  Patient used: no assistive device  Gait Deviation(s): none noted    Education:   Patient was educated on continued safe mobility throughout this admission.       Patient left up in chair with all lines intact, call button in reach, and RN notified.    GOALS:   Multidisciplinary Problems       Physical Therapy Goals       Not on file              Multidisciplinary Problems (Resolved)          Problem: Physical Therapy    Goal Priority Disciplines Outcome Goal Variances Interventions   Physical Therapy Goal   (Resolved)     PT, PT/OT Met     Description: PT evaluation complete - no acute needs at this time.  "                       History:     Past Medical History:   Diagnosis Date    CKD (chronic kidney disease) stage 3, GFR 30-59 ml/min     Dyslipidemia     GERD (gastroesophageal reflux disease)     Hx of melanoma excision     Hypertension     Melanoma     PAD (peripheral artery disease)     Squamous cell carcinoma of skin        Past Surgical History:   Procedure Laterality Date    ARTHROSCOPIC DEBRIDEMENT OF SHOULDER Right 2/27/2020    Procedure: EXTENSIVE DEBRIDEMENT, SHOULDER, ARTHROSCOPIC;  Surgeon: Staci Childress MD;  Location: [facility] OR;  Service: Orthopedics;  Laterality: Right;    ARTHROSCOPIC DEBRIDEMENT OF SHOULDER Left 6/21/2022    Procedure: EXTENSIVE DEBRIDEMENT, SHOULDER, ARTHROSCOPIC;  Surgeon: Staci Childress MD;  Location: [facility] OR;  Service: Orthopedics;  Laterality: Left;    ARTHROSCOPIC REPAIR OF ROTATOR CUFF OF SHOULDER Right 2/27/2020    Procedure: REPAIR, ROTATOR CUFF, ARTHROSCOPIC;  Surgeon: Staci Childress MD;  Location: [facility] OR;  Service: Orthopedics;  Laterality: Right;    ARTHROSCOPIC REPAIR OF ROTATOR CUFF OF SHOULDER Left 6/21/2022    Procedure: REPAIR, ROTATOR CUFF, ARTHROSCOPIC WITH CUFF MEND;  Surgeon: Staci Childress MD;  Location: [facility] OR;  Service: Orthopedics;  Laterality: Left;    ARTHROSCOPY OF SHOULDER WITH DECOMPRESSION OF SUBACROMIAL SPACE Right 2/27/2020    Procedure: ARTHROSCOPY, SHOULDER, WITH SUBACROMIAL SPACE DECOMPRESSION;  Surgeon: Staci Childress MD;  Location: [facility] OR;  Service: Orthopedics;  Laterality: Right;    ARTHROSCOPY OF SHOULDER WITH DECOMPRESSION OF SUBACROMIAL SPACE Left 6/21/2022    Procedure: ARTHROSCOPY, SHOULDER, WITH SUBACROMIAL  DECOMPRESSION;  Surgeon: Staci Childress MD;  Location: [facility] OR;  Service: Orthopedics;  Laterality: Left;    ARTHROSCOPY OF SHOULDER WITH REMOVAL OF DISTAL CLAVICLE Left 6/21/2022    Procedure: ARTHROSCOPY, SHOULDER, WITH DISTAL CLAVICLE EXCISION;  Surgeon: Staci Childress MD;  Location: [facility] OR;  Service: Orthopedics;  Laterality: Left;     ARTHROSCOPY,SHOULDER,WITH BICEPS TENODESIS Left 6/21/2022    Procedure: ARTHROSCOPY,SHOULDER,WITH BICEPS TENODESIS;  Surgeon: Staci Childress MD;  Location: [facility] OR;  Service: Orthopedics;  Laterality: Left;    BLEPHAROPLASTY      CAROTID ENDARTERECTOMY Right 10/15/2021    Procedure: ENDARTERECTOMY-CAROTID;  Surgeon: Imer Farooq MD;  Location: [facility] OR [address] [place] [address];  Service: Peripheral Vascular;  Laterality: Right;  AWAKE CERVICAL BLOCK    CATARACT EXTRACTION, BILATERAL      EXCISION OF BURSA Left 6/21/2022    Procedure: BURSECTOMY;  Surgeon: Staci Childress MD;  Location: [facility] OR;  Service: Orthopedics;  Laterality: Left;    FIXATION OF TENDON Right 2/27/2020    Procedure: FIXATION, TENDON, Biceps Tenodesis;  Surgeon: Staci Childress MD;  Location: [facility] OR;  Service: Orthopedics;  Laterality: Right;  FIXATION, TENDON, Biceps Tenodesis    OPEN REDUCTION AND INTERNAL FIXATION (ORIF) OF FRACTURE OF PROXIMAL HUMERUS Right 2/27/2020    Procedure: ORIF, FRACTURE, GREATER TUBEROSITY;  Surgeon: Staci Childress MD;  Location: [facility] OR;  Service: Orthopedics;  Laterality: Right;    ORIF HUMERUS FRACTURE Left 6/21/2022    Procedure: ORIF, FRACTURE, HUMERUS, GREATER TUBEROSITY;  Surgeon: Staci Childress MD;  Location: [facility] OR;  Service: Orthopedics;  Laterality: Left;    SHOULDER ARTHROSCOPY Left 6/21/2022    Procedure: ARTHROSCOPY, SHOULDER WITH LABRAL REPAIR;  Surgeon: Staci Childress MD;  Location: [facility] OR;  Service: Orthopedics;  Laterality: Left;    TONSILLECTOMY         Time Tracking:     PT Received On: 02/01/23  PT Start Time: 0915     PT Stop Time: 0923  PT Total Time (min): 8 min     Billable Minutes: Evaluation 8    Paula Bang PT, DPT  2/1/2023

## 2023-02-01 NOTE — ASSESSMENT & PLAN NOTE
Patient presenting with volume overload given increased lower extremity edema, paroxysmal nocturnal dyspnea and decreased exercise tolerance.  JVD and bibasilar crackles noted on examination.  BNP elevated.  Patient with recent diagnosis of heart failure with preserved ejection fraction. Last transthoracic echocardiogram (1/2023) with EF 65%.Patient without increase urine output on oral diuretics which is likely the cause of his volume overload and exacerbation; will need better titration while inpatient.  - cont IV furosemide 40 mg BID  - strict intake and output; goal of net negative two liters daily  - replace all electrolytes keeping K>4 and Mg>2  - cardiac monitoring/telemetry  - decreased home atenolol dose to 25 mg daily given exacerbation  - resume spironolactone 25 mg daily  - holding patient's home sacubitril-valsartan; unclear why patient is on this medication given his preserved ejection fraction

## 2023-02-02 LAB
ANION GAP SERPL CALC-SCNC: 14 MMOL/L (ref 8–16)
ANION GAP SERPL CALC-SCNC: 8 MMOL/L (ref 8–16)
BASOPHILS # BLD AUTO: 0.05 K/UL (ref 0–0.2)
BASOPHILS NFR BLD: 0.5 % (ref 0–1.9)
BUN SERPL-MCNC: 35 MG/DL (ref 8–23)
BUN SERPL-MCNC: 37 MG/DL (ref 8–23)
CALCIUM SERPL-MCNC: 8 MG/DL (ref 8.7–10.5)
CALCIUM SERPL-MCNC: 8.1 MG/DL (ref 8.7–10.5)
CHLORIDE SERPL-SCNC: 108 MMOL/L (ref 95–110)
CHLORIDE SERPL-SCNC: 108 MMOL/L (ref 95–110)
CO2 SERPL-SCNC: 21 MMOL/L (ref 23–29)
CO2 SERPL-SCNC: 23 MMOL/L (ref 23–29)
CREAT SERPL-MCNC: 1.7 MG/DL (ref 0.5–1.4)
CREAT SERPL-MCNC: 1.8 MG/DL (ref 0.5–1.4)
DIFFERENTIAL METHOD: ABNORMAL
EOSINOPHIL # BLD AUTO: 0.2 K/UL (ref 0–0.5)
EOSINOPHIL NFR BLD: 1.5 % (ref 0–8)
ERYTHROCYTE [DISTWIDTH] IN BLOOD BY AUTOMATED COUNT: 13.7 % (ref 11.5–14.5)
EST. GFR  (NO RACE VARIABLE): 37.3 ML/MIN/1.73 M^2
EST. GFR  (NO RACE VARIABLE): 40 ML/MIN/1.73 M^2
GLUCOSE SERPL-MCNC: 117 MG/DL (ref 70–110)
GLUCOSE SERPL-MCNC: 123 MG/DL (ref 70–110)
HCT VFR BLD AUTO: 32.1 % (ref 40–54)
HGB BLD-MCNC: 10.2 G/DL (ref 14–18)
IMM GRANULOCYTES # BLD AUTO: 0.03 K/UL (ref 0–0.04)
IMM GRANULOCYTES NFR BLD AUTO: 0.3 % (ref 0–0.5)
LYMPHOCYTES # BLD AUTO: 2.4 K/UL (ref 1–4.8)
LYMPHOCYTES NFR BLD: 22.6 % (ref 18–48)
MAGNESIUM SERPL-MCNC: 1.6 MG/DL (ref 1.6–2.6)
MAGNESIUM SERPL-MCNC: 2.1 MG/DL (ref 1.6–2.6)
MCH RBC QN AUTO: 31.3 PG (ref 27–31)
MCHC RBC AUTO-ENTMCNC: 31.8 G/DL (ref 32–36)
MCV RBC AUTO: 99 FL (ref 82–98)
MONOCYTES # BLD AUTO: 1.1 K/UL (ref 0.3–1)
MONOCYTES NFR BLD: 10.7 % (ref 4–15)
NEUTROPHILS # BLD AUTO: 6.8 K/UL (ref 1.8–7.7)
NEUTROPHILS NFR BLD: 64.4 % (ref 38–73)
NRBC BLD-RTO: 0 /100 WBC
PHOSPHATE SERPL-MCNC: 2.6 MG/DL (ref 2.7–4.5)
PLATELET # BLD AUTO: 174 K/UL (ref 150–450)
PMV BLD AUTO: 11.8 FL (ref 9.2–12.9)
POTASSIUM SERPL-SCNC: 3.9 MMOL/L (ref 3.5–5.1)
POTASSIUM SERPL-SCNC: 4.7 MMOL/L (ref 3.5–5.1)
RBC # BLD AUTO: 3.26 M/UL (ref 4.6–6.2)
SODIUM SERPL-SCNC: 139 MMOL/L (ref 136–145)
SODIUM SERPL-SCNC: 143 MMOL/L (ref 136–145)
WBC # BLD AUTO: 10.49 K/UL (ref 3.9–12.7)

## 2023-02-02 PROCEDURE — 80048 BASIC METABOLIC PNL TOTAL CA: CPT | Performed by: STUDENT IN AN ORGANIZED HEALTH CARE EDUCATION/TRAINING PROGRAM

## 2023-02-02 PROCEDURE — 83735 ASSAY OF MAGNESIUM: CPT | Mod: 91 | Performed by: STUDENT IN AN ORGANIZED HEALTH CARE EDUCATION/TRAINING PROGRAM

## 2023-02-02 PROCEDURE — 25000003 PHARM REV CODE 250: Performed by: STUDENT IN AN ORGANIZED HEALTH CARE EDUCATION/TRAINING PROGRAM

## 2023-02-02 PROCEDURE — 80048 BASIC METABOLIC PNL TOTAL CA: CPT | Mod: 91 | Performed by: STUDENT IN AN ORGANIZED HEALTH CARE EDUCATION/TRAINING PROGRAM

## 2023-02-02 PROCEDURE — 36415 COLL VENOUS BLD VENIPUNCTURE: CPT | Performed by: STUDENT IN AN ORGANIZED HEALTH CARE EDUCATION/TRAINING PROGRAM

## 2023-02-02 PROCEDURE — 63600175 PHARM REV CODE 636 W HCPCS: Performed by: STUDENT IN AN ORGANIZED HEALTH CARE EDUCATION/TRAINING PROGRAM

## 2023-02-02 PROCEDURE — 25000003 PHARM REV CODE 250: Performed by: FAMILY MEDICINE

## 2023-02-02 PROCEDURE — 20600001 HC STEP DOWN PRIVATE ROOM

## 2023-02-02 PROCEDURE — 84100 ASSAY OF PHOSPHORUS: CPT | Performed by: STUDENT IN AN ORGANIZED HEALTH CARE EDUCATION/TRAINING PROGRAM

## 2023-02-02 PROCEDURE — 99232 SBSQ HOSP IP/OBS MODERATE 35: CPT | Mod: ,,, | Performed by: STUDENT IN AN ORGANIZED HEALTH CARE EDUCATION/TRAINING PROGRAM

## 2023-02-02 PROCEDURE — 85025 COMPLETE CBC W/AUTO DIFF WBC: CPT | Performed by: STUDENT IN AN ORGANIZED HEALTH CARE EDUCATION/TRAINING PROGRAM

## 2023-02-02 PROCEDURE — 99232 PR SUBSEQUENT HOSPITAL CARE,LEVL II: ICD-10-PCS | Mod: ,,, | Performed by: STUDENT IN AN ORGANIZED HEALTH CARE EDUCATION/TRAINING PROGRAM

## 2023-02-02 PROCEDURE — 83735 ASSAY OF MAGNESIUM: CPT | Performed by: FAMILY MEDICINE

## 2023-02-02 RX ORDER — POTASSIUM CHLORIDE 20 MEQ/1
40 TABLET, EXTENDED RELEASE ORAL ONCE
Status: COMPLETED | OUTPATIENT
Start: 2023-02-02 | End: 2023-02-02

## 2023-02-02 RX ORDER — MAGNESIUM SULFATE HEPTAHYDRATE 40 MG/ML
2 INJECTION, SOLUTION INTRAVENOUS ONCE
Status: COMPLETED | OUTPATIENT
Start: 2023-02-02 | End: 2023-02-02

## 2023-02-02 RX ORDER — TIZANIDINE 4 MG/1
4 TABLET ORAL ONCE
Status: COMPLETED | OUTPATIENT
Start: 2023-02-02 | End: 2023-02-02

## 2023-02-02 RX ORDER — FUROSEMIDE 40 MG/1
40 TABLET ORAL 2 TIMES DAILY
Status: DISCONTINUED | OUTPATIENT
Start: 2023-02-03 | End: 2023-02-03 | Stop reason: HOSPADM

## 2023-02-02 RX ORDER — FUROSEMIDE 40 MG/1
40 TABLET ORAL 2 TIMES DAILY
Status: DISCONTINUED | OUTPATIENT
Start: 2023-02-02 | End: 2023-02-02

## 2023-02-02 RX ORDER — TIZANIDINE 4 MG/1
4 TABLET ORAL EVERY 8 HOURS PRN
Status: DISCONTINUED | OUTPATIENT
Start: 2023-02-02 | End: 2023-02-03 | Stop reason: HOSPADM

## 2023-02-02 RX ADMIN — TIZANIDINE 4 MG: 4 TABLET ORAL at 09:02

## 2023-02-02 RX ADMIN — ACETAMINOPHEN 650 MG: 325 TABLET ORAL at 12:02

## 2023-02-02 RX ADMIN — ATORVASTATIN CALCIUM 80 MG: 40 TABLET, FILM COATED ORAL at 10:02

## 2023-02-02 RX ADMIN — SPIRONOLACTONE 25 MG: 25 TABLET, FILM COATED ORAL at 11:02

## 2023-02-02 RX ADMIN — TIZANIDINE 4 MG: 4 TABLET ORAL at 01:02

## 2023-02-02 RX ADMIN — DIPHENHYDRAMINE HYDROCHLORIDE, ZINC ACETATE: 2; .1 CREAM TOPICAL at 03:02

## 2023-02-02 RX ADMIN — Medication 400 MG: at 09:02

## 2023-02-02 RX ADMIN — HEPARIN SODIUM 5000 UNITS: 5000 INJECTION INTRAVENOUS; SUBCUTANEOUS at 01:02

## 2023-02-02 RX ADMIN — HEPARIN SODIUM 5000 UNITS: 5000 INJECTION INTRAVENOUS; SUBCUTANEOUS at 09:02

## 2023-02-02 RX ADMIN — HEPARIN SODIUM 5000 UNITS: 5000 INJECTION INTRAVENOUS; SUBCUTANEOUS at 05:02

## 2023-02-02 RX ADMIN — ATENOLOL 25 MG: 25 TABLET ORAL at 10:02

## 2023-02-02 RX ADMIN — PANTOPRAZOLE SODIUM 40 MG: 40 TABLET, DELAYED RELEASE ORAL at 10:02

## 2023-02-02 RX ADMIN — DIPHENHYDRAMINE HYDROCHLORIDE, ZINC ACETATE: 2; .1 CREAM TOPICAL at 10:02

## 2023-02-02 RX ADMIN — ASPIRIN 81 MG: 81 TABLET, COATED ORAL at 10:02

## 2023-02-02 RX ADMIN — DIPHENHYDRAMINE HYDROCHLORIDE, ZINC ACETATE: 2; .1 CREAM TOPICAL at 09:02

## 2023-02-02 RX ADMIN — POTASSIUM CHLORIDE 40 MEQ: 1500 TABLET, EXTENDED RELEASE ORAL at 10:02

## 2023-02-02 RX ADMIN — MAGNESIUM SULFATE 2 G: 2 INJECTION INTRAVENOUS at 10:02

## 2023-02-02 NOTE — ASSESSMENT & PLAN NOTE
Patient presenting with volume overload given increased lower extremity edema, paroxysmal nocturnal dyspnea and decreased exercise tolerance.  JVD and bibasilar crackles noted on examination.  BNP elevated.  Patient with recent diagnosis of heart failure with preserved ejection fraction. Last transthoracic echocardiogram (1/2023) with EF 65%.Patient without increase urine output on oral diuretics which is likely the cause of his volume overload and exacerbation; will need better titration while inpatient.    - hold IV furosemide 40 mg BID as appears euvolemic and complaining of cramping  - strict intake and output; goal of net negative two liters daily  - replace all electrolytes keeping K>4 and Mg>2  - cardiac monitoring/telemetry  - atenolol dose 25 mg daily given exacerbation  - spironolactone 25 mg daily  - holding patient's home sacubitril-valsartan; unclear why patient is on this medication given his preserved ejection fraction

## 2023-02-02 NOTE — NURSING
Pt reporting swelling and pain of left foot. Left foot assessed, red, warm to touch, 7/10 pain, and pulses +2 (doppler). Reported finding to Dr. Combs. New order placed for stat US to R/O DVT. Tylenol given for pain and pending US. Left foot elevated. Pt updated on poc.

## 2023-02-02 NOTE — SUBJECTIVE & OBJECTIVE
Interval History:   Patient seen and examined at bedside.    No acute events overnight.  Patient notes overall improvement in terms of shortness of breath and bilateral lower extremity swelling.  However, notes significant cramps of left foot this morning.  Patient updated regarding care plan.    Review of Systems   Constitutional:  Negative for appetite change, chills and fever.   HENT:  Negative for congestion and sore throat.    Respiratory:  Negative for cough, chest tightness, wheezing and stridor.    Cardiovascular:  Negative for chest pain and palpitations.   Gastrointestinal:  Negative for abdominal pain, blood in stool, constipation, diarrhea, nausea and vomiting.   Genitourinary:  Negative for difficulty urinating and dysuria.   Musculoskeletal:  Negative for back pain and gait problem.   Skin:  Negative for pallor and rash.   Neurological:  Negative for dizziness, weakness and headaches.   Psychiatric/Behavioral:  Negative for agitation, confusion and decreased concentration.    All other systems reviewed and are negative.    Objective:     Vital Signs (Most Recent):  Temp: 98.2 °F (36.8 °C) (02/02/23 1217)  Pulse: 71 (02/02/23 1217)  Resp: 18 (02/02/23 1217)  BP: (!) 112/57 (02/02/23 1217)  SpO2: 99 % (02/02/23 1217)   Vital Signs (24h Range):  Temp:  [97.4 °F (36.3 °C)-98.6 °F (37 °C)] 98.2 °F (36.8 °C)  Pulse:  [65-97] 71  Resp:  [16-20] 18  SpO2:  [93 %-99 %] 99 %  BP: (112-144)/(54-78) 112/57     Weight: 75.2 kg (165 lb 12.6 oz)  Body mass index is 21.87 kg/m².    Intake/Output Summary (Last 24 hours) at 2/2/2023 1446  Last data filed at 2/1/2023 2241  Gross per 24 hour   Intake 240 ml   Output 600 ml   Net -360 ml        Physical Exam  Vitals and nursing note reviewed.   Constitutional:       General: He is not in acute distress.     Appearance: He is not toxic-appearing or diaphoretic.   HENT:      Head: Normocephalic and atraumatic.      Right Ear: External ear normal.      Left Ear: External ear  normal.      Nose: No congestion.   Eyes:      General: No scleral icterus.        Right eye: No discharge.         Left eye: No discharge.   Cardiovascular:      Rate and Rhythm: Normal rate. Rhythm irregular.      Heart sounds: No murmur heard.    No friction rub. No gallop.   Pulmonary:      Effort: Pulmonary effort is normal. No respiratory distress.      Breath sounds: No rales.   Chest:      Chest wall: No tenderness.   Abdominal:      General: Bowel sounds are normal. There is no distension.      Palpations: Abdomen is soft.      Tenderness: There is no abdominal tenderness. There is no guarding.   Musculoskeletal:         General: No tenderness.      Cervical back: No rigidity or tenderness.      Right lower leg: Edema (improved) present.      Left lower leg: Edema (improved) present.   Skin:     Findings: No erythema or rash.   Neurological:      General: No focal deficit present.      Mental Status: He is alert and oriented to person, place, and time.      Cranial Nerves: No cranial nerve deficit.      Motor: No weakness.      Gait: Gait normal.   Psychiatric:         Behavior: Behavior normal.       Significant Labs: All pertinent labs within the past 24 hours have been reviewed.  BMP:   Recent Labs   Lab 02/02/23  0343   *      K 3.9      CO2 21*   BUN 35*   CREATININE 1.7*   CALCIUM 8.1*   MG 1.6       CBC:   Recent Labs   Lab 02/01/23  0335 02/02/23  0343   WBC 9.09 10.49   HGB 10.4* 10.2*   HCT 32.5* 32.1*    174       CMP:   Recent Labs   Lab 02/01/23  0335 02/01/23 2158 02/02/23  0343   * 144 143   K 3.5 4.1 3.9   * 110 108   CO2 21* 24 21*    137* 117*   BUN 32* 36* 35*   CREATININE 1.8* 1.8* 1.7*   CALCIUM 7.7* 8.1* 8.1*   ANIONGAP 13 10 14       Recent Lab Results         02/02/23  0343   02/01/23  2158   02/01/23  1618        Anion Gap 14   10         Baso # 0.05           Basophil % 0.5           BUN 35   36         Calcium 8.1   8.1          Chloride 108   110         CO2 21   24         Creatinine 1.7   1.8         Differential Method Automated           eGFR 40.0   37.3         Eos # 0.2           Eosinophil % 1.5           Glucose 117   137         Gran # (ANC) 6.8           Gran % 64.4           Hematocrit 32.1           Hemoglobin 10.2           Immature Grans (Abs) 0.03  Comment: Mild elevation in immature granulocytes is non specific and   can be seen in a variety of conditions including stress response,   acute inflammation, trauma and pregnancy. Correlation with other   laboratory and clinical findings is essential.             Immature Granulocytes 0.3           Lymph # 2.4           Lymph % 22.6           Magnesium 1.6   1.3   1.4       MCH 31.3           MCHC 31.8           MCV 99           Mono # 1.1           Mono % 10.7           MPV 11.8           nRBC 0           Phosphorus 2.6           Platelets 174           Potassium 3.9   4.1         RBC 3.26           RDW 13.7           Sodium 143   144         WBC 10.49                   Significant Imaging: I have reviewed all pertinent imaging results/findings within the past 24 hours.    Imaging Results              X-Ray Chest AP Portable (Final result)  Result time 01/30/23 16:12:59      Final result by Emory Tinsley MD (01/30/23 16:12:59)                   Impression:      No detrimental change or radiographic acute intrathoracic process seen on this single view.      Electronically signed by: Emory Tinsley MD  Date:    01/30/2023  Time:    16:12               Narrative:    EXAMINATION:  XR CHEST AP PORTABLE    CLINICAL HISTORY:  CHF;    TECHNIQUE:  Single frontal view of the chest was performed.    COMPARISON:  Chest radiograph 01/05/2023    FINDINGS:  Patient is slightly rotated.  Mediastinal structures are midline.  There is grossly similar calcification and tortuosity of the aorta.  Cardiac silhouette is mildly enlarged similar to prior without evidence of failure.  Pulmonary  vasculature and hilar contours are within normal limits.    Similar bibasilar minimal platelike scarring versus atelectasis.  The lungs are otherwise well expanded without consolidation, pleural effusion or pneumothorax.    No acute osseous process seen.  PA and lateral views can be obtained.

## 2023-02-02 NOTE — ASSESSMENT & PLAN NOTE
-Near baseline (1.4-1.6)  -Cont diuresis and monitor kidney function closely  -replace electrolytes as needed  -avoid nephrotoxic agents  -strict intake and output

## 2023-02-02 NOTE — PROGRESS NOTES
[facility]  Progress Note    Patient Name: Gus Sabillon  MRN: [id number]  Patient Class: IP- Inpatient   Admission Date: 1/30/2023  Length of Stay: 3 days  Attending Physician: Flor Garcia MD  Primary Care Provider: DEB Mora MD        Subjective:     Principal Problem:Acute on chronic diastolic heart failure        HPI:  Gus Sabillon is an 82-year-old man with a past medical history of primary hypertension, hyperlipidemia, HFpEF, carotid stenosis s/p CEA, PAD, GERD, who presents to the emergency department with increased lower extremity edema and shortness of breath.  The patient carries a new diagnosis of congestive heart failure.  He was recently started on oral furosemide 20 mg daily by his primary care provider and has been compliant with the medication.  The patient and his wife, who is present at the bedside, say that the patient has had a reduced exercise tolerance for the past several weeks.  The patient states that he usually enjoys walking with his wife every night but is now having to stop after 1 block because he is short of breath.  He also notes increased lower extremity edema.  The patient does not weigh himself daily so is not aware of any changes in his weight, however he does note increased bloating and abdominal distention.  He describes paroxysmal nocturnal dyspnea.  The patient denies any chest pain or chest discomfort.  He recently established care with a cardiologist who increased his oral furosemide to 40 mg daily.  The patient reports compliance with this medication but also states that he has not had increased urine output from the increased dose.  The patient is compliant with a low-sodium diet, however, he does not appear to be restricting his fluid intake.  He denies any abdominal pain or nausea, but the patient says he has been having frequent episodes of loose and watery bowel movements.  The patient reports multiple episodes  each day.    In the emergency department, the patient was noted to be hemodynamically stable.  He had an elevated BNP greater >3000 and an elevated troponin of 0.118 with repeat at 0.106. The patient was admitted to Hospital Medicine for further management.      Overview/Hospital Course:  elevated BNP greater >3000 and an elevated troponin of 0.118 with repeat at 0.106.  Initiated on IV diuresis for heart failure exacerbation.  The left ventricle is normal in size with concentric hypertrophy and normal systolic function.  The estimated ejection fraction is 65%.  Indeterminate left ventricular diastolic function.  Moderate right ventricular enlargement with normal right ventricular systolic function.  Severe left atrial enlargement.  Right atrial enlargement.  Mild-to-moderate mitral regurgitation.  Moderate aortic regurgitation.  Moderate to severe pulmonic regurgitation      Interval History:   Patient seen and examined at bedside.    No acute events overnight.  Patient notes overall improvement in terms of shortness of breath and bilateral lower extremity swelling.  However, notes significant cramps of left foot this morning.  Patient updated regarding care plan.    Review of Systems   Constitutional:  Negative for appetite change, chills and fever.   HENT:  Negative for congestion and sore throat.    Respiratory:  Negative for cough, chest tightness, wheezing and stridor.    Cardiovascular:  Negative for chest pain and palpitations.   Gastrointestinal:  Negative for abdominal pain, blood in stool, constipation, diarrhea, nausea and vomiting.   Genitourinary:  Negative for difficulty urinating and dysuria.   Musculoskeletal:  Negative for back pain and gait problem.   Skin:  Negative for pallor and rash.   Neurological:  Negative for dizziness, weakness and headaches.   Psychiatric/Behavioral:  Negative for agitation, confusion and decreased concentration.    All other systems reviewed and are  negative.    Objective:     Vital Signs (Most Recent):  Temp: 98.2 °F (36.8 °C) (02/02/23 1217)  Pulse: 71 (02/02/23 1217)  Resp: 18 (02/02/23 1217)  BP: (!) 112/57 (02/02/23 1217)  SpO2: 99 % (02/02/23 1217)   Vital Signs (24h Range):  Temp:  [97.4 °F (36.3 °C)-98.6 °F (37 °C)] 98.2 °F (36.8 °C)  Pulse:  [65-97] 71  Resp:  [16-20] 18  SpO2:  [93 %-99 %] 99 %  BP: (112-144)/(54-78) 112/57     Weight: 75.2 kg (165 lb 12.6 oz)  Body mass index is 21.87 kg/m².    Intake/Output Summary (Last 24 hours) at 2/2/2023 1446  Last data filed at 2/1/2023 2241  Gross per 24 hour   Intake 240 ml   Output 600 ml   Net -360 ml        Physical Exam  Vitals and nursing note reviewed.   Constitutional:       General: He is not in acute distress.     Appearance: He is not toxic-appearing or diaphoretic.   HENT:      Head: Normocephalic and atraumatic.      Right Ear: External ear normal.      Left Ear: External ear normal.      Nose: No congestion.   Eyes:      General: No scleral icterus.        Right eye: No discharge.         Left eye: No discharge.   Cardiovascular:      Rate and Rhythm: Normal rate. Rhythm irregular.      Heart sounds: No murmur heard.    No friction rub. No gallop.   Pulmonary:      Effort: Pulmonary effort is normal. No respiratory distress.      Breath sounds: No rales.   Chest:      Chest wall: No tenderness.   Abdominal:      General: Bowel sounds are normal. There is no distension.      Palpations: Abdomen is soft.      Tenderness: There is no abdominal tenderness. There is no guarding.   Musculoskeletal:         General: No tenderness.      Cervical back: No rigidity or tenderness.      Right lower leg: Edema (improved) present.      Left lower leg: Edema (improved) present.   Skin:     Findings: No erythema or rash.   Neurological:      General: No focal deficit present.      Mental Status: He is alert and oriented to person, place, and time.      Cranial Nerves: No cranial nerve deficit.      Motor: No  weakness.      Gait: Gait normal.   Psychiatric:         Behavior: Behavior normal.       Significant Labs: All pertinent labs within the past 24 hours have been reviewed.  BMP:   Recent Labs   Lab 02/02/23  0343   *      K 3.9      CO2 21*   BUN 35*   CREATININE 1.7*   CALCIUM 8.1*   MG 1.6       CBC:   Recent Labs   Lab 02/01/23  0335 02/02/23  0343   WBC 9.09 10.49   HGB 10.4* 10.2*   HCT 32.5* 32.1*    174       CMP:   Recent Labs   Lab 02/01/23  0335 02/01/23 2158 02/02/23  0343   * 144 143   K 3.5 4.1 3.9   * 110 108   CO2 21* 24 21*    137* 117*   BUN 32* 36* 35*   CREATININE 1.8* 1.8* 1.7*   CALCIUM 7.7* 8.1* 8.1*   ANIONGAP 13 10 14       Recent Lab Results         02/02/23 0343 02/01/23 2158 02/01/23  1618        Anion Gap 14   10         Baso # 0.05           Basophil % 0.5           BUN 35   36         Calcium 8.1   8.1         Chloride 108   110         CO2 21   24         Creatinine 1.7   1.8         Differential Method Automated           eGFR 40.0   37.3         Eos # 0.2           Eosinophil % 1.5           Glucose 117   137         Gran # (ANC) 6.8           Gran % 64.4           Hematocrit 32.1           Hemoglobin 10.2           Immature Grans (Abs) 0.03  Comment: Mild elevation in immature granulocytes is non specific and   can be seen in a variety of conditions including stress response,   acute inflammation, trauma and pregnancy. Correlation with other   laboratory and clinical findings is essential.             Immature Granulocytes 0.3           Lymph # 2.4           Lymph % 22.6           Magnesium 1.6   1.3   1.4       MCH 31.3           MCHC 31.8           MCV 99           Mono # 1.1           Mono % 10.7           MPV 11.8           nRBC 0           Phosphorus 2.6           Platelets 174           Potassium 3.9   4.1         RBC 3.26           RDW 13.7           Sodium 143   144         WBC 10.49                   Significant Imaging: I  have reviewed all pertinent imaging results/findings within the past 24 hours.    Imaging Results              X-Ray Chest AP Portable (Final result)  Result time 01/30/23 16:12:59      Final result by Emory Tinsley MD (01/30/23 16:12:59)                   Impression:      No detrimental change or radiographic acute intrathoracic process seen on this single view.      Electronically signed by: Emory Tinsley MD  Date:    01/30/2023  Time:    16:12               Narrative:    EXAMINATION:  XR CHEST AP PORTABLE    CLINICAL HISTORY:  CHF;    TECHNIQUE:  Single frontal view of the chest was performed.    COMPARISON:  Chest radiograph 01/05/2023    FINDINGS:  Patient is slightly rotated.  Mediastinal structures are midline.  There is grossly similar calcification and tortuosity of the aorta.  Cardiac silhouette is mildly enlarged similar to prior without evidence of failure.  Pulmonary vasculature and hilar contours are within normal limits.    Similar bibasilar minimal platelike scarring versus atelectasis.  The lungs are otherwise well expanded without consolidation, pleural effusion or pneumothorax.    No acute osseous process seen.  PA and lateral views can be obtained.                                          Assessment/Plan:      * Acute on chronic diastolic heart failure  Patient presenting with volume overload given increased lower extremity edema, paroxysmal nocturnal dyspnea and decreased exercise tolerance.  JVD and bibasilar crackles noted on examination.  BNP elevated.  Patient with recent diagnosis of heart failure with preserved ejection fraction. Last transthoracic echocardiogram (1/2023) with EF 65%.Patient without increase urine output on oral diuretics which is likely the cause of his volume overload and exacerbation; will need better titration while inpatient.    - hold IV furosemide 40 mg BID as appears euvolemic and complaining of cramping  - strict intake and output; goal of net negative two liters  daily  - replace all electrolytes keeping K>4 and Mg>2  - cardiac monitoring/telemetry  - atenolol dose 25 mg daily given exacerbation  - spironolactone 25 mg daily  - holding patient's home sacubitril-valsartan; unclear why patient is on this medication given his preserved ejection fraction    Troponin level elevated  Patient's troponin elevated with initial reading of 0.118 and repeat at 0.106.  Patient without complaints of chest pain or chest discomfort. This is likely secondary to demand ischemia from his heart failure exacerbation.      Hypokalemia  -cont replacement as needed to goal of 4      Hypomagnesemia  -cont replacement as needed to goal of 2      Decreased strength of upper extremity  Evaluated by PT/OT; no needs      CKD (chronic kidney disease)  -Near baseline (1.4-1.6)  -Cont diuresis and monitor kidney function closely  -replace electrolytes as needed  -avoid nephrotoxic agents  -strict intake and output      Mixed hyperlipidemia  -cont statin      Chronic right shoulder pain  -pain management and monitor      Carotid atherosclerosis, bilateral  Patient with history of carotid atherosclerosis.  Patient had right carotid endarterectomy.  Resume home aspirin and statin.      Gastroesophageal reflux disease  Symptoms stable.  Patient on omeprazole as outpatient; not on formulary.  We will begin pantoprazole.      HTN (hypertension), benign  -controlled, low normal  - decreased atenolol given his heart failure exacerbation  - resume spironolactone 25 mg daily  -Entresto on hold, outpatient cardiology follow up        VTE Risk Mitigation (From admission, onward)         Ordered     heparin (porcine) injection 5,000 Units  Every 8 hours         01/30/23 1918     IP VTE HIGH RISK PATIENT  Once         01/30/23 1918     Place sequential compression device  Until discontinued         01/30/23 1918                Discharge Planning   BARB: 2/2/2023     Code Status: Full Code   Is the patient medically ready  for discharge?: Yes    Reason for patient still in hospital (select all that apply): Patient trending condition and Pending disposition  Discharge Plan A: Home                  Flor Garcia MD  Department of Hospital Medicine   [facility]

## 2023-02-03 VITALS
HEART RATE: 79 BPM | HEIGHT: 73 IN | BODY MASS INDEX: 22.09 KG/M2 | RESPIRATION RATE: 14 BRPM | OXYGEN SATURATION: 95 % | TEMPERATURE: 98 F | SYSTOLIC BLOOD PRESSURE: 119 MMHG | DIASTOLIC BLOOD PRESSURE: 60 MMHG | WEIGHT: 166.69 LBS

## 2023-02-03 LAB
ANION GAP SERPL CALC-SCNC: 13 MMOL/L (ref 8–16)
BNP SERPL-MCNC: 2291 PG/ML (ref 0–99)
BUN SERPL-MCNC: 41 MG/DL (ref 8–23)
CALCIUM SERPL-MCNC: 8.5 MG/DL (ref 8.7–10.5)
CHLORIDE SERPL-SCNC: 106 MMOL/L (ref 95–110)
CO2 SERPL-SCNC: 21 MMOL/L (ref 23–29)
CREAT SERPL-MCNC: 1.7 MG/DL (ref 0.5–1.4)
CRP SERPL-MCNC: 91.3 MG/L (ref 0–8.2)
ERYTHROCYTE [SEDIMENTATION RATE] IN BLOOD BY PHOTOMETRIC METHOD: 27 MM/HR (ref 0–23)
EST. GFR  (NO RACE VARIABLE): 40 ML/MIN/1.73 M^2
GLUCOSE SERPL-MCNC: 108 MG/DL (ref 70–110)
MAGNESIUM SERPL-MCNC: 2 MG/DL (ref 1.6–2.6)
POTASSIUM SERPL-SCNC: 4.4 MMOL/L (ref 3.5–5.1)
SODIUM SERPL-SCNC: 140 MMOL/L (ref 136–145)

## 2023-02-03 PROCEDURE — 83880 ASSAY OF NATRIURETIC PEPTIDE: CPT | Performed by: STUDENT IN AN ORGANIZED HEALTH CARE EDUCATION/TRAINING PROGRAM

## 2023-02-03 PROCEDURE — 80048 BASIC METABOLIC PNL TOTAL CA: CPT | Performed by: STUDENT IN AN ORGANIZED HEALTH CARE EDUCATION/TRAINING PROGRAM

## 2023-02-03 PROCEDURE — 99239 HOSP IP/OBS DSCHRG MGMT >30: CPT | Mod: ,,, | Performed by: STUDENT IN AN ORGANIZED HEALTH CARE EDUCATION/TRAINING PROGRAM

## 2023-02-03 PROCEDURE — 63600175 PHARM REV CODE 636 W HCPCS: Performed by: STUDENT IN AN ORGANIZED HEALTH CARE EDUCATION/TRAINING PROGRAM

## 2023-02-03 PROCEDURE — 25000003 PHARM REV CODE 250: Performed by: STUDENT IN AN ORGANIZED HEALTH CARE EDUCATION/TRAINING PROGRAM

## 2023-02-03 PROCEDURE — 85652 RBC SED RATE AUTOMATED: CPT | Performed by: STUDENT IN AN ORGANIZED HEALTH CARE EDUCATION/TRAINING PROGRAM

## 2023-02-03 PROCEDURE — 25000003 PHARM REV CODE 250: Performed by: FAMILY MEDICINE

## 2023-02-03 PROCEDURE — 83735 ASSAY OF MAGNESIUM: CPT | Performed by: STUDENT IN AN ORGANIZED HEALTH CARE EDUCATION/TRAINING PROGRAM

## 2023-02-03 PROCEDURE — 99239 PR HOSPITAL DISCHARGE DAY,>30 MIN: ICD-10-PCS | Mod: ,,, | Performed by: STUDENT IN AN ORGANIZED HEALTH CARE EDUCATION/TRAINING PROGRAM

## 2023-02-03 PROCEDURE — 86140 C-REACTIVE PROTEIN: CPT | Performed by: STUDENT IN AN ORGANIZED HEALTH CARE EDUCATION/TRAINING PROGRAM

## 2023-02-03 RX ORDER — ATENOLOL 50 MG/1
25 TABLET ORAL DAILY
Qty: 90 TABLET | Refills: 3
Start: 2023-02-03 | End: 2023-02-15

## 2023-02-03 RX ORDER — ACETAMINOPHEN 325 MG/1
650 TABLET ORAL EVERY 6 HOURS PRN
Refills: 0
Start: 2023-02-03

## 2023-02-03 RX ADMIN — HEPARIN SODIUM 5000 UNITS: 5000 INJECTION INTRAVENOUS; SUBCUTANEOUS at 06:02

## 2023-02-03 RX ADMIN — Medication 400 MG: at 08:02

## 2023-02-03 RX ADMIN — SPIRONOLACTONE 25 MG: 25 TABLET, FILM COATED ORAL at 08:02

## 2023-02-03 RX ADMIN — PANTOPRAZOLE SODIUM 40 MG: 40 TABLET, DELAYED RELEASE ORAL at 08:02

## 2023-02-03 RX ADMIN — FUROSEMIDE 40 MG: 40 TABLET ORAL at 08:02

## 2023-02-03 RX ADMIN — DIPHENHYDRAMINE HYDROCHLORIDE, ZINC ACETATE: 2; .1 CREAM TOPICAL at 08:02

## 2023-02-03 RX ADMIN — ATORVASTATIN CALCIUM 80 MG: 40 TABLET, FILM COATED ORAL at 08:02

## 2023-02-03 RX ADMIN — ASPIRIN 81 MG: 81 TABLET, COATED ORAL at 08:02

## 2023-02-03 RX ADMIN — ATENOLOL 25 MG: 25 TABLET ORAL at 08:02

## 2023-02-03 NOTE — CARE UPDATE
Patient seen and evaluated in bed 355. He reports left ankle pain x 3 days or so. He reports previous ankle injury and pain in the left ankle two times in the last year. He thought it was gout, but has not been diagnosed with gout. He has not had any other infections. After examining the patient, I recommended aspiration of the ankle. The patient refused aspiration. I explained that if this is infected, it could proceed to severe ankle arthritis, worsening infection, loss of limb and death. The patient reports understanding of this. I again offered aspiration and he refused.     Please call if patient changes his mind regarding aspiration.

## 2023-02-03 NOTE — PLAN OF CARE
02/03/23 1611   Final Note   Assessment Type Final Discharge Note   Anticipated Discharge Disposition Home     Patient discharged home no needs. Appointment scheduled.     Prieto Greenwood RN    [phone]

## 2023-02-03 NOTE — PLAN OF CARE
Discharge instructions reviewed with patient and wife. Opportunity given for questions. Questions answered. Per patient and wife they both  understand. PIVs removed.  Tele monitor removed.

## 2023-02-03 NOTE — HPI
Gus Sabillon is a 82 y.o. male with PMH of primary hypertension, hyperlipidemia, HFpEF, carotid stenosis s/p CEA, PAD s/p L leg stenting, and GERD admitted to  with acute on chronic diastolic hear failure.  Patient reports 1 day history of progressively worsening left ankle pain, swelling, and decreased weight-bearing. Denies fevers, numbness, tingling, or paresthesias to the LLE.  consulted orthopedic surgery for concern for septic arthritis.     Patient endorses previous injury to his left ankle at age 28 in which he tore a ligament (unsure which).

## 2023-02-03 NOTE — DISCHARGE SUMMARY
Scott Jones - Cardiology [facility] Medicine  Discharge Summary      Patient Name: Gus Sabillon  MRN: [id number]  BRANDON: [id number]  Patient Class: IP- Inpatient  Admission Date: 1/30/2023  Hospital Length of Stay: 4 days  Discharge Date and Time:  02/03/2023 4:10 PM  Attending Physician: Flor Garcia MD   Discharging Provider: Flor Garcia MD  Primary Care Provider: DEB Mora MD  [facility] Medicine Team: [facility]OSP MED D Flor Garcia MD  Primary Care Team: [facility] MED D    HPI:   Gus Sabillon is an 82-year-old man with a past medical history of primary hypertension, hyperlipidemia, HFpEF, carotid stenosis s/p CEA, PAD, GERD, who presents to the emergency department with increased lower extremity edema and shortness of breath.  The patient carries a new diagnosis of congestive heart failure.  He was recently started on oral furosemide 20 mg daily by his primary care provider and has been compliant with the medication.  The patient and his wife, who is present at the bedside, say that the patient has had a reduced exercise tolerance for the past several weeks.  The patient states that he usually enjoys walking with his wife every night but is now having to stop after 1 block because he is short of breath.  He also notes increased lower extremity edema.  The patient does not weigh himself daily so is not aware of any changes in his weight, however he does note increased bloating and abdominal distention.  He describes paroxysmal nocturnal dyspnea.  The patient denies any chest pain or chest discomfort.  He recently established care with a cardiologist who increased his oral furosemide to 40 mg daily.  The patient reports compliance with this medication but also states that he has not had increased urine output from the increased dose.  The patient is compliant with a low-sodium diet, however, he does not appear to be restricting his fluid intake.  He denies any abdominal pain or nausea, but the  patient says he has been having frequent episodes of loose and watery bowel movements.  The patient reports multiple episodes each day.    In the emergency department, the patient was noted to be hemodynamically stable.  He had an elevated BNP greater >3000 and an elevated troponin of 0.118 with repeat at 0.106. The patient was admitted to Hospital Medicine for further management.      * No surgery found *      Hospital Course:   On admission, noted to have elevated BNP greater >3000 and an elevated troponin of 0.118 with repeat at 0.106; latter likely due to demand ischemia.  Initiated on IV diuresis for heart failure exacerbation.  Gradual improvement in symptoms and BNP.  Renal function largely at baseline throughout diuresis.  Echo repeated with findings as below:  The left ventricle is normal in size with concentric hypertrophy and normal systolic function.  The estimated ejection fraction is 65%.  Indeterminate left ventricular diastolic function.  Moderate right ventricular enlargement with normal right ventricular systolic function.  Severe left atrial enlargement.  Right atrial enlargement.  Mild-to-moderate mitral regurgitation.  Moderate aortic regurgitation.  Moderate to severe pulmonic regurgitation    Patient euvolemic 2/2.  Transition to p.o. Lasix.  However, with onset of left ankle pain.  Left ankle x-ray unremarkable for acute fracture or dislocation or bone destruction.  Left ankle MRI without contrast remarkable for Medial ankle tendon tenosynovitis and Tibiotalar joint effusion.  Orthopedic surgery consulted.  Aspiration ankle was recommended, however despite extensive counseling regarding risks of deferring, patient refused.  Upon further counseling, patient notes improvement in pain and ambulation with supportive care including pain control, elevation, ice pack.  He is amenable to continue these interventions at home. Patient without leukocytosis or fevers and apart from mildly elevated  inflammatory markers, no signs or symptoms to suggest systemic infection.  Return precautions advised and patient verbalizes understanding of any signs or symptoms that would necessitate immediate return to the ER and risks of not proceeding with joint aspiration.  Due to CKD and heart failure, deferring NSAID therapy.  Also discussed with patient, hesitant to initiate any steroidal therapy if potentially gout as septic joint not ruled out.  Advised continuing Tylenol and supportive care as patient noted improvement.  Ambulatory referral was placed to Nephrology and Cardiology.  PCP follow-up in place.  Patient discharged on 02/03 in stable condition.    Vitals:    02/03/23 0806 02/03/23 1124 02/03/23 1248 02/03/23 1533   BP: 135/65 (!) 111/59     BP Location: Left arm Right arm     Patient Position: Lying Lying     Pulse: 71 73 74 85   Resp: 14 16     Temp: 98.8 °F (37.1 °C) 97.6 °F (36.4 °C)     TempSrc: Oral Oral     SpO2: 97% 97%     Weight:       Height:         Physical Exam  Vitals and nursing note reviewed.   Constitutional:       General: He is not in acute distress.     Appearance: He is not toxic-appearing or diaphoretic.   HENT:      Head: Normocephalic and atraumatic.      Right Ear: External ear normal.      Left Ear: External ear normal.      Nose: No congestion.   Eyes:      General: No scleral icterus.        Right eye: No discharge.         Left eye: No discharge.   Cardiovascular:      Rate and Rhythm: Normal rate and rhythm.     Heart sounds: No murmur heard.    No friction rub. No gallop.   Pulmonary:      Effort: Pulmonary effort is normal. No respiratory distress.      Breath sounds: No rales.   Chest:      Chest wall: No tenderness.   Abdominal:      General: Bowel sounds are normal. There is no distension.      Palpations: Abdomen is soft.      Tenderness: There is no abdominal tenderness. There is no guarding.   Musculoskeletal:         General:  Tenderness on superior site of ankle; pain  with range of movement improved compared to 02/03     Cervical back: No rigidity or tenderness.      Right lower leg: Edema (improved) present.      Left lower leg: Edema (improved) present.   Skin:     Findings: No erythema or rash.   Neurological:      General: No focal deficit present.      Mental Status: He is alert and oriented to person, place, and time.      Cranial Nerves: No cranial nerve deficit.      Motor: No weakness.      Gait: Gait normal.   Psychiatric:         Behavior: Behavior normal.        Goals of Care Treatment Preferences:  Code Status: Full Code      Consults:   Consults (From admission, onward)        Status Ordering Provider     Inpatient consult to Social Work/Case Management  Once        Provider:  (Not yet assigned)    Acknowledged YENI FRANKLIN     Inpatient consult to Registered Dietitian/Nutritionist  Once        Provider:  (Not yet assigned)    Completed YENI FRANKLIN new Assessment & Plan notes have been filed under this hospital service since the last note was generated.  Service: Hospital Medicine    Final Active Diagnoses:    Diagnosis Date Noted POA    PRINCIPAL PROBLEM:  Acute on chronic diastolic heart failure [I50.33] 01/11/2023 Yes    Troponin level elevated [R77.8] 01/31/2023 Yes    Hypokalemia [E87.6] 01/31/2023 Yes    Hypomagnesemia [E83.42] 02/01/2023 Yes    Decreased strength of upper extremity [R29.898] 08/03/2022 Yes    CKD (chronic kidney disease) [N18.9] 03/16/2022 Yes    Mixed hyperlipidemia [E78.2] 05/05/2021 Yes    Chronic right shoulder pain [M25.511, G89.29] 04/14/2020 Yes    HTN (hypertension), benign [I10] 12/31/2019 Yes    Gastroesophageal reflux disease [K21.9] 12/31/2019 Yes      Problems Resolved During this Admission:       Discharged Condition: stable    Disposition: Home or Self Care    Follow Up:   Follow-up Information     P Rishabh Mora MD Follow up.    Specialty: Internal Medicine  Why: Hernesto Randhawa,  NP    Enhanced Annual Wellness Visit 10:00 AM   Hernesto Randhawa NP  It is almost time for your virtual visit with your provider! Your virtual visit can be performed on the [facility] ary on your smart phone or tablet; or via the [facility] website on a computer/laptop with webcam and microphone capabilities on a [employer], or [employer] web browser.      Download the [facility] mobile ary from the Apple Ary Store or Google Play Store.       You will need the latest version of the [facility] ary if using a smart phone or tablet.  Contact information:  [address] [place] [address] [place] [address]  [phone]                       Patient Instructions:      Ambulatory referral/consult to Cardiology   Standing Status: Future   Referral Priority: Urgent Referral Type: Consultation   Referral Reason: Specialty Services Required   Requested Specialty: Cardiology   Number of Visits Requested: 1     Ambulatory referral/consult to Nephrology   Standing Status: Future   Referral Priority: Urgent Referral Type: Consultation   Referral Reason: Specialty Services Required   Requested Specialty: Nephrology   Number of Visits Requested: 1     Diet Cardiac     Call MD for:  temperature >100.4     Call MD for:  persistent nausea and vomiting or diarrhea     Call MD for:  severe uncontrolled pain     Call MD for:  redness, tenderness, or signs of infection (pain, swelling, redness, odor or green/yellow discharge around incision site)     Call MD for:  difficulty breathing or increased cough     Call MD for:  severe persistent headache     Call MD for:  worsening rash     Call MD for:  persistent dizziness, light-headedness, or visual disturbances     Call MD for:  increased confusion or weakness     Activity as tolerated       Significant Diagnostic Studies: Labs:   CMP   Recent Labs   Lab 02/02/23  0343 02/02/23  1525 02/03/23  0301    139 140   K 3.9 4.7 4.4    108 106   CO2 21* 23 21*   * 123* 108   BUN 35*  37* 41*   CREATININE 1.7* 1.8* 1.7*   CALCIUM 8.1* 8.0* 8.5*   ANIONGAP 14 8 13    and CBC   Recent Labs   Lab 02/02/23  0343   WBC 10.49   HGB 10.2*   HCT 32.1*        Microbiology: Blood Culture No results found for: LABBLOO    Pending Diagnostic Studies:     None         Medications:  Reconciled Home Medications:      Medication List      START taking these medications    acetaminophen 325 MG tablet  Commonly known as: TYLENOL  Take 2 tablets (650 mg total) by mouth every 6 (six) hours as needed for Pain.        CHANGE how you take these medications    atenoloL 50 MG tablet  Commonly known as: TENORMIN  Take 0.5 tablets (25 mg total) by mouth once daily.  What changed:   · how much to take  · when to take this        CONTINUE taking these medications    aspirin 81 MG EC tablet  Commonly known as: ECOTRIN  Take 81 mg by mouth once daily.     COLLAGEN 1500 PLUS C ORAL  Take by mouth Daily. 11 G of powder daily     fish oil-omega-3 fatty acids 300-1,000 mg capsule  Take by mouth once daily.     fluorouraciL 5 % cream  Commonly known as: EFUDEX  AAA on both arms BID x 2-3 weeks. Stop if blistered, oozing, or bleeding. Use daily sun protection.     furosemide 40 MG tablet  Commonly known as: LASIX  Take 1 tablet (40 mg total) by mouth once daily.     ketoconazole 2 % cream  Commonly known as: NIZORAL  Apply topically 2 (two) times daily.     multivit-min-FA-lycopen-lutein 300-600-300 mcg Tab  Take by mouth.     NEXLETOL 180 mg Tab  Generic drug: bempedoic acid  Take 1 tablet (180 mg) by mouth once daily.     omeprazole 20 MG capsule  Commonly known as: PRILOSEC  Take 2 capsules (40 mg total) by mouth once daily.     oxyCODONE-acetaminophen  mg per tablet  Commonly known as: PERCOCET  Take 1 tablet by mouth every 4-6 hours as needed for pain. Take stool softener with this medication.     promethazine 25 MG tablet  Commonly known as: PHENERGAN  Take 1 tablet (25 mg total) by mouth every 6 (six) hours as  needed for Nausea.     rosuvastatin 20 MG tablet  Commonly known as: CRESTOR  TAKE 1 TABLET BY MOUTH ONCE DAILY     spironolactone 25 MG tablet  Commonly known as: ALDACTONE  Take 1 tablet (25 mg total) by mouth once daily.     tadalafiL 5 MG tablet  Commonly known as: CIALIS  Take 1 tablet (5 mg total) by mouth once daily.     traMADoL 50 mg tablet  Commonly known as: ULTRAM  Take 1-2 tablets ( mg total) by mouth every 6 (six) hours as needed for Pain.        STOP taking these medications    ENTRESTO 24-26 mg per tablet  Generic drug: sacubitriL-valsartan            Indwelling Lines/Drains at time of discharge:   Lines/Drains/Airways     None                 Time spent on the discharge of patient: 65 minutes         Flor Garcia MD  Department of Hospital Medicine  [facility] - Cardiology Stepdown

## 2023-02-03 NOTE — SUBJECTIVE & OBJECTIVE
Past Medical History:   Diagnosis Date    CKD (chronic kidney disease) stage 3, GFR 30-59 ml/min     Dyslipidemia     GERD (gastroesophageal reflux disease)     Hx of melanoma excision     Hypertension     Melanoma     PAD (peripheral artery disease)     Squamous cell carcinoma of skin        Past Surgical History:   Procedure Laterality Date    ARTHROSCOPIC DEBRIDEMENT OF SHOULDER Right 2/27/2020    Procedure: EXTENSIVE DEBRIDEMENT, SHOULDER, ARTHROSCOPIC;  Surgeon: Staci Childress MD;  Location: [facility] OR;  Service: Orthopedics;  Laterality: Right;    ARTHROSCOPIC DEBRIDEMENT OF SHOULDER Left 6/21/2022    Procedure: EXTENSIVE DEBRIDEMENT, SHOULDER, ARTHROSCOPIC;  Surgeon: Staci Childress MD;  Location: [facility] OR;  Service: Orthopedics;  Laterality: Left;    ARTHROSCOPIC REPAIR OF ROTATOR CUFF OF SHOULDER Right 2/27/2020    Procedure: REPAIR, ROTATOR CUFF, ARTHROSCOPIC;  Surgeon: Staci Childress MD;  Location: [facility] OR;  Service: Orthopedics;  Laterality: Right;    ARTHROSCOPIC REPAIR OF ROTATOR CUFF OF SHOULDER Left 6/21/2022    Procedure: REPAIR, ROTATOR CUFF, ARTHROSCOPIC WITH CUFF MEND;  Surgeon: Staci Childress MD;  Location: [facility] OR;  Service: Orthopedics;  Laterality: Left;    ARTHROSCOPY OF SHOULDER WITH DECOMPRESSION OF SUBACROMIAL SPACE Right 2/27/2020    Procedure: ARTHROSCOPY, SHOULDER, WITH SUBACROMIAL SPACE DECOMPRESSION;  Surgeon: Staci Childress MD;  Location: [facility] OR;  Service: Orthopedics;  Laterality: Right;    ARTHROSCOPY OF SHOULDER WITH DECOMPRESSION OF SUBACROMIAL SPACE Left 6/21/2022    Procedure: ARTHROSCOPY, SHOULDER, WITH SUBACROMIAL  DECOMPRESSION;  Surgeon: Staci Childress MD;  Location: [facility] OR;  Service: Orthopedics;  Laterality: Left;    ARTHROSCOPY OF SHOULDER WITH REMOVAL OF DISTAL CLAVICLE Left 6/21/2022    Procedure: ARTHROSCOPY, SHOULDER, WITH DISTAL CLAVICLE EXCISION;  Surgeon: Staci Childress MD;  Location: [facility] OR;  Service: Orthopedics;  Laterality: Left;    ARTHROSCOPY,SHOULDER,WITH BICEPS TENODESIS Left  6/21/2022    Procedure: ARTHROSCOPY,SHOULDER,WITH BICEPS TENODESIS;  Surgeon: Staci Childress MD;  Location: [facility] OR;  Service: Orthopedics;  Laterality: Left;    BLEPHAROPLASTY      CAROTID ENDARTERECTOMY Right 10/15/2021    Procedure: ENDARTERECTOMY-CAROTID;  Surgeon: Imer Farooq MD;  Location: [facility] OR [address] [place] [address];  Service: Peripheral Vascular;  Laterality: Right;  AWAKE CERVICAL BLOCK    CATARACT EXTRACTION, BILATERAL      EXCISION OF BURSA Left 6/21/2022    Procedure: BURSECTOMY;  Surgeon: Staci Childress MD;  Location: [facility] OR;  Service: Orthopedics;  Laterality: Left;    FIXATION OF TENDON Right 2/27/2020    Procedure: FIXATION, TENDON, Biceps Tenodesis;  Surgeon: Staci Childress MD;  Location: [facility] OR;  Service: Orthopedics;  Laterality: Right;  FIXATION, TENDON, Biceps Tenodesis    OPEN REDUCTION AND INTERNAL FIXATION (ORIF) OF FRACTURE OF PROXIMAL HUMERUS Right 2/27/2020    Procedure: ORIF, FRACTURE, GREATER TUBEROSITY;  Surgeon: Staci Childress MD;  Location: [facility] OR;  Service: Orthopedics;  Laterality: Right;    ORIF HUMERUS FRACTURE Left 6/21/2022    Procedure: ORIF, FRACTURE, HUMERUS, GREATER TUBEROSITY;  Surgeon: Staci Childress MD;  Location: [facility] OR;  Service: Orthopedics;  Laterality: Left;    SHOULDER ARTHROSCOPY Left 6/21/2022    Procedure: ARTHROSCOPY, SHOULDER WITH LABRAL REPAIR;  Surgeon: Staci Childress MD;  Location: [facility] OR;  Service: Orthopedics;  Laterality: Left;    TONSILLECTOMY         Review of patient's allergies indicates:   Allergen Reactions    Adhesive Hives, Itching and Blisters    Clindamycin Nausea And Vomiting    Penicillins Hives       Current Facility-Administered Medications   Medication    acetaminophen tablet 650 mg    aspirin EC tablet 81 mg    atenoloL tablet 25 mg    atorvastatin tablet 80 mg    diphenhydrAMINE capsule 25 mg    diphenhydrAMINE-zinc acetate 2-0.1% cream    furosemide tablet 40 mg    heparin (porcine) injection 5,000 Units    magnesium oxide tablet 400 mg    melatonin tablet  6 mg    ondansetron injection 4 mg    pantoprazole EC tablet 40 mg    simethicone chewable tablet 80 mg    sodium chloride 0.9% flush 10 mL    spironolactone tablet 25 mg    tiZANidine tablet 4 mg     Family History       Problem Relation (Age of Onset)    Cancer Sister    Colon cancer Sister    Diabetes Mother    Heart disease Father    Hyperlipidemia Mother          Tobacco Use    Smoking status: Former     Types: Cigarettes     Quit date:      Years since quittin.1    Smokeless tobacco: Never   Substance and Sexual Activity    Alcohol use: Yes     Alcohol/week: 14.0 standard drinks     Types: 14 Glasses of wine per week     Comment: 2 glasses of wine daily    Drug use: Never    Sexual activity: Not Currently     Review of Systems   Constitutional: Negative for fever, malaise/fatigue and night sweats.   HENT:  Negative for congestion and sore throat.    Cardiovascular:  Positive for leg swelling. Negative for chest pain and claudication.   Respiratory:  Negative for cough and shortness of breath.    Hematologic/Lymphatic: Negative for bleeding problem. Does not bruise/bleed easily.   Skin:  Positive for color change. Negative for itching and rash.   Musculoskeletal:  Positive for arthritis, joint pain and joint swelling. Negative for falls and stiffness.   Gastrointestinal:  Negative for diarrhea, nausea and vomiting.   Genitourinary:  Negative for pelvic pain and urgency.   Neurological:  Negative for dizziness, focal weakness and paresthesias.   Psychiatric/Behavioral:  Negative for altered mental status and substance abuse.    Allergic/Immunologic: Negative for HIV exposure and persistent infections.   Objective:     Vital Signs (Most Recent):  Temp: 98.8 °F (37.1 °C) (23)  Pulse: 71 (23)  Resp: 14 (23)  BP: 135/65 (23)  SpO2: 97 % (23) Vital Signs (24h Range):  Temp:  [97.5 °F (36.4 °C)-98.8 °F (37.1 °C)] 98.8 °F (37.1 °C)  Pulse:  [55-77]  "71  Resp:  [14-19] 14  SpO2:  [95 %-99 %] 97 %  BP: ()/(51-65) 135/65     Weight: 75.6 kg (166 lb 10.7 oz)  Height: 6' 1" (185.4 cm)  Body mass index is 21.99 kg/m².      Intake/Output Summary (Last 24 hours) at 2/3/2023 1053  Last data filed at 2/3/2023 0857  Gross per 24 hour   Intake 720 ml   Output 550 ml   Net 170 ml       General    Nursing note and vitals reviewed.        General:  no acute distress, appears stated age   Neuro: alert and oriented x3  Psych: normal mood  Head: normocephalic, atraumatic.  Eyes: no scleral icterus  Mouth: moist mucous membranes  Cardiovascular: extremities warm and well perfused  Lungs: breathing comfortably, equal chest rise bilat  Skin: clean, dry, intact (any exceptions noted in below musculoskeletal exam)    RLE:  - Skin intact throughout, no open wounds  - No swelling  - No ecchymosis, erythema, or signs of cellulitis  - NonTTP throughout  - AROM and PROM of the hip, knee, ankle, and foot intact without pain  - TA/EHL/Gastroc/FHL assessed in isolation without deficit  - SILT throughout  - Compartments soft  - DP and PT palpated  - Capillary Refill <3s  - Negative Log roll  - [facility]    LLE:  - Skin intact throughout, no open wounds  - Swelling over ankle  - Erythema over ant/lat/med aspect of ankle  - No ecchymosis or signs of cellulitis  - Tenderness to palpation over anterior and medial ankle  - AROM and PROM of the hip and knee intact without pain  - AROM of motion limited by pain at ankle  - PROM intact at ankle  - TA/EHL/Gastroc/FHL assessed in isolation without deficit  - SILT throughout  - Compartments soft  - DP and PT palpated  - Capillary Refill <3s  - Negative Log roll  - Negative [facility]      Significant Labs: All pertinent labs within the past 24 hours have been reviewed.    Significant Imaging: I have reviewed and interpreted all pertinent imaging results/findings.  "

## 2023-02-06 ENCOUNTER — TELEPHONE (OUTPATIENT)
Dept: ADMINISTRATIVE | Facility: CLINIC | Age: 83
End: 2023-02-06
Payer: MEDICARE

## 2023-02-06 ENCOUNTER — PATIENT MESSAGE (OUTPATIENT)
Dept: INTERNAL MEDICINE | Facility: CLINIC | Age: 83
End: 2023-02-06
Payer: MEDICARE

## 2023-02-06 ENCOUNTER — PATIENT MESSAGE (OUTPATIENT)
Dept: ADMINISTRATIVE | Facility: CLINIC | Age: 83
End: 2023-02-06
Payer: MEDICARE

## 2023-02-06 ENCOUNTER — TELEPHONE (OUTPATIENT)
Dept: CARDIOLOGY | Facility: CLINIC | Age: 83
End: 2023-02-06
Payer: MEDICARE

## 2023-02-06 NOTE — TELEPHONE ENCOUNTER
"[facility]) hospital discharge 48-72 hour phone follow up completed.     Most Recent Hospital Discharge Date: 2/3/23    Last admission Diagnosis/chief complaint: SOB    TCC RN Navigator spoke with pt, Ms. Gus Sabillon    Current Patient reported weight: pt reports weights since discharge as follow 2/3/23: 173.8 lbs; 2/4/23: 170.8 lbs 2/5/23: 169.6 lbs. Pt educated r/t to appropriate weight loss and gain with HF.     Current Patient reported blood pressure and heart rate: BP: 134/ 57 HR: 66    Pt reports the following:  []  Shortness of Breath with Activity  []  Shortness of Breath at rest   []  Fatigue  []  Edema   [] Chest pain or tightness  [] Weight Increase since discharge  [x] None of the above    Medications:   Discharge medication reviewed with pt.  Pt reports having medication list available and has all medications at home for use per list.     Pt states he will need more spironolactone in "about a week" and his atenolol dose was lowered by half to 25mg, he will need a new Rx with that dosage. RN educated pt r/t refills with [facility] office, told he will receive new Rxs if needed at his first appt. Pt verbalized understanding.     Education:   Confirmed pt received "Home Care Guide for Heart Failure Patients" while admitted.  Reviewed key points as listed below.     Recommend 2 -3 gram sodium restriction and 1500 cc-2000 cc fluid restriction.  Encourage physical activity with graded exercise program.  Requested patient to weigh themselves daily, and to notify us if their weight increases by more than 3 lbs in 1 day or 5 lbs in 3 days.   Reminded patient to use "Daily weight and symptom tracker" to record and guide patient on when and how to call [facility]. PT may also use symptom tracker if no scale available  Pt reports being in the GREEN Zone. If in yellow/red, reminded that they should be calling [facility] today or now.     Watch for these Signs and Symptoms: If any of these " occur, contact [facility] immediately:   Increase in shortness of breath with movement   Increase in swelling in your legs and ankles   Weight gain of more than 3 pounds in a day or 5 pounds in 3 days.   Difficulty breathing when you are lying down   Worsening fatigue or tiredness   Stomach bloating, a full feeling or a loss of appetite   Increased coughing--especially when you are lying down      Pt was able to verbalize back to RN in their own words correct diet/fluid restrictions, necessity for exercise, warning signs and symptoms, when and how to contact their TCC team.      Pt educated [facility] to include:   Week 1 -  F/u appt with Provider and RN Pt has initial visit on 2/8/23 at 9AM.      Week 2-5 - In person/ Virtual/ phone call check ins    Week 5-7 - [facility] and transition to longterm care provider (Cardiology/PCP/ Advanced Heart Failure).      Patient active on myChart? yes      Pt given the following contact information for ease of communication: [phone] (Mon-Fri, 8a-5p) & for urgent issues on the weekend to page the Heart Transplant MD on call.  Pt also encouraged utilize [facility] messaging as well.      Pt instructed to contact [facility] RNs if HF red flag signs and sxs return before appt. Pt agreed.     Will follow up with pt at first clinic visit and RN navigator available for pt questions, issues or concerns.

## 2023-02-06 NOTE — TELEPHONE ENCOUNTER
Offered pt next ov 2/9 , pt has an appointment that day , only wants to see  he is scheduled 2/15

## 2023-02-08 ENCOUNTER — OFFICE VISIT (OUTPATIENT)
Dept: INTERNAL MEDICINE | Facility: CLINIC | Age: 83
End: 2023-02-08
Payer: MEDICARE

## 2023-02-08 ENCOUNTER — TELEPHONE (OUTPATIENT)
Dept: ADMINISTRATIVE | Facility: CLINIC | Age: 83
End: 2023-02-08
Payer: MEDICARE

## 2023-02-08 DIAGNOSIS — I70.0 AORTIC ATHEROSCLEROSIS: ICD-10-CM

## 2023-02-08 DIAGNOSIS — M25.512 CHRONIC LEFT SHOULDER PAIN: ICD-10-CM

## 2023-02-08 DIAGNOSIS — Z98.890 S/P CAROTID ENDARTERECTOMY: ICD-10-CM

## 2023-02-08 DIAGNOSIS — E78.2 MIXED HYPERLIPIDEMIA: ICD-10-CM

## 2023-02-08 DIAGNOSIS — R29.898 DECREASED STRENGTH OF UPPER EXTREMITY: ICD-10-CM

## 2023-02-08 DIAGNOSIS — I65.23 CAROTID ATHEROSCLEROSIS, BILATERAL: ICD-10-CM

## 2023-02-08 DIAGNOSIS — N18.30 STAGE 3 CHRONIC KIDNEY DISEASE, UNSPECIFIED WHETHER STAGE 3A OR 3B CKD: ICD-10-CM

## 2023-02-08 DIAGNOSIS — I77.1 TORTUOUS AORTA: ICD-10-CM

## 2023-02-08 DIAGNOSIS — I71.40 ABDOMINAL AORTIC ANEURYSM (AAA) WITHOUT RUPTURE, UNSPECIFIED PART: ICD-10-CM

## 2023-02-08 DIAGNOSIS — M75.102 NONTRAUMATIC TEAR OF LEFT ROTATOR CUFF, UNSPECIFIED TEAR EXTENT: ICD-10-CM

## 2023-02-08 DIAGNOSIS — I73.9 PAD (PERIPHERAL ARTERY DISEASE): ICD-10-CM

## 2023-02-08 DIAGNOSIS — I50.33 ACUTE ON CHRONIC DIASTOLIC HEART FAILURE: ICD-10-CM

## 2023-02-08 DIAGNOSIS — Z00.00 ENCOUNTER FOR PREVENTIVE HEALTH EXAMINATION: Primary | ICD-10-CM

## 2023-02-08 DIAGNOSIS — I10 HTN (HYPERTENSION), BENIGN: ICD-10-CM

## 2023-02-08 DIAGNOSIS — I65.21 STENOSIS OF RIGHT CAROTID ARTERY: ICD-10-CM

## 2023-02-08 DIAGNOSIS — E83.42 HYPOMAGNESEMIA: ICD-10-CM

## 2023-02-08 DIAGNOSIS — E87.6 HYPOKALEMIA: ICD-10-CM

## 2023-02-08 DIAGNOSIS — K21.9 GASTROESOPHAGEAL REFLUX DISEASE, UNSPECIFIED WHETHER ESOPHAGITIS PRESENT: ICD-10-CM

## 2023-02-08 DIAGNOSIS — G47.9 SLEEPING DIFFICULTY: ICD-10-CM

## 2023-02-08 DIAGNOSIS — G89.29 CHRONIC LEFT SHOULDER PAIN: ICD-10-CM

## 2023-02-08 DIAGNOSIS — M25.612 DECREASED ROM OF LEFT SHOULDER: ICD-10-CM

## 2023-02-08 DIAGNOSIS — G89.29 CHRONIC RIGHT SHOULDER PAIN: ICD-10-CM

## 2023-02-08 DIAGNOSIS — M25.511 CHRONIC RIGHT SHOULDER PAIN: ICD-10-CM

## 2023-02-08 DIAGNOSIS — R79.89 TROPONIN LEVEL ELEVATED: ICD-10-CM

## 2023-02-08 PROCEDURE — 1159F MED LIST DOCD IN RCRD: CPT | Mod: CPTII,95,, | Performed by: NURSE PRACTITIONER

## 2023-02-08 PROCEDURE — 1160F RVW MEDS BY RX/DR IN RCRD: CPT | Mod: CPTII,95,, | Performed by: NURSE PRACTITIONER

## 2023-02-08 PROCEDURE — 1159F PR MEDICATION LIST DOCUMENTED IN MEDICAL RECORD: ICD-10-PCS | Mod: CPTII,95,, | Performed by: NURSE PRACTITIONER

## 2023-02-08 PROCEDURE — G0439 PPPS, SUBSEQ VISIT: HCPCS | Mod: 95,,, | Performed by: NURSE PRACTITIONER

## 2023-02-08 PROCEDURE — 1160F PR REVIEW ALL MEDS BY PRESCRIBER/CLIN PHARMACIST DOCUMENTED: ICD-10-PCS | Mod: CPTII,95,, | Performed by: NURSE PRACTITIONER

## 2023-02-08 PROCEDURE — G0439 PR MEDICARE ANNUAL WELLNESS SUBSEQUENT VISIT: ICD-10-PCS | Mod: 95,,, | Performed by: NURSE PRACTITIONER

## 2023-02-08 PROCEDURE — 1170F PR FUNCTIONAL STATUS ASSESSED: ICD-10-PCS | Mod: CPTII,95,, | Performed by: NURSE PRACTITIONER

## 2023-02-08 PROCEDURE — 1170F FXNL STATUS ASSESSED: CPT | Mod: CPTII,95,, | Performed by: NURSE PRACTITIONER

## 2023-02-08 NOTE — PROGRESS NOTES
The patient location is: [place]  The chief complaint leading to consultation is: AWV    Visit type: audiovisual    Face to Face time with patient: yes  30  minutes of total time spent on the encounter, which includes face to face time and non-face to face time preparing to see the patient (eg, review of tests), Obtaining and/or reviewing separately obtained history, Documenting clinical information in the electronic or other health record, Independently interpreting results (not separately reported) and communicating results to the patient/family/caregiver, or Care coordination (not separately reported).         Each patient to whom he or she provides medical services by telemedicine is:  (1) informed of the relationship between the physician and patient and the respective role of any other health care provider with respect to management of the patient; and (2) notified that he or she may decline to receive medical services by telemedicine and may withdraw from such care at any time.          Gus Sabillon presented for a  Medicare AWV and comprehensive Health Risk Assessment today. The following components were reviewed and updated:    Medical history  Family History  Social history  Allergies and Current Medications  Health Risk Assessment  Health Maintenance  Care Team         ** See Completed Assessments for Annual Wellness Visit within the encounter summary.**         The following assessments were completed:  Living Situation  CAGE  Depression Screening  Fall Risk Assessment (MACH 10)  Hearing Assessment(HHI)  Cognitive Function Screening  Nutrition Screening  ADL Screening  PAQ Screening    Constitutional: She is oriented to person, place, and time and well-developed, well-nourished, and in no distress. No distress.   HENT:   Head: Normocephalic and atraumatic.   Eyes: No scleral icterus.   Pulmonary/Chest: Effort normal. No respiratory distress.   Neurological: He is alert and oriented to person,  place, and time.   Skin: He is not diaphoretic.   Psychiatric: Mood and affect normal.           Diagnoses and health risks identified today and associated recommendations/orders:    1. Encounter for preventive health examination  Annual Health Risk Assessment (HRA) visit today.  Counseling and referral of health maintenance and preventative health measures performed.  Patient given annual wellness paperwork to take home.  Encouraged to return in 1 year for subsequent HRA visit.   Patient not on chronic opioids. Risk factors reviewed for any misuse and/or abuse. Pain assessed during visit.     2. Tortuous aorta  Chronic. Stable. Noted on CXR from 1/30/23. Continue current treatment plan as previously prescribed by PCP.    3. Carotid atherosclerosis, bilateral  Chronic. Stable. Continue current treatment plan as previously prescribed by PCP.    4. Aortic atherosclerosis  Chronic. Stable. Continue current treatment plan as previously prescribed by PCP.    5. Acute on chronic diastolic heart failure  Chronic. Stable. Continue current treatment plan as previously prescribed by PCP.    6. Abdominal aortic aneurysm (AAA) without rupture, unspecified part  Chronic. Stable. Continue current treatment plan as previously prescribed by PCP.    7. Stenosis of right carotid artery  Chronic. Stable. Continue current treatment plan as previously prescribed by PCP.    8. HTN (hypertension), benign  Chronic. Stable. Controlled. Encouraged to increase exercise as tolerated (moderate-intensity aerobic activity and muscle-strengthening activities) improve diet to heart healthy, low sodium diet.  Continue current treatment plan as previously prescribed by PCP.    9. Mixed hyperlipidemia  Chronic. Stable. Continue current treatment plan as previously prescribed by PCP.    10. PAD (peripheral artery disease)  Chronic. Stable. Continue current treatment plan as previously prescribed by PCP.    11. S/P carotid endarterectomy  Chronic.  Stable. Continue current treatment plan as previously prescribed by PCP.    12. Troponin level elevated  Chronic. Stable. Continue current treatment plan as previously prescribed by PCP.    13. Stage 3 chronic kidney disease, unspecified whether stage 3a or 3b CKD  Chronic. Stable. Continue current treatment plan as previously prescribed by PCP.    14. Hypokalemia  Chronic. Stable. Continue current treatment plan as previously prescribed by PCP.    15. Hypomagnesemia  Chronic. Stable. Continue current treatment plan as previously prescribed by PCP.    16. Gastroesophageal reflux disease, unspecified whether esophagitis present  Chronic. Stable. Continue current treatment plan as previously prescribed by PCP.    17. Chronic left shoulder pain  Chronic. Stable. Continue current treatment plan as previously prescribed by PCP.    18. Chronic right shoulder pain  Chronic. Stable. Continue current treatment plan as previously prescribed by PCP.    19. Decreased ROM of left shoulder  Chronic. Stable. Continue current treatment plan as previously prescribed by PCP.    20. Decreased strength of upper extremity  Chronic. Stable. Continue current treatment plan as previously prescribed by PCP.    21. Nontraumatic tear of left rotator cuff, unspecified tear extent  Chronic. Stable. Continue current treatment plan as previously prescribed by PCP.    22. Sleeping difficulty  Chronic. Stable. Continue current treatment plan as previously prescribed by PCP.      Provided Gus with a 5-10 year written screening schedule and personal prevention plan. Recommendations were developed using the USPSTF age appropriate recommendations. Education, counseling, and referrals were provided as needed. After Visit Summary printed and given to patient which includes a list of additional screenings\tests needed.      I offered to discuss end of life issues, including information on how to make advance directives that the patient could use to  name someone who would make medical decisions on their behalf if they became too ill to make themselves.    ___Patient declined  _X_Patient is interested, I provided paper work and offered to discuss.    Follow up in about 1 year (around 2/8/2024).    JANICE Kauffman offered to discuss advanced care planning, including how to pick a person who would make decisions for you if you were unable to make them for yourself, called a health care power of , and what kind of decisions you might make such as use of life sustaining treatments such as ventilators and tube feeding when faced with a life limiting illness recorded on a living will that they will need to know. (How you want to be cared for as you near the end of your natural life)     X Patient is interested in learning more about how to make advanced directives.  I provided them paperwork and offered to discuss this with them.

## 2023-02-08 NOTE — PATIENT INSTRUCTIONS
Counseling and Referral of Other Preventative  (Italic type indicates deductible and co-insurance are waived)    Patient Name: Gus Sabillon  Today's Date: 2/8/2023    Health Maintenance       Date Due Completion Date    Pneumococcal Vaccines (Age 65+) (1 - PCV) Never done ---    TETANUS VACCINE Never done ---    Shingles Vaccine (1 of 2) Never done ---    COVID-19 Vaccine (4 - Booster for Pfizer series) 02/03/2022 12/9/2021    Influenza Vaccine (1) 09/01/2022 8/4/2021    Lipid Panel 01/13/2024 1/13/2023        No orders of the defined types were placed in this encounter.      The following information is provided to all patients.  This information is to help you find resources for any of the problems found today that may be affecting your health:                Living healthy guide: www.[facility].[place].gov      Understanding Diabetes: www.diabetes.org      Eating healthy: www.cdc.gov/healthyweight      [facility] home safety checklist: www.cdc.gov/steadi/patient.html      Agency on Aging: www.goea.[place].gov      Alcoholics anonymous (AA): www.aa.org      Physical Activity: www.epi.nih.gov/xc3grtr      Tobacco use: www.quitwithusla.org

## 2023-02-09 ENCOUNTER — OFFICE VISIT (OUTPATIENT)
Dept: CARDIOLOGY | Facility: CLINIC | Age: 83
End: 2023-02-09
Payer: MEDICARE

## 2023-02-09 ENCOUNTER — LAB VISIT (OUTPATIENT)
Dept: LAB | Facility: HOSPITAL | Age: 83
End: 2023-02-09
Payer: MEDICARE

## 2023-02-09 ENCOUNTER — PATIENT MESSAGE (OUTPATIENT)
Dept: CARDIOLOGY | Facility: CLINIC | Age: 83
End: 2023-02-09

## 2023-02-09 VITALS
BODY MASS INDEX: 22.02 KG/M2 | HEART RATE: 77 BPM | HEIGHT: 73 IN | DIASTOLIC BLOOD PRESSURE: 65 MMHG | SYSTOLIC BLOOD PRESSURE: 144 MMHG | WEIGHT: 166.13 LBS

## 2023-02-09 DIAGNOSIS — R06.02 SOB (SHORTNESS OF BREATH): ICD-10-CM

## 2023-02-09 DIAGNOSIS — E78.2 MIXED HYPERLIPIDEMIA: ICD-10-CM

## 2023-02-09 DIAGNOSIS — I50.32 CHRONIC DIASTOLIC HEART FAILURE: Primary | ICD-10-CM

## 2023-02-09 DIAGNOSIS — I65.23 CAROTID ATHEROSCLEROSIS, BILATERAL: ICD-10-CM

## 2023-02-09 DIAGNOSIS — I73.9 PAD (PERIPHERAL ARTERY DISEASE): ICD-10-CM

## 2023-02-09 DIAGNOSIS — I10 HTN (HYPERTENSION), BENIGN: ICD-10-CM

## 2023-02-09 DIAGNOSIS — N18.30 STAGE 3 CHRONIC KIDNEY DISEASE, UNSPECIFIED WHETHER STAGE 3A OR 3B CKD: ICD-10-CM

## 2023-02-09 LAB
ALBUMIN SERPL BCP-MCNC: 4 G/DL (ref 3.5–5.2)
ALP SERPL-CCNC: 152 U/L (ref 55–135)
ALT SERPL W/O P-5'-P-CCNC: 30 U/L (ref 10–44)
ANION GAP SERPL CALC-SCNC: 12 MMOL/L (ref 8–16)
AST SERPL-CCNC: 19 U/L (ref 10–40)
BASOPHILS # BLD AUTO: 0.08 K/UL (ref 0–0.2)
BASOPHILS NFR BLD: 0.9 % (ref 0–1.9)
BILIRUB SERPL-MCNC: 0.7 MG/DL (ref 0.1–1)
BNP SERPL-MCNC: 1308 PG/ML (ref 0–99)
BUN SERPL-MCNC: 44 MG/DL (ref 8–23)
CALCIUM SERPL-MCNC: 10.4 MG/DL (ref 8.7–10.5)
CHLORIDE SERPL-SCNC: 101 MMOL/L (ref 95–110)
CO2 SERPL-SCNC: 28 MMOL/L (ref 23–29)
CREAT SERPL-MCNC: 1.8 MG/DL (ref 0.5–1.4)
DIFFERENTIAL METHOD: ABNORMAL
EOSINOPHIL # BLD AUTO: 0.5 K/UL (ref 0–0.5)
EOSINOPHIL NFR BLD: 5.3 % (ref 0–8)
ERYTHROCYTE [DISTWIDTH] IN BLOOD BY AUTOMATED COUNT: 13.2 % (ref 11.5–14.5)
EST. GFR  (NO RACE VARIABLE): 37.3 ML/MIN/1.73 M^2
GLUCOSE SERPL-MCNC: 123 MG/DL (ref 70–110)
HCT VFR BLD AUTO: 42.1 % (ref 40–54)
HGB BLD-MCNC: 13.2 G/DL (ref 14–18)
IMM GRANULOCYTES # BLD AUTO: 0.02 K/UL (ref 0–0.04)
IMM GRANULOCYTES NFR BLD AUTO: 0.2 % (ref 0–0.5)
LYMPHOCYTES # BLD AUTO: 4.3 K/UL (ref 1–4.8)
LYMPHOCYTES NFR BLD: 47.1 % (ref 18–48)
MAGNESIUM SERPL-MCNC: 1.3 MG/DL (ref 1.6–2.6)
MCH RBC QN AUTO: 30.5 PG (ref 27–31)
MCHC RBC AUTO-ENTMCNC: 31.4 G/DL (ref 32–36)
MCV RBC AUTO: 97 FL (ref 82–98)
MONOCYTES # BLD AUTO: 0.8 K/UL (ref 0.3–1)
MONOCYTES NFR BLD: 8.9 % (ref 4–15)
NEUTROPHILS # BLD AUTO: 3.4 K/UL (ref 1.8–7.7)
NEUTROPHILS NFR BLD: 37.6 % (ref 38–73)
NRBC BLD-RTO: 0 /100 WBC
PHOSPHATE SERPL-MCNC: 3 MG/DL (ref 2.7–4.5)
PLATELET # BLD AUTO: 301 K/UL (ref 150–450)
PMV BLD AUTO: 10.7 FL (ref 9.2–12.9)
POTASSIUM SERPL-SCNC: 4.5 MMOL/L (ref 3.5–5.1)
PROT SERPL-MCNC: 7.6 G/DL (ref 6–8.4)
RBC # BLD AUTO: 4.33 M/UL (ref 4.6–6.2)
SODIUM SERPL-SCNC: 141 MMOL/L (ref 136–145)
WBC # BLD AUTO: 9.02 K/UL (ref 3.9–12.7)

## 2023-02-09 PROCEDURE — 3078F DIAST BP <80 MM HG: CPT | Mod: CPTII,S$GLB,,

## 2023-02-09 PROCEDURE — 1111F DSCHRG MED/CURRENT MED MERGE: CPT | Mod: CPTII,S$GLB,,

## 2023-02-09 PROCEDURE — 85025 COMPLETE CBC W/AUTO DIFF WBC: CPT

## 2023-02-09 PROCEDURE — 1159F PR MEDICATION LIST DOCUMENTED IN MEDICAL RECORD: ICD-10-PCS | Mod: CPTII,S$GLB,,

## 2023-02-09 PROCEDURE — 99204 OFFICE O/P NEW MOD 45 MIN: CPT | Mod: S$GLB,,,

## 2023-02-09 PROCEDURE — 99204 PR OFFICE/OUTPT VISIT, NEW, LEVL IV, 45-59 MIN: ICD-10-PCS | Mod: S$GLB,,,

## 2023-02-09 PROCEDURE — 3078F PR MOST RECENT DIASTOLIC BLOOD PRESSURE < 80 MM HG: ICD-10-PCS | Mod: CPTII,S$GLB,,

## 2023-02-09 PROCEDURE — 36415 COLL VENOUS BLD VENIPUNCTURE: CPT

## 2023-02-09 PROCEDURE — 3288F PR FALLS RISK ASSESSMENT DOCUMENTED: ICD-10-PCS | Mod: CPTII,S$GLB,,

## 2023-02-09 PROCEDURE — 99999 PR PBB SHADOW E&M-EST. PATIENT-LVL V: CPT | Mod: PBBFAC,,,

## 2023-02-09 PROCEDURE — 1160F PR REVIEW ALL MEDS BY PRESCRIBER/CLIN PHARMACIST DOCUMENTED: ICD-10-PCS | Mod: CPTII,S$GLB,,

## 2023-02-09 PROCEDURE — 1101F PT FALLS ASSESS-DOCD LE1/YR: CPT | Mod: CPTII,S$GLB,,

## 2023-02-09 PROCEDURE — 1160F RVW MEDS BY RX/DR IN RCRD: CPT | Mod: CPTII,S$GLB,,

## 2023-02-09 PROCEDURE — 1111F PR DISCHARGE MEDS RECONCILED W/ CURRENT OUTPATIENT MED LIST: ICD-10-PCS | Mod: CPTII,S$GLB,,

## 2023-02-09 PROCEDURE — 3077F PR MOST RECENT SYSTOLIC BLOOD PRESSURE >= 140 MM HG: ICD-10-PCS | Mod: CPTII,S$GLB,,

## 2023-02-09 PROCEDURE — 99999 PR PBB SHADOW E&M-EST. PATIENT-LVL V: ICD-10-PCS | Mod: PBBFAC,,,

## 2023-02-09 PROCEDURE — 1126F PR PAIN SEVERITY QUANTIFIED, NO PAIN PRESENT: ICD-10-PCS | Mod: CPTII,S$GLB,,

## 2023-02-09 PROCEDURE — 83735 ASSAY OF MAGNESIUM: CPT

## 2023-02-09 PROCEDURE — 1159F MED LIST DOCD IN RCRD: CPT | Mod: CPTII,S$GLB,,

## 2023-02-09 PROCEDURE — 3288F FALL RISK ASSESSMENT DOCD: CPT | Mod: CPTII,S$GLB,,

## 2023-02-09 PROCEDURE — 80053 COMPREHEN METABOLIC PANEL: CPT

## 2023-02-09 PROCEDURE — 84100 ASSAY OF PHOSPHORUS: CPT

## 2023-02-09 PROCEDURE — 1126F AMNT PAIN NOTED NONE PRSNT: CPT | Mod: CPTII,S$GLB,,

## 2023-02-09 PROCEDURE — 83880 ASSAY OF NATRIURETIC PEPTIDE: CPT

## 2023-02-09 PROCEDURE — 3077F SYST BP >= 140 MM HG: CPT | Mod: CPTII,S$GLB,,

## 2023-02-09 PROCEDURE — 1101F PR PT FALLS ASSESS DOC 0-1 FALLS W/OUT INJ PAST YR: ICD-10-PCS | Mod: CPTII,S$GLB,,

## 2023-02-09 RX ORDER — LANOLIN ALCOHOL/MO/W.PET/CERES
400 CREAM (GRAM) TOPICAL DAILY
Qty: 90 TABLET | Refills: 1 | Status: SHIPPED | OUTPATIENT
Start: 2023-02-09 | End: 2023-07-26

## 2023-02-09 RX ORDER — SPIRONOLACTONE 25 MG/1
25 TABLET ORAL DAILY
Qty: 90 TABLET | Refills: 3 | Status: SHIPPED | OUTPATIENT
Start: 2023-02-09 | End: 2023-12-20

## 2023-02-09 RX ORDER — LANOLIN ALCOHOL/MO/W.PET/CERES
400 CREAM (GRAM) TOPICAL DAILY
Qty: 90 TABLET | Refills: 1 | Status: CANCELLED | OUTPATIENT
Start: 2023-02-09 | End: 2024-02-09

## 2023-02-09 NOTE — PROGRESS NOTES
LMSW met with pt to discuss barriers towards treatment. Pt has access to transportation, medication, insurance benefits, housing, and food. Pt reports no need for HME, HH, MH or [facility] resources. Pt asked for 's phone number as this was his previous cardiologist. LMSW provided this number for the pt to call.

## 2023-02-09 NOTE — PATIENT INSTRUCTIONS
Recommend 0849-6552 mg/day sodium restriction and 2000ml/day fluid restriction.    Continue lasix 40mg daily for now. Notify us (#[phone]) with persistent dizziness/lightheadedness or continued weight loss on home scale.    Keep daily blood pressure log and bring to next clinic visit.

## 2023-02-09 NOTE — PROGRESS NOTES
HF TCC Provider Note (Initial Clinic) Consult Note    Date of original referral: 1/31/23  Age: 82 y.o.  Gender: male  Ethnicity: White  Number of admissions for CHF within the preceding year: 1   Duration of CHF: ~1 month  Type of Congestive Heart Failure: Diastolic   Etiology: unspecified  Social history: distant smoking history, quit 40-50 years ago (previously smoked 4 ppd x ~15 years), 1-2 glasses of wine daily, denies illicit drug use   Enrolled in Infusion suite: no    Diagnostic Labs:   EKG - 02/01/2023  CXR - 01/30/2023  ECHO - 01/10/2023  Stress test -   Stress echo -   Pharmacologic stress -   Cardiac catheterization -    Cardiac MRI -     Lab Results   Component Value Date     02/03/2023     02/02/2023    K 4.4 02/03/2023    K 4.7 02/02/2023     02/03/2023     02/02/2023    CO2 21 (L) 02/03/2023    CO2 23 02/02/2023     02/03/2023     (H) 02/02/2023    BUN 41 (H) 02/03/2023    BUN 37 (H) 02/02/2023    CREATININE 1.7 (H) 02/03/2023    CREATININE 1.8 (H) 02/02/2023    CALCIUM 8.5 (L) 02/03/2023    CALCIUM 8.0 (L) 02/02/2023    PROT 7.3 01/30/2023    PROT 7.0 01/05/2023    ALBUMIN 4.0 01/30/2023    ALBUMIN 3.8 01/05/2023    BILITOT 0.6 01/30/2023    BILITOT 0.7 01/05/2023    ALKPHOS 54 (L) 01/30/2023    ALKPHOS 66 01/05/2023    AST 23 01/30/2023    AST 20 01/05/2023    ALT 16 01/30/2023    ALT 13 01/05/2023    ANIONGAP 13 02/03/2023    ANIONGAP 8 02/02/2023    ESTGFRAFRICA 54.1 (A) 06/02/2022    ESTGFRAFRICA 46.3 (A) 12/08/2021    EGFRNONAA 46.8 (A) 06/02/2022    EGFRNONAA 40.1 (A) 12/08/2021       Lab Results   Component Value Date    WBC 10.49 02/02/2023    WBC 9.09 02/01/2023    RBC 3.26 (L) 02/02/2023    RBC 3.35 (L) 02/01/2023    HGB 10.2 (L) 02/02/2023    HGB 10.4 (L) 02/01/2023    HCT 32.1 (L) 02/02/2023    HCT 32.5 (L) 02/01/2023    MCV 99 (H) 02/02/2023    MCV 97 02/01/2023    MCH 31.3 (H) 02/02/2023    MCH 31.0 02/01/2023    MCHC 31.8 (L) 02/02/2023    MCHC  32.0 02/01/2023    RDW 13.7 02/02/2023    RDW 13.8 02/01/2023     02/02/2023     02/01/2023    MPV 11.8 02/02/2023    MPV 11.5 02/01/2023    IMMGR 0.3 02/02/2023    IMMGR 0.2 02/01/2023    IGABS 0.03 02/02/2023    IGABS 0.02 02/01/2023    LYMPH 2.4 02/02/2023    LYMPH 22.6 02/02/2023    MONO 1.1 (H) 02/02/2023    MONO 10.7 02/02/2023    EOS 0.2 02/02/2023    EOS 0.3 02/01/2023    BASO 0.05 02/02/2023    BASO 0.05 02/01/2023    NRBC 0 02/02/2023    NRBC 0 02/01/2023    GRAN 6.8 02/02/2023    GRAN 64.4 02/02/2023    EOSINOPHIL 1.5 02/02/2023    EOSINOPHIL 3.5 02/01/2023    BASOPHIL 0.5 02/02/2023    BASOPHIL 0.6 02/01/2023       Lab Results   Component Value Date    BNP 2,291 (H) 02/03/2023    BNP 3,279 (H) 01/30/2023    MG 2.0 02/03/2023    MG 2.1 02/02/2023    PHOS 2.6 (L) 02/02/2023    TROPONINI 0.106 (H) 01/30/2023    TROPONINI 0.118 (H) 01/30/2023    HGBA1C 5.8 (H) 01/30/2023    TSH 2.276 01/30/2023    TSH 2.024 01/05/2023       Lab Results   Component Value Date    IRON 46 01/30/2023    TIBC 389 01/30/2023    FERRITIN 39 01/30/2023    CHOL 148 01/13/2023    TRIG 67 01/13/2023    HDL 46 01/13/2023    LDLCALC 88.6 01/13/2023    CHOLHDL 31.1 01/13/2023    TOTALCHOLEST 3.2 01/13/2023    NONHDLCHOL 102 01/13/2023    COLORU Yellow 01/05/2023    APPEARANCEUA Clear 01/05/2023    PHUR 5.0 01/05/2023    SPECGRAV 1.010 01/05/2023    PROTEINUA Negative 01/05/2023    GLUCUA Negative 01/05/2023    KETONESU Negative 01/05/2023    BILIRUBINUA Negative 01/05/2023    OCCULTUA Negative 01/05/2023    NITRITE Negative 01/05/2023    LEUKOCYTESUR Negative 01/05/2023       No implanted cardiac devices    Current Outpatient Medications on File Prior to Visit   Medication Sig Dispense Refill    acetaminophen (TYLENOL) 325 MG tablet Take 2 tablets (650 mg total) by mouth every 6 (six) hours as needed for Pain. (Patient not taking: Reported on 2/8/2023)  0    aspirin (ECOTRIN) 81 MG EC tablet Take 81 mg by mouth once daily.       atenoloL (TENORMIN) 50 MG tablet Take 0.5 tablets (25 mg total) by mouth once daily. 90 tablet 3    bempedoic acid (NEXLETOL) 180 mg Tab Take 1 tablet (180 mg) by mouth once daily. 90 tablet 3    collagen/biotin/ascorbic acid (COLLAGEN 1500 PLUS C ORAL) Take by mouth Daily. 11 G of powder daily      fluorouraciL (EFUDEX) 5 % cream AAA on both arms BID x 2-3 weeks. Stop if blistered, oozing, or bleeding. Use daily sun protection. (Patient not taking: Reported on 2/8/2023) 40 g 1    furosemide (LASIX) 40 MG tablet Take 1 tablet (40 mg total) by mouth once daily. 90 tablet 3    ketoconazole (NIZORAL) 2 % cream Apply topically 2 (two) times daily. 30 g 2    multivit-min-FA-lycopen-lutein 300-600-300 mcg Tab Take by mouth.      omega-3 fatty acids/fish oil (FISH OIL-OMEGA-3 FATTY ACIDS) 300-1,000 mg capsule Take by mouth once daily.      omeprazole (PRILOSEC) 20 MG capsule Take 2 capsules (40 mg total) by mouth once daily. 180 capsule 3    oxyCODONE-acetaminophen (PERCOCET)  mg per tablet Take 1 tablet by mouth every 4-6 hours as needed for pain. Take stool softener with this medication. (Patient not taking: Reported on 2/8/2023) 15 tablet 0    promethazine (PHENERGAN) 25 MG tablet Take 1 tablet (25 mg total) by mouth every 6 (six) hours as needed for Nausea. (Patient not taking: Reported on 2/8/2023) 8 tablet 0    rosuvastatin (CRESTOR) 20 MG tablet TAKE 1 TABLET BY MOUTH ONCE DAILY 90 tablet 3    spironolactone (ALDACTONE) 25 MG tablet Take 1 tablet (25 mg total) by mouth once daily. 90 tablet 3    tadalafiL (CIALIS) 5 MG tablet Take 1 tablet (5 mg total) by mouth once daily. (Patient not taking: Reported on 2/8/2023) 30 tablet 6    traMADoL (ULTRAM) 50 mg tablet Take 1-2 tablets ( mg total) by mouth every 6 (six) hours as needed for Pain. (Patient not taking: Reported on 2/8/2023) 20 tablet 0     No current facility-administered medications on file prior to visit.         HPI:  Patient can walk 1-2  blocks and pace normal. Ambulating to clinic today without appreciable SOB   Patient sleeps on 1 number of pillows   Patient wakes up SOB, has to get out of bed, associated cough- now resolved   Palpitations - denies    Dizzy, light-headed, pre-syncope or syncope- reports mild grogginess/lightheadedness in clinic today (denies previous episodes), denies pre-syncope/syncope   Since discharge frequency of performing weights, home weight and weight change- performing daily weights. Weight downtrending 173lbs->162lbs   Other information felt pertinent to HPI: Mr. Gus Sabillon is an 83 yo male with a PMHx of HTN, HLD, HFpEF, carotid stenosis s/p CEA, PAD, GERD [facility] for [facility]HF after presenting to the ED with c/o LE edema and SOB. On presentation BNP >greater >3000 and an elevated troponin of 0.118 with repeat at 0.106 suspected 2/2 demand ischemia. He was adequately diuresed. During admission he had new onset left ankle pain. Left ankle x-ray unremarkable for acute fracture or dislocation or bone destruction.  Left ankle MRI without contrast remarkable for Medial ankle tendon tenosynovitis and Tibiotalar joint effusion.  Orthopedic surgery consulted.  Aspiration ankle was recommended, however despite extensive counseling regarding risks of deferring, patient refused.  Upon further counseling, patient notes improvement in pain and ambulation with supportive care including pain control, elevation, ice pack.  He is amenable to continue these interventions at home. Patient without leukocytosis or fevers and apart from mildly elevated inflammatory markers, no signs or symptoms to suggest systemic infection. He was discharged with plan to continue Tylenol and supportive care as patient noted improvement.    2/9/23: Today he reports improvement in SOB since recent hospitalization. Denies PND or orthopnea symptoms. Ankle pain and edema now resolved.      PHYSICAL:    Vitals:    02/09/23 0914   BP: 121/64   Pulse: 75      Wt Readings from Last 3 Encounters:   02/09/23 75.3 kg (166 lb 1.6 oz)   02/03/23 75.6 kg (166 lb 10.7 oz)   01/23/23 79.4 kg (175 lb)       JVD: no   Heart rhythm: regular with frequent premature beats  Cardiac murmur: No    S3: no  S4: no  Lungs: clear  Hepatojugular reflux: no  Edema: no      Echo 1/10/23:  The left ventricle is normal in size with concentric hypertrophy and normal systolic function.  The estimated ejection fraction is 65%.  Indeterminate left ventricular diastolic function.  Moderate right ventricular enlargement with normal right ventricular systolic function.  Severe left atrial enlargement.  Right atrial enlargement.  Mild-to-moderate mitral regurgitation.  Moderate aortic regurgitation.  Moderate to severe pulmonic regurgitation.  Mild to moderate tricuspid regurgitation.  Intermediate central venous pressure (8 mmHg).  The estimated PA systolic pressure is 39 mmHg.  The sinuses of Valsalva is mildly dilated.    ASSESSMENT: Chronic diastolic HF    PLAN:      Patient Instructions:   Instruct the patient to notify this clinic if HH, a physician or an advanced care provider wants to change medication one of their HF medications   Activity and Diet restrictions:   Recommend 2-3 gram sodium restriction and 1500cc- 2000cc fluid restriction.  Encourage physical activity with graded exercise program.  Requested patient to weigh themselves daily, and to notify us if their weight increases by more than 3 lbs in 1 day or 5 lbs in 3 days.    Assigned dry weight on home scale: unsure  Medication changes (include current dose and changed dose): NYHA Class I-II symptoms. Well compensated on exam. BNP downtrending (2200->1300). Negative orthostatics in office. Cr 1.8 near baseline (~1.6). Continue lasix 40mg daily for now with close monitoring of fluid status. Consider reducing lasix next visit to avoid overdiuresis. Mag 1.3, supplement ordered. Given  smoking history, history of PAD and carotid stenosis, elevated troponins during admission, and new HFpEF diagnosis, Nuclear Stress ordered for ischemic eval.    Upcoming labs and date anticipated: RTC in 1 week for repeat labs and close follow up    Other diagnostic tests ordered: Nuclear Stress     Silvana Catalan PA-C

## 2023-02-13 NOTE — PROGRESS NOTES
HF TCC Provider Note (Follow-up) Consult Note      HPI:     Patient can walk 1-2 blocks and pace normal. Ambulating to clinic today without appreciable SOB. Denies worsening SOB. Reports mild SOB only on more prolonged conversation              Patient sleeps on 1 number of pillows              Patient wakes up SOB, has to get out of bed, associated cough- now resolved              Palpitations - denies               Dizzy, light-headed, pre-syncope or syncope- reports mild intermittent fogginess/lightheadedness, denies pre-syncope/syncope              Since discharge frequency of performing weights, home weight and weight change- performing daily weights. Weight now stabilized 162-163lbs              Other information felt pertinent to HPI: Mr. Gus Sabillon is an 83 yo male with a PMHx of HTN, HLD, HFpEF, carotid stenosis s/p CEA, PAD, GERD who presents to second H[facility] visit following recent admission for ADHF after presenting to the ED with c/o LE edema and SOB. On presentation BNP greater >3000 and an elevated troponin of 0.118 with repeat at 0.106 suspected 2/2 demand ischemia. He was adequately diuresed. During admission he had new onset left ankle pain. Left ankle x-ray unremarkable for acute fracture or dislocation or bone destruction.  Left ankle MRI without contrast remarkable for Medial ankle tendon tenosynovitis and Tibiotalar joint effusion.  Orthopedic surgery consulted.  Aspiration ankle was recommended, however despite extensive counseling regarding risks of deferring, patient refused.  Upon further counseling, patient notes improvement in pain and ambulation with supportive care including pain control, elevation, ice pack.  He is amenable to continue these interventions at home. Patient without leukocytosis or fevers and apart from mildly elevated inflammatory markers, no signs or symptoms to suggest systemic infection. He was discharged with plan to continue Tylenol and supportive care as  patient noted improvement.     2/9/23: Today he reports improvement in SOB since recent hospitalization. Denies PND or orthopnea symptoms. Ankle pain and edema now resolved.     2/15/23: Since last visit, he followed with his cardiologist with plan to start jardiance 10mg daily and reduce lasix to 20mg daily. Plans to fill jardiance at pharmacy today after visit. Otherwise today he denies worsening SOB/BLE edema. On chart review, atenolol decreased to 25mg daily during hospital admission for acute decompensation. Previously tolerating atenolol 50mg daily without issue.     PHYSICAL:   Vitals:    02/15/23 1334   BP: 137/63   Pulse: 68      Wt Readings from Last 3 Encounters:   02/15/23 75.1 kg (165 lb 9.6 oz)   02/15/23 76.1 kg (167 lb 10.6 oz)   02/14/23 79.8 kg (176 lb)     JVD: no   Heart rhythm: regular with frequent premature beats  Cardiac murmur: No    S3: no  S4: no  Lungs: clear  Hepatojugular reflux: no  Edema: no       Lab Results   Component Value Date     02/09/2023    K 4.5 02/09/2023    MG 1.3 (L) 02/09/2023     02/09/2023    CO2 28 02/09/2023    BUN 44 (H) 02/09/2023    CREATININE 1.8 (H) 02/09/2023     (H) 02/09/2023    CALCIUM 10.4 02/09/2023    AST 19 02/09/2023    ALT 30 02/09/2023    ALBUMIN 4.0 02/09/2023    PROT 7.6 02/09/2023    BILITOT 0.7 02/09/2023     Lab Results   Component Value Date    BNP 1,308 (H) 02/09/2023    BNP 2,291 (H) 02/03/2023    BNP 3,279 (H) 01/30/2023       ASSESSMENT: Chronic diastolic HF    PLAN:      Patient Instructions:   Instruct the patient to notify this clinic if HH, a physician or an advanced care provider wants to change medication one of their HF medications   Activity and Diet restrictions:   Recommend 2-3 gram sodium restriction and 1500cc- 2000cc fluid restriction.  Encourage physical activity with graded exercise program.  Requested patient to weigh themselves daily, and to notify us if their weight increases by more than 3 lbs in 1 day  or 5 lbs in 3 days.    Assigned dry weight on home scale: 162-163lbs  Medication changes (include current dose and changed dose): NYHA Class I-II symptoms. Well compensated on exam. Increase atenolol to 50mg daily to reduce PVC burden. Agree with starting jardiance 10mg daily and reducing lasix to 20mg daily with close monitoring of fluid status.      Given smoking history, history of PAD and carotid stenosis, elevated troponins during admission, and new HFpEF diagnosis, Nuclear Stress ordered for ischemic eval.     RD education provided during visit.    Upcoming labs and date anticipated: RTC in 2 weeks for repeat labs and close follow up.    Other diagnostic tests ordered: Nuclear Stress scheduled 2/22    Silvana Catalan PA-C

## 2023-02-14 ENCOUNTER — OFFICE VISIT (OUTPATIENT)
Dept: CARDIOLOGY | Facility: CLINIC | Age: 83
End: 2023-02-14
Payer: MEDICARE

## 2023-02-14 VITALS
HEART RATE: 73 BPM | DIASTOLIC BLOOD PRESSURE: 70 MMHG | WEIGHT: 176 LBS | RESPIRATION RATE: 20 BRPM | BODY MASS INDEX: 23.33 KG/M2 | HEIGHT: 73 IN | SYSTOLIC BLOOD PRESSURE: 146 MMHG

## 2023-02-14 DIAGNOSIS — I73.9 PAD (PERIPHERAL ARTERY DISEASE): ICD-10-CM

## 2023-02-14 DIAGNOSIS — I70.0 AORTIC ATHEROSCLEROSIS: ICD-10-CM

## 2023-02-14 DIAGNOSIS — E78.2 MIXED HYPERLIPIDEMIA: ICD-10-CM

## 2023-02-14 DIAGNOSIS — N18.30 STAGE 3 CHRONIC KIDNEY DISEASE, UNSPECIFIED WHETHER STAGE 3A OR 3B CKD: ICD-10-CM

## 2023-02-14 DIAGNOSIS — I50.30 ACC/AHA STAGE C HEART FAILURE WITH PRESERVED EJECTION FRACTION: Primary | ICD-10-CM

## 2023-02-14 DIAGNOSIS — I10 HTN (HYPERTENSION), BENIGN: ICD-10-CM

## 2023-02-14 PROCEDURE — 3078F DIAST BP <80 MM HG: CPT | Mod: CPTII,S$GLB,, | Performed by: INTERNAL MEDICINE

## 2023-02-14 PROCEDURE — 99215 PR OFFICE/OUTPT VISIT, EST, LEVL V, 40-54 MIN: ICD-10-PCS | Mod: S$GLB,,, | Performed by: INTERNAL MEDICINE

## 2023-02-14 PROCEDURE — 3077F PR MOST RECENT SYSTOLIC BLOOD PRESSURE >= 140 MM HG: ICD-10-PCS | Mod: CPTII,S$GLB,, | Performed by: INTERNAL MEDICINE

## 2023-02-14 PROCEDURE — 1111F DSCHRG MED/CURRENT MED MERGE: CPT | Mod: CPTII,S$GLB,, | Performed by: INTERNAL MEDICINE

## 2023-02-14 PROCEDURE — 1126F AMNT PAIN NOTED NONE PRSNT: CPT | Mod: CPTII,S$GLB,, | Performed by: INTERNAL MEDICINE

## 2023-02-14 PROCEDURE — 1160F PR REVIEW ALL MEDS BY PRESCRIBER/CLIN PHARMACIST DOCUMENTED: ICD-10-PCS | Mod: CPTII,S$GLB,, | Performed by: INTERNAL MEDICINE

## 2023-02-14 PROCEDURE — 1159F MED LIST DOCD IN RCRD: CPT | Mod: CPTII,S$GLB,, | Performed by: INTERNAL MEDICINE

## 2023-02-14 PROCEDURE — 3077F SYST BP >= 140 MM HG: CPT | Mod: CPTII,S$GLB,, | Performed by: INTERNAL MEDICINE

## 2023-02-14 PROCEDURE — 99999 PR PBB SHADOW E&M-EST. PATIENT-LVL IV: ICD-10-PCS | Mod: PBBFAC,,, | Performed by: INTERNAL MEDICINE

## 2023-02-14 PROCEDURE — 1159F PR MEDICATION LIST DOCUMENTED IN MEDICAL RECORD: ICD-10-PCS | Mod: CPTII,S$GLB,, | Performed by: INTERNAL MEDICINE

## 2023-02-14 PROCEDURE — 99999 PR PBB SHADOW E&M-EST. PATIENT-LVL IV: CPT | Mod: PBBFAC,,, | Performed by: INTERNAL MEDICINE

## 2023-02-14 PROCEDURE — 1111F PR DISCHARGE MEDS RECONCILED W/ CURRENT OUTPATIENT MED LIST: ICD-10-PCS | Mod: CPTII,S$GLB,, | Performed by: INTERNAL MEDICINE

## 2023-02-14 PROCEDURE — 1126F PR PAIN SEVERITY QUANTIFIED, NO PAIN PRESENT: ICD-10-PCS | Mod: CPTII,S$GLB,, | Performed by: INTERNAL MEDICINE

## 2023-02-14 PROCEDURE — 1101F PT FALLS ASSESS-DOCD LE1/YR: CPT | Mod: CPTII,S$GLB,, | Performed by: INTERNAL MEDICINE

## 2023-02-14 PROCEDURE — 3288F PR FALLS RISK ASSESSMENT DOCUMENTED: ICD-10-PCS | Mod: CPTII,S$GLB,, | Performed by: INTERNAL MEDICINE

## 2023-02-14 PROCEDURE — 99215 OFFICE O/P EST HI 40 MIN: CPT | Mod: S$GLB,,, | Performed by: INTERNAL MEDICINE

## 2023-02-14 PROCEDURE — 3288F FALL RISK ASSESSMENT DOCD: CPT | Mod: CPTII,S$GLB,, | Performed by: INTERNAL MEDICINE

## 2023-02-14 PROCEDURE — 1160F RVW MEDS BY RX/DR IN RCRD: CPT | Mod: CPTII,S$GLB,, | Performed by: INTERNAL MEDICINE

## 2023-02-14 PROCEDURE — 3078F PR MOST RECENT DIASTOLIC BLOOD PRESSURE < 80 MM HG: ICD-10-PCS | Mod: CPTII,S$GLB,, | Performed by: INTERNAL MEDICINE

## 2023-02-14 PROCEDURE — 1101F PR PT FALLS ASSESS DOC 0-1 FALLS W/OUT INJ PAST YR: ICD-10-PCS | Mod: CPTII,S$GLB,, | Performed by: INTERNAL MEDICINE

## 2023-02-14 NOTE — PROGRESS NOTES
HISTORY:    82-year-old male with a history of heart failure with preserved ejection fraction, hypertension, hyperlipidemia, CKD, PAD status post bilateral lower extremity PI, carotid stenosis s/p R CEA '21, and GERD presenting for follow-up.    Patient was initially evaluated by me in early January with significant heart failure symptoms.  We tried outpatient management with diuretics and goal-directed medical therapy, but the patient required in-hospital diuresis.  He was admitted in late January with a positive response to inpatient diuresis and medicine adjustment. Lost 10-15 pounds. Has followed up with HF post-discharge and is doing a great job monitoring weight and limiting fluid/salt intake. Orthopnea, edema, and SOB seemingly resolved. Walked a half a mile yesterday pm without issue.     He currently tolerates aspirin 81 x 1, atenolol 25 x 1, spironolactone 25 x 1, furosemide 40 x 1, rosuvastatin 20 x 1, and bempedoic acid 180 x 1. Bps 130s/70s on average post discharge.     MEDICATIONS:      Current Outpatient Medications:     acetaminophen (TYLENOL) 325 MG tablet, Take 2 tablets (650 mg total) by mouth every 6 (six) hours as needed for Pain., Disp: , Rfl: 0    aspirin (ECOTRIN) 81 MG EC tablet, Take 81 mg by mouth once daily., Disp: , Rfl:     atenoloL (TENORMIN) 50 MG tablet, Take 0.5 tablets (25 mg total) by mouth once daily., Disp: 90 tablet, Rfl: 3    bempedoic acid (NEXLETOL) 180 mg Tab, Take 1 tablet (180 mg) by mouth once daily., Disp: 90 tablet, Rfl: 3    collagen/biotin/ascorbic acid (COLLAGEN 1500 PLUS C ORAL), Take by mouth Daily. 11 G of powder daily, Disp: , Rfl:     fluorouraciL (EFUDEX) 5 % cream, AAA on both arms BID x 2-3 weeks. Stop if blistered, oozing, or bleeding. Use daily sun protection., Disp: 40 g, Rfl: 1    furosemide (LASIX) 40 MG tablet, Take 1 tablet (40 mg total) by mouth once daily., Disp: 90 tablet, Rfl: 3    ketoconazole (NIZORAL) 2 % cream, Apply topically 2 (two) times  daily., Disp: 30 g, Rfl: 2    magnesium oxide (MAG-OX) 400 mg (241.3 mg magnesium) tablet, Take 1 tablet (400 mg total) by mouth once daily., Disp: 90 tablet, Rfl: 1    multivit-min-FA-lycopen-lutein 300-600-300 mcg Tab, Take by mouth., Disp: , Rfl:     omega-3 fatty acids/fish oil (FISH OIL-OMEGA-3 FATTY ACIDS) 300-1,000 mg capsule, Take by mouth once daily., Disp: , Rfl:     omeprazole (PRILOSEC) 20 MG capsule, Take 2 capsules (40 mg total) by mouth once daily., Disp: 180 capsule, Rfl: 3    oxyCODONE-acetaminophen (PERCOCET)  mg per tablet, Take 1 tablet by mouth every 4-6 hours as needed for pain. Take stool softener with this medication., Disp: 15 tablet, Rfl: 0    promethazine (PHENERGAN) 25 MG tablet, Take 1 tablet (25 mg total) by mouth every 6 (six) hours as needed for Nausea., Disp: 8 tablet, Rfl: 0    rosuvastatin (CRESTOR) 20 MG tablet, TAKE 1 TABLET BY MOUTH ONCE DAILY, Disp: 90 tablet, Rfl: 3    spironolactone (ALDACTONE) 25 MG tablet, Take 1 tablet (25 mg total) by mouth once daily., Disp: 90 tablet, Rfl: 3    tadalafiL (CIALIS) 5 MG tablet, Take 1 tablet (5 mg total) by mouth once daily., Disp: 30 tablet, Rfl: 6    traMADoL (ULTRAM) 50 mg tablet, Take 1-2 tablets ( mg total) by mouth every 6 (six) hours as needed for Pain., Disp: 20 tablet, Rfl: 0    empagliflozin (JARDIANCE) 10 mg tablet, Take 1 tablet (10 mg total) by mouth once daily., Disp: 90 tablet, Rfl: 3      PHYSICAL EXAM:    Vitals:    02/14/23 1119   BP: (!) 146/70   Pulse: 73   Resp: 20         NAD, A+Ox3.  Mild jvd, no bruit.  RRR nml s1,s2. No murmurs.  CTA B no wheezes or crackles.  2+ B radial and 1+ B DP/PT. No edema.    LABS/STUDIES (imaging reviewed during clinic visit):    February 2023 hemoglobin 13.2.  Creatinine 1.8 with a BUN of 44.  Albumin 4.0.  BNP 1300 down from 3300 at time of admission.  January 2023 Creatinine 1.6 with a BUN of 29.  BNP 2000.  TSH normal.  Normal iron studies.  Negative HIV/HCV antibodies.   LDL 88 HDL 46.  Triglycerides 67.   ECG January 2023 demonstrates sinus rhythm with no Q-waves or ST changes.  PVCs noted.    TTE January 2023 demonstrates normal LV size and systolic function.  LVH is present.  EF 65%.  RV enlargement with normal function.  Moderate AI, mild-to-moderate MR.  CVP 8 with estimated PASP of 39.  Carotid 2022 patent right carotid endarterectomy patch with no evidence of restenosis.  Left ICA demonstrates less than 50% stenosis.    DL/PVR September 2022 demonstrates normal DL with unremarkable PVR waveforms bilaterally.    Abd us 2021 AAA 3.3x3.3    ASSESSMENT & PLAN:    1. ACC/AHA stage C heart failure with preserved ejection fraction    2. HTN (hypertension), benign    3. Mixed hyperlipidemia    4. PAD (peripheral artery disease)    5. Aortic atherosclerosis    6. Stage 3 chronic kidney disease, unspecified whether stage 3a or 3b CKD          Orders Placed This Encounter    Basic Metabolic Panel    empagliflozin (JARDIANCE) 10 mg tablet        Patient with diastolic heart failure. New diagnosis that required inpatient admission. S/p successful diuresis with 15 pound weight loss. BNP improved, albeit still elevated. Symptoms much improved. Pt feels better than he has felt in over a year.     Unclear etiology. NST ordered by NP Hossein. Pt with preserved LVEF on TTE. No strain images. Infiltrative/amyloid can be considered. If NST unremarkable will send an SPEP/UPEP with a PYP scan.    Cont atenolol 25x1, spironolactone 25x1. Will start empagliflozin 10x1 and reduce furosemide to 20x1. CKD noted. Has follow-up labs tomorrow and will repeat in 2 weeks.     Bps reasonable on current regimen. No orthostatic symptoms.    LDL 88 on rosuvastatin and bempedoic acid. Pt deferred PCSK9i previously. Can consider ezetimibe.     PVD and managed by vas surgery for R CEA and AAA. Unremarkable ABIs recently. No claudication.        Karrie Sarabia MD

## 2023-02-15 ENCOUNTER — EDUCATION (OUTPATIENT)
Dept: TRANSPLANT | Facility: CLINIC | Age: 83
End: 2023-02-15
Payer: MEDICARE

## 2023-02-15 ENCOUNTER — LAB VISIT (OUTPATIENT)
Dept: LAB | Facility: HOSPITAL | Age: 83
End: 2023-02-15
Payer: MEDICARE

## 2023-02-15 ENCOUNTER — OFFICE VISIT (OUTPATIENT)
Dept: CARDIOLOGY | Facility: CLINIC | Age: 83
End: 2023-02-15
Payer: MEDICARE

## 2023-02-15 ENCOUNTER — OFFICE VISIT (OUTPATIENT)
Dept: INTERNAL MEDICINE | Facility: CLINIC | Age: 83
End: 2023-02-15
Payer: MEDICARE

## 2023-02-15 VITALS
TEMPERATURE: 97 F | DIASTOLIC BLOOD PRESSURE: 70 MMHG | BODY MASS INDEX: 22.22 KG/M2 | HEART RATE: 77 BPM | WEIGHT: 167.69 LBS | OXYGEN SATURATION: 99 % | SYSTOLIC BLOOD PRESSURE: 130 MMHG | HEIGHT: 73 IN

## 2023-02-15 VITALS
WEIGHT: 165.63 LBS | HEART RATE: 68 BPM | SYSTOLIC BLOOD PRESSURE: 137 MMHG | BODY MASS INDEX: 21.95 KG/M2 | HEIGHT: 73 IN | DIASTOLIC BLOOD PRESSURE: 63 MMHG

## 2023-02-15 DIAGNOSIS — I73.9 PAD (PERIPHERAL ARTERY DISEASE): ICD-10-CM

## 2023-02-15 DIAGNOSIS — I10 HTN (HYPERTENSION), BENIGN: ICD-10-CM

## 2023-02-15 DIAGNOSIS — I50.30 ACC/AHA STAGE C HEART FAILURE WITH PRESERVED EJECTION FRACTION: ICD-10-CM

## 2023-02-15 DIAGNOSIS — N18.30 STAGE 3 CHRONIC KIDNEY DISEASE, UNSPECIFIED WHETHER STAGE 3A OR 3B CKD: ICD-10-CM

## 2023-02-15 DIAGNOSIS — I65.23 CAROTID ATHEROSCLEROSIS, BILATERAL: ICD-10-CM

## 2023-02-15 DIAGNOSIS — I10 HTN (HYPERTENSION), BENIGN: Primary | ICD-10-CM

## 2023-02-15 DIAGNOSIS — E78.2 MIXED HYPERLIPIDEMIA: ICD-10-CM

## 2023-02-15 DIAGNOSIS — I50.32 CHRONIC DIASTOLIC HEART FAILURE: Primary | ICD-10-CM

## 2023-02-15 DIAGNOSIS — R06.02 SOB (SHORTNESS OF BREATH): ICD-10-CM

## 2023-02-15 LAB
ALBUMIN SERPL BCP-MCNC: 4.4 G/DL (ref 3.5–5.2)
ALP SERPL-CCNC: 100 U/L (ref 55–135)
ALT SERPL W/O P-5'-P-CCNC: 16 U/L (ref 10–44)
ANION GAP SERPL CALC-SCNC: 13 MMOL/L (ref 8–16)
AST SERPL-CCNC: 20 U/L (ref 10–40)
BILIRUB SERPL-MCNC: 0.7 MG/DL (ref 0.1–1)
BNP SERPL-MCNC: 1192 PG/ML (ref 0–99)
BUN SERPL-MCNC: 62 MG/DL (ref 8–23)
CALCIUM SERPL-MCNC: 10.6 MG/DL (ref 8.7–10.5)
CHLORIDE SERPL-SCNC: 100 MMOL/L (ref 95–110)
CO2 SERPL-SCNC: 29 MMOL/L (ref 23–29)
CREAT SERPL-MCNC: 2.1 MG/DL (ref 0.5–1.4)
EST. GFR  (NO RACE VARIABLE): 31 ML/MIN/1.73 M^2
GLUCOSE SERPL-MCNC: 93 MG/DL (ref 70–110)
MAGNESIUM SERPL-MCNC: 1.4 MG/DL (ref 1.6–2.6)
PHOSPHATE SERPL-MCNC: 3.3 MG/DL (ref 2.7–4.5)
POTASSIUM SERPL-SCNC: 4.7 MMOL/L (ref 3.5–5.1)
PROT SERPL-MCNC: 7.7 G/DL (ref 6–8.4)
SODIUM SERPL-SCNC: 142 MMOL/L (ref 136–145)

## 2023-02-15 PROCEDURE — 3288F FALL RISK ASSESSMENT DOCD: CPT | Mod: CPTII,S$GLB,, | Performed by: INTERNAL MEDICINE

## 2023-02-15 PROCEDURE — 1111F PR DISCHARGE MEDS RECONCILED W/ CURRENT OUTPATIENT MED LIST: ICD-10-PCS | Mod: CPTII,S$GLB,, | Performed by: INTERNAL MEDICINE

## 2023-02-15 PROCEDURE — 1160F RVW MEDS BY RX/DR IN RCRD: CPT | Mod: CPTII,S$GLB,,

## 2023-02-15 PROCEDURE — 90694 VACC AIIV4 NO PRSRV 0.5ML IM: CPT | Mod: S$GLB,,, | Performed by: INTERNAL MEDICINE

## 2023-02-15 PROCEDURE — 1126F PR PAIN SEVERITY QUANTIFIED, NO PAIN PRESENT: ICD-10-PCS | Mod: CPTII,S$GLB,, | Performed by: INTERNAL MEDICINE

## 2023-02-15 PROCEDURE — 36415 COLL VENOUS BLD VENIPUNCTURE: CPT | Mod: PO

## 2023-02-15 PROCEDURE — G0008 ADMIN INFLUENZA VIRUS VAC: HCPCS | Mod: S$GLB,,, | Performed by: INTERNAL MEDICINE

## 2023-02-15 PROCEDURE — 80053 COMPREHEN METABOLIC PANEL: CPT

## 2023-02-15 PROCEDURE — 3078F DIAST BP <80 MM HG: CPT | Mod: CPTII,S$GLB,,

## 2023-02-15 PROCEDURE — 99999 PR PBB SHADOW E&M-EST. PATIENT-LVL V: ICD-10-PCS | Mod: PBBFAC,,,

## 2023-02-15 PROCEDURE — G0008 FLU VACCINE - QUADRIVALENT - ADJUVANTED: ICD-10-PCS | Mod: S$GLB,,, | Performed by: INTERNAL MEDICINE

## 2023-02-15 PROCEDURE — 1111F PR DISCHARGE MEDS RECONCILED W/ CURRENT OUTPATIENT MED LIST: ICD-10-PCS | Mod: CPTII,S$GLB,,

## 2023-02-15 PROCEDURE — 3288F PR FALLS RISK ASSESSMENT DOCUMENTED: ICD-10-PCS | Mod: CPTII,S$GLB,,

## 2023-02-15 PROCEDURE — 1101F PR PT FALLS ASSESS DOC 0-1 FALLS W/OUT INJ PAST YR: ICD-10-PCS | Mod: CPTII,S$GLB,,

## 2023-02-15 PROCEDURE — 1126F PR PAIN SEVERITY QUANTIFIED, NO PAIN PRESENT: ICD-10-PCS | Mod: CPTII,S$GLB,,

## 2023-02-15 PROCEDURE — 3075F PR MOST RECENT SYSTOLIC BLOOD PRESS GE 130-139MM HG: ICD-10-PCS | Mod: CPTII,S$GLB,, | Performed by: INTERNAL MEDICINE

## 2023-02-15 PROCEDURE — 90694 FLU VACCINE - QUADRIVALENT - ADJUVANTED: ICD-10-PCS | Mod: S$GLB,,, | Performed by: INTERNAL MEDICINE

## 2023-02-15 PROCEDURE — 83880 ASSAY OF NATRIURETIC PEPTIDE: CPT

## 2023-02-15 PROCEDURE — 3288F PR FALLS RISK ASSESSMENT DOCUMENTED: ICD-10-PCS | Mod: CPTII,S$GLB,, | Performed by: INTERNAL MEDICINE

## 2023-02-15 PROCEDURE — 1101F PT FALLS ASSESS-DOCD LE1/YR: CPT | Mod: CPTII,S$GLB,,

## 2023-02-15 PROCEDURE — 3075F SYST BP GE 130 - 139MM HG: CPT | Mod: CPTII,S$GLB,, | Performed by: INTERNAL MEDICINE

## 2023-02-15 PROCEDURE — 99999 PR PBB SHADOW E&M-EST. PATIENT-LVL IV: ICD-10-PCS | Mod: PBBFAC,,, | Performed by: INTERNAL MEDICINE

## 2023-02-15 PROCEDURE — 1101F PT FALLS ASSESS-DOCD LE1/YR: CPT | Mod: CPTII,S$GLB,, | Performed by: INTERNAL MEDICINE

## 2023-02-15 PROCEDURE — 3288F FALL RISK ASSESSMENT DOCD: CPT | Mod: CPTII,S$GLB,,

## 2023-02-15 PROCEDURE — 99214 PR OFFICE/OUTPT VISIT, EST, LEVL IV, 30-39 MIN: ICD-10-PCS | Mod: S$GLB,,,

## 2023-02-15 PROCEDURE — 1159F PR MEDICATION LIST DOCUMENTED IN MEDICAL RECORD: ICD-10-PCS | Mod: CPTII,S$GLB,,

## 2023-02-15 PROCEDURE — 3075F SYST BP GE 130 - 139MM HG: CPT | Mod: CPTII,S$GLB,,

## 2023-02-15 PROCEDURE — 1159F MED LIST DOCD IN RCRD: CPT | Mod: CPTII,S$GLB,,

## 2023-02-15 PROCEDURE — 99214 OFFICE O/P EST MOD 30 MIN: CPT | Mod: S$GLB,,,

## 2023-02-15 PROCEDURE — 3078F PR MOST RECENT DIASTOLIC BLOOD PRESSURE < 80 MM HG: ICD-10-PCS | Mod: CPTII,S$GLB,,

## 2023-02-15 PROCEDURE — 99213 PR OFFICE/OUTPT VISIT, EST, LEVL III, 20-29 MIN: ICD-10-PCS | Mod: S$GLB,,, | Performed by: INTERNAL MEDICINE

## 2023-02-15 PROCEDURE — 84100 ASSAY OF PHOSPHORUS: CPT

## 2023-02-15 PROCEDURE — 1111F DSCHRG MED/CURRENT MED MERGE: CPT | Mod: CPTII,S$GLB,,

## 2023-02-15 PROCEDURE — 3078F PR MOST RECENT DIASTOLIC BLOOD PRESSURE < 80 MM HG: ICD-10-PCS | Mod: CPTII,S$GLB,, | Performed by: INTERNAL MEDICINE

## 2023-02-15 PROCEDURE — 83735 ASSAY OF MAGNESIUM: CPT

## 2023-02-15 PROCEDURE — 99999 PR PBB SHADOW E&M-EST. PATIENT-LVL IV: CPT | Mod: PBBFAC,,, | Performed by: INTERNAL MEDICINE

## 2023-02-15 PROCEDURE — 1126F AMNT PAIN NOTED NONE PRSNT: CPT | Mod: CPTII,S$GLB,, | Performed by: INTERNAL MEDICINE

## 2023-02-15 PROCEDURE — 3075F PR MOST RECENT SYSTOLIC BLOOD PRESS GE 130-139MM HG: ICD-10-PCS | Mod: CPTII,S$GLB,,

## 2023-02-15 PROCEDURE — 3078F DIAST BP <80 MM HG: CPT | Mod: CPTII,S$GLB,, | Performed by: INTERNAL MEDICINE

## 2023-02-15 PROCEDURE — 1160F PR REVIEW ALL MEDS BY PRESCRIBER/CLIN PHARMACIST DOCUMENTED: ICD-10-PCS | Mod: CPTII,S$GLB,,

## 2023-02-15 PROCEDURE — 1126F AMNT PAIN NOTED NONE PRSNT: CPT | Mod: CPTII,S$GLB,,

## 2023-02-15 PROCEDURE — 99213 OFFICE O/P EST LOW 20 MIN: CPT | Mod: S$GLB,,, | Performed by: INTERNAL MEDICINE

## 2023-02-15 PROCEDURE — 1111F DSCHRG MED/CURRENT MED MERGE: CPT | Mod: CPTII,S$GLB,, | Performed by: INTERNAL MEDICINE

## 2023-02-15 PROCEDURE — 1101F PR PT FALLS ASSESS DOC 0-1 FALLS W/OUT INJ PAST YR: ICD-10-PCS | Mod: CPTII,S$GLB,, | Performed by: INTERNAL MEDICINE

## 2023-02-15 PROCEDURE — 99999 PR PBB SHADOW E&M-EST. PATIENT-LVL V: CPT | Mod: PBBFAC,,,

## 2023-02-15 RX ORDER — FUROSEMIDE 20 MG/1
20 TABLET ORAL DAILY
Qty: 30 TABLET | Refills: 11 | Status: SHIPPED | OUTPATIENT
Start: 2023-02-15 | End: 2024-01-31

## 2023-02-15 RX ORDER — ATENOLOL 50 MG/1
50 TABLET ORAL DAILY
Qty: 90 TABLET | Refills: 3
Start: 2023-02-15 | End: 2023-10-24 | Stop reason: SDUPTHER

## 2023-02-15 NOTE — PROGRESS NOTES
Met with pt to discuss 2g Na diet and 1500 ml fluid restriction      Pt states good kev, diarrhea due to medication, no prob chew/swallow      Pt stated that he gets 3 meals per day that his wife cooks and keeps sodium under 2400 mg of Na per day. Pt has eggs, yogurt, toast. Lunch is a casserole or spaghetti and meat balls (low Na red sauce), fish and vegetables. Dinner is always a meat, potato, and vegetables. Pt drinks 54oz of fluid per day and states he watches fluid and limits it to less than 60oz per day.         Pt was educated on how to read nutrition labels to understand food has natural sodium present. Recc'd of 600 - 700mg Na per meal. Pt recc to drink no more than 2000ml / 60oz. Pt given nutrition handout and contact info if needs arise. Will follow up in [facility] as needed

## 2023-02-15 NOTE — PROGRESS NOTES
History of present illness:   82-year-old gentleman with hypertension, PA D, dyslipidemia, CKD or recently diagnosed with subacute heart failure with preserved ejection fraction.  Discharged on February 3rd after an admission for such.  Medications adjusted and changed.  He has had recent follow-up with cardiology subsequent to his discharge.  He reports that he currently feels well having no shortness of breath chest pain palpitations syncope presyncope.  No claudication described.    Current medications:  Medications are all noted and reviewed.    Review of systems:  Constitutional:  No fever no chills.    Respiratory:  No cough shortness of breath.    Cardiovascular:  Denies current chest pain palpitations syncope presyncope claudication or edema.    GI:  No nausea no vomiting abdominal pain or diarrhea.  Neurologic:  No headaches or new focal neurological symptomatologies.      Physical examination:  General:  Pleasant alert appropriately groomed gentleman no acute distress.    Vital signs:  All noted and reviewed is normal.    HEENT:  Normocephalic.  Neck supple no masses no thyromegaly.    Lungs: Clear to auscultation.    Cardiovascular:  Regular rate rhythm.  No significant murmur.  No JVD.  No peripheral extremity edema.  Mental status:  Alert oriented affect mood all appropriate.    Data:  Reviewed the recent hospital discharge summary and data reports.      Impression:   Recent subacute heart failure clinically compensated at this time.    Hypertension controlled.      PA D clinically stable.      CKD clinically stable.        Plan:  Influenza vaccine today.    Continue current pharmacologic regimens.    Continue other subspecialty follow-up with cardiology has planned.  Follow-up with us in six months.

## 2023-02-23 ENCOUNTER — TELEPHONE (OUTPATIENT)
Dept: CARDIOLOGY | Facility: CLINIC | Age: 83
End: 2023-02-23
Payer: MEDICARE

## 2023-02-23 ENCOUNTER — PATIENT MESSAGE (OUTPATIENT)
Dept: CARDIOLOGY | Facility: CLINIC | Age: 83
End: 2023-02-23
Payer: MEDICARE

## 2023-02-23 DIAGNOSIS — R79.89 TROPONIN LEVEL ELEVATED: Primary | ICD-10-CM

## 2023-02-23 NOTE — TELEPHONE ENCOUNTER
"[facility]) weekly phone follow up / triage call completed.     TCC RN Navigator spoke with  PT    Current Patient reported weight:  160.2   Patient Goal Weight: (162-163)  Recent Patient reported blood pressure and heart rate: Hasn't taken today but has been around 137/70    Pt reports the following:  []  Shortness of Breath with Activity  []  Shortness of Breath at rest   []  Fatigue  []  Edema   [] Chest pain or tightness  [] Weight Increase since discharge  [x] None of the above    Pt reports using "Daily weight and symptom tracker".    Pt reports being in the Green(color) Zone. If in yellow/red, reminded that they should be calling HFTCC today or now.     Medications:   Medication compliance reviewed with pt.  Pt reports having medication list available and has all medications at home for use per list.     Education:   Confirmed pt still has "[facility] Home Care Guide"  .     Reminded of key points as listed below.     Recommend 2 -3 gram sodium restriction and 1500 cc-2000 cc fluid restriction.  Encourage physical activity with graded exercise program.  Requested patient to weigh themselves daily, and to notify us if their weight increases by more than 3 lbs in 1 day or 5 lbs in 3 days.   Reminded to use "Daily weight and symptom tracker".  Even if pt does not have a scale, to use symptom tracker.       Watch for these Signs and Symptoms: If any of these occur, contact HFT[facility] immediately:   Increase in shortness of breath with movement   Increase in swelling in your legs and ankles   Weight gain of more than 3 pounds in a day or 5 pounds in 3 days.   Difficulty breathing when you are lying down   Worsening fatigue or tiredness   Stomach bloating, a full feeling or a loss of appetite   Increased coughing--especially when you are lying down      Pt was able to verbalize back to RN in their own words correct diet/fluid restrictions, necessity for " exercise, warning signs and symptoms, when and how to contact their HFT[facility] team.      Pt reminded of upcoming appointment.  PT reports they will attend. To be set for 1 week either phone or in person if Stress Test completed.      Pt reminded of how and when to contact Psychiatric:  [phone] (Mon-Fri, 8a-5p) & for urgent issues on the weekend to page the Heart Transplant MD on call.  Pt also encouraged utilize [facility] messaging as well.      Pt  verbalized understanding and in agreement of plan.       Will follow up with pt at next clinic visit and RN navigator available for pt questions, issues or concerns.  No questions posed today.

## 2023-02-25 ENCOUNTER — PATIENT MESSAGE (OUTPATIENT)
Dept: CARDIOLOGY | Facility: CLINIC | Age: 83
End: 2023-02-25
Payer: MEDICARE

## 2023-03-01 ENCOUNTER — LAB VISIT (OUTPATIENT)
Dept: LAB | Facility: HOSPITAL | Age: 83
End: 2023-03-01
Payer: MEDICARE

## 2023-03-01 ENCOUNTER — OFFICE VISIT (OUTPATIENT)
Dept: CARDIOLOGY | Facility: CLINIC | Age: 83
End: 2023-03-01
Payer: MEDICARE

## 2023-03-01 VITALS
BODY MASS INDEX: 22.18 KG/M2 | HEART RATE: 61 BPM | HEIGHT: 73 IN | WEIGHT: 167.38 LBS | SYSTOLIC BLOOD PRESSURE: 121 MMHG | DIASTOLIC BLOOD PRESSURE: 60 MMHG

## 2023-03-01 DIAGNOSIS — I50.32 CHRONIC DIASTOLIC HEART FAILURE: Primary | ICD-10-CM

## 2023-03-01 DIAGNOSIS — E78.2 MIXED HYPERLIPIDEMIA: ICD-10-CM

## 2023-03-01 DIAGNOSIS — I65.23 CAROTID ATHEROSCLEROSIS, BILATERAL: ICD-10-CM

## 2023-03-01 DIAGNOSIS — R06.02 SOB (SHORTNESS OF BREATH): ICD-10-CM

## 2023-03-01 DIAGNOSIS — I10 HTN (HYPERTENSION), BENIGN: ICD-10-CM

## 2023-03-01 DIAGNOSIS — I73.9 PAD (PERIPHERAL ARTERY DISEASE): ICD-10-CM

## 2023-03-01 DIAGNOSIS — N18.30 STAGE 3 CHRONIC KIDNEY DISEASE, UNSPECIFIED WHETHER STAGE 3A OR 3B CKD: ICD-10-CM

## 2023-03-01 LAB
ALBUMIN SERPL BCP-MCNC: 4.2 G/DL (ref 3.5–5.2)
ALP SERPL-CCNC: 62 U/L (ref 55–135)
ALT SERPL W/O P-5'-P-CCNC: 11 U/L (ref 10–44)
ANION GAP SERPL CALC-SCNC: 9 MMOL/L (ref 8–16)
AST SERPL-CCNC: 17 U/L (ref 10–40)
BILIRUB SERPL-MCNC: 0.6 MG/DL (ref 0.1–1)
BNP SERPL-MCNC: 1392 PG/ML (ref 0–99)
BUN SERPL-MCNC: 48 MG/DL (ref 8–23)
CALCIUM SERPL-MCNC: 10.2 MG/DL (ref 8.7–10.5)
CHLORIDE SERPL-SCNC: 103 MMOL/L (ref 95–110)
CO2 SERPL-SCNC: 28 MMOL/L (ref 23–29)
CREAT SERPL-MCNC: 2 MG/DL (ref 0.5–1.4)
EST. GFR  (NO RACE VARIABLE): 32.9 ML/MIN/1.73 M^2
GLUCOSE SERPL-MCNC: 98 MG/DL (ref 70–110)
MAGNESIUM SERPL-MCNC: 1.6 MG/DL (ref 1.6–2.6)
PHOSPHATE SERPL-MCNC: 3.9 MG/DL (ref 2.7–4.5)
POTASSIUM SERPL-SCNC: 5 MMOL/L (ref 3.5–5.1)
PROT SERPL-MCNC: 7.4 G/DL (ref 6–8.4)
SODIUM SERPL-SCNC: 140 MMOL/L (ref 136–145)

## 2023-03-01 PROCEDURE — 1101F PR PT FALLS ASSESS DOC 0-1 FALLS W/OUT INJ PAST YR: ICD-10-PCS | Mod: CPTII,S$GLB,,

## 2023-03-01 PROCEDURE — 1126F AMNT PAIN NOTED NONE PRSNT: CPT | Mod: CPTII,S$GLB,,

## 2023-03-01 PROCEDURE — 1159F MED LIST DOCD IN RCRD: CPT | Mod: CPTII,S$GLB,,

## 2023-03-01 PROCEDURE — 1160F RVW MEDS BY RX/DR IN RCRD: CPT | Mod: CPTII,S$GLB,,

## 2023-03-01 PROCEDURE — 99999 PR PBB SHADOW E&M-EST. PATIENT-LVL V: CPT | Mod: PBBFAC,,,

## 2023-03-01 PROCEDURE — 83735 ASSAY OF MAGNESIUM: CPT

## 2023-03-01 PROCEDURE — 1101F PT FALLS ASSESS-DOCD LE1/YR: CPT | Mod: CPTII,S$GLB,,

## 2023-03-01 PROCEDURE — 1159F PR MEDICATION LIST DOCUMENTED IN MEDICAL RECORD: ICD-10-PCS | Mod: CPTII,S$GLB,,

## 2023-03-01 PROCEDURE — 99214 OFFICE O/P EST MOD 30 MIN: CPT | Mod: S$GLB,,,

## 2023-03-01 PROCEDURE — 1111F DSCHRG MED/CURRENT MED MERGE: CPT | Mod: CPTII,S$GLB,,

## 2023-03-01 PROCEDURE — 3078F DIAST BP <80 MM HG: CPT | Mod: CPTII,S$GLB,,

## 2023-03-01 PROCEDURE — 1160F PR REVIEW ALL MEDS BY PRESCRIBER/CLIN PHARMACIST DOCUMENTED: ICD-10-PCS | Mod: CPTII,S$GLB,,

## 2023-03-01 PROCEDURE — 83880 ASSAY OF NATRIURETIC PEPTIDE: CPT

## 2023-03-01 PROCEDURE — 1111F PR DISCHARGE MEDS RECONCILED W/ CURRENT OUTPATIENT MED LIST: ICD-10-PCS | Mod: CPTII,S$GLB,,

## 2023-03-01 PROCEDURE — 3288F FALL RISK ASSESSMENT DOCD: CPT | Mod: CPTII,S$GLB,,

## 2023-03-01 PROCEDURE — 3074F SYST BP LT 130 MM HG: CPT | Mod: CPTII,S$GLB,,

## 2023-03-01 PROCEDURE — 80053 COMPREHEN METABOLIC PANEL: CPT

## 2023-03-01 PROCEDURE — 3074F PR MOST RECENT SYSTOLIC BLOOD PRESSURE < 130 MM HG: ICD-10-PCS | Mod: CPTII,S$GLB,,

## 2023-03-01 PROCEDURE — 3288F PR FALLS RISK ASSESSMENT DOCUMENTED: ICD-10-PCS | Mod: CPTII,S$GLB,,

## 2023-03-01 PROCEDURE — 99999 PR PBB SHADOW E&M-EST. PATIENT-LVL V: ICD-10-PCS | Mod: PBBFAC,,,

## 2023-03-01 PROCEDURE — 1126F PR PAIN SEVERITY QUANTIFIED, NO PAIN PRESENT: ICD-10-PCS | Mod: CPTII,S$GLB,,

## 2023-03-01 PROCEDURE — 84100 ASSAY OF PHOSPHORUS: CPT

## 2023-03-01 PROCEDURE — 99214 PR OFFICE/OUTPT VISIT, EST, LEVL IV, 30-39 MIN: ICD-10-PCS | Mod: S$GLB,,,

## 2023-03-01 PROCEDURE — 36415 COLL VENOUS BLD VENIPUNCTURE: CPT

## 2023-03-01 PROCEDURE — 3078F PR MOST RECENT DIASTOLIC BLOOD PRESSURE < 80 MM HG: ICD-10-PCS | Mod: CPTII,S$GLB,,

## 2023-03-01 NOTE — PROGRESS NOTES
HF [facility] Provider Note (Follow-up) Consult Note      HPI:     Patient denies appreciable SOB since last visit. Reports walking 3/4 mile yesterday and pace normal without appreciable SOB              Patient sleeps on 1 number of pillows              Patient wakes up SOB, has to get out of bed, associated cough- denies              Palpitations - denies              Dizzy, light-headed, pre-syncope or syncope- reports mild intermittent fogginess/lightheadedness, denies syncope              Since discharge frequency of performing weights, home weight and weight change- performing daily weights. Weight now stable at 159-160lbs               Other information felt pertinent to HPI: Mr. Gus Sabillon is an 81 yo male with a PMHx of HTN, HLD, HFpEF, carotid stenosis s/p CEA, PAD, GERD who presents to third [facility] visit following recent admission for ADHF after presenting to the ED with c/o LE edema and SOB. On presentation BNP greater >3000 and an elevated troponin of 0.118 with repeat at 0.106 suspected 2/2 demand ischemia. He was adequately diuresed. During admission he had new onset left ankle pain. Left ankle x-ray unremarkable for acute fracture or dislocation or bone destruction.  Left ankle MRI without contrast remarkable for Medial ankle tendon tenosynovitis and Tibiotalar joint effusion.  Orthopedic surgery consulted.  Aspiration ankle was recommended, however despite extensive counseling regarding risks of deferring, patient refused.  Upon further counseling, patient notes improvement in pain and ambulation with supportive care including pain control, elevation, ice pack.  He is amenable to continue these interventions at home. Patient without leukocytosis or fevers and apart from mildly elevated inflammatory markers, no signs or symptoms to suggest systemic infection. He was discharged with plan to continue Tylenol and supportive care as patient noted improvement.     2/9/23: Today he reports improvement in SOB  since recent hospitalization. Denies PND or orthopnea symptoms. Ankle pain and edema now resolved.     2/15/23: Since last visit, he followed with his cardiologist with plan to start jardiance 10mg daily and reduce lasix to 20mg daily. Plans to fill jardiance at pharmacy today after visit. Otherwise today he denies worsening SOB/BLE edema. On chart review, atenolol decreased to 25mg daily during hospital admission for acute decompensation. Previously tolerating atenolol 50mg daily without issue.    3/1/23: Last visit we increased atenolol to 50mg daily. Today he has no acute complaints. Denies worsening SOB/LE edema. Reports walking 3/4 mile yesterday without appreciable SOB.     PHYSICAL:   Vitals:    03/01/23 1119   BP: 121/60   Pulse: 61        Wt Readings from Last 3 Encounters:   03/01/23 75.9 kg (167 lb 6.4 oz)   02/15/23 75.1 kg (165 lb 9.6 oz)   02/15/23 76.1 kg (167 lb 10.6 oz)     JVD: no   Heart rhythm: regular with frequent premature beats  Cardiac murmur: No    S3: no  S4: no  Lungs: clear  Hepatojugular reflux: no  Edema: no       Lab Results   Component Value Date     03/01/2023    K 5.0 03/01/2023    MG 1.6 03/01/2023     03/01/2023    CO2 28 03/01/2023    BUN 48 (H) 03/01/2023    CREATININE 2.0 (H) 03/01/2023    GLU 98 03/01/2023    CALCIUM 10.2 03/01/2023    AST 17 03/01/2023    ALT 11 03/01/2023    ALBUMIN 4.2 03/01/2023    PROT 7.4 03/01/2023    BILITOT 0.6 03/01/2023     Lab Results   Component Value Date    BNP 1,392 (H) 03/01/2023    BNP 1,192 (H) 02/15/2023    BNP 1,308 (H) 02/09/2023       ASSESSMENT: Chronic diastolic HF    PLAN:      Patient Instructions:   Instruct the patient to notify this clinic if HH, a physician or an advanced care provider wants to change medication one of their HF medications   Activity and Diet restrictions:   Recommend 2-3 gram sodium restriction and 1500cc- 2000cc fluid restriction.  Encourage physical activity with graded exercise  program.  Requested patient to weigh themselves daily, and to notify us if their weight increases by more than 3 lbs in 1 day or 5 lbs in 3 days.    Assigned dry weight on home scale: 160lbs  Medication changes (include current dose and changed dose): NYHA Class I symptoms. Well compensated on exam. Labs stable. Continue current GDMT.    Given smoking history, history of PAD and carotid stenosis, elevated troponins during admission, and new HFpEF diagnosis, Nuclear Stress ordered for ischemic eval.     Upcoming labs and date anticipated: pt completed HFTCC program with no readmissions for 31 days. RTC prn    Other diagnostic tests ordered: Nuclear Stress scheduled 3/15    Silvana Catalan PA-C

## 2023-03-02 ENCOUNTER — TELEPHONE (OUTPATIENT)
Dept: CARDIOLOGY | Facility: CLINIC | Age: 83
End: 2023-03-02
Payer: MEDICARE

## 2023-03-02 NOTE — TELEPHONE ENCOUNTER
Call to PT to tell him that he will be getting his Certificate of Completion of our Clinic. I also wish him luck on his floor repairs from the broken pipe he had during the [employer] [place].

## 2023-03-13 ENCOUNTER — PATIENT MESSAGE (OUTPATIENT)
Dept: CARDIOLOGY | Facility: CLINIC | Age: 83
End: 2023-03-13
Payer: MEDICARE

## 2023-03-13 ENCOUNTER — TELEPHONE (OUTPATIENT)
Dept: CARDIOLOGY | Facility: HOSPITAL | Age: 83
End: 2023-03-13
Payer: MEDICARE

## 2023-03-15 ENCOUNTER — HOSPITAL ENCOUNTER (OUTPATIENT)
Dept: CARDIOLOGY | Facility: HOSPITAL | Age: 83
Discharge: HOME | End: 2023-03-15
Payer: MEDICARE

## 2023-03-15 VITALS
SYSTOLIC BLOOD PRESSURE: 143 MMHG | DIASTOLIC BLOOD PRESSURE: 97 MMHG | HEART RATE: 78 BPM | BODY MASS INDEX: 21.87 KG/M2 | HEIGHT: 73 IN | WEIGHT: 165 LBS | RESPIRATION RATE: 16 BRPM

## 2023-03-15 DIAGNOSIS — R79.89 TROPONIN LEVEL ELEVATED: ICD-10-CM

## 2023-03-15 LAB
CV STRESS BASE HR: 65 BPM
DIASTOLIC BLOOD PRESSURE: 80 MMHG
EJECTION FRACTION- HIGH: 65 %
END DIASTOLIC INDEX-HIGH: 153 ML/M2
END DIASTOLIC INDEX-LOW: 93 ML/M2
END SYSTOLIC INDEX-HIGH: 71 ML/M2
END SYSTOLIC INDEX-LOW: 31 ML/M2
NUC REST DIASTOLIC VOLUME INDEX: 117
NUC REST EJECTION FRACTION: 47
NUC REST SYSTOLIC VOLUME INDEX: 62
NUC STRESS DIASTOLIC VOLUME INDEX: 132
NUC STRESS EJECTION FRACTION: 55 %
NUC STRESS SYSTOLIC VOLUME INDEX: 59
OHS CV CPX 1 MINUTE RECOVERY HEART RATE: 82 BPM
OHS CV CPX 85 PERCENT MAX PREDICTED HEART RATE MALE: 118
OHS CV CPX MAX PREDICTED HEART RATE: 139
OHS CV CPX PATIENT IS FEMALE: 0
OHS CV CPX PATIENT IS MALE: 1
OHS CV CPX PEAK DIASTOLIC BLOOD PRESSURE: 81 MMHG
OHS CV CPX PEAK HEAR RATE: 65 BPM
OHS CV CPX PEAK RATE PRESSURE PRODUCT: 9295
OHS CV CPX PEAK SYSTOLIC BLOOD PRESSURE: 143 MMHG
OHS CV CPX PERCENT MAX PREDICTED HEART RATE ACHIEVED: 47
OHS CV CPX RATE PRESSURE PRODUCT PRESENTING: 9490
RETIRED EF AND QEF - SEE NOTES: 53 %
SYSTOLIC BLOOD PRESSURE: 146 MMHG

## 2023-03-15 PROCEDURE — 78452 HT MUSCLE IMAGE SPECT MULT: CPT | Mod: 26,,, | Performed by: INTERNAL MEDICINE

## 2023-03-15 PROCEDURE — 93016 NUCLEAR STRESS - CARDIOLOGY INTERPRETED (CUPID ONLY): ICD-10-PCS | Mod: ,,, | Performed by: INTERNAL MEDICINE

## 2023-03-15 PROCEDURE — 63600175 PHARM REV CODE 636 W HCPCS

## 2023-03-15 PROCEDURE — 93017 CV STRESS TEST TRACING ONLY: CPT

## 2023-03-15 PROCEDURE — 78452 NUCLEAR STRESS - CARDIOLOGY INTERPRETED (CUPID ONLY): ICD-10-PCS | Mod: 26,,, | Performed by: INTERNAL MEDICINE

## 2023-03-15 PROCEDURE — A9502 TC99M TETROFOSMIN: HCPCS

## 2023-03-15 PROCEDURE — 93018 NUCLEAR STRESS - CARDIOLOGY INTERPRETED (CUPID ONLY): ICD-10-PCS | Mod: ,,, | Performed by: INTERNAL MEDICINE

## 2023-03-15 PROCEDURE — 93018 CV STRESS TEST I&R ONLY: CPT | Mod: ,,, | Performed by: INTERNAL MEDICINE

## 2023-03-15 PROCEDURE — 93016 CV STRESS TEST SUPVJ ONLY: CPT | Mod: ,,, | Performed by: INTERNAL MEDICINE

## 2023-03-15 RX ORDER — REGADENOSON 0.08 MG/ML
0.4 INJECTION, SOLUTION INTRAVENOUS ONCE
Status: COMPLETED | OUTPATIENT
Start: 2023-03-15 | End: 2023-03-15

## 2023-03-15 RX ORDER — CAFFEINE CITRATE 20 MG/ML
60 SOLUTION INTRAVENOUS ONCE
Status: COMPLETED | OUTPATIENT
Start: 2023-03-15 | End: 2023-03-15

## 2023-03-15 RX ADMIN — REGADENOSON 0.4 MG: 0.08 INJECTION, SOLUTION INTRAVENOUS at 09:03

## 2023-03-15 RX ADMIN — CAFFEINE CITRATE 60 MG: 20 SOLUTION INTRAVENOUS at 09:03

## 2023-03-20 ENCOUNTER — OFFICE VISIT (OUTPATIENT)
Dept: CARDIOLOGY | Facility: CLINIC | Age: 83
End: 2023-03-20
Payer: MEDICARE

## 2023-03-20 VITALS
HEART RATE: 64 BPM | SYSTOLIC BLOOD PRESSURE: 112 MMHG | BODY MASS INDEX: 22.4 KG/M2 | DIASTOLIC BLOOD PRESSURE: 54 MMHG | HEIGHT: 73 IN | WEIGHT: 169.06 LBS

## 2023-03-20 DIAGNOSIS — N18.30 STAGE 3 CHRONIC KIDNEY DISEASE, UNSPECIFIED WHETHER STAGE 3A OR 3B CKD: ICD-10-CM

## 2023-03-20 DIAGNOSIS — I10 HTN (HYPERTENSION), BENIGN: ICD-10-CM

## 2023-03-20 DIAGNOSIS — I71.40 ABDOMINAL AORTIC ANEURYSM (AAA) WITHOUT RUPTURE, UNSPECIFIED PART: ICD-10-CM

## 2023-03-20 DIAGNOSIS — I50.30 ACC/AHA STAGE C HEART FAILURE WITH PRESERVED EJECTION FRACTION: Primary | ICD-10-CM

## 2023-03-20 DIAGNOSIS — E78.2 MIXED HYPERLIPIDEMIA: ICD-10-CM

## 2023-03-20 DIAGNOSIS — I65.23 CAROTID ATHEROSCLEROSIS, BILATERAL: ICD-10-CM

## 2023-03-20 DIAGNOSIS — I50.9 ACUTE ON CHRONIC CONGESTIVE HEART FAILURE, UNSPECIFIED HEART FAILURE TYPE: ICD-10-CM

## 2023-03-20 DIAGNOSIS — I70.0 AORTIC ATHEROSCLEROSIS: ICD-10-CM

## 2023-03-20 PROCEDURE — 99999 PR PBB SHADOW E&M-EST. PATIENT-LVL IV: CPT | Mod: PBBFAC,,, | Performed by: INTERNAL MEDICINE

## 2023-03-20 PROCEDURE — 1160F RVW MEDS BY RX/DR IN RCRD: CPT | Mod: CPTII,S$GLB,, | Performed by: INTERNAL MEDICINE

## 2023-03-20 PROCEDURE — 3078F PR MOST RECENT DIASTOLIC BLOOD PRESSURE < 80 MM HG: ICD-10-PCS | Mod: CPTII,S$GLB,, | Performed by: INTERNAL MEDICINE

## 2023-03-20 PROCEDURE — 99214 PR OFFICE/OUTPT VISIT, EST, LEVL IV, 30-39 MIN: ICD-10-PCS | Mod: S$GLB,,, | Performed by: INTERNAL MEDICINE

## 2023-03-20 PROCEDURE — 3288F FALL RISK ASSESSMENT DOCD: CPT | Mod: CPTII,S$GLB,, | Performed by: INTERNAL MEDICINE

## 2023-03-20 PROCEDURE — 1101F PR PT FALLS ASSESS DOC 0-1 FALLS W/OUT INJ PAST YR: ICD-10-PCS | Mod: CPTII,S$GLB,, | Performed by: INTERNAL MEDICINE

## 2023-03-20 PROCEDURE — 3074F SYST BP LT 130 MM HG: CPT | Mod: CPTII,S$GLB,, | Performed by: INTERNAL MEDICINE

## 2023-03-20 PROCEDURE — 1159F PR MEDICATION LIST DOCUMENTED IN MEDICAL RECORD: ICD-10-PCS | Mod: CPTII,S$GLB,, | Performed by: INTERNAL MEDICINE

## 2023-03-20 PROCEDURE — 3078F DIAST BP <80 MM HG: CPT | Mod: CPTII,S$GLB,, | Performed by: INTERNAL MEDICINE

## 2023-03-20 PROCEDURE — 1126F PR PAIN SEVERITY QUANTIFIED, NO PAIN PRESENT: ICD-10-PCS | Mod: CPTII,S$GLB,, | Performed by: INTERNAL MEDICINE

## 2023-03-20 PROCEDURE — 3288F PR FALLS RISK ASSESSMENT DOCUMENTED: ICD-10-PCS | Mod: CPTII,S$GLB,, | Performed by: INTERNAL MEDICINE

## 2023-03-20 PROCEDURE — 99214 OFFICE O/P EST MOD 30 MIN: CPT | Mod: S$GLB,,, | Performed by: INTERNAL MEDICINE

## 2023-03-20 PROCEDURE — 1126F AMNT PAIN NOTED NONE PRSNT: CPT | Mod: CPTII,S$GLB,, | Performed by: INTERNAL MEDICINE

## 2023-03-20 PROCEDURE — 3074F PR MOST RECENT SYSTOLIC BLOOD PRESSURE < 130 MM HG: ICD-10-PCS | Mod: CPTII,S$GLB,, | Performed by: INTERNAL MEDICINE

## 2023-03-20 PROCEDURE — 1101F PT FALLS ASSESS-DOCD LE1/YR: CPT | Mod: CPTII,S$GLB,, | Performed by: INTERNAL MEDICINE

## 2023-03-20 PROCEDURE — 1160F PR REVIEW ALL MEDS BY PRESCRIBER/CLIN PHARMACIST DOCUMENTED: ICD-10-PCS | Mod: CPTII,S$GLB,, | Performed by: INTERNAL MEDICINE

## 2023-03-20 PROCEDURE — 1159F MED LIST DOCD IN RCRD: CPT | Mod: CPTII,S$GLB,, | Performed by: INTERNAL MEDICINE

## 2023-03-20 PROCEDURE — 99999 PR PBB SHADOW E&M-EST. PATIENT-LVL IV: ICD-10-PCS | Mod: PBBFAC,,, | Performed by: INTERNAL MEDICINE

## 2023-03-20 NOTE — PROGRESS NOTES
HISTORY:    82-year-old male with a history of heart failure with preserved ejection fraction, hypertension, hyperlipidemia, CKD, PAD status post bilateral lower extremity PI, carotid stenosis s/p R CEA '21, and GERD presenting for follow-up.    Patient was initially evaluated by me in early January with significant heart failure symptoms.  We tried outpatient management with diuretics and goal-directed medical therapy, but the patient required in-hospital diuresis.  He was admitted in late January with a positive response to inpatient diuresis and medicine adjustment. Lost 10-15 pounds that he has maintained. Doing a great job monitoring weight and limiting fluid/salt intake. Orthopnea, edema, and SOB resolved. No chest pain.    Back to regular exercise walking 0.7-1.5 miles/day. Some mild claudication.     He currently tolerates aspirin 81 x 1, atenolol 50 x 1, spironolactone 25 x 1, empagloflozin 10x1, furosemide 20 x 1, rosuvastatin 20 x 1, and bempedoic acid 180 x 1. Bps 130s/70s on average post discharge.     PHYSICAL EXAM:    Vitals:    03/20/23 1042   BP: (!) 112/54   Pulse: 64         NAD, A+Ox3.  No jvd, no bruit.  RRR nml s1,s2. No murmurs.  CTA B no wheezes or crackles.  No edema.    LABS/STUDIES (imaging reviewed during clinic visit):    February 2023 hemoglobin 13.2.  March 2023 creatinine 2.0 with a BUN of 48.  Albumin 4.2.  BNP 1300 down from 3300 at time of admission.  January 2023 Creatinine 1.6 with a BUN of 29.  BNP 2000.  TSH normal.  Normal iron studies.  Negative HIV/HCV antibodies.  LDL 88 HDL 46.  Triglycerides 67.   ECG January 2023 demonstrates sinus rhythm with no Q-waves or ST changes.  PVCs noted.    TTE January 2023 demonstrates normal LV size and systolic function.  LVH is present.  EF 65%.  RV enlargement with normal function.  Moderate AI, mild-to-moderate MR.  CVP 8 with estimated PASP of 39.  NST March 2023 LVEF 47% at rest and 55% with stress.  Equivocal perfusion abnormality  that is fixed in the basal to distal inferior wall with bowel interference.  Carotid 2022 patent right carotid endarterectomy patch with no evidence of restenosis.  Left ICA demonstrates less than 50% stenosis.    DL/PVR September 2022 demonstrates normal DL with unremarkable PVR waveforms bilaterally.    Abd us 2021 AAA 3.3x3.3    ASSESSMENT & PLAN:    1. ACC/AHA stage C heart failure with preserved ejection fraction    2. Acute on chronic congestive heart failure, unspecified heart failure type    3. Aortic atherosclerosis    4. Carotid atherosclerosis, bilateral    5. Abdominal aortic aneurysm (AAA) without rupture, unspecified part    6. Mixed hyperlipidemia    7. HTN (hypertension), benign    8. Stage 3 chronic kidney disease, unspecified whether stage 3a or 3b CKD          Orders Placed This Encounter    Echo          Patient with diastolic heart failure. New diagnosis that required inpatient admission. S/p successful diuresis with 15 pound weight loss. BNP improved, albeit still elevated. Symptoms much improved. Doing well with diet modifications and fluid restriction.     Unclear etiology. NST equivocal. Pt with preserved LVEF on TTE. No strain images. Infiltrative/amyloid can be considered. Pt feels better than he has felt in over a year and favors medical management at this time.      Cont atenolol 50x1, spironolactone 25x1, empagliflozin 10x1, and furosemide 20x1. Okay to skip doses of furosemide or try QOD as long weight does not increase. CKD noted.     Bps reasonable on current regimen. No orthostatic symptoms.    LDL 88 on rosuvastatin and bempedoic acid. Pt deferred PCSK9i previously.     PVD and managed by vasc surgery for R CEA and AAA. Unremarkable ABIs recently. Mild claudication.    Karrie Sarabia MD

## 2023-03-27 RX ORDER — TADALAFIL 5 MG/1
5 TABLET ORAL DAILY
Qty: 30 TABLET | Refills: 6 | Status: SHIPPED | OUTPATIENT
Start: 2023-03-27 | End: 2023-12-06

## 2023-04-05 ENCOUNTER — TELEPHONE (OUTPATIENT)
Dept: SPORTS MEDICINE | Facility: CLINIC | Age: 83
End: 2023-04-05
Payer: MEDICARE

## 2023-04-05 NOTE — TELEPHONE ENCOUNTER
Called patient regarding rescheduling of appointment.  Patient didn't answer, but I left a voice mail to call us back.

## 2023-04-14 ENCOUNTER — TELEPHONE (OUTPATIENT)
Dept: SPORTS MEDICINE | Facility: CLINIC | Age: 83
End: 2023-04-14
Payer: MEDICARE

## 2023-04-14 NOTE — TELEPHONE ENCOUNTER
Spoke to pt about rescheduling upcoming appt. on  4/24. He says he isn't having much shoulder pain or discomfort at the moment, and is dealing with other health concerns. He will follow up with us as needed and can schedule via [facility] in the near future. Answered any questions he had.

## 2023-05-10 ENCOUNTER — HOSPITAL ENCOUNTER (OUTPATIENT)
Dept: CARDIOLOGY | Facility: HOSPITAL | Age: 83
Discharge: HOME | End: 2023-05-10
Attending: INTERNAL MEDICINE
Payer: MEDICARE

## 2023-05-10 VITALS
SYSTOLIC BLOOD PRESSURE: 168 MMHG | HEART RATE: 67 BPM | HEIGHT: 73 IN | BODY MASS INDEX: 22.4 KG/M2 | DIASTOLIC BLOOD PRESSURE: 74 MMHG | WEIGHT: 169 LBS

## 2023-05-10 DIAGNOSIS — I50.30 ACC/AHA STAGE C HEART FAILURE WITH PRESERVED EJECTION FRACTION: ICD-10-CM

## 2023-05-10 LAB
ASCENDING AORTA: 3.42 CM
AV INDEX (PROSTH): 0.98
AV MEAN GRADIENT: 3 MMHG
AV PEAK GRADIENT: 5 MMHG
AV VALVE AREA: 4.53 CM2
AV VELOCITY RATIO: 0.98
BSA FOR ECHO PROCEDURE: 1.99 M2
CV ECHO LV RWT: 0.38 CM
DOP CALC AO PEAK VEL: 1.12 M/S
DOP CALC AO VTI: 24.6 CM
DOP CALC LVOT AREA: 4.6 CM2
DOP CALC LVOT DIAMETER: 2.42 CM
DOP CALC LVOT PEAK VEL: 1.1 M/S
DOP CALC LVOT STROKE VOLUME: 111.35 CM3
DOP CALC MV VTI: 20.24 CM
DOP CALCLVOT PEAK VEL VTI: 24.22 CM
E WAVE DECELERATION TIME: 315.52 MSEC
E/A RATIO: 0.83
E/E' RATIO: 9.83 M/S
ECHO LV POSTERIOR WALL: 0.98 CM (ref 0.6–1.1)
EJECTION FRACTION: 60 %
FRACTIONAL SHORTENING: 31 % (ref 28–44)
INTERVENTRICULAR SEPTUM: 0.99 CM (ref 0.6–1.1)
LA MAJOR: 4.66 CM
LA MINOR: 5.23 CM
LA WIDTH: 3.46 CM
LEFT ATRIUM SIZE: 4.85 CM
LEFT ATRIUM VOLUME INDEX MOD: 19.6 ML/M2
LEFT ATRIUM VOLUME INDEX: 35.2 ML/M2
LEFT ATRIUM VOLUME MOD: 39.28 CM3
LEFT ATRIUM VOLUME: 70.3 CM3
LEFT INTERNAL DIMENSION IN SYSTOLE: 3.5 CM (ref 2.1–4)
LEFT VENTRICLE DIASTOLIC VOLUME INDEX: 61.8 ML/M2
LEFT VENTRICLE DIASTOLIC VOLUME: 123.59 ML
LEFT VENTRICLE MASS INDEX: 92 G/M2
LEFT VENTRICLE SYSTOLIC VOLUME INDEX: 25.4 ML/M2
LEFT VENTRICLE SYSTOLIC VOLUME: 50.72 ML
LEFT VENTRICULAR INTERNAL DIMENSION IN DIASTOLE: 5.1 CM (ref 3.5–6)
LEFT VENTRICULAR MASS: 184.26 G
LV LATERAL E/E' RATIO: 8.43 M/S
LV SEPTAL E/E' RATIO: 11.8 M/S
MV A" WAVE DURATION": 13.7 MSEC
MV MEAN GRADIENT: 1 MMHG
MV PEAK A VEL: 0.71 M/S
MV PEAK E VEL: 0.59 M/S
MV PEAK GRADIENT: 3 MMHG
MV STENOSIS PRESSURE HALF TIME: 91.5 MS
MV VALVE AREA BY CONTINUITY EQUATION: 5.5 CM2
MV VALVE AREA P 1/2 METHOD: 2.4 CM2
PISA TR MAX VEL: 2.36 M/S
PULM VEIN S/D RATIO: 1.07
PV PEAK D VEL: 0.41 M/S
PV PEAK S VEL: 0.44 M/S
RA MAJOR: 3.75 CM
RA PRESSURE: 3 MMHG
RA WIDTH: 3.35 CM
RIGHT VENTRICULAR END-DIASTOLIC DIMENSION: 3.28 CM
SINUS: 3.83 CM
STJ: 3.49 CM
TDI LATERAL: 0.07 M/S
TDI SEPTAL: 0.05 M/S
TDI: 0.06 M/S
TR MAX PG: 22 MMHG
TRICUSPID ANNULAR PLANE SYSTOLIC EXCURSION: 1.87 CM
TV REST PULMONARY ARTERY PRESSURE: 25 MMHG

## 2023-05-10 PROCEDURE — 93306 TTE W/DOPPLER COMPLETE: CPT | Mod: 26,,, | Performed by: INTERNAL MEDICINE

## 2023-05-10 PROCEDURE — 93306 ECHO (CUPID ONLY): ICD-10-PCS | Mod: 26,,, | Performed by: INTERNAL MEDICINE

## 2023-05-10 PROCEDURE — 93306 TTE W/DOPPLER COMPLETE: CPT

## 2023-05-24 ENCOUNTER — OFFICE VISIT (OUTPATIENT)
Dept: CARDIOLOGY | Facility: CLINIC | Age: 83
End: 2023-05-24
Payer: MEDICARE

## 2023-05-24 VITALS
HEART RATE: 61 BPM | DIASTOLIC BLOOD PRESSURE: 74 MMHG | BODY MASS INDEX: 22.08 KG/M2 | SYSTOLIC BLOOD PRESSURE: 131 MMHG | WEIGHT: 167.31 LBS

## 2023-05-24 DIAGNOSIS — E78.2 MIXED HYPERLIPIDEMIA: ICD-10-CM

## 2023-05-24 DIAGNOSIS — N18.30 STAGE 3 CHRONIC KIDNEY DISEASE, UNSPECIFIED WHETHER STAGE 3A OR 3B CKD: ICD-10-CM

## 2023-05-24 DIAGNOSIS — Z98.890 S/P CAROTID ENDARTERECTOMY: ICD-10-CM

## 2023-05-24 DIAGNOSIS — I10 HTN (HYPERTENSION), BENIGN: Primary | ICD-10-CM

## 2023-05-24 DIAGNOSIS — I73.9 PAD (PERIPHERAL ARTERY DISEASE): ICD-10-CM

## 2023-05-24 DIAGNOSIS — I70.0 AORTIC ATHEROSCLEROSIS: ICD-10-CM

## 2023-05-24 DIAGNOSIS — I50.30 ACC/AHA STAGE C HEART FAILURE WITH PRESERVED EJECTION FRACTION: ICD-10-CM

## 2023-05-24 PROCEDURE — 3288F PR FALLS RISK ASSESSMENT DOCUMENTED: ICD-10-PCS | Mod: CPTII,S$GLB,, | Performed by: INTERNAL MEDICINE

## 2023-05-24 PROCEDURE — 3078F PR MOST RECENT DIASTOLIC BLOOD PRESSURE < 80 MM HG: ICD-10-PCS | Mod: CPTII,S$GLB,, | Performed by: INTERNAL MEDICINE

## 2023-05-24 PROCEDURE — 99215 OFFICE O/P EST HI 40 MIN: CPT | Mod: S$GLB,,, | Performed by: INTERNAL MEDICINE

## 2023-05-24 PROCEDURE — 3075F PR MOST RECENT SYSTOLIC BLOOD PRESS GE 130-139MM HG: ICD-10-PCS | Mod: CPTII,S$GLB,, | Performed by: INTERNAL MEDICINE

## 2023-05-24 PROCEDURE — 99999 PR PBB SHADOW E&M-EST. PATIENT-LVL III: ICD-10-PCS | Mod: PBBFAC,,, | Performed by: INTERNAL MEDICINE

## 2023-05-24 PROCEDURE — 99215 PR OFFICE/OUTPT VISIT, EST, LEVL V, 40-54 MIN: ICD-10-PCS | Mod: S$GLB,,, | Performed by: INTERNAL MEDICINE

## 2023-05-24 PROCEDURE — 1101F PR PT FALLS ASSESS DOC 0-1 FALLS W/OUT INJ PAST YR: ICD-10-PCS | Mod: CPTII,S$GLB,, | Performed by: INTERNAL MEDICINE

## 2023-05-24 PROCEDURE — 1159F PR MEDICATION LIST DOCUMENTED IN MEDICAL RECORD: ICD-10-PCS | Mod: CPTII,S$GLB,, | Performed by: INTERNAL MEDICINE

## 2023-05-24 PROCEDURE — 1101F PT FALLS ASSESS-DOCD LE1/YR: CPT | Mod: CPTII,S$GLB,, | Performed by: INTERNAL MEDICINE

## 2023-05-24 PROCEDURE — 99999 PR PBB SHADOW E&M-EST. PATIENT-LVL III: CPT | Mod: PBBFAC,,, | Performed by: INTERNAL MEDICINE

## 2023-05-24 PROCEDURE — 1160F PR REVIEW ALL MEDS BY PRESCRIBER/CLIN PHARMACIST DOCUMENTED: ICD-10-PCS | Mod: CPTII,S$GLB,, | Performed by: INTERNAL MEDICINE

## 2023-05-24 PROCEDURE — 3078F DIAST BP <80 MM HG: CPT | Mod: CPTII,S$GLB,, | Performed by: INTERNAL MEDICINE

## 2023-05-24 PROCEDURE — 1160F RVW MEDS BY RX/DR IN RCRD: CPT | Mod: CPTII,S$GLB,, | Performed by: INTERNAL MEDICINE

## 2023-05-24 PROCEDURE — 3075F SYST BP GE 130 - 139MM HG: CPT | Mod: CPTII,S$GLB,, | Performed by: INTERNAL MEDICINE

## 2023-05-24 PROCEDURE — 3288F FALL RISK ASSESSMENT DOCD: CPT | Mod: CPTII,S$GLB,, | Performed by: INTERNAL MEDICINE

## 2023-05-24 PROCEDURE — 1159F MED LIST DOCD IN RCRD: CPT | Mod: CPTII,S$GLB,, | Performed by: INTERNAL MEDICINE

## 2023-05-24 NOTE — PROGRESS NOTES
HISTORY:    82-year-old male with a history of heart failure with preserved ejection fraction, hypertension, hyperlipidemia, CKD, PAD status post bilateral lower extremity PI, carotid stenosis s/p R CEA '21, and GERD presenting for follow-up.    Patient was initially evaluated by me in early January with significant heart failure symptoms.  We tried outpatient management with diuretics and goal-directed medical therapy, but the patient required in-hospital diuresis.  He was admitted in late January with a positive response to inpatient diuresis and medicine adjustment. Lost 10-15 pounds that he has maintained. Doing a great job monitoring weight and limiting fluid/salt intake. Orthopnea, edema, and SOB resolved. No chest pain.    Back to regular exercise walking 0.7-1.5 miles/day. Some mild claudication.     He currently tolerates aspirin 81 x 1, atenolol 50 x 1, spironolactone 25 x 1, empagloflozin 10x1, furosemide 20 x 1, rosuvastatin 20 x 1, and bempedoic acid 180 x 1. Bps 130s/70s on average post discharge.     PHYSICAL EXAM:    Vitals:    05/24/23 1044   BP: 131/74   Pulse: 61       NAD, A+Ox3.  No jvd, no bruit.  RRR nml s1,s2. No murmurs.  CTA B no wheezes or crackles.  No edema.    LABS/STUDIES (imaging reviewed during clinic visit):    February 2023 hemoglobin 13.2.  March 2023 creatinine 2.0 with a BUN of 48.  Albumin 4.2.  BNP 1300 down from 3300 at time of admission.  January 2023 Creatinine 1.6 with a BUN of 29.  BNP 2000.  TSH normal.  Normal iron studies.  Negative HIV/HCV antibodies.  LDL 88 HDL 46.  Triglycerides 67.   ECG January 2023 demonstrates sinus rhythm with no Q-waves or ST changes.  PVCs noted.    TTE May 2023 Nml LV size and function. CVP 3/PASP 25. January 2023 demonstrates normal LV size and systolic function.  LVH is present.  EF 65%.  RV enlargement with normal function.  Moderate AI, mild-to-moderate MR.  CVP 8 with estimated PASP of 39.  NST March 2023 LVEF 47% at rest and 55%  with stress.  Equivocal perfusion abnormality that is fixed in the basal to distal inferior wall with bowel interference.  Carotid 2022 patent right carotid endarterectomy patch with no evidence of restenosis.  Left ICA demonstrates less than 50% stenosis.    DL/PVR September 2022 demonstrates normal DL with unremarkable PVR waveforms bilaterally.    Abd us 2021 AAA 3.3x3.3    ASSESSMENT & PLAN:    1. HTN (hypertension), benign    2. Mixed hyperlipidemia    3. PAD (peripheral artery disease)    4. S/P carotid endarterectomy    5. Stage 3 chronic kidney disease, unspecified whether stage 3a or 3b CKD    6. ACC/AHA stage C heart failure with preserved ejection fraction    7. Aortic atherosclerosis            Orders Placed This Encounter    BNP    Basic Metabolic Panel    Magnesium    Ambulatory referral/consult to Nephrology            Patient with diastolic heart failure that required inpatient admission in early '23. S/p successful diuresis with 15 pound weight loss. BNP improved, albeit still elevated. Symptoms much improved. Doing well with diet modifications and fluid restriction.     Unclear etiology. NST equivocal. Pt with preserved LVEF on TTE. No strain images. Infiltrative/amyloid can be considered. Pt feels better than he has felt in over a year and favors medical management at this time.      Cont atenolol 50x1, spironolactone 25x1, empagliflozin 10x1, and furosemide 20x1. Okay to skip doses of furosemide or try QOD as long weight does not increase. CKD noted. Pt is a  and is interested in reducing meds. He is unimpressed by ARRs for some of the above meds, but also understands the mechanisms of benefit. Will follow-up labs and make decisions regarding HFpEF meds in the future.  Hypothetically, could benefit from aceI/ARNI, but patient not interested in adding on.     Bps reasonable on current regimen. No orthostatic symptoms.    LDL 88 on rosuvastatin and bempedoic acid. Pt deferred PCSK9i  previously. He wants to stop bempedoic acid due to cost and low ARR on top of statin therapy.     PVD and managed by vasc surgery for R CEA and AAA. Unremarkable ABIs '22. Mild claudication.    Will refer to nephrology for general follow-up and management of CKD.     The total time spent today in care of this established patient was 40 minutes.      Follow up in about 3 months (around 8/24/2023).        Karrie Sarabia MD

## 2023-05-26 DIAGNOSIS — K21.9 GASTROESOPHAGEAL REFLUX DISEASE, UNSPECIFIED WHETHER ESOPHAGITIS PRESENT: ICD-10-CM

## 2023-05-26 RX ORDER — OMEPRAZOLE 20 MG/1
40 CAPSULE, DELAYED RELEASE ORAL DAILY
Qty: 180 CAPSULE | Refills: 3 | Status: SHIPPED | OUTPATIENT
Start: 2023-05-26 | End: 2023-07-26

## 2023-05-31 ENCOUNTER — LAB VISIT (OUTPATIENT)
Dept: LAB | Facility: HOSPITAL | Age: 83
End: 2023-05-31
Attending: INTERNAL MEDICINE
Payer: MEDICARE

## 2023-05-31 DIAGNOSIS — N18.30 STAGE 3 CHRONIC KIDNEY DISEASE, UNSPECIFIED WHETHER STAGE 3A OR 3B CKD: ICD-10-CM

## 2023-05-31 DIAGNOSIS — I50.30 ACC/AHA STAGE C HEART FAILURE WITH PRESERVED EJECTION FRACTION: ICD-10-CM

## 2023-05-31 DIAGNOSIS — I10 HTN (HYPERTENSION), BENIGN: ICD-10-CM

## 2023-05-31 LAB
ANION GAP SERPL CALC-SCNC: 10 MMOL/L (ref 8–16)
ANION GAP SERPL CALC-SCNC: 10 MMOL/L (ref 8–16)
BNP SERPL-MCNC: 696 PG/ML (ref 0–99)
BUN SERPL-MCNC: 33 MG/DL (ref 8–23)
BUN SERPL-MCNC: 33 MG/DL (ref 8–23)
CALCIUM SERPL-MCNC: 9.9 MG/DL (ref 8.7–10.5)
CALCIUM SERPL-MCNC: 9.9 MG/DL (ref 8.7–10.5)
CHLORIDE SERPL-SCNC: 106 MMOL/L (ref 95–110)
CHLORIDE SERPL-SCNC: 106 MMOL/L (ref 95–110)
CO2 SERPL-SCNC: 27 MMOL/L (ref 23–29)
CO2 SERPL-SCNC: 27 MMOL/L (ref 23–29)
CREAT SERPL-MCNC: 1.4 MG/DL (ref 0.5–1.4)
CREAT SERPL-MCNC: 1.4 MG/DL (ref 0.5–1.4)
EST. GFR  (NO RACE VARIABLE): 50.2 ML/MIN/1.73 M^2
EST. GFR  (NO RACE VARIABLE): 50.2 ML/MIN/1.73 M^2
GLUCOSE SERPL-MCNC: 102 MG/DL (ref 70–110)
GLUCOSE SERPL-MCNC: 102 MG/DL (ref 70–110)
MAGNESIUM SERPL-MCNC: 1.4 MG/DL (ref 1.6–2.6)
POTASSIUM SERPL-SCNC: 4.6 MMOL/L (ref 3.5–5.1)
POTASSIUM SERPL-SCNC: 4.6 MMOL/L (ref 3.5–5.1)
SODIUM SERPL-SCNC: 143 MMOL/L (ref 136–145)
SODIUM SERPL-SCNC: 143 MMOL/L (ref 136–145)

## 2023-05-31 PROCEDURE — 83735 ASSAY OF MAGNESIUM: CPT | Performed by: INTERNAL MEDICINE

## 2023-05-31 PROCEDURE — 83880 ASSAY OF NATRIURETIC PEPTIDE: CPT | Performed by: INTERNAL MEDICINE

## 2023-05-31 PROCEDURE — 80048 BASIC METABOLIC PNL TOTAL CA: CPT | Performed by: INTERNAL MEDICINE

## 2023-05-31 PROCEDURE — 36415 COLL VENOUS BLD VENIPUNCTURE: CPT | Performed by: INTERNAL MEDICINE

## 2023-06-01 ENCOUNTER — PATIENT MESSAGE (OUTPATIENT)
Dept: CARDIOLOGY | Facility: CLINIC | Age: 83
End: 2023-06-01
Payer: MEDICARE

## 2023-06-19 RX ORDER — ROSUVASTATIN CALCIUM 20 MG/1
TABLET, COATED ORAL
Qty: 90 TABLET | Refills: 3 | Status: SHIPPED | OUTPATIENT
Start: 2023-06-19 | End: 2023-12-20

## 2023-07-26 ENCOUNTER — OFFICE VISIT (OUTPATIENT)
Dept: CARDIOLOGY | Facility: CLINIC | Age: 83
End: 2023-07-26
Payer: MEDICARE

## 2023-07-26 VITALS
RESPIRATION RATE: 20 BRPM | HEIGHT: 73 IN | BODY MASS INDEX: 22.13 KG/M2 | WEIGHT: 167 LBS | SYSTOLIC BLOOD PRESSURE: 114 MMHG | HEART RATE: 75 BPM | DIASTOLIC BLOOD PRESSURE: 65 MMHG

## 2023-07-26 DIAGNOSIS — I65.23 CAROTID ATHEROSCLEROSIS, BILATERAL: ICD-10-CM

## 2023-07-26 DIAGNOSIS — E78.2 MIXED HYPERLIPIDEMIA: ICD-10-CM

## 2023-07-26 DIAGNOSIS — I73.9 PAD (PERIPHERAL ARTERY DISEASE): ICD-10-CM

## 2023-07-26 DIAGNOSIS — I50.30 ACC/AHA STAGE C HEART FAILURE WITH PRESERVED EJECTION FRACTION: Primary | ICD-10-CM

## 2023-07-26 DIAGNOSIS — I70.0 AORTIC ATHEROSCLEROSIS: ICD-10-CM

## 2023-07-26 DIAGNOSIS — N18.30 STAGE 3 CHRONIC KIDNEY DISEASE, UNSPECIFIED WHETHER STAGE 3A OR 3B CKD: ICD-10-CM

## 2023-07-26 DIAGNOSIS — I10 HTN (HYPERTENSION), BENIGN: ICD-10-CM

## 2023-07-26 PROCEDURE — 1101F PR PT FALLS ASSESS DOC 0-1 FALLS W/OUT INJ PAST YR: ICD-10-PCS | Mod: CPTII,S$GLB,, | Performed by: INTERNAL MEDICINE

## 2023-07-26 PROCEDURE — 3288F FALL RISK ASSESSMENT DOCD: CPT | Mod: CPTII,S$GLB,, | Performed by: INTERNAL MEDICINE

## 2023-07-26 PROCEDURE — 1101F PT FALLS ASSESS-DOCD LE1/YR: CPT | Mod: CPTII,S$GLB,, | Performed by: INTERNAL MEDICINE

## 2023-07-26 PROCEDURE — 3288F PR FALLS RISK ASSESSMENT DOCUMENTED: ICD-10-PCS | Mod: CPTII,S$GLB,, | Performed by: INTERNAL MEDICINE

## 2023-07-26 PROCEDURE — 3078F PR MOST RECENT DIASTOLIC BLOOD PRESSURE < 80 MM HG: ICD-10-PCS | Mod: CPTII,S$GLB,, | Performed by: INTERNAL MEDICINE

## 2023-07-26 PROCEDURE — 1126F AMNT PAIN NOTED NONE PRSNT: CPT | Mod: CPTII,S$GLB,, | Performed by: INTERNAL MEDICINE

## 2023-07-26 PROCEDURE — 3074F SYST BP LT 130 MM HG: CPT | Mod: CPTII,S$GLB,, | Performed by: INTERNAL MEDICINE

## 2023-07-26 PROCEDURE — 3078F DIAST BP <80 MM HG: CPT | Mod: CPTII,S$GLB,, | Performed by: INTERNAL MEDICINE

## 2023-07-26 PROCEDURE — 99214 OFFICE O/P EST MOD 30 MIN: CPT | Mod: S$GLB,,, | Performed by: INTERNAL MEDICINE

## 2023-07-26 PROCEDURE — 1160F RVW MEDS BY RX/DR IN RCRD: CPT | Mod: CPTII,S$GLB,, | Performed by: INTERNAL MEDICINE

## 2023-07-26 PROCEDURE — 3074F PR MOST RECENT SYSTOLIC BLOOD PRESSURE < 130 MM HG: ICD-10-PCS | Mod: CPTII,S$GLB,, | Performed by: INTERNAL MEDICINE

## 2023-07-26 PROCEDURE — 99999 PR PBB SHADOW E&M-EST. PATIENT-LVL IV: CPT | Mod: PBBFAC,,, | Performed by: INTERNAL MEDICINE

## 2023-07-26 PROCEDURE — 1159F MED LIST DOCD IN RCRD: CPT | Mod: CPTII,S$GLB,, | Performed by: INTERNAL MEDICINE

## 2023-07-26 PROCEDURE — 1159F PR MEDICATION LIST DOCUMENTED IN MEDICAL RECORD: ICD-10-PCS | Mod: CPTII,S$GLB,, | Performed by: INTERNAL MEDICINE

## 2023-07-26 PROCEDURE — 1126F PR PAIN SEVERITY QUANTIFIED, NO PAIN PRESENT: ICD-10-PCS | Mod: CPTII,S$GLB,, | Performed by: INTERNAL MEDICINE

## 2023-07-26 PROCEDURE — 99214 PR OFFICE/OUTPT VISIT, EST, LEVL IV, 30-39 MIN: ICD-10-PCS | Mod: S$GLB,,, | Performed by: INTERNAL MEDICINE

## 2023-07-26 PROCEDURE — 1160F PR REVIEW ALL MEDS BY PRESCRIBER/CLIN PHARMACIST DOCUMENTED: ICD-10-PCS | Mod: CPTII,S$GLB,, | Performed by: INTERNAL MEDICINE

## 2023-07-26 PROCEDURE — 99999 PR PBB SHADOW E&M-EST. PATIENT-LVL IV: ICD-10-PCS | Mod: PBBFAC,,, | Performed by: INTERNAL MEDICINE

## 2023-07-26 NOTE — PROGRESS NOTES
HISTORY:    83-year-old male with a history of heart failure with preserved ejection fraction, hypertension, hyperlipidemia, CKD, PAD status post bilateral lower extremity PI, carotid stenosis s/p R CEA '21, and GERD presenting for follow-up.    Patient was initially evaluated by me in early January with significant heart failure symptoms.  We tried outpatient management with diuretics and goal-directed medical therapy, but the patient required in-hospital diuresis.  He was admitted in late January with a positive response to inpatient diuresis and medicine adjustment. Lost 10-15 pounds that he has maintained. Doing a great job monitoring weight and limiting fluid/salt intake. Orthopnea, edema, and SOB resolved. No chest pain.    Back to regular exercise. Doing resistance exercises in the pool.     Since last visit, we weaned off of furosemide successfully per pt request. Weight remains stable with no change in symptoms.     He currently tolerates aspirin 81 x 1, atenolol 50 x 1, spironolactone 25 x 1, empagloflozin 10x1, rosuvastatin 20 x 1. Bps 120s/70s on average.    PHYSICAL EXAM:    Vitals:    07/26/23 1334   BP: 114/65   Pulse: 75   Resp: 20         NAD, A+Ox3.  No jvd, no bruit.  RRR nml s1,s2. No murmurs.  CTA B no wheezes or crackles.  No edema.    LABS/STUDIES (imaging reviewed during clinic visit):    February 2023 hemoglobin 13.2.  May 2023 creatinine 1.4 with a BUN of 34.  Albumin 4.2.  .  March 2023 creatinine 2.0 with a BUN of 48.  Albumin 4.2.  BNP 1300 down from 3300 at time of admission.  January 2023 Creatinine 1.6 with a BUN of 29.  BNP 2000.  TSH normal.  Normal iron studies.  Negative HIV/HCV antibodies.  LDL 88 HDL 46.  Triglycerides 67.   ECG January 2023 demonstrates sinus rhythm with no Q-waves or ST changes.  PVCs noted.    TTE May 2023 Nml LV size and function. CVP 3/PASP 25. January 2023 demonstrates normal LV size and systolic function.  LVH is present.  EF 65%.  RV enlargement  with normal function.  Moderate AI, mild-to-moderate MR.  CVP 8 with estimated PASP of 39.  NST March 2023 LVEF 47% at rest and 55% with stress.  Equivocal perfusion abnormality that is fixed in the basal to distal inferior wall with bowel interference.  Carotid 2022 patent right carotid endarterectomy patch with no evidence of restenosis.  Left ICA demonstrates less than 50% stenosis.    DL/PVR September 2022 demonstrates normal DL with unremarkable PVR waveforms bilaterally.    Abd us 2021 AAA 3.3x3.3    ASSESSMENT & PLAN:    1. ACC/AHA stage C heart failure with preserved ejection fraction    2. Aortic atherosclerosis    3. Carotid atherosclerosis, bilateral    4. HTN (hypertension), benign    5. Mixed hyperlipidemia    6. PAD (peripheral artery disease)    7. Stage 3 chronic kidney disease, unspecified whether stage 3a or 3b CKD              Orders Placed This Encounter    Basic Metabolic Panel    Lipid Panel    Magnesium    empagliflozin (JARDIANCE) 10 mg tablet              Patient with diastolic heart failure that required inpatient admission in early '23. S/p successful diuresis with 15 pound weight loss. BNP improved, albeit still elevated. Symptoms much improved. Doing well with diet modifications and fluid restriction.     Unclear etiology. NST equivocal. Pt with preserved LVEF on TTE. No strain images. Infiltrative/amyloid can be considered. Pt feels better than he has felt in over a year and favors medical management at this time.      Cont atenolol 50x1, spironolactone 25x1, empagliflozin 10x1. Pt has weaned himself off of furosemide without issue. Bps controlled and weight stable.     Pt is a  and is unimpressed by ARRs for some of the above meds, but also understands the mechanisms of benefit. Hypothetically, could benefit from aceI/ARNI, but patient not interested in adding on.     LDL was 88 on rosuvastatin and bempedoic acid. Pt deferred PCSK9i previously. He has since chosen to stop  bempedoic acid due to cost and low ARR on top of statin therapy.     PVD managed by vasc surgery for R CEA and AAA. Unremarkable ABIs '22. Mild claudication.      Follow up in about 6 months (around 1/26/2024).        Karrie Sarabia MD

## 2023-08-02 ENCOUNTER — TELEPHONE (OUTPATIENT)
Dept: CARDIOLOGY | Facility: HOSPITAL | Age: 83
End: 2023-08-02
Payer: MEDICARE

## 2023-08-02 ENCOUNTER — TELEMEDICINE (OUTPATIENT)
Dept: CARDIOLOGY | Facility: HOSPITAL | Age: 83
End: 2023-08-02
Payer: MEDICARE

## 2023-08-02 ENCOUNTER — LAB VISIT (OUTPATIENT)
Dept: LAB | Facility: HOSPITAL | Age: 83
End: 2023-08-02
Attending: INTERNAL MEDICINE
Payer: MEDICARE

## 2023-08-02 DIAGNOSIS — E78.2 MIXED HYPERLIPIDEMIA: ICD-10-CM

## 2023-08-02 DIAGNOSIS — E83.42 HYPOMAGNESEMIA: Primary | ICD-10-CM

## 2023-08-02 DIAGNOSIS — I50.30 ACC/AHA STAGE C HEART FAILURE WITH PRESERVED EJECTION FRACTION: ICD-10-CM

## 2023-08-02 LAB
ANION GAP SERPL CALC-SCNC: 10 MMOL/L (ref 8–16)
BUN SERPL-MCNC: 29 MG/DL (ref 8–23)
CALCIUM SERPL-MCNC: 8.7 MG/DL (ref 8.7–10.5)
CHLORIDE SERPL-SCNC: 106 MMOL/L (ref 95–110)
CHOLEST SERPL-MCNC: 178 MG/DL (ref 120–199)
CHOLEST/HDLC SERPL: 3.5 {RATIO} (ref 2–5)
CO2 SERPL-SCNC: 26 MMOL/L (ref 23–29)
CREAT SERPL-MCNC: 1.5 MG/DL (ref 0.5–1.4)
EST. GFR  (NO RACE VARIABLE): 46.2 ML/MIN/1.73 M^2
GLUCOSE SERPL-MCNC: 104 MG/DL (ref 70–110)
HDLC SERPL-MCNC: 51 MG/DL (ref 40–75)
HDLC SERPL: 28.7 % (ref 20–50)
LDLC SERPL CALC-MCNC: 109.8 MG/DL (ref 63–159)
MAGNESIUM SERPL-MCNC: 0.7 MG/DL (ref 1.6–2.6)
NONHDLC SERPL-MCNC: 127 MG/DL
POTASSIUM SERPL-SCNC: 4.5 MMOL/L (ref 3.5–5.1)
SODIUM SERPL-SCNC: 142 MMOL/L (ref 136–145)
TRIGL SERPL-MCNC: 86 MG/DL (ref 30–150)

## 2023-08-02 PROCEDURE — 80048 BASIC METABOLIC PNL TOTAL CA: CPT | Performed by: INTERNAL MEDICINE

## 2023-08-02 PROCEDURE — 80061 LIPID PANEL: CPT | Performed by: INTERNAL MEDICINE

## 2023-08-02 PROCEDURE — 36415 COLL VENOUS BLD VENIPUNCTURE: CPT | Performed by: INTERNAL MEDICINE

## 2023-08-02 PROCEDURE — 83735 ASSAY OF MAGNESIUM: CPT | Performed by: INTERNAL MEDICINE

## 2023-08-02 RX ORDER — LANOLIN ALCOHOL/MO/W.PET/CERES
400 CREAM (GRAM) TOPICAL 2 TIMES DAILY
Qty: 180 TABLET | Refills: 0 | Status: SHIPPED | OUTPATIENT
Start: 2023-08-02 | End: 2023-11-15

## 2023-08-02 NOTE — TELEPHONE ENCOUNTER
Hypomagnesemia on outpatient testing    Mg 0.7  Says he stopped taking MagOx supplements  Asymptomatic    Plan  -Magox 800 BID x 5 days and repeat Mg level on Monday  -if Mag level normal at that time, 400 BID there after    D/w Dr Johnson and called pt about the plan, new script sent for MagOx

## 2023-08-03 ENCOUNTER — PATIENT MESSAGE (OUTPATIENT)
Dept: CARDIOLOGY | Facility: CLINIC | Age: 83
End: 2023-08-03
Payer: MEDICARE

## 2023-08-03 NOTE — TELEPHONE ENCOUNTER
Pt with severe hypomagnesemia. No symptoms. Started on Magox 800x2. Repeat Mg ordered for Monday. Source is likely chronic diarrhea. Has had this issue previously. Pt is not on loop or thiazide diuretics at this time.

## 2023-08-07 ENCOUNTER — LAB VISIT (OUTPATIENT)
Dept: LAB | Facility: HOSPITAL | Age: 83
End: 2023-08-07
Attending: INTERNAL MEDICINE
Payer: MEDICARE

## 2023-08-07 DIAGNOSIS — E83.42 HYPOMAGNESEMIA: ICD-10-CM

## 2023-08-07 LAB — MAGNESIUM SERPL-MCNC: 1.2 MG/DL (ref 1.6–2.6)

## 2023-08-07 PROCEDURE — 36415 COLL VENOUS BLD VENIPUNCTURE: CPT | Performed by: INTERNAL MEDICINE

## 2023-08-07 PROCEDURE — 83735 ASSAY OF MAGNESIUM: CPT | Performed by: INTERNAL MEDICINE

## 2023-08-08 ENCOUNTER — PATIENT MESSAGE (OUTPATIENT)
Dept: CARDIOLOGY | Facility: CLINIC | Age: 83
End: 2023-08-08
Payer: MEDICARE

## 2023-08-08 DIAGNOSIS — E83.42 HYPOMAGNESEMIA: Primary | ICD-10-CM

## 2023-08-08 NOTE — TELEPHONE ENCOUNTER
Mg imrpoving. Stay on Mg 400x2. Recheck on Friday. Pt to go out of town for one month on Saturday.

## 2023-08-11 ENCOUNTER — LAB VISIT (OUTPATIENT)
Dept: LAB | Facility: HOSPITAL | Age: 83
End: 2023-08-11
Attending: INTERNAL MEDICINE
Payer: MEDICARE

## 2023-08-11 DIAGNOSIS — E83.42 HYPOMAGNESEMIA: ICD-10-CM

## 2023-08-11 LAB — MAGNESIUM SERPL-MCNC: 1.3 MG/DL (ref 1.6–2.6)

## 2023-08-11 PROCEDURE — 36415 COLL VENOUS BLD VENIPUNCTURE: CPT | Performed by: INTERNAL MEDICINE

## 2023-08-11 PROCEDURE — 83735 ASSAY OF MAGNESIUM: CPT | Performed by: INTERNAL MEDICINE

## 2023-10-25 RX ORDER — ATENOLOL 50 MG/1
50 TABLET ORAL DAILY
Qty: 90 TABLET | Refills: 3
Start: 2023-10-25 | End: 2023-12-20 | Stop reason: SDUPTHER

## 2023-11-15 ENCOUNTER — TELEPHONE (OUTPATIENT)
Dept: INTERNAL MEDICINE | Facility: CLINIC | Age: 83
End: 2023-11-15
Payer: MEDICARE

## 2023-11-15 RX ORDER — LANOLIN ALCOHOL/MO/W.PET/CERES
1 CREAM (GRAM) TOPICAL 2 TIMES DAILY
Qty: 180 TABLET | Refills: 0 | Status: SHIPPED | OUTPATIENT
Start: 2023-11-15 | End: 2024-01-31 | Stop reason: SDUPTHER

## 2023-11-15 NOTE — TELEPHONE ENCOUNTER
----- Message from Lexi Varghese sent at 11/15/2023  2:41 PM CST -----  Contact: [phone]  Pt called to advise that he has received a reminder to schedule his annual appointment. Pt says he was unaware that the provider would be transitioning into a new position and you recommendation from the provider personally on who he should see in his absence. Please Advise

## 2023-11-15 NOTE — TELEPHONE ENCOUNTER
I sent the pt the letter about dr alejandre taking a new position since he said he was not aware of this

## 2023-12-06 ENCOUNTER — OFFICE VISIT (OUTPATIENT)
Dept: INTERNAL MEDICINE | Facility: CLINIC | Age: 83
End: 2023-12-06
Payer: MEDICARE

## 2023-12-06 VITALS
HEIGHT: 73 IN | OXYGEN SATURATION: 96 % | DIASTOLIC BLOOD PRESSURE: 86 MMHG | WEIGHT: 169.75 LBS | HEART RATE: 71 BPM | SYSTOLIC BLOOD PRESSURE: 120 MMHG | BODY MASS INDEX: 22.5 KG/M2 | TEMPERATURE: 97 F

## 2023-12-06 DIAGNOSIS — I10 HTN (HYPERTENSION), BENIGN: ICD-10-CM

## 2023-12-06 DIAGNOSIS — N18.31 STAGE 3A CHRONIC KIDNEY DISEASE: ICD-10-CM

## 2023-12-06 DIAGNOSIS — Z12.5 PROSTATE CANCER SCREENING: ICD-10-CM

## 2023-12-06 DIAGNOSIS — R42 DIZZINESS: ICD-10-CM

## 2023-12-06 DIAGNOSIS — R73.9 ELEVATED BLOOD SUGAR: ICD-10-CM

## 2023-12-06 DIAGNOSIS — K21.9 GASTROESOPHAGEAL REFLUX DISEASE, UNSPECIFIED WHETHER ESOPHAGITIS PRESENT: ICD-10-CM

## 2023-12-06 DIAGNOSIS — I73.9 PAD (PERIPHERAL ARTERY DISEASE): ICD-10-CM

## 2023-12-06 DIAGNOSIS — Z23 NEED FOR VACCINATION: ICD-10-CM

## 2023-12-06 DIAGNOSIS — Z98.890 S/P CAROTID ENDARTERECTOMY: ICD-10-CM

## 2023-12-06 DIAGNOSIS — I65.23 CAROTID ATHEROSCLEROSIS, BILATERAL: ICD-10-CM

## 2023-12-06 DIAGNOSIS — Z00.00 ANNUAL PHYSICAL EXAM: Primary | ICD-10-CM

## 2023-12-06 DIAGNOSIS — I70.0 AORTIC ATHEROSCLEROSIS: ICD-10-CM

## 2023-12-06 DIAGNOSIS — E78.2 MIXED HYPERLIPIDEMIA: ICD-10-CM

## 2023-12-06 PROCEDURE — G0008 ADMIN INFLUENZA VIRUS VAC: HCPCS | Mod: S$GLB,,, | Performed by: INTERNAL MEDICINE

## 2023-12-06 PROCEDURE — 1160F PR REVIEW ALL MEDS BY PRESCRIBER/CLIN PHARMACIST DOCUMENTED: ICD-10-PCS | Mod: CPTII,S$GLB,, | Performed by: INTERNAL MEDICINE

## 2023-12-06 PROCEDURE — 99999 PR PBB SHADOW E&M-EST. PATIENT-LVL IV: CPT | Mod: PBBFAC,,, | Performed by: INTERNAL MEDICINE

## 2023-12-06 PROCEDURE — 3288F FALL RISK ASSESSMENT DOCD: CPT | Mod: CPTII,S$GLB,, | Performed by: INTERNAL MEDICINE

## 2023-12-06 PROCEDURE — 3074F SYST BP LT 130 MM HG: CPT | Mod: CPTII,S$GLB,, | Performed by: INTERNAL MEDICINE

## 2023-12-06 PROCEDURE — 99215 PR OFFICE/OUTPT VISIT, EST, LEVL V, 40-54 MIN: ICD-10-PCS | Mod: S$GLB,,, | Performed by: INTERNAL MEDICINE

## 2023-12-06 PROCEDURE — 1159F MED LIST DOCD IN RCRD: CPT | Mod: CPTII,S$GLB,, | Performed by: INTERNAL MEDICINE

## 2023-12-06 PROCEDURE — 1160F RVW MEDS BY RX/DR IN RCRD: CPT | Mod: CPTII,S$GLB,, | Performed by: INTERNAL MEDICINE

## 2023-12-06 PROCEDURE — 90694 FLU VACCINE - QUADRIVALENT - ADJUVANTED: ICD-10-PCS | Mod: S$GLB,,, | Performed by: INTERNAL MEDICINE

## 2023-12-06 PROCEDURE — 3288F PR FALLS RISK ASSESSMENT DOCUMENTED: ICD-10-PCS | Mod: CPTII,S$GLB,, | Performed by: INTERNAL MEDICINE

## 2023-12-06 PROCEDURE — 99215 OFFICE O/P EST HI 40 MIN: CPT | Mod: S$GLB,,, | Performed by: INTERNAL MEDICINE

## 2023-12-06 PROCEDURE — 3079F DIAST BP 80-89 MM HG: CPT | Mod: CPTII,S$GLB,, | Performed by: INTERNAL MEDICINE

## 2023-12-06 PROCEDURE — 99999 PR PBB SHADOW E&M-EST. PATIENT-LVL IV: ICD-10-PCS | Mod: PBBFAC,,, | Performed by: INTERNAL MEDICINE

## 2023-12-06 PROCEDURE — 90694 VACC AIIV4 NO PRSRV 0.5ML IM: CPT | Mod: S$GLB,,, | Performed by: INTERNAL MEDICINE

## 2023-12-06 PROCEDURE — G0008 FLU VACCINE - QUADRIVALENT - ADJUVANTED: ICD-10-PCS | Mod: S$GLB,,, | Performed by: INTERNAL MEDICINE

## 2023-12-06 PROCEDURE — 3074F PR MOST RECENT SYSTOLIC BLOOD PRESSURE < 130 MM HG: ICD-10-PCS | Mod: CPTII,S$GLB,, | Performed by: INTERNAL MEDICINE

## 2023-12-06 PROCEDURE — 1126F AMNT PAIN NOTED NONE PRSNT: CPT | Mod: CPTII,S$GLB,, | Performed by: INTERNAL MEDICINE

## 2023-12-06 PROCEDURE — 1101F PT FALLS ASSESS-DOCD LE1/YR: CPT | Mod: CPTII,S$GLB,, | Performed by: INTERNAL MEDICINE

## 2023-12-06 PROCEDURE — 3079F PR MOST RECENT DIASTOLIC BLOOD PRESSURE 80-89 MM HG: ICD-10-PCS | Mod: CPTII,S$GLB,, | Performed by: INTERNAL MEDICINE

## 2023-12-06 PROCEDURE — 1159F PR MEDICATION LIST DOCUMENTED IN MEDICAL RECORD: ICD-10-PCS | Mod: CPTII,S$GLB,, | Performed by: INTERNAL MEDICINE

## 2023-12-06 PROCEDURE — 1126F PR PAIN SEVERITY QUANTIFIED, NO PAIN PRESENT: ICD-10-PCS | Mod: CPTII,S$GLB,, | Performed by: INTERNAL MEDICINE

## 2023-12-06 PROCEDURE — 1101F PR PT FALLS ASSESS DOC 0-1 FALLS W/OUT INJ PAST YR: ICD-10-PCS | Mod: CPTII,S$GLB,, | Performed by: INTERNAL MEDICINE

## 2023-12-06 NOTE — PROGRESS NOTES
Subjective     Patient ID: Gus Sabillon is a 83 y.o. male.    Chief Complaint: Annual Exam, Flu Vaccine, and Dizziness    HPI  83 y.o. Male here for annual exam.     Vaccines: Influenza (2023); Tetanus (declined); Prevnar 20 (next visit); Shingrix (will consider)  Eye exam: current  Colonoscopy: declined    Exercise:   Diet: regular     Past Medical History:  No date: Carotid artery disease  No date: CKD (chronic kidney disease) stage 3, GFR 30-59 ml/min  No date: Dyslipidemia  No date: GERD (gastroesophageal reflux disease)  No date: Hx of melanoma excision  No date: Hypertension  No date: Melanoma  No date: PAD (peripheral artery disease)  No date: CHF  No date: Squamous cell carcinoma of skin  Past Surgical History:  02/27/2020: ARTHROSCOPIC DEBRIDEMENT OF SHOULDER; Right      Comment:  Procedure: EXTENSIVE DEBRIDEMENT, SHOULDER,                ARTHROSCOPIC;  Surgeon: Staci Childress MD;  Location: [facility]                OR;  Service: Orthopedics;  Laterality: Right;  06/21/2022: ARTHROSCOPIC DEBRIDEMENT OF SHOULDER; Left      Comment:  Procedure: EXTENSIVE DEBRIDEMENT, SHOULDER,                ARTHROSCOPIC;  Surgeon: Staci Childress MD;  Location: [facility]                OR;  Service: Orthopedics;  Laterality: Left;  02/27/2020: ARTHROSCOPIC REPAIR OF ROTATOR CUFF OF SHOULDER; Right      Comment:  Procedure: REPAIR, ROTATOR CUFF, ARTHROSCOPIC;  Surgeon:               Staci Childress MD;  Location: [facility] OR;  Service:                Orthopedics;  Laterality: Right;  06/21/2022: ARTHROSCOPIC REPAIR OF ROTATOR CUFF OF SHOULDER; Left      Comment:  Procedure: REPAIR, ROTATOR CUFF, ARTHROSCOPIC WITH CUFF                MEND;  Surgeon: Staci Childress MD;  Location: [facility] OR;                 Service: Orthopedics;  Laterality: Left;  02/27/2020: ARTHROSCOPY OF SHOULDER WITH DECOMPRESSION OF SUBACROMIAL   SPACE; Right      Comment:  Procedure: ARTHROSCOPY, SHOULDER, WITH SUBACROMIAL SPACE               DECOMPRESSION;  Surgeon: Staci Childress  MD;  Location: [facility]                OR;  Service: Orthopedics;  Laterality: Right;  06/21/2022: ARTHROSCOPY OF SHOULDER WITH DECOMPRESSION OF SUBACROMIAL   SPACE; Left      Comment:  Procedure: ARTHROSCOPY, SHOULDER, WITH SUBACROMIAL                 DECOMPRESSION;  Surgeon: Staci Childress MD;  Location: [facility]                OR;  Service: Orthopedics;  Laterality: Left;  06/21/2022: ARTHROSCOPY OF SHOULDER WITH REMOVAL OF DISTAL CLAVICLE;   Left      Comment:  Procedure: ARTHROSCOPY, SHOULDER, WITH DISTAL CLAVICLE                EXCISION;  Surgeon: Staci Childress MD;  Location: [facility] OR;                 Service: Orthopedics;  Laterality: Left;  06/21/2022: ARTHROSCOPY,SHOULDER,WITH BICEPS TENODESIS; Left      Comment:  Procedure: ARTHROSCOPY,SHOULDER,WITH BICEPS TENODESIS;                 Surgeon: Staci Childress MD;  Location: [facility];  Service:                Orthopedics;  Laterality: Left;  No date: BLEPHAROPLASTY  10/15/2021: CAROTID ENDARTERECTOMY; Right      Comment:  Procedure: ENDARTERECTOMY-CAROTID;  Surgeon: Imer Farooq MD;  Location: [address];  Service:                Peripheral Vascular;  Laterality: Right;  AWAKE CERVICAL                BLOCK  No date: CATARACT EXTRACTION, BILATERAL  No date: COSMETIC SURGERY  06/21/2022: EXCISION OF BURSA; Left      Comment:  Procedure: BURSECTOMY;  Surgeon: Staci Childress MD;                 Location: [facility];  Service: Orthopedics;  Laterality:                Left;  No date: EYE SURGERY; Bilateral      Comment:  cataract removal  02/27/2020: FIXATION OF TENDON; Right      Comment:  Procedure: FIXATION, TENDON, Biceps Tenodesis;  Surgeon:               Staci Childress MD;  Location: [facility];  Service:                Orthopedics;  Laterality: Right;  FIXATION, TENDON,                Biceps Tenodesis  02/27/2020: OPEN REDUCTION AND INTERNAL FIXATION (ORIF) OF FRACTURE   OF PROXIMAL HUMERUS; Right      Comment:  Procedure: ORIF, FRACTURE, GREATER TUBEROSITY;   Surgeon:               Staci Childress MD;  Location: [facility] OR;  Service:                Orthopedics;  Laterality: Right;  2022: ORIF HUMERUS FRACTURE; Left      Comment:  Procedure: ORIF, FRACTURE, HUMERUS, GREATER TUBEROSITY;                Surgeon: Staci Childress MD;  Location: [facility] OR;  Service:                Orthopedics;  Laterality: Left;  2022: SHOULDER ARTHROSCOPY; Left      Comment:  Procedure: ARTHROSCOPY, SHOULDER WITH LABRAL REPAIR;                 Surgeon: Staci Childress MD;  Location: [facility] OR;  Service:                Orthopedics;  Laterality: Left;  No date: TONSILLECTOMY  No date: VASECTOMY  Social History    Socioeconomic History      Marital status:     Tobacco Use      Smoking status: Former        Packs/day: 0.00        Years: 2.0 packs/day for 15.0 years (30.0 ttl pk-yrs)        Types: Cigarettes        Start date:         Quit date:         Years since quittin.9        Passive exposure: Never      Smokeless tobacco: Never    Substance and Sexual Activity      Alcohol use: Yes        Alcohol/week: 14.0 standard drinks of alcohol        Types: 14 Glasses of wine per week        Comment: 2 glasses of wine daily      Drug use: Never      Sexual activity: Not Currently        Partners: Female    Social Determinants of Health  Financial Resource Strain: Low Risk  (2023)      Overall Financial Resource Strain (CARDIA)          Difficulty of Paying Living Expenses: Not hard at all  Food Insecurity: No Food Insecurity (2023)      Hunger Vital Sign          Worried About Running Out of Food in the Last Year: Never true          Ran Out of Food in the Last Year: Never true  Transportation Needs: No Transportation Needs (2023)      PRAPARE - Transportation          Lack of Transportation (Medical): No          Lack of Transportation (Non-Medical): No  Physical Activity: Inactive (2023)      Exercise Vital Sign          Days of Exercise per Week: 0 days           Minutes of Exercise per Session: 30 min  Stress: No Stress Concern Present (11/30/2023)      [employer] - Occupational Stress Questionnaire          Feeling of Stress : Not at all  Social Connections: Unknown (11/30/2023)      Social Connection and Isolation Panel [NHANES]          Frequency of Communication with Friends and Family: More than three times a week          Frequency of Social Gatherings with Friends and Family: More than three times a week          Active Member of Clubs or Organizations: No          Attends Club or Organization Meetings: Never          Marital Status:   Housing Stability: Low Risk  (11/30/2023)      Housing Stability Vital Sign          Unable to Pay for Housing in the Last Year: No          Number of Places Lived in the Last Year: 1          Unstable Housing in the Last Year: No  Review of patient's allergies indicates:   -- Adhesive -- Hives, Itching and Blisters   -- Clindamycin -- Nausea And Vomiting   -- Penicillins -- Hives  Zeeshan Sabillon had no medications administered during this visit.  Review of Systems   Constitutional:  Positive for activity change. Negative for appetite change, chills, diaphoresis, fatigue, fever and unexpected weight change.   HENT:  Positive for hearing loss, rhinorrhea and trouble swallowing. Negative for nasal congestion, mouth sores, postnasal drip, sinus pressure/congestion, sneezing, sore throat and voice change.    Eyes:  Negative for discharge, itching and visual disturbance.   Respiratory:  Negative for cough, chest tightness, shortness of breath and wheezing.    Cardiovascular:  Negative for chest pain, palpitations and leg swelling.   Gastrointestinal:  Negative for abdominal pain, blood in stool, constipation, diarrhea, nausea and vomiting.   Endocrine: Negative for cold intolerance, heat intolerance, polydipsia and polyuria.   Genitourinary:  Positive for urgency. Negative for difficulty urinating,  dysuria, flank pain and hematuria.   Musculoskeletal:  Negative for arthralgias, back pain, joint swelling, myalgias and neck pain.   Integumentary:  Negative for rash and wound.   Allergic/Immunologic: Negative for environmental allergies and food allergies.   Neurological:  Positive for dizziness. Negative for tremors, seizures, syncope, weakness and headaches.   Hematological:  Negative for adenopathy. Does not bruise/bleed easily.   Psychiatric/Behavioral:  Positive for dysphoric mood. Negative for confusion, self-injury, sleep disturbance and suicidal ideas. The patient is not nervous/anxious.           Objective     Physical Exam  Constitutional:       General: He is not in acute distress.     Appearance: Normal appearance. He is well-developed. He is not ill-appearing, toxic-appearing or diaphoretic.   HENT:      Head: Normocephalic and atraumatic.      Right Ear: External ear normal.      Left Ear: External ear normal.      Nose: Nose normal.      Mouth/Throat:      Pharynx: No oropharyngeal exudate.   Eyes:      General: No scleral icterus.        Right eye: No discharge.         Left eye: No discharge.      Extraocular Movements: Extraocular movements intact.      Conjunctiva/sclera: Conjunctivae normal.      Pupils: Pupils are equal, round, and reactive to light.   Neck:      Thyroid: No thyromegaly.      Vascular: No JVD.   Cardiovascular:      Rate and Rhythm: Normal rate and regular rhythm.      Pulses: Normal pulses.      Heart sounds: Normal heart sounds. No murmur heard.  Pulmonary:      Effort: Pulmonary effort is normal. No respiratory distress.      Breath sounds: Normal breath sounds. No wheezing or rales.   Abdominal:      General: Bowel sounds are normal. There is no distension.      Palpations: Abdomen is soft.      Tenderness: There is no abdominal tenderness. There is no right CVA tenderness, left CVA tenderness, guarding or rebound.   Musculoskeletal:      Cervical back: Normal range of  motion and neck supple. No rigidity.      Right lower leg: No edema.      Left lower leg: No edema.   Lymphadenopathy:      Cervical: No cervical adenopathy.   Skin:     General: Skin is warm and dry.      Capillary Refill: Capillary refill takes less than 2 seconds.      Coloration: Skin is not pale.      Findings: No rash.   Neurological:      General: No focal deficit present.      Mental Status: He is alert and oriented to person, place, and time. Mental status is at baseline.      Cranial Nerves: No cranial nerve deficit.      Sensory: No sensory deficit.      Motor: No weakness.      Coordination: Coordination normal.      Gait: Gait normal.      Deep Tendon Reflexes: Reflexes normal.   Psychiatric:         Mood and Affect: Mood normal.         Behavior: Behavior normal.         Thought Content: Thought content normal.         Judgment: Judgment normal.            Assessment and Plan     1. Annual physical exam    2. Aortic atherosclerosis  -     CBC Auto Differential; Future; Expected date: 12/06/2023  -     Comprehensive Metabolic Panel; Future; Expected date: 12/06/2023  -     TSH; Future; Expected date: 12/06/2023  -     Lipid Panel; Future; Expected date: 12/06/2023  -     Urinalysis; Future  -     PSA, Screening; Future; Expected date: 12/06/2023    3. HTN (hypertension), benign  -     CBC Auto Differential; Future; Expected date: 12/06/2023  -     Comprehensive Metabolic Panel; Future; Expected date: 12/06/2023  -     TSH; Future; Expected date: 12/06/2023  -     Lipid Panel; Future; Expected date: 12/06/2023  -     Urinalysis; Future  -     PSA, Screening; Future; Expected date: 12/06/2023    4. PAD (peripheral artery disease)    5. Carotid atherosclerosis, bilateral  -     CBC Auto Differential; Future; Expected date: 12/06/2023  -     Comprehensive Metabolic Panel; Future; Expected date: 12/06/2023  -     TSH; Future; Expected date: 12/06/2023  -     Lipid Panel; Future; Expected date: 12/06/2023  -      Urinalysis; Future  -     PSA, Screening; Future; Expected date: 12/06/2023  -     CV Ultrasound Bilateral Doppler Carotid; Future    6. Mixed hyperlipidemia    7. S/P carotid endarterectomy    8. Gastroesophageal reflux disease, unspecified whether esophagitis present    9. Prostate cancer screening  -     PSA, Screening; Future; Expected date: 12/06/2023    10. Dizziness  -     Ambulatory referral/consult to Neurology; Future; Expected date: 12/13/2023    11. Elevated blood sugar  -     Hemoglobin A1C; Future; Expected date: 12/06/2023    12. Need for vaccination  -     Influenza - Quadrivalent (Adjuvanted)    13. Stage 3a chronic kidney disease      Blood work ordered     CHF- stable, followed by Cardio    HTN- stable on Atenolol    AAA- mild per ultrasound 2021    Carotid artery disease- s/p R CEA 2021, repeat US    HLD- repeat Lipids, continue Rosuvastatin     PAD- stable on asa/statin    CKD III- stable    Intermittent lightheadedness(around 2 brief episodes per week)- not associated with changes in head/body position, no cardiac symptoms related to it and normal neuro exam today. Will hold Jardiance as it could be causing acute hypoglycemia and repeat Carotid US. If no improvement will refer to Neuro    Over 1/2 of 40 minute visit spent reviewing pt's medical records, education/discussion of pt's medical conditions and medical management

## 2023-12-12 ENCOUNTER — LAB VISIT (OUTPATIENT)
Dept: LAB | Facility: HOSPITAL | Age: 83
End: 2023-12-12
Attending: INTERNAL MEDICINE
Payer: MEDICARE

## 2023-12-12 DIAGNOSIS — I65.23 CAROTID ATHEROSCLEROSIS, BILATERAL: ICD-10-CM

## 2023-12-12 DIAGNOSIS — Z12.5 PROSTATE CANCER SCREENING: ICD-10-CM

## 2023-12-12 DIAGNOSIS — I10 HTN (HYPERTENSION), BENIGN: ICD-10-CM

## 2023-12-12 DIAGNOSIS — R73.9 ELEVATED BLOOD SUGAR: ICD-10-CM

## 2023-12-12 DIAGNOSIS — I70.0 AORTIC ATHEROSCLEROSIS: ICD-10-CM

## 2023-12-12 LAB
ALBUMIN SERPL BCP-MCNC: 4.1 G/DL (ref 3.5–5.2)
ALP SERPL-CCNC: 62 U/L (ref 55–135)
ALT SERPL W/O P-5'-P-CCNC: 13 U/L (ref 10–44)
ANION GAP SERPL CALC-SCNC: 13 MMOL/L (ref 8–16)
ANISOCYTOSIS BLD QL SMEAR: SLIGHT
AST SERPL-CCNC: 17 U/L (ref 10–40)
BASOPHILS # BLD AUTO: 0.06 K/UL (ref 0–0.2)
BASOPHILS NFR BLD: 0.6 % (ref 0–1.9)
BILIRUB SERPL-MCNC: 1.1 MG/DL (ref 0.1–1)
BUN SERPL-MCNC: 39 MG/DL (ref 8–23)
BURR CELLS BLD QL SMEAR: ABNORMAL
CALCIUM SERPL-MCNC: 10 MG/DL (ref 8.7–10.5)
CHLORIDE SERPL-SCNC: 106 MMOL/L (ref 95–110)
CHOLEST SERPL-MCNC: 192 MG/DL (ref 120–199)
CHOLEST/HDLC SERPL: 3.5 {RATIO} (ref 2–5)
CO2 SERPL-SCNC: 26 MMOL/L (ref 23–29)
COMPLEXED PSA SERPL-MCNC: 4.8 NG/ML (ref 0–4)
CREAT SERPL-MCNC: 1.7 MG/DL (ref 0.5–1.4)
DIFFERENTIAL METHOD: ABNORMAL
EOSINOPHIL # BLD AUTO: 0.2 K/UL (ref 0–0.5)
EOSINOPHIL NFR BLD: 2.1 % (ref 0–8)
ERYTHROCYTE [DISTWIDTH] IN BLOOD BY AUTOMATED COUNT: 13.6 % (ref 11.5–14.5)
EST. GFR  (NO RACE VARIABLE): 39.8 ML/MIN/1.73 M^2
ESTIMATED AVG GLUCOSE: 123 MG/DL (ref 68–131)
GLUCOSE SERPL-MCNC: 134 MG/DL (ref 70–110)
HBA1C MFR BLD: 5.9 % (ref 4–5.6)
HCT VFR BLD AUTO: 49.6 % (ref 40–54)
HDLC SERPL-MCNC: 55 MG/DL (ref 40–75)
HDLC SERPL: 28.6 % (ref 20–50)
HGB BLD-MCNC: 16.2 G/DL (ref 14–18)
IMM GRANULOCYTES # BLD AUTO: 0.03 K/UL (ref 0–0.04)
IMM GRANULOCYTES NFR BLD AUTO: 0.3 % (ref 0–0.5)
LDLC SERPL CALC-MCNC: 112.6 MG/DL (ref 63–159)
LYMPHOCYTES # BLD AUTO: 5.5 K/UL (ref 1–4.8)
LYMPHOCYTES NFR BLD: 52.4 % (ref 18–48)
MCH RBC QN AUTO: 32.1 PG (ref 27–31)
MCHC RBC AUTO-ENTMCNC: 32.7 G/DL (ref 32–36)
MCV RBC AUTO: 98 FL (ref 82–98)
MONOCYTES # BLD AUTO: 0.7 K/UL (ref 0.3–1)
MONOCYTES NFR BLD: 6.8 % (ref 4–15)
NEUTROPHILS # BLD AUTO: 3.9 K/UL (ref 1.8–7.7)
NEUTROPHILS NFR BLD: 37.8 % (ref 38–73)
NONHDLC SERPL-MCNC: 137 MG/DL
NRBC BLD-RTO: 0 /100 WBC
OVALOCYTES BLD QL SMEAR: ABNORMAL
PLATELET # BLD AUTO: 216 K/UL (ref 150–450)
PLATELET BLD QL SMEAR: ABNORMAL
PMV BLD AUTO: 12.2 FL (ref 9.2–12.9)
POIKILOCYTOSIS BLD QL SMEAR: SLIGHT
POTASSIUM SERPL-SCNC: 4.8 MMOL/L (ref 3.5–5.1)
PROT SERPL-MCNC: 7.5 G/DL (ref 6–8.4)
RBC # BLD AUTO: 5.04 M/UL (ref 4.6–6.2)
SODIUM SERPL-SCNC: 145 MMOL/L (ref 136–145)
TRIGL SERPL-MCNC: 122 MG/DL (ref 30–150)
TSH SERPL DL<=0.005 MIU/L-ACNC: 2.14 UIU/ML (ref 0.4–4)
WBC # BLD AUTO: 10.42 K/UL (ref 3.9–12.7)

## 2023-12-12 PROCEDURE — 83036 HEMOGLOBIN GLYCOSYLATED A1C: CPT | Performed by: INTERNAL MEDICINE

## 2023-12-12 PROCEDURE — 84153 ASSAY OF PSA TOTAL: CPT | Performed by: INTERNAL MEDICINE

## 2023-12-12 PROCEDURE — 84443 ASSAY THYROID STIM HORMONE: CPT | Performed by: INTERNAL MEDICINE

## 2023-12-12 PROCEDURE — 36415 COLL VENOUS BLD VENIPUNCTURE: CPT | Performed by: INTERNAL MEDICINE

## 2023-12-12 PROCEDURE — 80053 COMPREHEN METABOLIC PANEL: CPT | Performed by: INTERNAL MEDICINE

## 2023-12-12 PROCEDURE — 80061 LIPID PANEL: CPT | Performed by: INTERNAL MEDICINE

## 2023-12-12 PROCEDURE — 85025 COMPLETE CBC W/AUTO DIFF WBC: CPT | Performed by: INTERNAL MEDICINE

## 2023-12-18 ENCOUNTER — HOSPITAL ENCOUNTER (OUTPATIENT)
Dept: CARDIOLOGY | Facility: HOSPITAL | Age: 83
Discharge: HOME | End: 2023-12-18
Attending: INTERNAL MEDICINE
Payer: MEDICARE

## 2023-12-18 DIAGNOSIS — I65.23 CAROTID ATHEROSCLEROSIS, BILATERAL: ICD-10-CM

## 2023-12-18 PROCEDURE — 93880 EXTRACRANIAL BILAT STUDY: CPT

## 2023-12-18 PROCEDURE — 93880 EXTRACRANIAL BILAT STUDY: CPT | Mod: 26,,, | Performed by: INTERNAL MEDICINE

## 2023-12-18 PROCEDURE — 93880 CV US DOPPLER CAROTID (CUPID ONLY): ICD-10-PCS | Mod: 26,,, | Performed by: INTERNAL MEDICINE

## 2023-12-19 DIAGNOSIS — I50.32 CHRONIC DIASTOLIC HEART FAILURE: ICD-10-CM

## 2023-12-19 LAB
LEFT ARM DIASTOLIC BLOOD PRESSURE: 60 MMHG
LEFT ARM SYSTOLIC BLOOD PRESSURE: 110 MMHG
LEFT CBA DIAS: 11 CM/S
LEFT CBA SYS: 84 CM/S
LEFT CCA DIST DIAS: 8 CM/S
LEFT CCA DIST SYS: 71 CM/S
LEFT CCA MID DIAS: 6 CM/S
LEFT CCA MID SYS: 72 CM/S
LEFT CCA PROX DIAS: 6 CM/S
LEFT CCA PROX SYS: 49 CM/S
LEFT ECA DIAS: 24 CM/S
LEFT ECA SYS: 260 CM/S
LEFT ICA DIST DIAS: 18 CM/S
LEFT ICA DIST SYS: 85 CM/S
LEFT ICA MID DIAS: 19 CM/S
LEFT ICA MID SYS: 127 CM/S
LEFT ICA PROX DIAS: 26 CM/S
LEFT ICA PROX SYS: 130 CM/S
LEFT VERTEBRAL DIAS: 12 CM/S
LEFT VERTEBRAL SYS: 51 CM/S
OHS CV CAROTID RIGHT ICA EDV HIGHEST: 21
OHS CV CAROTID ULTRASOUND LEFT ICA/CCA RATIO: 1.83
OHS CV CAROTID ULTRASOUND RIGHT ICA/CCA RATIO: 1.53
OHS CV PV CAROTID LEFT HIGHEST CCA: 72
OHS CV PV CAROTID LEFT HIGHEST ICA: 130
OHS CV PV CAROTID RIGHT HIGHEST CCA: 61
OHS CV PV CAROTID RIGHT HIGHEST ICA: 92
OHS CV US CAROTID LEFT HIGHEST EDV: 26
RIGHT ARM DIASTOLIC BLOOD PRESSURE: 60 MMHG
RIGHT ARM SYSTOLIC BLOOD PRESSURE: 110 MMHG
RIGHT CBA DIAS: 11 CM/S
RIGHT CBA SYS: 51 CM/S
RIGHT CCA DIST DIAS: 9 CM/S
RIGHT CCA DIST SYS: 60 CM/S
RIGHT CCA MID DIAS: 8 CM/S
RIGHT CCA MID SYS: 61 CM/S
RIGHT CCA PROX DIAS: 9 CM/S
RIGHT CCA PROX SYS: 58 CM/S
RIGHT ECA DIAS: 33 CM/S
RIGHT ECA SYS: 308 CM/S
RIGHT ICA DIST DIAS: 21 CM/S
RIGHT ICA DIST SYS: 92 CM/S
RIGHT ICA MID DIAS: 20 CM/S
RIGHT ICA MID SYS: 82 CM/S
RIGHT ICA PROX DIAS: 9 CM/S
RIGHT ICA PROX SYS: 43 CM/S
RIGHT VERTEBRAL DIAS: 7 CM/S
RIGHT VERTEBRAL SYS: 42 CM/S

## 2023-12-20 ENCOUNTER — PATIENT MESSAGE (OUTPATIENT)
Dept: INTERNAL MEDICINE | Facility: CLINIC | Age: 83
End: 2023-12-20
Payer: MEDICARE

## 2023-12-20 ENCOUNTER — TELEPHONE (OUTPATIENT)
Dept: INTERNAL MEDICINE | Facility: CLINIC | Age: 83
End: 2023-12-20
Payer: MEDICARE

## 2023-12-20 DIAGNOSIS — E78.5 HYPERLIPIDEMIA, UNSPECIFIED HYPERLIPIDEMIA TYPE: ICD-10-CM

## 2023-12-20 DIAGNOSIS — R97.20 ELEVATED PSA: Primary | ICD-10-CM

## 2023-12-20 RX ORDER — ATENOLOL 50 MG/1
50 TABLET ORAL DAILY
Qty: 90 TABLET | Refills: 3
Start: 2023-12-20 | End: 2023-12-20 | Stop reason: SDUPTHER

## 2023-12-20 RX ORDER — SPIRONOLACTONE 25 MG/1
25 TABLET ORAL
Qty: 90 TABLET | Refills: 3 | Status: SHIPPED | OUTPATIENT
Start: 2023-12-20 | End: 2024-01-31 | Stop reason: SDUPTHER

## 2023-12-20 RX ORDER — ROSUVASTATIN CALCIUM 40 MG/1
40 TABLET, COATED ORAL NIGHTLY
Qty: 90 TABLET | Refills: 3 | Status: SHIPPED | OUTPATIENT
Start: 2023-12-20 | End: 2024-01-31 | Stop reason: SDUPTHER

## 2023-12-20 RX ORDER — ATENOLOL 50 MG/1
50 TABLET ORAL DAILY
Qty: 90 TABLET | Refills: 3 | Status: SHIPPED | OUTPATIENT
Start: 2023-12-20 | End: 2024-01-31 | Stop reason: SDUPTHER

## 2023-12-20 NOTE — TELEPHONE ENCOUNTER
Crestor increased to 40 mg qd, new Rx sent  Lipids/AST/ALT in 3 months  Referral to Urology  SRN    Any improvement in the dizziness since holding the Jardiance ?

## 2023-12-20 NOTE — TELEPHONE ENCOUNTER
No care due was identified.  [facility] Embedded Care Due Messages. Reference number: [id number].   12/20/2023 10:11:18 AM CST

## 2023-12-21 ENCOUNTER — OFFICE VISIT (OUTPATIENT)
Dept: UROLOGY | Facility: CLINIC | Age: 83
End: 2023-12-21
Payer: MEDICARE

## 2023-12-21 VITALS — WEIGHT: 169 LBS | HEIGHT: 73 IN | BODY MASS INDEX: 22.4 KG/M2

## 2023-12-21 DIAGNOSIS — R97.20 ELEVATED PSA: ICD-10-CM

## 2023-12-21 DIAGNOSIS — N52.9 ERECTILE DYSFUNCTION, UNSPECIFIED ERECTILE DYSFUNCTION TYPE: Primary | ICD-10-CM

## 2023-12-21 PROCEDURE — 1159F PR MEDICATION LIST DOCUMENTED IN MEDICAL RECORD: ICD-10-PCS | Mod: CPTII,S$GLB,, | Performed by: UROLOGY

## 2023-12-21 PROCEDURE — 3288F FALL RISK ASSESSMENT DOCD: CPT | Mod: CPTII,S$GLB,, | Performed by: UROLOGY

## 2023-12-21 PROCEDURE — 1159F MED LIST DOCD IN RCRD: CPT | Mod: CPTII,S$GLB,, | Performed by: UROLOGY

## 2023-12-21 PROCEDURE — 99999 PR PBB SHADOW E&M-EST. PATIENT-LVL III: CPT | Mod: PBBFAC,,, | Performed by: UROLOGY

## 2023-12-21 PROCEDURE — 1101F PR PT FALLS ASSESS DOC 0-1 FALLS W/OUT INJ PAST YR: ICD-10-PCS | Mod: CPTII,S$GLB,, | Performed by: UROLOGY

## 2023-12-21 PROCEDURE — 3288F PR FALLS RISK ASSESSMENT DOCUMENTED: ICD-10-PCS | Mod: CPTII,S$GLB,, | Performed by: UROLOGY

## 2023-12-21 PROCEDURE — 99203 OFFICE O/P NEW LOW 30 MIN: CPT | Mod: S$GLB,,, | Performed by: UROLOGY

## 2023-12-21 PROCEDURE — 99999 PR PBB SHADOW E&M-EST. PATIENT-LVL III: ICD-10-PCS | Mod: PBBFAC,,, | Performed by: UROLOGY

## 2023-12-21 PROCEDURE — 99203 PR OFFICE/OUTPT VISIT, NEW, LEVL III, 30-44 MIN: ICD-10-PCS | Mod: S$GLB,,, | Performed by: UROLOGY

## 2023-12-21 PROCEDURE — 1160F PR REVIEW ALL MEDS BY PRESCRIBER/CLIN PHARMACIST DOCUMENTED: ICD-10-PCS | Mod: CPTII,S$GLB,, | Performed by: UROLOGY

## 2023-12-21 PROCEDURE — 1160F RVW MEDS BY RX/DR IN RCRD: CPT | Mod: CPTII,S$GLB,, | Performed by: UROLOGY

## 2023-12-21 PROCEDURE — 1101F PT FALLS ASSESS-DOCD LE1/YR: CPT | Mod: CPTII,S$GLB,, | Performed by: UROLOGY

## 2023-12-21 RX ORDER — PAPAVERINE HYDROCHLORIDE 30 MG/ML
INJECTION INTRAMUSCULAR; INTRAVENOUS
Qty: 10 ML | Refills: 11 | Status: SHIPPED | OUTPATIENT
Start: 2023-12-21

## 2023-12-21 NOTE — PROGRESS NOTES
Subjective:       Patient ID: Gus Sabillon is a 83 y.o. male.    Chief Complaint: Elevated PSA (/Pt here for elevated psa. )    HPI   Patient is here for erectile dysfunction.  He also has a normal age adjusted PSA of 4.8.  He is voiding well.  He is relatively healthy but unfortunately has failed treatment with oral medications and with a pump device    Past Medical History:   Diagnosis Date    Carotid artery disease     CHF (congestive heart failure)     CKD (chronic kidney disease) stage 3, GFR 30-59 ml/min     Dyslipidemia     GERD (gastroesophageal reflux disease)     Hx of melanoma excision     Hypertension     Melanoma     PAD (peripheral artery disease)     Squamous cell carcinoma of skin        Past Surgical History:   Procedure Laterality Date    ARTHROSCOPIC DEBRIDEMENT OF SHOULDER Right 02/27/2020    Procedure: EXTENSIVE DEBRIDEMENT, SHOULDER, ARTHROSCOPIC;  Surgeon: Staci Childress MD;  Location: [facility] OR;  Service: Orthopedics;  Laterality: Right;    ARTHROSCOPIC DEBRIDEMENT OF SHOULDER Left 06/21/2022    Procedure: EXTENSIVE DEBRIDEMENT, SHOULDER, ARTHROSCOPIC;  Surgeon: Staci Childress MD;  Location: [facility] OR;  Service: Orthopedics;  Laterality: Left;    ARTHROSCOPIC REPAIR OF ROTATOR CUFF OF SHOULDER Right 02/27/2020    Procedure: REPAIR, ROTATOR CUFF, ARTHROSCOPIC;  Surgeon: Staci Childress MD;  Location: [facility] OR;  Service: Orthopedics;  Laterality: Right;    ARTHROSCOPIC REPAIR OF ROTATOR CUFF OF SHOULDER Left 06/21/2022    Procedure: REPAIR, ROTATOR CUFF, ARTHROSCOPIC WITH CUFF MEND;  Surgeon: Staci Childress MD;  Location: [facility] OR;  Service: Orthopedics;  Laterality: Left;    ARTHROSCOPY OF SHOULDER WITH DECOMPRESSION OF SUBACROMIAL SPACE Right 02/27/2020    Procedure: ARTHROSCOPY, SHOULDER, WITH SUBACROMIAL SPACE DECOMPRESSION;  Surgeon: Staci Childress MD;  Location: [facility] OR;  Service: Orthopedics;  Laterality: Right;    ARTHROSCOPY OF SHOULDER WITH DECOMPRESSION OF SUBACROMIAL SPACE Left 06/21/2022     Procedure: ARTHROSCOPY, SHOULDER, WITH SUBACROMIAL  DECOMPRESSION;  Surgeon: Staci Childress MD;  Location: [facility] OR;  Service: Orthopedics;  Laterality: Left;    ARTHROSCOPY OF SHOULDER WITH REMOVAL OF DISTAL CLAVICLE Left 06/21/2022    Procedure: ARTHROSCOPY, SHOULDER, WITH DISTAL CLAVICLE EXCISION;  Surgeon: Staci Childress MD;  Location: [facility] OR;  Service: Orthopedics;  Laterality: Left;    ARTHROSCOPY,SHOULDER,WITH BICEPS TENODESIS Left 06/21/2022    Procedure: ARTHROSCOPY,SHOULDER,WITH BICEPS TENODESIS;  Surgeon: Staci Childress MD;  Location: [facility] OR;  Service: Orthopedics;  Laterality: Left;    BLEPHAROPLASTY      CAROTID ENDARTERECTOMY Right 10/15/2021    Procedure: ENDARTERECTOMY-CAROTID;  Surgeon: Imer Farooq MD;  Location: [facility] OR [address] [place] [address];  Service: Peripheral Vascular;  Laterality: Right;  AWAKE CERVICAL BLOCK    CATARACT EXTRACTION, BILATERAL      COSMETIC SURGERY      EXCISION OF BURSA Left 06/21/2022    Procedure: BURSECTOMY;  Surgeon: Staci Childress MD;  Location: [facility] OR;  Service: Orthopedics;  Laterality: Left;    EYE SURGERY Bilateral     cataract removal    FIXATION OF TENDON Right 02/27/2020    Procedure: FIXATION, TENDON, Biceps Tenodesis;  Surgeon: Staci Childress MD;  Location: [facility] OR;  Service: Orthopedics;  Laterality: Right;  FIXATION, TENDON, Biceps Tenodesis    OPEN REDUCTION AND INTERNAL FIXATION (ORIF) OF FRACTURE OF PROXIMAL HUMERUS Right 02/27/2020    Procedure: ORIF, FRACTURE, GREATER TUBEROSITY;  Surgeon: Staci Childress MD;  Location: [facility] OR;  Service: Orthopedics;  Laterality: Right;    ORIF HUMERUS FRACTURE Left 06/21/2022    Procedure: ORIF, FRACTURE, HUMERUS, GREATER TUBEROSITY;  Surgeon: Staci Childress MD;  Location: [facility] OR;  Service: Orthopedics;  Laterality: Left;    SHOULDER ARTHROSCOPY Left 06/21/2022    Procedure: ARTHROSCOPY, SHOULDER WITH LABRAL REPAIR;  Surgeon: Staci Childress MD;  Location: [facility] OR;  Service: Orthopedics;  Laterality: Left;    TONSILLECTOMY      VASECTOMY         Family  History   Problem Relation Age of Onset    Diabetes Mother     Hyperlipidemia Mother     Heart disease Father     Cancer Sister         ? colon or uterine    Colon cancer Sister     No Known Problems Brother     No Known Problems Brother     No Known Problems Daughter     No Known Problems Son        Social History     Socioeconomic History    Marital status:    Tobacco Use    Smoking status: Former     Current packs/day: 0.00     Average packs/day: 2.0 packs/day for 15.0 years (30.0 ttl pk-yrs)     Types: Cigarettes     Start date:      Quit date:      Years since quittin.0     Passive exposure: Never    Smokeless tobacco: Never   Substance and Sexual Activity    Alcohol use: Yes     Alcohol/week: 14.0 standard drinks of alcohol     Types: 14 Glasses of wine per week     Comment: 2 glasses of wine daily    Drug use: Never    Sexual activity: Not Currently     Partners: Female     Social Determinants of Health     Financial Resource Strain: Low Risk  (2023)    Overall Financial Resource Strain (CARDIA)     Difficulty of Paying Living Expenses: Not hard at all   Food Insecurity: No Food Insecurity (2023)    Hunger Vital Sign     Worried About Running Out of Food in the Last Year: Never true     Ran Out of Food in the Last Year: Never true   Transportation Needs: No Transportation Needs (2023)    PRAPARE - Transportation     Lack of Transportation (Medical): No     Lack of Transportation (Non-Medical): No   Physical Activity: Inactive (2023)    Exercise Vital Sign     Days of Exercise per Week: 0 days     Minutes of Exercise per Session: 30 min   Stress: No Stress Concern Present (2023)    [place] [employer] - Occupational Stress Questionnaire     Feeling of Stress : Not at all   Social Connections: Unknown (2023)    Social Connection and Isolation Panel [NHANES]     Frequency of Communication with Friends and Family: More than three times a  week     Frequency of Social Gatherings with Friends and Family: More than three times a week     Active Member of Clubs or Organizations: No     Attends Club or Organization Meetings: Never     Marital Status:    Housing Stability: Low Risk  (11/30/2023)    Housing Stability Vital Sign     Unable to Pay for Housing in the Last Year: No     Number of Places Lived in the Last Year: 1     Unstable Housing in the Last Year: No       Allergies:  Adhesive, Clindamycin, and Penicillins    Medications:    Current Outpatient Medications:     acetaminophen (TYLENOL) 325 MG tablet, Take 2 tablets (650 mg total) by mouth every 6 (six) hours as needed for Pain., Disp: , Rfl: 0    aspirin (ECOTRIN) 81 MG EC tablet, Take 81 mg by mouth once daily., Disp: , Rfl:     atenoloL (TENORMIN) 50 MG tablet, Take 1 tablet (50 mg total) by mouth once daily., Disp: 90 tablet, Rfl: 3    collagen/biotin/ascorbic acid (COLLAGEN 1500 PLUS C ORAL), Take by mouth Daily. 11 G of powder daily, Disp: , Rfl:     empagliflozin (JARDIANCE) 10 mg tablet, Take 1 tablet (10 mg total) by mouth once daily., Disp: 90 tablet, Rfl: 3    fluorouraciL (EFUDEX) 5 % cream, AAA on both arms BID x 2-3 weeks. Stop if blistered, oozing, or bleeding. Use daily sun protection., Disp: 40 g, Rfl: 1    furosemide (LASIX) 20 MG tablet, Take 1 tablet (20 mg total) by mouth once daily., Disp: 30 tablet, Rfl: 11    ketoconazole (NIZORAL) 2 % cream, Apply topically 2 (two) times daily., Disp: 30 g, Rfl: 2    magnesium oxide (MAG-OX) 400 mg (241.3 mg magnesium) tablet, TAKE 1 TABLET BY MOUTH TWICE A DAY, Disp: 180 tablet, Rfl: 0    multivit-min-FA-lycopen-lutein 300-600-300 mcg Tab, Take by mouth., Disp: , Rfl:     omega-3 fatty acids/fish oil (FISH OIL-OMEGA-3 FATTY ACIDS) 300-1,000 mg capsule, Take by mouth once daily., Disp: , Rfl:     papaverine 30 mg/mL injection, ADD: Phentolamine 10mg/ml ADD: PGE1 100 mcg  Sig: Inject as directed into lateral aspect of penis, Disp:  10 mL, Rfl: 11    rosuvastatin (CRESTOR) 40 MG Tab, Take 1 tablet (40 mg total) by mouth every evening., Disp: 90 tablet, Rfl: 3    spironolactone (ALDACTONE) 25 MG tablet, TAKE 1 TABLET BY MOUTH ONCE  DAILY, Disp: 90 tablet, Rfl: 3    Review of Systems   Constitutional:  Negative for activity change, appetite change, chills, diaphoresis, fatigue, fever and unexpected weight change.   HENT:  Negative for congestion, dental problem, hearing loss, mouth sores, postnasal drip, rhinorrhea, sinus pressure and trouble swallowing.    Eyes:  Negative for pain, discharge and itching.   Respiratory:  Negative for apnea, cough, choking, chest tightness, shortness of breath and wheezing.    Cardiovascular:  Negative for chest pain, palpitations and leg swelling.   Gastrointestinal:  Negative for abdominal distention, abdominal pain, anal bleeding, blood in stool, constipation, diarrhea, nausea, rectal pain and vomiting.   Endocrine: Negative for polydipsia and polyuria.   Genitourinary:  Negative for decreased urine volume, difficulty urinating, dysuria, enuresis, flank pain, frequency, genital sores, hematuria, penile discharge, penile pain, penile swelling, scrotal swelling, testicular pain and urgency.   Musculoskeletal:  Negative for arthralgias, back pain and myalgias.   Skin:  Negative for color change, rash and wound.   Neurological:  Negative for dizziness, syncope, speech difficulty, light-headedness and headaches.   Hematological:  Negative for adenopathy. Does not bruise/bleed easily.   Psychiatric/Behavioral:  Negative for behavioral problems, confusion, hallucinations and sleep disturbance.        Objective:      Physical Exam  Constitutional:       Appearance: He is well-developed.   HENT:      Head: Normocephalic.   Cardiovascular:      Rate and Rhythm: Normal rate.   Pulmonary:      Effort: Pulmonary effort is normal.   Abdominal:      Palpations: Abdomen is soft.   Genitourinary:     Prostate: Normal.       Comments:  30 g benign no nodules patient has a normal age adjusted PSA  Skin:     General: Skin is warm.   Neurological:      Mental Status: He is alert.         Assessment:       1. Erectile dysfunction, unspecified erectile dysfunction type    2. Elevated PSA        Plan:       Gus was seen today for elevated psa.    Diagnoses and all orders for this visit:    Erectile dysfunction, unspecified erectile dysfunction type    Elevated PSA  -     Ambulatory referral/consult to Urology    Other orders  -     papaverine 30 mg/mL injection; ADD: Phentolamine 10mg/ml  ADD: PGE1 100 mcg    Sig: Inject as directed into lateral aspect of penis         Appointment with Adele Abraham already Pap injections

## 2024-01-06 ENCOUNTER — OFFICE VISIT (OUTPATIENT)
Dept: URGENT CARE | Facility: CLINIC | Age: 84
End: 2024-01-06
Payer: MEDICARE

## 2024-01-06 VITALS
DIASTOLIC BLOOD PRESSURE: 70 MMHG | RESPIRATION RATE: 16 BRPM | HEART RATE: 67 BPM | HEIGHT: 73 IN | TEMPERATURE: 99 F | WEIGHT: 163 LBS | BODY MASS INDEX: 21.6 KG/M2 | SYSTOLIC BLOOD PRESSURE: 144 MMHG | OXYGEN SATURATION: 97 %

## 2024-01-06 DIAGNOSIS — U07.1 COVID-19 VIRUS DETECTED: ICD-10-CM

## 2024-01-06 DIAGNOSIS — U07.1 COVID: ICD-10-CM

## 2024-01-06 DIAGNOSIS — R05.9 COUGH, UNSPECIFIED TYPE: Primary | ICD-10-CM

## 2024-01-06 LAB
CTP QC/QA: YES
SARS-COV-2 AG RESP QL IA.RAPID: POSITIVE

## 2024-01-06 PROCEDURE — 96372 THER/PROPH/DIAG INJ SC/IM: CPT | Mod: S$GLB,,, | Performed by: FAMILY MEDICINE

## 2024-01-06 PROCEDURE — 87811 SARS-COV-2 COVID19 W/OPTIC: CPT | Mod: QW,S$GLB,, | Performed by: FAMILY MEDICINE

## 2024-01-06 PROCEDURE — 99203 OFFICE O/P NEW LOW 30 MIN: CPT | Mod: 25,S$GLB,, | Performed by: FAMILY MEDICINE

## 2024-01-06 RX ORDER — DEXAMETHASONE SODIUM PHOSPHATE 100 MG/10ML
10 INJECTION INTRAMUSCULAR; INTRAVENOUS
Status: COMPLETED | OUTPATIENT
Start: 2024-01-06 | End: 2024-01-06

## 2024-01-06 RX ORDER — PROMETHAZINE HYDROCHLORIDE AND DEXTROMETHORPHAN HYDROBROMIDE 6.25; 15 MG/5ML; MG/5ML
5 SYRUP ORAL EVERY 8 HOURS PRN
Qty: 180 ML | Refills: 0 | Status: SHIPPED | OUTPATIENT
Start: 2024-01-06 | End: 2024-01-16

## 2024-01-06 RX ADMIN — DEXAMETHASONE SODIUM PHOSPHATE 10 MG: 100 INJECTION INTRAMUSCULAR; INTRAVENOUS at 12:01

## 2024-01-06 NOTE — PROGRESS NOTES
"Subjective:      Patient ID: Gus Sabillon is a 83 y.o. male.    Vitals:  height is 6' 1" (1.854 m) and weight is 73.9 kg (163 lb). His temperature is 98.9 °F (37.2 °C). His blood pressure is 144/70 (abnormal) and his pulse is 67. His respiration is 16 and oxygen saturation is 97%.     Chief Complaint: Sore Throat    This is a 83 y.o. male who presents today with a chief complaint of sore throat onset 3 days. Pt also complains nasal congestion and cough. Pt is producing brown and mucus. Pt took theraflu, nyquil and dayquil, which provided some relief. Pt took a home test and was positive.    Sore Throat   This is a new problem. The current episode started in the past 7 days. The problem has been gradually worsening. Neither side of throat is experiencing more pain than the other. The maximum temperature recorded prior to his arrival was 100.4 - 100.9 F. The fever has been present for Less than 1 day. The pain is at a severity of 9/10. The pain is severe. Associated symptoms include congestion, coughing, headaches and shortness of breath. He has tried acetaminophen for the symptoms. The treatment provided moderate relief.       HENT:  Positive for congestion and sore throat.    Respiratory:  Positive for cough and shortness of breath.    Neurological:  Positive for headaches.      Objective:     Physical Exam   Constitutional: He is oriented to person, place, and time. He appears well-developed. He is cooperative.  Non-toxic appearance. He does not appear ill. No distress.   HENT:   Head: Normocephalic and atraumatic.   Ears:   Right Ear: Hearing, tympanic membrane and external ear normal.   Left Ear: Hearing, tympanic membrane and external ear normal.   Nose: Nose normal. No mucosal edema, rhinorrhea or nasal deformity. No epistaxis. Right sinus exhibits no maxillary sinus tenderness and no frontal sinus tenderness. Left sinus exhibits no maxillary sinus tenderness and no frontal sinus tenderness. "   Mouth/Throat: Uvula is midline and mucous membranes are normal. No trismus in the jaw. Normal dentition. No uvula swelling. Posterior oropharyngeal erythema present. No oropharyngeal exudate or posterior oropharyngeal edema.   Eyes: Conjunctivae and lids are normal. No scleral icterus.   Neck: Trachea normal and phonation normal. Neck supple. No edema present. No erythema present. No neck rigidity present.   Cardiovascular: Normal rate, regular rhythm, normal heart sounds and normal pulses.   Pulmonary/Chest: Effort normal and breath sounds normal. No respiratory distress. He has no decreased breath sounds. He has no rhonchi.   Abdominal: Normal appearance.   Musculoskeletal: Normal range of motion.         General: No deformity. Normal range of motion.   Neurological: He is alert and oriented to person, place, and time. He exhibits normal muscle tone. Coordination normal.   Skin: Skin is warm, dry, intact, not diaphoretic and not pale.   Psychiatric: His speech is normal and behavior is normal. Judgment and thought content normal.   Nursing note and vitals reviewed.      Assessment:     1. Cough, unspecified type        Plan:       Cough, unspecified type  -     SARS Coronavirus 2 Antigen, POCT Manual Read    Other orders  -     nirmatrelvir-ritonavir 300 mg (150 mg x 2)-100 mg copackaged tablets (EUA); Take 3 tablets by mouth 2 (two) times daily for 5 days. Each dose contains 2 nirmatrelvir (pink tablets) and 1 ritonavir (white tablet). Take all 3 tablets together  Dispense: 30 tablet; Refill: 0  -     promethazine-dextromethorphan (PROMETHAZINE-DM) 6.25-15 mg/5 mL Syrp; Take 5 mLs by mouth every 8 (eight) hours as needed.  Dispense: 180 mL; Refill: 0  -     dexAMETHasone injection 10 mg    Thank you for choosing [facility]!     Our goal in the Urgent Care is to always provide outstanding medical care. You may receive a survey by mail or e-mail in the next week regarding your experience today. We would  greatly appreciate you completing and returning the survey. Your feedback provides us with a way to recognize our staff who provide very good care, and it helps us learn how to improve when your experience was below our aspiration of excellence.       We appreciate you trusting us with your medical care. We hope you feel better soon. We will be happy to take care of you for all of your future medical needs.  You must understand that you've received an Urgent Care treatment only and that you may be released before all your medical problems are known or treated. You, the patient, will arrange for follow up care as instructed.  Follow up with your PCP or specialty clinic as directed in the next 1-2 weeks if not improved or as needed.  You can call [phone] to schedule an appointment with the appropriate provider.  Another option is to follow up with [facility] ([email or web address]) virtually for quick simple medical advice.  If your condition worsens we recommend that you receive another evaluation at the emergency room immediately or contact your primary medical clinics after hours call service to discuss your concerns.  Please return here or go to the Emergency Department for any concerns or worsening of condition.      *If you were prescribed a narcotic or controlled medication, do not drive or operate heavy equipment or machinery while taking these medications.

## 2024-01-31 ENCOUNTER — OFFICE VISIT (OUTPATIENT)
Dept: CARDIOLOGY | Facility: CLINIC | Age: 84
End: 2024-01-31
Payer: MEDICARE

## 2024-01-31 VITALS
HEART RATE: 79 BPM | RESPIRATION RATE: 18 BRPM | DIASTOLIC BLOOD PRESSURE: 75 MMHG | WEIGHT: 168.88 LBS | BODY MASS INDEX: 22.28 KG/M2 | SYSTOLIC BLOOD PRESSURE: 145 MMHG

## 2024-01-31 DIAGNOSIS — Z98.890 S/P CAROTID ENDARTERECTOMY: ICD-10-CM

## 2024-01-31 DIAGNOSIS — E78.2 MIXED HYPERLIPIDEMIA: ICD-10-CM

## 2024-01-31 DIAGNOSIS — I70.0 AORTIC ATHEROSCLEROSIS: ICD-10-CM

## 2024-01-31 DIAGNOSIS — I10 HTN (HYPERTENSION), BENIGN: ICD-10-CM

## 2024-01-31 DIAGNOSIS — I50.32 CHRONIC DIASTOLIC HEART FAILURE: ICD-10-CM

## 2024-01-31 DIAGNOSIS — E83.42 HYPOMAGNESEMIA: ICD-10-CM

## 2024-01-31 DIAGNOSIS — I49.3 PVC'S (PREMATURE VENTRICULAR CONTRACTIONS): ICD-10-CM

## 2024-01-31 DIAGNOSIS — I65.23 CAROTID ATHEROSCLEROSIS, BILATERAL: ICD-10-CM

## 2024-01-31 DIAGNOSIS — I73.9 PAD (PERIPHERAL ARTERY DISEASE): ICD-10-CM

## 2024-01-31 DIAGNOSIS — I71.43 INFRARENAL ABDOMINAL AORTIC ANEURYSM (AAA) WITHOUT RUPTURE: ICD-10-CM

## 2024-01-31 DIAGNOSIS — N18.31 STAGE 3A CHRONIC KIDNEY DISEASE: ICD-10-CM

## 2024-01-31 DIAGNOSIS — I50.30 ACC/AHA STAGE C HEART FAILURE WITH PRESERVED EJECTION FRACTION: Primary | ICD-10-CM

## 2024-01-31 PROBLEM — R79.89 TROPONIN LEVEL ELEVATED: Status: RESOLVED | Noted: 2023-01-31 | Resolved: 2024-01-31

## 2024-01-31 PROCEDURE — 99214 OFFICE O/P EST MOD 30 MIN: CPT | Mod: S$GLB,,, | Performed by: INTERNAL MEDICINE

## 2024-01-31 PROCEDURE — 99999 PR PBB SHADOW E&M-EST. PATIENT-LVL III: CPT | Mod: PBBFAC,,, | Performed by: INTERNAL MEDICINE

## 2024-01-31 PROCEDURE — 93000 ELECTROCARDIOGRAM COMPLETE: CPT | Mod: S$GLB,,, | Performed by: INTERNAL MEDICINE

## 2024-01-31 RX ORDER — SPIRONOLACTONE 25 MG/1
25 TABLET ORAL DAILY
Qty: 90 TABLET | Refills: 3 | Status: SHIPPED | OUTPATIENT
Start: 2024-01-31 | End: 2024-05-10

## 2024-01-31 RX ORDER — ATENOLOL 25 MG/1
75 TABLET ORAL DAILY
Qty: 270 TABLET | Refills: 3 | Status: SHIPPED | OUTPATIENT
Start: 2024-01-31 | End: 2024-05-10

## 2024-01-31 RX ORDER — ROSUVASTATIN CALCIUM 20 MG/1
20 TABLET, COATED ORAL NIGHTLY
Qty: 90 TABLET | Refills: 3 | Status: SHIPPED | OUTPATIENT
Start: 2024-01-31 | End: 2025-01-30

## 2024-01-31 RX ORDER — LANOLIN ALCOHOL/MO/W.PET/CERES
1 CREAM (GRAM) TOPICAL 2 TIMES DAILY
Qty: 180 TABLET | Refills: 3 | Status: SHIPPED | OUTPATIENT
Start: 2024-01-31

## 2024-01-31 RX ORDER — ATENOLOL 50 MG/1
75 TABLET ORAL DAILY
Qty: 135 TABLET | Refills: 3 | Status: SHIPPED | OUTPATIENT
Start: 2024-01-31 | End: 2024-01-31

## 2024-01-31 NOTE — PROGRESS NOTES
HISTORY:    83-year-old male with a history of heart failure with preserved ejection fraction, hypertension, hyperlipidemia, PVCs, CKD, PAD status post bilateral lower extremity PI, carotid stenosis s/p R CEA '21, and GERD presenting for follow-up.    Patient was initially evaluated by me in early January '23 with significant heart failure symptoms.  We tried outpatient management with diuretics and goal-directed medical therapy, but the patient required in-hospital diuresis.  He was admitted in late January with a positive response to inpatient diuresis and medicine adjustment. Lost 10-15 pounds that he has maintained. Doing a great job monitoring weight and limiting fluid/salt intake. No orthopnea, edema, or PRINGLE. No chest pain.    Back to regular exercise. Doing resistance exercises in the pool.     Did have covid 1 month ago and has been sluggish at times.    He currently tolerates aspirin 81 x 1, atenolol 50 x 1, spironolactone 25 x 1, empagloflozin 10x1.  PCP increased rosuvastatin to 40x1. Also, on magnesium for h/o symptomatic hypomagnesemia. Bps 120s/70s on average.    PHYSICAL EXAM:    Vitals:    01/31/24 1037   BP: (!) 145/75   Pulse: 79   Resp: 18       NAD, A+Ox3.  No jvd, no bruit.  RRR nml s1,s2. No murmurs.  CTA B no wheezes or crackles.  No edema.    LABS/STUDIES (imaging reviewed during clinic visit):    December 2023 CBC normal.  CMP with a creatinine of 1.7/BUN 39/GFR 40 (baseline).   and HDL 55.  Triglycerides 122.  BNP May '23 700<- March '23 1300 <- Jan '23 3300. TSH normal.  Normal iron studies.  Negative HIV/HCV antibodies.  LDL 88 HDL 46.  Triglycerides 67.   ECG today SR 1st AVB. No Qs/Sts. PVCs. January 2023 demonstrates sinus rhythm with no Q-waves or ST changes.  PVCs noted.    TTE May 2023 Nml LV size and function. CVP 3/PASP 25. January 2023 demonstrates normal LV size and systolic function.  LVH is present.  EF 65%.  RV enlargement with normal function.  Moderate AI,  mild-to-moderate MR.  CVP 8 with estimated PASP of 39.  NST March 2023 LVEF 47% at rest and 55% with stress.  Equivocal perfusion abnormality that is fixed in the basal to distal inferior wall with bowel interference.  Carotid December 2023 carotid atherosclerosis doses with a patent right carotid endarterectomy patch.  Left ICA less than 50%.  Elevated ECA velocity with dense plaque at the bifurcation.  Vertebral flow antegrade bilaterally.  DL/PVR September 2022 demonstrates normal DL with unremarkable PVR waveforms bilaterally.    Abd us 2021 AAA 3.3x3.3    ASSESSMENT & PLAN:    1. ACC/AHA stage C heart failure with preserved ejection fraction    2. Aortic atherosclerosis    3. Hypomagnesemia    4. Stage 3a chronic kidney disease    5. S/P carotid endarterectomy    6. Infrarenal abdominal aortic aneurysm (AAA) without rupture    7. Mixed hyperlipidemia    8. PAD (peripheral artery disease)    9. HTN (hypertension), benign    10. Carotid atherosclerosis, bilateral    11. PVC's (premature ventricular contractions)    12. Chronic diastolic heart failure              Orders Placed This Encounter    IN OFFICE EKG 12-LEAD (to Muse)    rosuvastatin (CRESTOR) 20 MG tablet    spironolactone (ALDACTONE) 25 MG tablet    magnesium oxide (MAG-OX) 400 mg (241.3 mg magnesium) tablet    empagliflozin (JARDIANCE) 10 mg tablet    atenoloL (TENORMIN) 25 MG tablet       Patient with diastolic heart failure that required inpatient admission in early '23. S/p successful diuresis with 15 pound weight loss. BNP improved and symptoms resolved. Doing well with diet modifications and fluid restriction.  Stable weight.    Unclear etiology. NST equivocal. Pt with preserved LVEF on TTE. No strain images. Infiltrative/amyloid can be considered. Pt feels very well and continues to favor medical management at this time.      Cont atenolol 50x1, spironolactone 25x1, empagliflozin 10x1. Off furosemide without issue. Bps controlled and weight  stable.    Fatigue and lethargy post covid. Could simply be recovering. Alternatively, PVCs noted on ecg. Pt notes h/o PVCs dating back to childhood. Can do trial of atenolol 75x1.  Can increase magnesium to 400 BID or TID as long as diarrhea is not an issue.     Pt is a  and is unimpressed by ARRs for some of the above meds, but also understands the mechanisms of benefit. Hypothetically, could benefit from aceI/ARNI, but patient not interested in adding on.     LDL was 88 on rosuvastatin and bempedoic acid. Pt deferred PCSK9i previously. He has since chosen to stop bempedoic acid due to cost and low ARR on top of statin therapy. LDL now 112. Wants to switch back to rosuvastatin 20x1.     PVD previously managed by vas surgery for R CEA and small AAA. Unremarkable ABIs '22. Mild claudication.    Patient moving to [place], will continue cardiac care there. Meds refilled for a year.       Karrie Sarabia MD

## 2024-02-10 ENCOUNTER — PATIENT MESSAGE (OUTPATIENT)
Dept: CARDIOLOGY | Facility: CLINIC | Age: 84
End: 2024-02-10
Payer: MEDICARE

## 2024-02-27 DIAGNOSIS — Z00.00 ENCOUNTER FOR MEDICARE ANNUAL WELLNESS EXAM: ICD-10-CM

## 2024-02-29 ENCOUNTER — TELEPHONE (OUTPATIENT)
Dept: NEUROLOGY | Facility: CLINIC | Age: 84
End: 2024-02-29
Payer: MEDICARE

## 2024-02-29 NOTE — TELEPHONE ENCOUNTER
----- Message from Dora Soliz sent at 2/29/2024  2:46 PM CST -----  Name of Who is Calling:  PT CALLLED        What is the request in detail:  THE PT WOULD LIKE TO BE SEEN FOR IMBALANCE AND POSSIBLE STROKE.. HE IS OUT OF TOWN AND NEEDS A APT FOR AFTER APRIL 1 . PLEASE ADVISE       Can the clinic reply by [facility]:    NO      What Number to Call Back if not in [facility]: Telephone Information:  Mobile          [phone]

## 2024-03-19 ENCOUNTER — TELEPHONE (OUTPATIENT)
Dept: NEUROLOGY | Facility: CLINIC | Age: 84
End: 2024-03-19
Payer: MEDICARE

## 2024-03-19 NOTE — TELEPHONE ENCOUNTER
----- Message from Flora Mosher sent at 3/19/2024  2:22 PM CDT -----  Type:  Sooner Apoointment Request    Caller is requesting a sooner appointment.  Caller declined first available appointment listed below.  Caller will not accept being placed on the waitlist and is requesting a message be sent to doctor.  Name of Caller: Pt  When is the first available appointment?  Symptoms: referral  Would the patient rather a call back or a response via [facility]? Call  Best Call Back Number:  [phone]  Additional Information: pt would like to speak to someone in office for a sooner appt.

## 2024-03-26 ENCOUNTER — PATIENT MESSAGE (OUTPATIENT)
Dept: INTERNAL MEDICINE | Facility: CLINIC | Age: 84
End: 2024-03-26
Payer: MEDICARE

## 2024-04-01 ENCOUNTER — LAB VISIT (OUTPATIENT)
Dept: LAB | Facility: HOSPITAL | Age: 84
End: 2024-04-01
Payer: MEDICARE

## 2024-04-01 ENCOUNTER — OFFICE VISIT (OUTPATIENT)
Dept: INTERNAL MEDICINE | Facility: CLINIC | Age: 84
End: 2024-04-01
Payer: MEDICARE

## 2024-04-01 VITALS
HEIGHT: 73 IN | RESPIRATION RATE: 16 BRPM | HEART RATE: 57 BPM | SYSTOLIC BLOOD PRESSURE: 128 MMHG | DIASTOLIC BLOOD PRESSURE: 54 MMHG | OXYGEN SATURATION: 98 % | TEMPERATURE: 98 F | WEIGHT: 169.75 LBS | BODY MASS INDEX: 22.5 KG/M2

## 2024-04-01 DIAGNOSIS — R53.83 FATIGUE, UNSPECIFIED TYPE: ICD-10-CM

## 2024-04-01 DIAGNOSIS — L57.0 ACTINIC KERATOSIS: ICD-10-CM

## 2024-04-01 DIAGNOSIS — R00.1 BRADYCARDIA: ICD-10-CM

## 2024-04-01 DIAGNOSIS — G47.10 HYPERSOMNIA: ICD-10-CM

## 2024-04-01 DIAGNOSIS — R19.7 DIARRHEA, UNSPECIFIED TYPE: ICD-10-CM

## 2024-04-01 DIAGNOSIS — R53.83 FATIGUE, UNSPECIFIED TYPE: Primary | ICD-10-CM

## 2024-04-01 LAB
25(OH)D3+25(OH)D2 SERPL-MCNC: 52 NG/ML (ref 30–96)
ALBUMIN SERPL BCP-MCNC: 3.7 G/DL (ref 3.5–5.2)
ALP SERPL-CCNC: 73 U/L (ref 55–135)
ALT SERPL W/O P-5'-P-CCNC: 26 U/L (ref 10–44)
ANION GAP SERPL CALC-SCNC: 7 MMOL/L (ref 8–16)
AST SERPL-CCNC: 22 U/L (ref 10–40)
BASOPHILS # BLD AUTO: 0.04 K/UL (ref 0–0.2)
BASOPHILS NFR BLD: 0.4 % (ref 0–1.9)
BILIRUB SERPL-MCNC: 0.7 MG/DL (ref 0.1–1)
BUN SERPL-MCNC: 39 MG/DL (ref 8–23)
CALCIUM SERPL-MCNC: 9.6 MG/DL (ref 8.7–10.5)
CHLORIDE SERPL-SCNC: 109 MMOL/L (ref 95–110)
CO2 SERPL-SCNC: 23 MMOL/L (ref 23–29)
CREAT SERPL-MCNC: 1.8 MG/DL (ref 0.5–1.4)
DIFFERENTIAL METHOD BLD: ABNORMAL
EOSINOPHIL # BLD AUTO: 0.2 K/UL (ref 0–0.5)
EOSINOPHIL NFR BLD: 1.7 % (ref 0–8)
ERYTHROCYTE [DISTWIDTH] IN BLOOD BY AUTOMATED COUNT: 14.9 % (ref 11.5–14.5)
EST. GFR  (NO RACE VARIABLE): 37.1 ML/MIN/1.73 M^2
GLUCOSE SERPL-MCNC: 157 MG/DL (ref 70–110)
HCT VFR BLD AUTO: 40.4 % (ref 40–54)
HGB BLD-MCNC: 12.6 G/DL (ref 14–18)
IMM GRANULOCYTES # BLD AUTO: 0.01 K/UL (ref 0–0.04)
IMM GRANULOCYTES NFR BLD AUTO: 0.1 % (ref 0–0.5)
LYMPHOCYTES # BLD AUTO: 3.5 K/UL (ref 1–4.8)
LYMPHOCYTES NFR BLD: 38.3 % (ref 18–48)
MAGNESIUM SERPL-MCNC: 1.6 MG/DL (ref 1.6–2.6)
MCH RBC QN AUTO: 29.4 PG (ref 27–31)
MCHC RBC AUTO-ENTMCNC: 31.2 G/DL (ref 32–36)
MCV RBC AUTO: 94 FL (ref 82–98)
MONOCYTES # BLD AUTO: 0.9 K/UL (ref 0.3–1)
MONOCYTES NFR BLD: 9.9 % (ref 4–15)
NEUTROPHILS # BLD AUTO: 4.5 K/UL (ref 1.8–7.7)
NEUTROPHILS NFR BLD: 49.6 % (ref 38–73)
NRBC BLD-RTO: 0 /100 WBC
PLATELET # BLD AUTO: 183 K/UL (ref 150–450)
PMV BLD AUTO: 11.8 FL (ref 9.2–12.9)
POTASSIUM SERPL-SCNC: 4.5 MMOL/L (ref 3.5–5.1)
PROT SERPL-MCNC: 6.7 G/DL (ref 6–8.4)
RBC # BLD AUTO: 4.28 M/UL (ref 4.6–6.2)
SODIUM SERPL-SCNC: 139 MMOL/L (ref 136–145)
TSH SERPL DL<=0.005 MIU/L-ACNC: 1.51 UIU/ML (ref 0.4–4)
WBC # BLD AUTO: 9.16 K/UL (ref 3.9–12.7)

## 2024-04-01 PROCEDURE — 1101F PT FALLS ASSESS-DOCD LE1/YR: CPT | Mod: CPTII,S$GLB,, | Performed by: NURSE PRACTITIONER

## 2024-04-01 PROCEDURE — 99999 PR PBB SHADOW E&M-EST. PATIENT-LVL V: CPT | Mod: PBBFAC,,, | Performed by: NURSE PRACTITIONER

## 2024-04-01 PROCEDURE — 3078F DIAST BP <80 MM HG: CPT | Mod: CPTII,S$GLB,, | Performed by: NURSE PRACTITIONER

## 2024-04-01 PROCEDURE — 1159F MED LIST DOCD IN RCRD: CPT | Mod: CPTII,S$GLB,, | Performed by: NURSE PRACTITIONER

## 2024-04-01 PROCEDURE — 99214 OFFICE O/P EST MOD 30 MIN: CPT | Mod: S$GLB,,, | Performed by: NURSE PRACTITIONER

## 2024-04-01 PROCEDURE — 3288F FALL RISK ASSESSMENT DOCD: CPT | Mod: CPTII,S$GLB,, | Performed by: NURSE PRACTITIONER

## 2024-04-01 PROCEDURE — 80053 COMPREHEN METABOLIC PANEL: CPT | Performed by: NURSE PRACTITIONER

## 2024-04-01 PROCEDURE — 3074F SYST BP LT 130 MM HG: CPT | Mod: CPTII,S$GLB,, | Performed by: NURSE PRACTITIONER

## 2024-04-01 PROCEDURE — 85025 COMPLETE CBC W/AUTO DIFF WBC: CPT | Performed by: NURSE PRACTITIONER

## 2024-04-01 PROCEDURE — 84443 ASSAY THYROID STIM HORMONE: CPT | Performed by: NURSE PRACTITIONER

## 2024-04-01 PROCEDURE — 82306 VITAMIN D 25 HYDROXY: CPT | Performed by: NURSE PRACTITIONER

## 2024-04-01 PROCEDURE — 83735 ASSAY OF MAGNESIUM: CPT | Performed by: NURSE PRACTITIONER

## 2024-04-01 PROCEDURE — 36415 COLL VENOUS BLD VENIPUNCTURE: CPT | Mod: PO | Performed by: NURSE PRACTITIONER

## 2024-04-01 PROCEDURE — 1160F RVW MEDS BY RX/DR IN RCRD: CPT | Mod: CPTII,S$GLB,, | Performed by: NURSE PRACTITIONER

## 2024-04-01 PROCEDURE — 1125F AMNT PAIN NOTED PAIN PRSNT: CPT | Mod: CPTII,S$GLB,, | Performed by: NURSE PRACTITIONER

## 2024-04-01 NOTE — PROGRESS NOTES
Falling asleep  during the day since COVID.   Memory failure  Diarrhea 2-3 months  AK  Answers submitted by the patient for this visit:  Rash Questionnaire (Submitted on 3/27/2024)  Chief Complaint: Rash  Chronicity: new  Onset: more than 1 month ago  Progression since onset: waxing and waning  Affected locations: left arm, left hand, left upper leg, left leg, right arm, right hand, right upper leg, right leg  Characteristics: blistering, dryness, redness, itchiness, scaling  Exposed to: a new medication  anorexia: Yes  cough: Yes  diarrhea: Yes  eye pain: Yes  fatigue: Yes  rhinorrhea: Yes  Treatments tried: analgesics  Improvement on treatment: mild  asthma: No  allergies: Yes  eczema: No  varicella: Yes

## 2024-04-05 ENCOUNTER — TELEPHONE (OUTPATIENT)
Dept: INTERNAL MEDICINE | Facility: CLINIC | Age: 84
End: 2024-04-05
Payer: MEDICARE

## 2024-04-05 DIAGNOSIS — D64.9 ANEMIA, UNSPECIFIED TYPE: ICD-10-CM

## 2024-04-05 DIAGNOSIS — N18.30 STAGE 3 CHRONIC KIDNEY DISEASE, UNSPECIFIED WHETHER STAGE 3A OR 3B CKD: Primary | ICD-10-CM

## 2024-04-05 DIAGNOSIS — R79.9 ABNORMAL BLOOD CHEMISTRY: ICD-10-CM

## 2024-04-08 ENCOUNTER — OFFICE VISIT (OUTPATIENT)
Dept: NEUROLOGY | Facility: CLINIC | Age: 84
End: 2024-04-08
Payer: MEDICARE

## 2024-04-08 VITALS — HEART RATE: 38 BPM | SYSTOLIC BLOOD PRESSURE: 142 MMHG | DIASTOLIC BLOOD PRESSURE: 55 MMHG

## 2024-04-08 DIAGNOSIS — I73.9 PAD (PERIPHERAL ARTERY DISEASE): ICD-10-CM

## 2024-04-08 DIAGNOSIS — I70.0 AORTIC ATHEROSCLEROSIS: ICD-10-CM

## 2024-04-08 DIAGNOSIS — I10 PRIMARY HYPERTENSION: ICD-10-CM

## 2024-04-08 DIAGNOSIS — I65.23 CAROTID ATHEROSCLEROSIS, BILATERAL: ICD-10-CM

## 2024-04-08 DIAGNOSIS — H91.90 HEARING LOSS, UNSPECIFIED HEARING LOSS TYPE, UNSPECIFIED LATERALITY: ICD-10-CM

## 2024-04-08 DIAGNOSIS — R42 DIZZINESS AND GIDDINESS: Primary | ICD-10-CM

## 2024-04-08 DIAGNOSIS — D63.1 ANEMIA DUE TO STAGE 3B CHRONIC KIDNEY DISEASE: ICD-10-CM

## 2024-04-08 DIAGNOSIS — N18.32 ANEMIA DUE TO STAGE 3B CHRONIC KIDNEY DISEASE: ICD-10-CM

## 2024-04-08 DIAGNOSIS — R42 DIZZINESS: ICD-10-CM

## 2024-04-08 DIAGNOSIS — I65.29 OCCLUSION AND STENOSIS OF UNSPECIFIED CAROTID ARTERY: ICD-10-CM

## 2024-04-08 DIAGNOSIS — E78.2 MIXED HYPERLIPIDEMIA: ICD-10-CM

## 2024-04-08 DIAGNOSIS — I50.30 ACC/AHA STAGE C HEART FAILURE WITH PRESERVED EJECTION FRACTION: ICD-10-CM

## 2024-04-08 DIAGNOSIS — Z98.890 S/P CAROTID ENDARTERECTOMY: ICD-10-CM

## 2024-04-08 DIAGNOSIS — N18.32 STAGE 3B CHRONIC KIDNEY DISEASE: ICD-10-CM

## 2024-04-08 PROCEDURE — 3288F FALL RISK ASSESSMENT DOCD: CPT | Mod: CPTII,S$GLB,, | Performed by: STUDENT IN AN ORGANIZED HEALTH CARE EDUCATION/TRAINING PROGRAM

## 2024-04-08 PROCEDURE — 99417 PROLNG OP E/M EACH 15 MIN: CPT | Mod: S$GLB,,, | Performed by: STUDENT IN AN ORGANIZED HEALTH CARE EDUCATION/TRAINING PROGRAM

## 2024-04-08 PROCEDURE — 1126F AMNT PAIN NOTED NONE PRSNT: CPT | Mod: CPTII,S$GLB,, | Performed by: STUDENT IN AN ORGANIZED HEALTH CARE EDUCATION/TRAINING PROGRAM

## 2024-04-08 PROCEDURE — 3078F DIAST BP <80 MM HG: CPT | Mod: CPTII,S$GLB,, | Performed by: STUDENT IN AN ORGANIZED HEALTH CARE EDUCATION/TRAINING PROGRAM

## 2024-04-08 PROCEDURE — 3077F SYST BP >= 140 MM HG: CPT | Mod: CPTII,S$GLB,, | Performed by: STUDENT IN AN ORGANIZED HEALTH CARE EDUCATION/TRAINING PROGRAM

## 2024-04-08 PROCEDURE — 1159F MED LIST DOCD IN RCRD: CPT | Mod: CPTII,S$GLB,, | Performed by: STUDENT IN AN ORGANIZED HEALTH CARE EDUCATION/TRAINING PROGRAM

## 2024-04-08 PROCEDURE — 1160F RVW MEDS BY RX/DR IN RCRD: CPT | Mod: CPTII,S$GLB,, | Performed by: STUDENT IN AN ORGANIZED HEALTH CARE EDUCATION/TRAINING PROGRAM

## 2024-04-08 PROCEDURE — 99205 OFFICE O/P NEW HI 60 MIN: CPT | Mod: S$GLB,,, | Performed by: STUDENT IN AN ORGANIZED HEALTH CARE EDUCATION/TRAINING PROGRAM

## 2024-04-08 PROCEDURE — 99999 PR PBB SHADOW E&M-EST. PATIENT-LVL IV: CPT | Mod: PBBFAC,,, | Performed by: STUDENT IN AN ORGANIZED HEALTH CARE EDUCATION/TRAINING PROGRAM

## 2024-04-08 PROCEDURE — 1100F PTFALLS ASSESS-DOCD GE2>/YR: CPT | Mod: CPTII,S$GLB,, | Performed by: STUDENT IN AN ORGANIZED HEALTH CARE EDUCATION/TRAINING PROGRAM

## 2024-04-08 NOTE — PATIENT INSTRUCTIONS
- Magnesium glycinate may cause less issues with diarrhea    - Same dosage as prescribed before by your cardiologists  -

## 2024-04-09 PROBLEM — R00.1 BRADYCARDIA: Status: ACTIVE | Noted: 2024-04-09

## 2024-04-09 PROBLEM — R19.7 DIARRHEA: Status: ACTIVE | Noted: 2024-04-09

## 2024-04-09 NOTE — ASSESSMENT & PLAN NOTE
Patient will have labs completed today to rulev outs thyroid dysfunction vs electrolyte perturbation vs anemia vs vs vitamin-D deficiency.   We discussed that his ongoing fatigue is likely due to long COVID.  It may also be due to bradycardia especially since his atenolol was increased from 50 mg to 75 mg.  Patient recommended to contact Cardiology to discuss this matter.    Reassuringly patient denies any other symptoms in relation to his bradycardia

## 2024-04-09 NOTE — ASSESSMENT & PLAN NOTE
New onset of diarrhea is likely due to magnesium oxide.    We will repeat his magnesium level today and if within normal limits may be able to discontinue medication  Patient will be contacted with results

## 2024-04-09 NOTE — PROGRESS NOTES
Subjective     Patient ID: Gus Sabillon is a 83 y.o. male.    Chief Complaint: Dizziness, Hypotension, Diarrhea (/), and Skin Problem    Pt in office accompanied by his wife with multiple complaints.    His history includes heart failure with preserved ejection fraction, hypertension, hyperlipidemia, PVCs, CKD, PAD status post bilateral lower extremity PI, carotid stenosis s/p R CEA '21, and GERD.  Patient reports new onset of fatigue.  Patient has usually been relatively active but since joseph COVID he has been having a hard time staying awake during the day. No changes to his sleep patterns and wife validates that pt is not apneic.     He is also concerned about a generalized rash characterized by erythema, pruritus and xerosis.  He denies any changes to his soaps, lotions or detergents.  Patient visited [place] recently where the rash dissipated but on his return to [place] the rash flared up again.      Patient reports ongoing diarrhea that he noticed after he was started on magnesium oxide due to hypomagnesemia.   He denies any melena, hematochezia, nausea, vomiting or weight loss    Diarrhea   Associated symptoms include coughing.   Rash  This is a new problem. The current episode started more than 1 month ago. The problem has been waxing and waning since onset. The affected locations include the left arm, left hand, left upper leg, left leg, right arm, right hand, right upper leg and right leg. The rash is characterized by blistering, dryness, redness, itchiness and scaling. He was exposed to a new medication. Associated symptoms include anorexia, coughing, diarrhea, eye pain, fatigue and rhinorrhea. Past treatments include analgesics. The treatment provided mild relief. His past medical history is significant for allergies and varicella. There is no history of asthma or eczema.     Review of Systems   Constitutional:  Positive for fatigue.   HENT:  Positive for rhinorrhea.    Eyes:  Positive  for pain.   Respiratory:  Positive for cough.    Gastrointestinal:  Positive for anorexia and diarrhea.   Integumentary:  Positive for rash.   Neurological:  Positive for dizziness.      Objective     Physical Exam  Vitals reviewed.   Constitutional:       Appearance: Normal appearance.   HENT:      Head: Normocephalic and atraumatic.      Right Ear: Tympanic membrane normal.      Left Ear: Tympanic membrane normal.      Nose: Nose normal.      Mouth/Throat:      Mouth: Mucous membranes are moist.   Eyes:      Pupils: Pupils are equal, round, and reactive to light.   Cardiovascular:      Rate and Rhythm: Regular rhythm. Bradycardia present.      Heart sounds: Normal heart sounds.   Pulmonary:      Effort: Pulmonary effort is normal.      Breath sounds: Normal breath sounds.   Abdominal:      General: Abdomen is flat. Bowel sounds are normal.      Palpations: Abdomen is soft.   Musculoskeletal:         General: Normal range of motion.      Cervical back: Normal range of motion.      Right lower leg: No edema.      Left lower leg: No edema.   Skin:     General: Skin is warm and dry.      Comments: AK to bilateral arms and legs   Neurological:      General: No focal deficit present.      Mental Status: He is alert and oriented to person, place, and time.   Psychiatric:         Mood and Affect: Mood normal.         Behavior: Behavior normal.        Assessment and Plan   1. Fatigue, unspecified type  Assessment & Plan:  Patient will have labs completed today to rulev outs thyroid dysfunction vs electrolyte perturbation vs anemia vs vs vitamin-D deficiency.   We discussed that his ongoing fatigue is likely due to long COVID.  It may also be due to bradycardia especially since his atenolol was increased from 50 mg to 75 mg.  Patient recommended to contact Cardiology to discuss this matter.    Reassuringly patient denies any other symptoms in relation to his bradycardia    Orders:  -     Magnesium; Future; Expected date:  04/01/2024  -     Ambulatory referral/consult to Sleep Disorders; Future; Expected date: 04/08/2024  -     TSH; Future; Expected date: 04/01/2024  -     Vitamin D; Future; Expected date: 04/01/2024  -     CBC W/ AUTO DIFFERENTIAL; Future; Expected date: 04/01/2024  -     COMPREHENSIVE METABOLIC PANEL; Future; Expected date: 04/01/2024    2. Hypersomnia  Assessment & Plan:  See above    Orders:  -     Ambulatory referral/consult to Sleep Disorders; Future; Expected date: 04/08/2024    3. Bradycardia    4. Actinic keratosis  Assessment & Plan:  Patient's skin is limited with Sadiq spots.  Discussed pathophysiology and importance for dermatology referral for skin care.  Although hesitant patient accepted referral.    While he waits to get into Dermatology is encouraged to keep his skin as moisturized as possible and to try to avoid direct sunlight.  Encouraged to wear sunscreen when outdoors    Orders:  -     Ambulatory referral/consult to Dermatology; Future; Expected date: 04/08/2024    5. Diarrhea, unspecified type  Assessment & Plan:  New onset of diarrhea is likely due to magnesium oxide.    We will repeat his magnesium level today and if within normal limits may be able to discontinue medication  Patient will be contacted with results      RTC PRN.

## 2024-04-09 NOTE — ASSESSMENT & PLAN NOTE
Patient's skin is limited with Sadiq spots.  Discussed pathophysiology and importance for dermatology referral for skin care.  Although hesitant patient accepted referral.    While he waits to get into Dermatology is encouraged to keep his skin as moisturized as possible and to try to avoid direct sunlight.  Encouraged to wear sunscreen when outdoors

## 2024-04-10 PROBLEM — N18.32 ANEMIA DUE TO STAGE 3B CHRONIC KIDNEY DISEASE: Status: ACTIVE | Noted: 2024-04-05

## 2024-04-10 PROBLEM — N18.32 STAGE 3B CHRONIC KIDNEY DISEASE: Status: ACTIVE | Noted: 2024-04-10

## 2024-04-10 PROBLEM — N25.81 SECONDARY HYPERPARATHYROIDISM OF RENAL ORIGIN: Status: ACTIVE | Noted: 2024-04-10

## 2024-04-10 PROBLEM — D63.1 ANEMIA DUE TO STAGE 3B CHRONIC KIDNEY DISEASE: Status: ACTIVE | Noted: 2024-04-05

## 2024-04-11 ENCOUNTER — OFFICE VISIT (OUTPATIENT)
Dept: NEPHROLOGY | Facility: CLINIC | Age: 84
End: 2024-04-11
Payer: MEDICARE

## 2024-04-11 VITALS
OXYGEN SATURATION: 99 % | HEIGHT: 73 IN | HEART RATE: 55 BPM | SYSTOLIC BLOOD PRESSURE: 113 MMHG | DIASTOLIC BLOOD PRESSURE: 56 MMHG | BODY MASS INDEX: 21.88 KG/M2 | WEIGHT: 165.13 LBS

## 2024-04-11 DIAGNOSIS — I50.30 ACC/AHA STAGE C HEART FAILURE WITH PRESERVED EJECTION FRACTION: ICD-10-CM

## 2024-04-11 DIAGNOSIS — N18.32 ANEMIA DUE TO STAGE 3B CHRONIC KIDNEY DISEASE: ICD-10-CM

## 2024-04-11 DIAGNOSIS — D63.1 ANEMIA DUE TO STAGE 3B CHRONIC KIDNEY DISEASE: ICD-10-CM

## 2024-04-11 DIAGNOSIS — E83.42 HYPOMAGNESEMIA: ICD-10-CM

## 2024-04-11 DIAGNOSIS — I10 HTN (HYPERTENSION), BENIGN: ICD-10-CM

## 2024-04-11 DIAGNOSIS — N18.30 STAGE 3 CHRONIC KIDNEY DISEASE, UNSPECIFIED WHETHER STAGE 3A OR 3B CKD: ICD-10-CM

## 2024-04-11 DIAGNOSIS — D64.9 ANEMIA, UNSPECIFIED TYPE: ICD-10-CM

## 2024-04-11 DIAGNOSIS — R79.9 ABNORMAL BLOOD CHEMISTRY: ICD-10-CM

## 2024-04-11 DIAGNOSIS — N25.81 SECONDARY HYPERPARATHYROIDISM OF RENAL ORIGIN: ICD-10-CM

## 2024-04-11 DIAGNOSIS — N18.32 STAGE 3B CHRONIC KIDNEY DISEASE: Primary | ICD-10-CM

## 2024-04-11 PROCEDURE — G2211 COMPLEX E/M VISIT ADD ON: HCPCS | Mod: S$GLB,,, | Performed by: STUDENT IN AN ORGANIZED HEALTH CARE EDUCATION/TRAINING PROGRAM

## 2024-04-11 PROCEDURE — 1159F MED LIST DOCD IN RCRD: CPT | Mod: CPTII,S$GLB,, | Performed by: STUDENT IN AN ORGANIZED HEALTH CARE EDUCATION/TRAINING PROGRAM

## 2024-04-11 PROCEDURE — 99215 OFFICE O/P EST HI 40 MIN: CPT | Mod: S$GLB,,, | Performed by: STUDENT IN AN ORGANIZED HEALTH CARE EDUCATION/TRAINING PROGRAM

## 2024-04-11 PROCEDURE — 3078F DIAST BP <80 MM HG: CPT | Mod: CPTII,S$GLB,, | Performed by: STUDENT IN AN ORGANIZED HEALTH CARE EDUCATION/TRAINING PROGRAM

## 2024-04-11 PROCEDURE — 1126F AMNT PAIN NOTED NONE PRSNT: CPT | Mod: CPTII,S$GLB,, | Performed by: STUDENT IN AN ORGANIZED HEALTH CARE EDUCATION/TRAINING PROGRAM

## 2024-04-11 PROCEDURE — 1160F RVW MEDS BY RX/DR IN RCRD: CPT | Mod: CPTII,S$GLB,, | Performed by: STUDENT IN AN ORGANIZED HEALTH CARE EDUCATION/TRAINING PROGRAM

## 2024-04-11 PROCEDURE — 1101F PT FALLS ASSESS-DOCD LE1/YR: CPT | Mod: CPTII,S$GLB,, | Performed by: STUDENT IN AN ORGANIZED HEALTH CARE EDUCATION/TRAINING PROGRAM

## 2024-04-11 PROCEDURE — 3074F SYST BP LT 130 MM HG: CPT | Mod: CPTII,S$GLB,, | Performed by: STUDENT IN AN ORGANIZED HEALTH CARE EDUCATION/TRAINING PROGRAM

## 2024-04-11 PROCEDURE — 99999 PR PBB SHADOW E&M-EST. PATIENT-LVL IV: CPT | Mod: PBBFAC,,, | Performed by: STUDENT IN AN ORGANIZED HEALTH CARE EDUCATION/TRAINING PROGRAM

## 2024-04-11 PROCEDURE — 3288F FALL RISK ASSESSMENT DOCD: CPT | Mod: CPTII,S$GLB,, | Performed by: STUDENT IN AN ORGANIZED HEALTH CARE EDUCATION/TRAINING PROGRAM

## 2024-04-11 NOTE — PROGRESS NOTES
Nephrology Clinic Note   4/11/2024    Chief Complaint   Patient presents with    Chronic Kidney Disease      History of Present Illness    Patient presents today for follow-up.    Patient relayed experiencing kidney dysfunction, possibly related to previous heavy alcohol consumption. He underwent stent placement in his legs for Peripheral Arterial Disease (PAD). He is currently on Jardiance and Spironolactone, started approximately a year ago. No adverse side effects reported.    Patient has a history of heart failure which impacts his kidney functions, known as cardiorenal syndrome. He is taking Jardiance for both heart failure and kidney protective functions, with satisfactory response observed. No present symptoms directly linked to his heart failure.    Patient reports a condition on his shins similar to eczema.    Patient's daily consumption includes two glasses of white wine.    Patient's current supplement use for maintaining Vitamin D levels appears to be sufficient. Recent readings are within the normal range.    Prescribed Magnesium supplement has resulted in episodes of loose stools for the patient.    Patient reports significant improvement in balance issues, and has started walking around the pool.    Patient recounts recent episodes of swollen ankles. He uses Lasix with noticeable response during episodes of swelling. No worrisome symptoms reported.  ROS:  Gastrointestinal: +change in bowel habits  Integumentary: +rash       Presents to the clinic today for medical conditions listed below.  Problem Noted   Stage 3b Chronic Kidney Disease 4/10/2024    stable  stage G3bA1 likely secondary to long standing hypertension, age related nephron loss and heart failure  baseline creatinine 1.5-1.9  baseline UPCR na  historic complications Volume overload  BP uncontrolled on BB and Aldosterone antagonist  glucose controlled on SGLT2 inhibitor  UPCR controlled on SGLT2 inhibitor  Native kidney US No results found  for this or any previous visit.   na  current diuretic therapy: none   current bicarbonate therapy: none   current CKD-MBD therapy: none  current anemia therapy: none    Recently referred here for aid in management of ckd.       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   Lungs: Normal respiratory effort. No respiratory distress.  Extremities: No edema lower extremities.  Psychiatric: Alert.  Skin: Dyshidrotic eczema on legs. Actinic keratoses on arms.        Assessment:    1. Stage 3b chronic kidney disease    2. HTN (hypertension), benign    3. ACC/AHA stage C heart failure with preserved ejection fraction    4. Anemia due to stage 3b chronic kidney disease    5. Hypomagnesemia    6. Secondary hyperparathyroidism of renal origin    7. Stage 3 chronic kidney disease, unspecified whether stage 3a or 3b CKD    8. Anemia, unspecified type    9. 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  - Prescribed the current medications, including Jardiance, which are proving beneficial and do not necessitate any alterations at this time.  - Discussed the advantages of Jardiance for kidney protection and heart failure management.  KIDNEY FUNCTION EVALUATION:  - Suggested a kidney ultrasound to evaluate the kidneys' condition, as the patient's prompt response to Lasix indicates satisfactory kidney function. Patient has elected to forgo this at this time  PHYSICAL STRENGTH  AND BALANCE:  - Advised the patient to improve core muscle strength and balance through exercises such as rising from a seated position and balancing on one leg.  MAGNESIUM LEVEL MAINTENANCE:  - Emphasized the importance of maintaining magnesium levels and recommended the use of loperamide (Imodium) to manage loose bowel movements induced by magnesium supplementation.  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  - Recommend the application of CeraVe or a similar ceramide-containing lotion within 3 minutes of showering to manage the skin condition.         Gus was seen today for chronic kidney disease.    Diagnoses and all orders for this visit:    Stage 3b chronic kidney disease    HTN (hypertension), benign    ACC/AHA stage C heart failure with preserved ejection fraction    Anemia due to stage 3b chronic kidney disease    Hypomagnesemia    Secondary hyperparathyroidism of renal origin    Stage 3 chronic kidney disease, unspecified whether stage 3a or 3b CKD  -     Ambulatory referral/consult to Nephrology    Anemia, unspecified type  -     Ambulatory referral/consult to Nephrology    Abnormal blood chemistry  -     Ambulatory referral/consult to Nephrology        Follow up in about 4 months (around 8/11/2024).     I spent a total of 68 minutes on the day of the visit.    Visit today included increased complexity associated with the care of the episodic problem CKD addressed and managing the longitudinal care of the patient due to the serious and/or complex managed problem(s) CKD, HTN, secondary hyperparathyroidism, and anemia of chronic disease.  No orders of the defined types were placed in this encounter.    There are no discontinued medications.   Future Appointments   Date Time Provider Department Center   4/17/2024  2:00 PM Lam Wagoner MD [facility]   5/2/2024  3:45 PM [facility]2 [facility]   5/2/2024  4:15 PM [facility]  OI-MRI2 [facility]   5/2/2024  5:00 PM [facility] OI-MRI1 [facility]   6/5/2024 11:20 AM Emory Beltran DO [facility] IM [place]   6/25/2024  2:00 PM Hailey Obregon AU.D [facility] AUDIO [address]   6/25/2024  2:30 PM Asha Monroe, DO [facility] ENT [address]   8/16/2024  8:30 AM Uriel Knox Jr., MD [facility]       This note was generated with the assistance of ambient listening technology. Verbal consent was obtained by the patient and accompanying visitor(s) for the recording of patient appointment to facilitate this note. I attest to having reviewed and edited the generated note for accuracy, though some syntax or spelling errors may persist. Please contact the author of this note for any clarification.      [facility]   4/11/2024    Chief Complaint   Patient presents with    Chronic Kidney Disease      History of Present Illness    Patient presents today for follow-up.    Patient relayed experiencing kidney dysfunction, possibly related to previous heavy alcohol consumption. He underwent stent placement in his legs for Peripheral Arterial Disease (PAD). He is currently on Jardiance and Spironolactone, started approximately a year ago. No adverse side effects reported.    Patient has a history of heart failure which impacts his kidney functions, known as cardiorenal syndrome. He is taking Jardiance for both heart failure and kidney protective functions, with satisfactory response observed. No present symptoms directly linked to his heart failure.    Patient reports a condition on his shins similar to eczema.    Patient's daily consumption includes two glasses of white wine.    Patient's current supplement use for maintaining Vitamin D levels appears to be sufficient. Recent readings are within the normal range.    Prescribed Magnesium supplement has resulted in episodes of loose stools for the patient.    Patient reports significant improvement in  balance issues, and has started walking around the pool.    Patient recounts recent episodes of swollen ankles. He uses Lasix with noticeable response during episodes of swelling. No worrisome symptoms reported.  ROS:  Gastrointestinal: +change in bowel habits  Integumentary: +rash       Presents to the clinic today for medical conditions listed below.  Problem Noted   Stage 3b Chronic Kidney Disease 4/10/2024    stable  stage G3bA1 likely secondary to long standing hypertension, age related nephron loss and heart failure  baseline creatinine 1.5-1.9  baseline UPCR na  historic complications Volume overload  BP uncontrolled on BB and Aldosterone antagonist  glucose controlled on SGLT2 inhibitor  UPCR controlled on SGLT2 inhibitor  Native kidney US No results found for this or any previous visit.   na  current diuretic therapy: none   current bicarbonate therapy: none   current CKD-MBD therapy: none  current anemia therapy: none    Recently referred here for aid in management of ckd.       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 Well-appearing.  Cardiovascular: Regular rate. Regular rhythm.  Lungs: Normal respiratory effort. No respiratory distress.  Extremities: No edema lower extremities.  Psychiatric: Alert.  Vitals: Normal blood pressure.  Male Genitourinary: Kidneys appear well preserved.  Skin: Dyshidrotic eczema on legs. Actinic keratoses on arms.  Musculoskeletal: Good muscle function.         Assessment:    1. Stage 3b chronic kidney disease    2. HTN (hypertension), benign    3. ACC/AHA stage C heart failure with preserved ejection fraction    4. Anemia due to stage 3b chronic kidney disease    5. Hypomagnesemia    6. Secondary hyperparathyroidism of renal origin    7.  Stage 3 chronic kidney disease, unspecified whether stage 3a or 3b CKD    8. Anemia, unspecified type    9. 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  - Prescribed the current medications, including Jardiance, which are proving beneficial and do not necessitate any alterations at this time.  - Discussed the advantages of Jardiance for kidney protection and heart failure management.  KIDNEY FUNCTION EVALUATION:  - Suggested a kidney ultrasound to evaluate the kidneys' condition, as the patient's prompt response to Lasix indicates satisfactory kidney function.  PHYSICAL STRENGTH AND BALANCE:  - Advised the patient to improve core muscle strength and balance through exercises such as rising from a seated position and balancing on one leg.  MAGNESIUM LEVEL MAINTENANCE:  - Emphasized the importance of maintaining magnesium levels and recommended the use of loperamide (Imodium) to manage loose bowel movements induced by magnesium supplementation.  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  - Recommend the application of CeraVe or a similar ceramide-containing lotion within 3 minutes of showering to manage the skin condition.         Gus was seen today for chronic kidney disease.    Diagnoses and all orders for this visit:    Stage 3b chronic kidney disease    HTN (hypertension), benign    ACC/AHA stage C heart failure with preserved ejection fraction    Anemia due to stage 3b chronic kidney disease    Hypomagnesemia    Secondary hyperparathyroidism of renal origin    Stage 3 chronic kidney disease, unspecified whether stage 3a or 3b  CKD  -     Ambulatory referral/consult to Nephrology    Anemia, unspecified type  -     Ambulatory referral/consult to Nephrology    Abnormal blood chemistry  -     Ambulatory referral/consult to Nephrology        Follow up in about 4 months (around 8/11/2024).     I spent a total of 68 minutes on the day of the visit.    Visit today included increased complexity associated with the care of the episodic problem CKD addressed and managing the longitudinal care of the patient due to the serious and/or complex managed problem(s) CKD, HTN, secondary hyperparathyroidism, and anemia of chronic disease.  No orders of the defined types were placed in this encounter.    There are no discontinued medications.   Future Appointments   Date Time Provider Department Center   4/17/2024  2:00 PM Lam Wagoner MD [facility] DER[facility] PC[facility]   5/2/2024  3:45 PM [facility] OIC-MRI2 [facility] MRI IC Imaging Ctr   5/2/2024  4:15 PM [facility] OIC-MRI2 [facility]   5/2/2024  5:00 PM [facility] OIC-MRI1 [facility]   6/5/2024 11:20 AM Emory Beltran DO [facility] IM [place]   6/25/2024  2:00 PM Hailey Obregon AU.D [facility] AUDIO [facility]   6/25/2024  2:30 PM Asha Monroe NP [facility] ENT [facility]   8/16/2024  8:30 AM Uriel Knox Jr., MD [facility] NEPHRO [facility]       This note was generated with the assistance of ambient listening technology. Verbal consent was obtained by the patient and accompanying visitor(s) for the recording of patient appointment to facilitate this note. I attest to having reviewed and edited the generated note for accuracy, though some syntax or spelling errors may persist. Please contact the author of this note for any clarification.      Uriel Knox

## 2024-04-17 ENCOUNTER — OFFICE VISIT (OUTPATIENT)
Dept: DERMATOLOGY | Facility: CLINIC | Age: 84
End: 2024-04-17
Payer: MEDICARE

## 2024-04-17 DIAGNOSIS — L28.0 LICHEN SIMPLEX: ICD-10-CM

## 2024-04-17 DIAGNOSIS — B02.9 HERPES ZOSTER WITHOUT COMPLICATION: Primary | ICD-10-CM

## 2024-04-17 PROCEDURE — 1126F AMNT PAIN NOTED NONE PRSNT: CPT | Mod: CPTII,S$GLB,, | Performed by: DERMATOLOGY

## 2024-04-17 PROCEDURE — 1101F PT FALLS ASSESS-DOCD LE1/YR: CPT | Mod: CPTII,S$GLB,, | Performed by: DERMATOLOGY

## 2024-04-17 PROCEDURE — 3288F FALL RISK ASSESSMENT DOCD: CPT | Mod: CPTII,S$GLB,, | Performed by: DERMATOLOGY

## 2024-04-17 PROCEDURE — 99213 OFFICE O/P EST LOW 20 MIN: CPT | Mod: S$GLB,,, | Performed by: DERMATOLOGY

## 2024-04-17 PROCEDURE — 99999 PR PBB SHADOW E&M-EST. PATIENT-LVL IV: CPT | Mod: PBBFAC,,, | Performed by: DERMATOLOGY

## 2024-04-17 PROCEDURE — 1159F MED LIST DOCD IN RCRD: CPT | Mod: CPTII,S$GLB,, | Performed by: DERMATOLOGY

## 2024-04-17 PROCEDURE — 1160F RVW MEDS BY RX/DR IN RCRD: CPT | Mod: CPTII,S$GLB,, | Performed by: DERMATOLOGY

## 2024-04-17 RX ORDER — VALACYCLOVIR HYDROCHLORIDE 1 G/1
1000 TABLET, FILM COATED ORAL 3 TIMES DAILY
Qty: 21 TABLET | Refills: 0 | Status: SHIPPED | OUTPATIENT
Start: 2024-04-17 | End: 2024-06-05

## 2024-04-17 NOTE — PROGRESS NOTES
Subjective:      Patient ID:  Gus Sabillon is a 83 y.o. male who presents for   Chief Complaint   Patient presents with    Lesion     Arms, legs, rt upper thigh     Patient with new complaint of lesion(s)  Location: right upper thigh  Duration:  2 weeks   Symptoms:  redness, spreading , boils  Relieving factors/Previous treatments:  none   Some mild pain, like his hip is hurting  Lesions still spreading; new lesions arising      Patient with new complaint of lesion(s)  Location: arms ans legs  Duration: 3-4 months  Symptoms: dryness, redness  Relieving factors/Previous treatments: Cetaphil lotion     Prior history of skin cancer    This is a high risk patient here to check for the development of new lesions.       Review of Systems   Constitutional:  Positive for fatigue. Negative for fever, chills, weight loss, night sweats and malaise.   HENT:  Negative for headaches.    Respiratory:  Negative for cough and shortness of breath.    Gastrointestinal:  Negative for indigestion.   Skin:  Positive for activity-related sunscreen use and wears hat (all the time). Negative for daily sunscreen use and recent sunburn.   Neurological:  Negative for headaches.   Hematologic/Lymphatic: Negative for adenopathy. Bruises/bleeds easily (on asa).       Objective:   Physical Exam   Constitutional: He appears well-developed and well-nourished. No distress.   Neurological: He is alert and oriented to person, place, and time. He is not disoriented.   Psychiatric: He has a normal mood and affect.   Skin:   Areas Examined (abnormalities noted in diagram):   RLE Inspected  LLE Inspection Performed            Diagram Legend     Erythematous scaling macule/papule c/w actinic keratosis       Vascular papule c/w angioma      Pigmented verrucoid papule/plaque c/w seborrheic keratosis      Yellow umbilicated papule c/w sebaceous hyperplasia      Irregularly shaped tan macule c/w lentigo     1-2 mm smooth white papules consistent with  "Milia      Movable subcutaneous cyst with punctum c/w epidermal inclusion cyst      Subcutaneous movable cyst c/w pilar cyst      Firm pink to brown papule c/w dermatofibroma      Pedunculated fleshy papule(s) c/w skin tag(s)      Evenly pigmented macule c/w junctional nevus     Mildly variegated pigmented, slightly irregular-bordered macule c/w mildly atypical nevus      Flesh colored to evenly pigmented papule c/w intradermal nevus       Pink pearly papule/plaque c/w basal cell carcinoma      Erythematous hyperkeratotic cursted plaque c/w SCC      Surgical scar with no sign of skin cancer recurrence      Open and closed comedones      Inflammatory papules and pustules      Verrucoid papule consistent consistent with wart     Erythematous eczematous patches and plaques     Dystrophic onycholytic nail with subungual debris c/w onychomycosis     Umbilicated papule    Erythematous-base heme-crusted tan verrucoid plaque consistent with inflamed seborrheic keratosis     Erythematous Silvery Scaling Plaque c/w Psoriasis     See annotation                        Assessment / Plan:        Gus aSmuels" was seen today for lesion.    Diagnoses and all orders for this visit:    Actinic keratosis  -     Ambulatory referral/consult to Dermatology    Other orders  -     valACYclovir (VALTREX) 1000 MG tablet; Take 1 tablet (1,000 mg total) by mouth 3 (three) times daily. for 7 days    Given continued development of new lesions, will start on Valtrex as above  Defer treatment to Lichen simplex pro tem  Followup 2 weeks  Call prn sooner           No follow-ups on file.    "

## 2024-05-01 ENCOUNTER — OFFICE VISIT (OUTPATIENT)
Dept: DERMATOLOGY | Facility: CLINIC | Age: 84
End: 2024-05-01
Payer: MEDICARE

## 2024-05-01 DIAGNOSIS — D48.5 NEOPLASM OF UNCERTAIN BEHAVIOR OF SKIN: ICD-10-CM

## 2024-05-01 DIAGNOSIS — B02.9 HERPES ZOSTER WITHOUT COMPLICATION: Primary | ICD-10-CM

## 2024-05-01 PROCEDURE — 99999 PR PBB SHADOW E&M-EST. PATIENT-LVL III: CPT | Mod: PBBFAC,,, | Performed by: DERMATOLOGY

## 2024-05-01 PROCEDURE — 88305 TISSUE EXAM BY PATHOLOGIST: CPT | Mod: 59 | Performed by: PATHOLOGY

## 2024-05-01 PROCEDURE — 1159F MED LIST DOCD IN RCRD: CPT | Mod: CPTII,S$GLB,, | Performed by: DERMATOLOGY

## 2024-05-01 PROCEDURE — 1126F AMNT PAIN NOTED NONE PRSNT: CPT | Mod: CPTII,S$GLB,, | Performed by: DERMATOLOGY

## 2024-05-01 PROCEDURE — 1160F RVW MEDS BY RX/DR IN RCRD: CPT | Mod: CPTII,S$GLB,, | Performed by: DERMATOLOGY

## 2024-05-01 PROCEDURE — 88305 TISSUE EXAM BY PATHOLOGIST: CPT | Mod: 26,,, | Performed by: PATHOLOGY

## 2024-05-01 PROCEDURE — 3288F FALL RISK ASSESSMENT DOCD: CPT | Mod: CPTII,S$GLB,, | Performed by: DERMATOLOGY

## 2024-05-01 PROCEDURE — 11103 TANGNTL BX SKIN EA SEP/ADDL: CPT | Mod: S$GLB,,, | Performed by: DERMATOLOGY

## 2024-05-01 PROCEDURE — 99212 OFFICE O/P EST SF 10 MIN: CPT | Mod: 25,S$GLB,, | Performed by: DERMATOLOGY

## 2024-05-01 PROCEDURE — 1101F PT FALLS ASSESS-DOCD LE1/YR: CPT | Mod: CPTII,S$GLB,, | Performed by: DERMATOLOGY

## 2024-05-01 PROCEDURE — 11102 TANGNTL BX SKIN SINGLE LES: CPT | Mod: S$GLB,,, | Performed by: DERMATOLOGY

## 2024-05-01 NOTE — PROGRESS NOTES
Subjective:      Patient ID:  Gus Sabillon is a 83 y.o. male who presents for   Chief Complaint   Patient presents with    Follow-up     History of Present Illness: The patient presents for follow up.    The patient was last seen on: 4/14/2024 for treatment to shingles with Valtrex 1000mg TID x 7 days.-Improved.  This is a high risk patient here to check for the development of new lesions.    Other skin complaints: none        Review of Systems   Constitutional:  Positive for fatigue. Negative for fever, chills, weight loss, night sweats and malaise.   HENT:  Negative for headaches.    Respiratory:  Negative for cough and shortness of breath.    Gastrointestinal:  Negative for indigestion.   Skin:  Positive for activity-related sunscreen use and wears hat (all the time). Negative for daily sunscreen use and recent sunburn.   Neurological:  Negative for headaches.   Hematologic/Lymphatic: Negative for adenopathy. Bruises/bleeds easily (on asa).       Objective:   Physical Exam   Constitutional: He appears well-developed and well-nourished. No distress.   Neurological: He is alert and oriented to person, place, and time. He is not disoriented.   Psychiatric: He has a normal mood and affect.   Skin:   Areas Examined (abnormalities noted in diagram):   RLE Inspected  LLE Inspection Performed            Diagram Legend     Erythematous scaling macule/papule c/w actinic keratosis       Vascular papule c/w angioma      Pigmented verrucoid papule/plaque c/w seborrheic keratosis      Yellow umbilicated papule c/w sebaceous hyperplasia      Irregularly shaped tan macule c/w lentigo     1-2 mm smooth white papules consistent with Milia      Movable subcutaneous cyst with punctum c/w epidermal inclusion cyst      Subcutaneous movable cyst c/w pilar cyst      Firm pink to brown papule c/w dermatofibroma      Pedunculated fleshy papule(s) c/w skin tag(s)      Evenly pigmented macule c/w junctional nevus     Mildly  variegated pigmented, slightly irregular-bordered macule c/w mildly atypical nevus      Flesh colored to evenly pigmented papule c/w intradermal nevus       Pink pearly papule/plaque c/w basal cell carcinoma      Erythematous hyperkeratotic cursted plaque c/w SCC      Surgical scar with no sign of skin cancer recurrence      Open and closed comedones      Inflammatory papules and pustules      Verrucoid papule consistent consistent with wart     Erythematous eczematous patches and plaques     Dystrophic onycholytic nail with subungual debris c/w onychomycosis     Umbilicated papule    Erythematous-base heme-crusted tan verrucoid plaque consistent with inflamed seborrheic keratosis     Erythematous Silvery Scaling Plaque c/w Psoriasis     See annotation                    Assessment / Plan:      Pathology Orders:       Normal Orders This Visit    Specimen to Pathology, Dermatology     Comments:    Number of Specimens:->3  ------------------------->-------------------------  Spec 1 Procedure:->Biopsy  Spec 1 Clinical Impression:->lichen simplex vs KA/SCC  Spec 1 Source:->left shin  ------------------------->-------------------------  Spec 2 Procedure:->Biopsy  Spec 2 Clinical Impression:->lichen simplex vs KA/SCC  Spec 2 Source:->right shin proximal  ------------------------->-------------------------  Spec 3 Procedure:->Biopsy  Spec 3 Clinical Impression:->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  Spec 3 Source:->right shin distal  Release to patient->Immediate    Questions:    Procedure Type: Dermatology and skin neoplasms    Number of Specimens: 3    ------------------------: -------------------------    Spec 1 Procedure: Biopsy    Spec 1 Clinical Impression: lichen simplex vs KA/SCC    Spec 1 Source: left shin    ------------------------: -------------------------    Spec 2 Procedure: Biopsy    Spec 2 Clinical Impression: lichen simplex vs KA/SCC    Spec 2 Source: right shin proximal    ------------------------:  -------------------------    Spec 3 Procedure: Biopsy    Spec 3 Clinical Impression: 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 All questions were answered. Verbal consent was obtained.    SITE ONE  Location: left shin  Indication: clinically suspicious lesion; rule out malignancy  Prep: ethanol  Anesthesia: 1% lidocaine plain, less than 2 mL  Shave biopsy performed  Hemostasis with application of aluminum chloride and electrodesiccation  Dressed with petrolatum and bandaid  Specimen placed in formalin to be submitted to pathology    SITE TWO  Location: right shin proximal  Indication: clinically suspicious lesion; rule out malignancy  Prep: as above  Anesthesia: as above  Shave biopsy performed  Hemostasis as above  Dressed with petrolatum and bandaid  Specimen placed in formalin to be submitted to pathology    SITE THREE  Location: right shin distal  Indication: clinically suspicious lesion; rule out malignancy  Prep: as above  Anesthesia: as above  Shave biopsy performed  Hemostasis with as above  Dressed with petrolatum and bandaid  Specimen placed in formalin to be submitted to pathology  Routine care instructions given  Followup: per path  If biopsy positive for malignancy, refer to Dr. Saldana or Dr. Dennis for Mohs surgery consultation.

## 2024-05-02 ENCOUNTER — HOSPITAL ENCOUNTER (OUTPATIENT)
Dept: RADIOLOGY | Facility: HOSPITAL | Age: 84
Discharge: HOME | End: 2024-05-02
Attending: STUDENT IN AN ORGANIZED HEALTH CARE EDUCATION/TRAINING PROGRAM
Payer: MEDICARE

## 2024-05-02 DIAGNOSIS — I65.29 OCCLUSION AND STENOSIS OF UNSPECIFIED CAROTID ARTERY: ICD-10-CM

## 2024-05-02 DIAGNOSIS — R42 DIZZINESS AND GIDDINESS: ICD-10-CM

## 2024-05-02 PROCEDURE — 70544 MR ANGIOGRAPHY HEAD W/O DYE: CPT | Mod: 59,TC

## 2024-05-02 PROCEDURE — 70551 MRI BRAIN STEM W/O DYE: CPT | Mod: TC

## 2024-05-02 PROCEDURE — 70547 MR ANGIOGRAPHY NECK W/O DYE: CPT | Mod: TC

## 2024-05-02 PROCEDURE — 70551 MRI BRAIN STEM W/O DYE: CPT | Mod: 26,,, | Performed by: RADIOLOGY

## 2024-05-02 PROCEDURE — 70544 MR ANGIOGRAPHY HEAD W/O DYE: CPT | Mod: 26,59,, | Performed by: RADIOLOGY

## 2024-05-02 PROCEDURE — 70547 MR ANGIOGRAPHY NECK W/O DYE: CPT | Mod: 26,,, | Performed by: RADIOLOGY

## 2024-05-06 LAB
FINAL PATHOLOGIC DIAGNOSIS: NORMAL
GROSS: NORMAL
Lab: NORMAL
MICROSCOPIC EXAM: NORMAL

## 2024-05-07 ENCOUNTER — PATIENT MESSAGE (OUTPATIENT)
Dept: CARDIOLOGY | Facility: CLINIC | Age: 84
End: 2024-05-07
Payer: MEDICARE

## 2024-05-08 ENCOUNTER — OFFICE VISIT (OUTPATIENT)
Dept: DERMATOLOGY | Facility: CLINIC | Age: 84
End: 2024-05-08
Payer: MEDICARE

## 2024-05-08 DIAGNOSIS — L57.0 ACTINIC KERATOSIS: ICD-10-CM

## 2024-05-08 DIAGNOSIS — C44.722 SQUAMOUS CELL CARCINOMA OF LEG, RIGHT: Primary | ICD-10-CM

## 2024-05-08 PROCEDURE — 99999 PR PBB SHADOW E&M-EST. PATIENT-LVL III: CPT | Mod: PBBFAC,,, | Performed by: DERMATOLOGY

## 2024-05-08 PROCEDURE — 1159F MED LIST DOCD IN RCRD: CPT | Mod: CPTII,S$GLB,, | Performed by: DERMATOLOGY

## 2024-05-08 PROCEDURE — 1126F AMNT PAIN NOTED NONE PRSNT: CPT | Mod: CPTII,S$GLB,, | Performed by: DERMATOLOGY

## 2024-05-08 PROCEDURE — 3288F FALL RISK ASSESSMENT DOCD: CPT | Mod: CPTII,S$GLB,, | Performed by: DERMATOLOGY

## 2024-05-08 PROCEDURE — 1160F RVW MEDS BY RX/DR IN RCRD: CPT | Mod: CPTII,S$GLB,, | Performed by: DERMATOLOGY

## 2024-05-08 PROCEDURE — 99213 OFFICE O/P EST LOW 20 MIN: CPT | Mod: S$GLB,,, | Performed by: DERMATOLOGY

## 2024-05-08 PROCEDURE — 1101F PT FALLS ASSESS-DOCD LE1/YR: CPT | Mod: CPTII,S$GLB,, | Performed by: DERMATOLOGY

## 2024-05-08 NOTE — PROGRESS NOTES
Final Pathologic Diagnosis   Date Value Ref Range Status   05/01/2024   Final    1. Skin, left shin, shave biopsy:  -HYPERTROPHIC ACTINIC KERATOSIS (IRRITATED AND INFLAMED), INVOLVING HAIR FOLLICLES AND EXTENDING TO THE BASE OF THE BIOPSY    This lesion is atypical and further treatment may be required. You will be contacted by your provider's office.     2. Skin, right shin proximal, shave biopsy:  -SQUAMOUS CELL CARCINOMA, EXTENDING TO THE PERIPHERAL AND DEEP BIOPSY EDGES    This lesion is skin cancer. You will be contacted regarding treatment.    3. Skin, right shin distal, shave biopsy:  -SQUAMOUS CELL CARCINOMA, EXTENDING TO THE PERIPHERAL AND DEEP BIOPSY EDGES    This lesion is skin cancer. You will be contacted regarding treatment.       Comment:     Interp By Stone Roy M.D., Signed on 05/06/2024 at 14:37        The patient's wife listened in by phone on this visit.        Subjective:      Patient ID:  Gus Sabillon is a 83 y.o. male who presents for   Chief Complaint   Patient presents with    Follow-up     Discuss Biopsy results     History of Present Illness: The patient presents for follow up of biopsies.    The patient was last seen on: 5/1/2024 for bx x3- Left shin c/w HAK, Right shin proximal and Right shin distal both c/w SCC. Pt here today to discuss biopsy results and treatment.      Previously had Mohs by Dr. Saldana in 2020 for SCC right hand.    Review of Systems   Constitutional:  Positive for fatigue. Negative for fever, chills, weight loss, night sweats and malaise.   HENT:  Negative for headaches.    Respiratory:  Negative for cough and shortness of breath.    Gastrointestinal:  Negative for indigestion.   Skin:  Positive for activity-related sunscreen use and wears hat (all the time). Negative for daily sunscreen use and recent sunburn.   Neurological:  Negative for headaches.   Hematologic/Lymphatic: Negative for adenopathy. Bruises/bleeds easily (on asa).       Objective:    Physical Exam   Skin:               Diagram Legend     Erythematous scaling macule/papule c/w actinic keratosis       Vascular papule c/w angioma      Pigmented verrucoid papule/plaque c/w seborrheic keratosis      Yellow umbilicated papule c/w sebaceous hyperplasia      Irregularly shaped tan macule c/w lentigo     1-2 mm smooth white papules consistent with Milia      Movable subcutaneous cyst with punctum c/w epidermal inclusion cyst      Subcutaneous movable cyst c/w pilar cyst      Firm pink to brown papule c/w dermatofibroma      Pedunculated fleshy papule(s) c/w skin tag(s)      Evenly pigmented macule c/w junctional nevus     Mildly variegated pigmented, slightly irregular-bordered macule c/w mildly atypical nevus      Flesh colored to evenly pigmented papule c/w intradermal nevus       Pink pearly papule/plaque c/w basal cell carcinoma      Erythematous hyperkeratotic cursted plaque c/w SCC      Surgical scar with no sign of skin cancer recurrence      Open and closed comedones      Inflammatory papules and pustules      Verrucoid papule consistent consistent with wart     Erythematous eczematous patches and plaques     Dystrophic onycholytic nail with subungual debris c/w onychomycosis     Umbilicated papule    Erythematous-base heme-crusted tan verrucoid plaque consistent with inflamed seborrheic keratosis     Erythematous Silvery Scaling Plaque c/w Psoriasis     See annotation    Photo last week of right leg sites      Assessment / Plan:        Squamous cell carcinoma of leg, right    Actinic keratosis    Discussed diagnoses and options.     Will refer to Dr. Saldana for treatment of SCC x 2 on right leg    Defer treatment left leg pro tem. Consider Efudex.   Will coordinate followup with general derm in 6-8 weeks.     Discussion of the diagnosis and treatment options, and risks and benefits of the alternative, and answering any questions regarding these constituted greater than 50% of the face-to-face  encounter, the duration of which was 20 minutes.

## 2024-05-08 NOTE — PROGRESS NOTES
Recommend patient to Dr. Saldana for Mohs surgery consultation. Picture in chart.  Patient has previously had surgery by Dr. Saldana.  Path below.  Please arrange follow up visit with General Dermatology in 2 months.   Final Pathologic Diagnosis       Date                     Value               Ref Range           Status                05/01/2024                                                       Final               2. Skin, right shin proximal, shave biopsy:-SQUAMOUS CELL CARCINOMA, EXTENDING TO THE PERIPHERAL AND DEEP BIOPSY EDGES      3. Skin, right shin distal, shave biopsy:-SQUAMOUS CELL CARCINOMA, EXTENDING TO THE PERIPHERAL AND DEEP BIOPSY EDGES      Comment:    Interp By Stone Roy M.D., Signed on 05/06/2024 at 14:37  ----------

## 2024-05-09 ENCOUNTER — LAB VISIT (OUTPATIENT)
Dept: LAB | Facility: HOSPITAL | Age: 84
End: 2024-05-09
Payer: MEDICARE

## 2024-05-09 ENCOUNTER — OFFICE VISIT (OUTPATIENT)
Dept: INTERNAL MEDICINE | Facility: CLINIC | Age: 84
End: 2024-05-09
Payer: MEDICARE

## 2024-05-09 VITALS
OXYGEN SATURATION: 99 % | WEIGHT: 156 LBS | HEIGHT: 73 IN | RESPIRATION RATE: 16 BRPM | TEMPERATURE: 98 F | BODY MASS INDEX: 20.67 KG/M2 | DIASTOLIC BLOOD PRESSURE: 44 MMHG | SYSTOLIC BLOOD PRESSURE: 128 MMHG | HEART RATE: 41 BPM

## 2024-05-09 DIAGNOSIS — R55 POSTURAL DIZZINESS WITH PRESYNCOPE: ICD-10-CM

## 2024-05-09 DIAGNOSIS — R00.1 BRADYCARDIA: Primary | ICD-10-CM

## 2024-05-09 DIAGNOSIS — R00.1 BRADYCARDIA: ICD-10-CM

## 2024-05-09 DIAGNOSIS — R42 POSTURAL DIZZINESS WITH PRESYNCOPE: ICD-10-CM

## 2024-05-09 LAB
ALBUMIN SERPL BCP-MCNC: 3.5 G/DL (ref 3.5–5.2)
ALP SERPL-CCNC: 61 U/L (ref 55–135)
ALT SERPL W/O P-5'-P-CCNC: 12 U/L (ref 10–44)
ANION GAP SERPL CALC-SCNC: 9 MMOL/L (ref 8–16)
AST SERPL-CCNC: 18 U/L (ref 10–40)
BASOPHILS # BLD AUTO: 0.04 K/UL (ref 0–0.2)
BASOPHILS NFR BLD: 0.5 % (ref 0–1.9)
BILIRUB SERPL-MCNC: 0.5 MG/DL (ref 0.1–1)
BUN SERPL-MCNC: 48 MG/DL (ref 8–23)
CALCIUM SERPL-MCNC: 9.9 MG/DL (ref 8.7–10.5)
CHLORIDE SERPL-SCNC: 103 MMOL/L (ref 95–110)
CO2 SERPL-SCNC: 26 MMOL/L (ref 23–29)
CREAT SERPL-MCNC: 1.8 MG/DL (ref 0.5–1.4)
DIFFERENTIAL METHOD BLD: ABNORMAL
EOSINOPHIL # BLD AUTO: 0.2 K/UL (ref 0–0.5)
EOSINOPHIL NFR BLD: 2.4 % (ref 0–8)
ERYTHROCYTE [DISTWIDTH] IN BLOOD BY AUTOMATED COUNT: 16.6 % (ref 11.5–14.5)
EST. GFR  (NO RACE VARIABLE): 37.1 ML/MIN/1.73 M^2
GLUCOSE SERPL-MCNC: 121 MG/DL (ref 70–110)
HCT VFR BLD AUTO: 34.9 % (ref 40–54)
HGB BLD-MCNC: 11.2 G/DL (ref 14–18)
IMM GRANULOCYTES # BLD AUTO: 0.02 K/UL (ref 0–0.04)
IMM GRANULOCYTES NFR BLD AUTO: 0.2 % (ref 0–0.5)
LYMPHOCYTES # BLD AUTO: 3.2 K/UL (ref 1–4.8)
LYMPHOCYTES NFR BLD: 36.5 % (ref 18–48)
MAGNESIUM SERPL-MCNC: 2.3 MG/DL (ref 1.6–2.6)
MCH RBC QN AUTO: 30.3 PG (ref 27–31)
MCHC RBC AUTO-ENTMCNC: 32.1 G/DL (ref 32–36)
MCV RBC AUTO: 94 FL (ref 82–98)
MONOCYTES # BLD AUTO: 0.9 K/UL (ref 0.3–1)
MONOCYTES NFR BLD: 9.8 % (ref 4–15)
NEUTROPHILS # BLD AUTO: 4.4 K/UL (ref 1.8–7.7)
NEUTROPHILS NFR BLD: 50.6 % (ref 38–73)
NRBC BLD-RTO: 0 /100 WBC
OHS QRS DURATION: 90 MS
OHS QTC CALCULATION: 394 MS
PLATELET # BLD AUTO: 224 K/UL (ref 150–450)
PMV BLD AUTO: 10.9 FL (ref 9.2–12.9)
POTASSIUM SERPL-SCNC: 5 MMOL/L (ref 3.5–5.1)
PROT SERPL-MCNC: 6.3 G/DL (ref 6–8.4)
RBC # BLD AUTO: 3.7 M/UL (ref 4.6–6.2)
SODIUM SERPL-SCNC: 138 MMOL/L (ref 136–145)
TSH SERPL DL<=0.005 MIU/L-ACNC: 1.75 UIU/ML (ref 0.4–4)
WBC # BLD AUTO: 8.66 K/UL (ref 3.9–12.7)

## 2024-05-09 PROCEDURE — 99214 OFFICE O/P EST MOD 30 MIN: CPT | Mod: 25,S$GLB,, | Performed by: NURSE PRACTITIONER

## 2024-05-09 PROCEDURE — 1159F MED LIST DOCD IN RCRD: CPT | Mod: CPTII,S$GLB,, | Performed by: NURSE PRACTITIONER

## 2024-05-09 PROCEDURE — 1126F AMNT PAIN NOTED NONE PRSNT: CPT | Mod: CPTII,S$GLB,, | Performed by: NURSE PRACTITIONER

## 2024-05-09 PROCEDURE — 3074F SYST BP LT 130 MM HG: CPT | Mod: CPTII,S$GLB,, | Performed by: NURSE PRACTITIONER

## 2024-05-09 PROCEDURE — 93010 ELECTROCARDIOGRAM REPORT: CPT | Mod: S$GLB,,, | Performed by: INTERNAL MEDICINE

## 2024-05-09 PROCEDURE — 85025 COMPLETE CBC W/AUTO DIFF WBC: CPT | Performed by: NURSE PRACTITIONER

## 2024-05-09 PROCEDURE — 3078F DIAST BP <80 MM HG: CPT | Mod: CPTII,S$GLB,, | Performed by: NURSE PRACTITIONER

## 2024-05-09 PROCEDURE — 99999 PR PBB SHADOW E&M-EST. PATIENT-LVL IV: CPT | Mod: PBBFAC,,, | Performed by: NURSE PRACTITIONER

## 2024-05-09 PROCEDURE — 80053 COMPREHEN METABOLIC PANEL: CPT | Performed by: NURSE PRACTITIONER

## 2024-05-09 PROCEDURE — 36415 COLL VENOUS BLD VENIPUNCTURE: CPT | Mod: PO | Performed by: NURSE PRACTITIONER

## 2024-05-09 PROCEDURE — 83735 ASSAY OF MAGNESIUM: CPT | Performed by: NURSE PRACTITIONER

## 2024-05-09 PROCEDURE — 93005 ELECTROCARDIOGRAM TRACING: CPT | Mod: S$GLB,,, | Performed by: NURSE PRACTITIONER

## 2024-05-09 PROCEDURE — 84443 ASSAY THYROID STIM HORMONE: CPT | Performed by: NURSE PRACTITIONER

## 2024-05-09 PROCEDURE — 1160F RVW MEDS BY RX/DR IN RCRD: CPT | Mod: CPTII,S$GLB,, | Performed by: NURSE PRACTITIONER

## 2024-05-09 NOTE — PROGRESS NOTES
Subjective     Patient ID: Gus Sabillon is a 83 y.o. male.    Chief Complaint: Dizziness and Irregular Heart Beat    Pt reports consistent, lingering bradycardia with syncopal episodes without falls for the past month.  His history includes heart failure with preserved ejection fraction, hypertension, hyperlipidemia, PVCs, CKD, PAD status post bilateral lower extremity PI, carotid stenosis s/p R CEA '21, and GERD. Pt denies any chest pain or shortness of breath with episodes. EKG back in January showed  ' Sinus rhythm with 1st degree A-V block with frequent Premature ventricular complexes in a pattern of bigeminy.Otherwise normal ECG.' He reports his HR has been trending downward since early April. Recent atenolol dose was increased to 75 mg by cards but he has only been taking 50 mg. He continues to complain about mx loose stools daily which have been dark in colour.     Dizziness:    Associated symptoms: light-headedness.    Review of Systems   Neurological:  Positive for dizziness and light-headedness.      Objective   Physical Exam  Vitals reviewed.   Constitutional:       Appearance: Normal appearance.   HENT:      Head: Normocephalic and atraumatic.      Right Ear: Tympanic membrane normal.      Left Ear: Tympanic membrane normal.      Nose: Nose normal.      Mouth/Throat:      Mouth: Mucous membranes are moist.   Eyes:      Pupils: Pupils are equal, round, and reactive to light.   Cardiovascular:      Rate and Rhythm: Bradycardia present.      Heart sounds: Normal heart sounds.   Pulmonary:      Effort: Pulmonary effort is normal.      Breath sounds: Normal breath sounds.   Abdominal:      General: Abdomen is flat. Bowel sounds are normal.      Palpations: Abdomen is soft.   Musculoskeletal:         General: Normal range of motion.      Cervical back: Normal range of motion.   Skin:     General: Skin is warm and dry.   Neurological:      General: No focal deficit present.      Mental Status: He is alert  and oriented to person, place, and time.   Psychiatric:         Mood and Affect: Mood normal.         Behavior: Behavior normal.        Assessment and Plan   1. Bradycardia  Assessment & Plan:  Symptomatic bradycardia.   Recommend holding atenolog in the AM.   He is advised to go to the ER but refused to do so.   Labs ordered and will be completed today.   Fortunately the nurse was able to get pt in with Austyn Sarabia tomorrow @ 1pm.       Orders:  -     CBC W/ AUTO DIFFERENTIAL; Future; Expected date: 05/09/2024  -     COMPREHENSIVE METABOLIC PANEL; Future; Expected date: 05/09/2024  -     TSH; Future; Expected date: 05/09/2024  -     Magnesium; Future; Expected date: 05/09/2024    2. Postural dizziness with presyncope  -     CBC W/ AUTO DIFFERENTIAL; Future; Expected date: 05/09/2024  -     COMPREHENSIVE METABOLIC PANEL; Future; Expected date: 05/09/2024  -     TSH; Future; Expected date: 05/09/2024  -     Magnesium; Future; Expected date: 05/09/2024    Please keep appt with PCP on 6/5/24

## 2024-05-09 NOTE — ASSESSMENT & PLAN NOTE
Symptomatic bradycardia.   Recommend holding atenolol in the AM.   He is advised to go to the ER but refused to do so.   Labs ordered and will be completed today.   Fortunately the nurse was able to get pt in with Dr Sarabia tomorrow @ 1pm.   ER criterior re-iterated.

## 2024-05-10 ENCOUNTER — OFFICE VISIT (OUTPATIENT)
Dept: CARDIOLOGY | Facility: CLINIC | Age: 84
End: 2024-05-10
Payer: MEDICARE

## 2024-05-10 VITALS
HEART RATE: 44 BPM | WEIGHT: 161.81 LBS | BODY MASS INDEX: 21.35 KG/M2 | DIASTOLIC BLOOD PRESSURE: 69 MMHG | SYSTOLIC BLOOD PRESSURE: 118 MMHG

## 2024-05-10 DIAGNOSIS — R79.9 ABNORMAL FINDING OF BLOOD CHEMISTRY, UNSPECIFIED: ICD-10-CM

## 2024-05-10 DIAGNOSIS — I71.40 ABDOMINAL AORTIC ANEURYSM (AAA) WITHOUT RUPTURE, UNSPECIFIED PART: ICD-10-CM

## 2024-05-10 DIAGNOSIS — N18.32 STAGE 3B CHRONIC KIDNEY DISEASE: ICD-10-CM

## 2024-05-10 DIAGNOSIS — I73.9 PAD (PERIPHERAL ARTERY DISEASE): ICD-10-CM

## 2024-05-10 DIAGNOSIS — E78.2 MIXED HYPERLIPIDEMIA: ICD-10-CM

## 2024-05-10 DIAGNOSIS — I70.0 AORTIC ATHEROSCLEROSIS: ICD-10-CM

## 2024-05-10 DIAGNOSIS — I10 PRIMARY HYPERTENSION: ICD-10-CM

## 2024-05-10 DIAGNOSIS — I65.21 STENOSIS OF RIGHT CAROTID ARTERY: ICD-10-CM

## 2024-05-10 DIAGNOSIS — I71.43 INFRARENAL ABDOMINAL AORTIC ANEURYSM (AAA) WITHOUT RUPTURE: ICD-10-CM

## 2024-05-10 DIAGNOSIS — Z98.890 S/P CAROTID ENDARTERECTOMY: ICD-10-CM

## 2024-05-10 DIAGNOSIS — R00.1 BRADYCARDIA: ICD-10-CM

## 2024-05-10 DIAGNOSIS — I50.30 ACC/AHA STAGE C HEART FAILURE WITH PRESERVED EJECTION FRACTION: Primary | ICD-10-CM

## 2024-05-10 PROCEDURE — 1126F AMNT PAIN NOTED NONE PRSNT: CPT | Mod: CPTII,S$GLB,, | Performed by: INTERNAL MEDICINE

## 2024-05-10 PROCEDURE — 3288F FALL RISK ASSESSMENT DOCD: CPT | Mod: CPTII,S$GLB,, | Performed by: INTERNAL MEDICINE

## 2024-05-10 PROCEDURE — 3078F DIAST BP <80 MM HG: CPT | Mod: CPTII,S$GLB,, | Performed by: INTERNAL MEDICINE

## 2024-05-10 PROCEDURE — 1101F PT FALLS ASSESS-DOCD LE1/YR: CPT | Mod: CPTII,S$GLB,, | Performed by: INTERNAL MEDICINE

## 2024-05-10 PROCEDURE — 93000 ELECTROCARDIOGRAM COMPLETE: CPT | Mod: S$GLB,,, | Performed by: INTERNAL MEDICINE

## 2024-05-10 PROCEDURE — 99999 PR PBB SHADOW E&M-EST. PATIENT-LVL III: CPT | Mod: PBBFAC,,, | Performed by: INTERNAL MEDICINE

## 2024-05-10 PROCEDURE — 99215 OFFICE O/P EST HI 40 MIN: CPT | Mod: S$GLB,,, | Performed by: INTERNAL MEDICINE

## 2024-05-10 PROCEDURE — 1159F MED LIST DOCD IN RCRD: CPT | Mod: CPTII,S$GLB,, | Performed by: INTERNAL MEDICINE

## 2024-05-10 PROCEDURE — 1160F RVW MEDS BY RX/DR IN RCRD: CPT | Mod: CPTII,S$GLB,, | Performed by: INTERNAL MEDICINE

## 2024-05-10 PROCEDURE — 3074F SYST BP LT 130 MM HG: CPT | Mod: CPTII,S$GLB,, | Performed by: INTERNAL MEDICINE

## 2024-05-10 NOTE — PROGRESS NOTES
HISTORY:    83-year-old male with a history of heart failure with preserved ejection fraction, hypertension, hyperlipidemia, PVCs, CKD, PAD status post bilateral lower extremity PI, carotid stenosis s/p R CEA '21, GERD, squamous cell carcinoma of the skin presenting for follow-up.    Patient was initially evaluated by me in early January '23 with significant heart failure symptoms.  We tried outpatient management with diuretics and goal-directed medical therapy, but the patient required in-hospital diuresis with a positive response to inpatient diuresis and medicine adjustment. Lost 10-15 pounds initially that he maintained, monitoring weight and limiting fluid/salt intake.     More recently has lost more weight with decreased appetite. Also notes some abdominal discomfort. Additionally he notes progressive dizziness and light headedness since last visit. Is more frequent at this time along with fatigue. Not necessarily orthostatic in nature. Has been monitoring Bps and heart rates and has noted a significant decrease in his heart rates.     No orthopnea, edema, or PRINGLE. No chest pain.    Back to regular exercise. Doing resistance exercises in the pool.     Did have covid 1 month ago and has been sluggish at times.    He currently tolerates aspirin 81 x 1, atenolol 50 x 1, spironolactone 25 x 1, empagloflozin 10x1.  PCP increased rosuvastatin to 40x1. Also, on magnesium for h/o symptomatic hypomagnesemia. Bps 120s/70s on average.    PHYSICAL EXAM:    Vitals:    05/10/24 1310   BP: 118/69   Pulse: (!) 44     Same BP and heart rate with standing.     NAD, A+Ox3.  No jvd, no bruit.  RRR nml s1,s2. No murmurs.  CTA B no wheezes or crackles.  No edema.    LABS/STUDIES (imaging reviewed during clinic visit):    May 2024 hemoglobin 11.2/MCV 94.  Creatinine 1.8/BUN 48/GFR 37 (baseline).  Albumin 3.5.  December 2023 CBC normal.  CMP with a creatinine of 1.7/BUN 39/GFR 40 (baseline).  /HDL 55//.   and HDL 55.  Triglycerides 122.  BNP May '23 700<- March '23 1300 <- Jan '23 3300. May 2024 TSH normal.    ECG May 10th sinus bradycardia with a heart rate of 47 and no Qs. PVCs. May 9th 2024 sinus bradycardia with a heart rate of 36 and no Q-waves.  Nonspecific ST changes.  January 2024 SR 1st AVB. No Qs/Sts. PVCs. January 2023 demonstrates sinus rhythm with no Q-waves or ST changes.  PVCs noted.    TTE May 2023 Nml LV size and function.  Mild AI/mild MR.  CVP 3/PASP 25. January 2023 demonstrates normal LV size and systolic function.  LVH is present.  EF 65%.  RV enlargement with normal function.  Moderate AI, mild-to-moderate MR.  CVP 8 with estimated PASP of 39.  NST March 2023 LVEF 47% at rest and 55% with stress.  Equivocal perfusion abnormality that is fixed in the basal to distal inferior wall with bowel interference.  Carotid December 2023 carotid atherosclerosis doses with a patent right carotid endarterectomy patch.  Left ICA less than 50%.  Elevated ECA velocity with dense plaque at the bifurcation.  Vertebral flow antegrade bilaterally.  DL/PVR September 2022 demonstrates normal DL with unremarkable PVR waveforms bilaterally.    Abd us 2021 AAA 3.3x3.3  MRI/MRA head and neck no acute abnormalities.  Mild chronic small-vessel changes.  Left ICA atherosclerosis doses with less than 50% stenosis.  Patent right CEA patch.  No evidence of intracranial large vessel occlusion or stenosis.    ASSESSMENT & PLAN:    1. ACC/AHA stage C heart failure with preserved ejection fraction    2. Abdominal aortic aneurysm (AAA) without rupture, unspecified part    3. Aortic atherosclerosis    4. Bradycardia    5. Stenosis of right carotid artery    6. Infrarenal abdominal aortic aneurysm (AAA) without rupture    7. Mixed hyperlipidemia    8. PAD (peripheral artery disease)    9. Primary hypertension    10. S/P carotid endarterectomy    11. Stage 3b chronic kidney disease    12. Abnormal finding of blood chemistry,  unspecified        Orders Placed This Encounter    BNP    PROTEIN ELECTROPHORESIS, SERUM    Protein electrophoresis, timed urine    Hemoglobin A1C    PYP ATTR related Amyloidosis    Holter monitor - 24 hour         Patient with diastolic heart failure that required inpatient admission in early '23. S/p successful diuresis at that time with improvement in BNP and symptoms. Pt was doing well on a small amount of GDMT, but recently has been struggling with dizziness and fatigue as well as significant bradycardia. No e/o heart block on ECGs.    Will hold atenolol. Check holter. Pt does not feel like he can exercise on a treadmill. Will check BNP.    Okay to hold spironolactone and empagliflozin as pt seems to be losing weight and not tolerating much po intake. Not orthostatic on exam.     Previously wondered about infiltrative CMP as a cause of his dCHF presentation in early '23. Pt favored medical management as he was doing well. Will assess for amyloidosis at this time. Check SPEP/UPEP and PYP scan.    On rosuvastatin 20x1. No recent lipid profile. Previously pt deferred more intensive lipid lowering on top of statin tx.    PVD previously managed by vasc surgery for R CEA and small AAA. Unremarkable ABIs '22. Mild claudication. Unremarkable MRI/MRA Brain recently.    Follow up in about 3 months (around 8/10/2024).      Karrie Sarabia MD

## 2024-05-17 ENCOUNTER — CLINICAL SUPPORT (OUTPATIENT)
Dept: CARDIOLOGY | Facility: HOSPITAL | Age: 84
End: 2024-05-17
Attending: INTERNAL MEDICINE
Payer: MEDICARE

## 2024-05-17 DIAGNOSIS — R00.1 BRADYCARDIA: ICD-10-CM

## 2024-05-17 LAB
OHS QRS DURATION: 92 MS
OHS QTC CALCULATION: 423 MS

## 2024-05-17 PROCEDURE — 93227 XTRNL ECG REC<48 HR R&I: CPT | Mod: ,,, | Performed by: INTERNAL MEDICINE

## 2024-05-17 PROCEDURE — 93225 XTRNL ECG REC<48 HRS REC: CPT

## 2024-05-19 ENCOUNTER — PATIENT MESSAGE (OUTPATIENT)
Dept: CARDIOLOGY | Facility: CLINIC | Age: 84
End: 2024-05-19
Payer: MEDICARE

## 2024-05-20 ENCOUNTER — PATIENT MESSAGE (OUTPATIENT)
Dept: CARDIOLOGY | Facility: CLINIC | Age: 84
End: 2024-05-20
Payer: MEDICARE

## 2024-05-20 DIAGNOSIS — I50.30 ACC/AHA STAGE C HEART FAILURE WITH PRESERVED EJECTION FRACTION: Primary | ICD-10-CM

## 2024-05-21 ENCOUNTER — TELEPHONE (OUTPATIENT)
Dept: CARDIOLOGY | Facility: CLINIC | Age: 84
End: 2024-05-21
Payer: MEDICARE

## 2024-05-21 NOTE — TELEPHONE ENCOUNTER
----- Message from Emely Mathias sent at 5/21/2024 12:00 PM CDT -----  Regarding: Urine specimen  Pt [phone] says he was given the specimen cup but without directions, he would like a call about the order.    Thanks

## 2024-05-22 ENCOUNTER — PATIENT MESSAGE (OUTPATIENT)
Dept: CARDIOLOGY | Facility: CLINIC | Age: 84
End: 2024-05-22
Payer: MEDICARE

## 2024-05-22 ENCOUNTER — LAB VISIT (OUTPATIENT)
Dept: LAB | Facility: HOSPITAL | Age: 84
End: 2024-05-22
Attending: INTERNAL MEDICINE
Payer: MEDICARE

## 2024-05-22 DIAGNOSIS — I50.30 ACC/AHA STAGE C HEART FAILURE WITH PRESERVED EJECTION FRACTION: Primary | ICD-10-CM

## 2024-05-22 DIAGNOSIS — I49.3 PVC'S (PREMATURE VENTRICULAR CONTRACTIONS): ICD-10-CM

## 2024-05-22 DIAGNOSIS — I50.30 ACC/AHA STAGE C HEART FAILURE WITH PRESERVED EJECTION FRACTION: ICD-10-CM

## 2024-05-22 LAB
OHS CV EVENT MONITOR DAY: 0
OHS CV HOLTER LENGTH DECIMAL HOURS: 24
OHS CV HOLTER LENGTH HOURS: 24
OHS CV HOLTER LENGTH MINUTES: 0
OHS CV HOLTER SINUS AVERAGE HR: 104
OHS CV HOLTER SINUS MAX HR: 130
OHS CV HOLTER SINUS MIN HR: 90
PROT 24H UR-MRATE: 156 MG/SPEC (ref 0–100)
PROT UR-MCNC: 12 MG/DL (ref 0–15)
URINE COLLECTION DURATION: 24 HR
URINE VOLUME: 1300 ML

## 2024-05-22 PROCEDURE — 84166 PROTEIN E-PHORESIS/URINE/CSF: CPT | Performed by: INTERNAL MEDICINE

## 2024-05-22 PROCEDURE — 84166 PROTEIN E-PHORESIS/URINE/CSF: CPT | Mod: 26,,, | Performed by: PATHOLOGY

## 2024-05-22 PROCEDURE — 84156 ASSAY OF PROTEIN URINE: CPT | Performed by: INTERNAL MEDICINE

## 2024-05-22 RX ORDER — AMIODARONE HYDROCHLORIDE 200 MG/1
TABLET ORAL
Qty: 144 TABLET | Refills: 0 | Status: SHIPPED | OUTPATIENT
Start: 2024-05-22 | End: 2024-08-18

## 2024-05-23 ENCOUNTER — TELEPHONE (OUTPATIENT)
Dept: CARDIOLOGY | Facility: CLINIC | Age: 84
End: 2024-05-23
Payer: MEDICARE

## 2024-05-23 LAB
PATHOLOGIST INTERPRETATION UPE: NORMAL
PROT PATTERN UR ELPH-IMP: NORMAL

## 2024-05-24 RX ORDER — SPIRONOLACTONE 25 MG/1
25 TABLET ORAL DAILY
Qty: 90 TABLET | Refills: 3 | Status: SHIPPED | OUTPATIENT
Start: 2024-05-24 | End: 2025-05-24

## 2024-05-24 RX ORDER — FUROSEMIDE 20 MG/1
20 TABLET ORAL DAILY
Qty: 90 TABLET | Refills: 3 | Status: SHIPPED | OUTPATIENT
Start: 2024-05-24

## 2024-05-30 ENCOUNTER — TELEPHONE (OUTPATIENT)
Dept: DERMATOLOGY | Facility: CLINIC | Age: 84
End: 2024-05-30
Payer: MEDICARE

## 2024-05-30 NOTE — TELEPHONE ENCOUNTER
----- Message from Elayne Cárdenas sent at 5/30/2024  3:45 PM CDT -----  Regarding: pt advice  Contact: [phone]  Pt returning call from staff. Pt looking for referral in regards to some issues pt is having. Pls call to discuss.

## 2024-05-30 NOTE — TELEPHONE ENCOUNTER
----- Message from Agnes Angulo MA sent at 5/29/2024  3:38 PM CDT -----  Contact: [phone]    ----- Message -----  From: Ruby Pink  Sent: 5/29/2024   3:00 PM CDT  To: [facility] Staff    PATIENTCALL     Pt is calling to speak with the provider office regarding he states that he told him that he would recommend him to one of the other doctors in the office once he leave and he states he didn't call him back to tell him that information He is asking for a return call please call.

## 2024-05-31 ENCOUNTER — TELEPHONE (OUTPATIENT)
Dept: ELECTROPHYSIOLOGY | Facility: CLINIC | Age: 84
End: 2024-05-31
Payer: MEDICARE

## 2024-06-03 ENCOUNTER — OFFICE VISIT (OUTPATIENT)
Dept: INTERNAL MEDICINE | Facility: CLINIC | Age: 84
End: 2024-06-03
Payer: MEDICARE

## 2024-06-03 VITALS
WEIGHT: 158.5 LBS | HEIGHT: 73 IN | HEART RATE: 116 BPM | BODY MASS INDEX: 21.01 KG/M2 | SYSTOLIC BLOOD PRESSURE: 112 MMHG | RESPIRATION RATE: 96 BRPM | DIASTOLIC BLOOD PRESSURE: 70 MMHG

## 2024-06-03 DIAGNOSIS — I71.43 INFRARENAL ABDOMINAL AORTIC ANEURYSM (AAA) WITHOUT RUPTURE: ICD-10-CM

## 2024-06-03 DIAGNOSIS — I10 PRIMARY HYPERTENSION: ICD-10-CM

## 2024-06-03 DIAGNOSIS — D63.1 ANEMIA DUE TO STAGE 3B CHRONIC KIDNEY DISEASE: ICD-10-CM

## 2024-06-03 DIAGNOSIS — N18.32 ANEMIA DUE TO STAGE 3B CHRONIC KIDNEY DISEASE: ICD-10-CM

## 2024-06-03 DIAGNOSIS — N18.32 STAGE 3B CHRONIC KIDNEY DISEASE: ICD-10-CM

## 2024-06-03 DIAGNOSIS — I70.0 AORTIC ATHEROSCLEROSIS: ICD-10-CM

## 2024-06-03 DIAGNOSIS — I71.40 ABDOMINAL AORTIC ANEURYSM (AAA) WITHOUT RUPTURE, UNSPECIFIED PART: ICD-10-CM

## 2024-06-03 DIAGNOSIS — N25.81 SECONDARY HYPERPARATHYROIDISM OF RENAL ORIGIN: ICD-10-CM

## 2024-06-03 DIAGNOSIS — I73.9 PAD (PERIPHERAL ARTERY DISEASE): ICD-10-CM

## 2024-06-03 DIAGNOSIS — I50.30 ACC/AHA STAGE C HEART FAILURE WITH PRESERVED EJECTION FRACTION: ICD-10-CM

## 2024-06-03 DIAGNOSIS — Z00.00 ENCOUNTER FOR PREVENTIVE HEALTH EXAMINATION: Primary | ICD-10-CM

## 2024-06-03 DIAGNOSIS — K21.9 GASTROESOPHAGEAL REFLUX DISEASE, UNSPECIFIED WHETHER ESOPHAGITIS PRESENT: ICD-10-CM

## 2024-06-03 DIAGNOSIS — Z00.00 ENCOUNTER FOR MEDICARE ANNUAL WELLNESS EXAM: ICD-10-CM

## 2024-06-03 PROCEDURE — 3074F SYST BP LT 130 MM HG: CPT | Mod: CPTII,S$GLB,, | Performed by: NURSE PRACTITIONER

## 2024-06-03 PROCEDURE — G0439 PPPS, SUBSEQ VISIT: HCPCS | Mod: S$GLB,,, | Performed by: NURSE PRACTITIONER

## 2024-06-03 PROCEDURE — 3288F FALL RISK ASSESSMENT DOCD: CPT | Mod: CPTII,S$GLB,, | Performed by: NURSE PRACTITIONER

## 2024-06-03 PROCEDURE — 1160F RVW MEDS BY RX/DR IN RCRD: CPT | Mod: CPTII,S$GLB,, | Performed by: NURSE PRACTITIONER

## 2024-06-03 PROCEDURE — 1159F MED LIST DOCD IN RCRD: CPT | Mod: CPTII,S$GLB,, | Performed by: NURSE PRACTITIONER

## 2024-06-03 PROCEDURE — 1170F FXNL STATUS ASSESSED: CPT | Mod: CPTII,S$GLB,, | Performed by: NURSE PRACTITIONER

## 2024-06-03 PROCEDURE — 1158F ADVNC CARE PLAN TLK DOCD: CPT | Mod: CPTII,S$GLB,, | Performed by: NURSE PRACTITIONER

## 2024-06-03 PROCEDURE — 1125F AMNT PAIN NOTED PAIN PRSNT: CPT | Mod: CPTII,S$GLB,, | Performed by: NURSE PRACTITIONER

## 2024-06-03 PROCEDURE — 99999 PR PBB SHADOW E&M-EST. PATIENT-LVL V: CPT | Mod: PBBFAC,,, | Performed by: NURSE PRACTITIONER

## 2024-06-03 PROCEDURE — 3078F DIAST BP <80 MM HG: CPT | Mod: CPTII,S$GLB,, | Performed by: NURSE PRACTITIONER

## 2024-06-03 PROCEDURE — 1101F PT FALLS ASSESS-DOCD LE1/YR: CPT | Mod: CPTII,S$GLB,, | Performed by: NURSE PRACTITIONER

## 2024-06-03 NOTE — PATIENT INSTRUCTIONS
Counseling and Referral of Other Preventative  (Italic type indicates deductible and co-insurance are waived)    Patient Name: Gus Sabillon  Today's Date: 6/3/2024    Health Maintenance       Date Due Completion Date    Pneumococcal Vaccines (Age 65+) (1 of 2 - PCV) Never done ---    TETANUS VACCINE Never done ---    Shingles Vaccine (1 of 2) Never done ---    RSV Vaccine (Age 60+ and Pregnant patients) (1 - 1-dose 60+ series) Never done ---    COVID-19 Vaccine (4 - 2023-24 season) 09/01/2023 12/9/2021    Lipid Panel 12/12/2024 12/12/2023    Aspirin/Antiplatelet Therapy 05/10/2025 5/10/2024    Hemoglobin A1c (Prediabetes) 05/17/2025 5/17/2024        No orders of the defined types were placed in this encounter.      The following information is provided to all patients.  This information is to help you find resources for any of the problems found today that may be affecting your health:                  Living healthy guide: www.[facility].[place].gov      Understanding Diabetes: www.diabetes.org      Eating healthy: www.cdc.gov/healthyweight      [facility] [place] home safety checklist: www.cdc.gov/steadi/patient.html      Agency on Aging: www.goea.[place].gov      Alcoholics anonymous (AA): www.aa.org      Physical Activity: www.epi.nih.gov/ud9mcpf      Tobacco use: www.quitwithusla.org

## 2024-06-03 NOTE — PROGRESS NOTES
"  Gus Sabillon presented for a follow-up Medicare AWV today. The following components were reviewed and updated:    Medical history  Family History  Social history  Allergies and Current Medications  Health Risk Assessment  Health Maintenance  Care Team    **See Completed Assessments for Annual Wellness visit with in the encounter summary    The following assessments were completed:  Depression Screening  Cognitive function Screening    Timed Get Up Test  Whisper Test      Opioid documentation:      Patient does not have a current opioid prescription.          Vitals:    06/03/24 1508 06/03/24 1527   BP:  112/70   Pulse:  (!) 116   Resp:  (!) 96   Weight: 71.9 kg (158 lb 8.2 oz)    Height: 6' 1" (1.854 m)      Body mass index is 20.91 kg/m².       Physical Exam  Vitals and nursing note reviewed.   Constitutional:       Appearance: He is well-developed.   HENT:      Head: Normocephalic.   Cardiovascular:      Rate and Rhythm: Normal rate and regular rhythm.      Heart sounds: Normal heart sounds. No murmur heard.  Pulmonary:      Effort: Pulmonary effort is normal.      Breath sounds: Normal breath sounds.   Abdominal:      General: Bowel sounds are normal.      Palpations: Abdomen is soft.   Musculoskeletal:         General: Normal range of motion.   Skin:     General: Skin is warm and dry.   Neurological:      Mental Status: He is alert and oriented to person, place, and time.      Motor: No abnormal muscle tone.   Psychiatric:         Mood and Affect: Mood normal.            Diagnoses and health risks identified today and associated recommendations/orders:    1. Encounter for preventive health examination  Here for Health Risk Assessment/Annual Wellness Visit.  Health maintenance reviewed and updated. Follow up in one year.     2. Primary hypertension  Chronic, at goal. No medications at present due to episodes of near syncope. Followed by PCP, Cardiology.    3. Aortic atherosclerosis  Chronic, stable on " current medications. Noted CTA neck 2/17/21. Followed by PCP, Cardiology.    4. ACC/AHA stage C heart failure with preserved ejection fraction  Chronic, stable on current medications. No C/O dyspnea, + weakness. Followed by PCP, Cardiology.    5. Infrarenal abdominal aortic aneurysm (AAA) without rupture  Chronic, stable. Followed by PCP, Cardiology.    6. PAD (peripheral artery disease)  Chronic, stable. No C/O claudication. Followed by PCP, Cardiology.    7. Abdominal aortic aneurysm (AAA) without rupture, unspecified part  Chronic, stable. Followed by PCP, Cardiology.    8. Stage 3b chronic kidney disease  Chronic, stable. Followed by PCP, Nephrology.     9. Anemia due to stage 3b chronic kidney disease  Chronic, stable. Followed by PCP, Nephrology.     10. Secondary hyperparathyroidism of renal origin  Chronic, stable. Followed by PCP, Nephrology.     11. Gastroesophageal reflux disease, unspecified whether esophagitis present  Chronic, stable. No C/O reflux. Followed by PCP.    12. Encounter for Medicare annual wellness exam  - Ambulatory Referral/Consult to Enhanced Annual Wellness Visit (eAWV)      Provided Gus with a 5-10 year written screening schedule and personal prevention plan. Recommendations were developed using the USPSTF age appropriate recommendations. Education, counseling, and referrals were provided as needed.  After Visit Summary printed and given to patient which includes a list of additional screenings\tests needed.    Follow up in 2 days (on 6/5/2024).with PCP      Corinne Norman NP

## 2024-06-05 ENCOUNTER — OFFICE VISIT (OUTPATIENT)
Dept: INTERNAL MEDICINE | Facility: CLINIC | Age: 84
End: 2024-06-05
Payer: MEDICARE

## 2024-06-05 VITALS
RESPIRATION RATE: 17 BRPM | SYSTOLIC BLOOD PRESSURE: 136 MMHG | OXYGEN SATURATION: 98 % | DIASTOLIC BLOOD PRESSURE: 64 MMHG | TEMPERATURE: 98 F | WEIGHT: 156.94 LBS | HEART RATE: 51 BPM | HEIGHT: 73 IN | BODY MASS INDEX: 20.8 KG/M2

## 2024-06-05 DIAGNOSIS — I10 PRIMARY HYPERTENSION: Primary | ICD-10-CM

## 2024-06-05 DIAGNOSIS — I70.0 AORTIC ATHEROSCLEROSIS: ICD-10-CM

## 2024-06-05 DIAGNOSIS — E78.2 MIXED HYPERLIPIDEMIA: ICD-10-CM

## 2024-06-05 DIAGNOSIS — I71.40 ABDOMINAL AORTIC ANEURYSM (AAA) WITHOUT RUPTURE, UNSPECIFIED PART: ICD-10-CM

## 2024-06-05 DIAGNOSIS — Z98.890 S/P CAROTID ENDARTERECTOMY: ICD-10-CM

## 2024-06-05 DIAGNOSIS — N18.32 STAGE 3B CHRONIC KIDNEY DISEASE: ICD-10-CM

## 2024-06-05 DIAGNOSIS — I50.30 ACC/AHA STAGE C HEART FAILURE WITH PRESERVED EJECTION FRACTION: ICD-10-CM

## 2024-06-05 DIAGNOSIS — K21.9 GASTROESOPHAGEAL REFLUX DISEASE, UNSPECIFIED WHETHER ESOPHAGITIS PRESENT: ICD-10-CM

## 2024-06-05 DIAGNOSIS — I65.23 CAROTID ATHEROSCLEROSIS, BILATERAL: ICD-10-CM

## 2024-06-05 DIAGNOSIS — R13.10 DYSPHAGIA, UNSPECIFIED TYPE: ICD-10-CM

## 2024-06-05 DIAGNOSIS — I73.9 PAD (PERIPHERAL ARTERY DISEASE): ICD-10-CM

## 2024-06-05 PROCEDURE — 99999 PR PBB SHADOW E&M-EST. PATIENT-LVL IV: CPT | Mod: PBBFAC,,, | Performed by: INTERNAL MEDICINE

## 2024-06-05 PROCEDURE — 3078F DIAST BP <80 MM HG: CPT | Mod: CPTII,S$GLB,, | Performed by: INTERNAL MEDICINE

## 2024-06-05 PROCEDURE — 1125F AMNT PAIN NOTED PAIN PRSNT: CPT | Mod: CPTII,S$GLB,, | Performed by: INTERNAL MEDICINE

## 2024-06-05 PROCEDURE — 99214 OFFICE O/P EST MOD 30 MIN: CPT | Mod: S$GLB,,, | Performed by: INTERNAL MEDICINE

## 2024-06-05 PROCEDURE — 1101F PT FALLS ASSESS-DOCD LE1/YR: CPT | Mod: CPTII,S$GLB,, | Performed by: INTERNAL MEDICINE

## 2024-06-05 PROCEDURE — 3075F SYST BP GE 130 - 139MM HG: CPT | Mod: CPTII,S$GLB,, | Performed by: INTERNAL MEDICINE

## 2024-06-05 PROCEDURE — G2211 COMPLEX E/M VISIT ADD ON: HCPCS | Mod: S$GLB,,, | Performed by: INTERNAL MEDICINE

## 2024-06-05 PROCEDURE — 3288F FALL RISK ASSESSMENT DOCD: CPT | Mod: CPTII,S$GLB,, | Performed by: INTERNAL MEDICINE

## 2024-06-05 RX ORDER — ATENOLOL 25 MG/1
25 TABLET ORAL DAILY
COMMUNITY
Start: 2024-05-15

## 2024-06-05 NOTE — PROGRESS NOTES
Subjective     Patient ID: Gus Sabillon is a 84 y.o. male.    Chief Complaint: Follow-up    HPI  Pt with HFpEF, HTN, AAA, Carotid artery disease(s/p R CEA), HLD, PAD, CKD III, Prediabetes is here for 6 month f/u. Pt currently denies any acute cardiopulmonary complaints. Pt is having recurrent symptoms of GERD a/w dysphagia. Symptoms not well controlled on PPI.   Review of Systems   Constitutional:  Negative for activity change, appetite change, chills, diaphoresis, fatigue, fever and unexpected weight change.   HENT:  Negative for postnasal drip, rhinorrhea, sinus pressure/congestion, sneezing, sore throat, trouble swallowing and voice change.    Respiratory:  Negative for cough, shortness of breath and wheezing.    Cardiovascular:  Negative for chest pain, palpitations and leg swelling.   Gastrointestinal:  Negative for abdominal pain, blood in stool, constipation, diarrhea, nausea and vomiting.   Genitourinary:  Negative for dysuria.   Musculoskeletal:  Negative for arthralgias and myalgias.   Integumentary:  Negative for rash and wound.   Allergic/Immunologic: Negative for environmental allergies and food allergies.   Hematological:  Negative for adenopathy. Does not bruise/bleed easily.          Objective     Physical Exam  Constitutional:       General: He is not in acute distress.     Appearance: Normal appearance. He is well-developed. He is not diaphoretic.   HENT:      Head: Normocephalic and atraumatic.      Right Ear: External ear normal.      Left Ear: External ear normal.      Nose: Nose normal.      Mouth/Throat:      Pharynx: No oropharyngeal exudate.   Eyes:      General: No scleral icterus.        Right eye: No discharge.         Left eye: No discharge.      Conjunctiva/sclera: Conjunctivae normal.      Pupils: Pupils are equal, round, and reactive to light.   Neck:      Vascular: No JVD.   Cardiovascular:      Rate and Rhythm: Normal rate and regular rhythm.      Pulses: Normal pulses.       Heart sounds: Normal heart sounds. No murmur heard.  Pulmonary:      Effort: Pulmonary effort is normal. No respiratory distress.      Breath sounds: Normal breath sounds. No wheezing or rales.   Abdominal:      General: Bowel sounds are normal.      Tenderness: There is no abdominal tenderness. There is no guarding or rebound.   Musculoskeletal:      Cervical back: Normal range of motion and neck supple.      Right lower leg: No edema.      Left lower leg: No edema.   Lymphadenopathy:      Cervical: No cervical adenopathy.   Skin:     General: Skin is warm and dry.      Capillary Refill: Capillary refill takes less than 2 seconds.      Coloration: Skin is not pale.      Findings: No rash.   Neurological:      Mental Status: He is alert and oriented to person, place, and time. Mental status is at baseline.      Cranial Nerves: No cranial nerve deficit.   Psychiatric:         Mood and Affect: Mood normal.         Behavior: Behavior normal.            Assessment and Plan     1. Primary hypertension    2. PAD (peripheral artery disease)    3. Carotid atherosclerosis, bilateral    4. Mixed hyperlipidemia    5. S/P carotid endarterectomy    6. Aortic atherosclerosis    7. Abdominal aortic aneurysm (AAA) without rupture, unspecified part    8. ACC/AHA stage C heart failure with preserved ejection fraction  Overview:  On empa, fritz, and no oral diuretic      9. Stage 3b chronic kidney disease  Overview:  stable  stage G3bA1 likely secondary to long standing hypertension, age related nephron loss and heart failure  baseline creatinine 1.5-1.9  baseline UPCR na  historic complications Volume overload  BP uncontrolled on BB and Aldosterone antagonist  glucose controlled on SGLT2 inhibitor  UPCR controlled on SGLT2 inhibitor  Native kidney US No results found for this or any previous visit.   na  current diuretic therapy: none   current bicarbonate therapy: none   current CKD-MBD therapy: none  current anemia therapy:  none    Recently referred here for aid in management of ckd.        10. Gastroesophageal reflux disease, unspecified whether esophagitis present  -     Case Request Endoscopy: ESOPHAGOGASTRODUODENOSCOPY (EGD)    11. Dysphagia, unspecified type  -     Case Request Endoscopy: ESOPHAGOGASTRODUODENOSCOPY (EGD)          HFpEF- stable, followed by Cardio     HTN- stable off Atenolol     AAA- mild per ultrasound 2021     Carotid artery disease- s/p R CEA 2021, repeat US     HLD- repeat Lipids, continue Rosuvastatin      PAD- stable on asa/statin     CKD III- stable    Prediabetes- stable    GERD/Dysphagia- will order EGD    F/u in 6 months for annual exam

## 2024-06-07 ENCOUNTER — PATIENT MESSAGE (OUTPATIENT)
Dept: CARDIOLOGY | Facility: CLINIC | Age: 84
End: 2024-06-07
Payer: MEDICARE

## 2024-06-12 ENCOUNTER — TELEPHONE (OUTPATIENT)
Dept: ELECTROPHYSIOLOGY | Facility: CLINIC | Age: 84
End: 2024-06-12
Payer: MEDICARE

## 2024-06-12 DIAGNOSIS — R00.1 BRADYCARDIA: Primary | ICD-10-CM

## 2024-06-12 NOTE — TELEPHONE ENCOUNTER
Called pt to address new patient consult questions. Patient confirmed:    Have you ever seen a doctor outside of the [facility] system pertaining to your heart? If yes, where? no  Do you have a device implanted in your chest such as a pacemaker, defibrillator or loop recorder? no  Have you had any recent testing done for your heart such as holter, stress test, echo, heart cath or EKG? yes  Have you ever had surgery for an arrhythmia before such as cardioversion, ablation, EP study or tilt table test? no    Pt verbalized understanding. Thanks

## 2024-06-13 ENCOUNTER — HOSPITAL ENCOUNTER (OUTPATIENT)
Dept: CARDIOLOGY | Facility: CLINIC | Age: 84
Discharge: HOME | End: 2024-06-13
Payer: MEDICARE

## 2024-06-13 ENCOUNTER — OFFICE VISIT (OUTPATIENT)
Dept: ELECTROPHYSIOLOGY | Facility: CLINIC | Age: 84
End: 2024-06-13
Payer: MEDICARE

## 2024-06-13 VITALS
SYSTOLIC BLOOD PRESSURE: 132 MMHG | HEART RATE: 101 BPM | DIASTOLIC BLOOD PRESSURE: 70 MMHG | HEIGHT: 73 IN | WEIGHT: 160.5 LBS | BODY MASS INDEX: 21.27 KG/M2

## 2024-06-13 DIAGNOSIS — R93.1 ABNORMAL FINDINGS ON DIAGNOSTIC IMAGING OF HEART AND CORONARY CIRCULATION: ICD-10-CM

## 2024-06-13 DIAGNOSIS — I50.30 ACC/AHA STAGE C HEART FAILURE WITH PRESERVED EJECTION FRACTION: ICD-10-CM

## 2024-06-13 DIAGNOSIS — R94.39 EQUIVOCAL STRESS TEST: Primary | ICD-10-CM

## 2024-06-13 DIAGNOSIS — I47.29 NONSUSTAINED VENTRICULAR TACHYCARDIA: ICD-10-CM

## 2024-06-13 DIAGNOSIS — I49.3 PVC'S (PREMATURE VENTRICULAR CONTRACTIONS): ICD-10-CM

## 2024-06-13 DIAGNOSIS — N18.32 STAGE 3B CHRONIC KIDNEY DISEASE: ICD-10-CM

## 2024-06-13 DIAGNOSIS — I70.0 AORTIC ATHEROSCLEROSIS: ICD-10-CM

## 2024-06-13 DIAGNOSIS — I10 PRIMARY HYPERTENSION: ICD-10-CM

## 2024-06-13 DIAGNOSIS — I49.3 FREQUENT PVCS: ICD-10-CM

## 2024-06-13 DIAGNOSIS — R00.1 BRADYCARDIA: ICD-10-CM

## 2024-06-13 LAB
OHS QRS DURATION: 80 MS
OHS QTC CALCULATION: 440 MS

## 2024-06-13 PROCEDURE — 1159F MED LIST DOCD IN RCRD: CPT | Mod: CPTII,S$GLB,, | Performed by: INTERNAL MEDICINE

## 2024-06-13 PROCEDURE — 93010 ELECTROCARDIOGRAM REPORT: CPT | Mod: S$GLB,,, | Performed by: INTERNAL MEDICINE

## 2024-06-13 PROCEDURE — 1101F PT FALLS ASSESS-DOCD LE1/YR: CPT | Mod: CPTII,S$GLB,, | Performed by: INTERNAL MEDICINE

## 2024-06-13 PROCEDURE — 3075F SYST BP GE 130 - 139MM HG: CPT | Mod: CPTII,S$GLB,, | Performed by: INTERNAL MEDICINE

## 2024-06-13 PROCEDURE — 93005 ELECTROCARDIOGRAM TRACING: CPT | Mod: S$GLB,,, | Performed by: INTERNAL MEDICINE

## 2024-06-13 PROCEDURE — 99205 OFFICE O/P NEW HI 60 MIN: CPT | Mod: S$GLB,,, | Performed by: INTERNAL MEDICINE

## 2024-06-13 PROCEDURE — 3288F FALL RISK ASSESSMENT DOCD: CPT | Mod: CPTII,S$GLB,, | Performed by: INTERNAL MEDICINE

## 2024-06-13 PROCEDURE — 1126F AMNT PAIN NOTED NONE PRSNT: CPT | Mod: CPTII,S$GLB,, | Performed by: INTERNAL MEDICINE

## 2024-06-13 PROCEDURE — 99999 PR PBB SHADOW E&M-EST. PATIENT-LVL IV: CPT | Mod: PBBFAC,,, | Performed by: INTERNAL MEDICINE

## 2024-06-13 PROCEDURE — 1160F RVW MEDS BY RX/DR IN RCRD: CPT | Mod: CPTII,S$GLB,, | Performed by: INTERNAL MEDICINE

## 2024-06-13 PROCEDURE — 3078F DIAST BP <80 MM HG: CPT | Mod: CPTII,S$GLB,, | Performed by: INTERNAL MEDICINE

## 2024-06-13 NOTE — PROGRESS NOTES
Subjective   Patient ID:  Gus Sabillon is a 84 y.o. male who presents for evaluation of PVCs    Referring Cardiologist: Karrie Sarabia MD  Primary Care Physician: Emory Beltran,     HPI  I had the pleasure of seeing Mr. Sabillon today in our electrophysiology clinic in consultation for his PVCs and heart failure. As you are aware he is a pleasant 84 year-old man with hypertension, frequent PVCs, peripheral arterial disease, and CKD stage 3. He was first seen by Dr. Sarabia in 1/2023 for heart failure symptoms. His LVEF was preserved at that time. Ultimately required admission for IV diuresis. He was doing well until this year when he began having weight loss, dizziness, light-headedness, and fatigue. He was bradycardic and his atenolol was stopped. A holter monitor noted very frequent PVCs and nonsustained VT with a 51.5% PVC burden (polyfocal) and nsVT runs up to 43 beats in duration. He was rx amiodarone and referred for evaluation. He is undergoing work-up for amyloidosis. Per conversations with Dr. Sarabia, the patient has preferred conservative management. He did not start amiodarone as he was concerned with the side effects. He feels miserable.    Nuclear stress 3/2023: equivocal (either inferior wall ischemia/infarct or bowel artifact)    I reviewed available ECGs in EPIC which show sinus rhythm (at times sinus bradycardia with rates in the 30s) and PVCs (some inferiorly directed, another RB with concordance and superior axis)    My interpretation of today's in-clinic ECG is sinus rhythm with frequent PVCs    Review of Systems   Constitutional: Positive for malaise/fatigue. Negative for fever.   HENT:  Negative for congestion and sore throat.    Eyes:  Negative for blurred vision and visual disturbance.   Cardiovascular:  Negative for chest pain, dyspnea on exertion, irregular heartbeat, near-syncope, palpitations and syncope.   Respiratory:  Negative for cough and shortness of breath.     Hematologic/Lymphatic: Negative for bleeding problem. Does not bruise/bleed easily.   Skin: Negative.    Musculoskeletal: Negative.    Gastrointestinal:  Negative for bloating, abdominal pain, hematochezia and melena.   Neurological:  Negative for focal weakness and weakness.   Psychiatric/Behavioral: Negative.            Objective     Physical Exam  Vitals reviewed.   Constitutional:       General: He is not in acute distress.     Appearance: He is well-developed. He is not diaphoretic.   HENT:      Head: Normocephalic and atraumatic.   Eyes:      General:         Right eye: No discharge.         Left eye: No discharge.      Conjunctiva/sclera: Conjunctivae normal.   Cardiovascular:      Rate and Rhythm: Normal rate and regular rhythm. Frequent Extrasystoles are present.     Heart sounds: No murmur heard.     No friction rub. No gallop.   Pulmonary:      Effort: Pulmonary effort is normal. No respiratory distress.      Breath sounds: Normal breath sounds. No wheezing or rales.   Abdominal:      General: Bowel sounds are normal. There is no distension.      Palpations: Abdomen is soft.      Tenderness: There is no abdominal tenderness.   Musculoskeletal:      Cervical back: Neck supple.   Skin:     General: Skin is warm and dry.   Neurological:      Mental Status: He is alert and oriented to person, place, and time.   Psychiatric:         Behavior: Behavior normal.         Thought Content: Thought content normal.         Judgment: Judgment normal.            Assessment and Plan     1. Nonsustained ventricular tachycardia    2. Frequent PVCs    3. ACC/AHA stage C heart failure with preserved ejection fraction    4. Aortic atherosclerosis    5. Primary hypertension    6. Stage 3b chronic kidney disease    7. PVC's (premature ventricular contractions)        Plan:  In summary, Mr. Sabillon is a pleasant 84 year-old man with hypertension, frequent PVCs/nsVT, peripheral arterial disease, and CKD stage 3. EF has been  preserved. He had a questionable stress test (ischemia/infarct vs bowel artifact). He is undergoing work-up for possible amyloid. His PVCs are polyfocal (at least 2 predominant foci. 1 is outflow tract and the other inferior LV). Discussed the etiologies and different pathophysiologies of the potential causes for frequent ventricular ectopy. Discussed treatment indications and options. I strongly advise therapy to attempt suppress PVCs/nsVT. Due to CKD, amiodarone or multaq are the only anti-arrhythmic options. He prefers to not have to take medications. Discussed PVC mapping/ablation. I spent about a half hour discussing the nature of EP study and ablation, including possible retrograde aortic approach. We discussed risks and benefits at length. Our discussion included, but was not limited to the risk of death, infection, bleeding, stroke, MI, cardiac perforation, vascular injury, valvular injury, embolism, cardiac tamponade, skin burns, and other organic injury including the possibility for need for surgery or pacemaker implantation. He has PAD. Discussed problematic nature of this when there are multiple foci, although he has 2 foci that are predominant. If he is diagnosed with amyloid/infiltrative disorder discussed not proceeding with ablation and treating with anti-arrhythmic drugs. Recommend PET stress to rule out infarct/ischemia in setting of nsVT from inferior wall and equivocal prior nuclear stress test with defect of the inferior wall.    In the end he very much likes the idea of PVC mapping/ablation.    Plan  PET stress test  Amyloid scan pending  If both are negative would proceed with PVC mapping/ablation (CARTO, anesthesia)  Will call with results and arrange    Thank you for allowing me to participate in the care of this patient. Please do not hesitate to call me with any questions or concerns.    Barney Devlin MD, PhD  Cardiac Electrophysiology

## 2024-06-20 ENCOUNTER — TELEPHONE (OUTPATIENT)
Dept: CARDIOLOGY | Facility: HOSPITAL | Age: 84
End: 2024-06-20
Payer: MEDICARE

## 2024-06-24 ENCOUNTER — PATIENT MESSAGE (OUTPATIENT)
Dept: CARDIOLOGY | Facility: CLINIC | Age: 84
End: 2024-06-24
Payer: MEDICARE

## 2024-06-24 ENCOUNTER — HOSPITAL ENCOUNTER (OUTPATIENT)
Dept: CARDIOLOGY | Facility: HOSPITAL | Age: 84
Discharge: HOME | End: 2024-06-24
Attending: INTERNAL MEDICINE
Payer: MEDICARE

## 2024-06-24 VITALS — WEIGHT: 160 LBS | HEIGHT: 73 IN | BODY MASS INDEX: 21.2 KG/M2

## 2024-06-24 DIAGNOSIS — E85.82 WILD-TYPE TRANSTHYRETIN-RELATED (ATTR) AMYLOIDOSIS: Primary | ICD-10-CM

## 2024-06-24 DIAGNOSIS — I50.30 ACC/AHA STAGE C HEART FAILURE WITH PRESERVED EJECTION FRACTION: ICD-10-CM

## 2024-06-24 DIAGNOSIS — R00.1 BRADYCARDIA: ICD-10-CM

## 2024-06-24 PROCEDURE — 78803 RP LOCLZJ TUM SPECT 1 AREA: CPT | Mod: 26,,, | Performed by: INTERNAL MEDICINE

## 2024-06-24 PROCEDURE — A9538 TC99M PYROPHOSPHATE: HCPCS | Performed by: INTERNAL MEDICINE

## 2024-06-24 PROCEDURE — 78803 RP LOCLZJ TUM SPECT 1 AREA: CPT

## 2024-06-24 RX ORDER — TECHNETIUM TC99M PYROPHOSPHATE 12 MG/10ML
10.7 INJECTION INTRAVENOUS
Status: COMPLETED | OUTPATIENT
Start: 2024-06-24 | End: 2024-06-24

## 2024-06-24 RX ADMIN — TECHNETIUM TC99M PYROPHOSPHATE 10.7 MILLICURIE: 12 INJECTION INTRAVENOUS at 11:06

## 2024-06-24 NOTE — TELEPHONE ENCOUNTER
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    Have referred to [facility] for further evaluation. Discussed with patient.

## 2024-06-25 ENCOUNTER — PROCEDURE VISIT (OUTPATIENT)
Dept: DERMATOLOGY | Facility: CLINIC | Age: 84
End: 2024-06-25
Payer: MEDICARE

## 2024-06-25 ENCOUNTER — OFFICE VISIT (OUTPATIENT)
Dept: OTOLARYNGOLOGY | Facility: CLINIC | Age: 84
End: 2024-06-25
Payer: MEDICARE

## 2024-06-25 ENCOUNTER — CLINICAL SUPPORT (OUTPATIENT)
Dept: AUDIOLOGY | Facility: CLINIC | Age: 84
End: 2024-06-25
Payer: MEDICARE

## 2024-06-25 VITALS
SYSTOLIC BLOOD PRESSURE: 137 MMHG | DIASTOLIC BLOOD PRESSURE: 60 MMHG | HEIGHT: 73 IN | WEIGHT: 160.06 LBS | HEART RATE: 50 BPM | BODY MASS INDEX: 21.21 KG/M2

## 2024-06-25 DIAGNOSIS — H90.3 SENSORINEURAL HEARING LOSS (SNHL) OF BOTH EARS: Primary | ICD-10-CM

## 2024-06-25 DIAGNOSIS — H90.3 SENSORINEURAL HEARING LOSS, BILATERAL: Primary | ICD-10-CM

## 2024-06-25 DIAGNOSIS — R59.0 PREAURICULAR LYMPHADENOPATHY: ICD-10-CM

## 2024-06-25 DIAGNOSIS — H61.21 IMPACTED CERUMEN OF RIGHT EAR: ICD-10-CM

## 2024-06-25 DIAGNOSIS — C44.722 SQUAMOUS CELL CARCINOMA OF SKIN OF RIGHT LOWER EXTREMITY: Primary | ICD-10-CM

## 2024-06-25 PROCEDURE — 3288F FALL RISK ASSESSMENT DOCD: CPT | Mod: CPTII,S$GLB,, | Performed by: NURSE PRACTITIONER

## 2024-06-25 PROCEDURE — 99204 OFFICE O/P NEW MOD 45 MIN: CPT | Mod: 25,S$GLB,, | Performed by: NURSE PRACTITIONER

## 2024-06-25 PROCEDURE — 99499 UNLISTED E&M SERVICE: CPT | Mod: S$GLB,,, | Performed by: DERMATOLOGY

## 2024-06-25 PROCEDURE — 92557 COMPREHENSIVE HEARING TEST: CPT | Mod: S$GLB,,, | Performed by: AUDIOLOGIST

## 2024-06-25 PROCEDURE — 1101F PT FALLS ASSESS-DOCD LE1/YR: CPT | Mod: CPTII,S$GLB,, | Performed by: NURSE PRACTITIONER

## 2024-06-25 PROCEDURE — 17313 MOHS 1 STAGE T/A/L: CPT | Mod: S$GLB,,, | Performed by: DERMATOLOGY

## 2024-06-25 PROCEDURE — 99999 PR PBB SHADOW E&M-EST. PATIENT-LVL I: CPT | Mod: PBBFAC,,, | Performed by: AUDIOLOGIST

## 2024-06-25 PROCEDURE — 1126F AMNT PAIN NOTED NONE PRSNT: CPT | Mod: CPTII,S$GLB,, | Performed by: NURSE PRACTITIONER

## 2024-06-25 PROCEDURE — 99999 PR PBB SHADOW E&M-EST. PATIENT-LVL III: CPT | Mod: PBBFAC,,, | Performed by: NURSE PRACTITIONER

## 2024-06-25 PROCEDURE — 92567 TYMPANOMETRY: CPT | Mod: S$GLB,,, | Performed by: AUDIOLOGIST

## 2024-06-25 NOTE — PROGRESS NOTES
PROCEDURE: Mohs' Micrographic Surgery    INDICATION: Biopsy-proven skin cancer of cosmetically and functionally important areas, including head, neck, genital, hand, foot, or areas known for having difficulty in healing, such as the lower anterior legs. Tumors with aggressive clinical behavior (rapidly growing, greater than 1 cm in diameter). Tumor with ill-defined borders. Tumor size >2 cm on the trunk or extremities.    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  Tumor Type:  No tumor seen.    STAGES OF MOHS' SURGERY PERFORMED: 1    TUMOR-FREE PLANE ACHIEVED: Yes    HEMOSTASIS: electrocoagulation     SPECIMENS: 4     LOCATION: right shin proximal. Location verified with Dr. Wagoner's clinical photograph. Patient also verified location.    INITIAL LESION SIZE: 1.6 x 2.5 cm    FINAL DEFECT SIZE: 2.0 x 3.0 cm    WOUND REPAIR/DISPOSITION: When the tumor was completely removed, repair options were discussed with the patient, and it was decided to let the wound heal by second intention. The patient tolerated the procedure well and will consider delayed reconstruction or repair if necessary.    The area was cleaned and dressed appropriately, and the patient was given wound care instructions. Patient was referred to the Wound Care Clinic.  Meanwhile, wound care demonstrated to patient and rec daily use of compression hose to expedite healing.     Vitals:    06/25/24 1121 06/25/24 1310   BP: (!) 102/49 137/60   BP Location:  "Left arm Left arm   Patient Position: Sitting Sitting   BP Method: Small (Automatic) Medium (Automatic)   Pulse: (!) 57 (!) 50   Weight: 72.6 kg (160 lb 0.9 oz)    Height: 6' 1" (1.854 m)        PROCEDURE: Mohs' Micrographic Surgery    INDICATION: Biopsy-proven skin cancer of cosmetically and functionally important areas, including head, neck, genital, hand, foot, or areas known for having difficulty in healing, such as the lower anterior legs. Tumors with aggressive clinical behavior (rapidly growing, greater than 1 cm in diameter). Tumor with ill-defined borders.    REFERRING PROVIDER:  Lam Wagoner MD    CASE NUMBER:     ANESTHETIC: 3 cc 0.5% Lidocaine with Epi 1:200,000 mixed 1:1 with 0.5% Bupivacaine    SURGICAL PREP: Hibiclens    SURGEON: Les Saldana MD    ASSISTANTS: iXmena Lawson PA-C    PREOPERATIVE DIAGNOSIS: squamous cell carcinoma    POSTOPERATIVE DIAGNOSIS: squamous cell carcinoma    PATHOLOGIC DIAGNOSIS: squamous cell carcinoma    HISTOLOGY OF SPECIMENS IN FIRST STAGE:   Tumor Type:  No tumor seen.    STAGES OF MOHS' SURGERY PERFORMED: 1    TUMOR-FREE PLANE ACHIEVED: Yes    HEMOSTASIS: electrocoagulation     SPECIMENS: 2     LOCATION: right shin distal. Location verified with Dr. Wagoner's clinical photograph. Patient also verified location.    INITIAL LESION SIZE: 0.8 x 1.4 cm    FINAL DEFECT SIZE: 1.3 x 1.7 cm    WOUND REPAIR/DISPOSITION: When the tumor was completely removed, repair options were discussed with the patient, and it was decided to let the wound heal by second intention. The patient tolerated the procedure well and will consider delayed reconstruction or repair if necessary.    The area was cleaned and dressed appropriately, and the patient was given wound care instructions. Patient was referred to the Wound Care Clinic.  Meanwhile, wound care demonstrated to patient and rec daily use of compression hose to expedite healing.     Vitals:    06/25/24 1121 06/25/24 1310 " "  BP: (!) 102/49 137/60   BP Location: Left arm Left arm   Patient Position: Sitting Sitting   BP Method: Small (Automatic) Medium (Automatic)   Pulse: (!) 57 (!) 50   Weight: 72.6 kg (160 lb 0.9 oz)    Height: 6' 1" (1.854 m)          "

## 2024-06-25 NOTE — PROGRESS NOTES
Gus Sabillon, a 84 y.o. male, was seen today in the clinic for an audiologic evaluation.  The patient's main complaint was hearing loss.  Mr. Sabillon reported that he feels his hearing has decreased lately.  He denied otalgia, aural fullness, tinnitus and vertigo.  He noted intermittent lightheadedness.He was seen by ENT for ear cleaning prior to hearing testing.      Tympanometry revealed Type A in the right ear and Type A in the left ear. 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  Speech reception thresholds were noted at 15 dB in the right ear and 15 dB in the left ear.  Speech discrimination scores were 96% in the right ear and 96% in the left ear.    Recommendations:  Otologic evaluation  Annual audiogram  Hearing protection when in noise  Hearing aid consultation

## 2024-06-25 NOTE — PROGRESS NOTES
Subjective:   Gus Sabillon is a 84 y.o. male who was self-referred for  multiple ear concerns . He has a past medical history of Carotid artery disease, CHF (congestive heart failure), CKD (chronic kidney disease) stage 3, GFR 30-59 ml/min, Dyslipidemia, GERD (gastroesophageal reflux disease), melanoma excision, Hypertension, Melanoma, PAD (peripheral artery disease), SCC (squamous cell carcinoma) (05/01/2024), and Squamous cell carcinoma of skin (05/01/2024). He reports that over the past 6 months he has felt a change in his hearing, although does not feel one ear hears better than the other. He denies any tinnitus, fullness/pressure, otorrhea or vertigo. He does report some intermittent light-headedness that improved with a medication change. Denies any room spinning sensations. He also notes some right ear fullness, and notes that water gets uncomfortably stuck in the right ear after showers. He also reports some left jaw pain that started a few months ago, also developed a swollen, tender left pre-auricular lymph node. There is not a family history of hearing loss at a young age. There is not a prior history of ear surgery. There is not a prior history of ear infections. There is not a history of ear trauma. He denies a history of significant noise exposure.     Past Medical History  He has a past medical history of Carotid artery disease, CHF (congestive heart failure), CKD (chronic kidney disease) stage 3, GFR 30-59 ml/min, Dyslipidemia, GERD (gastroesophageal reflux disease), melanoma excision, Hypertension, Melanoma, PAD (peripheral artery disease), SCC (squamous cell carcinoma), and Squamous cell carcinoma of skin.    Past Surgical History  He has a past surgical history that includes Cataract extraction, bilateral; Tonsillectomy; Blepharoplasty; Arthroscopic repair of rotator cuff of shoulder (Right, 02/27/2020); Arthroscopic debridement of shoulder (Right, 02/27/2020); Fixation of tendon (Right,  02/27/2020); Open reduction and internal fixation (ORIF) of fracture of proximal humerus (Right, 02/27/2020); Arthroscopy of shoulder with decompression of subacromial space (Right, 02/27/2020); Carotid endarterectomy (Right, 10/15/2021); Arthroscopic repair of rotator cuff of shoulder (Left, 06/21/2022); Arthroscopic debridement of shoulder (Left, 06/21/2022); Arthroscopy of shoulder with removal of distal clavicle (Left, 06/21/2022); arthroscopy,shoulder,with biceps tenodesis (Left, 06/21/2022); ORIF humerus fracture (Left, 06/21/2022); Excision of bursa (Left, 06/21/2022); Arthroscopy of shoulder with decompression of subacromial space (Left, 06/21/2022); Shoulder arthroscopy (Left, 06/21/2022); Eye surgery (Bilateral); Vasectomy; and Cosmetic surgery.    Family History  His family history includes Cancer in his sister; Colon cancer in his sister; Diabetes in his mother; Heart disease in his father; Hyperlipidemia in his mother; No Known Problems in his brother, brother, daughter, and son.    Social History  He reports that he quit smoking about 47 years ago. His smoking use included cigarettes. He started smoking about 62 years ago. He has a 30 pack-year smoking history. He has never been exposed to tobacco smoke. He has never used smokeless tobacco. He reports current alcohol use of about 14.0 standard drinks of alcohol per week. He reports that he does not use drugs.    Allergies  He is allergic to adhesive, clindamycin, and penicillins.    Medications  He has a current medication list which includes the following prescription(s): acetaminophen, amiodarone, aspirin, atenolol, collagen/biotin/ascorbic acid, fluorouracil, furosemide, ketoconazole, magnesium oxide, mv-min-folic-k1-lycopen-lutein, fish oil-omega-3 fatty acids, papaverine, rosuvastatin, and spironolactone.  Review of Systems     Constitutional: Positive for fatigue and unexpected weight loss.      HENT: Positive for facial swelling, hearing loss,  postnasal drip, sinus infection, sinus pressure and trouble swallowing.      Eyes:  Positive for eye itching.     Respiratory:  Positive for shortness of breath.      Cardiovascular:  Positive for foot swelling and irregular heartbeat.     Gastrointestinal:  Positive for abdominal pain, acid reflux, diarrhea and heartburn.     Musc: Positive for back pain.     Skin: Positive for rash.     Allergy: Negative for food allergies and seasonal allergies.     Endocrine: Positive for cold intolerance.     Neurological: Positive for dizziness and light-headedness.     Hematologic: Positive for swollen glands.     Psychiatric: Positive for nervous/anxious and sleep disturbance.         Objective:     Constitutional:   He is oriented to person, place, and time. He appears well-developed and well-nourished. He appears alert. He is cooperative.  Non-toxic appearance. He does not have a sickly appearance. He does not appear ill. Normal speech.      Head:  Normocephalic and atraumatic. Not macrocephalic and not microcephalic. Head is without abrasion, without right periorbital erythema, without left periorbital erythema and without TMJ tenderness.     Ears:    Right Ear: No drainage, swelling or tenderness. No mastoid tenderness. Tympanic membrane is not scarred, not perforated, not erythematous, not retracted and not bulging. No middle ear effusion.   Left Ear: No drainage, swelling or tenderness. No mastoid tenderness. Tympanic membrane is not scarred, not perforated, not erythematous, not retracted and not bulging.  No middle ear effusion.   Ears:    Ceruminous debris obstructing right TM removed under microscopy    Neck:         Head (left side): Preauricular adenopathy present.     He has no cervical adenopathy.     Pulmonary/Chest:   Effort normal.     Psychiatric:   He has a normal mood and affect. His speech is normal and behavior is normal.     Neurological:   He is alert and oriented to person, place, and time.      Procedure  Cerumen removal performed.  See procedure note.  Procedure Note:  The patient was brought to the minor procedure room and placed under the operating microscope of the right ear canal which was cleaned of ceruminous debris. Using a combination of suction, curettes and cup forceps the patient's cerumen was removed. The patient tolerated the procedure well. There were no complications.    Audiogram    I independently reviewed the tracings of the complete audiometric evaluation. I reviewed the audiogram with the patient as well. Pertinent findings include binaural sloping HF sensorineural hearing loss with normal tymps.  Assessment:     1. Sensorineural hearing loss (SNHL) of both ears    2. Preauricular lymphadenopathy    3. Impacted cerumen of left ear      Plan:     Sensorineural hearing loss (SNHL) of both ears  Audiometric testing interpretation consistent with sensorineural hearing loss. Discussed the etiology of SNHL.Hearing conservation in noisy environments.     Preauricular lymphadenopathy, left  -     US Soft Tissue Head Neck; Future    Impacted cerumen of right ear  Cerumen impaction removed under microscopy. Patient tolerated procedure well and noted improvement upon impaction removal.

## 2024-06-26 ENCOUNTER — OFFICE VISIT (OUTPATIENT)
Dept: DERMATOLOGY | Facility: CLINIC | Age: 84
End: 2024-06-26
Payer: MEDICARE

## 2024-06-26 ENCOUNTER — OFFICE VISIT (OUTPATIENT)
Dept: WOUND CARE | Facility: CLINIC | Age: 84
End: 2024-06-26
Payer: MEDICARE

## 2024-06-26 VITALS
WEIGHT: 163.81 LBS | HEIGHT: 73 IN | DIASTOLIC BLOOD PRESSURE: 68 MMHG | TEMPERATURE: 98 F | HEART RATE: 49 BPM | SYSTOLIC BLOOD PRESSURE: 148 MMHG | BODY MASS INDEX: 21.71 KG/M2

## 2024-06-26 DIAGNOSIS — D48.5 NEOPLASM OF UNCERTAIN BEHAVIOR OF SKIN: Primary | ICD-10-CM

## 2024-06-26 DIAGNOSIS — Z85.828 HISTORY OF SKIN CANCER: ICD-10-CM

## 2024-06-26 DIAGNOSIS — Z85.89 HISTORY OF SQUAMOUS CELL CARCINOMA: ICD-10-CM

## 2024-06-26 DIAGNOSIS — S81.801A MULTIPLE OPEN WOUNDS OF RIGHT LOWER EXTREMITY, INITIAL ENCOUNTER: Primary | ICD-10-CM

## 2024-06-26 DIAGNOSIS — L82.1 SEBORRHEIC KERATOSES: ICD-10-CM

## 2024-06-26 DIAGNOSIS — L57.0 ACTINIC KERATOSIS: ICD-10-CM

## 2024-06-26 DIAGNOSIS — Z85.828 HISTORY OF NONMELANOMA SKIN CANCER: ICD-10-CM

## 2024-06-26 DIAGNOSIS — D22.9 MULTIPLE BENIGN NEVI: ICD-10-CM

## 2024-06-26 DIAGNOSIS — Z85.820 HISTORY OF MALIGNANT MELANOMA: ICD-10-CM

## 2024-06-26 DIAGNOSIS — Z98.890 STATUS POST MOHS SURGERY: ICD-10-CM

## 2024-06-26 DIAGNOSIS — I73.9 PAD (PERIPHERAL ARTERY DISEASE): ICD-10-CM

## 2024-06-26 DIAGNOSIS — Z12.83 SCREENING EXAM FOR SKIN CANCER: ICD-10-CM

## 2024-06-26 DIAGNOSIS — L56.5 DSAP (DISSEMINATED SUPERFICIAL ACTINIC POROKERATOSIS): ICD-10-CM

## 2024-06-26 DIAGNOSIS — N18.32 STAGE 3B CHRONIC KIDNEY DISEASE: ICD-10-CM

## 2024-06-26 DIAGNOSIS — D18.01 CHERRY ANGIOMA: ICD-10-CM

## 2024-06-26 PROCEDURE — 99999 PR PBB SHADOW E&M-EST. PATIENT-LVL III: CPT | Mod: PBBFAC,,, | Performed by: STUDENT IN AN ORGANIZED HEALTH CARE EDUCATION/TRAINING PROGRAM

## 2024-06-26 PROCEDURE — 1101F PT FALLS ASSESS-DOCD LE1/YR: CPT | Mod: CPTII,S$GLB,,

## 2024-06-26 PROCEDURE — 1101F PT FALLS ASSESS-DOCD LE1/YR: CPT | Mod: CPTII,S$GLB,, | Performed by: STUDENT IN AN ORGANIZED HEALTH CARE EDUCATION/TRAINING PROGRAM

## 2024-06-26 PROCEDURE — 3288F FALL RISK ASSESSMENT DOCD: CPT | Mod: CPTII,S$GLB,, | Performed by: STUDENT IN AN ORGANIZED HEALTH CARE EDUCATION/TRAINING PROGRAM

## 2024-06-26 PROCEDURE — 99214 OFFICE O/P EST MOD 30 MIN: CPT | Mod: 25,S$GLB,, | Performed by: STUDENT IN AN ORGANIZED HEALTH CARE EDUCATION/TRAINING PROGRAM

## 2024-06-26 PROCEDURE — 11104 PUNCH BX SKIN SINGLE LESION: CPT | Mod: S$GLB,,, | Performed by: STUDENT IN AN ORGANIZED HEALTH CARE EDUCATION/TRAINING PROGRAM

## 2024-06-26 PROCEDURE — 1125F AMNT PAIN NOTED PAIN PRSNT: CPT | Mod: CPTII,S$GLB,, | Performed by: STUDENT IN AN ORGANIZED HEALTH CARE EDUCATION/TRAINING PROGRAM

## 2024-06-26 PROCEDURE — 17000 DESTRUCT PREMALG LESION: CPT | Mod: XS,S$GLB,, | Performed by: STUDENT IN AN ORGANIZED HEALTH CARE EDUCATION/TRAINING PROGRAM

## 2024-06-26 PROCEDURE — 3288F FALL RISK ASSESSMENT DOCD: CPT | Mod: CPTII,S$GLB,,

## 2024-06-26 PROCEDURE — 3077F SYST BP >= 140 MM HG: CPT | Mod: CPTII,S$GLB,,

## 2024-06-26 PROCEDURE — 1159F MED LIST DOCD IN RCRD: CPT | Mod: CPTII,S$GLB,,

## 2024-06-26 PROCEDURE — 1126F AMNT PAIN NOTED NONE PRSNT: CPT | Mod: CPTII,S$GLB,,

## 2024-06-26 PROCEDURE — 3078F DIAST BP <80 MM HG: CPT | Mod: CPTII,S$GLB,,

## 2024-06-26 PROCEDURE — 17003 DESTRUCT PREMALG LES 2-14: CPT | Mod: S$GLB,,, | Performed by: STUDENT IN AN ORGANIZED HEALTH CARE EDUCATION/TRAINING PROGRAM

## 2024-06-26 PROCEDURE — 99999 PR PBB SHADOW E&M-EST. PATIENT-LVL V: CPT | Mod: PBBFAC,,,

## 2024-06-26 PROCEDURE — 99215 OFFICE O/P EST HI 40 MIN: CPT | Mod: S$GLB,,,

## 2024-06-26 RX ORDER — FLUOROURACIL 50 MG/G
CREAM TOPICAL
Qty: 30 G | Refills: 0 | Status: SHIPPED | OUTPATIENT
Start: 2024-06-26

## 2024-06-26 NOTE — PROGRESS NOTES
Subjective:       Patient ID: Gus Sabillon is a 84 y.o. male.    Chief Complaint: Wound Consult      Patient presents for an evaluation of two right lower extremity wounds.  Pt followed with Dr. 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 Pt s/p Mohs' Micrographic surgery with Dr. Saldana on 6/25/24. She dressed his wound, instructed to wear compression stockings daily, and referred to wound care. Pt follows with vascular surgery for PAD. He was last seen by Dr. Farooq in 2022 who determined no intervention for his claudication. Pt did not have open wounds at this time. Pt s/p bilateral PTAS in [facility] around the late 1990s. He reports that his claudication symptoms are better and he can walk further than before. He reports being able to walk about 1 mile before his heart failure symptoms prevent him from walking. Pt is a former smoker. He smoked 3-4 ppd for 15 years. He quit 42 years ago. He reports that his legs/ ankles swelling from heart failure. Admits compliance with leg elevation, taking lasix, and following a low sodium diet. Denies fever, chills, erythema, warmth, purulent drainage, or pain.    2/1/23 venous ultrasound:  No evidence of deep venous thrombosis in either lower extremity.     9/30/22 ABIs:  Rt DL 0.92   Lt DL  0.94   Right Leg: Segmental pressures and PVR waveforms suggest minimal peripheral arterial occlusive disease.   Left Leg: Segmental pressures and PVR waveforms suggest minimal peripheral arterial occlusive disease.     1/12/22 venous ultrasound:  Right Leg: Duplex imaging of the right lower extremity veins demonstrates patent and compressible veins. There is no evidence of a venous thrombosis in the deep or superficial veins. Significant reflux noted in the right GSV including the Saphenofemoral Junction (SFJ). No significant reflux noted in the SSV. The accessory vein was too small for accurate assessment. The GSV branches  extensively from the proximal calf to the ankle.   Left Leg: Duplex imaging of the left lower extremity veins demonstrates patent and compressible veins. There is no evidence of a venous thrombosis in the deep or superficial veins. Significant reflux noted in the left GSV including the Saphenofemoral Junction (SFJ). No significant reflux noted in the SSV. The accessory lorena was too small for accurate assessment. The GSV branches extensively from the proximal calf to the ankle.     1/12/22 arterial ultrasound:  Right leg: Color flow duplex of the right lower extremity stents reveals a PSV increase of 59 cm/sec to 123 cm/sec in the mid segment of the proximal SFA stent. These findings are suggestive of a hemodynamically significant stenoses. The mid and distal SFA stents appear to be patent without evidence of stenosis.   Left leg: Color flow duplex of the left lower extremity mid SFA stent reveals a PSV increase of 124 cm/sec to 286 cm/sec in the mid segment. These findings are suggestive of a hemodynamically significant stenoses. There is a PSV elevation of 127 cm/sec in the proximal segment of the Popliteal A which decreases to 44 cm/sec in the mid segment with a change in waveform morphology.         Review of Systems   Constitutional:  Negative for activity change, chills, diaphoresis, fatigue and fever.   Respiratory:  Negative for apnea, chest tightness and shortness of breath.    Cardiovascular:  Negative for chest pain, palpitations and leg swelling.   Musculoskeletal:  Negative for gait problem and joint swelling.   Skin:  Positive for wound. Negative for color change, pallor and rash.   Neurological:  Negative for syncope, weakness and numbness.   Psychiatric/Behavioral:  Negative for agitation. The patient is not nervous/anxious.    All other systems reviewed and are negative.      Objective:      Physical Exam  Vitals reviewed.   Constitutional:       General: He is not in acute distress.     Appearance:  Normal appearance.   Cardiovascular:      Pulses:           Dorsalis pedis pulses are 2+ on the right side.        Posterior tibial pulses are 1+ on the right side.   Pulmonary:      Effort: No respiratory distress.   Musculoskeletal:         General: No swelling.   Skin:     General: Skin is warm and dry.      Findings: Wound present. No erythema.          Neurological:      General: No focal deficit present.      Mental Status: He is alert and oriented to person, place, and time.   Psychiatric:         Mood and Affect: Mood normal.         Behavior: Behavior normal.         Thought Content: Thought content normal.         Judgment: Judgment normal.         Assessment:       1. Multiple open wounds of right lower extremity, initial encounter    2. History of squamous cell carcinoma    3. PAD (peripheral artery disease)    4. Stage 3b chronic kidney disease    5. History of skin cancer    6. Status post Mohs surgery           Wound Other (comment) Right anterior;lower Leg (Active)       Present on Original Admission:    Primary Wound Type: Other   Side: Right   Orientation: anterior;lower   Location: Leg   Wound Approximate Age at First Assessment (Weeks):    Wound Number:    Is this injury device related?:    Incision Type:    Closure Method:    Wound Description (Comments):    Type:    Additional Comments:    Ankle-Brachial Index:    Pulses:    Removal Indication and Assessment:    Wound Outcome:    Wound Image   06/26/24 1606   Dressing Appearance Dry;Intact;Clean;Moist drainage 06/26/24 1606   Drainage Amount Small 06/26/24 1606   Drainage Characteristics/Odor Serosanguineous 06/26/24 1606   Red (%), Wound Tissue Color 100 % 06/26/24 1606   Periwound Area Intact 06/26/24 1606   Wound Length (cm) 3 cm 06/26/24 1606   Wound Width (cm) 2 cm 06/26/24 1606   Wound Depth (cm) 0.1 cm 06/26/24 1606   Wound Volume (cm^3) 0.6 cm^3 06/26/24 1606   Wound Surface Area (cm^2) 6 cm^2 06/26/24 1606   Care Cleansed with:;Soap  and water 06/26/24 1606   Dressing Applied;Foam;Island/border 06/26/24 1606   Periwound Care Skin barrier film applied 06/26/24 1606            Wound Other (comment) Right lower;medial Leg (Active)       Present on Original Admission:    Primary Wound Type: Other   Side: Right   Orientation: lower;medial   Location: Leg   Wound Approximate Age at First Assessment (Weeks):    Wound Number:    Is this injury device related?:    Incision Type:    Closure Method:    Wound Description (Comments):    Type:    Additional Comments:    Ankle-Brachial Index:    Pulses:    Removal Indication and Assessment:    Wound Outcome:    Dressing Appearance Dry;Intact;Clean;Moist drainage 06/26/24 1606   Drainage Amount Small 06/26/24 1606   Drainage Characteristics/Odor Serosanguineous 06/26/24 1606   Red (%), Wound Tissue Color 100 % 06/26/24 1606   Periwound Area Intact 06/26/24 1606   Wound Length (cm) 1.9 cm 06/26/24 1606   Wound Width (cm) 1 cm 06/26/24 1606   Wound Depth (cm) 0.1 cm 06/26/24 1606   Wound Volume (cm^3) 0.19 cm^3 06/26/24 1606   Wound Surface Area (cm^2) 1.9 cm^2 06/26/24 1606   Care Cleansed with:;Soap and water 06/26/24 1606   Dressing Applied;Island/border;Foam 06/26/24 1606   Periwound Care Skin barrier film applied 06/26/24 1606       Gus was seen in the clinic room and placed in the supine position on the treatment table.  The dressing was removed and the area was cleansed with Easi-clense sponges and dried thoroughly. No odor, erythema, or warmth noted.    Plan of Care: Calmoseptine and skin prep to periwound, hydrofera blue to wound beds, and covered with a border dressing.          Plan:           Orders Placed This Encounter   Procedures    VAS Ankle Brachial Indices Resting     Standing Status:   Future     Standing Expiration Date:   6/26/2025     Order Specific Question:   Release to patient     Answer:   Immediate    VAS US Arterial Legs Bilateral     Standing Status:   Future     Standing  Expiration Date:   6/26/2025     Order Specific Question:   Release to patient     Answer:   Immediate       Right lower extremity was dressed as detailed above.  Patient was instructed to not get the dressings wet and to use cast covers for showering.  Should the dressing become wet, he is to remove it, place a moist dressing over the wound, cover with gauze and roll gauze and to secure bandages.  He should then notify this office as soon as possible to have a new dressing applied.    Discussed nutrition and the role of protein in wound healing with the patient. Instructed patient to optimize protein for wound healing.     Discussed lower extremity edema with patient. Instructed patient to elevate legs whenever sedentary, follow a low sodium diet, and to take lasix as prescribed.    Discussed vascular status. Ordered ABIs and arterial ultrasound to assess stent stenosis. Will discuss with vascular surgery depending on results. Will utilize a compression wrap depending on results.    Discussed signs and symptoms of cellulitis- none appreciated.     Instructed patient to keep follow ups with Dr. Saldana.      Written and verbal instructions given to patient  RTC in 1 week      Nicky Brito PA-C                 45  minutes of total time spent on the encounter, which includes face to face time and non-face to face time preparing to see the patient (eg, review of tests), Obtaining and/or reviewing separately obtained history, Documenting clinical information in the electronic or other health record, Independently interpreting results (not separately reported) and communicating results to the patient/family/caregiver, or Care coordination (not separately reported).

## 2024-06-26 NOTE — PATIENT INSTRUCTIONS

## 2024-06-26 NOTE — PROGRESS NOTES
Subjective:      Patient ID:  Gus Sabillon is a 84 y.o. male who presents for   Chief Complaint   Patient presents with    Skin Check            History of Present Illness: The patient presents for TBSE per PWS    The patient was last seen on: 5/1/2024 for bx x3- Left shin c/w HAK, Right shin proximal and Right shin distal both c/w SCC.     Pt has h/o of MM and NMSC     This is a high risk patient here to check for the development of new lesions.    Pt has several new concerns        Problem List Items Addressed This Visit          Derm    Actinic keratosis    Relevant Medications    fluorouraciL (EFUDEX) 5 % cream    Other Relevant Orders    Specimen to Pathology, Dermatology       Oncology    History of skin cancer    Overview     melanoma on back in 2017. Melanoma only required excision for treatment   2020--right forearm KA  2020--Right hand SCC  2022--upper chest and right upper back, severely dysplastic nevus  5/2024  --right shin proxmial--SCC--s/p mohs  --right shin distal--SCC--s/p mohs          Other Visit Diagnoses       Neoplasm of uncertain behavior of skin    -  Primary    Multiple benign nevi        Seborrheic keratoses        Cherry angioma        Screening exam for skin cancer        History of nonmelanoma skin cancer        History of malignant melanoma        DSAP (disseminated superficial actinic porokeratosis)                  Review of Systems   Constitutional:  Positive for fatigue. Negative for fever, chills, weight loss, night sweats and malaise.   HENT:  Negative for headaches.    Respiratory:  Negative for cough and shortness of breath.    Gastrointestinal:  Negative for indigestion.   Skin:  Positive for activity-related sunscreen use and wears hat (all the time). Negative for daily sunscreen use and recent sunburn.   Neurological:  Negative for headaches.   Hematologic/Lymphatic: Negative for adenopathy. Bruises/bleeds easily (on asa).       Objective:   Physical Exam    Constitutional: He appears well-developed and well-nourished. No distress.   Neurological: He is alert and oriented to person, place, and time. He is not disoriented.   Psychiatric: He has a normal mood and affect.   Skin:   Areas Examined (abnormalities noted in diagram):   Scalp / Hair Palpated and Inspected  Head / Face Inspection Performed  Neck Inspection Performed  Chest / Axilla Inspection Performed  Abdomen Inspection Performed  Genitals / Buttocks / Groin Inspection Performed  Back Inspection Performed  RUE Inspected  LUE Inspection Performed  RLE Inspected  LLE Inspection Performed  Nails and Digits Inspection Performed                         Diagram Legend     Erythematous scaling macule/papule c/w actinic keratosis       Vascular papule c/w angioma      Pigmented verrucoid papule/plaque c/w seborrheic keratosis      Yellow umbilicated papule c/w sebaceous hyperplasia      Irregularly shaped tan macule c/w lentigo     1-2 mm smooth white papules consistent with Milia      Movable subcutaneous cyst with punctum c/w epidermal inclusion cyst      Subcutaneous movable cyst c/w pilar cyst      Firm pink to brown papule c/w dermatofibroma      Pedunculated fleshy papule(s) c/w skin tag(s)      Evenly pigmented macule c/w junctional nevus     Mildly variegated pigmented, slightly irregular-bordered macule c/w mildly atypical nevus      Flesh colored to evenly pigmented papule c/w intradermal nevus       Pink pearly papule/plaque c/w basal cell carcinoma      Erythematous hyperkeratotic cursted plaque c/w SCC      Surgical scar with no sign of skin cancer recurrence      Open and closed comedones      Inflammatory papules and pustules      Verrucoid papule consistent consistent with wart     Erythematous eczematous patches and plaques     Dystrophic onycholytic nail with subungual debris c/w onychomycosis     Umbilicated papule    Erythematous-base heme-crusted tan verrucoid plaque consistent with inflamed  seborrheic keratosis     Erythematous Silvery Scaling Plaque c/w Psoriasis     See annotation                        Assessment / Plan:      Pathology Orders:       Normal Orders This Visit    Specimen to Pathology, Dermatology     Comments:    Number of Specimens:->1  ------------------------->-------------------------  Spec 1 Procedure:->Biopsy  Spec 1 Clinical Impression:->r/o melanoma  Spec 1 Source:->right posterior arm  Release to patient->Immediate    Questions:    Procedure Type: Dermatology and skin neoplasms    Number of Specimens: 1    ------------------------: -------------------------    Spec 1 Procedure: Biopsy    Spec 1 Clinical Impression: r/o melanoma    Spec 1 Source: right posterior arm    Release to patient: Immediate    Release to patient:           Neoplasm of uncertain behavior of skin  -     Specimen to Pathology, Dermatology    Punch biopsy procedure note:  Punch biopsy performed after verbal consent obtained. Area marked and prepped with alcohol. Approximately 1cc of 1% lidocaine with epinephrine injected. 6 mm disposable punch used to remove lesion. Hemostasis obtained and biopsy site closed with 1 - 2 Prolene sutures. Wound care instructions reviewed with patient and handout given.    Patient had 2 other atypical pigmented lesions--dysplastic nevus vs melanoma on right shoulder and chest. Recommended biopsy. Patient voiced understanding but would like to defer until next visit in several months. Photos taken    Actinic keratosis  -     fluorouraciL (EFUDEX) 5 % cream; Compound fluorouracil 5% + calcipotriene 0.005% cream. Apply a thin layer to biopsied lesion on left anterior leg BID x 14 days.  Dispense: 30 g; Refill: 0    Large biopsy proven ak on left leg to be treated with efudex as above    Cryosurgery Procedure Note    Verbal consent from the patient is obtained including, but not limited to, risk of hypopigmentation/hyperpigmentation, scar, recurrence of lesion. The patient is  aware of the precancerous quality and need for treatment of these lesions. Liquid nitrogen cryosurgery is applied to the 10 actinic keratoses, as detailed in the physical exam, to produce a freeze injury. The patient is aware that blisters may form and is instructed on wound care with gentle cleansing and use of vaseline ointment to keep moist until healed. The patient is supplied a handout on cryosurgery and is instructed to call if lesions do not completely resolve.    Multiple benign nevi  Seborrheic keratoses  Cherry angioma  Reassurance given to patient. No treatment is necessary.   Treatment of benign, asymptomatic lesions may be considered cosmetic.    History of skin cancer  Screening exam for skin cancer  History of nonmelanoma skin cancer  History of malignant melanoma  Area of previous melanoma and NMSC examined. Site well healed with no signs of recurrence.    Total body skin examination performed today including at least 12 points as noted in physical examination. Suspicious lesions noted.    Recommend daily sun protection/avoidance, use of at least SPF 30, broad spectrum sunscreen (OTC drug), skin self examinations, and routine physician surveillance to optimize early detection    DSAP  - will monitor         Follow up in about 3 months (around 9/26/2024) for TBSE.

## 2024-06-28 ENCOUNTER — HOSPITAL ENCOUNTER (OUTPATIENT)
Dept: VASCULAR SURGERY | Facility: CLINIC | Age: 84
Discharge: HOME | End: 2024-06-28
Payer: MEDICARE

## 2024-06-28 DIAGNOSIS — S81.801A MULTIPLE OPEN WOUNDS OF RIGHT LOWER EXTREMITY, INITIAL ENCOUNTER: ICD-10-CM

## 2024-06-28 DIAGNOSIS — Z98.890 STATUS POST MOHS SURGERY: ICD-10-CM

## 2024-06-28 DIAGNOSIS — I73.9 PAD (PERIPHERAL ARTERY DISEASE): ICD-10-CM

## 2024-07-02 ENCOUNTER — OFFICE VISIT (OUTPATIENT)
Dept: WOUND CARE | Facility: CLINIC | Age: 84
End: 2024-07-02
Payer: MEDICARE

## 2024-07-02 VITALS
HEIGHT: 73 IN | TEMPERATURE: 98 F | BODY MASS INDEX: 21.65 KG/M2 | WEIGHT: 163.38 LBS | DIASTOLIC BLOOD PRESSURE: 63 MMHG | HEART RATE: 132 BPM | SYSTOLIC BLOOD PRESSURE: 139 MMHG

## 2024-07-02 DIAGNOSIS — Z85.89 HISTORY OF SQUAMOUS CELL CARCINOMA: ICD-10-CM

## 2024-07-02 DIAGNOSIS — I73.9 PAD (PERIPHERAL ARTERY DISEASE): ICD-10-CM

## 2024-07-02 DIAGNOSIS — S81.801D MULTIPLE OPEN WOUNDS OF RIGHT LOWER EXTREMITY, SUBSEQUENT ENCOUNTER: Primary | ICD-10-CM

## 2024-07-02 DIAGNOSIS — Z98.890 STATUS POST MOHS SURGERY: ICD-10-CM

## 2024-07-02 DIAGNOSIS — N18.32 STAGE 3B CHRONIC KIDNEY DISEASE: ICD-10-CM

## 2024-07-02 DIAGNOSIS — L03.90 WOUND CELLULITIS: ICD-10-CM

## 2024-07-02 DIAGNOSIS — Z85.828 HISTORY OF SKIN CANCER: ICD-10-CM

## 2024-07-02 PROCEDURE — 3075F SYST BP GE 130 - 139MM HG: CPT | Mod: CPTII,S$GLB,,

## 2024-07-02 PROCEDURE — 1159F MED LIST DOCD IN RCRD: CPT | Mod: CPTII,S$GLB,,

## 2024-07-02 PROCEDURE — 1125F AMNT PAIN NOTED PAIN PRSNT: CPT | Mod: CPTII,S$GLB,,

## 2024-07-02 PROCEDURE — 99999 PR PBB SHADOW E&M-EST. PATIENT-LVL IV: CPT | Mod: PBBFAC,,,

## 2024-07-02 PROCEDURE — 1101F PT FALLS ASSESS-DOCD LE1/YR: CPT | Mod: CPTII,S$GLB,,

## 2024-07-02 PROCEDURE — 3078F DIAST BP <80 MM HG: CPT | Mod: CPTII,S$GLB,,

## 2024-07-02 PROCEDURE — 99214 OFFICE O/P EST MOD 30 MIN: CPT | Mod: S$GLB,,,

## 2024-07-02 PROCEDURE — 3288F FALL RISK ASSESSMENT DOCD: CPT | Mod: CPTII,S$GLB,,

## 2024-07-02 RX ORDER — DOXYCYCLINE HYCLATE 100 MG
100 TABLET ORAL EVERY 12 HOURS
Qty: 28 TABLET | Refills: 0 | Status: SHIPPED | OUTPATIENT
Start: 2024-07-02 | End: 2024-07-16

## 2024-07-02 NOTE — PROGRESS NOTES
"Subjective:       Patient ID: Gus Sabillon is a 84 y.o. male.    Chief Complaint: Wound Check      Patient presents for a re-evaluation of two right lower extremity wounds.  Pt followed with Dr. 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 Pt s/p Mohs' Micrographic surgery with Dr. Saldana on 6/25/24. She dressed his wound, instructed to wear compression stockings daily, and referred to wound care. Pt follows with vascular surgery for PAD. He was last seen by Dr. Farooq in 2022 who determined no intervention for his claudication. Pt did not have open wounds at this time. Pt s/p bilateral PTAS in [facility] around the late 1990s. He reports that his claudication symptoms are better and he can walk further than before. He reports being able to walk about 1 mile before his heart failure symptoms prevent him from walking. Pt is a former smoker. He smoked 3-4 ppd for 15 years. He quit 42 years ago. He reports that his legs/ ankles swelling from heart failure. Admits compliance with leg elevation, taking lasix, and following a low sodium diet.     Interval history: Pt reports that the bandages were falling off. He felt the bandages "moving up and down" his leg. Pt reports increased pain to the wounds and the leg. He notices for the past 3 days his right foot "falls asleep" and goes numb. Pt reports it takes about 5 minutes for the foot to "wake up." Pt is going to [place] next Thursday for horse racing. Pt will be there until September 2024. Denies fever, chills, erythema, warmth, or purulent drainage.        6/28/24  ABIs and TBIs:  Right DL-1.00  Right TBI-0.31  Left DL-0.88  Left TBI-0.24  Right leg: Segmental pressures are within the normal range and do not suggest peripheral arterial occlusive disease. PVR waveforms suggest very minimal peripheral arterial occlusive disease.  Rt great toe: The PPG waveform as described above- TBI of 0.31 with a great toe pressure " of 48 mmHg is noted.  Left leg: Segmental pressures suggest mild to minimal peripheral arterial occlusive disease. PVR waveforms suggest very minimal peripheral arterial occlusive disease.  Lt great toe: The PPG waveform as described above. TBI of 0.24 with a great toe pressure of 37 mmHg is noted.  Patient with known bilateral SFA stents    6/28/24 arterial ultrasound:  Right Lower Extremity   Rt mid CFA PSV 94 cm/s   Rt mid CFA doppler waveform:   triphasic flow   Rt prox DFA PSV    176 cm/s   Rt prox DFA doppler waveform:  triphasic flow   Rt prox SFA PSV    189 cm/s   Rt prox SFA doppler waveform:  triphasic flow   Rt mid SFA  cm/s   Rt mid SFA doppler waveform:   triphasic flow   Rt distal SFA PSV  144 cm/s   Rt distal SFA doppler waveform:    triphasic flow   Rt prox POP PSV    100 cm/s   Rt prox POP doppler waveform:  triphasic flow   Rt mid POP PSV 54 cm/s   Rt mid POP doppler waveform:   triphasic flow   Rt distal POP PSV  54 cm/s   Rt distal POP doppler waveform:    triphasic flow   Rt TPT PSV 48 cm/s   Rt TPT doppler waveform:   triphasic flow   Rt prox DEVENDRA PSV    38 cm/s   Rt prox DEVENDRA doppler waveform:  triphasic flow   Rt mid DEVENDRA PSV 53 cm/s   Rt mid DEVENDRA doppler waveform:   triphasic flow   Rt distal DEVENDRA PSV  56 cm/s   Rt distal DEVENDRA doppler waveform:    triphasic flow   Rt prox PTA PSV    60 cm/s   Rt prox PTA doppler waveform:  triphasic flow   Rt mid PTA  cm/s   Rt mid PTA doppler waveform:   triphasic flow   Rt distal PTA PSV  40 cm/s   Rt distal PTA doppler waveform:    triphasic flow   Rt prox AVINASH PSV   39 cm/s   Rt prox AVINASH doppler waveform: triphasic flow   Rt mid AVINASH PSV    61 cm/s   Rt mid AVINASH doppler waveform:  triphasic flow   Rt distal AVINASH doppler waveform:   not visualized   Rt DPA PSV 67 cm/s   Rt DPA doppler waveform:   triphasic flow   Left Lower Extremity   Lt mid CFA  cm/s   Lt mid CFA doppler waveform:   triphasic flow   Lt prox DFA PSV    186 cm/s   Lt prox  DFA doppler waveform:  triphasic flow   Lt prox SFA PSV    77 cm/s   Lt prox SFA doppler waveform:  triphasic flow   Lt mid SFA PSV 97 cm/s   Lt mid SFA doppler waveform:   triphasic flow   Lt distal SFA PSV  78 cm/s   Lt distal SFA doppler waveform:    triphasic flow   Lt prox POP PSV    87 cm/s   Lt prox POP doppler waveform:  triphasic flow   Lt mid POP PSV 36 cm/s   Lt mid POP doppler waveform:   triphasic flow   Lt distal POP PSV  41 cm/s   Lt distal POP doppler waveform:    triphasic flow   Lt TPT PSV 36 cm/s   Lt TPT doppler waveform:   triphasic flow   Lt prox DEVENDRA PSV    51 cm/s   Lt prox DEVENDRA doppler waveform:  triphasic flow   Lt mid DEVENDRA PSV 66 cm/s   Lt mid DEVENDRA doppler waveform:   triphasic flow   Lt distal DEVENDRA PSV  64 cm/s   Lt distal DEVENDRA doppler waveform:    triphasic flow   Lt prox PTA PSV    67 cm/s   Lt prox PTA doppler waveform:  biphasic flow   Lt mid PTA PSV 0 cm/s   Lt mid PTA doppler waveform:   short segment chronic occlusion   Lt distal PTA PSV  13 cm/s   Lt distal PTA doppler waveform:    retrograde flow via reconstituted flow, monophasic   Lt prox AVINASH PSV   35 cm/s   Lt prox AVINASH doppler waveform: triphasic flow   Lt mid AVINASH PSV    39 cm/s   Lt mid AVINASH doppler waveform:  triphasic flow   Lt distal AVINASH PSV 47 cm/s   Lt distal AVINASH doppler waveform:   triphasic flow   Lt DPA PSV 83 cm/s   Lt DPA doppler waveform:   triphasic flow   Other: Stent eval in cm/s:   Right Proximal SFA:   pre-93   prox-60   mid-131   dist-140   post- 189   Right Mid SFA:   pre- 140   prox-98   mid-127   dist-124   post-144   Right Dist SFA:   pre-144   prox-145   mid-114   dist-140   post-125   Left Proximal-mid SFA stent:   pre- 78   prox- 136   mid- 149   dist-115   post- 128   Right Leg:Duplex imaging reveals a patent right lower extremity arterial tree and multiple patent Rt. SFA stents. There is no evidence of a hemodynamically significant stenosis. The distal Peroneal artery could not be visualized.   Left  Leg: Duplex imaging reveals a patent left lower extremity arterial tree and SFA stent. There is no evidence of a hemodynamically significant stenosis. A short segment chronic occlusion is noted in the Mid PTA with reconstituted flow feeding the distal segment, which is coursing retrograde.     2/1/23 venous ultrasound:  No evidence of deep venous thrombosis in either lower extremity.     9/30/22 ABIs:  Rt DL 0.92   Lt DL  0.94   Right Leg: Segmental pressures and PVR waveforms suggest minimal peripheral arterial occlusive disease.   Left Leg: Segmental pressures and PVR waveforms suggest minimal peripheral arterial occlusive disease.     1/12/22 venous ultrasound:  Right Leg: Duplex imaging of the right lower extremity veins demonstrates patent and compressible veins. There is no evidence of a venous thrombosis in the deep or superficial veins. Significant reflux noted in the right GSV including the Saphenofemoral Junction (SFJ). No significant reflux noted in the SSV. The accessory vein was too small for accurate assessment. The GSV branches extensively from the proximal calf to the ankle.   Left Leg: Duplex imaging of the left lower extremity veins demonstrates patent and compressible veins. There is no evidence of a venous thrombosis in the deep or superficial veins. Significant reflux noted in the left GSV including the Saphenofemoral Junction (SFJ). No significant reflux noted in the SSV. The accessory lorena was too small for accurate assessment. The GSV branches extensively from the proximal calf to the ankle.     1/12/22 arterial ultrasound:  Right leg: Color flow duplex of the right lower extremity stents reveals a PSV increase of 59 cm/sec to 123 cm/sec in the mid segment of the proximal SFA stent. These findings are suggestive of a hemodynamically significant stenoses. The mid and distal SFA stents appear to be patent without evidence of stenosis.   Left leg: Color flow duplex of the left lower extremity  mid SFA stent reveals a PSV increase of 124 cm/sec to 286 cm/sec in the mid segment. These findings are suggestive of a hemodynamically significant stenoses. There is a PSV elevation of 127 cm/sec in the proximal segment of the Popliteal A which decreases to 44 cm/sec in the mid segment with a change in waveform morphology.         Review of Systems   Constitutional:  Negative for activity change, chills, diaphoresis, fatigue and fever.   Respiratory:  Negative for apnea, chest tightness and shortness of breath.    Cardiovascular:  Negative for chest pain, palpitations and leg swelling.   Musculoskeletal:  Negative for gait problem and joint swelling.   Skin:  Positive for wound. Negative for color change, pallor and rash.   Neurological:  Negative for syncope, weakness and numbness.   Psychiatric/Behavioral:  Negative for agitation. The patient is not nervous/anxious.    All other systems reviewed and are negative.      Objective:      Physical Exam  Vitals reviewed.   Constitutional:       General: He is not in acute distress.     Appearance: Normal appearance.   Cardiovascular:      Pulses:           Dorsalis pedis pulses are 2+ on the right side.        Posterior tibial pulses are 1+ on the right side.   Pulmonary:      Effort: No respiratory distress.   Musculoskeletal:         General: No swelling.   Skin:     General: Skin is warm and dry.      Findings: Erythema and wound present.          Neurological:      General: No focal deficit present.      Mental Status: He is alert and oriented to person, place, and time.   Psychiatric:         Mood and Affect: Mood normal.         Behavior: Behavior normal.         Thought Content: Thought content normal.         Judgment: Judgment normal.         Assessment:       1. Multiple open wounds of right lower extremity, subsequent encounter    2. History of squamous cell carcinoma    3. PAD (peripheral artery disease)    4. Stage 3b chronic kidney disease    5. History  of skin cancer    6. Status post Mohs surgery    7. Wound cellulitis           Wound Other (comment) Right anterior;lower Leg (Active)       Present on Original Admission:    Primary Wound Type: Other   Side: Right   Orientation: anterior;lower   Location: Leg   Wound Approximate Age at First Assessment (Weeks):    Wound Number:    Is this injury device related?:    Incision Type:    Closure Method:    Wound Description (Comments):    Type:    Additional Comments:    Ankle-Brachial Index:    Pulses:    Removal Indication and Assessment:    Wound Outcome:    Wound Image   07/02/24 1517   Dressing Appearance Dry;Intact;Clean;Dried drainage 07/02/24 1517   Drainage Amount Moderate 07/02/24 1517   Drainage Characteristics/Odor Serosanguineous 07/02/24 1517   Red (%), Wound Tissue Color 40 % 07/02/24 1517   Yellow (%), Wound Tissue Color 60 % 07/02/24 1517   Periwound Area Intact;Redness;Warm 07/02/24 1517   Wound Length (cm) 3.2 cm 07/02/24 1517   Wound Width (cm) 2.6 cm 07/02/24 1517   Wound Depth (cm) 0.1 cm 07/02/24 1517   Wound Volume (cm^3) 0.832 cm^3 07/02/24 1517   Wound Surface Area (cm^2) 8.32 cm^2 07/02/24 1517   Care Cleansed with:;Soap and water 07/02/24 1517   Dressing Applied;Foam;Island/border;Other (comment) 07/02/24 1517   Periwound Care Skin barrier film applied 07/02/24 1517            Wound Other (comment) Right lower;medial Leg (Active)       Present on Original Admission:    Primary Wound Type: Other   Side: Right   Orientation: lower;medial   Location: Leg   Wound Approximate Age at First Assessment (Weeks):    Wound Number:    Is this injury device related?:    Incision Type:    Closure Method:    Wound Description (Comments):    Type:    Additional Comments:    Ankle-Brachial Index:    Pulses:    Removal Indication and Assessment:    Wound Outcome:    Wound Image   07/02/24 1517   Dressing Appearance Dry;Intact;Clean;Dried drainage 07/02/24 1517   Drainage Amount Moderate 07/02/24 1517    Drainage Characteristics/Odor Serosanguineous 07/02/24 1517   Red (%), Wound Tissue Color 50 % 07/02/24 1517   Yellow (%), Wound Tissue Color 50 % 07/02/24 1517   Periwound Area Intact;Redness;Warm 07/02/24 1517   Wound Length (cm) 1.9 cm 07/02/24 1517   Wound Width (cm) 1.2 cm 07/02/24 1517   Wound Depth (cm) 0.1 cm 07/02/24 1517   Wound Volume (cm^3) 0.228 cm^3 07/02/24 1517   Wound Surface Area (cm^2) 2.28 cm^2 07/02/24 1517   Care Cleansed with:;Soap and water 07/02/24 1517   Dressing Applied;Foam;Island/border;Other (comment) 07/02/24 1517   Periwound Care Skin barrier film applied 07/02/24 1517       Gus was seen in the clinic room and placed in the supine position on the treatment table.  The dressing was removed and the area was cleansed with Easi-clense sponges and dried thoroughly. No odor noted. Erythema and warmth noted. Marked edges of erythema with a surgical marker.    Plan of Care: Calmoseptine and skin prep to periwound, vaseline impregnated gauze to wound beds, hydrofera blue,  covered with a border dressing, and secured with 1 loose ACE wrap.          Plan:           No orders of the defined types were placed in this encounter.      Right lower extremity was dressed as detailed above.  Patient was instructed to not get the dressings wet and to use cast covers for showering.  Should the dressing become wet, he is to remove it, place a moist dressing over the wound, cover with gauze and roll gauze and to secure bandages.  He should then notify this office as soon as possible to have a new dressing applied.    Discussed nutrition and the role of protein in wound healing with the patient. Instructed patient to optimize protein for wound healing.     Discussed lower extremity edema with patient. Instructed patient to elevate legs whenever sedentary, follow a low sodium diet, and to take lasix as prescribed.    Discussed vascular status. Ordered ABIs and arterial ultrasound- discussed results with  patient. Will discuss results with Dr. Farooq.     Discussed cellulitis with patient. Prescribed doxycyline (pt is allergic to penicillins and clindamycin). Discussed side effects of medication. Instructed patient to take medication as prescribed.     Discussed patient's vacation until September 2024. If cellulitis is still present next week, instructed patient to contact his insurance to obtain a wound care provider in [place]. If cellulitis is not present, will discussed daily dressing changes at next appointment.      Instructed patient to keep follow ups with Dr. Saldana.      Written and verbal instructions given to patient  RTC in 1 week      Nicky Brito PA-C

## 2024-07-03 ENCOUNTER — DOCUMENTATION ONLY (OUTPATIENT)
Dept: HEMATOLOGY/ONCOLOGY | Facility: CLINIC | Age: 84
End: 2024-07-03
Payer: MEDICARE

## 2024-07-03 ENCOUNTER — TELEPHONE (OUTPATIENT)
Dept: DERMATOLOGY | Facility: CLINIC | Age: 84
End: 2024-07-03
Payer: MEDICARE

## 2024-07-03 ENCOUNTER — PATIENT MESSAGE (OUTPATIENT)
Dept: DERMATOLOGY | Facility: CLINIC | Age: 84
End: 2024-07-03
Payer: MEDICARE

## 2024-07-03 ENCOUNTER — TELEPHONE (OUTPATIENT)
Dept: WOUND CARE | Facility: CLINIC | Age: 84
End: 2024-07-03
Payer: MEDICARE

## 2024-07-03 DIAGNOSIS — C43.9 MALIGNANT MELANOMA, UNSPECIFIED SITE: Primary | ICD-10-CM

## 2024-07-03 NOTE — PROGRESS NOTES
Called pt to inform him of his upcoming 7/9/24 appt with Dr Mora. I reviewed date location and time, provided my direct contact information and encouraged them to call for any assistance.  Pt verbalized understanding.  Oncology Navigation   Intake  Date of Diagnosis: 06/26/24  Cancer Type: Melanoma  Type of Referral: Internal  Date of Referral: 07/03/24  Initial Nurse Navigator Contact: 07/03/24  Referral to Initial Contact Timeline (days): 0  First Appointment Available: 07/09/24  Appointment Date: 07/09/24  First Available Date vs. Scheduled Date (days): 0     Treatment  Current Status: Staging work-up    Surgical Oncologist: hill  Consult Date: 07/09/24          Procedures: Biopsy; MRI  Biopsy Schedule Date: 06/26/24  MRI Schedule Date: 05/02/24                 Acuity      Follow Up  No follow-ups on file.

## 2024-07-03 NOTE — TELEPHONE ENCOUNTER
Biopsy result with melanoma as below. I called the patient and informed him of the result and plan. Referred placed to surgical oncology for excision. 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  Uninvolved by invasive malignant melanoma.   Deep:  Uninvolved by invasive malignant melanoma or malignant melanoma in-situ.   Mitotic rate: <1 mm2   Anatomic (Zana) level: II   Lympho-vascular invasion: Not identified   Neurotropism: Not identified   Tumor infiltrating lymphocytes: Present, non-brisk   Tumor regression: Not identified     Pathologic Stage Classification (pTNM, AJCC 8th Ed): pT1a

## 2024-07-03 NOTE — TELEPHONE ENCOUNTER
Discussed vascular status with Dr. Farooq. He deemed patient has enough blood flow to heal wounds. Cleared for compression wrap therapy.      Called patient and informed him of this. Pt still reports pain to the area. Offered a referral to pain management. Pt would like to wait a little longer to see if the antibiotics help with the pain. Pt will contact office if he wants a referral to pain management.        All questions were answered.  Nicky Brito PA-C

## 2024-07-09 ENCOUNTER — DOCUMENTATION ONLY (OUTPATIENT)
Dept: TRANSPLANT | Facility: CLINIC | Age: 84
End: 2024-07-09

## 2024-07-09 ENCOUNTER — LAB VISIT (OUTPATIENT)
Dept: LAB | Facility: HOSPITAL | Age: 84
End: 2024-07-09
Attending: INTERNAL MEDICINE
Payer: MEDICARE

## 2024-07-09 ENCOUNTER — OFFICE VISIT (OUTPATIENT)
Dept: WOUND CARE | Facility: CLINIC | Age: 84
End: 2024-07-09
Payer: MEDICARE

## 2024-07-09 ENCOUNTER — TELEPHONE (OUTPATIENT)
Dept: TRANSPLANT | Facility: CLINIC | Age: 84
End: 2024-07-09

## 2024-07-09 ENCOUNTER — OFFICE VISIT (OUTPATIENT)
Dept: SURGERY | Facility: CLINIC | Age: 84
End: 2024-07-09
Payer: MEDICARE

## 2024-07-09 ENCOUNTER — OFFICE VISIT (OUTPATIENT)
Dept: TRANSPLANT | Facility: CLINIC | Age: 84
End: 2024-07-09
Payer: MEDICARE

## 2024-07-09 VITALS
BODY MASS INDEX: 21.71 KG/M2 | SYSTOLIC BLOOD PRESSURE: 131 MMHG | HEIGHT: 73 IN | WEIGHT: 163.81 LBS | OXYGEN SATURATION: 98 % | HEART RATE: 99 BPM | DIASTOLIC BLOOD PRESSURE: 55 MMHG

## 2024-07-09 VITALS
DIASTOLIC BLOOD PRESSURE: 67 MMHG | BODY MASS INDEX: 21.65 KG/M2 | HEIGHT: 73 IN | SYSTOLIC BLOOD PRESSURE: 110 MMHG | WEIGHT: 163.38 LBS | HEART RATE: 121 BPM

## 2024-07-09 VITALS
HEIGHT: 73 IN | WEIGHT: 162.25 LBS | DIASTOLIC BLOOD PRESSURE: 82 MMHG | SYSTOLIC BLOOD PRESSURE: 132 MMHG | BODY MASS INDEX: 21.5 KG/M2 | HEART RATE: 52 BPM | TEMPERATURE: 97 F

## 2024-07-09 DIAGNOSIS — Z98.890 S/P SKIN BIOPSY: ICD-10-CM

## 2024-07-09 DIAGNOSIS — I10 PRIMARY HYPERTENSION: Primary | ICD-10-CM

## 2024-07-09 DIAGNOSIS — I73.9 PAD (PERIPHERAL ARTERY DISEASE): ICD-10-CM

## 2024-07-09 DIAGNOSIS — I50.32 CHRONIC DIASTOLIC HEART FAILURE: ICD-10-CM

## 2024-07-09 DIAGNOSIS — I70.0 AORTIC ATHEROSCLEROSIS: ICD-10-CM

## 2024-07-09 DIAGNOSIS — N18.32 STAGE 3B CHRONIC KIDNEY DISEASE: ICD-10-CM

## 2024-07-09 DIAGNOSIS — E85.4 CARDIAC AMYLOIDOSIS: Primary | ICD-10-CM

## 2024-07-09 DIAGNOSIS — E78.2 MIXED HYPERLIPIDEMIA: ICD-10-CM

## 2024-07-09 DIAGNOSIS — S81.801D MULTIPLE OPEN WOUNDS OF RIGHT LOWER EXTREMITY, SUBSEQUENT ENCOUNTER: Primary | ICD-10-CM

## 2024-07-09 DIAGNOSIS — Z98.890 S/P CAROTID ENDARTERECTOMY: ICD-10-CM

## 2024-07-09 DIAGNOSIS — I43 CARDIAC AMYLOIDOSIS: Primary | ICD-10-CM

## 2024-07-09 DIAGNOSIS — C43.9 MALIGNANT MELANOMA, UNSPECIFIED SITE: ICD-10-CM

## 2024-07-09 DIAGNOSIS — E85.82 WILD-TYPE TRANSTHYRETIN-RELATED (ATTR) AMYLOIDOSIS: ICD-10-CM

## 2024-07-09 DIAGNOSIS — Z98.890 STATUS POST MOHS SURGERY: ICD-10-CM

## 2024-07-09 DIAGNOSIS — Z85.828 HISTORY OF SKIN CANCER: ICD-10-CM

## 2024-07-09 DIAGNOSIS — Z85.89 HISTORY OF SQUAMOUS CELL CARCINOMA: ICD-10-CM

## 2024-07-09 DIAGNOSIS — I49.3 FREQUENT PVCS: ICD-10-CM

## 2024-07-09 PROCEDURE — 1158F ADVNC CARE PLAN TLK DOCD: CPT | Mod: CPTII,S$GLB,, | Performed by: INTERNAL MEDICINE

## 2024-07-09 PROCEDURE — 99999 PR PBB SHADOW E&M-EST. PATIENT-LVL IV: CPT | Mod: PBBFAC,,, | Performed by: INTERNAL MEDICINE

## 2024-07-09 PROCEDURE — 1101F PT FALLS ASSESS-DOCD LE1/YR: CPT | Mod: CPTII,S$GLB,, | Performed by: INTERNAL MEDICINE

## 2024-07-09 PROCEDURE — 3075F SYST BP GE 130 - 139MM HG: CPT | Mod: CPTII,S$GLB,,

## 2024-07-09 PROCEDURE — 3288F FALL RISK ASSESSMENT DOCD: CPT | Mod: CPTII,S$GLB,, | Performed by: SURGERY

## 2024-07-09 PROCEDURE — 1158F ADVNC CARE PLAN TLK DOCD: CPT | Mod: CPTII,S$GLB,,

## 2024-07-09 PROCEDURE — 3078F DIAST BP <80 MM HG: CPT | Mod: CPTII,S$GLB,, | Performed by: INTERNAL MEDICINE

## 2024-07-09 PROCEDURE — 3075F SYST BP GE 130 - 139MM HG: CPT | Mod: CPTII,S$GLB,, | Performed by: SURGERY

## 2024-07-09 PROCEDURE — 1159F MED LIST DOCD IN RCRD: CPT | Mod: CPTII,S$GLB,, | Performed by: INTERNAL MEDICINE

## 2024-07-09 PROCEDURE — 1126F AMNT PAIN NOTED NONE PRSNT: CPT | Mod: CPTII,S$GLB,, | Performed by: SURGERY

## 2024-07-09 PROCEDURE — 1126F AMNT PAIN NOTED NONE PRSNT: CPT | Mod: CPTII,S$GLB,, | Performed by: INTERNAL MEDICINE

## 2024-07-09 PROCEDURE — 29581 APPL MULTLAYER CMPRN SYS LEG: CPT | Mod: RT,S$GLB,,

## 2024-07-09 PROCEDURE — 3079F DIAST BP 80-89 MM HG: CPT | Mod: CPTII,S$GLB,,

## 2024-07-09 PROCEDURE — 1125F AMNT PAIN NOTED PAIN PRSNT: CPT | Mod: CPTII,S$GLB,,

## 2024-07-09 PROCEDURE — 83521 IG LIGHT CHAINS FREE EACH: CPT | Mod: 59 | Performed by: INTERNAL MEDICINE

## 2024-07-09 PROCEDURE — 3288F FALL RISK ASSESSMENT DOCD: CPT | Mod: CPTII,S$GLB,, | Performed by: INTERNAL MEDICINE

## 2024-07-09 PROCEDURE — 1159F MED LIST DOCD IN RCRD: CPT | Mod: CPTII,S$GLB,,

## 2024-07-09 PROCEDURE — 1158F ADVNC CARE PLAN TLK DOCD: CPT | Mod: CPTII,S$GLB,, | Performed by: SURGERY

## 2024-07-09 PROCEDURE — 99024 POSTOP FOLLOW-UP VISIT: CPT | Mod: S$GLB,,,

## 2024-07-09 PROCEDURE — 36415 COLL VENOUS BLD VENIPUNCTURE: CPT | Performed by: INTERNAL MEDICINE

## 2024-07-09 PROCEDURE — 3074F SYST BP LT 130 MM HG: CPT | Mod: CPTII,S$GLB,, | Performed by: INTERNAL MEDICINE

## 2024-07-09 PROCEDURE — 99205 OFFICE O/P NEW HI 60 MIN: CPT | Mod: S$GLB,,, | Performed by: INTERNAL MEDICINE

## 2024-07-09 PROCEDURE — 99203 OFFICE O/P NEW LOW 30 MIN: CPT | Mod: S$GLB,,, | Performed by: SURGERY

## 2024-07-09 PROCEDURE — 99999 PR PBB SHADOW E&M-EST. PATIENT-LVL IV: CPT | Mod: PBBFAC,,,

## 2024-07-09 PROCEDURE — 1101F PT FALLS ASSESS-DOCD LE1/YR: CPT | Mod: CPTII,S$GLB,, | Performed by: SURGERY

## 2024-07-09 PROCEDURE — 3078F DIAST BP <80 MM HG: CPT | Mod: CPTII,S$GLB,, | Performed by: SURGERY

## 2024-07-09 PROCEDURE — 99999 PR PBB SHADOW E&M-EST. PATIENT-LVL IV: CPT | Mod: PBBFAC,,, | Performed by: SURGERY

## 2024-07-09 PROCEDURE — 99213 OFFICE O/P EST LOW 20 MIN: CPT | Mod: 25,S$GLB,,

## 2024-07-09 RX ORDER — EPLERENONE 25 MG/1
25 TABLET, FILM COATED ORAL DAILY
Qty: 30 TABLET | Refills: 11 | Status: SHIPPED | OUTPATIENT
Start: 2024-07-09

## 2024-07-09 NOTE — PROGRESS NOTES
Subjective   Patient ID:  Gus Sabillon is a 84 y.o. male who presents for evaluation of amyloid CM    85 YO M w/ HFpEF, HTN, HLD, PVCs, CKD, PAD status post bilateral lower extremity PI, carotid stenosis s/p R CEA '21, GERD, squamous cell carcinoma of the skin presenting for evaluation of amyloid CM.    He sees Dr. Karrie Sarabia MD as his primary cardiologist. As part of his evaluation for HFpEF he underwent evaluation for amyloidosis. His SPEP/UPEP was WNL but FLC was not done. PYP scan was positive.    He comes in today for evaluation with his family.  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  Still does work with this part-time.  He says that at present he has not very physically active as a consequence of his significant fatigue and shortness of breath with minimal exertion.  He says that since his diagnosis and establishing care with Dr. Sarabia and initiation of diuretics and some guideline directed medical therapy he has been feeling somewhat better.  The leg swelling he was experiencing before has gotten better.  Denies exertional chest discomfort, but does note shortness of breath and fatigue with minimal exertion.  He says that the leg swelling comes back occasionally, but gets better with Lasix.  Denies any palpitations.  Has PND/orthopnea on occasion, but usually is able to sleep flat.  No episodes of syncope.    As noted above, he carries a diagnosis of hypertension, but recently has had a reduction in his antihypertensive medications.  Of note, he previously was also started on Jardiance but this was stopped because of significant fatigue.    When asked specifically he denies a history of carpal tunnel, spinal stenosis, or neuropathy.  However he does mention that he had possible biceps tendon tears, as both biceps had to be reattached to when he had his shoulder surgeries.    His family history is negative for any cardiac amyloidosis.    Former smoker.  Drinks alcohol, 1-2 glasses of wine  per day.  Denies any recreational drug use.    TTE 5/10/23  The left ventricle is normal in size with normal systolic function.  The estimated ejection fraction is 60%.  Indeterminate left ventricular diastolic function.  Normal right ventricular size with normal right ventricular systolic function.  Mild left atrial enlargement.  Mild aortic regurgitation.  Mild mitral regurgitation.  Normal central venous pressure (3 mmHg).  The estimated PA systolic pressure is 25 mmHg.    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    SPECT imaging shows diffuse myocardial uptake.    Visual grade corresponds to the Perugini defined grading scale where 3 corresponds to cardiac activity is clearly above bone.    Evaluation for AL amyloidosis by serum FLCs, serum, and urine immunofixation is recommended in all patients undergoing 99mTc-PYP/DPD/HMDP scans for cardiac amyloidosis.    Results should be interpreted in the context of prior evaluation and referral to a hematologist or amyloidosis expert is recommended if either: (a) Recommended echo/CMR is strongly suggestive of cardiac amyloidosis and 99mTc-PYP/DPD/HMDP is not suggestive or equivocal and/or (b) FLCs are abnormal or equivocal.  Review of Systems   Constitutional: Negative for chills and fever.   HENT:  Negative for hearing loss.    Eyes:  Negative for visual disturbance.   Cardiovascular:  Negative for chest pain, dyspnea on exertion, irregular heartbeat, leg swelling, orthopnea, palpitations, paroxysmal nocturnal dyspnea and syncope.   Respiratory:  Negative for cough and shortness of breath.    Musculoskeletal:  Negative for muscle weakness.   Gastrointestinal:  Negative for diarrhea, nausea and vomiting.   Neurological:  Negative for focal weakness.   Psychiatric/Behavioral:  Negative for memory loss.           Objective   Vitals:    07/09/24 0930   BP: 110/67   Pulse: (!) 121       Physical Exam  Vitals  reviewed.   Constitutional:       General: He is not in acute distress.  HENT:      Head: Atraumatic.   Eyes:      Extraocular Movements: Extraocular movements intact.   Cardiovascular:      Rate and Rhythm: Normal rate. Rhythm irregular.      Heart sounds: Normal heart sounds.   Pulmonary:      Breath sounds: Normal breath sounds.   Abdominal:      Palpations: Abdomen is soft.      Tenderness: There is no abdominal tenderness.   Musculoskeletal:         General: Normal range of motion.      Right lower leg: No edema.      Left lower leg: No edema.   Neurological:      General: No focal deficit present.      Mental Status: He is alert and oriented to person, place, and time.            Assessment and Plan     1. Primary hypertension    2. Wild-type transthyretin-related (ATTR) amyloidosis    3. Mixed hyperlipidemia    4. S/P carotid endarterectomy    5. Aortic atherosclerosis    6. Frequent PVCs    7. Chronic diastolic heart failure        Plan:  - presents today to establish care with our amyloidosis Clinic.  As noted above, most of the workup for a al amyloidosis was negative but free light chains was not done.  I have ordered free light chains to be done today.  PYP scan was positive.  If free light chains are negative, this is more than likely TTR cardiac amyloidosis.  - he was complaining of some gynecomastia so I am changing his spironolactone to eplerenone.  Recommended continuing are the therapy without changes.  - once free light chains are negative, can initiate therapy with tafamidis.   - he will also need genetic testing done.  Unfortunately our amyloidosis coordinator was not present at the time of my visit.  We will have her contact him to set up time for genetic testing as well as some education about amyloidosis.  We will have him return to our amyloid clinic after completing testing.       Advance Care Planning     Date: 07/09/2024    Power of   I initiated the process of voluntary advance  care planning today and explained the importance of this process to the patient.  I introduced the concept of advance directives to the patient, as well. Then the patient received detailed information about the importance of designating a Health Care Power of  (HCPOA). He was also instructed to communicate with this person about their wishes for future healthcare, should he become sick and lose decision-making capacity. The patient has not previously appointed a HCPOA. After our discussion, the patient has decided to complete. I encouraged him to communicate with this person about their wishes for future healthcare, should he become sick and lose decision-making capacity.      A total of 15 min was spent on advance care planning, goals of care discussion, emotional support, formulating and communicating prognosis and exploring burden/benefit of various approaches of treatment. This discussion occurred on a fully voluntary basis with the verbal consent of the patient and/or family.

## 2024-07-09 NOTE — TELEPHONE ENCOUNTER
Appts scheduled for Mr. Zeeshan Sabillon with Dr. Zhong for 3 month f/u for amyloid f/u on 9/6/24 at 200 and 6mwt at 100; amyloid labs on 9/3/24. Mr. Sabillon notfied of appts and mailed copy of appts as well.

## 2024-07-09 NOTE — PROGRESS NOTES
Subjective:       Patient ID: Gus Sabillon is a 84 y.o. male.    Chief Complaint: Wound Check (RLE anterior medial wound )      Patient presents for a re-evaluation of two right lower extremity wounds.  Pt followed with Dr. 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 Pt s/p Mohs' Micrographic surgery with Dr. Saldana on 6/25/24. She dressed his wound, instructed to wear compression stockings daily, and referred to wound care. Pt follows with vascular surgery for PAD. He was last seen by Dr. Farooq in 2022 who determined no intervention for his claudication. Pt did not have open wounds at this time. Pt s/p bilateral PTAS in [facility] around the late 1990s. He reports that his claudication symptoms are better and he can walk further than before. He reports being able to walk about 1 mile before his heart failure symptoms prevent him from walking. Pt is a former smoker. He smoked 3-4 ppd for 15 years. He quit 42 years ago. He reports that his legs/ ankles swelling from heart failure. Admits compliance with leg elevation, taking lasix, and following a low sodium diet. Pt cleared for compression therapy by vascular surgery.    Interval history: Pt admits compliance with antibiotics. He noticed an improvement in the pain. Pt is going to [place] this Thursday for horse racing. Pt will be there until September 2024. Pt requests to have his right shoulder sutures removed from a skin biopsy. Pt had suture set that was given to him with writing on it from his provider.  Denies fever, chills, erythema, warmth, or purulent drainage.        6/28/24  ABIs and TBIs:  Right DL-1.00  Right TBI-0.31  Left DL-0.88  Left TBI-0.24  Right leg: Segmental pressures are within the normal range and do not suggest peripheral arterial occlusive disease. PVR waveforms suggest very minimal peripheral arterial occlusive disease.  Rt great toe: The PPG waveform as described above- TBI of 0.31  with a great toe pressure of 48 mmHg is noted.  Left leg: Segmental pressures suggest mild to minimal peripheral arterial occlusive disease. PVR waveforms suggest very minimal peripheral arterial occlusive disease.  Lt great toe: The PPG waveform as described above. TBI of 0.24 with a great toe pressure of 37 mmHg is noted.  Patient with known bilateral SFA stents    6/28/24 arterial ultrasound:  Right Lower Extremity   Rt mid CFA PSV 94 cm/s   Rt mid CFA doppler waveform:   triphasic flow   Rt prox DFA PSV    176 cm/s   Rt prox DFA doppler waveform:  triphasic flow   Rt prox SFA PSV    189 cm/s   Rt prox SFA doppler waveform:  triphasic flow   Rt mid SFA  cm/s   Rt mid SFA doppler waveform:   triphasic flow   Rt distal SFA PSV  144 cm/s   Rt distal SFA doppler waveform:    triphasic flow   Rt prox POP PSV    100 cm/s   Rt prox POP doppler waveform:  triphasic flow   Rt mid POP PSV 54 cm/s   Rt mid POP doppler waveform:   triphasic flow   Rt distal POP PSV  54 cm/s   Rt distal POP doppler waveform:    triphasic flow   Rt TPT PSV 48 cm/s   Rt TPT doppler waveform:   triphasic flow   Rt prox DEVENDRA PSV    38 cm/s   Rt prox DEVENDRA doppler waveform:  triphasic flow   Rt mid DEVENDRA PSV 53 cm/s   Rt mid DEVENDRA doppler waveform:   triphasic flow   Rt distal DEVENDRA PSV  56 cm/s   Rt distal DEVENDRA doppler waveform:    triphasic flow   Rt prox PTA PSV    60 cm/s   Rt prox PTA doppler waveform:  triphasic flow   Rt mid PTA  cm/s   Rt mid PTA doppler waveform:   triphasic flow   Rt distal PTA PSV  40 cm/s   Rt distal PTA doppler waveform:    triphasic flow   Rt prox AVINASH PSV   39 cm/s   Rt prox AVINASH doppler waveform: triphasic flow   Rt mid AVINASH PSV    61 cm/s   Rt mid AVINASH doppler waveform:  triphasic flow   Rt distal AVINASH doppler waveform:   not visualized   Rt DPA PSV 67 cm/s   Rt DPA doppler waveform:   triphasic flow   Left Lower Extremity   Lt mid CFA  cm/s   Lt mid CFA doppler waveform:   triphasic flow   Lt prox DFA  PSV    186 cm/s   Lt prox DFA doppler waveform:  triphasic flow   Lt prox SFA PSV    77 cm/s   Lt prox SFA doppler waveform:  triphasic flow   Lt mid SFA PSV 97 cm/s   Lt mid SFA doppler waveform:   triphasic flow   Lt distal SFA PSV  78 cm/s   Lt distal SFA doppler waveform:    triphasic flow   Lt prox POP PSV    87 cm/s   Lt prox POP doppler waveform:  triphasic flow   Lt mid POP PSV 36 cm/s   Lt mid POP doppler waveform:   triphasic flow   Lt distal POP PSV  41 cm/s   Lt distal POP doppler waveform:    triphasic flow   Lt TPT PSV 36 cm/s   Lt TPT doppler waveform:   triphasic flow   Lt prox DEVENDRA PSV    51 cm/s   Lt prox DEVENDRA doppler waveform:  triphasic flow   Lt mid DEVENDRA PSV 66 cm/s   Lt mid DEVENDRA doppler waveform:   triphasic flow   Lt distal DEVENDRA PSV  64 cm/s   Lt distal DEVENDRA doppler waveform:    triphasic flow   Lt prox PTA PSV    67 cm/s   Lt prox PTA doppler waveform:  biphasic flow   Lt mid PTA PSV 0 cm/s   Lt mid PTA doppler waveform:   short segment chronic occlusion   Lt distal PTA PSV  13 cm/s   Lt distal PTA doppler waveform:    retrograde flow via reconstituted flow, monophasic   Lt prox AVINASH PSV   35 cm/s   Lt prox AVINASH doppler waveform: triphasic flow   Lt mid AVINASH PSV    39 cm/s   Lt mid AVINASH doppler waveform:  triphasic flow   Lt distal AVINASH PSV 47 cm/s   Lt distal AVINASH doppler waveform:   triphasic flow   Lt DPA PSV 83 cm/s   Lt DPA doppler waveform:   triphasic flow   Other: Stent eval in cm/s:   Right Proximal SFA:   pre-93   prox-60   mid-131   dist-140   post- 189   Right Mid SFA:   pre- 140   prox-98   mid-127   dist-124   post-144   Right Dist SFA:   pre-144   prox-145   mid-114   dist-140   post-125   Left Proximal-mid SFA stent:   pre- 78   prox- 136   mid- 149   dist-115   post- 128   Right Leg:Duplex imaging reveals a patent right lower extremity arterial tree and multiple patent Rt. SFA stents. There is no evidence of a hemodynamically significant stenosis. The distal Peroneal artery could  not be visualized.   Left Leg: Duplex imaging reveals a patent left lower extremity arterial tree and SFA stent. There is no evidence of a hemodynamically significant stenosis. A short segment chronic occlusion is noted in the Mid PTA with reconstituted flow feeding the distal segment, which is coursing retrograde.     2/1/23 venous ultrasound:  No evidence of deep venous thrombosis in either lower extremity.     9/30/22 ABIs:  Rt DL 0.92   Lt DL  0.94   Right Leg: Segmental pressures and PVR waveforms suggest minimal peripheral arterial occlusive disease.   Left Leg: Segmental pressures and PVR waveforms suggest minimal peripheral arterial occlusive disease.     1/12/22 venous ultrasound:  Right Leg: Duplex imaging of the right lower extremity veins demonstrates patent and compressible veins. There is no evidence of a venous thrombosis in the deep or superficial veins. Significant reflux noted in the right GSV including the Saphenofemoral Junction (SFJ). No significant reflux noted in the SSV. The accessory vein was too small for accurate assessment. The GSV branches extensively from the proximal calf to the ankle.   Left Leg: Duplex imaging of the left lower extremity veins demonstrates patent and compressible veins. There is no evidence of a venous thrombosis in the deep or superficial veins. Significant reflux noted in the left GSV including the Saphenofemoral Junction (SFJ). No significant reflux noted in the SSV. The accessory lorena was too small for accurate assessment. The GSV branches extensively from the proximal calf to the ankle.     1/12/22 arterial ultrasound:  Right leg: Color flow duplex of the right lower extremity stents reveals a PSV increase of 59 cm/sec to 123 cm/sec in the mid segment of the proximal SFA stent. These findings are suggestive of a hemodynamically significant stenoses. The mid and distal SFA stents appear to be patent without evidence of stenosis.   Left leg: Color flow duplex of  the left lower extremity mid SFA stent reveals a PSV increase of 124 cm/sec to 286 cm/sec in the mid segment. These findings are suggestive of a hemodynamically significant stenoses. There is a PSV elevation of 127 cm/sec in the proximal segment of the Popliteal A which decreases to 44 cm/sec in the mid segment with a change in waveform morphology.         Review of Systems   Constitutional:  Negative for activity change, chills, diaphoresis, fatigue and fever.   Respiratory:  Negative for apnea, chest tightness and shortness of breath.    Cardiovascular:  Negative for chest pain, palpitations and leg swelling.   Musculoskeletal:  Negative for gait problem and joint swelling.   Skin:  Positive for wound. Negative for color change, pallor and rash.   Neurological:  Negative for syncope, weakness and numbness.   Psychiatric/Behavioral:  Negative for agitation. The patient is not nervous/anxious.    All other systems reviewed and are negative.      Objective:      Physical Exam  Vitals reviewed.   Constitutional:       General: He is not in acute distress.     Appearance: Normal appearance.   Cardiovascular:      Pulses:           Dorsalis pedis pulses are 2+ on the right side.        Posterior tibial pulses are 1+ on the right side.   Pulmonary:      Effort: No respiratory distress.   Musculoskeletal:         General: No swelling.   Skin:     General: Skin is warm and dry.      Findings: Wound present. No erythema.          Neurological:      General: No focal deficit present.      Mental Status: He is alert and oriented to person, place, and time.   Psychiatric:         Mood and Affect: Mood normal.         Behavior: Behavior normal.         Thought Content: Thought content normal.         Judgment: Judgment normal.         Assessment:       1. Multiple open wounds of right lower extremity, subsequent encounter    2. History of squamous cell carcinoma    3. PAD (peripheral artery disease)    4. Stage 3b chronic  kidney disease    5. History of skin cancer    6. Status post Mohs surgery    7. S/P skin biopsy           Wound Other (comment) Right anterior;lower Leg (Active)       Present on Original Admission:    Primary Wound Type: Other   Side: Right   Orientation: anterior;lower   Location: Leg   Wound Approximate Age at First Assessment (Weeks):    Wound Number:    Is this injury device related?:    Incision Type:    Closure Method:    Wound Description (Comments):    Type:    Additional Comments:    Ankle-Brachial Index:    Pulses:    Removal Indication and Assessment:    Wound Outcome:    Wound Image   07/09/24 1116   Dressing Appearance Dry;Intact;Clean;Moist drainage 07/09/24 1116   Drainage Amount Moderate 07/09/24 1116   Drainage Characteristics/Odor Serosanguineous 07/09/24 1116   Yellow (%), Wound Tissue Color 100 % 07/09/24 1116   Periwound Area Intact;Macerated 07/09/24 1116   Wound Length (cm) 3.3 cm 07/09/24 1116   Wound Width (cm) 1.9 cm 07/09/24 1116   Wound Depth (cm) 0.1 cm 07/09/24 1116   Wound Volume (cm^3) 0.627 cm^3 07/09/24 1116   Wound Surface Area (cm^2) 6.27 cm^2 07/09/24 1116   Care Cleansed with:;Soap and water 07/09/24 1116   Dressing Applied;Foam;Absorptive Pad;Compression wrap;Other (comment) 07/09/24 1116   Periwound Care Skin barrier film applied 07/09/24 1116   Compression Two layer compression 07/09/24 1116            Wound Other (comment) Right lower;medial Leg (Active)       Present on Original Admission:    Primary Wound Type: Other   Side: Right   Orientation: lower;medial   Location: Leg   Wound Approximate Age at First Assessment (Weeks):    Wound Number:    Is this injury device related?:    Incision Type:    Closure Method:    Wound Description (Comments):    Type:    Additional Comments:    Ankle-Brachial Index:    Pulses:    Removal Indication and Assessment:    Wound Outcome:    Dressing Appearance Dry;Intact;Clean;Moist drainage 07/09/24 1116   Drainage Amount Moderate 07/09/24  1116   Drainage Characteristics/Odor Serosanguineous 07/09/24 1116   Yellow (%), Wound Tissue Color 100 % 07/09/24 1116   Periwound Area Intact;Macerated 07/09/24 1116   Wound Length (cm) 1.5 cm 07/09/24 1116   Wound Width (cm) 5.9 cm 07/09/24 1116   Wound Depth (cm) 0.1 cm 07/09/24 1116   Wound Volume (cm^3) 0.885 cm^3 07/09/24 1116   Wound Surface Area (cm^2) 8.85 cm^2 07/09/24 1116   Care Cleansed with:;Soap and water 07/09/24 1116   Dressing Applied;Foam;Absorptive Pad;Compression wrap;Other (comment) 07/09/24 1116   Periwound Care Skin barrier film applied 07/09/24 1116   Compression Two layer compression 07/09/24 1116       Gus was seen in the clinic room and placed in the supine position on the treatment table.  The dressing was removed and the area was cleansed with Easi-clense sponges and dried thoroughly. No odor, erythema, and warmth noted. Surgical marker still noted from last visit. Pt declined sharp debridement. Removed 3 sutures from patient's right shoulder with suture removal set. Pt tolerated well. No dehiscence noted to right shoulder.    Plan of Care: Calmoseptine and skin prep to periwound, vaseline impregnated gauze to wound beds, hydrofera blue,  mextra pad, and a 2 layer calamine compression wrap. The patient's foot was positioned at a 90 degree angle.  A compression wrap was applied using a spiral technique avoiding creases or folds.  The wrap was started behind the first metatarsal and ended below the tibial tubercle of the knee.  There was overlap of each turn half the width of the previous turn.            Plan:           No orders of the defined types were placed in this encounter.      Right lower extremity was dressed as detailed above.  Patient was instructed to not get the dressings wet and to use cast covers for showering.  Should the dressing become wet, he is to remove it, place a moist dressing over the wound, cover with gauze and roll gauze and to secure bandages.  He should  then notify this office as soon as possible to have a new dressing applied.  Instructed patient to remove wrap if he notices tingling, pain, swelling, or coldness to his left lower extremity or if his toes become white, blue, or cold.    Discussed nutrition and the role of protein in wound healing with the patient. Instructed patient to optimize protein for wound healing.     Discussed lower extremity edema with patient. Instructed patient to elevate legs whenever sedentary, follow a low sodium diet, and to take lasix as prescribed.    Discussed vascular status. Pt cleared for compression therapy by Dr. Farooq.    Instructed patient to continue doxycyline as prescribed.    Discussed patient's vacation until September 2024. Discussed daily dressing changes. Patient will cleanse the wound with mild soap and water, pat dry, while wearing gloves place hydrogel to wound bed, cover with an island dressing, and secure with compression stockings.      Instructed patient to keep follow ups with Dr. Saldana.      Written and verbal instructions given to patient  RTC when patient returns from [place]       Nicky Brito PA-C

## 2024-07-09 NOTE — NURSING
Mr. Zeeshan Sabillon saw Dr. Yen on 7/9/24 referred by Dr. Karrie Sarabia MD  for evaluation for amyloidosis. He was accompanied by wife and daughter.  .   As the Amyloidosis Nurse Navigator, I spoke with patient after appt with Dr. Yen to educate on AL/TTR amyloid disease process and therapy options.     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 Informed patient that results may take up to 3 weeks but will call them once results received. Copy of test results will be mailed to them as well. If positive, will refer for Genetic Counseling.      Vyndamax was not ordered at this time until FLC & CHARISMA are resulted from today. Explained that we will have completed results in 3 days and will provide them with results and plan of care after discussion with Dr. Yen.      If and when Vyndamax is ordered, it will be provided by a  Specialty pharmacy and shipped to their home. Process of prior authorization and Patient assistance with the cost of the drug may take up to 3 - 6 weeks to complete.    Literature was provided. And reviewed.  Patient and wife encourage to read the information and reach out with any additional questions. All questions answered and verbalized understanding  of all instructions.

## 2024-07-11 ENCOUNTER — PATIENT MESSAGE (OUTPATIENT)
Dept: WOUND CARE | Facility: CLINIC | Age: 84
End: 2024-07-11
Payer: MEDICARE

## 2024-07-11 LAB
KAPPA LC SER QL IA: 3.28 MG/DL (ref 0.33–1.94)
KAPPA LC/LAMBDA SER IA: 1.43 (ref 0.26–1.65)
LAMBDA LC SER QL IA: 2.3 MG/DL (ref 0.57–2.63)

## 2024-07-17 ENCOUNTER — OFFICE VISIT (OUTPATIENT)
Dept: WOUND CARE | Facility: CLINIC | Age: 84
End: 2024-07-17
Payer: MEDICARE

## 2024-07-17 ENCOUNTER — TELEPHONE (OUTPATIENT)
Dept: TRANSPLANT | Facility: CLINIC | Age: 84
End: 2024-07-17
Payer: MEDICARE

## 2024-07-17 VITALS
WEIGHT: 159.63 LBS | BODY MASS INDEX: 21.16 KG/M2 | HEIGHT: 73 IN | DIASTOLIC BLOOD PRESSURE: 58 MMHG | HEART RATE: 71 BPM | TEMPERATURE: 98 F | SYSTOLIC BLOOD PRESSURE: 132 MMHG

## 2024-07-17 DIAGNOSIS — Z85.828 HISTORY OF SKIN CANCER: ICD-10-CM

## 2024-07-17 DIAGNOSIS — E85.4 SENILE CARDIAC AMYLOIDOSIS: Primary | ICD-10-CM

## 2024-07-17 DIAGNOSIS — Z98.890 STATUS POST MOHS SURGERY: ICD-10-CM

## 2024-07-17 DIAGNOSIS — I43 SENILE CARDIAC AMYLOIDOSIS: Primary | ICD-10-CM

## 2024-07-17 DIAGNOSIS — Z85.89 HISTORY OF SQUAMOUS CELL CARCINOMA: ICD-10-CM

## 2024-07-17 DIAGNOSIS — S81.801D MULTIPLE OPEN WOUNDS OF RIGHT LOWER EXTREMITY, SUBSEQUENT ENCOUNTER: Primary | ICD-10-CM

## 2024-07-17 DIAGNOSIS — Z98.890 S/P SKIN BIOPSY: ICD-10-CM

## 2024-07-17 DIAGNOSIS — I73.9 PAD (PERIPHERAL ARTERY DISEASE): ICD-10-CM

## 2024-07-17 DIAGNOSIS — N18.32 STAGE 3B CHRONIC KIDNEY DISEASE: ICD-10-CM

## 2024-07-17 PROCEDURE — 99999 PR PBB SHADOW E&M-EST. PATIENT-LVL I: CPT | Mod: PBBFAC,,,

## 2024-07-17 PROCEDURE — 99212 OFFICE O/P EST SF 10 MIN: CPT | Mod: 25,S$GLB,,

## 2024-07-17 PROCEDURE — 11042 DBRDMT SUBQ TIS 1ST 20SQCM/<: CPT | Mod: S$GLB,,,

## 2024-07-17 RX ORDER — TAFAMIDIS 61 MG/1
61 CAPSULE, LIQUID FILLED ORAL DAILY
Qty: 30 CAPSULE | Refills: 11 | Status: ACTIVE | OUTPATIENT
Start: 2024-07-17 | End: 2025-07-17

## 2024-07-17 NOTE — PROCEDURES
"Debridement    Date/Time: 7/17/2024 8:00 AM    Performed by: Nicky Brito PA-C  Authorized by: Nicky Brito PA-C    Time out: Immediately prior to procedure a "time out" was called to verify the correct patient, procedure, equipment, support staff and site/side marked as required.    Consent Done?:  Yes (Written)  Local anesthesia used?: Yes    Local anesthetic:  Topical anesthetic (Topical 4% Lidocaine Hydrochloride)    Wound Details:    Location:  Right leg (anterior)    Type of Debridement:  Excisional       Length (cm):  2.9       Area (sq cm):  5.51       Width (cm):  1.9       Percent Debrided (%):  100       Depth (cm):  0.1       Total Area Debrided (sq cm):  5.51    Depth of debridement:  Subcutaneous tissue    Tissue debrided:  Subcutaneous    Devitalized tissue debrided:  Biofilm, Exudate, Fibrin and Slough    Instruments:  Curette  Bleeding:  Minimal  Hemostasis Achieved: Yes  Method Used:  Pressure    Additional wounds:  1    2nd Wound Details:     Location:  Right leg (medial)    Type of Debridement:  Excisional       Length (cm):  1.3       Area (sq cm):  1.17       Width (cm):  0.9       Percent Debrided (%):  100       Depth (cm):  0.1       Total Area Debrided (sq cm):  1.17    Depth of debridement:  Subcutaneous tissue    Tissue debrided:  Subcutaneous    Devitalized tissue debrided:  Biofilm, Exudate, Fibrin and Slough    Instruments:  Curette  Bleeding:  Minimal  Hemostasis Achieved: Yes    Method Used:  Pressure  Patient tolerance:  Patient tolerated the procedure well with no immediate complications    "

## 2024-07-17 NOTE — PROGRESS NOTES
Subjective:       Patient ID: Gus Sabillon is a 84 y.o. male.    Chief Complaint: Wound Check      Patient presents for a re-evaluation of two right lower extremity wounds.  Pt followed with Dr. 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 Pt s/p Mohs' Micrographic surgery with Dr. Saldana on 6/25/24. She dressed his wound, instructed to wear compression stockings daily, and referred to wound care. Pt follows with vascular surgery for PAD. He was last seen by Dr. Faoroq in 2022 who determined no intervention for his claudication. Pt did not have open wounds at this time. Pt s/p bilateral PTAS in [facility] around the late 1990s. He reports that his claudication symptoms are better and he can walk further than before. He reports being able to walk about 1 mile before his heart failure symptoms prevent him from walking. Pt is a former smoker. He smoked 3-4 ppd for 15 years. He quit 42 years ago. He reports that his legs/ ankles swelling from heart failure. Admits compliance with leg elevation, taking lasix, and following a low sodium diet. Pt cleared for compression therapy by vascular surgery.    Interval history: Pt contacted the office for another appointment before he leaves to go to [place]. Pt is going to [place] this week for horse racing. Pt will be there until September 2024. Pt brought wound care products to review prior to leaving for [place]. Denies fever, chills, erythema, warmth, or purulent drainage.        6/28/24  ABIs and TBIs:  Right DL-1.00  Right TBI-0.31  Left DL-0.88  Left TBI-0.24  Right leg: Segmental pressures are within the normal range and do not suggest peripheral arterial occlusive disease. PVR waveforms suggest very minimal peripheral arterial occlusive disease.  Rt great toe: The PPG waveform as described above- TBI of 0.31 with a great toe pressure of 48 mmHg is noted.  Left leg: Segmental pressures suggest mild to minimal  peripheral arterial occlusive disease. PVR waveforms suggest very minimal peripheral arterial occlusive disease.  Lt great toe: The PPG waveform as described above. TBI of 0.24 with a great toe pressure of 37 mmHg is noted.  Patient with known bilateral SFA stents    6/28/24 arterial ultrasound:  Right Leg:Duplex imaging reveals a patent right lower extremity arterial tree and multiple patent Rt. SFA stents. There is no evidence of a hemodynamically significant stenosis. The distal Peroneal artery could not be visualized.   Left Leg: Duplex imaging reveals a patent left lower extremity arterial tree and SFA stent. There is no evidence of a hemodynamically significant stenosis. A short segment chronic occlusion is noted in the Mid PTA with reconstituted flow feeding the distal segment, which is coursing retrograde.     2/1/23 venous ultrasound:  No evidence of deep venous thrombosis in either lower extremity.     9/30/22 ABIs:  Rt DL 0.92   Lt DL  0.94   Right Leg: Segmental pressures and PVR waveforms suggest minimal peripheral arterial occlusive disease.   Left Leg: Segmental pressures and PVR waveforms suggest minimal peripheral arterial occlusive disease.     1/12/22 venous ultrasound:  Right Leg: Duplex imaging of the right lower extremity veins demonstrates patent and compressible veins. There is no evidence of a venous thrombosis in the deep or superficial veins. Significant reflux noted in the right GSV including the Saphenofemoral Junction (SFJ). No significant reflux noted in the SSV. The accessory vein was too small for accurate assessment. The GSV branches extensively from the proximal calf to the ankle.   Left Leg: Duplex imaging of the left lower extremity veins demonstrates patent and compressible veins. There is no evidence of a venous thrombosis in the deep or superficial veins. Significant reflux noted in the left GSV including the Saphenofemoral Junction (SFJ). No significant reflux noted in the  SSV. The accessory lorena was too small for accurate assessment. The GSV branches extensively from the proximal calf to the ankle.     1/12/22 arterial ultrasound:  Right leg: Color flow duplex of the right lower extremity stents reveals a PSV increase of 59 cm/sec to 123 cm/sec in the mid segment of the proximal SFA stent. These findings are suggestive of a hemodynamically significant stenoses. The mid and distal SFA stents appear to be patent without evidence of stenosis.   Left leg: Color flow duplex of the left lower extremity mid SFA stent reveals a PSV increase of 124 cm/sec to 286 cm/sec in the mid segment. These findings are suggestive of a hemodynamically significant stenoses. There is a PSV elevation of 127 cm/sec in the proximal segment of the Popliteal A which decreases to 44 cm/sec in the mid segment with a change in waveform morphology.         Review of Systems   Constitutional:  Negative for activity change, chills, diaphoresis, fatigue and fever.   Respiratory:  Negative for apnea, chest tightness and shortness of breath.    Cardiovascular:  Negative for chest pain, palpitations and leg swelling.   Musculoskeletal:  Negative for gait problem and joint swelling.   Skin:  Positive for wound. Negative for color change, pallor and rash.   Neurological:  Negative for syncope, weakness and numbness.   Psychiatric/Behavioral:  Negative for agitation. The patient is not nervous/anxious.    All other systems reviewed and are negative.      Objective:      Physical Exam  Vitals reviewed.   Constitutional:       General: He is not in acute distress.     Appearance: Normal appearance.   Cardiovascular:      Pulses:           Dorsalis pedis pulses are 2+ on the right side.        Posterior tibial pulses are 1+ on the right side.   Pulmonary:      Effort: No respiratory distress.   Musculoskeletal:         General: No swelling.   Skin:     General: Skin is warm and dry.      Findings: Wound present. No erythema.           Neurological:      General: No focal deficit present.      Mental Status: He is alert and oriented to person, place, and time.   Psychiatric:         Mood and Affect: Mood normal.         Behavior: Behavior normal.         Thought Content: Thought content normal.         Judgment: Judgment normal.         Assessment:       1. Multiple open wounds of right lower extremity, subsequent encounter    2. History of squamous cell carcinoma    3. PAD (peripheral artery disease)    4. Stage 3b chronic kidney disease    5. History of skin cancer    6. Status post Mohs surgery    7. S/P skin biopsy           Wound Other (comment) Right anterior;lower Leg (Active)       Present on Original Admission:    Primary Wound Type: Other   Side: Right   Orientation: anterior;lower   Location: Leg   Wound Approximate Age at First Assessment (Weeks):    Wound Number:    Is this injury device related?:    Incision Type:    Closure Method:    Wound Description (Comments):    Type:    Additional Comments:    Ankle-Brachial Index:    Pulses:    Removal Indication and Assessment:    Wound Outcome:    Wound Image    07/17/24 0814   Dressing Appearance Dry;Intact;Clean;Moist drainage 07/17/24 0814   Drainage Amount Moderate 07/17/24 0814   Drainage Characteristics/Odor Serosanguineous 07/17/24 0814   Red (%), Wound Tissue Color 30 % 07/17/24 0814   Yellow (%), Wound Tissue Color 70 % 07/17/24 0814   Periwound Area Intact;Pink;Macerated 07/17/24 0814   Wound Length (cm) 2.9 cm 07/17/24 0814   Wound Width (cm) 1.9 cm 07/17/24 0814   Wound Depth (cm) 0.1 cm 07/17/24 0814   Wound Volume (cm^3) 0.551 cm^3 07/17/24 0814   Wound Surface Area (cm^2) 5.51 cm^2 07/17/24 0814   Care Cleansed with:;Soap and water 07/17/24 0814   Dressing Applied;Foam;Compression wrap;Other (comment) 07/17/24 0814   Periwound Care Skin barrier film applied 07/17/24 0814   Compression Two layer compression 07/17/24 0814            Wound Other (comment) Right  lower;medial Leg (Active)       Present on Original Admission:    Primary Wound Type: Other   Side: Right   Orientation: lower;medial   Location: Leg   Wound Approximate Age at First Assessment (Weeks):    Wound Number:    Is this injury device related?:    Incision Type:    Closure Method:    Wound Description (Comments):    Type:    Additional Comments:    Ankle-Brachial Index:    Pulses:    Removal Indication and Assessment:    Wound Outcome:    Dressing Appearance Dry;Intact;Clean;Moist drainage 07/17/24 0814   Drainage Amount Moderate 07/17/24 0814   Drainage Characteristics/Odor Serosanguineous 07/17/24 0814   Yellow (%), Wound Tissue Color 100 % 07/17/24 0814   Periwound Area Intact;Edematous;Pink 07/17/24 0814   Wound Length (cm) 1.3 cm 07/17/24 0814   Wound Width (cm) 0.9 cm 07/17/24 0814   Wound Depth (cm) 0.1 cm 07/17/24 0814   Wound Volume (cm^3) 0.117 cm^3 07/17/24 0814   Wound Surface Area (cm^2) 1.17 cm^2 07/17/24 0814   Care Cleansed with:;Soap and water 07/17/24 0814   Dressing Applied;Compression wrap;Foam;Other (comment) 07/17/24 0814   Periwound Care Skin barrier film applied 07/17/24 0814   Compression Two layer compression 07/17/24 0814       Gus was seen in the clinic room and placed in the supine position on the treatment table.  The dressing was removed and the area was cleansed with Easi-clense sponges and dried thoroughly. No odor, erythema, and warmth noted. Placed topical 4% Lidocaine Hydrochloride to wound bed for 15 minutes. Sharp debridement with 5 mm curette to remove non-viable tissue. Pt tolerated procedure well. See procedure note.      Plan of Care: Calmoseptine and skin prep to periwound, vaseline impregnated gauze to wound beds, hydrofera blue,  mextra pad, and a 2 layer calamine compression wrap. The patient's foot was positioned at a 90 degree angle.  A compression wrap was applied using a spiral technique avoiding creases or folds.  The wrap was started behind the first  metatarsal and ended below the tibial tubercle of the knee.  There was overlap of each turn half the width of the previous turn.            Plan:           Orders Placed This Encounter   Procedures    Debridement     This order was created via procedure documentation       Right lower extremity was dressed as detailed above.  Patient was instructed to not get the dressings wet and to use cast covers for showering.  Should the dressing become wet, he is to remove it, place a moist dressing over the wound, cover with gauze and roll gauze and to secure bandages.  He should then notify this office as soon as possible to have a new dressing applied.  Instructed patient to remove wrap if he notices tingling, pain, swelling, or coldness to his left lower extremity or if his toes become white, blue, or cold.    Discussed nutrition and the role of protein in wound healing with the patient. Instructed patient to optimize protein for wound healing.     Discussed lower extremity edema with patient. Instructed patient to elevate legs whenever sedentary, follow a low sodium diet, and to take lasix as prescribed.    Discussed vascular status. Pt cleared for compression therapy by Dr. Farooq.    Instructed patient to continue doxycyline as prescribed.    Discussed patient's vacation until September 2024. Reviewed products patient brought. Discussed daily dressing changes. Patient will cleanse the wound with mild soap and water, pat dry, while wearing gloves place hydrogel to wound bed, cover with an island dressing, and secure with compression stockings.      Instructed patient to keep follow ups with Dr. Saldana.      Written and verbal instructions given to patient  RTC when patient returns from [place]       Nicky Brito PA-C

## 2024-07-17 NOTE — TELEPHONE ENCOUNTER
Mr. Zeeshan Sabillon  saw Dr. Yen on 7/9/24 referred by  Dr. Karrie Sarabia    for evaluation for amyloidosis.  Patient had positve PYP completed on 6/24/24.  .   TTR/DNA  test was performed as ordered  via buccal swab and sent to [facility] laboratory at no cost to the patient as it is provided by [facility] . Results received today which were NEGATIVE for amyloid mutation. Serum/urine CAHRISMA  negative on 7/9/24.. Kappa mildly elevated at 3.28 but ratio is normal.     Dr. Yen notfied of results. Vyndamax was ordered. Patient notfied of results and Rx for Vyndamax. This will be provided by [facility] and shipped to their home. Process of prior authorization and Patient assistance with the cost of the drug may take up to 3 - 6 weeks to complete.    Patient and wife encourage to read the information provided at first visit and to reach out with any additional questions.       Paula Guthrie RN  Amyloidosis Nurse Navigator

## 2024-07-18 PROBLEM — I43 SENILE CARDIAC AMYLOIDOSIS: Status: ACTIVE | Noted: 2024-07-18

## 2024-07-18 PROBLEM — E85.4 SENILE CARDIAC AMYLOIDOSIS: Status: ACTIVE | Noted: 2024-07-18

## 2024-08-02 ENCOUNTER — TELEPHONE (OUTPATIENT)
Dept: TRANSPLANT | Facility: CLINIC | Age: 84
End: 2024-08-02
Payer: MEDICARE

## 2024-08-14 ENCOUNTER — OFFICE VISIT (OUTPATIENT)
Dept: OTOLARYNGOLOGY | Facility: CLINIC | Age: 84
End: 2024-08-14
Payer: MEDICARE

## 2024-08-14 VITALS
DIASTOLIC BLOOD PRESSURE: 84 MMHG | BODY MASS INDEX: 21.35 KG/M2 | WEIGHT: 161.81 LBS | SYSTOLIC BLOOD PRESSURE: 136 MMHG | HEART RATE: 80 BPM

## 2024-08-14 DIAGNOSIS — H90.3 SENSORINEURAL HEARING LOSS (SNHL) OF BOTH EARS: ICD-10-CM

## 2024-08-14 DIAGNOSIS — R13.10 DYSPHAGIA, UNSPECIFIED TYPE: ICD-10-CM

## 2024-08-14 DIAGNOSIS — K11.7 XEROSTOMIA: ICD-10-CM

## 2024-08-14 DIAGNOSIS — R09.89 CHRONIC THROAT CLEARING: Primary | ICD-10-CM

## 2024-08-14 DIAGNOSIS — J34.2 DEVIATED NASAL SEPTUM: ICD-10-CM

## 2024-08-14 DIAGNOSIS — K21.9 GASTROESOPHAGEAL REFLUX DISEASE, UNSPECIFIED WHETHER ESOPHAGITIS PRESENT: ICD-10-CM

## 2024-08-14 PROCEDURE — 99214 OFFICE O/P EST MOD 30 MIN: CPT | Mod: 25,S$GLB,, | Performed by: PHYSICIAN ASSISTANT

## 2024-08-14 PROCEDURE — 1159F MED LIST DOCD IN RCRD: CPT | Mod: CPTII,S$GLB,, | Performed by: PHYSICIAN ASSISTANT

## 2024-08-14 PROCEDURE — 31575 DIAGNOSTIC LARYNGOSCOPY: CPT | Mod: S$GLB,,, | Performed by: PHYSICIAN ASSISTANT

## 2024-08-14 PROCEDURE — 3075F SYST BP GE 130 - 139MM HG: CPT | Mod: CPTII,S$GLB,, | Performed by: PHYSICIAN ASSISTANT

## 2024-08-14 PROCEDURE — 3288F FALL RISK ASSESSMENT DOCD: CPT | Mod: CPTII,S$GLB,, | Performed by: PHYSICIAN ASSISTANT

## 2024-08-14 PROCEDURE — 1126F AMNT PAIN NOTED NONE PRSNT: CPT | Mod: CPTII,S$GLB,, | Performed by: PHYSICIAN ASSISTANT

## 2024-08-14 PROCEDURE — 3079F DIAST BP 80-89 MM HG: CPT | Mod: CPTII,S$GLB,, | Performed by: PHYSICIAN ASSISTANT

## 2024-08-14 PROCEDURE — 99999 PR PBB SHADOW E&M-EST. PATIENT-LVL IV: CPT | Mod: PBBFAC,,, | Performed by: PHYSICIAN ASSISTANT

## 2024-08-14 PROCEDURE — 1101F PT FALLS ASSESS-DOCD LE1/YR: CPT | Mod: CPTII,S$GLB,, | Performed by: PHYSICIAN ASSISTANT

## 2024-08-14 RX ORDER — OMEPRAZOLE 20 MG/1
20 CAPSULE, DELAYED RELEASE ORAL EVERY MORNING
Qty: 90 CAPSULE | Refills: 0 | Status: SHIPPED | OUTPATIENT
Start: 2024-08-14 | End: 2024-11-12

## 2024-08-14 RX ORDER — SPIRONOLACTONE 25 MG/1
25 TABLET ORAL DAILY
COMMUNITY

## 2024-08-14 NOTE — PROCEDURES
"Laryngoscopy    Date/Time: 8/14/2024 1:00 PM    Performed by: Asaf Hampton PA-C  Authorized by: Asaf Hampton PA-C    Time out: Immediately prior to procedure a "time out" was called to verify the correct patient, procedure, equipment, support staff and site/side marked as required.    Anesthesia:     Local anesthetic:  Lidocaine 4% and Mikel-Synephrine 1/2%    Patient tolerance:  Patient tolerated the procedure well with no immediate complications  Laryngoscopy:     Areas examined:  Nasal cavities, nasopharynx, oropharynx, hypopharynx, larynx and vocal cords    Laryngoscope size:  4 mm  Nose Intranasal:      Mucosa no polyps     Mucosa ulcers not present     No mucosa lesions present     Septum gross deformity (mild R deviation)     Enlarged turbinates  Nasopharynx:      No mucosa lesions     Adenoids present     Posterior choanae patent     Eustachian tube patent  Larynx/hypopharynx:      No epiglottis lesions     No epiglottis edema     No AE folds lesions     No vocal cord polyps     Equal and normal bilateral     No hypopharynx lesions     Piriform sinus pooling     No piriform sinus lesions     Post cricoid edema     No post cricoid erythema     Mild erythema/discoloration to BL TVFs  Aerated secretions in R hypopharynx    "

## 2024-08-14 NOTE — PATIENT INSTRUCTIONS
"  Please contact central scheduling at [phone] or [phone] to schedule your swallow study.    Xylimelts at night and Biotene mouthwash during the day for dry mouth    Read info below about acid reflex:    LARYNGOPHARYNGEAL REFLUX  (SILENT OR ATYPICAL REFLUX)    LPR is a very challenging disease as it includes a vast range of symptoms and difficult to diagnose.      SYMPTOMS  If you have any of the following symptoms you MAY have laryngopharyngeal reflux (LPR):    1. Hoarseness  2. Sore throat  4. throat clearing, sometimes clearing thick mucous  5. chronic cough, especially cough that wakes you up from sleep  6.  "postnasal drip" without the need to blow your nose  7. Globus sensation, like the sensation of something being stuck in the throat  8.  Red, swollen or irritated larynx (voice box)    HOW  Many people with LPR do not have symptoms of heartburn. Compared to the esophagus, the voice box and the back of the throat are significantly more sensitive to the effects of acid and digestive enzymes on surrounding tissue. Acid passing quickly through the esophagus does not have a chance to irritate the area for too long.  However acid that pools in the throat or voice box can cause prolonged irritation resulting in the symptoms of LPR.    DIAGNOSIS  A common procedure performed by an ENT is called flexible laryngoscopy.  A thin tube is inserted through the nose in order to visualize the larynx (the voice box). This method can detect abnormalities in the voice box ranging from polyps to laryngeal cancer.  This can also reveal signs of LPR, but is not 100% reliable.      If symptoms persist despite medical treatment listed below, further testing is needed.  Three commonly used tests are: a swallowing study; a direct look at the stomach and esophagus through an endoscope, and; an esophageal pH test.  Further testing is usually coordinated with a gastroenterologist.     TREATMENT  Lifestyle changes  1. Do not " smoke.  Smoking will worsen reflux.    2. Avoid eating at least 2-3 hours prior to bedtime.  Try to avoid very large meals at night.    4. Weight loss.  For patient's with recent weight gain, shedding a few pounds is all that is required to improve reflux.    5. Avoid reflux triggers. These can worsen acid in the stomach or even cause the esophagus muscles to relax: caffeine, soft drinks, acidic foods (tomato, lots of fruit) mints, alcoholic beverages, particularly at night, cheese, fried foods, spicy foods, eggs, and chocolate.    6. Sleep with the head of bed elevated at least 6 inches.    Consider reading the book Dropping Acid by Robert Claros MD.  This has information to help choose meals with less acid.     Other Treatments:  All natural remedies include Reflux Gourmet (and Reflux Raft) which create a temporary protective barrier in your stomach to prevent reflux into your esophagus. This can be an effective therapy without the side effects of normal acid reducing medications and was also developed by laryngologists (LPR specialists).   Speech Therapy: education and techniques aimed at helping you control the cough     Medications  Take over-the-counter (OTC) medications, including antacids, such as Gaviscon Advance (others include Tums®, Maalox®, or Mylanta) as needed  Prescription and OTC stomach acid reducers: H2 blockers such as famotidine (Pepcid®), cimetidine (Tagamet®), ranitidine (Zantac®) OR proton pump inhibitors (PPIs), such as omeprazole (Prilosec®), pantoprazole (Protonix®), and esomeprazole (Nexium®).  Most patients will begin to notice some relief in their symptoms about 2-4 weeks after starting an acid reducing medication; however it is generally recommended the medication should be continued for at least 2 months. If the symptoms completely resolve, the medication can then be tapered.  Some people will remain symptom free while others may have relapses which required treatment again.

## 2024-08-14 NOTE — PROGRESS NOTES
Subjective:     HPI: Gus Sabillon is a 84 y.o. male with senile cardiac amyloidosis, HFpEF, SCC RLE, carotid artery stenosis, GERD, PAD who was self-referred for sinusitis.    Patient reports chronic throat clearing with associated globus sensation, postnasal drip, dysphonia, and gagging episodes for several years.  This was exacerbated recently after developing COVID in December.  Patient had significant dysphagia and restrictive sensation in his throat.  This is since improved but throat clearing has persisted.  Patient reports dry mouth at times.  He is on several medications right now and recently started on amyloidosis medication.  He has had issues with his stomach in the past and has undergone EGDs.  He had seen his GI for these symptoms and was told this was likely ENT related.    Triggers for the cough include the following:   - voice use:  yes  - breathing heavily:  no  - laughing:  no  - eating:  no  - drinking cold liquids:  no  - strong odors:  no  - post-prandial:  no  - lying down:  yes    Prior workup includes the following:  - pulmonary:  no  - GI:  yes  - allergy:  no  - ENT:  no       Current sinonasal medications include none at present.  The last course of antibiotics was a long time ago.    He does not recall previously having allergy testing.  He denies a history of asthma.  He relates a history of reflux symptoms which is managed with over-the-counter medication as needed.    He denies a diagnosis of obstructive sleep apnea.   He does not recall a prior history of nasal trauma.  He has not had sinonasal surgery.    He has had a tonsillectomy.  He is not a tobacco smoker.     Past Medical/Past Surgical History  Past Medical History:   Diagnosis Date    Carotid artery disease     CHF (congestive heart failure)     CKD (chronic kidney disease) stage 3, GFR 30-59 ml/min     Dyslipidemia     GERD (gastroesophageal reflux disease)     Hx of melanoma excision     Hypertension     Melanoma      PAD (peripheral artery disease)     SCC (squamous cell carcinoma) 05/01/2024    Right shin distal    Squamous cell carcinoma of skin 05/01/2024    Right shin proximal     He has a past surgical history that includes Cataract extraction, bilateral; Tonsillectomy; Blepharoplasty; Arthroscopic repair of rotator cuff of shoulder (Right, 02/27/2020); Arthroscopic debridement of shoulder (Right, 02/27/2020); Fixation of tendon (Right, 02/27/2020); Open reduction and internal fixation (ORIF) of fracture of proximal humerus (Right, 02/27/2020); Arthroscopy of shoulder with decompression of subacromial space (Right, 02/27/2020); Carotid endarterectomy (Right, 10/15/2021); Arthroscopic repair of rotator cuff of shoulder (Left, 06/21/2022); Arthroscopic debridement of shoulder (Left, 06/21/2022); Arthroscopy of shoulder with removal of distal clavicle (Left, 06/21/2022); arthroscopy,shoulder,with biceps tenodesis (Left, 06/21/2022); ORIF humerus fracture (Left, 06/21/2022); Excision of bursa (Left, 06/21/2022); Arthroscopy of shoulder with decompression of subacromial space (Left, 06/21/2022); Shoulder arthroscopy (Left, 06/21/2022); Eye surgery (Bilateral); Vasectomy; and Cosmetic surgery.    Family History/Social History  His family history includes Cancer in his sister; Colon cancer in his sister; Diabetes in his mother; Heart disease in his father; Hyperlipidemia in his mother; No Known Problems in his brother, brother, daughter, and son.  He reports that he quit smoking about 47 years ago. His smoking use included cigarettes. He started smoking about 62 years ago. He has a 30 pack-year smoking history. He has never been exposed to tobacco smoke. He has never used smokeless tobacco. He reports current alcohol use of about 14.0 standard drinks of alcohol per week. He reports that he does not use drugs.    Allergies/Immunizations  He is allergic to adhesive, clindamycin, and penicillins.  Immunization History   Administered  Date(s) Administered    COVID-19, MRNA, LN-S, PF (Pfizer) (Purple Cap) 01/09/2021, 01/30/2021, 12/09/2021    Influenza (FLUAD) - Quadrivalent - Adjuvanted - PF *Preferred* (65+) 11/21/2020, 02/15/2023, 12/06/2023        Medications   acetaminophen  amiodarone Tab  aspirin  atenoloL  COLLAGEN 1500 PLUS C ORAL  eplerenone Tab  fish oil-omega-3 fatty acids  fluorouraciL  furosemide  ketoconazole  magnesium oxide  mv-min-folic-K1-lycopen-lutein Tab  papaverine  VYNDAMAX Cap     Review of Systems     Constitutional: Positive for appetite change and fatigue.      HENT: Positive for facial swelling, postnasal drip, sinus infection, sinus pressure and trouble swallowing.      Eyes:  Negative for change in eyesight, eye drainage, eye itching and photophobia.     Respiratory:  Positive for shortness of breath.      Cardiovascular:  Positive for foot swelling and irregular heartbeat. Negative for chest pain.     Gastrointestinal:  Positive for acid reflux, constipation and heartburn. Negative for abdominal pain, diarrhea and vomiting.     Genitourinary: Negative for difficulty urinating, sexual problems and frequent urination.     Musc: Positive for back pain and neck pain.     Skin: Positive for rash.     Allergy: Negative for food allergies and seasonal allergies.     Endocrine: Negative for cold intolerance and heat intolerance.      Neurological: Positive for dizziness, headaches and light-headedness.     Hematologic: Positive for swollen glands. Negative for bruises/bleeds easily.      Psychiatric: Positive for sleep disturbance. Negative for decreased concentration, depression and nervous/anxious.            Objective:     /84 (BP Location: Right arm, Patient Position: Sitting)   Pulse 80   Wt 73.4 kg (161 lb 13.1 oz)   BMI 21.35 kg/m²      Physical Exam  Vitals reviewed.   Constitutional:       Appearance: Normal appearance.   HENT:      Head: Normocephalic and atraumatic.      Right Ear: Tympanic membrane, ear  "canal and external ear normal.      Left Ear: Tympanic membrane, ear canal and external ear normal.      Nose: Septal deviation (mild right) present. No mucosal edema or rhinorrhea.      Right Nostril: No epistaxis.      Left Nostril: No epistaxis.      Right Turbinates: Enlarged.      Left Turbinates: Not enlarged.      Right Sinus: No maxillary sinus tenderness or frontal sinus tenderness.      Left Sinus: No maxillary sinus tenderness or frontal sinus tenderness.      Mouth/Throat:      Lips: Pink.      Mouth: Mucous membranes are dry.      Dentition: Normal dentition.      Tongue: No lesions. Tongue does not deviate from midline.      Palate: No mass and lesions.      Pharynx: Oropharynx is clear. Uvula midline. No uvula swelling.      Tonsils: No tonsillar exudate or tonsillar abscesses. 0 on the right. 0 on the left.   Eyes:      Extraocular Movements: Extraocular movements intact.      Conjunctiva/sclera: Conjunctivae normal.   Musculoskeletal:      Cervical back: No tenderness.   Lymphadenopathy:      Cervical: No cervical adenopathy.   Neurological:      Mental Status: He is alert.   Psychiatric:         Mood and Affect: Mood normal.         Behavior: Behavior normal.          Procedure    Flexible laryngoscopy performed.  See procedure note.     R NV     R MT     R ET     VF mobility     Hypopharynx/larynx with pooling+aerated secretions      Data Reviewed  I personally reviewed the chart, including any outside records, and pertinent data below:    I reviewed the following notes Internal Medicine and ENT     WBC (K/uL)   Date Value   05/09/2024 8.66     Eosinophil % (%)   Date Value   05/09/2024 2.4     Eos # (K/uL)   Date Value   05/09/2024 0.2     Platelets (K/uL)   Date Value   05/09/2024 224     Glucose (mg/dL)   Date Value   05/09/2024 121 (H)     No results found for: "IGE"    I independently reviewed the images of the MRI Brain dated 5/2/24. Pertinent sinus findings include overall patent " sinuses.    Assessment & Plan:     1. Chronic throat clearing  2. Dysphagia, unspecified type  3. Xerostomia  4. Gastroesophageal reflux disease, unspecified whether esophagitis present  -     suspect an underlying GI motility issue as etiology of symptoms.  Laryngoscopy with evidence of xerostomia and piriform sinus pooling.  Unclear if this is a side effect of medications or age-related/neuropathic changes.  - advised patient undergo swallow study  - Advised OTC sialogogues   - starting a PPI due to possible LPR  - Discussed the etiology of LPR and management strategies including nonpharmacologic treatments: eating smaller meals, eating at least 3 hours before bed, elevation of the head of bed at night, avoidance of caffeine, chocolate, nicotine and peppermint, and avoiding tight fitting clothing.    -     Fl Modified Barium Swallow Speech; Future; Expected date: 08/14/2024  -     SLP video swallow; Future; Expected date: 08/14/2024    5. Sensorineural hearing loss (SNHL) of both ears   - recently evaluated by DO Monroe  6. Deviated nasal septum   - intermittent nasal obstruction    He will follow up as needed  I had a discussion with the patient regarding his condition and the further workup and management options.    All questions were answered, and the patient is in agreement with the above.     Disclaimer:  This note may have been prepared utilizing voice recognition software which may result in occasional typographical errors in the text such as sound alike words.   If further clarification is needed, please contact the ENT department of [facility].

## 2024-08-15 ENCOUNTER — TELEPHONE (OUTPATIENT)
Dept: WOUND CARE | Facility: CLINIC | Age: 84
End: 2024-08-15
Payer: MEDICARE

## 2024-08-15 NOTE — TELEPHONE ENCOUNTER
Returned call and spoke with patient offered and scheduled follow up appointment for 8/20/24 at 1030am.

## 2024-08-15 NOTE — TELEPHONE ENCOUNTER
----- Message from Balta Rodriguez sent at 8/15/2024 12:17 PM CDT -----  Pt would like to schedule appt. Pt would like the office to give him a call back.               Pt can be reached at  [phone]          TY

## 2024-08-16 ENCOUNTER — OFFICE VISIT (OUTPATIENT)
Dept: NEPHROLOGY | Facility: CLINIC | Age: 84
End: 2024-08-16
Payer: MEDICARE

## 2024-08-16 ENCOUNTER — LAB VISIT (OUTPATIENT)
Dept: LAB | Facility: HOSPITAL | Age: 84
End: 2024-08-16
Attending: STUDENT IN AN ORGANIZED HEALTH CARE EDUCATION/TRAINING PROGRAM
Payer: MEDICARE

## 2024-08-16 VITALS — OXYGEN SATURATION: 96 % | WEIGHT: 160.94 LBS | BODY MASS INDEX: 21.23 KG/M2

## 2024-08-16 DIAGNOSIS — D63.1 ANEMIA DUE TO STAGE 3B CHRONIC KIDNEY DISEASE: ICD-10-CM

## 2024-08-16 DIAGNOSIS — E85.4 SENILE CARDIAC AMYLOIDOSIS: ICD-10-CM

## 2024-08-16 DIAGNOSIS — N18.32 STAGE 3B CHRONIC KIDNEY DISEASE: Primary | ICD-10-CM

## 2024-08-16 DIAGNOSIS — I43 SENILE CARDIAC AMYLOIDOSIS: ICD-10-CM

## 2024-08-16 DIAGNOSIS — N25.81 SECONDARY HYPERPARATHYROIDISM OF RENAL ORIGIN: ICD-10-CM

## 2024-08-16 DIAGNOSIS — I50.30 ACC/AHA STAGE C HEART FAILURE WITH PRESERVED EJECTION FRACTION: ICD-10-CM

## 2024-08-16 DIAGNOSIS — I10 PRIMARY HYPERTENSION: ICD-10-CM

## 2024-08-16 DIAGNOSIS — I50.32 CHRONIC DIASTOLIC HEART FAILURE: ICD-10-CM

## 2024-08-16 DIAGNOSIS — N18.32 ANEMIA DUE TO STAGE 3B CHRONIC KIDNEY DISEASE: ICD-10-CM

## 2024-08-16 DIAGNOSIS — N18.32 STAGE 3B CHRONIC KIDNEY DISEASE: ICD-10-CM

## 2024-08-16 LAB
ALBUMIN SERPL BCP-MCNC: 4 G/DL (ref 3.5–5.2)
ALP SERPL-CCNC: 76 U/L (ref 55–135)
ALT SERPL W/O P-5'-P-CCNC: 11 U/L (ref 10–44)
ANION GAP SERPL CALC-SCNC: 12 MMOL/L (ref 8–16)
AST SERPL-CCNC: 16 U/L (ref 10–40)
BASOPHILS # BLD AUTO: 0.07 K/UL (ref 0–0.2)
BASOPHILS NFR BLD: 0.7 % (ref 0–1.9)
BILIRUB SERPL-MCNC: 0.9 MG/DL (ref 0.1–1)
BUN SERPL-MCNC: 38 MG/DL (ref 8–23)
CALCIUM SERPL-MCNC: 9.8 MG/DL (ref 8.7–10.5)
CHLORIDE SERPL-SCNC: 104 MMOL/L (ref 95–110)
CO2 SERPL-SCNC: 24 MMOL/L (ref 23–29)
CREAT SERPL-MCNC: 1.6 MG/DL (ref 0.5–1.4)
DIFFERENTIAL METHOD BLD: ABNORMAL
EOSINOPHIL # BLD AUTO: 0.2 K/UL (ref 0–0.5)
EOSINOPHIL NFR BLD: 1.7 % (ref 0–8)
ERYTHROCYTE [DISTWIDTH] IN BLOOD BY AUTOMATED COUNT: 17.7 % (ref 11.5–14.5)
EST. GFR  (NO RACE VARIABLE): 42.5 ML/MIN/1.73 M^2
GLUCOSE SERPL-MCNC: 120 MG/DL (ref 70–110)
HCT VFR BLD AUTO: 42.5 % (ref 40–54)
HGB BLD-MCNC: 12.9 G/DL (ref 14–18)
IGA SERPL-MCNC: 155 MG/DL (ref 40–350)
IGG SERPL-MCNC: 735 MG/DL (ref 650–1600)
IGM SERPL-MCNC: 20 MG/DL (ref 50–300)
IMM GRANULOCYTES # BLD AUTO: 0.02 K/UL (ref 0–0.04)
IMM GRANULOCYTES NFR BLD AUTO: 0.2 % (ref 0–0.5)
LYMPHOCYTES # BLD AUTO: 4 K/UL (ref 1–4.8)
LYMPHOCYTES NFR BLD: 39 % (ref 18–48)
MCH RBC QN AUTO: 26.1 PG (ref 27–31)
MCHC RBC AUTO-ENTMCNC: 30.4 G/DL (ref 32–36)
MCV RBC AUTO: 86 FL (ref 82–98)
MONOCYTES # BLD AUTO: 0.9 K/UL (ref 0.3–1)
MONOCYTES NFR BLD: 8.7 % (ref 4–15)
NEUTROPHILS # BLD AUTO: 5.1 K/UL (ref 1.8–7.7)
NEUTROPHILS NFR BLD: 49.7 % (ref 38–73)
NRBC BLD-RTO: 0 /100 WBC
PLATELET # BLD AUTO: 298 K/UL (ref 150–450)
PMV BLD AUTO: 11.2 FL (ref 9.2–12.9)
POTASSIUM SERPL-SCNC: 4 MMOL/L (ref 3.5–5.1)
PREALB SERPL-MCNC: 44 MG/DL (ref 20–43)
PROT SERPL-MCNC: 7.4 G/DL (ref 6–8.4)
RBC # BLD AUTO: 4.94 M/UL (ref 4.6–6.2)
SODIUM SERPL-SCNC: 140 MMOL/L (ref 136–145)
TROPONIN I SERPL DL<=0.01 NG/ML-MCNC: 0.35 NG/ML (ref 0–0.03)
WBC # BLD AUTO: 10.34 K/UL (ref 3.9–12.7)

## 2024-08-16 PROCEDURE — 83521 IG LIGHT CHAINS FREE EACH: CPT | Mod: 59 | Performed by: STUDENT IN AN ORGANIZED HEALTH CARE EDUCATION/TRAINING PROGRAM

## 2024-08-16 PROCEDURE — 86334 IMMUNOFIX E-PHORESIS SERUM: CPT | Mod: 26,,, | Performed by: PATHOLOGY

## 2024-08-16 PROCEDURE — 86334 IMMUNOFIX E-PHORESIS SERUM: CPT | Performed by: STUDENT IN AN ORGANIZED HEALTH CARE EDUCATION/TRAINING PROGRAM

## 2024-08-16 PROCEDURE — 80053 COMPREHEN METABOLIC PANEL: CPT | Performed by: STUDENT IN AN ORGANIZED HEALTH CARE EDUCATION/TRAINING PROGRAM

## 2024-08-16 PROCEDURE — 83880 ASSAY OF NATRIURETIC PEPTIDE: CPT | Performed by: STUDENT IN AN ORGANIZED HEALTH CARE EDUCATION/TRAINING PROGRAM

## 2024-08-16 PROCEDURE — 84165 PROTEIN E-PHORESIS SERUM: CPT | Mod: 26,,, | Performed by: PATHOLOGY

## 2024-08-16 PROCEDURE — 84165 PROTEIN E-PHORESIS SERUM: CPT | Performed by: STUDENT IN AN ORGANIZED HEALTH CARE EDUCATION/TRAINING PROGRAM

## 2024-08-16 PROCEDURE — 99999 PR PBB SHADOW E&M-EST. PATIENT-LVL III: CPT | Mod: PBBFAC,,, | Performed by: STUDENT IN AN ORGANIZED HEALTH CARE EDUCATION/TRAINING PROGRAM

## 2024-08-16 PROCEDURE — 84484 ASSAY OF TROPONIN QUANT: CPT | Performed by: STUDENT IN AN ORGANIZED HEALTH CARE EDUCATION/TRAINING PROGRAM

## 2024-08-16 PROCEDURE — 84134 ASSAY OF PREALBUMIN: CPT | Performed by: STUDENT IN AN ORGANIZED HEALTH CARE EDUCATION/TRAINING PROGRAM

## 2024-08-16 PROCEDURE — 36415 COLL VENOUS BLD VENIPUNCTURE: CPT | Performed by: STUDENT IN AN ORGANIZED HEALTH CARE EDUCATION/TRAINING PROGRAM

## 2024-08-16 PROCEDURE — 82784 ASSAY IGA/IGD/IGG/IGM EACH: CPT | Mod: 59 | Performed by: STUDENT IN AN ORGANIZED HEALTH CARE EDUCATION/TRAINING PROGRAM

## 2024-08-16 PROCEDURE — 85025 COMPLETE CBC W/AUTO DIFF WBC: CPT | Performed by: STUDENT IN AN ORGANIZED HEALTH CARE EDUCATION/TRAINING PROGRAM

## 2024-08-16 NOTE — PROGRESS NOTES
Nephrology Clinic Note   8/16/2024    Chief Complaint   Patient presents with    Chronic Kidney Disease      History of Present Illness    Gus presents today for follow up on amyloidosis. He has been diagnosed with cardiac amyloidosis and is currently undergoing treatment with Vendamax, described as a medication that stops protein release from the liver. He reports no symptoms specifically related to cardiac amyloidosis at this time. He is awaiting further cardiac imaging to assess disease progression. He has upcoming appointments at [facility] and the [facility] amyloidosis clinic in September. His kidney function has been stable at approximately 40% for the past four years. He reports no problems with urination despite taking diuretics. Minimal protein has been detected in his urine. He denies experiencing any symptoms suggestive of kidney dysfunction. He reports lower back pain with a history of severe sciatic pain. Neurology has suggested there might be a narrowing in his spinal canal. He denies any current loss of sensation or motor function in his legs related to the back pain. He recently experienced a severe episode of constipation, which has gradually improved. He continues to experience significant stomach gas. He canceled a gastroenterology appointment to prioritize other medical concerns. He is currently taking spironolactone (Aldactone) and experiences breast pain and nipple tenderness as side effects. A switch to eplerenone has been considered due to its potentially lower risk of breast-related side effects. He has been prescribed eplerenone but has not started it yet, expressing concerns about potential dizziness. He reports previous issues with Farxiga or Jardiance, stating he experienced severe dizziness and felt like he was going to pass out in the mornings. He reports recent Mohs surgery on his shin, resulting in two holes. He has requested specific labs for his upcoming consultation at [facility], including CBC with differential, CMP, urine protein to creatinine ratio, Benty ProBNP, serum protein electrophoresis, urine protein electrophoresis, immunofixation, IgG, IgA, IgM, and troponin. He expresses concerns about insurance coverage for his planned visit to [facility]. He is currently on a [place] Medicare Advantage plan, which may present barriers to out-of-state care. He reports difficulties in finding information about out-of-area coverage and potential out-of-pocket expenses.    ROS:  Constitutional: +dizziness  Cardiovascular: -chest pain  Gastrointestinal: +constipation  Genitourinary: -difficulty urinating  Musculoskeletal: +back pain  Breasts: +breast pain       Presents to the clinic today for medical conditions listed below.  Problem Noted   Senile Cardiac Amyloidosis 7/18/2024    Ordered repeat SPEP, IF, UPEP, SFLC, UPCR, IG, BNP, pre-ablumin, trop     Chronic Diastolic Heart Failure 7/9/2024    Now on furo for symptom management     Stage 3b Chronic Kidney Disease 4/10/2024    stable  stage G3bA1 likely secondary to long standing hypertension, age related nephron loss and heart failure  baseline creatinine 1.5-1.9  baseline UPCR na  historic complications Volume overload  BP uncontrolled on BB and Aldosterone antagonist  glucose controlled on SGLT2 inhibitor  UPCR controlled on SGLT2 inhibitor  Native kidney US No results found for this or any previous visit.   na  current diuretic therapy: furo 20  current bicarbonate therapy: none   current CKD-MBD therapy: none  current anemia therapy: none    Recently referred here for aid in management of ckd.  8/2024: no longer on SGLT2 due to feeling ill. Diagnosed with cardiac amyloidosis. SPEP, IF benign, no other known organ involvement. UPCR 0 and stable kidney function. Would not perform biopsy.     Secondary Hyperparathyroidism of Renal Origin 4/10/2024   Anemia Due to Stage 3b Chronic Kidney Disease 4/5/2024   Acc/Aha Stage C Heart  Failure With Preserved Ejection Fraction 1/11/2023    Agree with transition from fritz to eplerenone due to breast symptoms  Unable to tolerate SGLT2  On furo         Physical Exam    General: Well-appearing.  Lungs: Normal respiratory effort. No respiratory distress.  Extremities: No edema lower extremities.         Assessment:  1. Stage 3b chronic kidney disease    2. Anemia due to stage 3b chronic kidney disease    3. Secondary hyperparathyroidism of renal origin    4. Primary hypertension    5. Chronic diastolic heart failure    6. Senile cardiac amyloidosis    7. ACC/AHA stage C heart failure with preserved ejection fraction      These problems place this patient at high risk of mortality.    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  - Considered potential widespread involvement of amyloidosis, but current lab values suggest localized cardiac involvement.  - Explained the nature of amyloidosis, including its potential to affect multiple organs but currently appears localized to the heart.  - Discussed the formation of amyloid plaques and their growth mechanism.  - Shared resources from [facility] for further reading on cardiac amyloidosis.  KIDNEY FUNCTION:  - Assessed kidney function, noting stability at around 40% for the past 4 years.  - Deferred consideration of angiotensin receptor blocker for kidney protection due to current focus on cardiac management.  BACK PAIN:  - Evaluated back pain, likely due to age-related spinal degeneration  "rather than amyloidosis.  - Provided information on spinal compression and potential treatments for back pain.  MEDICATIONS/SUPPLEMENTS:  - Continued treatment with Venetoclax for preventing abnormal protein production from the liver.  - Continued spironolactone, acknowledging potential side effects.  - Discussed potential future switch from spironolactone to eplerenone to reduce side effects, but no immediate change made.  LABS:  - Ordered comprehensive lab panel including: CBC with differential, CMP, Urine protein to creatinine ratio, NT-proBNP, Serum protein electrophoresis, Pre-albumin, Urine protein electrophoresis, Immunofixation electrophoresis, IgG, IgA, IgM, and Troponin.  FOLLOW UP:  - Follow up in 6 months to 1 year to reassess kidney function and consider potential medication changes.         Gus Samuels" was seen today for chronic kidney disease.    Diagnoses and all orders for this visit:    Stage 3b chronic kidney disease  -     CBC W/ AUTO DIFFERENTIAL; Future  -     COMPREHENSIVE METABOLIC PANEL; Future  -     Cancel: Protein / creatinine ratio, urine  -     NT-Pro Natriuretic Peptide; Future  -     Protein electrophoresis, serum; Future  -     PREALBUMIN; Future  -     Immunoglobulin free LT chains blood; Future  -     IMMUNOFIXATION ELECTROPHORESIS, SERUM; Future  -     IMMUNOGLOBULINS (IGG, IGA, IGM) QUANTITATIVE; Future  -     TROPONIN I; Future  -     Protein / creatinine ratio, urine; Future  -     Microalbumin/creatinine urine ratio; Future    Anemia due to stage 3b chronic kidney disease    Secondary hyperparathyroidism of renal origin    Primary hypertension    Chronic diastolic heart failure    Senile cardiac amyloidosis  -     Immunoglobulin free LT chains blood; Future  -     IMMUNOFIXATION ELECTROPHORESIS, SERUM; Future  -     IMMUNOGLOBULINS (IGG, IGA, IGM) QUANTITATIVE; Future  -     TROPONIN I; Future  -     Cancel: Protein Electrophoresis, Random Urine  -     Protein " Electrophoresis, Random Urine; Future  -     Microalbumin/creatinine urine ratio; Future    ACC/AHA stage C heart failure with preserved ejection fraction        Follow up in about 4 months (around 12/16/2024).     I spent a total of 55 minutes on the day of the visit.    Visit today included increased complexity associated with the care of the episodic problem CKD addressed and managing the longitudinal care of the patient due to the serious and/or complex managed problem(s) CKD, HTN, secondary hyperparathyroidism, and anemia of chronic disease.  Orders Placed This Encounter   Procedures    CBC W/ AUTO DIFFERENTIAL    COMPREHENSIVE METABOLIC PANEL    NT-Pro Natriuretic Peptide    Protein electrophoresis, serum    PREALBUMIN    Immunoglobulin free LT chains blood    IMMUNOFIXATION ELECTROPHORESIS, SERUM    IMMUNOGLOBULINS (IGG, IGA, IGM) QUANTITATIVE    TROPONIN I    Protein / creatinine ratio, urine    Protein Electrophoresis, Random Urine    Microalbumin/creatinine urine ratio     There are no discontinued medications.   Future Appointments   Date Time Provider Department Center   8/20/2024 10:30 AM Nicky Brito PA-C [facility] WOUND [facility]   9/3/2024 10:00 AM LAB, APPOINTMENT [facility] VNP [facility]   9/6/2024  1:00 PM SIX, MINUTE WALK [facility] PUL WLK [facility]   9/6/2024  2:00 PM Reji Zhong Jr., MD [facility] HEARTTX [facility]   9/18/2024  1:30 PM Les Saldana MD [facility] DERMSUR [facility]   9/23/2024  1:00 PM Ajith Mora MD [facility] GENSUR [facility]   9/25/2024 11:30 AM [facility] US[facility] [place]   9/25/2024  2:30 PM Mikie Dela Cruz MD [facility] DERM [facility]   10/8/2024  2:00 PM Asaf Hampton PA-C [facility] ENT [place]       This note was generated with the assistance of ambient listening technology. Verbal consent was obtained by the patient and accompanying visitor(s) for the recording of patient appointment to facilitate this note. I attest to having reviewed and edited the generated  note for accuracy, though some syntax or spelling errors may persist. Please contact the author of this note for any clarification.      Uriel Knox

## 2024-08-17 LAB — NT-PROBNP SERPL IA-MCNC: 9187 PG/ML

## 2024-08-19 LAB
ALBUMIN SERPL ELPH-MCNC: 4.4 G/DL (ref 3.35–5.55)
ALPHA1 GLOB SERPL ELPH-MCNC: 0.28 G/DL (ref 0.17–0.41)
ALPHA2 GLOB SERPL ELPH-MCNC: 0.71 G/DL (ref 0.43–0.99)
B-GLOBULIN SERPL ELPH-MCNC: 0.86 G/DL (ref 0.5–1.1)
GAMMA GLOB SERPL ELPH-MCNC: 0.65 G/DL (ref 0.67–1.58)
INTERPRETATION SERPL IFE-IMP: NORMAL
PROT SERPL-MCNC: 6.9 G/DL (ref 6–8.4)

## 2024-08-20 ENCOUNTER — OFFICE VISIT (OUTPATIENT)
Dept: WOUND CARE | Facility: CLINIC | Age: 84
End: 2024-08-20
Payer: MEDICARE

## 2024-08-20 ENCOUNTER — TELEPHONE (OUTPATIENT)
Dept: SPEECH THERAPY | Facility: HOSPITAL | Age: 84
End: 2024-08-20
Payer: MEDICARE

## 2024-08-20 VITALS
HEART RATE: 106 BPM | DIASTOLIC BLOOD PRESSURE: 86 MMHG | BODY MASS INDEX: 21.45 KG/M2 | HEIGHT: 73 IN | SYSTOLIC BLOOD PRESSURE: 137 MMHG | OXYGEN SATURATION: 100 % | WEIGHT: 161.81 LBS

## 2024-08-20 DIAGNOSIS — S81.801D MULTIPLE OPEN WOUNDS OF RIGHT LOWER EXTREMITY, SUBSEQUENT ENCOUNTER: Primary | ICD-10-CM

## 2024-08-20 DIAGNOSIS — Z85.828 HISTORY OF SKIN CANCER: ICD-10-CM

## 2024-08-20 DIAGNOSIS — N18.32 STAGE 3B CHRONIC KIDNEY DISEASE: ICD-10-CM

## 2024-08-20 DIAGNOSIS — I73.9 PAD (PERIPHERAL ARTERY DISEASE): ICD-10-CM

## 2024-08-20 DIAGNOSIS — Z85.89 HISTORY OF SQUAMOUS CELL CARCINOMA: ICD-10-CM

## 2024-08-20 DIAGNOSIS — Z98.890 S/P SKIN BIOPSY: ICD-10-CM

## 2024-08-20 DIAGNOSIS — Z98.890 STATUS POST MOHS SURGERY: ICD-10-CM

## 2024-08-20 LAB
PATHOLOGIST INTERPRETATION IFE: NORMAL
PATHOLOGIST INTERPRETATION SPE: NORMAL

## 2024-08-20 PROCEDURE — 99213 OFFICE O/P EST LOW 20 MIN: CPT | Mod: 25,S$GLB,,

## 2024-08-20 PROCEDURE — 3079F DIAST BP 80-89 MM HG: CPT | Mod: CPTII,S$GLB,,

## 2024-08-20 PROCEDURE — 11042 DBRDMT SUBQ TIS 1ST 20SQCM/<: CPT | Mod: S$GLB,,,

## 2024-08-20 PROCEDURE — 3075F SYST BP GE 130 - 139MM HG: CPT | Mod: CPTII,S$GLB,,

## 2024-08-20 PROCEDURE — 1126F AMNT PAIN NOTED NONE PRSNT: CPT | Mod: CPTII,S$GLB,,

## 2024-08-20 PROCEDURE — 99999 PR PBB SHADOW E&M-EST. PATIENT-LVL III: CPT | Mod: PBBFAC,,,

## 2024-08-20 NOTE — PROCEDURES
"Debridement    Date/Time: 8/20/2024 10:30 AM    Performed by: Nicky Brito PA-C  Authorized by: Nicky Brito PA-C    Time out: Immediately prior to procedure a "time out" was called to verify the correct patient, procedure, equipment, support staff and site/side marked as required.    Consent Done?:  Yes (Written)  Local anesthesia used?: Yes    Local anesthetic:  Topical anesthetic (Topical 4% Lidocaine Hydrochloride)    Wound Details:    Location:  Right leg (anterior)    Type of Debridement:  Excisional       Length (cm):  0.7       Area (sq cm):  0.35       Width (cm):  0.5       Percent Debrided (%):  100       Depth (cm):  0.1       Total Area Debrided (sq cm):  0.35    Depth of debridement:  Subcutaneous tissue    Tissue debrided:  Subcutaneous    Devitalized tissue debrided:  Biofilm, Exudate, Fibrin and Slough    Instruments:  Curette  Bleeding:  Minimal  Hemostasis Achieved: Yes  Method Used:  Pressure    Additional wounds:  1    2nd Wound Details:     Location:  Right leg (medial)    Type of Debridement:  Excisional       Length (cm):  0.4       Area (sq cm):  0.32       Width (cm):  0.8       Percent Debrided (%):  100       Depth (cm):  0.1       Total Area Debrided (sq cm):  0.32    Depth of debridement:  Subcutaneous tissue    Tissue debrided:  Subcutaneous    Devitalized tissue debrided:  Biofilm, Exudate, Fibrin and Slough    Instruments:  Curette  Bleeding:  Minimal  Hemostasis Achieved: Yes    Method Used:  Pressure  Patient tolerance:  Patient tolerated the procedure well with no immediate complications    "

## 2024-08-20 NOTE — PROGRESS NOTES
Subjective:       Patient ID: Gus Sabillon is a 84 y.o. male.    Chief Complaint: Wound Check      Patient presents for a re-evaluation of two right lower extremity wounds.  Pt followed with Dr. 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 Pt s/p Mohs' Micrographic surgery with Dr. Saldana on 6/25/24. She dressed his wound, instructed to wear compression stockings daily, and referred to wound care. Pt follows with vascular surgery for PAD. He was last seen by Dr. Farooq in 2022 who determined no intervention for his claudication. Pt did not have open wounds at this time. Pt s/p bilateral PTAS in [facility] around the late 1990s. He reports that his claudication symptoms are better and he can walk further than before. He reports being able to walk about 1 mile before his heart failure symptoms prevent him from walking. Pt is a former smoker. He smoked 3-4 ppd for 15 years. He quit 42 years ago. He reports that his legs/ ankles swelling from heart failure. Admits compliance with leg elevation, taking lasix, and following a low sodium diet. Pt cleared for compression therapy by vascular surgery.    Interval history: Pt went out of town to [place] for horse racing. He was last seen in office on 7/17/24. Pt admitted compliance with dressing changes. He was applying hydrogel to wound bed, covering with mepilex border dressings, and compression stockings. Pt is leaving next week to go back to [place] and will not be back until middle of September. Denies fever, chills, erythema, warmth, or purulent drainage.        6/28/24  ABIs and TBIs:  Right DL-1.00  Right TBI-0.31  Left DL-0.88  Left TBI-0.24  Right leg: Segmental pressures are within the normal range and do not suggest peripheral arterial occlusive disease. PVR waveforms suggest very minimal peripheral arterial occlusive disease.  Rt great toe: The PPG waveform as described above- TBI of 0.31 with a great toe  pressure of 48 mmHg is noted.  Left leg: Segmental pressures suggest mild to minimal peripheral arterial occlusive disease. PVR waveforms suggest very minimal peripheral arterial occlusive disease.  Lt great toe: The PPG waveform as described above. TBI of 0.24 with a great toe pressure of 37 mmHg is noted.  Patient with known bilateral SFA stents    6/28/24 arterial ultrasound:  Right Leg:Duplex imaging reveals a patent right lower extremity arterial tree and multiple patent Rt. SFA stents. There is no evidence of a hemodynamically significant stenosis. The distal Peroneal artery could not be visualized.   Left Leg: Duplex imaging reveals a patent left lower extremity arterial tree and SFA stent. There is no evidence of a hemodynamically significant stenosis. A short segment chronic occlusion is noted in the Mid PTA with reconstituted flow feeding the distal segment, which is coursing retrograde.     2/1/23 venous ultrasound:  No evidence of deep venous thrombosis in either lower extremity.     9/30/22 ABIs:  Rt DL 0.92   Lt DL  0.94   Right Leg: Segmental pressures and PVR waveforms suggest minimal peripheral arterial occlusive disease.   Left Leg: Segmental pressures and PVR waveforms suggest minimal peripheral arterial occlusive disease.     1/12/22 venous ultrasound:  Right Leg: Duplex imaging of the right lower extremity veins demonstrates patent and compressible veins. There is no evidence of a venous thrombosis in the deep or superficial veins. Significant reflux noted in the right GSV including the Saphenofemoral Junction (SFJ). No significant reflux noted in the SSV. The accessory vein was too small for accurate assessment. The GSV branches extensively from the proximal calf to the ankle.   Left Leg: Duplex imaging of the left lower extremity veins demonstrates patent and compressible veins. There is no evidence of a venous thrombosis in the deep or superficial veins. Significant reflux noted in the left  GSV including the Saphenofemoral Junction (SFJ). No significant reflux noted in the SSV. The accessory lorena was too small for accurate assessment. The GSV branches extensively from the proximal calf to the ankle.     1/12/22 arterial ultrasound:  Right leg: Color flow duplex of the right lower extremity stents reveals a PSV increase of 59 cm/sec to 123 cm/sec in the mid segment of the proximal SFA stent. These findings are suggestive of a hemodynamically significant stenoses. The mid and distal SFA stents appear to be patent without evidence of stenosis.   Left leg: Color flow duplex of the left lower extremity mid SFA stent reveals a PSV increase of 124 cm/sec to 286 cm/sec in the mid segment. These findings are suggestive of a hemodynamically significant stenoses. There is a PSV elevation of 127 cm/sec in the proximal segment of the Popliteal A which decreases to 44 cm/sec in the mid segment with a change in waveform morphology.         Review of Systems   Constitutional:  Negative for activity change, chills, diaphoresis, fatigue and fever.   Respiratory:  Negative for apnea, chest tightness and shortness of breath.    Cardiovascular:  Negative for chest pain, palpitations and leg swelling.   Musculoskeletal:  Negative for gait problem and joint swelling.   Skin:  Positive for wound. Negative for color change, pallor and rash.   Neurological:  Negative for syncope, weakness and numbness.   Psychiatric/Behavioral:  Negative for agitation. The patient is not nervous/anxious.    All other systems reviewed and are negative.      Objective:      Physical Exam  Vitals reviewed.   Constitutional:       General: He is not in acute distress.     Appearance: Normal appearance.   Cardiovascular:      Pulses:           Dorsalis pedis pulses are 2+ on the right side.        Posterior tibial pulses are 1+ on the right side.   Pulmonary:      Effort: No respiratory distress.   Musculoskeletal:         General: No swelling.    Skin:     General: Skin is warm and dry.      Findings: Wound present. No erythema.          Neurological:      General: No focal deficit present.      Mental Status: He is alert and oriented to person, place, and time.   Psychiatric:         Mood and Affect: Mood normal.         Behavior: Behavior normal.         Thought Content: Thought content normal.         Judgment: Judgment normal.         Assessment:       1. Multiple open wounds of right lower extremity, subsequent encounter    2. History of squamous cell carcinoma    3. PAD (peripheral artery disease)    4. Stage 3b chronic kidney disease    5. History of skin cancer    6. Status post Mohs surgery    7. S/P skin biopsy           Wound Other (comment) Right anterior;lower Leg (Active)       Present on Original Admission:    Primary Wound Type: Other   Side: Right   Orientation: anterior;lower   Location: Leg   Wound Approximate Age at First Assessment (Weeks):    Wound Number:    Is this injury device related?:    Incision Type:    Closure Method:    Wound Description (Comments):    Type:    Additional Comments:    Ankle-Brachial Index:    Pulses:    Removal Indication and Assessment:    Wound Outcome:    Wound Image    08/20/24 1035   Dressing Appearance Dry;Intact;Clean;Moist drainage 08/20/24 1035   Drainage Amount Small 08/20/24 1035   Drainage Characteristics/Odor Serosanguineous 08/20/24 1035   Red (%), Wound Tissue Color 50 % 08/20/24 1035   Yellow (%), Wound Tissue Color 50 % 08/20/24 1035   Periwound Area Intact;Edematous;Pink 08/20/24 1035   Wound Length (cm) 0.7 cm 08/20/24 1035   Wound Width (cm) 0.5 cm 08/20/24 1035   Wound Depth (cm) 0.1 cm 08/20/24 1035   Wound Volume (cm^3) 0.035 cm^3 08/20/24 1035   Wound Surface Area (cm^2) 0.35 cm^2 08/20/24 1035   Care Soap and water;Cleansed with: 08/20/24 1035   Dressing Applied;Compression wrap;Foam 08/20/24 1035   Periwound Care Skin barrier film applied 08/20/24 1035   Compression Two layer  compression 08/20/24 1035            Wound Other (comment) Right lower;medial Leg (Active)       Present on Original Admission:    Primary Wound Type: Other   Side: Right   Orientation: lower;medial   Location: Leg   Wound Approximate Age at First Assessment (Weeks):    Wound Number:    Is this injury device related?:    Incision Type:    Closure Method:    Wound Description (Comments):    Type:    Additional Comments:    Ankle-Brachial Index:    Pulses:    Removal Indication and Assessment:    Wound Outcome:    Dressing Appearance Dry;Intact;Clean;Moist drainage 08/20/24 1035   Drainage Amount Small 08/20/24 1035   Drainage Characteristics/Odor Serosanguineous 08/20/24 1035   Red (%), Wound Tissue Color 50 % 08/20/24 1035   Yellow (%), Wound Tissue Color 50 % 08/20/24 1035   Periwound Area Intact;Edematous;Pink 08/20/24 1035   Wound Length (cm) 0.4 cm 08/20/24 1035   Wound Width (cm) 0.8 cm 08/20/24 1035   Wound Depth (cm) 0.1 cm 08/20/24 1035   Wound Volume (cm^3) 0.032 cm^3 08/20/24 1035   Wound Surface Area (cm^2) 0.32 cm^2 08/20/24 1035   Care Cleansed with:;Soap and water 08/20/24 1035   Dressing Applied;Compression wrap;Foam 08/20/24 1035   Periwound Care Skin barrier film applied 08/20/24 1035   Compression Two layer compression 08/20/24 1035       Gus was seen in the clinic room and placed in the supine position on the treatment table.  The dressing was removed and the area was cleansed with Easi-clense sponges and dried thoroughly. No odor, erythema, and warmth noted. Placed topical 4% Lidocaine Hydrochloride to wound bed for 15 minutes. Sharp debridement with 5 mm curette to remove non-viable tissue. Pt tolerated procedure well. See procedure note.      Plan of Care: Calmoseptine and skin prep to periwound, polymem, and a 2 layer calamine compression wrap. The patient's foot was positioned at a 90 degree angle.  A compression wrap was applied using a spiral technique avoiding creases or folds.  The  wrap was started behind the first metatarsal and ended below the tibial tubercle of the knee.  There was overlap of each turn half the width of the previous turn.            Plan:           Orders Placed This Encounter   Procedures    Debridement     This order was created via procedure documentation       Right lower extremity was dressed as detailed above.  Patient was instructed to not get the dressings wet and to use cast covers for showering.  Should the dressing become wet, he is to remove it, place a moist dressing over the wound, cover with gauze and roll gauze and to secure bandages.  He should then notify this office as soon as possible to have a new dressing applied.  Instructed patient to remove wrap if he notices tingling, pain, swelling, or coldness to his left lower extremity or if his toes become white, blue, or cold.    Discussed nutrition and the role of protein in wound healing with the patient. Instructed patient to optimize protein for wound healing.     Discussed lower extremity edema with patient. Instructed patient to elevate legs whenever sedentary, follow a low sodium diet, and to take lasix as prescribed.    Discussed vascular status. Pt cleared for compression therapy by Dr. Farooq.    Instructed patient to keep follow ups with Dr. Saldana.      Written and verbal instructions given to patient  RTC in 1 week      Nicky Brito PA-C

## 2024-08-21 ENCOUNTER — TELEPHONE (OUTPATIENT)
Dept: INTERNAL MEDICINE | Facility: CLINIC | Age: 84
End: 2024-08-21
Payer: MEDICARE

## 2024-08-21 ENCOUNTER — PATIENT MESSAGE (OUTPATIENT)
Dept: NEPHROLOGY | Facility: CLINIC | Age: 84
End: 2024-08-21
Payer: MEDICARE

## 2024-08-21 DIAGNOSIS — I10 PRIMARY HYPERTENSION: Primary | ICD-10-CM

## 2024-08-21 DIAGNOSIS — N18.32 STAGE 3B CHRONIC KIDNEY DISEASE: ICD-10-CM

## 2024-08-21 DIAGNOSIS — E78.2 MIXED HYPERLIPIDEMIA: ICD-10-CM

## 2024-08-21 DIAGNOSIS — R79.9 ABNORMAL FINDING OF BLOOD CHEMISTRY, UNSPECIFIED: ICD-10-CM

## 2024-08-21 DIAGNOSIS — Z12.5 ENCOUNTER FOR SCREENING FOR MALIGNANT NEOPLASM OF PROSTATE: ICD-10-CM

## 2024-08-21 LAB
KAPPA LC SER QL IA: 2.95 MG/DL (ref 0.33–1.94)
KAPPA LC/LAMBDA SER IA: 1.22 (ref 0.26–1.65)
LAMBDA LC SER QL IA: 2.41 MG/DL (ref 0.57–2.63)

## 2024-08-23 ENCOUNTER — CLINICAL SUPPORT (OUTPATIENT)
Dept: SPEECH THERAPY | Facility: HOSPITAL | Age: 84
End: 2024-08-23
Attending: PHYSICIAN ASSISTANT
Payer: MEDICARE

## 2024-08-23 ENCOUNTER — HOSPITAL ENCOUNTER (OUTPATIENT)
Dept: RADIOLOGY | Facility: HOSPITAL | Age: 84
Discharge: HOME | End: 2024-08-23
Attending: PHYSICIAN ASSISTANT
Payer: MEDICARE

## 2024-08-23 DIAGNOSIS — R13.12 OROPHARYNGEAL DYSPHAGIA: Primary | ICD-10-CM

## 2024-08-23 DIAGNOSIS — R13.10 DYSPHAGIA, UNSPECIFIED TYPE: ICD-10-CM

## 2024-08-23 PROCEDURE — 25500020 PHARM REV CODE 255: Performed by: PHYSICIAN ASSISTANT

## 2024-08-23 PROCEDURE — A9698 NON-RAD CONTRAST MATERIALNOC: HCPCS | Performed by: PHYSICIAN ASSISTANT

## 2024-08-23 PROCEDURE — 74230 X-RAY XM SWLNG FUNCJ C+: CPT | Mod: 26,,, | Performed by: RADIOLOGY

## 2024-08-23 PROCEDURE — 92611 MOTION FLUOROSCOPY/SWALLOW: CPT | Mod: GN

## 2024-08-23 PROCEDURE — 74230 X-RAY XM SWLNG FUNCJ C+: CPT | Mod: TC

## 2024-08-23 RX ADMIN — BARIUM SULFATE 40 ML: 0.81 POWDER, FOR SUSPENSION ORAL at 02:08

## 2024-08-23 NOTE — PROGRESS NOTES
MODIFIED BARIUM SWALLOW STUDY SPEECH PATHOLOGY REPORT    REASON FOR REFERRAL:  Gus Sabillon,  was referred by Dr. Hampton, Asaf SANTIAGO PA-C for a Modified Barium Swallow Study to rule out aspiration,assess overall swallowing anatomy and function, determine efficacy of compensatory strategies and recommend safest consistencies for oral intake.    MEDICAL HISTORY  Patient is a 84 y.o., year old male with what he describes as a llife long history of throat/swallowing difficulties. He is s/p tonsillectomy, SCC of the skin, shingles, CAD, CHF, CKD, HTN, PAD. He had Covid in December resulting in exacerbation of already existing mucous and swallowing issues and he has experienced changes in his vocal quality. He has seen some improvement, but feels a constant need to clear his throat/cough up mucous. He has xerostomia. History negative/positive for pneumonia.    ENT: 8/14/2024   Piriform sinus pooling was appreciated during laryngoscopy.    MEDICAL HISTORY:  Past Medical History:   Diagnosis Date    Carotid artery disease     CHF (congestive heart failure)     CKD (chronic kidney disease) stage 3, GFR 30-59 ml/min     Dyslipidemia     GERD (gastroesophageal reflux disease)     Hx of melanoma excision     Hypertension     Melanoma     PAD (peripheral artery disease)     SCC (squamous cell carcinoma) 05/01/2024    Right shin distal    Squamous cell carcinoma of skin 05/01/2024    Right shin proximal     SWALLOWING HISTORY  Gus Sabillon  Diet consistency at present is regular consistency with thin liquids.  Patient complains of constant need to clear his throat and occasional globus sensation.   Medications are taken by mouth with water without difficulty.      PREVIOUS MBSS: none    SURGICAL HISTORY:  Past Surgical History:   Procedure Laterality Date    ARTHROSCOPIC DEBRIDEMENT OF SHOULDER Right 02/27/2020    Procedure: EXTENSIVE DEBRIDEMENT, SHOULDER, ARTHROSCOPIC;  Surgeon: Staci Childress MD;  Location: [facility] OR;   Service: Orthopedics;  Laterality: Right;    ARTHROSCOPIC DEBRIDEMENT OF SHOULDER Left 06/21/2022    Procedure: EXTENSIVE DEBRIDEMENT, SHOULDER, ARTHROSCOPIC;  Surgeon: Staci Childress MD;  Location: [facility] OR;  Service: Orthopedics;  Laterality: Left;    ARTHROSCOPIC REPAIR OF ROTATOR CUFF OF SHOULDER Right 02/27/2020    Procedure: REPAIR, ROTATOR CUFF, ARTHROSCOPIC;  Surgeon: Staci Childress MD;  Location: [facility] OR;  Service: Orthopedics;  Laterality: Right;    ARTHROSCOPIC REPAIR OF ROTATOR CUFF OF SHOULDER Left 06/21/2022    Procedure: REPAIR, ROTATOR CUFF, ARTHROSCOPIC WITH CUFF MEND;  Surgeon: Staci Childress MD;  Location: [facility] OR;  Service: Orthopedics;  Laterality: Left;    ARTHROSCOPY OF SHOULDER WITH DECOMPRESSION OF SUBACROMIAL SPACE Right 02/27/2020    Procedure: ARTHROSCOPY, SHOULDER, WITH SUBACROMIAL SPACE DECOMPRESSION;  Surgeon: Staci Childress MD;  Location: [facility] OR;  Service: Orthopedics;  Laterality: Right;    ARTHROSCOPY OF SHOULDER WITH DECOMPRESSION OF SUBACROMIAL SPACE Left 06/21/2022    Procedure: ARTHROSCOPY, SHOULDER, WITH SUBACROMIAL  DECOMPRESSION;  Surgeon: Staci Childress MD;  Location: [facility] OR;  Service: Orthopedics;  Laterality: Left;    ARTHROSCOPY OF SHOULDER WITH REMOVAL OF DISTAL CLAVICLE Left 06/21/2022    Procedure: ARTHROSCOPY, SHOULDER, WITH DISTAL CLAVICLE EXCISION;  Surgeon: Staci Childress MD;  Location: [facility] OR;  Service: Orthopedics;  Laterality: Left;    ARTHROSCOPY,SHOULDER,WITH BICEPS TENODESIS Left 06/21/2022    Procedure: ARTHROSCOPY,SHOULDER,WITH BICEPS TENODESIS;  Surgeon: Staci Childress MD;  Location: [facility] OR;  Service: Orthopedics;  Laterality: Left;    BLEPHAROPLASTY      CAROTID ENDARTERECTOMY Right 10/15/2021    Procedure: ENDARTERECTOMY-CAROTID;  Surgeon: Imer Farooq MD;  Location: [facility] OR [address] [place] [address];  Service: Peripheral Vascular;  Laterality: Right;  AWAKE CERVICAL BLOCK    CATARACT EXTRACTION, BILATERAL      COSMETIC SURGERY      EXCISION OF BURSA Left 06/21/2022    Procedure:  BURSECTOMY;  Surgeon: Staci Childress MD;  Location: [facility] OR;  Service: Orthopedics;  Laterality: Left;    EYE SURGERY Bilateral     cataract removal    FIXATION OF TENDON Right 02/27/2020    Procedure: FIXATION, TENDON, Biceps Tenodesis;  Surgeon: Staci Childress MD;  Location: [facility] OR;  Service: Orthopedics;  Laterality: Right;  FIXATION, TENDON, Biceps Tenodesis    OPEN REDUCTION AND INTERNAL FIXATION (ORIF) OF FRACTURE OF PROXIMAL HUMERUS Right 02/27/2020    Procedure: ORIF, FRACTURE, GREATER TUBEROSITY;  Surgeon: Staci Childress MD;  Location: [facility] OR;  Service: Orthopedics;  Laterality: Right;    ORIF HUMERUS FRACTURE Left 06/21/2022    Procedure: ORIF, FRACTURE, HUMERUS, GREATER TUBEROSITY;  Surgeon: Staci Childress MD;  Location: [facility] OR;  Service: Orthopedics;  Laterality: Left;    SHOULDER ARTHROSCOPY Left 06/21/2022    Procedure: ARTHROSCOPY, SHOULDER WITH LABRAL REPAIR;  Surgeon: Staci Childress MD;  Location: [facility] OR;  Service: Orthopedics;  Laterality: Left;    TONSILLECTOMY      VASECTOMY           FAMILY HISTORY:  Family History   Problem Relation Name Age of Onset    Diabetes Mother      Hyperlipidemia Mother      Heart disease Father      Cancer Sister          ? colon or uterine    Colon cancer Sister      No Known Problems Brother Rishabh     No Known Problems Brother Lee     No Known Problems Daughter      No Known Problems Son          SOCIAL HISTORY:  Patient lives in [place], [place].     BEHAVIOR:  Gus Sabillon was pleasant and had normal affect and social interaction.and cooperated fully during the study.  Results of today's assessment were considered indicative of current levels of swallowing functioning.      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  Speech reception thresholds were noted at 15 dB in the right ear and 15 dB in the left ear.  Speech  discrimination scores were 96% in the right ear and 96% in the left ear.     ORAL PERIPHERAL:   Informal examination of the oral mechanism revealed structures and functioning within normal limits for swallowing and speech purposes.    Voice quality was mildly dysphonic. Mild wet quality was appreciated before, during and after the study.    TEST FINDINGS:   Patient was seen in Radiology with the Radiologist for a Modified Barium Swallow Study and was positioned for a left lateral videofluoroscopic view.    Consistencies assessed using radiopaque barium contrast:  Thin liquids (1 tsp x2) single and continuous swallows from an open cup  Thin puree (applesauce) with barium contrast pudding by spoon  Thick puree barium contrast pudding by spoon  Solid (1/4 cracker) with barium contrast pudding   13 mm barium tablet    Phases:  Oral:  Patient was able to obtain liquid and strip utensils adequately with no anterior loss of material from the oral cavity.  He moved boluses through the oral cavity with mildly increased oral preparation time.  There was no pooling of liquids in the mouth.  Swallow reflex was triggered  with a mild delay to the valleculae on thick puree consistency .     Pharyngeal:  Boluses moved through the pharyngeal phase with no laryngeal penetration and no aspiration and no nasal regurgitation. There was stasis in the valleculae on thick puree and solid consistencies. A second swallow partially cleared this and s small sip of water cleared almost all of the remaining residue.     - Delayed initiation  - Adequate soft palate elevation  - Adequate laryngeal elevation and anterior hyoid excursion  - Reduced tongue base retraction  - Complete epiglottic inversion  - Complete laryngeal vestibular closure  - Reduced pharyngeal stripping wave  - Adequate PE segment opening.  -  Minimum pharyngeal residue in valleculae    Cervical Esophageal:  Boluses entered the upper esophagus within normal limits.  No  obstruction was noted.    Strategies   Use of water throughout the day to trigger swallow for clearing thickened mucous in the pharynx as well as clearing stasis during meals.   Effortful swallows on thicker consistencies and solids.      Rosenbeck 8-point Penetration-Aspiration Scale:     Thin liquids: 1 - Material does not enter airway.  Thin puree: 1 - Material does not enter airway.  Thick puree:1 - Material does not enter airway.  Solid(barium laced cracker): 1 - Material does not enter airway.  Tablet/Capsule: 1 - Material does not enter airway.      IMPRESSIONS:     1. Oral/pharyngeal swallow with mild delay  and weakness resulting in pharyngeal stasis on thicker consistencies. NO aspiration or penetration.    2. No cervical esophageal swallowing abnormalities were noted.    3. History of  SCC of the skin, shingles, CAD, CHF, CKD, HTN, PAD, COVID.    RECOMMENDATIONS/PLAN OF CARE:     1. Continue current regular diet consistency with thin liquids.     2. Compensatory strategies include:   Use of water throughout the day to trigger swallow for clearing thickened mucous in the pharynx as well as clearing stasis during meals.   Effortful swallows on thicker consistencies and solids.  Alternate food bites with small sips of liquid to clear food from the throat.    3.Dysphagia treatment is recommended for a short period for patient to learn exercises for increased  strength and efficiency of swallow. Patient stated he has too many other medical issues being taken care of for now for him to be able to attend dysphagia therapy.  Strategies/techniques:  Rate of eating and drinking  Dry swallows  Alternate liquid and solid swallows    Exercises:  Tongue base retraction  Tongue-anchor swallow (Carolyn)   Chin tuck against resistance  Effortful swallow       4. Review of the swallow study results by the referring physician for further management of the patients concerns.    5. Contact [facility] at  [phone] or via the [facility] portal with any further questions or concerns.    Results were discussed with patient. Questions were answered. Therapist informed patient that he may contact this therapist via his [facility] portal if and when he decided to attend dysphagia therapy.

## 2024-08-26 ENCOUNTER — OFFICE VISIT (OUTPATIENT)
Dept: WOUND CARE | Facility: CLINIC | Age: 84
End: 2024-08-26
Payer: MEDICARE

## 2024-08-26 VITALS
WEIGHT: 160.5 LBS | DIASTOLIC BLOOD PRESSURE: 60 MMHG | HEIGHT: 73 IN | OXYGEN SATURATION: 97 % | HEART RATE: 63 BPM | BODY MASS INDEX: 21.27 KG/M2 | SYSTOLIC BLOOD PRESSURE: 132 MMHG

## 2024-08-26 DIAGNOSIS — S81.801D MULTIPLE OPEN WOUNDS OF RIGHT LOWER EXTREMITY, SUBSEQUENT ENCOUNTER: Primary | ICD-10-CM

## 2024-08-26 DIAGNOSIS — I73.9 PAD (PERIPHERAL ARTERY DISEASE): ICD-10-CM

## 2024-08-26 DIAGNOSIS — Z85.828 HISTORY OF SKIN CANCER: ICD-10-CM

## 2024-08-26 DIAGNOSIS — Z98.890 S/P SKIN BIOPSY: ICD-10-CM

## 2024-08-26 DIAGNOSIS — Z98.890 STATUS POST MOHS SURGERY: ICD-10-CM

## 2024-08-26 DIAGNOSIS — Z85.89 HISTORY OF SQUAMOUS CELL CARCINOMA: ICD-10-CM

## 2024-08-26 DIAGNOSIS — N18.32 STAGE 3B CHRONIC KIDNEY DISEASE: ICD-10-CM

## 2024-08-26 PROCEDURE — 3075F SYST BP GE 130 - 139MM HG: CPT | Mod: CPTII,S$GLB,,

## 2024-08-26 PROCEDURE — 1159F MED LIST DOCD IN RCRD: CPT | Mod: CPTII,S$GLB,,

## 2024-08-26 PROCEDURE — 3078F DIAST BP <80 MM HG: CPT | Mod: CPTII,S$GLB,,

## 2024-08-26 PROCEDURE — 99999 PR PBB SHADOW E&M-EST. PATIENT-LVL III: CPT | Mod: PBBFAC,,,

## 2024-08-26 PROCEDURE — 3288F FALL RISK ASSESSMENT DOCD: CPT | Mod: CPTII,S$GLB,,

## 2024-08-26 PROCEDURE — 1101F PT FALLS ASSESS-DOCD LE1/YR: CPT | Mod: CPTII,S$GLB,,

## 2024-08-26 PROCEDURE — 99214 OFFICE O/P EST MOD 30 MIN: CPT | Mod: S$GLB,,,

## 2024-08-26 PROCEDURE — 1126F AMNT PAIN NOTED NONE PRSNT: CPT | Mod: CPTII,S$GLB,,

## 2024-08-26 NOTE — PROGRESS NOTES
Subjective:       Patient ID: Gus Sabillon is a 84 y.o. male.    Chief Complaint: Wound Check      Patient presents for a re-evaluation of two right lower extremity wounds.  Pt followed with Dr. 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 Pt s/p Mohs' Micrographic surgery with Dr. Saldana on 6/25/24. She dressed his wound, instructed to wear compression stockings daily, and referred to wound care. Pt follows with vascular surgery for PAD. He was last seen by Dr. Farooq in 2022 who determined no intervention for his claudication. Pt did not have open wounds at this time. Pt s/p bilateral PTAS in [facility] around the late 1990s. He reports that his claudication symptoms are better and he can walk further than before. He reports being able to walk about 1 mile before his heart failure symptoms prevent him from walking. Pt is a former smoker. He smoked 3-4 ppd for 15 years. He quit 42 years ago. He reports that his legs/ ankles swelling from heart failure. Admits compliance with leg elevation, taking lasix, and following a low sodium diet. Pt cleared for compression therapy by vascular surgery. Pt went out of town to [place] for horse racing. He was last seen in office on 7/17/24. Pt admitted compliance with dressing changes. He was applying hydrogel to wound bed, covering with mepilex border dressings, and compression stockings    Interval history: Pt is leaving next week to go back to [place] and will not be back until middle of September. He removed the compression wrap last Thursday. He dressed his wounds with a mepilex border dressing. Pt does not want a compression wrap placed. Denies fever, chills, erythema, warmth, or purulent drainage.        6/28/24  ABIs and TBIs:  Right DL-1.00  Right TBI-0.31  Left DL-0.88  Left TBI-0.24  Right leg: Segmental pressures are within the normal range and do not suggest peripheral arterial occlusive disease. PVR  waveforms suggest very minimal peripheral arterial occlusive disease.  Rt great toe: The PPG waveform as described above- TBI of 0.31 with a great toe pressure of 48 mmHg is noted.  Left leg: Segmental pressures suggest mild to minimal peripheral arterial occlusive disease. PVR waveforms suggest very minimal peripheral arterial occlusive disease.  Lt great toe: The PPG waveform as described above. TBI of 0.24 with a great toe pressure of 37 mmHg is noted.  Patient with known bilateral SFA stents    6/28/24 arterial ultrasound:  Right Leg:Duplex imaging reveals a patent right lower extremity arterial tree and multiple patent Rt. SFA stents. There is no evidence of a hemodynamically significant stenosis. The distal Peroneal artery could not be visualized.   Left Leg: Duplex imaging reveals a patent left lower extremity arterial tree and SFA stent. There is no evidence of a hemodynamically significant stenosis. A short segment chronic occlusion is noted in the Mid PTA with reconstituted flow feeding the distal segment, which is coursing retrograde.     2/1/23 venous ultrasound:  No evidence of deep venous thrombosis in either lower extremity.     9/30/22 ABIs:  Rt DL 0.92   Lt DL  0.94   Right Leg: Segmental pressures and PVR waveforms suggest minimal peripheral arterial occlusive disease.   Left Leg: Segmental pressures and PVR waveforms suggest minimal peripheral arterial occlusive disease.     1/12/22 venous ultrasound:  Right Leg: Duplex imaging of the right lower extremity veins demonstrates patent and compressible veins. There is no evidence of a venous thrombosis in the deep or superficial veins. Significant reflux noted in the right GSV including the Saphenofemoral Junction (SFJ). No significant reflux noted in the SSV. The accessory vein was too small for accurate assessment. The GSV branches extensively from the proximal calf to the ankle.   Left Leg: Duplex imaging of the left lower extremity veins  demonstrates patent and compressible veins. There is no evidence of a venous thrombosis in the deep or superficial veins. Significant reflux noted in the left GSV including the Saphenofemoral Junction (SFJ). No significant reflux noted in the SSV. The accessory lorena was too small for accurate assessment. The GSV branches extensively from the proximal calf to the ankle.     1/12/22 arterial ultrasound:  Right leg: Color flow duplex of the right lower extremity stents reveals a PSV increase of 59 cm/sec to 123 cm/sec in the mid segment of the proximal SFA stent. These findings are suggestive of a hemodynamically significant stenoses. The mid and distal SFA stents appear to be patent without evidence of stenosis.   Left leg: Color flow duplex of the left lower extremity mid SFA stent reveals a PSV increase of 124 cm/sec to 286 cm/sec in the mid segment. These findings are suggestive of a hemodynamically significant stenoses. There is a PSV elevation of 127 cm/sec in the proximal segment of the Popliteal A which decreases to 44 cm/sec in the mid segment with a change in waveform morphology.         Review of Systems   Constitutional:  Negative for activity change, chills, diaphoresis, fatigue and fever.   Respiratory:  Negative for apnea, chest tightness and shortness of breath.    Cardiovascular:  Negative for chest pain, palpitations and leg swelling.   Musculoskeletal:  Negative for gait problem and joint swelling.   Skin:  Positive for wound. Negative for color change, pallor and rash.   Neurological:  Negative for syncope, weakness and numbness.   Psychiatric/Behavioral:  Negative for agitation. The patient is not nervous/anxious.    All other systems reviewed and are negative.      Objective:      Physical Exam  Vitals reviewed.   Constitutional:       General: He is not in acute distress.     Appearance: Normal appearance.   Cardiovascular:      Pulses:           Dorsalis pedis pulses are 2+ on the right side.         Posterior tibial pulses are 1+ on the right side.   Pulmonary:      Effort: No respiratory distress.   Musculoskeletal:         General: No swelling.   Skin:     General: Skin is warm and dry.      Findings: Wound present. No erythema.          Neurological:      General: No focal deficit present.      Mental Status: He is alert and oriented to person, place, and time.   Psychiatric:         Mood and Affect: Mood normal.         Behavior: Behavior normal.         Thought Content: Thought content normal.         Judgment: Judgment normal.         Assessment:       1. Multiple open wounds of right lower extremity, subsequent encounter    2. History of squamous cell carcinoma    3. PAD (peripheral artery disease)    4. Stage 3b chronic kidney disease    5. History of skin cancer    6. Status post Mohs surgery    7. S/P skin biopsy           Wound Other (comment) Right anterior;lower Leg (Active)       Present on Original Admission:    Primary Wound Type: Other   Side: Right   Orientation: anterior;lower   Location: Leg   Wound Approximate Age at First Assessment (Weeks):    Wound Number:    Is this injury device related?:    Incision Type:    Closure Method:    Wound Description (Comments):    Type:    Additional Comments:    Ankle-Brachial Index:    Pulses:    Removal Indication and Assessment:    Wound Outcome:    Wound Image   08/26/24 1120   Dressing Appearance Dry;Intact;Clean;Moist drainage 08/26/24 1120   Drainage Amount Small 08/26/24 1120   Drainage Characteristics/Odor Serous 08/26/24 1120   Red (%), Wound Tissue Color 100 % 08/26/24 1120   Periwound Area Intact;[place] 08/26/24 1120   Wound Length (cm) 0.2 cm 08/26/24 1120   Wound Width (cm) 0.2 cm 08/26/24 1120   Wound Depth (cm) 0.1 cm 08/26/24 1120   Wound Volume (cm^3) 0.004 cm^3 08/26/24 1120   Wound Surface Area (cm^2) 0.04 cm^2 08/26/24 1120   Care Cleansed with:;Soap and water 08/26/24 1120   Dressing Applied;Silicone;Island/border 08/26/24 1120    Periwound Care Skin barrier film applied 08/26/24 1120            Wound Other (comment) Right lower;medial Leg (Active)       Present on Original Admission:    Primary Wound Type: Other   Side: Right   Orientation: lower;medial   Location: Leg   Wound Approximate Age at First Assessment (Weeks):    Wound Number:    Is this injury device related?:    Incision Type:    Closure Method:    Wound Description (Comments):    Type:    Additional Comments:    Ankle-Brachial Index:    Pulses:    Removal Indication and Assessment:    Wound Outcome:    Dressing Appearance Dry;Intact;Clean;Moist drainage 08/26/24 1120   Drainage Amount Small 08/26/24 1120   Drainage Characteristics/Odor Serous 08/26/24 1120   Red (%), Wound Tissue Color 100 % 08/26/24 1120   Periwound Area Intact;[place] 08/26/24 1120   Wound Length (cm) 0.3 cm 08/26/24 1120   Wound Width (cm) 0.2 cm 08/26/24 1120   Wound Depth (cm) 0.1 cm 08/26/24 1120   Wound Volume (cm^3) 0.006 cm^3 08/26/24 1120   Wound Surface Area (cm^2) 0.06 cm^2 08/26/24 1120   Care Cleansed with:;Soap and water 08/26/24 1120   Dressing Applied;Island/border;Silicone 08/26/24 1120   Periwound Care Skin barrier film applied 08/26/24 1120       Gus was seen in the clinic room and placed in the supine position on the treatment table.  The dressing was removed and the area was cleansed with Easi-clense sponges and dried thoroughly. No odor, erythema, and warmth noted.       Plan of Care: Calmoseptine and skin prep to periwound, mepilex border dressing, and covered with compression stockings.        Plan:           No orders of the defined types were placed in this encounter.      Right lower extremity was dressed as detailed above.  Patient was instructed to not get the dressings wet and to use cast covers for showering.  Should the dressing become wet, he is to remove it, and begin dressing changes.     Discussed nutrition and the role of protein in wound healing with the patient.  Instructed patient to optimize protein for wound healing.     Discussed lower extremity edema with patient. Instructed patient to elevate legs whenever sedentary, follow a low sodium diet, and to take lasix as prescribed.  Instructed patient to wear compression stockings daily. Place on first thing in the morning and remove before bed.    Discussed vascular status. Pt cleared for compression therapy by Dr. Farooq.    Instructed patient to keep follow ups with Dr. Saldana.    Discussed patient's vacation until September 2024. Discussed daily dressing changes. Patient will cleanse the wound with mild soap and water, pat dry, while wearing gloves place hydrogel to wound bed, cover with an island dressing, and secure with compression stockings.        Written and verbal instructions given to patient  RTC when patient returns from [place]      Nicky Brito PA-C

## 2024-09-04 NOTE — PROGRESS NOTES
Encounter Date:  2024    Patient ID: Gus Sabillon  Age:  84 y.o. :  1940    Chief Complaint:    Melanoma      History:    Mr. Sabillon is a 84 y.o. male who presents with newly diagnosed melanoma of the right posterior arm dx by Dr. Dela Cruz.  H/o prior SCC.  Otherwise in usual state of health.  No new or concerning skin lesions, lumps/bumps, neurologic changes, abnormal headaches, bone pain, weight loss, hemoptysis, hematochezia, melena, hematemesis.  .    Past Medical History:   Diagnosis Date    Carotid artery disease     CHF (congestive heart failure)     CKD (chronic kidney disease) stage 3, GFR 30-59 ml/min     Dyslipidemia     GERD (gastroesophageal reflux disease)     Hx of melanoma excision     Hypertension     Melanoma     PAD (peripheral artery disease)     SCC (squamous cell carcinoma) 2024    Right shin distal    Squamous cell carcinoma of skin 2024    Right shin proximal     Past Surgical History:   Procedure Laterality Date    ARTHROSCOPIC DEBRIDEMENT OF SHOULDER Right 2020    Procedure: EXTENSIVE DEBRIDEMENT, SHOULDER, ARTHROSCOPIC;  Surgeon: Staci Childress MD;  Location: [facility] OR;  Service: Orthopedics;  Laterality: Right;    ARTHROSCOPIC DEBRIDEMENT OF SHOULDER Left 2022    Procedure: EXTENSIVE DEBRIDEMENT, SHOULDER, ARTHROSCOPIC;  Surgeon: Staci Childress MD;  Location: [facility] OR;  Service: Orthopedics;  Laterality: Left;    ARTHROSCOPIC REPAIR OF ROTATOR CUFF OF SHOULDER Right 2020    Procedure: REPAIR, ROTATOR CUFF, ARTHROSCOPIC;  Surgeon: Staci Childress MD;  Location: [facility] OR;  Service: Orthopedics;  Laterality: Right;    ARTHROSCOPIC REPAIR OF ROTATOR CUFF OF SHOULDER Left 2022    Procedure: REPAIR, ROTATOR CUFF, ARTHROSCOPIC WITH CUFF MEND;  Surgeon: Staci Childress MD;  Location: [facility] OR;  Service: Orthopedics;  Laterality: Left;    ARTHROSCOPY OF SHOULDER WITH DECOMPRESSION OF SUBACROMIAL SPACE Right 2020    Procedure: ARTHROSCOPY, SHOULDER, WITH  SUBACROMIAL SPACE DECOMPRESSION;  Surgeon: Staci Childress MD;  Location: [facility] OR;  Service: Orthopedics;  Laterality: Right;    ARTHROSCOPY OF SHOULDER WITH DECOMPRESSION OF SUBACROMIAL SPACE Left 06/21/2022    Procedure: ARTHROSCOPY, SHOULDER, WITH SUBACROMIAL  DECOMPRESSION;  Surgeon: Staci Childress MD;  Location: [facility] OR;  Service: Orthopedics;  Laterality: Left;    ARTHROSCOPY OF SHOULDER WITH REMOVAL OF DISTAL CLAVICLE Left 06/21/2022    Procedure: ARTHROSCOPY, SHOULDER, WITH DISTAL CLAVICLE EXCISION;  Surgeon: Staci Childress MD;  Location: [facility] OR;  Service: Orthopedics;  Laterality: Left;    ARTHROSCOPY,SHOULDER,WITH BICEPS TENODESIS Left 06/21/2022    Procedure: ARTHROSCOPY,SHOULDER,WITH BICEPS TENODESIS;  Surgeon: Staci Childress MD;  Location: [facility] OR;  Service: Orthopedics;  Laterality: Left;    BLEPHAROPLASTY      CAROTID ENDARTERECTOMY Right 10/15/2021    Procedure: ENDARTERECTOMY-CAROTID;  Surgeon: Imer Farooq MD;  Location: [address];  Service: Peripheral Vascular;  Laterality: Right;  AWAKE CERVICAL BLOCK    CATARACT EXTRACTION, BILATERAL      COSMETIC SURGERY      EXCISION OF BURSA Left 06/21/2022    Procedure: BURSECTOMY;  Surgeon: Staci Childress MD;  Location: [facility] OR;  Service: Orthopedics;  Laterality: Left;    EYE SURGERY Bilateral     cataract removal    FIXATION OF TENDON Right 02/27/2020    Procedure: FIXATION, TENDON, Biceps Tenodesis;  Surgeon: Staci Childress MD;  Location: [facility] OR;  Service: Orthopedics;  Laterality: Right;  FIXATION, TENDON, Biceps Tenodesis    OPEN REDUCTION AND INTERNAL FIXATION (ORIF) OF FRACTURE OF PROXIMAL HUMERUS Right 02/27/2020    Procedure: ORIF, FRACTURE, GREATER TUBEROSITY;  Surgeon: Staci Childress MD;  Location: [facility] OR;  Service: Orthopedics;  Laterality: Right;    ORIF HUMERUS FRACTURE Left 06/21/2022    Procedure: ORIF, FRACTURE, HUMERUS, GREATER TUBEROSITY;  Surgeon: Staci Childress MD;  Location: [facility] OR;  Service: Orthopedics;  Laterality: Left;    SHOULDER  ARTHROSCOPY Left 06/21/2022    Procedure: ARTHROSCOPY, SHOULDER WITH LABRAL REPAIR;  Surgeon: Staci Childress MD;  Location: [facility];  Service: Orthopedics;  Laterality: Left;    TONSILLECTOMY      VASECTOMY       Current Outpatient Medications on File Prior to Visit   Medication Sig Dispense Refill    aspirin (ECOTRIN) 81 MG EC tablet Take 81 mg by mouth once daily.      fluorouraciL (EFUDEX) 5 % cream AAA on both arms BID x 2-3 weeks. Stop if blistered, oozing, or bleeding. Use daily sun protection. 40 g 1    furosemide (LASIX) 20 MG tablet Take 1 tablet (20 mg total) by mouth once daily. 90 tablet 3    ketoconazole (NIZORAL) 2 % cream Apply topically 2 (two) times daily. 30 g 2    magnesium oxide (MAG-OX) 400 mg (241.3 mg magnesium) tablet Take 1 tablet (400 mg total) by mouth 2 (two) times daily. 180 tablet 3    multivit-min-FA-lycopen-lutein 300-600-300 mcg Tab Take by mouth.      omega-3 fatty acids/fish oil (FISH OIL-OMEGA-3 FATTY ACIDS) 300-1,000 mg capsule Take by mouth once daily. (Patient not taking: Reported on 8/16/2024)       No current facility-administered medications on file prior to visit.     Review of patient's allergies indicates:   Allergen Reactions    Adhesive Hives, Itching and Blisters    Clindamycin Nausea And Vomiting    Penicillins Hives       Family History:  His family history includes Cancer in his sister; Colon cancer in his sister; Diabetes in his mother; Heart disease in his father; Hyperlipidemia in his mother; No Known Problems in his brother, brother, daughter, and son.     Social History:  He reports that he quit smoking about 47 years ago. His smoking use included cigarettes. He started smoking about 62 years ago. He has a 30 pack-year smoking history. He has never been exposed to tobacco smoke. He has never used smokeless tobacco. He reports current alcohol use of about 14.0 standard drinks of alcohol per week. He reports that he does not use drugs.     ROS:     Review of  "Systems  Pertinent positive/negatives detailed in HPI, all other systems negative.     Physical Exam:  BP (!) 131/55 (BP Location: Left arm, Patient Position: Sitting, BP Method: Medium (Automatic))   Pulse 99   Ht 6' 1" (1.854 m)   Wt 74.3 kg (163 lb 12.8 oz)   SpO2 98%   BMI 21.61 kg/m²     Physical Exam    Constitutional:  Non-toxic, no acute distress.    Eyes:  Sclerae anicteric, gaze symmetrical  Neck:  Trachea midline, thyroid non-enlarged without palpable nodules,  FROM  Resp:  Easy work of breathing, no wheezes  CV:  Regular pulse, no JVD  Breasts/Chest:  Symmetrical, no masses  Abd:  Soft, non-tender, no masses, no ascites  Lymphatics:  No cervical, supraclavicular, axillary, inguinal, epitrochlear, popliteal  lymphadenopathy  Musculoskeletal:  Ambulatory, normal gait, no muscle wasting  Skin:  Total body skin exam performed.  Index biopsy site without signs of infection.  No other suspicious raised, ulcerated, or pigmented lesions.    Neuro:  No gross deficits  Psych:  Awake, alert, oriented.  Answers and asks questions appropriately    Data:   Pathology Report:  reviewed below      ICD-10-CM ICD-9-CM    1. Malignant melanoma, unspecified site  C43.9 172.9 Ambulatory referral/consult to Surgical Oncology      Plan       7/9/2024 melanoma right posterior arm (initial bx 0.4 mm, non ulcerated, periph bx margin with MMIS, [id number]    Plan:  WLE under local     Risks and benefits of wide excision were reviewed including bleeding, infection, wound problems, pain/numbness, scar, cosmetic deformity, margin positivity, discovery of additional disease, need for additional procedures, and imponderables.  He was given the opportunity to ask questions, which were all addressed.  He voiced understanding and wishes to proceed.      I spent >30 minutes of total time on the encounter, which includes face to face time and non-face to face time preparing to see the patient (e.g., review of tests), obtaining and/or " reviewing separately obtained history, documenting clinical information in the electronic or other health record, independently interpreting results (not separately reported) and communicating results to the patient/family/caregiver, or care coordination (not separately reported).        Ajith Mora MD, FACS  Surgical Oncology  [facility] [place], [place]  Office: [phone]  Fax: [phone]     Gus Sabillon 1940

## 2024-09-10 ENCOUNTER — TELEPHONE (OUTPATIENT)
Dept: TRANSPLANT | Facility: CLINIC | Age: 84
End: 2024-09-10
Payer: MEDICARE

## 2024-09-10 NOTE — TELEPHONE ENCOUNTER
Received call from  Ramandeep who stated that he wanted to make an appt with Neurology for amyloid evaluation a, and follow up with Dr. Barney Devlin for his atrial fib.    Consult placed for both per Dr. Zhong.

## 2024-09-11 ENCOUNTER — TELEPHONE (OUTPATIENT)
Dept: NEUROLOGY | Facility: CLINIC | Age: 84
End: 2024-09-11
Payer: MEDICARE

## 2024-09-11 NOTE — TELEPHONE ENCOUNTER
----- Message from Elpidio Ramirez PT sent at 9/10/2024  8:06 PM CDT -----  Regarding: FW: neuro exam  Hey Chris,     Not sure how she got my name, but can you assist this patient?    Thank you, let me know if you have any questions.  ----- Message -----  From: Paula Guthrie  Sent: 9/10/2024   2:48 PM CDT  To: Elpidio Ramirez PT  Subject: neuro exam                                       Dr. Trevin Magallanes has requested neuro exam with Dr. House for amyloidosis. Can you please call pt and schedule. Referral order has been placed and patient is expecting your call.    Please call if you have any questions,    Thanks,    Paula Guthrie, RN, BSN, ELLYN  Amyloidosis Clinical Coordinator  [facility]  [email or web address]  [phone]

## 2024-09-17 ENCOUNTER — TELEPHONE (OUTPATIENT)
Dept: ELECTROPHYSIOLOGY | Facility: CLINIC | Age: 84
End: 2024-09-17
Payer: MEDICARE

## 2024-09-17 DIAGNOSIS — I49.3 FREQUENT PVCS: Primary | ICD-10-CM

## 2024-09-17 DIAGNOSIS — I43 SENILE CARDIAC AMYLOIDOSIS: Primary | ICD-10-CM

## 2024-09-17 DIAGNOSIS — E85.4 SENILE CARDIAC AMYLOIDOSIS: Primary | ICD-10-CM

## 2024-09-17 NOTE — TELEPHONE ENCOUNTER
----- Message from Paula Guthrie sent at 9/17/2024  3:36 PM CDT -----  Regarding: f/u for atrial fib requested by patient  Hi,    Mr. Gus Sabillon has requested f/u appt with Dr. Barney Devlin for atrial fib; history of wild type TTR amyloidosis.    Can you please call and schedule first available appt for this patient of Dr. Zhong please.    Paula Guthrie, RN, BSN, ELLYN  Amyloidosis Clinical Coordinator  [facility]  [email or web address]  [phone]

## 2024-09-24 ENCOUNTER — OFFICE VISIT (OUTPATIENT)
Dept: INTERNAL MEDICINE | Facility: CLINIC | Age: 84
End: 2024-09-24
Payer: MEDICARE

## 2024-09-24 VITALS
SYSTOLIC BLOOD PRESSURE: 120 MMHG | OXYGEN SATURATION: 98 % | HEART RATE: 110 BPM | DIASTOLIC BLOOD PRESSURE: 64 MMHG | WEIGHT: 164.44 LBS | BODY MASS INDEX: 21.7 KG/M2 | RESPIRATION RATE: 14 BRPM

## 2024-09-24 DIAGNOSIS — E78.2 MIXED HYPERLIPIDEMIA: ICD-10-CM

## 2024-09-24 DIAGNOSIS — N25.81 SECONDARY HYPERPARATHYROIDISM OF RENAL ORIGIN: ICD-10-CM

## 2024-09-24 DIAGNOSIS — N18.32 STAGE 3B CHRONIC KIDNEY DISEASE: ICD-10-CM

## 2024-09-24 DIAGNOSIS — I71.40 ABDOMINAL AORTIC ANEURYSM (AAA) WITHOUT RUPTURE, UNSPECIFIED PART: Primary | ICD-10-CM

## 2024-09-24 DIAGNOSIS — D63.1 ANEMIA DUE TO STAGE 3B CHRONIC KIDNEY DISEASE: ICD-10-CM

## 2024-09-24 DIAGNOSIS — N18.32 ANEMIA DUE TO STAGE 3B CHRONIC KIDNEY DISEASE: ICD-10-CM

## 2024-09-24 DIAGNOSIS — I50.20 HEART FAILURE WITH REDUCED EJECTION FRACTION: ICD-10-CM

## 2024-09-24 DIAGNOSIS — I73.9 PAD (PERIPHERAL ARTERY DISEASE): ICD-10-CM

## 2024-09-24 DIAGNOSIS — I65.23 CAROTID ATHEROSCLEROSIS, BILATERAL: ICD-10-CM

## 2024-09-24 DIAGNOSIS — E85.4 SENILE CARDIAC AMYLOIDOSIS: Primary | ICD-10-CM

## 2024-09-24 DIAGNOSIS — I43 SENILE CARDIAC AMYLOIDOSIS: Primary | ICD-10-CM

## 2024-09-24 DIAGNOSIS — I70.0 AORTIC ATHEROSCLEROSIS: ICD-10-CM

## 2024-09-24 DIAGNOSIS — R73.03 PREDIABETES: ICD-10-CM

## 2024-09-24 DIAGNOSIS — I10 PRIMARY HYPERTENSION: ICD-10-CM

## 2024-09-24 DIAGNOSIS — E85.4 CARDIAC AMYLOIDOSIS: ICD-10-CM

## 2024-09-24 DIAGNOSIS — K21.9 GASTROESOPHAGEAL REFLUX DISEASE, UNSPECIFIED WHETHER ESOPHAGITIS PRESENT: ICD-10-CM

## 2024-09-24 DIAGNOSIS — I43 CARDIAC AMYLOIDOSIS: ICD-10-CM

## 2024-09-24 PROCEDURE — 99214 OFFICE O/P EST MOD 30 MIN: CPT | Mod: S$GLB,,, | Performed by: INTERNAL MEDICINE

## 2024-09-24 PROCEDURE — G2211 COMPLEX E/M VISIT ADD ON: HCPCS | Mod: S$GLB,,, | Performed by: INTERNAL MEDICINE

## 2024-09-24 PROCEDURE — 1125F AMNT PAIN NOTED PAIN PRSNT: CPT | Mod: CPTII,S$GLB,, | Performed by: INTERNAL MEDICINE

## 2024-09-24 PROCEDURE — 99999 PR PBB SHADOW E&M-EST. PATIENT-LVL III: CPT | Mod: PBBFAC,,, | Performed by: INTERNAL MEDICINE

## 2024-09-24 PROCEDURE — 3074F SYST BP LT 130 MM HG: CPT | Mod: CPTII,S$GLB,, | Performed by: INTERNAL MEDICINE

## 2024-09-24 PROCEDURE — 3078F DIAST BP <80 MM HG: CPT | Mod: CPTII,S$GLB,, | Performed by: INTERNAL MEDICINE

## 2024-09-24 RX ORDER — EPLERENONE 25 MG/1
25 TABLET, FILM COATED ORAL
COMMUNITY
Start: 2024-08-08 | End: 2024-09-30

## 2024-09-24 NOTE — PROGRESS NOTES
Subjective     Patient ID: Gus Sabillon is a 84 y.o. male.    Chief Complaint: Follow-up    HPI  Pt with HFrEF, HTN, AAA, Carotid artery disease(s/p R CEA), HLD, PAD, CKD III, Prediabetes is here for f/u. Pt was recently dx of cardiac amyloidosis. No acute cardiopulmonary complaints today.    He has not yet done his EGD for recurrent GERD/dysphagia.   Review of Systems   Constitutional:  Negative for activity change, appetite change, chills, diaphoresis, fatigue, fever and unexpected weight change.   HENT:  Negative for postnasal drip, rhinorrhea, sinus pressure/congestion, sneezing, sore throat, trouble swallowing and voice change.    Respiratory:  Negative for cough, shortness of breath and wheezing.    Cardiovascular:  Negative for chest pain, palpitations and leg swelling.   Gastrointestinal:  Negative for abdominal pain, blood in stool, constipation, diarrhea, nausea and vomiting.   Genitourinary:  Negative for dysuria.   Musculoskeletal:  Negative for arthralgias and myalgias.   Integumentary:  Negative for rash and wound.   Allergic/Immunologic: Negative for environmental allergies and food allergies.   Hematological:  Negative for adenopathy. Does not bruise/bleed easily.          Objective     Physical Exam  Constitutional:       General: He is not in acute distress.     Appearance: Normal appearance. He is well-developed. He is not diaphoretic.   HENT:      Head: Normocephalic and atraumatic.      Right Ear: External ear normal.      Left Ear: External ear normal.      Nose: Nose normal.      Mouth/Throat:      Pharynx: No oropharyngeal exudate.   Eyes:      General: No scleral icterus.        Right eye: No discharge.         Left eye: No discharge.      Conjunctiva/sclera: Conjunctivae normal.      Pupils: Pupils are equal, round, and reactive to light.   Neck:      Vascular: No JVD.   Cardiovascular:      Rate and Rhythm: Normal rate and regular rhythm.      Pulses: Normal pulses.      Heart  sounds: Normal heart sounds. No murmur heard.  Pulmonary:      Effort: Pulmonary effort is normal. No respiratory distress.      Breath sounds: Normal breath sounds. No wheezing or rales.   Abdominal:      General: Bowel sounds are normal.      Tenderness: There is no abdominal tenderness. There is no guarding or rebound.   Musculoskeletal:      Cervical back: Normal range of motion and neck supple.      Right lower leg: No edema.      Left lower leg: No edema.   Lymphadenopathy:      Cervical: No cervical adenopathy.   Skin:     General: Skin is warm and dry.      Capillary Refill: Capillary refill takes less than 2 seconds.      Coloration: Skin is not pale.      Findings: No rash.   Neurological:      Mental Status: He is alert and oriented to person, place, and time. Mental status is at baseline.      Cranial Nerves: No cranial nerve deficit.   Psychiatric:         Mood and Affect: Mood normal.         Behavior: Behavior normal.            Assessment and Plan     1. Abdominal aortic aneurysm (AAA) without rupture, unspecified part    2. Primary hypertension    3. PAD (peripheral artery disease)    4. Carotid atherosclerosis, bilateral    5. Mixed hyperlipidemia    6. Aortic atherosclerosis    7. Heart failure with reduced ejection fraction  Overview:  Agree with transition from fritz to eplerenone due to breast symptoms  Unable to tolerate SGLT2  On furo      8. Stage 3b chronic kidney disease  Overview:  stable  stage G3bA1 likely secondary to long standing hypertension, age related nephron loss and heart failure  baseline creatinine 1.5-1.9  baseline UPCR na  historic complications Volume overload  BP uncontrolled on BB and Aldosterone antagonist  glucose controlled on SGLT2 inhibitor  UPCR controlled on SGLT2 inhibitor  Native kidney US No results found for this or any previous visit.   na  current diuretic therapy: furo 20  current bicarbonate therapy: none   current CKD-MBD therapy: none  current anemia  therapy: none    Recently referred here for aid in management of ckd.  8/2024: no longer on SGLT2 due to feeling ill. Diagnosed with cardiac amyloidosis. SPEP, IF benign, no other known organ involvement. UPCR 0 and stable kidney function. Would not perform biopsy.      9. Anemia due to stage 3b chronic kidney disease    10. Secondary hyperparathyroidism of renal origin    11. Gastroesophageal reflux disease, unspecified whether esophagitis present    12. Prediabetes        HFrEF with cardiac - stable, followed by Cardio    Cardiac amyloidosis- followed by cardio     HTN- stable off Atenolol     AAA- mild per ultrasound 2021     Carotid artery disease- s/p R CEA 2021, repeat US pending     HLD- repeat Lipids, pt stopped the Rosuvastatin      PAD- stable on asa/statin     CKD III- stable     Prediabetes- stable     GERD/Dysphagia- will order EGD     F/u in 3 months for annual exam

## 2024-09-25 ENCOUNTER — TELEPHONE (OUTPATIENT)
Dept: WOUND CARE | Facility: CLINIC | Age: 84
End: 2024-09-25
Payer: MEDICARE

## 2024-09-25 ENCOUNTER — OFFICE VISIT (OUTPATIENT)
Dept: DERMATOLOGY | Facility: CLINIC | Age: 84
End: 2024-09-25
Payer: MEDICARE

## 2024-09-25 ENCOUNTER — HOSPITAL ENCOUNTER (OUTPATIENT)
Dept: RADIOLOGY | Facility: HOSPITAL | Age: 84
Discharge: HOME | End: 2024-09-25
Attending: NURSE PRACTITIONER
Payer: MEDICARE

## 2024-09-25 DIAGNOSIS — D48.5 NEOPLASM OF UNCERTAIN BEHAVIOR OF SKIN: Primary | ICD-10-CM

## 2024-09-25 DIAGNOSIS — L57.0 MULTIPLE ACTINIC KERATOSES: ICD-10-CM

## 2024-09-25 DIAGNOSIS — Z12.83 SCREENING EXAM FOR SKIN CANCER: ICD-10-CM

## 2024-09-25 DIAGNOSIS — D22.9 MULTIPLE BENIGN NEVI: ICD-10-CM

## 2024-09-25 DIAGNOSIS — D18.01 CHERRY ANGIOMA: ICD-10-CM

## 2024-09-25 DIAGNOSIS — R59.0 PREAURICULAR LYMPHADENOPATHY: ICD-10-CM

## 2024-09-25 DIAGNOSIS — L82.1 SEBORRHEIC KERATOSES: ICD-10-CM

## 2024-09-25 DIAGNOSIS — Z85.828 HISTORY OF SKIN CANCER: ICD-10-CM

## 2024-09-25 DIAGNOSIS — L81.4 LENTIGO: ICD-10-CM

## 2024-09-25 PROCEDURE — 3288F FALL RISK ASSESSMENT DOCD: CPT | Mod: CPTII,S$GLB,, | Performed by: STUDENT IN AN ORGANIZED HEALTH CARE EDUCATION/TRAINING PROGRAM

## 2024-09-25 PROCEDURE — 1101F PT FALLS ASSESS-DOCD LE1/YR: CPT | Mod: CPTII,S$GLB,, | Performed by: STUDENT IN AN ORGANIZED HEALTH CARE EDUCATION/TRAINING PROGRAM

## 2024-09-25 PROCEDURE — 76536 US EXAM OF HEAD AND NECK: CPT | Mod: 26,,, | Performed by: INTERNAL MEDICINE

## 2024-09-25 PROCEDURE — 1159F MED LIST DOCD IN RCRD: CPT | Mod: CPTII,S$GLB,, | Performed by: STUDENT IN AN ORGANIZED HEALTH CARE EDUCATION/TRAINING PROGRAM

## 2024-09-25 PROCEDURE — 1125F AMNT PAIN NOTED PAIN PRSNT: CPT | Mod: CPTII,S$GLB,, | Performed by: STUDENT IN AN ORGANIZED HEALTH CARE EDUCATION/TRAINING PROGRAM

## 2024-09-25 PROCEDURE — 76536 US EXAM OF HEAD AND NECK: CPT | Mod: TC

## 2024-09-25 PROCEDURE — 99214 OFFICE O/P EST MOD 30 MIN: CPT | Mod: 25,S$GLB,, | Performed by: STUDENT IN AN ORGANIZED HEALTH CARE EDUCATION/TRAINING PROGRAM

## 2024-09-25 PROCEDURE — 11102 TANGNTL BX SKIN SINGLE LES: CPT | Mod: S$GLB,,, | Performed by: STUDENT IN AN ORGANIZED HEALTH CARE EDUCATION/TRAINING PROGRAM

## 2024-09-25 PROCEDURE — 99999 PR PBB SHADOW E&M-EST. PATIENT-LVL III: CPT | Mod: PBBFAC,,, | Performed by: STUDENT IN AN ORGANIZED HEALTH CARE EDUCATION/TRAINING PROGRAM

## 2024-09-25 PROCEDURE — 1160F RVW MEDS BY RX/DR IN RCRD: CPT | Mod: CPTII,S$GLB,, | Performed by: STUDENT IN AN ORGANIZED HEALTH CARE EDUCATION/TRAINING PROGRAM

## 2024-09-25 RX ORDER — FLUOROURACIL 50 MG/G
CREAM TOPICAL
Qty: 40 G | Refills: 1 | Status: SHIPPED | OUTPATIENT
Start: 2024-09-25

## 2024-09-25 NOTE — TELEPHONE ENCOUNTER
Called no answer left detailed message advising patient we have no availability on the schedule to be seen at 330PM.  Our first available is Monday 9/30/24.  Patient asked to return call to [phone] to schedule appointment for Monday

## 2024-09-25 NOTE — PROGRESS NOTES
Subjective:      Patient ID:  Gus Sabillon is a 84 y.o. male who presents for   Chief Complaint   Patient presents with    Skin Check     TBSE     History of Present Illness: The patient presents for TBSE     The patient was last seen on: 6/26/24 for TBSE. Right shin proximal and Right shin distal both c/w SCC.     Pt has h/o of MM and NMSC     This is a high risk patient here to check for the development of new lesions.    Patient with new area of concern:   Location: behind R ear and both legs   Previous treatments: efudex (both legs)          Problem List Items Addressed This Visit          Oncology    History of skin cancer    Overview     melanoma on back in 2017. Melanoma only required excision for treatment   2020--right forearm KA  2020--Right hand SCC  2022--upper chest and right upper back, severely dysplastic nevus  5/2024  --right shin proxmial--SCC--s/p mohs  --right shin distal--SCC--s/p mohs  6/2024: right posterior arm--melanoma 0.4 mm, pending excision    Biopsy proven HAK on left shin s/p efudex + dovonex x 2 weeks          Other Visit Diagnoses       Neoplasm of uncertain behavior of skin    -  Primary    Relevant Orders    Specimen to Pathology, Dermatology    Multiple benign nevi        Lentigo        [place] angioma        Seborrheic keratoses        Screening exam for skin cancer        Multiple actinic keratoses        Relevant Medications    fluorouraciL (EFUDEX) 5 % cream            Review of Systems   Skin:  Positive for wears hat. Negative for daily sunscreen use, activity-related sunscreen use and recent sunburn.   Hematologic/Lymphatic: Does not bruise/bleed easily.       Objective:   Physical Exam   Constitutional: He appears well-developed and well-nourished. No distress.   Neurological: He is alert and oriented to person, place, and time. He is not disoriented.   Psychiatric: He has a normal mood and affect.   Skin:   Areas Examined (abnormalities noted in diagram):   Scalp /  Hair Palpated and Inspected  Head / Face Inspection Performed  Neck Inspection Performed  Chest / Axilla Inspection Performed  Abdomen Inspection Performed  Genitals / Buttocks / Groin Inspection Performed  Back Inspection Performed  RUE Inspected  LUE Inspection Performed  RLE Inspected  LLE Inspection Performed  Nails and Digits Inspection Performed                         Diagram Legend     Erythematous scaling macule/papule c/w actinic keratosis       Vascular papule c/w angioma      Pigmented verrucoid papule/plaque c/w seborrheic keratosis      Yellow umbilicated papule c/w sebaceous hyperplasia      Irregularly shaped tan macule c/w lentigo     1-2 mm smooth white papules consistent with Milia      Movable subcutaneous cyst with punctum c/w epidermal inclusion cyst      Subcutaneous movable cyst c/w pilar cyst      Firm pink to brown papule c/w dermatofibroma      Pedunculated fleshy papule(s) c/w skin tag(s)      Evenly pigmented macule c/w junctional nevus     Mildly variegated pigmented, slightly irregular-bordered macule c/w mildly atypical nevus      Flesh colored to evenly pigmented papule c/w intradermal nevus       Pink pearly papule/plaque c/w basal cell carcinoma      Erythematous hyperkeratotic cursted plaque c/w SCC      Surgical scar with no sign of skin cancer recurrence      Open and closed comedones      Inflammatory papules and pustules      Verrucoid papule consistent consistent with wart     Erythematous eczematous patches and plaques     Dystrophic onycholytic nail with subungual debris c/w onychomycosis     Umbilicated papule    Erythematous-base heme-crusted tan verrucoid plaque consistent with inflamed seborrheic keratosis     Erythematous Silvery Scaling Plaque c/w Psoriasis     See annotation            Assessment / Plan:      Pathology Orders:       Normal Orders This Visit    Specimen to Pathology, Dermatology     Comments:    Number of  Specimens:->1  ------------------------->-------------------------  Spec 1 Procedure:->Biopsy  Spec 1 Clinical Impression:->isk vs NMSC  Spec 1 Source:->right postauricular  Release to patient->Immediate    Questions:    Procedure Type: Dermatology and skin neoplasms    Number of Specimens: 1    ------------------------: -------------------------    Spec 1 Procedure: Biopsy    Spec 1 Clinical Impression: isk vs NMSC    Spec 1 Source: right postauricular    Release to patient: Immediate    Release to patient:               Neoplasm of uncertain behavior of skin  -     Specimen to Pathology, Dermatology     Shave biopsy procedure note:    Shave biopsy performed after verbal consent including risk of infection, scar, recurrence, need for additional treatment of site. Area prepped with alcohol, anesthetized with approximately 1.0cc of 1% lidocaine with epinephrine. Lesional tissue shaved with razor blade. Hemostasis achieved with application of aluminum chloride followed by hyfrecation. No complications. Dressing applied. Wound care explained.       Patient has had several other atypical pigmented lesions noted on prior exams. Recommended biopsy to r/o melanoma. Patient voiced understanding but declined biopsy due to his age. States he only wants things biopsied that are definitively melanoma. I explained that the point of the biopsy is to confirm diagnosis and we cannot always be sure without a biopsy    He only agreed to biopsy of lesion behind his ear because it was bothering him     Actinic keratosis  -     fluorouraciL (EFUDEX) 5 % cream bid x 2-3 weeks     Large biopsy proven ak on left leg to be treated with efudex as above  Previously treated with efudex+dovonex but patient unsure how long he used it to the specific lesions because he applied it to his entire leg and ran out of cream. Counseled to only use it to the area    Multiple benign nevi  Seborrheic keratoses  Cherry angioma  Reassurance given to patient.  No treatment is necessary.   Treatment of benign, asymptomatic lesions may be considered cosmetic.     History of skin cancer  Screening exam for skin cancer  History of nonmelanoma skin cancer  History of malignant melanoma  Area of previous melanoma and NMSC examined. Site well healed with no signs of recurrence.     Total body skin examination performed today including at least 12 points as noted in physical examination. Suspicious lesions noted.     Recommend daily sun protection/avoidance, use of at least SPF 30, broad spectrum sunscreen (OTC drug), skin self examinations, and routine physician surveillance to optimize early detection     DSAP  - will monitor  - amlactin              Follow up in about 3 months (around 12/25/2024) for TBSE.

## 2024-09-25 NOTE — PATIENT INSTRUCTIONS
Shave Biopsy Wound Care    Your doctor has performed a shave biopsy today.  A band aid and vaseline ointment has been placed over the site.  This should remain in place for NO LONGER THAN 48 hours.  It is fine to remove the bandaid after 24 hours, if the area is no longer bleeding. It is recommended that you keep the area dry (do not wet)) for the first 24 hours.  After 24 hours, wash the area with warm soap and water and apply Vaseline jelly.  Many patients prefer to use Neosporin or Bacitracin ointment.  This is acceptable; however, know that you can develop an allergy to this medication even if you have used it safely for years.  It is important to keep the area moist.  Letting it dry out and get air slows healing time, and will worsen the scar.        If you notice increasing redness, tenderness, pain, or yellow drainage at the biopsy site, please notify your doctor.  These are signs of an infection.    If your biopsy site is bleeding, apply firm pressure for 15 minutes straight.  Repeat for another 15 minutes, if it is still bleeding.   If the surgical site continues to bleed, then please contact your doctor.      For [facility] users:   You will receive your biopsy results in [facility] as soon as they are available. Please be assured that your physician/provider will review your results and will then determine what further treatment, evaluation, or planning is required. You should be contacted by your physician's/provider's office within 5 business days of receiving your results; If not, please reach out to directly. This is one more way [facility] is putting you first.     [facility] [place] [address]/ [phone] FAX/ [email or web address]

## 2024-09-25 NOTE — TELEPHONE ENCOUNTER
----- Message from Ramona Randhawa sent at 9/25/2024 10:17 AM CDT -----  Regarding: appt access  Contact: pt [phone]  Pt calling to schedule appt this afternoon after 3:30 for wound care. Pls call

## 2024-09-26 ENCOUNTER — LAB VISIT (OUTPATIENT)
Dept: LAB | Facility: HOSPITAL | Age: 84
End: 2024-09-26
Attending: INTERNAL MEDICINE
Payer: MEDICARE

## 2024-09-26 ENCOUNTER — OFFICE VISIT (OUTPATIENT)
Dept: WOUND CARE | Facility: CLINIC | Age: 84
End: 2024-09-26
Payer: MEDICARE

## 2024-09-26 DIAGNOSIS — I43 CARDIAC AMYLOIDOSIS: ICD-10-CM

## 2024-09-26 DIAGNOSIS — Z87.828 HEALED WOUND: ICD-10-CM

## 2024-09-26 DIAGNOSIS — E85.4 CARDIAC AMYLOIDOSIS: ICD-10-CM

## 2024-09-26 DIAGNOSIS — I73.9 PAD (PERIPHERAL ARTERY DISEASE): Primary | ICD-10-CM

## 2024-09-26 DIAGNOSIS — Z85.89 HISTORY OF SQUAMOUS CELL CARCINOMA: ICD-10-CM

## 2024-09-26 DIAGNOSIS — I50.30 ACC/AHA STAGE C HEART FAILURE WITH PRESERVED EJECTION FRACTION: ICD-10-CM

## 2024-09-26 DIAGNOSIS — Z98.890 STATUS POST MOHS SURGERY: ICD-10-CM

## 2024-09-26 LAB
ANION GAP SERPL CALC-SCNC: 11 MMOL/L (ref 8–16)
BNP SERPL-MCNC: 2474 PG/ML (ref 0–99)
BNP SERPL-MCNC: 2474 PG/ML (ref 0–99)
BUN SERPL-MCNC: 50 MG/DL (ref 8–23)
CALCIUM SERPL-MCNC: 9.4 MG/DL (ref 8.7–10.5)
CHLORIDE SERPL-SCNC: 103 MMOL/L (ref 95–110)
CO2 SERPL-SCNC: 25 MMOL/L (ref 23–29)
CREAT SERPL-MCNC: 1.9 MG/DL (ref 0.5–1.4)
EST. GFR  (NO RACE VARIABLE): 34.6 ML/MIN/1.73 M^2
GLUCOSE SERPL-MCNC: 105 MG/DL (ref 70–110)
POTASSIUM SERPL-SCNC: 4.2 MMOL/L (ref 3.5–5.1)
SODIUM SERPL-SCNC: 139 MMOL/L (ref 136–145)

## 2024-09-26 PROCEDURE — 80048 BASIC METABOLIC PNL TOTAL CA: CPT | Performed by: INTERNAL MEDICINE

## 2024-09-26 PROCEDURE — 83880 ASSAY OF NATRIURETIC PEPTIDE: CPT | Performed by: INTERNAL MEDICINE

## 2024-09-26 PROCEDURE — 36415 COLL VENOUS BLD VENIPUNCTURE: CPT | Performed by: INTERNAL MEDICINE

## 2024-09-26 PROCEDURE — 99214 OFFICE O/P EST MOD 30 MIN: CPT | Mod: S$GLB,,,

## 2024-09-26 PROCEDURE — 99999 PR PBB SHADOW E&M-EST. PATIENT-LVL I: CPT | Mod: PBBFAC,,,

## 2024-09-26 NOTE — PROGRESS NOTES
Subjective:       Patient ID: Gus Sabillon is a 84 y.o. male.    Chief Complaint: Wound Check      Patient presents for a re-evaluation of two right lower extremity wounds.  Pt followed with Dr. 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 Pt s/p Mohs' Micrographic surgery with Dr. Saldana on 6/25/24. She dressed his wound, instructed to wear compression stockings daily, and referred to wound care. Pt follows with vascular surgery for PAD. He was last seen by Dr. Farooq in 2022 who determined no intervention for his claudication. Pt did not have open wounds at this time. Pt s/p bilateral PTAS in [facility] around the late 1990s. He reports that his claudication symptoms are better and he can walk further than before. He reports being able to walk about 1 mile before his heart failure symptoms prevent him from walking. Pt is a former smoker. He smoked 3-4 ppd for 15 years. He quit 42 years ago. He reports that his legs/ ankles swelling from heart failure. Admits compliance with leg elevation, taking lasix, and following a low sodium diet. Pt cleared for compression therapy by vascular surgery. Pt went out of town to [place] for horse racing. He was last seen in office on 7/17/24. Pt admitted compliance with dressing changes. He was applying hydrogel to wound bed, covering with mepilex border dressings, and compression stockings    Interval history: Pt has been in [place]. He has been dressing his wounds with a mepilex border dressing. Pt does not want a compression wrap placed. Denies fever, chills, erythema, warmth, or purulent drainage.        6/28/24  ABIs and TBIs:  Right DL-1.00  Right TBI-0.31  Left DL-0.88  Left TBI-0.24  Right leg: Segmental pressures are within the normal range and do not suggest peripheral arterial occlusive disease. PVR waveforms suggest very minimal peripheral arterial occlusive disease.  Rt great toe: The PPG waveform as described  above- TBI of 0.31 with a great toe pressure of 48 mmHg is noted.  Left leg: Segmental pressures suggest mild to minimal peripheral arterial occlusive disease. PVR waveforms suggest very minimal peripheral arterial occlusive disease.  Lt great toe: The PPG waveform as described above. TBI of 0.24 with a great toe pressure of 37 mmHg is noted.  Patient with known bilateral SFA stents    6/28/24 arterial ultrasound:  Right Leg:Duplex imaging reveals a patent right lower extremity arterial tree and multiple patent Rt. SFA stents. There is no evidence of a hemodynamically significant stenosis. The distal Peroneal artery could not be visualized.   Left Leg: Duplex imaging reveals a patent left lower extremity arterial tree and SFA stent. There is no evidence of a hemodynamically significant stenosis. A short segment chronic occlusion is noted in the Mid PTA with reconstituted flow feeding the distal segment, which is coursing retrograde.     2/1/23 venous ultrasound:  No evidence of deep venous thrombosis in either lower extremity.     9/30/22 ABIs:  Rt DL 0.92   Lt DL  0.94   Right Leg: Segmental pressures and PVR waveforms suggest minimal peripheral arterial occlusive disease.   Left Leg: Segmental pressures and PVR waveforms suggest minimal peripheral arterial occlusive disease.     1/12/22 venous ultrasound:  Right Leg: Duplex imaging of the right lower extremity veins demonstrates patent and compressible veins. There is no evidence of a venous thrombosis in the deep or superficial veins. Significant reflux noted in the right GSV including the Saphenofemoral Junction (SFJ). No significant reflux noted in the SSV. The accessory vein was too small for accurate assessment. The GSV branches extensively from the proximal calf to the ankle.   Left Leg: Duplex imaging of the left lower extremity veins demonstrates patent and compressible veins. There is no evidence of a venous thrombosis in the deep or superficial veins.  Significant reflux noted in the left GSV including the Saphenofemoral Junction (SFJ). No significant reflux noted in the SSV. The accessory lorena was too small for accurate assessment. The GSV branches extensively from the proximal calf to the ankle.     1/12/22 arterial ultrasound:  Right leg: Color flow duplex of the right lower extremity stents reveals a PSV increase of 59 cm/sec to 123 cm/sec in the mid segment of the proximal SFA stent. These findings are suggestive of a hemodynamically significant stenoses. The mid and distal SFA stents appear to be patent without evidence of stenosis.   Left leg: Color flow duplex of the left lower extremity mid SFA stent reveals a PSV increase of 124 cm/sec to 286 cm/sec in the mid segment. These findings are suggestive of a hemodynamically significant stenoses. There is a PSV elevation of 127 cm/sec in the proximal segment of the Popliteal A which decreases to 44 cm/sec in the mid segment with a change in waveform morphology.         Review of Systems   Constitutional:  Negative for activity change, chills, diaphoresis, fatigue and fever.   Respiratory:  Negative for apnea, chest tightness and shortness of breath.    Cardiovascular:  Negative for chest pain, palpitations and leg swelling.   Musculoskeletal:  Negative for gait problem and joint swelling.   Skin:  Positive for wound. Negative for color change, pallor and rash.   Neurological:  Negative for syncope, weakness and numbness.   Psychiatric/Behavioral:  Negative for agitation. The patient is not nervous/anxious.    All other systems reviewed and are negative.      Objective:      Physical Exam  Vitals reviewed.   Constitutional:       General: He is not in acute distress.     Appearance: Normal appearance.   Cardiovascular:      Pulses:           Dorsalis pedis pulses are 2+ on the right side.        Posterior tibial pulses are 1+ on the right side.   Pulmonary:      Effort: No respiratory distress.    Musculoskeletal:         General: No swelling.   Skin:     General: Skin is warm and dry.      Findings: No erythema or wound.          Neurological:      General: No focal deficit present.      Mental Status: He is alert and oriented to person, place, and time.   Psychiatric:         Mood and Affect: Mood normal.         Behavior: Behavior normal.         Thought Content: Thought content normal.         Judgment: Judgment normal.         Assessment:       1. PAD (peripheral artery disease)    2. History of squamous cell carcinoma    3. Status post Mohs surgery    4. Healed wound           Wound Other (comment) Right anterior;lower Leg (Active)       Present on Original Admission:    Primary Wound Type: Other   Side: Right   Orientation: anterior;lower   Location: Leg   Wound Approximate Age at First Assessment (Weeks):    Wound Number:    Is this injury device related?:    Incision Type:    Closure Method:    Wound Description (Comments):    Type:    Additional Comments:    Ankle-Brachial Index:    Pulses:    Removal Indication and Assessment:    Wound Outcome:    Wound Image   09/26/24 1144   Dressing Appearance Open to air 09/26/24 1144   Drainage Amount None 09/26/24 1144   Care Cleansed with:;Soap and water 09/26/24 1144            Wound Other (comment) Right lower;medial Leg (Active)       Present on Original Admission:    Primary Wound Type: Other   Side: Right   Orientation: lower;medial   Location: Leg   Wound Approximate Age at First Assessment (Weeks):    Wound Number:    Is this injury device related?:    Incision Type:    Closure Method:    Wound Description (Comments):    Type:    Additional Comments:    Ankle-Brachial Index:    Pulses:    Removal Indication and Assessment:    Wound Outcome:    Dressing Appearance Open to air 09/26/24 1144   Drainage Amount None 09/26/24 1144   Care Cleansed with:;Soap and water 09/26/24 1144       Gus was seen in the clinic room and placed in the supine position  on the treatment table.  The dressing was removed and the area was cleansed with Easi-clense sponges and dried thoroughly. No odor, erythema, and warmth noted.  No open wounds noted.            Plan:           No orders of the defined types were placed in this encounter.  No open wounds noted.  Precautions given to prevent wounds from re-opening. Discussed how area is extremely fragile. Protect area from friction, wash area gently, and pat dry. If the wound should re-open, instructed patient to contact the office.    Instructed patient to keep follow ups with Dr. Saldana.        Written and verbal instructions given to patient  RTC PRN      Nicky Brito PA-C

## 2024-09-27 ENCOUNTER — TELEPHONE (OUTPATIENT)
Dept: TRANSPLANT | Facility: CLINIC | Age: 84
End: 2024-09-27
Payer: MEDICARE

## 2024-09-27 ENCOUNTER — TELEPHONE (OUTPATIENT)
Dept: INTERNAL MEDICINE | Facility: CLINIC | Age: 84
End: 2024-09-27
Payer: MEDICARE

## 2024-09-27 DIAGNOSIS — K21.9 GASTROESOPHAGEAL REFLUX DISEASE, UNSPECIFIED WHETHER ESOPHAGITIS PRESENT: Primary | ICD-10-CM

## 2024-09-27 NOTE — TELEPHONE ENCOUNTER
----- Message from Rock Franks sent at 9/27/2024 10:43 AM CDT -----  Contact: p [phone]  He wants to know the status of the order for an endoscopy/EGD    Thank you

## 2024-09-27 NOTE — TELEPHONE ENCOUNTER
9/27/24: Mr. Zeeshan Sabillon had BMP & BNP completed on 9/26/24 as requested by Dr. Goetz to follow up on creatine and K+.     9/26/24:  K+ 4.2, BUN 50, cr. 1.9 which is higher than last time.  8/16/24: K+ 4.0, BUN 38, Cr. 1.6  5/9/24: K+ 5.0, BUN 48, Cr. 1.8    Patient is currently on Lasix 20 mg daily but instructed to take (2) 20 mg daily if noticed increase swelling or shortness of breath. He also was changed from Spirolactone 25 mg and it was changed to eplerenon 25 mg on 7/29/25. Spirolactone still on medicine list but patietn confirmed that he no longer takes it so it was discontinued from list.     Weight is 160 lbs. States he feels much better but stated that he has been taking (2) lasix 20 mg daily for the last 3 weeks. States this dose eliminated the swelling in his ankles completed and denies any shortness of breathe. Denies lightheadness and BP has been 120/60's.    Stated that he was called earlier today by MA to change his appt with Dr. Zhong on a later date in October but really wanted to see him earlier. Informed patient that  appt was available  with Dr. Zhong is  on Monday, November 30th and he quickly agreed to this appt.     Mr. 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 (Back pain, neuropathy and chronic constipation). Consult Appts were scheduled for both as requested.     Paula Guthrie RN  Amyloid Nurse Navigator   
No

## 2024-09-30 ENCOUNTER — OFFICE VISIT (OUTPATIENT)
Dept: TRANSPLANT | Facility: CLINIC | Age: 84
End: 2024-09-30
Attending: INTERNAL MEDICINE
Payer: MEDICARE

## 2024-09-30 ENCOUNTER — PROCEDURE VISIT (OUTPATIENT)
Dept: SURGERY | Facility: CLINIC | Age: 84
End: 2024-09-30
Payer: MEDICARE

## 2024-09-30 VITALS — DIASTOLIC BLOOD PRESSURE: 65 MMHG | SYSTOLIC BLOOD PRESSURE: 122 MMHG | HEART RATE: 84 BPM

## 2024-09-30 VITALS
BODY MASS INDEX: 21.83 KG/M2 | HEIGHT: 73 IN | WEIGHT: 164.69 LBS | HEART RATE: 94 BPM | SYSTOLIC BLOOD PRESSURE: 140 MMHG | DIASTOLIC BLOOD PRESSURE: 58 MMHG

## 2024-09-30 DIAGNOSIS — E85.4 CARDIAC AMYLOIDOSIS: ICD-10-CM

## 2024-09-30 DIAGNOSIS — I43 CARDIAC AMYLOIDOSIS: ICD-10-CM

## 2024-09-30 DIAGNOSIS — I50.33 ACUTE ON CHRONIC DIASTOLIC CONGESTIVE HEART FAILURE: Primary | ICD-10-CM

## 2024-09-30 DIAGNOSIS — C43.61 MALIGNANT MELANOMA OF RIGHT UPPER ARM: Primary | ICD-10-CM

## 2024-09-30 DIAGNOSIS — I71.43 INFRARENAL ABDOMINAL AORTIC ANEURYSM (AAA) WITHOUT RUPTURE: ICD-10-CM

## 2024-09-30 PROCEDURE — 3077F SYST BP >= 140 MM HG: CPT | Mod: CPTII,S$GLB,, | Performed by: INTERNAL MEDICINE

## 2024-09-30 PROCEDURE — 1159F MED LIST DOCD IN RCRD: CPT | Mod: CPTII,S$GLB,, | Performed by: INTERNAL MEDICINE

## 2024-09-30 PROCEDURE — 88342 IMHCHEM/IMCYTCHM 1ST ANTB: CPT | Performed by: PATHOLOGY

## 2024-09-30 PROCEDURE — 1101F PT FALLS ASSESS-DOCD LE1/YR: CPT | Mod: CPTII,S$GLB,, | Performed by: INTERNAL MEDICINE

## 2024-09-30 PROCEDURE — 88305 TISSUE EXAM BY PATHOLOGIST: CPT | Performed by: PATHOLOGY

## 2024-09-30 PROCEDURE — 88305 TISSUE EXAM BY PATHOLOGIST: CPT | Mod: 26,,, | Performed by: PATHOLOGY

## 2024-09-30 PROCEDURE — 99999 PR PBB SHADOW E&M-EST. PATIENT-LVL III: CPT | Mod: PBBFAC,,, | Performed by: INTERNAL MEDICINE

## 2024-09-30 PROCEDURE — 88342 IMHCHEM/IMCYTCHM 1ST ANTB: CPT | Mod: 26,,, | Performed by: PATHOLOGY

## 2024-09-30 PROCEDURE — 3288F FALL RISK ASSESSMENT DOCD: CPT | Mod: CPTII,S$GLB,, | Performed by: INTERNAL MEDICINE

## 2024-09-30 PROCEDURE — 1160F RVW MEDS BY RX/DR IN RCRD: CPT | Mod: CPTII,S$GLB,, | Performed by: INTERNAL MEDICINE

## 2024-09-30 PROCEDURE — 3078F DIAST BP <80 MM HG: CPT | Mod: CPTII,S$GLB,, | Performed by: INTERNAL MEDICINE

## 2024-09-30 PROCEDURE — 99214 OFFICE O/P EST MOD 30 MIN: CPT | Mod: S$GLB,,, | Performed by: INTERNAL MEDICINE

## 2024-09-30 RX ORDER — TAFAMIDIS 61 MG/1
61 CAPSULE, LIQUID FILLED ORAL DAILY
COMMUNITY
End: 2024-10-04

## 2024-09-30 RX ORDER — EPLERENONE 25 MG/1
25 TABLET, FILM COATED ORAL DAILY
COMMUNITY

## 2024-09-30 RX ORDER — FUROSEMIDE 80 MG/1
80 TABLET ORAL DAILY
Qty: 60 TABLET | Refills: 11 | Status: SHIPPED | OUTPATIENT
Start: 2024-09-30

## 2024-09-30 NOTE — PATIENT INSTRUCTIONS
Change furosemide to 80 mg every morning and may take second dose if needed daily but separate doses by 8 hours    Lab check in 1 week with 6 minute walk test

## 2024-09-30 NOTE — PROCEDURES
[facility]  Surgery Department  Operative Note       Date of Procedure: 9/30/2024       Surgeon:  Ajith Mora MD    Assistant:  Romel    Pre-Operative Diagnosis:   Melanoma right upper arm    Post-Operative Diagnosis: Same    Anesthesia: Local / Awake    Operative Findings:     Wide Local Excision for Primary Cutaneous Melanoma  Operation performed with curative intent Yes   Original Breslow thickness of the lesion 0.4 mm (to the tenth of a millimeter)   Clinical margin width 1 cm   Depth of excision Full-thickness skin/subcutaneous tissue down to fascia (melanoma)       Procedures:  Wide Local Excision of melanoma (3.5 cm defect), right posterior arm  Complex Closure of 6.5 cm wound, right posterior arm      Estimated Blood Loss (EBL):  <10 mL           Indications:  Gus Sabillon presents for the above procedures, risks and benefits including bleeding, infection, seroma, lymphedema, margin positivity, need for additional procedures and imponderables were reviewed.  He gave consent to proceed.      Details:  The patient was kept awake and moved into a comfortable position to access the operative field, extremites and pressure points padded and protected, and the field prepped in standard sterile fashion.  An appropriate timeout was confirmed.      Local anesthetic was injected and a 1 cm margin was measured around the right posterior arm biopsy site.  This was fashioned into an ellipse with a tumor defect / minor axis of 3.5 cm and a length/major axis of 6.5 cm to facilitate primary closure along skin lines.  Incision made through the full thickness of the skin, through the adjacent soft tissue, down to the underlying muscular fascia, completing an oncologic resection.  The wound was undermined extensively at the level of the fascia.  Complex closure was performed in multiple layers using vicryl and nylon suture.  Sterile Dressing was applied.     All needle, sponge and instrument  counts were correct.  The patient tolerated the procedure well and was awake throughout the procedure.    Specimens:   Specimen (24h ago, onward)       Start     Ordered    09/30/24 0000  Specimen to Pathology Dermatology and skin neoplasms (Right Upper Arm Malignant Melanoma)        Comments: Specimen [id number]: Procedure Type:->ExcisionRelease to patient->ImmediateSend normal result to authorizing provider's In Basket ifpatient is active on MyChart:->Yes     References:    Click here for ordering Quick Tip   Question Answer Comment   Procedure Type: Dermatology and skin neoplasms Right Upper Arm Malignant Melanoma   Specimen Class: Known or suspected malignancy    Procedure Type: Excision    Clinical Information: Right Upper arm Malignant Melanoma-suture marks Short superior and long anterior    Release to patient Immediate    Send normal result to authorizing provider's In Basket if patient is active on MyChart: Yes        09/30/24 1434                            Condition: Awake, ambulatory    Disposition: Home with instructions for wound care and clinic follow up.    Attestation: I was present and scrubbed for the key portions of the procedure.          Procedures

## 2024-09-30 NOTE — PROGRESS NOTES
Subjective:   WT-TTR Amyloid (ag)  Vyndamax ordered per Farshad 7/17/24 7/9/24: Amyloid Consult with Dr. Yen, referred by Dr. Karrie Sarabia MD as his primary cardiologist. As part of his evaluation for HFpEF he underwent evaluation for amyloidosis. His SPEP/UPEP was WNL but FLC was not done. PYP scan on 6/24/24 was positive.    Plan:  1) TTR DNA buccal swab per [facility] done 7/9/24 - NEGATIVE patient notfied  2) FLC: done 7/9/24, Kappa mildly elevated 3.28, Lambda (2.30) & ratio (1.43) normal  3) urine/Serum CHARISMA negative monoclonal proteins; If FLC normal, proceed with Vyndamax  4) If TTR positive, refer for neuro consult/EMG with Dr. Javed  5) Pt complaining gynecomastia; changing his spironolactone to eplerenone.  6) We will have him return to our amyloid clinic after completing testing.     Patient ID:  Gus Sabillon is a 84 y.o. male who presents for follow-up of wt TTR amyloidosis    HPI:  85 yo WM with HFpEF, HTN, HLD, CKD, PAD status post bilateral lower extremity PI, carotid stenosis s/p R CEA '21, GERD, squamous cell carcinoma of the skin saw Dr. Yen who started Vyndamax for wt-TTR cardiac amyloidosis.  He returns today for f/u visit.   He complains of significant fatigue.  He notes shortness of breath and exhaustion after walking less than 1 block.    He sleeps on 1 pillow in a recliner and he does this both for breathing and his back discomfort.  He notes that his breathing is worse if he is lying on his back.  He has been experiencing some upper lumbar discomfort on both sides of the his fine but not over the spine.  He has had sciatica in the past but none at this time.  He has not fallen in over 2 years.  No trauma.     According to the chart he has an abdominal aortic aneurysm though he denies being aware of that diagnosis in his not had any follow-up.  He does have known peripheral arterial disease with prior stents in both legs.    Social history:  Former smoker.  Drinks alcohol, 1-2 glasses  "of wine per day.  Denies any recreational drug use.      Review of Systems   Constitutional: Positive for malaise/fatigue.   Cardiovascular:  Positive for dyspnea on exertion, leg swelling, orthopnea and palpitations (he reports his heart rate is a bit faster since being off atenolol sometime into the low 100s). Negative for paroxysmal nocturnal dyspnea.   Respiratory:  Positive for shortness of breath.    Hematologic/Lymphatic: Bruises/bleeds easily (Bruising).   Musculoskeletal:  Positive for arthritis and back pain.   Neurological:  Negative for dizziness, focal weakness and light-headedness.        Objective:   Physical Exam  Constitutional:       General: He is not in acute distress.     Appearance: He is well-developed. He is ill-appearing. He is not toxic-appearing or diaphoretic.      Comments: BP (!) 140/58 (BP Location: Left arm, Patient Position: Sitting)   Pulse 94   Ht 6' 1" (1.854 m)   Wt 74.7 kg (164 lb 10.9 oz)   BMI 21.73 kg/m²    Chronically ill-appearing elderly gentleman in no acute distress   HENT:      Head: Normocephalic and atraumatic.   Eyes:      General: No scleral icterus.        Right eye: No discharge.         Left eye: No discharge.      Conjunctiva/sclera: Conjunctivae normal.   Neck:      Thyroid: No thyromegaly.      Vascular: JVD (venous pulsations extend 8 cm above the clavicle) present.      Trachea: No tracheal deviation.   Cardiovascular:      Rate and Rhythm: Normal rate and regular rhythm.      Heart sounds: Normal heart sounds. No murmur heard.     No gallop.   Pulmonary:      Effort: Pulmonary effort is normal.      Breath sounds: Normal breath sounds.   Abdominal:      General: Bowel sounds are normal. There is no distension.      Palpations: Abdomen is soft. There is no mass.      Tenderness: There is no abdominal tenderness. There is no guarding or rebound.   Musculoskeletal:         General: Swelling (0.5+ both ankles) present. No tenderness.   Skin:     General: " Skin is warm and dry.      Findings: Bruising present.   Neurological:      General: No focal deficit present.      Mental Status: He is alert and oriented to person, place, and time. Mental status is at baseline.   Psychiatric:         Mood and Affect: Mood normal.         Behavior: Behavior normal.         Thought Content: Thought content normal.         Judgment: Judgment normal.          Labs reviewed at today's visit--please note significantly higher BNP  Lab Results   Component Value Date     09/26/2024    K 4.2 09/26/2024     09/26/2024    CO2 25 09/26/2024    BUN 50 (H) 09/26/2024    CREATININE 1.9 (H) 09/26/2024    CALCIUM 9.4 09/26/2024    ANIONGAP 11 09/26/2024    ESTGFRAFRICA 54.1 (A) 06/02/2022    EGFRNONAA 46.8 (A) 06/02/2022      BNP 2,474 (H) 09/26/2024    BNP 1,615 (H) 05/17/2024            Component Ref Range & Units 1 mo ago  (8/16/24) 1 yr ago  (1/30/23) 1 yr ago  (1/30/23)   Troponin I 0.000 - 0.026 ng/mL 0.352 High  0.106 High  CM 0.118 High  CM             Component Ref Range & Units 8/18/24   NT-proBNP <=540 pg/mL 9187 High           5/17/2024 24 hr Holter  The diary was properly completed.   Overall, the study was of good quality. The tape was adequate (0 days , 24 hours, 0 minutes).        There was an episode of felt swollen reported. The corresponding rhythm strips revealed the following: .     There was an episode of both arms, mouth hurting, dizzy reported. The corresponding rhythm strips revealed the following: .     There was an episode of mouth hurting reported. The corresponding rhythm strips revealed the following: .     There was an episode of fatigue reported. The corresponding rhythm strips revealed the following: .     Rhythm    Predominant Rhythm  Sinus tachycardia    Heart rates varied between 90 and 130 BPM with an average of 104 BPM.     Maximum heart rate recorded at: 16:36 CDT on day 1.     Minimum heart rate recorded at 23:01 CDT on day 1.   With a heart  block type of first-degree.  Aberrant PACs noted.  43 beats polymorphic WCT with bigeminal pattern.  Ectopic atrial beats seen.       5/10/23 ECHO  The left ventricle is normal in size with normal systolic function.  The estimated ejection fraction is 60%.  Indeterminate left ventricular diastolic function.  Normal right ventricular size with normal right ventricular systolic function.  Mild left atrial enlargement.  Mild aortic regurgitation.  Mild mitral regurgitation.  Normal central venous pressure (3 mmHg).  The estimated PA systolic pressure is 25 mmHg.    6/24/2024 PYP scan    Study is strongly suggestive of ATTR cardiac amyloidosis with an uptake ratio of 1.8.    SPECT imaging shows diffuse myocardial uptake.    Visual grade corresponds to the Perugini defined grading scale where 3 corresponds to cardiac activity is clearly above bone.    Evaluation for AL amyloidosis by serum FLCs, serum, and urine immunofixation is recommended in all patients undergoing 99mTc-PYP/DPD/HMDP scans for cardiac amyloidosis.    Results should be interpreted in the context of prior evaluation and referral to a hematologist or amyloidosis expert is recommended if either: (a) Recommended echo/CMR is strongly suggestive of cardiac amyloidosis and 99mTc-PYP/DPD/HMDP is not suggestive or equivocal and/or (b) FLCs are abnormal or equivocal.    6/13/2024 EKG  Sinus bradycardia with 1st degree A-V block with frequent and consecutive   Premature ventricular complexes   ST and T wave abnormality, consider lateral ischemia   Abnormal ECG   When compared with ECG of 10-MAY-2024 13:37,   Vent. rate has increased BY  54 BPM   T wave inversion more evident in Lateral leads   Confirmed by Sujata FIGUEROA, Jenifer ([id number]) on 6/13/2024 9:39:19 AM can you disabled me        Assessment:     1. Acute on chronic diastolic congestive heart failure    2. Infrarenal abdominal aortic aneurysm (AAA) without rupture    3. Cardiac amyloidosis      Plan:   Ultrasound  of aorta to f/u on AAA   Increase lasix to 80 mg daily with PM dose if needed  In 1 week BMP, BNP, NT pro BNP, trop T and 6 MWT  See me in 3 months with same tests  If he can enroll in depleter trial at [facility], I am happy to serve as sub-investigator or perform clinic f/u here if acceptable to [facility] and the company sponsoring the trial

## 2024-10-01 ENCOUNTER — TELEPHONE (OUTPATIENT)
Dept: ENDOSCOPY | Facility: HOSPITAL | Age: 84
End: 2024-10-01
Payer: MEDICARE

## 2024-10-01 ENCOUNTER — PATIENT MESSAGE (OUTPATIENT)
Dept: INTERNAL MEDICINE | Facility: CLINIC | Age: 84
End: 2024-10-01
Payer: MEDICARE

## 2024-10-01 VITALS — WEIGHT: 164 LBS | HEIGHT: 73 IN | BODY MASS INDEX: 21.74 KG/M2

## 2024-10-01 DIAGNOSIS — K21.9 GASTROESOPHAGEAL REFLUX DISEASE, UNSPECIFIED WHETHER ESOPHAGITIS PRESENT: Primary | ICD-10-CM

## 2024-10-01 NOTE — TELEPHONE ENCOUNTER
.Spoke to patient to schedule procedure(s) Upper Endoscopy (EGD)       Physician to perform procedure(s) Dr. DEB Bosch  Date of Procedure (s) 10/15/24  Arrival Time 10:30 AM  Time of Procedure(s) 11:30 AM   Location of Procedure(s) [place] 2nd Floor  Type of Rx Prep sent to patient: N/A  Instructions provided to patient via [facility]    Patient was informed on the following information and verbalized understanding. Screening questionnaire reviewed with patient and complete. If procedure requires anesthesia, a responsible adult needs to be present to accompany the patient home, patient cannot drive after receiving anesthesia. Appointment details are tentative, especially check-in time. Patient will receive a prep-op call 7 days prior to confirm check-in time for procedure. If applicable the patient should contact their pharmacy to verify Rx for procedure prep is ready for pick-up. Patient was advised to call the scheduling department at [phone] if pharmacy states no Rx is available. Patient was advised to call the endoscopy scheduling department if any questions or concerns arise.      [facility] Scheduling Department

## 2024-10-02 ENCOUNTER — OFFICE VISIT (OUTPATIENT)
Dept: DERMATOLOGY | Facility: CLINIC | Age: 84
End: 2024-10-02
Payer: MEDICARE

## 2024-10-02 DIAGNOSIS — C44.722 SQUAMOUS CELL CARCINOMA OF SKIN OF RIGHT LOWER EXTREMITY: Primary | ICD-10-CM

## 2024-10-02 PROCEDURE — 99212 OFFICE O/P EST SF 10 MIN: CPT | Mod: S$GLB,,, | Performed by: DERMATOLOGY

## 2024-10-02 PROCEDURE — 3288F FALL RISK ASSESSMENT DOCD: CPT | Mod: CPTII,S$GLB,, | Performed by: DERMATOLOGY

## 2024-10-02 PROCEDURE — 1159F MED LIST DOCD IN RCRD: CPT | Mod: CPTII,S$GLB,, | Performed by: DERMATOLOGY

## 2024-10-02 PROCEDURE — 1101F PT FALLS ASSESS-DOCD LE1/YR: CPT | Mod: CPTII,S$GLB,, | Performed by: DERMATOLOGY

## 2024-10-02 PROCEDURE — 1126F AMNT PAIN NOTED NONE PRSNT: CPT | Mod: CPTII,S$GLB,, | Performed by: DERMATOLOGY

## 2024-10-02 PROCEDURE — 99999 PR PBB SHADOW E&M-EST. PATIENT-LVL II: CPT | Mod: PBBFAC,,, | Performed by: DERMATOLOGY

## 2024-10-03 LAB
FINAL PATHOLOGIC DIAGNOSIS: NORMAL
GROSS: NORMAL
Lab: NORMAL
MICROSCOPIC EXAM: NORMAL

## 2024-10-04 DIAGNOSIS — I43 SENILE CARDIAC AMYLOIDOSIS: Primary | ICD-10-CM

## 2024-10-04 DIAGNOSIS — E85.4 SENILE CARDIAC AMYLOIDOSIS: Primary | ICD-10-CM

## 2024-10-04 RX ORDER — TAFAMIDIS 61 MG/1
61 CAPSULE, LIQUID FILLED ORAL DAILY
Qty: 30 CAPSULE | Refills: 11 | Status: CANCELLED | OUTPATIENT
Start: 2024-10-04 | End: 2025-10-04

## 2024-10-07 ENCOUNTER — HOSPITAL ENCOUNTER (OUTPATIENT)
Dept: PULMONOLOGY | Facility: CLINIC | Age: 84
Discharge: HOME | End: 2024-10-07
Attending: INTERNAL MEDICINE
Payer: MEDICARE

## 2024-10-07 ENCOUNTER — LAB VISIT (OUTPATIENT)
Dept: LAB | Facility: HOSPITAL | Age: 84
End: 2024-10-07
Attending: INTERNAL MEDICINE
Payer: MEDICARE

## 2024-10-07 VITALS — WEIGHT: 155 LBS | HEIGHT: 73 IN | BODY MASS INDEX: 20.54 KG/M2

## 2024-10-07 DIAGNOSIS — I43 CARDIAC AMYLOIDOSIS: ICD-10-CM

## 2024-10-07 DIAGNOSIS — I50.33 ACUTE ON CHRONIC DIASTOLIC CONGESTIVE HEART FAILURE: ICD-10-CM

## 2024-10-07 DIAGNOSIS — E85.4 CARDIAC AMYLOIDOSIS: ICD-10-CM

## 2024-10-07 LAB
ANION GAP SERPL CALC-SCNC: 13 MMOL/L (ref 8–16)
BNP SERPL-MCNC: 2129 PG/ML (ref 0–99)
BUN SERPL-MCNC: 56 MG/DL (ref 8–23)
CALCIUM SERPL-MCNC: 9.6 MG/DL (ref 8.7–10.5)
CHLORIDE SERPL-SCNC: 101 MMOL/L (ref 95–110)
CO2 SERPL-SCNC: 27 MMOL/L (ref 23–29)
CREAT SERPL-MCNC: 1.9 MG/DL (ref 0.5–1.4)
EST. GFR  (NO RACE VARIABLE): 34.6 ML/MIN/1.73 M^2
GLUCOSE SERPL-MCNC: 104 MG/DL (ref 70–110)
POTASSIUM SERPL-SCNC: 4.2 MMOL/L (ref 3.5–5.1)
SODIUM SERPL-SCNC: 141 MMOL/L (ref 136–145)

## 2024-10-07 PROCEDURE — 83880 ASSAY OF NATRIURETIC PEPTIDE: CPT | Performed by: INTERNAL MEDICINE

## 2024-10-07 PROCEDURE — 80048 BASIC METABOLIC PNL TOTAL CA: CPT | Performed by: INTERNAL MEDICINE

## 2024-10-07 PROCEDURE — 83880 ASSAY OF NATRIURETIC PEPTIDE: CPT | Mod: 91 | Performed by: INTERNAL MEDICINE

## 2024-10-07 PROCEDURE — 94618 PULMONARY STRESS TESTING: CPT | Mod: S$GLB,,, | Performed by: INTERNAL MEDICINE

## 2024-10-07 PROCEDURE — 36415 COLL VENOUS BLD VENIPUNCTURE: CPT | Performed by: INTERNAL MEDICINE

## 2024-10-07 PROCEDURE — 84484 ASSAY OF TROPONIN QUANT: CPT | Performed by: INTERNAL MEDICINE

## 2024-10-07 NOTE — PROCEDURES
Gus Sabillon is a 84 y.o.   male patient, who presents for a 6 minute walk test ordered by MD Trevin.  The diagnosis is Amyloidosis; Cardiomyopathy.  The patient's BMI is 20.5 kg/m2.  Predicted distance (lower limit of normal) is 295.98 meters.      Test Results:    The test was completed without stopping.  The total time walked was 360 seconds.  During walking, the patient reported:  Other (Comment) (back pain).  The patient used no assistive devices during testing.     10/07/2024---------Distance: 320.04 meters (1050 feet)     O2 Sat % Supplemental Oxygen Heart Rate Blood Pressure Frederick Scale   Pre-exercise  (Resting) 98 % Room Air 85 bpm 127/64 mmHg 0.5   During Exercise 95 % Room Air 80 bpm 119/69 mmHg 3   Post-exercise  (Recovery) 99 % Room Air  84 bpm       Recovery Time: 87 seconds    Performing nurse/tech: TERESA Tejeda      PREVIOUS STUDY:   The patient has not had a previous study.      CLINICAL INTERPRETATION:  Six minute walk distance is 320.04 meters (1050 feet) with moderate dyspnea.  During exercise, there was no significant desaturation while breathing room air.  Both blood pressure and heart rate remained stable with walking.  The patient reported non-pulmonary symptoms during exercise.  No previous study performed.  Based upon age and body mass index, exercise capacity is normal.

## 2024-10-08 ENCOUNTER — OFFICE VISIT (OUTPATIENT)
Dept: OTOLARYNGOLOGY | Facility: CLINIC | Age: 84
End: 2024-10-08
Payer: MEDICARE

## 2024-10-08 ENCOUNTER — TELEPHONE (OUTPATIENT)
Dept: ENDOSCOPY | Facility: HOSPITAL | Age: 84
End: 2024-10-08
Payer: MEDICARE

## 2024-10-08 VITALS
SYSTOLIC BLOOD PRESSURE: 126 MMHG | BODY MASS INDEX: 21.44 KG/M2 | HEART RATE: 92 BPM | WEIGHT: 162.5 LBS | DIASTOLIC BLOOD PRESSURE: 70 MMHG

## 2024-10-08 DIAGNOSIS — R09.89 CHRONIC THROAT CLEARING: ICD-10-CM

## 2024-10-08 DIAGNOSIS — K11.7 XEROSTOMIA: ICD-10-CM

## 2024-10-08 DIAGNOSIS — H69.02 PATULOUS EUSTACHIAN TUBE OF LEFT EAR: ICD-10-CM

## 2024-10-08 DIAGNOSIS — R13.12 OROPHARYNGEAL DYSPHAGIA: Primary | ICD-10-CM

## 2024-10-08 LAB
NT-PROBNP SERPL IA-MCNC: 7248 PG/ML
TROPONIN T SERPL-MCNC: 125 NG/L

## 2024-10-08 PROCEDURE — 1126F AMNT PAIN NOTED NONE PRSNT: CPT | Mod: CPTII,S$GLB,, | Performed by: PHYSICIAN ASSISTANT

## 2024-10-08 PROCEDURE — 3078F DIAST BP <80 MM HG: CPT | Mod: CPTII,S$GLB,, | Performed by: PHYSICIAN ASSISTANT

## 2024-10-08 PROCEDURE — 99214 OFFICE O/P EST MOD 30 MIN: CPT | Mod: S$GLB,,, | Performed by: PHYSICIAN ASSISTANT

## 2024-10-08 PROCEDURE — 1101F PT FALLS ASSESS-DOCD LE1/YR: CPT | Mod: CPTII,S$GLB,, | Performed by: PHYSICIAN ASSISTANT

## 2024-10-08 PROCEDURE — 99999 PR PBB SHADOW E&M-EST. PATIENT-LVL II: CPT | Mod: PBBFAC,,, | Performed by: PHYSICIAN ASSISTANT

## 2024-10-08 PROCEDURE — 1159F MED LIST DOCD IN RCRD: CPT | Mod: CPTII,S$GLB,, | Performed by: PHYSICIAN ASSISTANT

## 2024-10-08 PROCEDURE — 3074F SYST BP LT 130 MM HG: CPT | Mod: CPTII,S$GLB,, | Performed by: PHYSICIAN ASSISTANT

## 2024-10-08 PROCEDURE — 3288F FALL RISK ASSESSMENT DOCD: CPT | Mod: CPTII,S$GLB,, | Performed by: PHYSICIAN ASSISTANT

## 2024-10-08 NOTE — PROGRESS NOTES
Subjective:      Gus is a 84 y.o. male with senile cardiac amyloidosis, HFpEF, SCC RLE, carotid artery stenosis, GERD, PAD who comes for follow-up of chronic cough.  His last visit with me was on 8/14/2024.      No significant improvement in throat clearing since last visit.  He tried using Prilosec which caused him stomach pain so he has since stopped.  He plans on undergoing EGD next week.  He reports clearing out orange colored mucus this morning after increased globus sensation (after OJ).    Per last office visit 8/14/2024 :  Patient reports chronic throat clearing with associated globus sensation, postnasal drip, dysphonia, and gagging episodes for several years.  This was exacerbated recently after developing COVID in December.  Patient had significant dysphagia and restrictive sensation in his throat.  This has since improved but throat clearing has persisted.  Patient reports dry mouth at times.  He is on several medications right now and recently started on amyloidosis medication.  He has had issues with his stomach in the past and has undergone EGDs.  He had seen his GI for these symptoms and was told this was likely ENT related.     Triggers for the cough include the following:   - voice use:  yes  - breathing heavily:  no  - laughing:  no  - eating:  no  - drinking cold liquids:  no  - strong odors:  no  - post-prandial:  no  - lying down:  yes     Prior workup includes the following:  - pulmonary:  no  - GI:  yes  - allergy:  no  - ENT:  no     Current sinonasal medications include none at present.  The last course of antibiotics was a long time ago.    He does not recall previously having allergy testing.  He denies a history of asthma.  He relates a history of reflux symptoms which is managed with over-the-counter medication as needed.    He denies a diagnosis of obstructive sleep apnea.   He does not recall a prior history of nasal trauma.  He has not had sinonasal surgery.    He has had a  tonsillectomy.  He is not a tobacco smoker.     1. Chronic throat clearing  2. Dysphagia, unspecified type  3. Xerostomia  4. Gastroesophageal reflux disease, unspecified whether esophagitis present  -     suspect an underlying GI motility issue as etiology of symptoms.  Laryngoscopy with evidence of xerostomia and piriform sinus pooling.  Unclear if this is a side effect of medications or age-related/neuropathic changes.  -     advised patient undergo swallow study  -     Advised OTC sialogogues   -     starting a PPI due to possible LPR  -Discussed the etiology of LPR and management strategies including nonpharmacologic treatments: eating smaller meals, eating at least 3 hours before bed, elevation of the head of bed at night, avoidance of caffeine, chocolate, nicotine and peppermint, and avoiding tight fitting clothing.    -     Fl Modified Barium Swallow Speech; Future; Expected date: 08/14/2024  -     SLP video swallow; Future; Expected date: 08/14/2024  5. Sensorineural hearing loss (SNHL) of both ears              - recently evaluated by DO Monroe  6. Deviated nasal septum              - intermittent nasal obstruction    The patient's medications, allergies, past medical, surgical, social and family histories were reviewed and updated as appropriate.    A detailed review of systems was obtained with pertinent positives as per the above HPI, and otherwise negative.        Objective:     /70 (BP Location: Left arm, Patient Position: Sitting)   Pulse 92   Wt 73.7 kg (162 lb 7.7 oz)   BMI 21.44 kg/m²      Physical Exam  Vitals reviewed.   Constitutional:       Appearance: Normal appearance.   HENT:      Head: Normocephalic and atraumatic.      Right Ear: External ear normal.      Left Ear: External ear normal.   Eyes:      Conjunctiva/sclera: Conjunctivae normal.   Pulmonary:      Effort: Pulmonary effort is normal.   Neurological:      Mental Status: He is alert.        Procedure    None    Data  "Reviewed    WBC (K/uL)   Date Value   08/16/2024 10.34     Eosinophil % (%)   Date Value   08/16/2024 1.7     Eos # (K/uL)   Date Value   08/16/2024 0.2     Platelets (K/uL)   Date Value   08/16/2024 298     Glucose (mg/dL)   Date Value   10/07/2024 104     No results found for: "IGE"    MBSS 8/23/24:  "Oral/pharyngeal swallow with mild delay and weakness resulting in pharyngeal stasis on thicker consistencies. NO aspiration or penetration"    Assessment:     1. Oropharyngeal dysphagia    2. Chronic throat clearing    3. Xerostomia    4. Patulous eustachian tube of left ear         Plan:     Swallow study with evidence of oropharyngeal dysphagia especially with thicker consistencies.  Dysphagia therapy was advised but patient is deferring therapy at this time due to other health problems.  I discussed with him that the throat clearing will likely not improve until he undergoes this therapy.  He has stopped PPI;  I agree as this is likely not a major component.  Of note, he has EGD scheduled for 10/17/24    Reported ear symptoms are likely patulous Eustachian tube.  Suggested over-the-counter Patulend.     He will follow up as needed  I had a discussion with the patient regarding his condition and the further workup and management options.    All questions were answered, and the patient is in agreement with the above.     Disclaimer:  This note may have been prepared utilizing voice recognition software which may result in occasional typographical errors in the text such as sound alike words.   If further clarification is needed, please contact the ENT department of [facility].    "

## 2024-10-08 NOTE — TELEPHONE ENCOUNTER
Spoke to patient for pre-call to confirm scheduled Upper Endoscopy (EGD) and patient verbalized understanding of the following:       Date of Procedure (s)  verified 10/17/24  Arrival Time 1:30 PM verified.  Location of Procedure(s) [place] 4th Floor verified.  NPO status reinforced. Ok to continue clear liquids up until 4 hours prior to the Endoscopy procedure.   Patient denies use of blood thinners, GLP-1 medications, and weight loss medications.  Pt confirmed receipt of prep instructions and Rx prep (if applicable).  Instructions provided to patient via [facility]  Pt confirmed ride home after procedure if procedure requires anesthesia.   Pre-call screening questionnaire reviewed and completed with patient.   Appointment details are tentative, including check-in time.  If the patient begins taking any blood thinning medications, injectable weight loss/diabetes medications (other than insulin), or Adipex (phentermine) patient was instructed to contact the endoscopy scheduling department as soon as possible.  Patient was advised to call the endoscopy scheduling department if any questions or concerns arise.       [facility] Scheduling Department

## 2024-10-09 ENCOUNTER — HOSPITAL ENCOUNTER (OUTPATIENT)
Dept: CARDIOLOGY | Facility: HOSPITAL | Age: 84
Discharge: HOME | End: 2024-10-09
Attending: INTERNAL MEDICINE
Payer: MEDICARE

## 2024-10-09 ENCOUNTER — PATIENT MESSAGE (OUTPATIENT)
Dept: OTOLARYNGOLOGY | Facility: CLINIC | Age: 84
End: 2024-10-09
Payer: MEDICARE

## 2024-10-09 DIAGNOSIS — I71.43 INFRARENAL ABDOMINAL AORTIC ANEURYSM (AAA) WITHOUT RUPTURE: ICD-10-CM

## 2024-10-09 LAB
ABDOMINAL IMA AP: 2.39 CM
ABDOMINAL IMA ED VEL: 3 CM/S
ABDOMINAL IMA PS VEL: 71 CM/S
ABDOMINAL IMA TRANS: 2.24 CM
ABDOMINAL INFRARENAL AORTA AP: 2.97 CM
ABDOMINAL INFRARENAL AORTA ED VEL: 7 CM/S
ABDOMINAL INFRARENAL AORTA PS VEL: 37 CM/S
ABDOMINAL INFRARENAL AORTA TRANS: 2.86 CM
ABDOMINAL JUXTARENAL AORTA AP: 3.2 CM
ABDOMINAL JUXTARENAL AORTA ED VEL: 9 CM/S
ABDOMINAL JUXTARENAL AORTA PS VEL: 48 CM/S
ABDOMINAL JUXTARENAL AORTA TRANS: 3.24 CM
ABDOMINAL LT COM ILIAC AP: 0.81 CM
ABDOMINAL LT COM ILIAC TRANS: 0.71 CM
ABDOMINAL LT COM ILIAC VEL: 112 CM/S
ABDOMINAL LT COM ILLIAC ED VEL: 12 CM/S
ABDOMINAL RT COM ILIAC AP: 0.75 CM
ABDOMINAL RT COM ILIAC TRANS: 0.87 CM
ABDOMINAL RT COM ILIAC VEL: 143 CM/S
ABDOMINAL RT COM ILLIAC ED VEL: 10 CM/S
ABDOMINAL SUPRARENAL AORTA AP: 2.87 CM
ABDOMINAL SUPRARENAL AORTA ED VEL: 3 CM/S
ABDOMINAL SUPRARENAL AORTA PS VEL: 32 CM/S
ABDOMINAL SUPRARENAL AORTA TRANS: 2.38 CM
OHS CV AAA LARGEST SIZE: 3.24 CM

## 2024-10-09 PROCEDURE — 93978 VASCULAR STUDY: CPT

## 2024-10-09 PROCEDURE — 93978 VASCULAR STUDY: CPT | Mod: 26,,, | Performed by: INTERNAL MEDICINE

## 2024-10-10 ENCOUNTER — TELEPHONE (OUTPATIENT)
Dept: ENDOSCOPY | Facility: HOSPITAL | Age: 84
End: 2024-10-10
Payer: MEDICARE

## 2024-10-10 ENCOUNTER — TELEPHONE (OUTPATIENT)
Dept: TRANSPLANT | Facility: CLINIC | Age: 84
End: 2024-10-10
Payer: MEDICARE

## 2024-10-10 ENCOUNTER — PATIENT MESSAGE (OUTPATIENT)
Dept: ENDOSCOPY | Facility: HOSPITAL | Age: 84
End: 2024-10-10
Payer: MEDICARE

## 2024-10-10 RX ORDER — TAFAMIDIS 61 MG/1
61 CAPSULE, LIQUID FILLED ORAL DAILY
Qty: 30 CAPSULE | Refills: 11 | Status: ACTIVE | OUTPATIENT
Start: 2024-10-10 | End: 2025-10-10

## 2024-10-10 NOTE — TELEPHONE ENCOUNTER
Pt moved to earlier arrival time for egd on 2nd floor.spoke with pt and informed 8am arrival time on 2nd floor. New instructions placed in portal.pt verbalized understanding

## 2024-10-10 NOTE — TELEPHONE ENCOUNTER
10/10/24: Mr. Zeeshan Sabillon saw Dr. Zhong on 9/30/24, follow up wild type TTR amyloid. Lasix was increased lasix from 40 mg to 80 mg daily and requested BMP BNP on 10/7/24.    9/26/24: K+ 4.2, BUN 50, Cr. 1.9, BNP 2474  10/7/24: K+ 4.2, BUN 56, Cr. 1.9, BNP 2129.     Patient is currently on Eplerenone 25 mg daily & Lasix 80 mg daily. He states his weight is stable at 154, /60's and feels slight improvement. Has no nipple tnederness since changing to Eplerenone from Spirolactone. 6 mwt test and aorta ultra sound was also completed on 10/7/24.    Discussed results of all the information above ans well as 6 mwt & Aorta U/S.  No changes in medications. Will repeat aorta U/S annually. Discussed results with patient.  He is in recall to return to clinic in 3 months with repeat 6 mwt and amyloid lab scheduled 12/30/24. Verbalized understanding of all instructions.

## 2024-10-10 NOTE — TELEPHONE ENCOUNTER
Pt called to confirm egd for 10/17/24. Pt stated gets diarrhea if misses any meals.offered earlier arrival time but pt declined due to appt at [facility] for suture removal at 11am.      Spoke to pt for pre-call to confirm scheduled Upper Endoscopy (EGD) and patient verbalized understanding of the following:       Date of Procedure (s)  verified 10/17/24  Arrival Time 1:30 PM verified.  Location of Procedure(s) [place] 4th Floor verified.  NPO status reinforced. Ok to continue clear liquids up until 4 hours prior to the Endoscopy procedure.   Pt confirmed receipt of prep instructions and Rx prep (if applicable).  Instructions provided to patient via [facility]  Pt confirmed ride home after procedure if procedure requires anesthesia.   Pre-call screening questionnaire reviewed and completed with patient.   Appointment details are tentative, including check-in time.  If the patient begins taking any blood thinning medications, injectable weight loss/diabetes medications (other than insulin), or Adipex (phentermine) patient was instructed to contact the endoscopy scheduling department as soon as possible.  Patient was advised to call the endoscopy scheduling department if any questions or concerns arise.       [facility] Scheduling Department

## 2024-10-11 ENCOUNTER — TELEPHONE (OUTPATIENT)
Dept: NEUROLOGY | Facility: CLINIC | Age: 84
End: 2024-10-11
Payer: MEDICARE

## 2024-10-11 NOTE — TELEPHONE ENCOUNTER
Called and spoke to pt and per Dr. Oneal patient was referred to Dr. House and will need to be schedule for consult with his team. Patient states he was waiting a while for this appointment, but would like a call back to discuss options.     Explained will send message to team to give him a call back to schedule. Patient voice understanding.

## 2024-10-15 ENCOUNTER — TELEPHONE (OUTPATIENT)
Dept: NEUROLOGY | Facility: CLINIC | Age: 84
End: 2024-10-15
Payer: MEDICARE

## 2024-10-15 NOTE — TELEPHONE ENCOUNTER
Spoke to patient to schedule appointment (NP) on Nov 21 at 9am with Dr. Nieto.    
Brian Escoto), [facility]  [address] [place] [address]  Phone: [phone]  Fax: [phone]

## 2024-10-16 ENCOUNTER — TELEPHONE (OUTPATIENT)
Dept: GASTROENTEROLOGY | Facility: CLINIC | Age: 84
End: 2024-10-16
Payer: MEDICARE

## 2024-10-16 ENCOUNTER — TELEPHONE (OUTPATIENT)
Dept: ENDOSCOPY | Facility: HOSPITAL | Age: 84
End: 2024-10-16
Payer: MEDICARE

## 2024-10-16 NOTE — TELEPHONE ENCOUNTER
Patient calling regarding instructions for EGD on 10/17/2024. Patient not able to access previous message. Instructions sent to patients portal and email again.        EGD Procedure Prep Instructions        Date of procedure: 10/17/24 Arrive at: 800 am        Location of Department: [address] [place], [place] [address] [address]  Take the [facility] Elevators to 2nd floor same day surgery     How to prep:     Day Before Procedure: 10/16/24      You may have a light evening meal.   No solid food after 7:00 pm.   Continue drinking clear liquids.         Day of the Procedure:  10/17/24      You may have water/clear liquids until 2 hours before your procedure or as directed by the scheduling nurse   700 am See below for list.     What You CANNOT do:   Do not drink milk or anything colored red.  Do not drink alcohol.  No gum chewing or candy morning of procedure.     Liquids That Are OK to Drink:   Water  Sports drinks (Gatorade, Power-Aid)  Coffee or tea (no cream or nondairy creamer)  Clear juices without pulp (apple, white grape)  Gelatin desserts (no fruit or toppings)  Clear soda (sprite, coke, ginger ale)  Chicken broth (until 12 midnight the night before procedure)        Comments:                             IMPORTANT INFORMATION TO KNOW BEFORE YOUR PROCEDURE     [facility] 2nd Floor            If your procedure requires the administration of anesthesia, it is necessary for a responsible adult to drive you home. (Medical Transportation, Uber, Lyft, Taxi, etc. may ONLY be used if a responsible adult is present to accompany you home.  The responsible adult CAN'T be the  of the service).       person must be available to return to pick you up within 15 minutes of being notified of discharge.         Please bring a picture ID, insurance card, & copayment        Take Medications as directed below:           If you begin taking any blood thinning medications, injectable weight  loss/diabetes medications (other than insulin) , or Adipex (Phentermine) please contact the endoscopy scheduling department listed below as soon as possible.     If you are diabetic see the attached instruction sheet regarding your medication.      If you take HEART, BLOOD PRESSURE, SEIZURE, PAIN, LUNG (including inhalers/nebulizers), ANTI-REJECTION (transplant patients), or PSYCHIATRIC medications, please take at your regular times with a sip of water or as directed by the scheduling nurse.      Important contact information:     Endoscopy [phone] Hours of operation Monday-Friday 8:00-4:30pm.     Questions about insurance or financial obligations call [phone] or [phone].     If you have questions regarding the prep or need to reschedule, please call [phone]. After hours questions requiring immediate assistance, contact [facility] nurse line at [phone] or [phone].   NOTE:      On occasion, unforeseen circumstances may cause a delay in your procedure start time. We respect your time and appreciate your patience during these circumstances.              Comments:     This Klutch message has not been read.

## 2024-10-16 NOTE — TELEPHONE ENCOUNTER
Spoke with patient.  Requesting an appointment to see Dr.James Rucker.  Patient scheduled to see  on 11/12 at 1:00.   Confirmation mailed.   Teresa

## 2024-10-16 NOTE — TELEPHONE ENCOUNTER
----- Message from Brigitte sent at 10/16/2024  8:57 AM CDT -----  Regarding: Prepartion for procedure  Contact: [phone]  Type:  Needs Medical Advice    Who Called: Gus Weber called requesting a call back on time, bldg/floor and what's needed before procedure on 10/17  Would the patient rather a call back or a response via [facility]? both  Best Call Back Number: [phone]    Additional Information:

## 2024-10-17 ENCOUNTER — ANESTHESIA (OUTPATIENT)
Dept: ENDOSCOPY | Facility: HOSPITAL | Age: 84
End: 2024-10-17
Payer: MEDICARE

## 2024-10-17 ENCOUNTER — ANESTHESIA EVENT (OUTPATIENT)
Dept: ENDOSCOPY | Facility: HOSPITAL | Age: 84
End: 2024-10-17
Payer: MEDICARE

## 2024-10-17 ENCOUNTER — OFFICE VISIT (OUTPATIENT)
Dept: SURGERY | Facility: CLINIC | Age: 84
End: 2024-10-17
Payer: MEDICARE

## 2024-10-17 ENCOUNTER — HOSPITAL ENCOUNTER (OUTPATIENT)
Facility: HOSPITAL | Age: 84
Discharge: HOME | End: 2024-10-17
Attending: INTERNAL MEDICINE | Admitting: INTERNAL MEDICINE
Payer: MEDICARE

## 2024-10-17 VITALS
DIASTOLIC BLOOD PRESSURE: 65 MMHG | SYSTOLIC BLOOD PRESSURE: 112 MMHG | OXYGEN SATURATION: 100 % | WEIGHT: 161.38 LBS | BODY MASS INDEX: 21.29 KG/M2 | HEART RATE: 98 BPM

## 2024-10-17 VITALS
BODY MASS INDEX: 20.54 KG/M2 | WEIGHT: 155 LBS | TEMPERATURE: 98 F | HEART RATE: 85 BPM | SYSTOLIC BLOOD PRESSURE: 114 MMHG | DIASTOLIC BLOOD PRESSURE: 60 MMHG | OXYGEN SATURATION: 99 % | HEIGHT: 73 IN | RESPIRATION RATE: 19 BRPM

## 2024-10-17 DIAGNOSIS — K21.9 GERD (GASTROESOPHAGEAL REFLUX DISEASE): ICD-10-CM

## 2024-10-17 DIAGNOSIS — C43.61 MALIGNANT MELANOMA OF RIGHT UPPER ARM: Primary | ICD-10-CM

## 2024-10-17 PROCEDURE — C1726 CATH, BAL DIL, NON-VASCULAR: HCPCS | Performed by: INTERNAL MEDICINE

## 2024-10-17 PROCEDURE — 1101F PT FALLS ASSESS-DOCD LE1/YR: CPT | Mod: CPTII,S$GLB,,

## 2024-10-17 PROCEDURE — 99999 PR PBB SHADOW E&M-EST. PATIENT-LVL III: CPT | Mod: PBBFAC,,,

## 2024-10-17 PROCEDURE — 88305 TISSUE EXAM BY PATHOLOGIST: CPT | Performed by: PATHOLOGY

## 2024-10-17 PROCEDURE — 63600175 PHARM REV CODE 636 W HCPCS: Performed by: STUDENT IN AN ORGANIZED HEALTH CARE EDUCATION/TRAINING PROGRAM

## 2024-10-17 PROCEDURE — 88342 IMHCHEM/IMCYTCHM 1ST ANTB: CPT | Performed by: PATHOLOGY

## 2024-10-17 PROCEDURE — 43249 ESOPH EGD DILATION <30 MM: CPT | Mod: ,,, | Performed by: INTERNAL MEDICINE

## 2024-10-17 PROCEDURE — 1126F AMNT PAIN NOTED NONE PRSNT: CPT | Mod: CPTII,S$GLB,,

## 2024-10-17 PROCEDURE — 88341 IMHCHEM/IMCYTCHM EA ADD ANTB: CPT | Performed by: PATHOLOGY

## 2024-10-17 PROCEDURE — 88313 SPECIAL STAINS GROUP 2: CPT | Mod: 59 | Performed by: PATHOLOGY

## 2024-10-17 PROCEDURE — 27201012 HC FORCEPS, HOT/COLD, DISP: Performed by: INTERNAL MEDICINE

## 2024-10-17 PROCEDURE — 3074F SYST BP LT 130 MM HG: CPT | Mod: CPTII,S$GLB,,

## 2024-10-17 PROCEDURE — 37000009 HC ANESTHESIA EA ADD 15 MINS: Performed by: INTERNAL MEDICINE

## 2024-10-17 PROCEDURE — 3078F DIAST BP <80 MM HG: CPT | Mod: CPTII,S$GLB,,

## 2024-10-17 PROCEDURE — 43239 EGD BIOPSY SINGLE/MULTIPLE: CPT | Mod: 59 | Performed by: INTERNAL MEDICINE

## 2024-10-17 PROCEDURE — 43249 ESOPH EGD DILATION <30 MM: CPT | Performed by: INTERNAL MEDICINE

## 2024-10-17 PROCEDURE — 43239 EGD BIOPSY SINGLE/MULTIPLE: CPT | Mod: 59,,, | Performed by: INTERNAL MEDICINE

## 2024-10-17 PROCEDURE — 3288F FALL RISK ASSESSMENT DOCD: CPT | Mod: CPTII,S$GLB,,

## 2024-10-17 PROCEDURE — 99212 OFFICE O/P EST SF 10 MIN: CPT | Mod: S$GLB,,,

## 2024-10-17 PROCEDURE — 1159F MED LIST DOCD IN RCRD: CPT | Mod: CPTII,S$GLB,,

## 2024-10-17 PROCEDURE — 25000003 PHARM REV CODE 250: Performed by: INTERNAL MEDICINE

## 2024-10-17 PROCEDURE — 37000008 HC ANESTHESIA 1ST 15 MINUTES: Performed by: INTERNAL MEDICINE

## 2024-10-17 RX ORDER — PROPOFOL 10 MG/ML
VIAL (ML) INTRAVENOUS CONTINUOUS PRN
Status: DISCONTINUED | OUTPATIENT
Start: 2024-10-17 | End: 2024-10-17

## 2024-10-17 RX ORDER — ONDANSETRON HYDROCHLORIDE 2 MG/ML
4 INJECTION, SOLUTION INTRAVENOUS DAILY PRN
Status: DISCONTINUED | OUTPATIENT
Start: 2024-10-17 | End: 2024-10-17 | Stop reason: HOSPADM

## 2024-10-17 RX ORDER — SODIUM CHLORIDE 9 MG/ML
INJECTION, SOLUTION INTRAVENOUS CONTINUOUS
Status: DISCONTINUED | OUTPATIENT
Start: 2024-10-17 | End: 2024-10-17 | Stop reason: HOSPADM

## 2024-10-17 RX ORDER — LIDOCAINE HYDROCHLORIDE 20 MG/ML
INJECTION INTRAVENOUS
Status: DISCONTINUED | OUTPATIENT
Start: 2024-10-17 | End: 2024-10-17

## 2024-10-17 RX ORDER — GLUCAGON 1 MG
1 KIT INJECTION
Status: DISCONTINUED | OUTPATIENT
Start: 2024-10-17 | End: 2024-10-17 | Stop reason: HOSPADM

## 2024-10-17 RX ADMIN — PROPOFOL 200 MCG/KG/MIN: 10 INJECTION, EMULSION INTRAVENOUS at 09:10

## 2024-10-17 RX ADMIN — LIDOCAINE HYDROCHLORIDE 100 MG: 20 INJECTION INTRAVENOUS at 09:10

## 2024-10-17 RX ADMIN — PROPOFOL 50 MG: 10 INJECTION, EMULSION INTRAVENOUS at 09:10

## 2024-10-17 RX ADMIN — SODIUM CHLORIDE: 0.9 INJECTION, SOLUTION INTRAVENOUS at 09:10

## 2024-10-17 NOTE — H&P
[facility] - Endoscopy  Gastroenterology  H&P    Patient Name: Gus Sabillon  MRN: [id number]  Admission Date: 10/17/2024  Code Status: Prior    Attending Provider: ramírez grady  Primary Care Physician: Emory Beltran DO  Principal Problem:<principal problem not specified>    Subjective:     History of Present Illness: dysphagia, reflux, amyloidosis    Past Medical History:   Diagnosis Date    Carotid artery disease     CHF (congestive heart failure)     CKD (chronic kidney disease) stage 3, GFR 30-59 ml/min     Dyslipidemia     GERD (gastroesophageal reflux disease)     Hx of melanoma excision     Hypertension     Melanoma     PAD (peripheral artery disease)     SCC (squamous cell carcinoma) 05/01/2024    Right shin distal    Squamous cell carcinoma of skin 05/01/2024    Right shin proximal       Past Surgical History:   Procedure Laterality Date    ARTHROSCOPIC DEBRIDEMENT OF SHOULDER Right 02/27/2020    Procedure: EXTENSIVE DEBRIDEMENT, SHOULDER, ARTHROSCOPIC;  Surgeon: Staci Childress MD;  Location: [facility] OR;  Service: Orthopedics;  Laterality: Right;    ARTHROSCOPIC DEBRIDEMENT OF SHOULDER Left 06/21/2022    Procedure: EXTENSIVE DEBRIDEMENT, SHOULDER, ARTHROSCOPIC;  Surgeon: Staci Childress MD;  Location: [facility] OR;  Service: Orthopedics;  Laterality: Left;    ARTHROSCOPIC REPAIR OF ROTATOR CUFF OF SHOULDER Right 02/27/2020    Procedure: REPAIR, ROTATOR CUFF, ARTHROSCOPIC;  Surgeon: Staci Childress MD;  Location: [facility] OR;  Service: Orthopedics;  Laterality: Right;    ARTHROSCOPIC REPAIR OF ROTATOR CUFF OF SHOULDER Left 06/21/2022    Procedure: REPAIR, ROTATOR CUFF, ARTHROSCOPIC WITH CUFF MEND;  Surgeon: Staci Childress MD;  Location: [facility] OR;  Service: Orthopedics;  Laterality: Left;    ARTHROSCOPY OF SHOULDER WITH DECOMPRESSION OF SUBACROMIAL SPACE Right 02/27/2020    Procedure: ARTHROSCOPY, SHOULDER, WITH SUBACROMIAL SPACE DECOMPRESSION;  Surgeon: Staci Childress MD;  Location: [facility] OR;  Service: Orthopedics;  Laterality:  Right;    ARTHROSCOPY OF SHOULDER WITH DECOMPRESSION OF SUBACROMIAL SPACE Left 06/21/2022    Procedure: ARTHROSCOPY, SHOULDER, WITH SUBACROMIAL  DECOMPRESSION;  Surgeon: Staci Childress MD;  Location: [facility] OR;  Service: Orthopedics;  Laterality: Left;    ARTHROSCOPY OF SHOULDER WITH REMOVAL OF DISTAL CLAVICLE Left 06/21/2022    Procedure: ARTHROSCOPY, SHOULDER, WITH DISTAL CLAVICLE EXCISION;  Surgeon: Staci Childress MD;  Location: [facility] OR;  Service: Orthopedics;  Laterality: Left;    ARTHROSCOPY,SHOULDER,WITH BICEPS TENODESIS Left 06/21/2022    Procedure: ARTHROSCOPY,SHOULDER,WITH BICEPS TENODESIS;  Surgeon: Staci Childress MD;  Location: [facility] OR;  Service: Orthopedics;  Laterality: Left;    BLEPHAROPLASTY      CAROTID ENDARTERECTOMY Right 10/15/2021    Procedure: ENDARTERECTOMY-CAROTID;  Surgeon: Imer Farooq MD;  Location: [facility] OR [address] [place] [address];  Service: Peripheral Vascular;  Laterality: Right;  AWAKE CERVICAL BLOCK    CATARACT EXTRACTION, BILATERAL      COSMETIC SURGERY      EXCISION OF BURSA Left 06/21/2022    Procedure: BURSECTOMY;  Surgeon: Staci Childress MD;  Location: [facility] OR;  Service: Orthopedics;  Laterality: Left;    EYE SURGERY Bilateral     cataract removal    FIXATION OF TENDON Right 02/27/2020    Procedure: FIXATION, TENDON, Biceps Tenodesis;  Surgeon: Staci Childress MD;  Location: [facility] OR;  Service: Orthopedics;  Laterality: Right;  FIXATION, TENDON, Biceps Tenodesis    OPEN REDUCTION AND INTERNAL FIXATION (ORIF) OF FRACTURE OF PROXIMAL HUMERUS Right 02/27/2020    Procedure: ORIF, FRACTURE, GREATER TUBEROSITY;  Surgeon: Staci Childress MD;  Location: [facility] OR;  Service: Orthopedics;  Laterality: Right;    ORIF HUMERUS FRACTURE Left 06/21/2022    Procedure: ORIF, FRACTURE, HUMERUS, GREATER TUBEROSITY;  Surgeon: Staci Childress MD;  Location: [facility] OR;  Service: Orthopedics;  Laterality: Left;    SHOULDER ARTHROSCOPY Left 06/21/2022    Procedure: ARTHROSCOPY, SHOULDER WITH LABRAL REPAIR;  Surgeon: Staci Childress MD;  Location: [facility]  OR;  Service: Orthopedics;  Laterality: Left;    TONSILLECTOMY      VASECTOMY         Review of patient's allergies indicates:   Allergen Reactions    Adhesive Hives, Itching and Blisters    Clindamycin Nausea And Vomiting    Penicillins Hives     Family History       Problem Relation (Age of Onset)    Cancer Sister    Colon cancer Sister    Diabetes Mother    Heart disease Father    Hyperlipidemia Mother    No Known Problems Brother, Brother, Daughter, Son          Tobacco Use    Smoking status: Former     Current packs/day: 0.00     Average packs/day: 2.0 packs/day for 15.0 years (30.0 ttl pk-yrs)     Types: Cigarettes     Start date:      Quit date:      Years since quittin.8     Passive exposure: Never    Smokeless tobacco: Never   Substance and Sexual Activity    Alcohol use: Yes     Alcohol/week: 14.0 standard drinks of alcohol     Types: 14 Glasses of wine per week     Comment: 2 glasses of wine daily    Drug use: Never    Sexual activity: Not Currently     Partners: Female     Review of Systems   Respiratory: Negative.     Cardiovascular: Negative.      Objective:     Vital Signs (Most Recent):  Temp: 98 °F (36.7 °C) (10/17/24 0810)  Pulse: 96 (10/17/24 0810)  Resp: 16 (10/17/24 0810)  BP: 133/89 (10/17/24 0810)  SpO2: 99 % (10/17/24 0810) Vital Signs (24h Range):  Temp:  [98 °F (36.7 °C)] 98 °F (36.7 °C)  Pulse:  [96] 96  Resp:  [16] 16  SpO2:  [99 %] 99 %  BP: (133)/(89) 133/89     Weight: 70.3 kg (155 lb) (10/17/24 0810)  Body mass index is 20.45 kg/m².    No intake or output data in the 24 hours ending 10/17/24 0848    Lines/Drains/Airways       Peripheral Intravenous Line  Duration                  Peripheral IV - Single Lumen 10/17/24 0822 20 G Right Forearm <1 day                    Physical Exam  Cardiovascular:      Rate and Rhythm: Normal rate and regular rhythm.   Pulmonary:      Effort: Pulmonary effort is normal.      Breath sounds: Normal breath sounds.         Significant  Labs:      Significant Imaging:      Assessment/Plan:     There are no hospital problems to display for this patient.      Indication for procedure:    ASA:III  Airway normal  Malampati class:    Personal and family history negative for anesthesia problems    Plan:egd  Anesthesia plan: general      Davon Rucker MD  Gastroenterology  [facility] - Endoscopy

## 2024-10-17 NOTE — ANESTHESIA PREPROCEDURE EVALUATION
10/17/2024  Gus Sabillon is a 84 y.o., male.  Pre-operative evaluation for EGD (ESOPHAGOGASTRODUODENOSCOPY) (N/A)    Chief Complaint:GERD    PMH:  Frequent PVCs, CKD stage 3, peripheral arterial disease CEA 1.5 years ago, and cardiac amyloidosis (wtATTR-CM).   Sx include SOB, dizziness, fatigue  Past Surgical History:   Procedure Laterality Date    ARTHROSCOPIC DEBRIDEMENT OF SHOULDER Right 02/27/2020    Procedure: EXTENSIVE DEBRIDEMENT, SHOULDER, ARTHROSCOPIC;  Surgeon: Staci Childress MD;  Location: [facility] OR;  Service: Orthopedics;  Laterality: Right;    ARTHROSCOPIC DEBRIDEMENT OF SHOULDER Left 06/21/2022    Procedure: EXTENSIVE DEBRIDEMENT, SHOULDER, ARTHROSCOPIC;  Surgeon: Staci Childress MD;  Location: [facility] OR;  Service: Orthopedics;  Laterality: Left;    ARTHROSCOPIC REPAIR OF ROTATOR CUFF OF SHOULDER Right 02/27/2020    Procedure: REPAIR, ROTATOR CUFF, ARTHROSCOPIC;  Surgeon: Staci Childress MD;  Location: [facility] OR;  Service: Orthopedics;  Laterality: Right;    ARTHROSCOPIC REPAIR OF ROTATOR CUFF OF SHOULDER Left 06/21/2022    Procedure: REPAIR, ROTATOR CUFF, ARTHROSCOPIC WITH CUFF MEND;  Surgeon: Staci Childress MD;  Location: [facility] OR;  Service: Orthopedics;  Laterality: Left;    ARTHROSCOPY OF SHOULDER WITH DECOMPRESSION OF SUBACROMIAL SPACE Right 02/27/2020    Procedure: ARTHROSCOPY, SHOULDER, WITH SUBACROMIAL SPACE DECOMPRESSION;  Surgeon: Staci Childress MD;  Location: [facility] OR;  Service: Orthopedics;  Laterality: Right;    ARTHROSCOPY OF SHOULDER WITH DECOMPRESSION OF SUBACROMIAL SPACE Left 06/21/2022    Procedure: ARTHROSCOPY, SHOULDER, WITH SUBACROMIAL  DECOMPRESSION;  Surgeon: Staci Childress MD;  Location: [facility] OR;  Service: Orthopedics;  Laterality: Left;    ARTHROSCOPY OF SHOULDER WITH REMOVAL OF DISTAL CLAVICLE Left 06/21/2022    Procedure: ARTHROSCOPY, SHOULDER, WITH DISTAL CLAVICLE EXCISION;  Surgeon: Staci  "MD Fior;  Location: [facility] OR;  Service: Orthopedics;  Laterality: Left;    ARTHROSCOPY,SHOULDER,WITH BICEPS TENODESIS Left 06/21/2022    Procedure: ARTHROSCOPY,SHOULDER,WITH BICEPS TENODESIS;  Surgeon: Staci Childress MD;  Location: [facility] OR;  Service: Orthopedics;  Laterality: Left;    BLEPHAROPLASTY      CAROTID ENDARTERECTOMY Right 10/15/2021    Procedure: ENDARTERECTOMY-CAROTID;  Surgeon: Imer Farooq MD;  Location: [address];  Service: Peripheral Vascular;  Laterality: Right;  AWAKE CERVICAL BLOCK    CATARACT EXTRACTION, BILATERAL      COSMETIC SURGERY      EXCISION OF BURSA Left 06/21/2022    Procedure: BURSECTOMY;  Surgeon: Staci Childress MD;  Location: [facility] OR;  Service: Orthopedics;  Laterality: Left;    EYE SURGERY Bilateral     cataract removal    FIXATION OF TENDON Right 02/27/2020    Procedure: FIXATION, TENDON, Biceps Tenodesis;  Surgeon: Staci Childress MD;  Location: [facility] OR;  Service: Orthopedics;  Laterality: Right;  FIXATION, TENDON, Biceps Tenodesis    OPEN REDUCTION AND INTERNAL FIXATION (ORIF) OF FRACTURE OF PROXIMAL HUMERUS Right 02/27/2020    Procedure: ORIF, FRACTURE, GREATER TUBEROSITY;  Surgeon: Staci Childress MD;  Location: [facility] OR;  Service: Orthopedics;  Laterality: Right;    ORIF HUMERUS FRACTURE Left 06/21/2022    Procedure: ORIF, FRACTURE, HUMERUS, GREATER TUBEROSITY;  Surgeon: Staci Childress MD;  Location: [facility] OR;  Service: Orthopedics;  Laterality: Left;    SHOULDER ARTHROSCOPY Left 06/21/2022    Procedure: ARTHROSCOPY, SHOULDER WITH LABRAL REPAIR;  Surgeon: Staci Childress MD;  Location: [facility] OR;  Service: Orthopedics;  Laterality: Left;    TONSILLECTOMY      VASECTOMY           Vital Signs Range (Last 24H):         CBC:   No results for input(s): "WBC", "RBC", "HGB", "HCT", "PLT", "MCV", "MCH", "MCHC" in the last 720 hours.    CMP:   Recent Labs   Lab 09/26/24  1156 10/07/24  0958    141   K 4.2 4.2    101   CO2 25 27   BUN 50* 56*   CREATININE 1.9* 1.9*    104   CALCIUM " "9.4 9.6       INR:  No results for input(s): "PT", "INR", "PROTIME", "APTT" in the last 720 hours.      Diagnostic Studies:      EKD Echo:     Pre-op Assessment    I have reviewed the Patient Summary Reports.     I have reviewed the Nursing Notes. I have reviewed the NPO Status.   I have reviewed the Medications.     Review of Systems  Anesthesia Hx:  No problems with previous Anesthesia                Social:  Non-Smoker, No Alcohol Use       Cardiovascular:  Cardiovascular Normal                                              Pulmonary:  Pulmonary Normal                       Neurological:  Neurology Normal                                          Physical Exam  General: Well nourished, Cooperative, Alert and Oriented    Airway:  Mallampati: I   Mouth Opening: Normal  TM Distance: Normal  Tongue: Normal  Neck ROM: Normal ROM    Dental:  Intact    Chest/Lungs:  Normal Respiratory Rate        Anesthesia Plan  Type of Anesthesia, risks & benefits discussed:    Anesthesia Type: Gen Natural Airway, Gen ETT  Intra-op Monitoring Plan: Standard ASA Monitors  Post Op Pain Control Plan: multimodal analgesia  Induction:  IV  Informed Consent: Informed consent signed with the Patient and all parties understand the risks and agree with anesthesia plan.  All questions answered.   ASA Score: 3  Day of Surgery Review of History & Physical: H&P Update referred to the surgeon/provider.    Ready For Surgery From Anesthesia Perspective.     .      "

## 2024-10-17 NOTE — PROGRESS NOTES
Post-Op Follow-up Visit:   10/17/2024  Patient ID: Gus Sabillon is a 84 y.o. male, born 1940    No chief complaint on file.    Interval History: Mr. Sabillon returns to the clinic for a wound check after WLE with Dr. Mora on 9/30/24. He is doing well- has no complaints. Denies fever, chills, SOB, chest pain.     Physical Exam:  /65 (BP Location: Left arm, Patient Position: Sitting)   Pulse 98   Wt 73.2 kg (161 lb 6 oz)   SpO2 100%   BMI 21.29 kg/m²     General:  Non-toxic, ambulatory  Abd:  Soft, non-tender  Incision:  CDI without redness, drainage, or sign of infection noted. Sutures in place.     Pathology:  Final Pathologic Diagnosis   Skin, right upper arm, excision:  - RESIDUAL MALIGNANT MELANOMA, INVASIVE, EXTENDING TO A MAXIMUM DEPTH OF 0.5 MM.  - ALL MARGINS ARE NEGATIVE.  - PREVIOUS BIOPSY SITE CHANGES.      SYNOPTIC REPORT  Procedure:  Excision  Specimen Laterality:  Right  Tumor Site:  upper arm  Macroscopic Satellite Nodule(s): Not identified  Histologic Type:  Superficial spreading type  Maximum Tumor (Breslow) Thickness:  0.5 mm  Ulceration: Not identified  Microsatellite(s): Not identified  Margins:  Peripheral:  Negative.  Invasive melanoma extends to within 6.3 mm of the nearest peripheral margin (orange inked 9:00-12:00 margin).  Melanoma in situ extends to within 3 mm of the nearest peripheral margin (orange inked 9:00-12:00 margin)    Deep:  Negative  Mitotic rate: <1 mm2  Anatomic (Zana) level: II  Lympho-vascular invasion: Not identified  Neurotropism: Not identified  Tumor infiltrating lymphocytes: Present, non-brisk  Tumor regression: Not identified  Pathologic staging: pT1a      ICD-10-CM ICD-9-CM    1. Malignant melanoma of right upper arm  C43.61 172.6       Plan   Mr. Sabillon returns to the clinic for a wound check after WLE with Dr. Mora on 9/30/24. He is doing well- has no complaints. Incision is healing nicely. Sutures removed. Care of the incision discussed.  Pathology reviewed- continue f/u with dermatology for frequent skin checks.   Questions were asked and answered to patient's satisfaction.  We discussed the need for continued clinical/radiographic/endoscopic follow-up.     Follow up if symptoms worsen or fail to improve.         RUBÉN Mendiola, FNP-C  Upper GI / Hepatobiliary Surgical Oncology  [facility] [place], [place]  Office: [phone]  Fax: [phone]

## 2024-10-17 NOTE — ANESTHESIA POSTPROCEDURE EVALUATION
Anesthesia Post Evaluation    Patient: Gus Sabillno    Procedure(s) Performed: Procedure(s) (LRB):  EGD (ESOPHAGOGASTRODUODENOSCOPY) (N/A)    Final Anesthesia Type: general      Patient location during evaluation: PACU  Patient participation: Yes- Able to Participate  Level of consciousness: awake and alert and oriented  Post-procedure vital signs: reviewed and stable  Pain management: adequate  Airway patency: patent    PONV status at discharge: No PONV  Anesthetic complications: no      Cardiovascular status: hemodynamically stable  Respiratory status: unassisted, spontaneous ventilation and room air  Hydration status: euvolemic  Follow-up not needed.              Vitals Value Taken Time   /60 10/17/24 1000   Temp 36.7 °C (98 °F) 10/17/24 1000   Pulse 85 10/17/24 1000   Resp 19 10/17/24 1000   SpO2 99 % 10/17/24 1000         No case tracking events are documented in the log.      Pain/Alexia Score: Alexia Score: 9 (10/17/2024  9:40 AM)

## 2024-10-17 NOTE — TRANSFER OF CARE
"Anesthesia Transfer of Care Note    Patient: Gus Sabillon    Procedure(s) Performed: Procedure(s) (LRB):  EGD (ESOPHAGOGASTRODUODENOSCOPY) (N/A)    Patient location: PACU    Anesthesia Type: general    Transport from OR: Transported from OR on room air with adequate spontaneous ventilation    Post pain: adequate analgesia    Post assessment: no apparent anesthetic complications    Post vital signs: stable    Level of consciousness: responds to stimulation    Nausea/Vomiting: no nausea/vomiting    Complications: none    Transfer of care protocol was followed      Last vitals: Visit Vitals  BP 88/63 (Patient Position: Lying)   Pulse 96   Temp 36.7 °C (98 °F) (Temporal)   Resp 16   Ht 6' 1" (1.854 m)   Wt 70.3 kg (155 lb)   SpO2 95%   BMI 20.45 kg/m²     "

## 2024-10-17 NOTE — PROVATION PATIENT INSTRUCTIONS
Discharge Summary/Instructions after an Endoscopic Procedure  Patient Name: Gus Gregorio  Patient MRN: [id number]  Patient YOB: 1940 Thursday, October 17, 2024  Davon Rucker MD  Dear patient,  As a result of recent federal legislation (The Federal Cures Act), you may   receive lab or pathology results from your procedure in your [facility]   account before your physician is able to contact you. Your physician or   their representative will relay the results to you with their   recommendations at their soonest availability.  Thank you,  RESTRICTIONS:  During your procedure today, you received medications for sedation.  These   medications may affect your judgment, balance and coordination.  Therefore,   for 24 hours, you have the following restrictions:   - DO NOT drive a car, operate machinery, make legal/financial decisions,   sign important papers or drink alcohol.    ACTIVITY:  Today: no heavy lifting, straining or running due to procedural   sedation/anesthesia.  The following day: return to full activity including work.  DIET:  Eat and drink normally unless instructed otherwise.     TREATMENT FOR COMMON SIDE EFFECTS:  - Mild abdominal pain, nausea, belching, bloating or excessive gas:  rest,   eat lightly and use a heating pad.  - Sore Throat: treat with throat lozenges and/or gargle with warm salt   water.  - Because air was used during the procedure, expelling large amounts of air   from your rectum or belching is normal.  - If a bowel prep was taken, you may not have a bowel movement for 1-3 days.    This is normal.  SYMPTOMS TO WATCH FOR AND REPORT TO YOUR PHYSICIAN:  1. Abdominal pain or bloating, other than gas cramps.  2. Chest pain.  3. Back pain.  4. Signs of infection such as: chills or fever occurring within 24 hours   after the procedure.  5. Rectal bleeding, which would show as bright red, maroon, or black stools.   (A tablespoon of blood from the rectum is not serious, especially  if   hemorrhoids are present.)  6. Vomiting.  7. Weakness or dizziness.  GO DIRECTLY TO THE NEAREST EMERGENCY ROOM IF YOU HAVE ANY OF THE FOLLOWING:      Difficulty breathing              Chills and/or fever over 101 F   Persistent vomiting and/or vomiting blood   Severe abdominal pain   Severe chest pain   Black, tarry stools   Bleeding- more than one tablespoon   Any other symptom or condition that you feel may need urgent attention  Your doctor recommends these additional instructions:  If any biopsies were taken, your doctors clinic will contact you in 1 to 2   weeks with any results.  - Patient has a contact number available for emergencies.  The signs and   symptoms of potential delayed complications were discussed with the   patient.  Return to normal activities tomorrow.  Written discharge   instructions were provided to the patient.   - Discharge patient to home (ambulatory).   - Resume previous diet.   - Continue present medications.   - Return to referring physician after studies are complete.  For questions, problems or results please call your physician - Davon Rucker MD at Work:  [phone].  [facility], EMERGENCY ROOM PHONE NUMBER: [phone]  IF A COMPLICATION OR EMERGENCY SITUATION ARISES AND YOU ARE UNABLE TO REACH   YOUR PHYSICIAN - GO DIRECTLY TO THE EMERGENCY ROOM.  Davon Rucker MD  10/17/2024 9:19:32 AM  This report has been verified and signed electronically.  Dear patient,  As a result of recent federal legislation (The Federal Cures Act), you may   receive lab or pathology results from your procedure in your [facility]   account before your physician is able to contact you. Your physician or   their representative will relay the results to you with their   recommendations at their soonest availability.  Thank you,  PROVATION

## 2024-10-23 LAB
FINAL PATHOLOGIC DIAGNOSIS: NORMAL
GROSS: NORMAL
Lab: NORMAL

## 2024-10-24 ENCOUNTER — PATIENT MESSAGE (OUTPATIENT)
Dept: GASTROENTEROLOGY | Facility: HOSPITAL | Age: 84
End: 2024-10-24
Payer: MEDICARE

## 2024-11-05 NOTE — TELEPHONE ENCOUNTER
Feels better since last visit with reduction of GDMT. He restarted spironolactone 25x1 for some swelling.     Patient with elevated BNP and very high PVC burden on holter.    Will add furosemide 10x1, pt hesitant about higher doses.     Start amiodarone. Check TTE. Refer to EPS.    SPEP/UPEP negative. PYP scan scheduled for June. Concern for infiltrative ds remains high. Pt does have a h/o PVCs dating back to childhood per patient.     Patient still planning on moving to [place] this summer. During conversation regarding my concerns he acknowledged that he is 84 and still prefers conservative management when possible. He is a  by training and plays a strong role in joint decision making.      0

## 2024-11-11 DIAGNOSIS — K21.9 GASTROESOPHAGEAL REFLUX DISEASE, UNSPECIFIED WHETHER ESOPHAGITIS PRESENT: ICD-10-CM

## 2024-11-11 RX ORDER — OMEPRAZOLE 20 MG/1
20 CAPSULE, DELAYED RELEASE ORAL EVERY MORNING
Qty: 90 CAPSULE | Refills: 0 | Status: SHIPPED | OUTPATIENT
Start: 2024-11-11

## 2024-11-12 ENCOUNTER — OFFICE VISIT (OUTPATIENT)
Dept: GASTROENTEROLOGY | Facility: CLINIC | Age: 84
End: 2024-11-12
Attending: INTERNAL MEDICINE
Payer: MEDICARE

## 2024-11-12 VITALS — WEIGHT: 161.19 LBS | HEIGHT: 73 IN | BODY MASS INDEX: 21.36 KG/M2

## 2024-11-12 DIAGNOSIS — K59.00 CONSTIPATION, UNSPECIFIED CONSTIPATION TYPE: ICD-10-CM

## 2024-11-12 DIAGNOSIS — I43 SENILE CARDIAC AMYLOIDOSIS: ICD-10-CM

## 2024-11-12 DIAGNOSIS — Z80.0 FAMILY HISTORY OF COLON CANCER: ICD-10-CM

## 2024-11-12 DIAGNOSIS — E85.4 SENILE CARDIAC AMYLOIDOSIS: ICD-10-CM

## 2024-11-12 DIAGNOSIS — K22.4 ESOPHAGEAL DYSMOTILITY: Primary | ICD-10-CM

## 2024-11-12 PROCEDURE — 1159F MED LIST DOCD IN RCRD: CPT | Mod: CPTII,S$GLB,, | Performed by: INTERNAL MEDICINE

## 2024-11-12 PROCEDURE — 3288F FALL RISK ASSESSMENT DOCD: CPT | Mod: CPTII,S$GLB,, | Performed by: INTERNAL MEDICINE

## 2024-11-12 PROCEDURE — 99214 OFFICE O/P EST MOD 30 MIN: CPT | Mod: S$GLB,,, | Performed by: INTERNAL MEDICINE

## 2024-11-12 PROCEDURE — 1101F PT FALLS ASSESS-DOCD LE1/YR: CPT | Mod: CPTII,S$GLB,, | Performed by: INTERNAL MEDICINE

## 2024-11-12 PROCEDURE — 1160F RVW MEDS BY RX/DR IN RCRD: CPT | Mod: CPTII,S$GLB,, | Performed by: INTERNAL MEDICINE

## 2024-11-12 PROCEDURE — 1125F AMNT PAIN NOTED PAIN PRSNT: CPT | Mod: CPTII,S$GLB,, | Performed by: INTERNAL MEDICINE

## 2024-11-12 PROCEDURE — 99999 PR PBB SHADOW E&M-EST. PATIENT-LVL III: CPT | Mod: PBBFAC,,, | Performed by: INTERNAL MEDICINE

## 2024-11-12 NOTE — PROGRESS NOTES
Subjective:       Patient ID: Gus Sabillon is a 84 y.o. male.    Chief Complaint: Abdominal Pain    HPI    84-year-old gentleman.  He has seen Dr. garcia in our department before about 2 or 3 years ago for some oropharyngeal dysphagia.  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  There are number symptoms that relate to the GI tract.  Have a very long history of solid-food dysphagia.  But he finds that as long as he eats slowly and chews carefully this problem has remained stable for the years and does not really affect him.  He has been diagnosed with reflux but he really does not have any pyrosis.  There is some nocturnal regurgitation.  He has been on omeprazole and even omeprazole twice a day and that did not seem to affect his symptoms improve his symptoms.  Does have rare oropharyngeal dysphagia for water with choking.  He does have mucus in the throat and difficult time clearing out some thick mucus.  Describes postnasal drip in the past he has had a problems with diarrhea but more recently he has had constipation.  Right now most stools are normal but some days are difficult to pass.    He has had several colonoscopies in the past but none here in [place].  did have a sister with colon cancer and was told to have it every 5 years    Past Medical History:   Diagnosis Date    Carotid artery disease     CHF (congestive heart failure)     CKD (chronic kidney disease) stage 3, GFR 30-59 ml/min     Dyslipidemia     GERD (gastroesophageal reflux disease)     Hx of melanoma excision     Hypertension     Melanoma     PAD (peripheral artery disease)     SCC (squamous cell carcinoma) 05/01/2024    Right shin distal    Squamous cell carcinoma of skin 05/01/2024    Right shin proximal       Review of patient's allergies indicates:   Allergen Reactions    Adhesive Hives, Itching and Blisters    Clindamycin Nausea And Vomiting    Penicillins  Hives        Family History   Problem Relation Name Age of Onset    Diabetes Mother      Hyperlipidemia Mother      Heart disease Father      Cancer Sister          ? colon or uterine    Colon cancer Sister      No Known Problems Brother Rishabh     No Known Problems Brother Lee     No Known Problems Daughter      No Known Problems Son         Social History     Tobacco Use    Smoking status: Former     Current packs/day: 0.00     Average packs/day: 2.0 packs/day for 15.0 years (30.0 ttl pk-yrs)     Types: Cigarettes     Start date:      Quit date:      Years since quittin.8     Passive exposure: Never    Smokeless tobacco: Never   Substance Use Topics    Alcohol use: Yes     Alcohol/week: 14.0 standard drinks of alcohol     Types: 14 Glasses of wine per week     Comment: 2 glasses of wine daily    Drug use: Never        Review of Systems        Fl Modified Barium Swallow Speech 2024    Narrative  EXAMINATION:  FL MODIFIED BARIUM SWALLOW SPEECH STUDY    CLINICAL HISTORY:  Dysphagia, unspecified    TECHNIQUE:  Modified Barium Swallow Study. Video fluoroscopic swallowing examination was performed in conjunction with the speech pathology department.  Various liquid and solid food substances were used to assess swallowing.    Fluoroscopy Time: 02:22 minutes please note the recording device was malfunction.  Images were reviewed on an Ipad at the time of the study    COMPARISON:  CTA neck 2021    Impression  Transit: Normal oral transit time.  Some thick contrast stasis in the vallecula which cleared after liquids.    Penetration/Aspiration: None.    Other: Impression on the posterior pharyngeal/cervical esophagus wall by cervical degenerative changes/cricopharyngeus muscle without significant narrowing or obstruction.    See speech pathology report for further details.    Electronically signed by resident: Aj Juarez  Date:    2024  Time:    15:03    Electronically  signed by: Woo Robin MD  Date:    08/23/2024  Time:    15:34             Objective:      Physical Exam    Assessment & Plan:       Esophageal dysmotility    Senile cardiac amyloidosis  -     Ambulatory consult to Gastroenterology    Family history of colon cancer    Constipation, unspecified constipation type     Assessment  1. Esophageal dysmotility.  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  And this is probably indicative of a nonspecific esophageal motility disorder as an ineffective peristalsis.  And this may have something to do with the nocturnal regurgitation as those her secretions just do not rapidly cleared into the stomach.  And likewise the mucus in his throat may relate to this dysmotility.  However there is nothing more that can be done for this other than what he is doing right now.  I do not think this will become progressive in any way to inhibit his oral intake.  Nor do I think we need any further evaluation.  Esophageal manometry might give specific information about the dysmotility but that would not translate in any specific treatment  2. Amyloidosis.  3. Family history of colon cancer.  Given the age of 84, he may not need future surveillance.  Really depends on what the results of the prior colonoscopies have been, and I do not have access to those.  But I think for now considering other medical problems that colon cancer surveillance does not need to be done  4. Constipation past history of diarrhea now a tendency towards constipation.  This may or may not relate to any motility problem associated with amyloidosis.  I encouraged him right now to hopefully avoid constipation by using a small dose of MiraLax maybe a half capful every day, but contact me if that causes problems    This note was created with voice recognition dictation technology.  There may be errors that I did not see, detect or correct.        Davon Rucker MD

## 2024-11-12 NOTE — PATIENT INSTRUCTIONS
Continue to chew carefully and eat slowly  Try MiraLax over-the-counter but go with less than the usual dose, possibly 1/2 capful each day

## 2024-11-19 ENCOUNTER — TELEPHONE (OUTPATIENT)
Dept: CARDIOLOGY | Facility: CLINIC | Age: 84
End: 2024-11-19
Payer: MEDICARE

## 2024-11-19 ENCOUNTER — PATIENT MESSAGE (OUTPATIENT)
Dept: CARDIOLOGY | Facility: CLINIC | Age: 84
End: 2024-11-19
Payer: MEDICARE

## 2024-11-21 ENCOUNTER — TELEPHONE (OUTPATIENT)
Dept: CARDIOLOGY | Facility: CLINIC | Age: 84
End: 2024-11-21
Payer: MEDICARE

## 2024-11-21 ENCOUNTER — HOSPITAL ENCOUNTER (OUTPATIENT)
Dept: CARDIOLOGY | Facility: HOSPITAL | Age: 84
Discharge: HOME | End: 2024-11-21
Attending: INTERNAL MEDICINE
Payer: MEDICARE

## 2024-11-21 ENCOUNTER — OFFICE VISIT (OUTPATIENT)
Dept: NEUROLOGY | Facility: CLINIC | Age: 84
End: 2024-11-21
Payer: MEDICARE

## 2024-11-21 VITALS
WEIGHT: 161 LBS | HEIGHT: 73 IN | BODY MASS INDEX: 21.34 KG/M2 | SYSTOLIC BLOOD PRESSURE: 151 MMHG | DIASTOLIC BLOOD PRESSURE: 65 MMHG | HEART RATE: 102 BPM

## 2024-11-21 VITALS — HEART RATE: 92 BPM | DIASTOLIC BLOOD PRESSURE: 73 MMHG | SYSTOLIC BLOOD PRESSURE: 117 MMHG

## 2024-11-21 DIAGNOSIS — G89.29 CHRONIC RIGHT-SIDED LOW BACK PAIN WITHOUT SCIATICA: Primary | ICD-10-CM

## 2024-11-21 DIAGNOSIS — I47.29 NONSUSTAINED VENTRICULAR TACHYCARDIA: ICD-10-CM

## 2024-11-21 DIAGNOSIS — I43 SENILE CARDIAC AMYLOIDOSIS: ICD-10-CM

## 2024-11-21 DIAGNOSIS — E85.1: ICD-10-CM

## 2024-11-21 DIAGNOSIS — R93.1 ABNORMAL FINDINGS ON DIAGNOSTIC IMAGING OF HEART AND CORONARY CIRCULATION: ICD-10-CM

## 2024-11-21 DIAGNOSIS — G63: ICD-10-CM

## 2024-11-21 DIAGNOSIS — E85.4 SENILE CARDIAC AMYLOIDOSIS: ICD-10-CM

## 2024-11-21 DIAGNOSIS — M54.50 CHRONIC RIGHT-SIDED LOW BACK PAIN WITHOUT SCIATICA: Primary | ICD-10-CM

## 2024-11-21 DIAGNOSIS — R94.39 EQUIVOCAL STRESS TEST: ICD-10-CM

## 2024-11-21 LAB
CFR FLOW - ANTERIOR: 1.12
CFR FLOW - INFERIOR: 1.11
CFR FLOW - LATERAL: 1.11
CFR FLOW - MAX: 1.43
CFR FLOW - MIN: 0.72
CFR FLOW - SEPTAL: 1
CFR FLOW - WHOLE HEART: 1.09
CV PHARM DOSE: 0.4 MG
CV STRESS BASE HR: 102 BPM
DIASTOLIC BLOOD PRESSURE: 65 MMHG
EJECTION FRACTION- HIGH: 59 %
END DIASTOLIC INDEX-HIGH: 155 ML/M2
END DIASTOLIC INDEX-LOW: 91 ML/M2
END SYSTOLIC INDEX-HIGH: 78 ML/M2
END SYSTOLIC INDEX-LOW: 40 ML/M2
NUC REST DIASTOLIC VOLUME INDEX: 135
NUC REST EJECTION FRACTION: 29
NUC REST SYSTOLIC VOLUME INDEX: 96
NUC STRESS DIASTOLIC VOLUME INDEX: 191
NUC STRESS EJECTION FRACTION: 27 %
NUC STRESS SYSTOLIC VOLUME INDEX: 139
OHS CV CPX 85 PERCENT MAX PREDICTED HEART RATE MALE: 116
OHS CV CPX MAX PREDICTED HEART RATE: 137
OHS CV CPX PATIENT IS FEMALE: 0
OHS CV CPX PATIENT IS MALE: 1
OHS CV CPX PEAK DIASTOLIC BLOOD PRESSURE: 65 MMHG
OHS CV CPX PEAK HEAR RATE: 86 BPM
OHS CV CPX PEAK RATE PRESSURE PRODUCT: NORMAL
OHS CV CPX PEAK SYSTOLIC BLOOD PRESSURE: 134 MMHG
OHS CV CPX PERCENT MAX PREDICTED HEART RATE ACHIEVED: 63
OHS CV CPX RATE PRESSURE PRODUCT PRESENTING: NORMAL
OHS CV INITIAL DOSE: 22 MCG/KG/MIN
OHS CV MODERATELY REDUCED FLOW CAPACITY: 24 %
OHS CV MYOCARDIAL STEAL: 24 %
OHS CV NO ISCHEMIA MILDLY REDUCED FLOW CAPACTY: 14 %
OHS CV NO ISCHEMIA MINIMALLY REDUCED FLOW CAPACITY: 0 %
OHS CV NORMAL FLOW CAPACITY COMPARABLE TO HEALTHY YOUNG VOLUNTEERS: 0 %
OHS CV PEAK DOSE: 21.7 MCG/KG/MIN
OHS CV PET ID: 7673
OHS CV SEVERELY REDUCED FLOW CAPACITY: 63 %
OHS CV TOTAL EXAM DLP: 346.2 MGY-CM
PERFUSION DEFECT 1 SIZE IN %: 13 %
REST FLOW - ANTERIOR: 0.83 CC/MIN/G
REST FLOW - INFERIOR: 0.61 CC/MIN/G
REST FLOW - LATERAL: 0.95 CC/MIN/G
REST FLOW - MAX: 1.2 CC/MIN/G
REST FLOW - MIN: 0.35 CC/MIN/G
REST FLOW - SEPTAL: 0.6 CC/MIN/G
REST FLOW - WHOLE HEART: 0.75 CC/MIN/G
RETIRED EF AND QEF - SEE NOTES: 47 %
STRESS FLOW - ANTERIOR: 0.94 CC/MIN/G
STRESS FLOW - INFERIOR: 0.68 CC/MIN/G
STRESS FLOW - LATERAL: 1.06 CC/MIN/G
STRESS FLOW - MAX: 1.4 CC/MIN/G
STRESS FLOW - MIN: 0.33 CC/MIN/G
STRESS FLOW - SEPTAL: 0.6 CC/MIN/G
STRESS FLOW - WHOLE HEART: 0.82 CC/MIN/G
STRESS ST DEPRESSION: 1 MM
SYSTOLIC BLOOD PRESSURE: 151 MMHG

## 2024-11-21 PROCEDURE — 78431 MYOCRD IMG PET RST&STRS CT: CPT

## 2024-11-21 PROCEDURE — 63600175 PHARM REV CODE 636 W HCPCS: Performed by: INTERNAL MEDICINE

## 2024-11-21 PROCEDURE — A9555 RB82 RUBIDIUM: HCPCS | Performed by: INTERNAL MEDICINE

## 2024-11-21 PROCEDURE — 99999 PR PBB SHADOW E&M-EST. PATIENT-LVL III: CPT | Mod: PBBFAC,GC,,

## 2024-11-21 RX ORDER — REGADENOSON 0.08 MG/ML
0.4 INJECTION, SOLUTION INTRAVENOUS
Status: COMPLETED | OUTPATIENT
Start: 2024-11-21 | End: 2024-11-21

## 2024-11-21 RX ORDER — AMINOPHYLLINE 25 MG/ML
75 INJECTION, SOLUTION INTRAVENOUS ONCE
Status: COMPLETED | OUTPATIENT
Start: 2024-11-21 | End: 2024-11-21

## 2024-11-21 RX ORDER — METHOCARBAMOL 500 MG/1
1500 TABLET, FILM COATED ORAL 3 TIMES DAILY PRN
Qty: 120 TABLET | Refills: 11 | Status: SHIPPED | OUTPATIENT
Start: 2024-11-21

## 2024-11-21 RX ADMIN — AMINOPHYLLINE 75 MG: 25 INJECTION, SOLUTION INTRAVENOUS at 01:11

## 2024-11-21 RX ADMIN — RUBIDIUM CHLORIDE RB-82 21.7 MILLICURIE: 150 INJECTION, SOLUTION INTRAVENOUS at 01:11

## 2024-11-21 RX ADMIN — RUBIDIUM CHLORIDE RB-82 22 MILLICURIE: 150 INJECTION, SOLUTION INTRAVENOUS at 12:11

## 2024-11-21 RX ADMIN — REGADENOSON 0.4 MG: 0.08 INJECTION, SOLUTION INTRAVENOUS at 01:11

## 2024-11-21 NOTE — TELEPHONE ENCOUNTER
----- Message from Karrie Sarabia MD sent at 11/21/2024  4:40 PM CST -----  Lets setup for cardiac cath Monday am at [facility].

## 2024-11-21 NOTE — PROGRESS NOTES
"[facility] - NEUROLOGY 7TH FL [facility], [place]    Date: 11/21/24  Patient Name: Gus Sabillon   MRN: [id number]   PCP: [facility]  Referring Provider: Reji Zhong Jr*    Assessment:   Gus Sabillon is a 84 y.o. male presenting for neurologic evaluation and back pain in the setting of newly diagnosed amyloidosis in 6/2024. He is currently on tafamidis (vyndamax), lasix, and eplerenone. He is currently followed by cardiology by Dr. Zhong. He has been referred by Dr. Zhong for neurology evaluation and possible EMG.     Patient reports back "pressure / compressive" pain in the R lower lumbar area, that began in 6/2024. He has also has lost 20 pounds in the past two years.  In July, he had some difficulty with bowel movement and requires straining. He describes his back pain as "floating". It has slightly worsened over the past few months but denies drastic worsening.    He reports the pain lasts 1-2 hours at a time. Not improved by changes in position. Initially lying down would worsen the pain but now he has felt a bit better when lying down. No temporal pattern noted.     He denies any focal weakness or loss of sensation. He reports reduced spontaneous functioning of bowel movements as well as swallowing. No changes in perianal or groin sensation. Patient's back pain appears to be related to spasmodic and intermittent.    In terms of amyloidosis, no current indication or evidence of neurological involvement. MRI brain showed some possible microbleed on SWI that may be artefactual. Patient counseled on avoiding blood thinners or falls and maintaining hypertension control.    Plan:   -- EMG / NCS  3 limbs  -- begin methocarbamol 1,500 mg TID PRN for back pain  -- RTC Jan 9th, 2025    Problem List Items Addressed This Visit    None  Visit Diagnoses       Chronic right-sided low back pain without sciatica    -  Primary    Relevant Medications    " methocarbamoL (ROBAXIN) 500 MG Tab    Senile cardiac amyloidosis        Relevant Medications    methocarbamoL (ROBAXIN) 500 MG Tab    Other Relevant Orders    EMG W/ ULTRASOUND AND NERVE CONDUCTION TEST 3 Extremities    NM Gastric Emptying    Amyloid neuropathies        Relevant Orders    EMG W/ ULTRASOUND AND NERVE CONDUCTION TEST 3 Extremities            11/21/2024  Gerson Wynne MD, [facility]  Neurology resident, PGY-4  Department of Neurology  [facility]        Subjective:          HPI:   Mr. Gus Sabillon is a 84 y.o. male presenting with the above complaints. Please see assessment and plan for HPI on this initial visit.    PAST MEDICAL HISTORY:  Past Medical History:   Diagnosis Date    Carotid artery disease     CHF (congestive heart failure)     CKD (chronic kidney disease) stage 3, GFR 30-59 ml/min     Dyslipidemia     GERD (gastroesophageal reflux disease)     Hx of melanoma excision     Hypertension     Melanoma     PAD (peripheral artery disease)     SCC (squamous cell carcinoma) 05/01/2024    Right shin distal    Squamous cell carcinoma of skin 05/01/2024    Right shin proximal       PAST SURGICAL HISTORY:  Past Surgical History:   Procedure Laterality Date    ARTHROSCOPIC DEBRIDEMENT OF SHOULDER Right 02/27/2020    Procedure: EXTENSIVE DEBRIDEMENT, SHOULDER, ARTHROSCOPIC;  Surgeon: Staci Childress MD;  Location: [facility] OR;  Service: Orthopedics;  Laterality: Right;    ARTHROSCOPIC DEBRIDEMENT OF SHOULDER Left 06/21/2022    Procedure: EXTENSIVE DEBRIDEMENT, SHOULDER, ARTHROSCOPIC;  Surgeon: Staci Childress MD;  Location: [facility] OR;  Service: Orthopedics;  Laterality: Left;    ARTHROSCOPIC REPAIR OF ROTATOR CUFF OF SHOULDER Right 02/27/2020    Procedure: REPAIR, ROTATOR CUFF, ARTHROSCOPIC;  Surgeon: Staci Childress MD;  Location: [facility] OR;  Service: Orthopedics;  Laterality: Right;    ARTHROSCOPIC REPAIR OF ROTATOR CUFF OF SHOULDER Left 06/21/2022    Procedure: REPAIR, ROTATOR CUFF, ARTHROSCOPIC WITH CUFF  MEND;  Surgeon: Staci Childress MD;  Location: [facility] OR;  Service: Orthopedics;  Laterality: Left;    ARTHROSCOPY OF SHOULDER WITH DECOMPRESSION OF SUBACROMIAL SPACE Right 02/27/2020    Procedure: ARTHROSCOPY, SHOULDER, WITH SUBACROMIAL SPACE DECOMPRESSION;  Surgeon: Staci Childress MD;  Location: [facility] OR;  Service: Orthopedics;  Laterality: Right;    ARTHROSCOPY OF SHOULDER WITH DECOMPRESSION OF SUBACROMIAL SPACE Left 06/21/2022    Procedure: ARTHROSCOPY, SHOULDER, WITH SUBACROMIAL  DECOMPRESSION;  Surgeon: Staci Childress MD;  Location: [facility] OR;  Service: Orthopedics;  Laterality: Left;    ARTHROSCOPY OF SHOULDER WITH REMOVAL OF DISTAL CLAVICLE Left 06/21/2022    Procedure: ARTHROSCOPY, SHOULDER, WITH DISTAL CLAVICLE EXCISION;  Surgeon: Staci Childress MD;  Location: [facility] OR;  Service: Orthopedics;  Laterality: Left;    ARTHROSCOPY,SHOULDER,WITH BICEPS TENODESIS Left 06/21/2022    Procedure: ARTHROSCOPY,SHOULDER,WITH BICEPS TENODESIS;  Surgeon: Staci Childress MD;  Location: [facility] OR;  Service: Orthopedics;  Laterality: Left;    BLEPHAROPLASTY      CAROTID ENDARTERECTOMY Right 10/15/2021    Procedure: ENDARTERECTOMY-CAROTID;  Surgeon: Imer Farooq MD;  Location: [facility] OR [address] [place] [address];  Service: Peripheral Vascular;  Laterality: Right;  AWAKE CERVICAL BLOCK    CATARACT EXTRACTION, BILATERAL      COSMETIC SURGERY      ESOPHAGOGASTRODUODENOSCOPY N/A 10/17/2024    Procedure: EGD (ESOPHAGOGASTRODUODENOSCOPY);  Surgeon: Davon Rucker MD;  [facility]  Service: Endoscopy;  Laterality: N/A;  Ref Dr [facility]nxqgyccyfl-Caymnp-AGyb  10/8 r/s from  on 10/15 to main campus due to cardiac hx, portal,precall complete-st  10/10-pre call complete-pt having sutures removed 11am at [facility]-moved to 2nd floor for earlier arrival time-tb    EXCISION OF BURSA Left 06/21/2022    Procedure: BURSECTOMY;  Surgeon: Staci Childress MD;  Location: [facility] OR;  Service: Orthopedics;  Laterality: Left;    EYE SURGERY Bilateral     cataract removal    FIXATION  OF TENDON Right 02/27/2020    Procedure: FIXATION, TENDON, Biceps Tenodesis;  Surgeon: Staci Childress MD;  Location: [facility] OR;  Service: Orthopedics;  Laterality: Right;  FIXATION, TENDON, Biceps Tenodesis    OPEN REDUCTION AND INTERNAL FIXATION (ORIF) OF FRACTURE OF PROXIMAL HUMERUS Right 02/27/2020    Procedure: ORIF, FRACTURE, GREATER TUBEROSITY;  Surgeon: Staci Childress MD;  Location: [facility] OR;  Service: Orthopedics;  Laterality: Right;    ORIF HUMERUS FRACTURE Left 06/21/2022    Procedure: ORIF, FRACTURE, HUMERUS, GREATER TUBEROSITY;  Surgeon: Staci Childress MD;  Location: [facility] OR;  Service: Orthopedics;  Laterality: Left;    SHOULDER ARTHROSCOPY Left 06/21/2022    Procedure: ARTHROSCOPY, SHOULDER WITH LABRAL REPAIR;  Surgeon: Staci Childress MD;  Location: [facility] OR;  Service: Orthopedics;  Laterality: Left;    TONSILLECTOMY      VASECTOMY         CURRENT MEDS:  Current Outpatient Medications   Medication Sig Dispense Refill    eplerenone (INSPRA) 25 MG Tab Take 25 mg by mouth once daily.      fluorouraciL (EFUDEX) 5 % cream AAA on left shin BID x 2-3 weeks. Stop if blistered, oozing, or bleeding. Use daily sun protection. 40 g 1    furosemide (LASIX) 80 MG tablet Take 1 tablet (80 mg total) by mouth once daily. Tale afternoon dose if needed 60 tablet 11    ketoconazole (NIZORAL) 2 % cream Apply topically 2 (two) times daily. 30 g 2    omeprazole (PRILOSEC) 20 MG capsule TAKE 1 CAPSULE BY MOUTH EVERY DAY IN THE MORNING 90 capsule 0    tafamidis (VYNDAMAX) 61 mg Cap Take 1 capsule (61 mg total) by mouth once daily. 30 capsule 11    methocarbamoL (ROBAXIN) 500 MG Tab Take 3 tablets (1,500 mg total) by mouth 3 (three) times daily as needed. Start by taking 2 tablets as needed, 3 times a day. If side effects minimal, can increase to 3 tablets 3 times a day. 120 tablet 11     No current facility-administered medications for this visit.       ALLERGIES:  Review of patient's allergies indicates:   Allergen Reactions    Adhesive  Hives, Itching and Blisters    Clindamycin Nausea And Vomiting    Penicillins Hives       FAMILY HISTORY:  Family History   Problem Relation Name Age of Onset    Diabetes Mother      Hyperlipidemia Mother      Heart disease Father      Cancer Sister          ? colon or uterine    Colon cancer Sister      No Known Problems Brother Rishabh     No Known Problems Brother Lee     No Known Problems Daughter      No Known Problems Son         SOCIAL HISTORY:  Social History     Tobacco Use    Smoking status: Former     Current packs/day: 0.00     Average packs/day: 2.0 packs/day for 15.0 years (30.0 ttl pk-yrs)     Types: Cigarettes     Start date:      Quit date:      Years since quittin.9     Passive exposure: Never    Smokeless tobacco: Never   Substance Use Topics    Alcohol use: Yes     Alcohol/week: 14.0 standard drinks of alcohol     Types: 14 Glasses of wine per week     Comment: 2 glasses of wine daily    Drug use: Never       Review of Systems:  12 system review of systems is negative except for the symptoms mentioned in HPI.        Objective:     Vitals:    24 0932   BP: 117/73   BP Location: Left arm   Patient Position: Sitting   Pulse: 92     General: NAD, well nourished   Eyes: no tearing, discharge, no erythema   ENT: moist mucous membranes of the oral cavity, nares patent    Neck: Supple, full range of motion  Cardiovascular: Warm and well perfused, pulses equal and symmetrical  Lungs: Normal work of breathing, normal chest wall excursions  Skin: No rash, lesions, or breakdown on exposed skin  Psychiatry: Mood and affect are appropriate   Abdomen: soft, non tender, non distended  Extremeties: No cyanosis, clubbing or edema.    Neurological Exam:  MENTAL STATUS  Level of consciousness: alert  Orientation: oriented to person, place, and time  Attention normal. Concentration normal.  Speech: normal    CRANIAL NERVES  CN II: Visual fields full to confrontation  CN III, IV, VI: PERRL,  EOMI  CN V: Facial sensation intact  CN VII: Facial expression symmetric and full  CN VIII: Hearing intact to finger rub  CN IX, X: Symmetric palate elevation. Phonation normal  CN XI: Shoulder shrug and head turn intact bilaterally  CN XII: Tongue midline with normal movements, no atrophy    MOTOR EXAM  Muscle bulk: normal  Muscle tone: normal  Pronator drift: absent    Strength - Upper Extremities   Arm abduction Elbow flexion Elbow extension Wrist flexion Wrist extension Finger abduction   Right 5/5 5/5 5/5 5/5 5/5 5/5   Left 5/5 5/5 5/5 5/5 5/5 5/5     Strength - Lower Extremities   Hip flexion Knee flexion Knee extension Dorsiflexion Plantarflexion   Right 5/5 5/5 5/5 5/5 5/5   Left 5/5 5/5 5/5 5/5 5/5       REFLEXES   Biceps Triceps Brachioradialis Patellar Achilles   Right +2 +2 +2 +2 +2   Left +2 +2 +2 +2 +2     Toes down bilaterally    SENSORY EXAM  Light touch: intact in all 4 extremities  Vibration: decreased to ankles bilaterally    COORDINATION  Finger to nose: normal  Tremor: none

## 2024-11-21 NOTE — TELEPHONE ENCOUNTER
----- Message from Karrie Sarabia MD sent at 11/21/2024  4:32 PM CST -----  Can we set patient up for labs tomorrow.

## 2024-11-21 NOTE — TELEPHONE ENCOUNTER
----- Message from Karrie Sarabia MD sent at 11/21/2024  3:28 PM CST -----  Can we setup for clinic follow-up on Monday.

## 2024-11-22 ENCOUNTER — LAB VISIT (OUTPATIENT)
Dept: LAB | Facility: HOSPITAL | Age: 84
End: 2024-11-22
Attending: INTERNAL MEDICINE
Payer: MEDICARE

## 2024-11-22 DIAGNOSIS — I50.20 HEART FAILURE WITH REDUCED EJECTION FRACTION: ICD-10-CM

## 2024-11-22 LAB
ANION GAP SERPL CALC-SCNC: 9 MMOL/L (ref 8–16)
BASOPHILS # BLD AUTO: 0.08 K/UL (ref 0–0.2)
BASOPHILS NFR BLD: 1 % (ref 0–1.9)
BUN SERPL-MCNC: 36 MG/DL (ref 8–23)
CALCIUM SERPL-MCNC: 9.5 MG/DL (ref 8.7–10.5)
CHLORIDE SERPL-SCNC: 102 MMOL/L (ref 95–110)
CO2 SERPL-SCNC: 29 MMOL/L (ref 23–29)
CREAT SERPL-MCNC: 1.6 MG/DL (ref 0.5–1.4)
DIFFERENTIAL METHOD BLD: ABNORMAL
EOSINOPHIL # BLD AUTO: 0.4 K/UL (ref 0–0.5)
EOSINOPHIL NFR BLD: 4.3 % (ref 0–8)
ERYTHROCYTE [DISTWIDTH] IN BLOOD BY AUTOMATED COUNT: 19.2 % (ref 11.5–14.5)
EST. GFR  (NO RACE VARIABLE): 42.5 ML/MIN/1.73 M^2
GLUCOSE SERPL-MCNC: 128 MG/DL (ref 70–110)
HCT VFR BLD AUTO: 41.6 % (ref 40–54)
HGB BLD-MCNC: 12.5 G/DL (ref 14–18)
IMM GRANULOCYTES # BLD AUTO: 0.02 K/UL (ref 0–0.04)
IMM GRANULOCYTES NFR BLD AUTO: 0.2 % (ref 0–0.5)
LYMPHOCYTES # BLD AUTO: 2.9 K/UL (ref 1–4.8)
LYMPHOCYTES NFR BLD: 34.6 % (ref 18–48)
MCH RBC QN AUTO: 26.3 PG (ref 27–31)
MCHC RBC AUTO-ENTMCNC: 30 G/DL (ref 32–36)
MCV RBC AUTO: 88 FL (ref 82–98)
MONOCYTES # BLD AUTO: 0.9 K/UL (ref 0.3–1)
MONOCYTES NFR BLD: 10.1 % (ref 4–15)
NEUTROPHILS # BLD AUTO: 4.2 K/UL (ref 1.8–7.7)
NEUTROPHILS NFR BLD: 49.8 % (ref 38–73)
NRBC BLD-RTO: 0 /100 WBC
PLATELET # BLD AUTO: 246 K/UL (ref 150–450)
PMV BLD AUTO: 11.7 FL (ref 9.2–12.9)
POTASSIUM SERPL-SCNC: 4 MMOL/L (ref 3.5–5.1)
RBC # BLD AUTO: 4.75 M/UL (ref 4.6–6.2)
SODIUM SERPL-SCNC: 140 MMOL/L (ref 136–145)
WBC # BLD AUTO: 8.38 K/UL (ref 3.9–12.7)

## 2024-11-22 PROCEDURE — 36415 COLL VENOUS BLD VENIPUNCTURE: CPT | Performed by: INTERNAL MEDICINE

## 2024-11-22 PROCEDURE — 80048 BASIC METABOLIC PNL TOTAL CA: CPT | Performed by: INTERNAL MEDICINE

## 2024-11-22 PROCEDURE — 85025 COMPLETE CBC W/AUTO DIFF WBC: CPT | Performed by: INTERNAL MEDICINE

## 2024-11-24 ENCOUNTER — NURSE TRIAGE (OUTPATIENT)
Dept: ADMINISTRATIVE | Facility: CLINIC | Age: 84
End: 2024-11-24
Payer: MEDICARE

## 2024-11-24 NOTE — TELEPHONE ENCOUNTER
Pt sched for procedure tomorrow morning- angiogram. Pt does not know when to be there or what time procedure is scheduled. Advised pt procedure sched for 9am, advise he arrives at least an hour early and NPO past midnight. Pt VU. No further assistance needed.  Reason for Disposition   Question about upcoming scheduled surgery, procedure or test, no triage required, and triager able to answer question    Protocols used: Information Only Call - No Triage-A-

## 2024-11-25 ENCOUNTER — HOSPITAL ENCOUNTER (OUTPATIENT)
Facility: HOSPITAL | Age: 84
Discharge: HOME | End: 2024-11-25
Attending: INTERNAL MEDICINE | Admitting: INTERNAL MEDICINE
Payer: MEDICARE

## 2024-11-25 VITALS
HEIGHT: 73 IN | BODY MASS INDEX: 21.2 KG/M2 | RESPIRATION RATE: 19 BRPM | DIASTOLIC BLOOD PRESSURE: 57 MMHG | HEART RATE: 82 BPM | TEMPERATURE: 97 F | SYSTOLIC BLOOD PRESSURE: 106 MMHG | WEIGHT: 160 LBS | OXYGEN SATURATION: 98 %

## 2024-11-25 DIAGNOSIS — R94.39 ABNORMAL STRESS TEST: ICD-10-CM

## 2024-11-25 DIAGNOSIS — I50.20 HEART FAILURE WITH REDUCED EJECTION FRACTION: ICD-10-CM

## 2024-11-25 DIAGNOSIS — I50.30 ACC/AHA STAGE C HEART FAILURE WITH PRESERVED EJECTION FRACTION: ICD-10-CM

## 2024-11-25 DIAGNOSIS — I49.3 PVC (PREMATURE VENTRICULAR CONTRACTION): ICD-10-CM

## 2024-11-25 PROCEDURE — 93454 CORONARY ARTERY ANGIO S&I: CPT | Mod: 26,,, | Performed by: INTERNAL MEDICINE

## 2024-11-25 PROCEDURE — 99152 MOD SED SAME PHYS/QHP 5/>YRS: CPT | Mod: ,,, | Performed by: INTERNAL MEDICINE

## 2024-11-25 PROCEDURE — 63600175 PHARM REV CODE 636 W HCPCS: Performed by: INTERNAL MEDICINE

## 2024-11-25 PROCEDURE — C1887 CATHETER, GUIDING: HCPCS | Performed by: INTERNAL MEDICINE

## 2024-11-25 PROCEDURE — 99152 MOD SED SAME PHYS/QHP 5/>YRS: CPT | Performed by: INTERNAL MEDICINE

## 2024-11-25 PROCEDURE — 25000003 PHARM REV CODE 250: Performed by: INTERNAL MEDICINE

## 2024-11-25 PROCEDURE — 93454 CORONARY ARTERY ANGIO S&I: CPT | Performed by: INTERNAL MEDICINE

## 2024-11-25 PROCEDURE — C1894 INTRO/SHEATH, NON-LASER: HCPCS | Performed by: INTERNAL MEDICINE

## 2024-11-25 PROCEDURE — C1769 GUIDE WIRE: HCPCS | Performed by: INTERNAL MEDICINE

## 2024-11-25 RX ORDER — FENTANYL CITRATE 50 UG/ML
INJECTION, SOLUTION INTRAMUSCULAR; INTRAVENOUS
Status: DISCONTINUED | OUTPATIENT
Start: 2024-11-25 | End: 2024-11-25 | Stop reason: HOSPADM

## 2024-11-25 RX ORDER — ATORVASTATIN CALCIUM 20 MG/1
20 TABLET, FILM COATED ORAL DAILY
Qty: 90 TABLET | Refills: 3 | Status: SHIPPED | OUTPATIENT
Start: 2024-11-25 | End: 2025-11-25

## 2024-11-25 RX ORDER — SODIUM CHLORIDE 9 MG/ML
INJECTION, SOLUTION INTRAVENOUS CONTINUOUS
Status: ACTIVE | OUTPATIENT
Start: 2024-11-25 | End: 2024-11-25

## 2024-11-25 RX ORDER — SODIUM CHLORIDE 9 MG/ML
INJECTION, SOLUTION INTRAVENOUS CONTINUOUS
Status: DISCONTINUED | OUTPATIENT
Start: 2024-11-25 | End: 2024-11-25 | Stop reason: HOSPADM

## 2024-11-25 RX ORDER — LIDOCAINE HYDROCHLORIDE 20 MG/ML
INJECTION, SOLUTION INFILTRATION; PERINEURAL
Status: DISCONTINUED | OUTPATIENT
Start: 2024-11-25 | End: 2024-11-25 | Stop reason: HOSPADM

## 2024-11-25 RX ORDER — HEPARIN SOD,PORCINE/0.9 % NACL 1000/500ML
INTRAVENOUS SOLUTION INTRAVENOUS
Status: DISCONTINUED | OUTPATIENT
Start: 2024-11-25 | End: 2024-11-25 | Stop reason: HOSPADM

## 2024-11-25 RX ORDER — ONDANSETRON 8 MG/1
8 TABLET, ORALLY DISINTEGRATING ORAL EVERY 8 HOURS PRN
Status: DISCONTINUED | OUTPATIENT
Start: 2024-11-25 | End: 2024-11-25 | Stop reason: HOSPADM

## 2024-11-25 RX ORDER — HEPARIN SODIUM 1000 [USP'U]/ML
INJECTION, SOLUTION INTRAVENOUS; SUBCUTANEOUS
Status: DISCONTINUED | OUTPATIENT
Start: 2024-11-25 | End: 2024-11-25 | Stop reason: HOSPADM

## 2024-11-25 RX ORDER — MIDAZOLAM HYDROCHLORIDE 1 MG/ML
INJECTION, SOLUTION INTRAMUSCULAR; INTRAVENOUS
Status: DISCONTINUED | OUTPATIENT
Start: 2024-11-25 | End: 2024-11-25 | Stop reason: HOSPADM

## 2024-11-25 RX ORDER — ASPIRIN 81 MG/1
81 TABLET ORAL DAILY
Start: 2024-11-25 | End: 2025-11-25

## 2024-11-25 RX ORDER — SODIUM CHLORIDE 9 MG/ML
INJECTION, SOLUTION INTRAVENOUS CONTINUOUS
Status: DISCONTINUED | OUTPATIENT
Start: 2024-11-25 | End: 2024-11-25

## 2024-11-25 RX ORDER — SODIUM CHLORIDE 9 MG/ML
INJECTION, SOLUTION INTRAVENOUS
Status: DISCONTINUED | OUTPATIENT
Start: 2024-11-25 | End: 2024-11-25 | Stop reason: HOSPADM

## 2024-11-25 NOTE — Clinical Note
25 ml of contrast were injected throughout the case. 75 mL of contrast was the total wasted during the case. 100 mL was the total amount used during the case.
A pre-sedation assessment was completed by the physician immediately prior to sedation start. 
A pulse oximeter was placed on the patient's left thumb.
An airway assessment has been completed by RN.
ID band present and verified. Family is in the lobby.
Phone report was given to [facility]
The DP pulses were 1+ bilaterally. The PT pulses were 1+ bilaterally. The radial pulses were +1 bilaterally.
The DP pulses were 1+ bilaterally. The PT pulses were 1+ bilaterally. The radial pulses were +1 bilaterally.
The ECG showed sinus rhythm and PVCs.
The ECG showed sinus rhythm and PVCs.
The catheter was inserted into the aorta.
The catheter was removed from the ostium   left main.
The catheter was repositioned into the ostium   left main. An angiography was performed of the left coronary arteries. Multiple views were taken.
The catheter was repositioned into the ostium   right coronary artery. An angiography was performed of the right coronary arteries.
The defib pads were placed on the patient's chest and back.
The groin and right radial was prepped. The site was prepped with ChloraPrep. The site was clipped. The patient was draped.
The physician was paged.
The procedural consent was signed. The blood consent was signed. A history and physical note was completed in the chart.
The radial band was applied to the right radial artery. 8 cc's of air were inserted into the closure device.
The sheath was inserted into the right radial artery.
The sheath was removed from the right radial artery.
The wire was inserted into the aorta.
The wire was removed from the aorta.
dry, intact, no bleeding and no hematoma. R radial
Yes

## 2024-11-25 NOTE — NURSING
Patient discharged home. Patient alert and oriented x4. Patient follows all commands appropriately. Right radial site intact, no signs of bleeding or hematoma noted. Dressing C/D/I. Discharge instructions provided to patient and wife. Patient and wife verbalize understanding. Patient denies pain/discomfort. Prescriptions delivered bedside. PIV  removed. Vital signs within normal limits. Patient in wheelchair and escorted to discharge with wife and belongings.

## 2024-11-25 NOTE — NURSING
Patient status post LHC. Patient alert and oriented x4. Patient follows all commands appropriately. Right radial  vascband noted. No signs of bleeding or hematoma noted. Patient educated on right radial site care and limitations. Patient verbalizes understanding. Patient denies pain/discomfort. Wife notified of patient's arrival to  recovery bay 10. All needs met at this time. Vital signs within normal limits. Safety measures in place. Plan of care ongoing.

## 2024-11-25 NOTE — DISCHARGE SUMMARY
[facility] - Cath Lab  Discharge Note  Short Stay    Procedure(s) (LRB):  ANGIOGRAM, CORONARY ARTERY (N/A)      OUTCOME: Patient tolerated treatment/procedure well without complication and is now ready for discharge.    DISPOSITION: Home or Self Care    FINAL DIAGNOSIS:  CAD    FOLLOWUP: In clinic    DISCHARGE INSTRUCTIONS: Restart aspirin 81 mg daily and atorvastatin 20x1 (prveiously tolerated high dose rosuvastatin, now on tafamadis). Cont epleronone 25x1 and furosemide 80x1.     TIME SPENT ON DISCHARGE: 35 minutes

## 2024-11-25 NOTE — OP NOTE
Brief Operative Note:    : Karrie Sarabia MD     Preoperative Diagnosis: Abnormal stress test    Postop Diagnosis: CAD    Treatments/Procedures: SCA    Findings:     Significant dLM disease.     See catheterization report for full details.    Closure device: Vascband    Estimated Blood loss: 5 cc    Specimens removed: Kailee Sarabia MD

## 2024-11-25 NOTE — H&P
HISTORY:    84-year-old male with a history of heart failure with preserved ejection fraction, cardiac amyloidosis, hypertension, hyperlipidemia, PVCs, CKD, PAD status post bilateral lower extremity PI, carotid stenosis s/p R CEA '21, GERD, squamous cell carcinoma of the skin presenting for elective cath.    Patient was initially evaluated by me in early January '23 with significant heart failure symptoms.  We tried outpatient management with diuretics and goal-directed medical therapy, but the patient required in-hospital diuresis with a positive response to inpatient diuresis and medicine adjustment. Lost 10-15 pounds initially that he maintained, monitoring weight and limiting fluid/salt intake. At that time patient favored conservative therapy as opposed to invasive evaluation and non-invasive assessment for infiltrative diseases.     In mid '24 he developed unintentional weight loss, decreased appetite, fatigue, and dizziness. PVCs were noted and holter demonstrated a remarkably high PVC burden. Pt additionally underwent a PYP scan confirming amyloidosis. Has since been evaluated [place] [facility] and has been started on tafamadis with an improvement in symptoms compared to 6 months ago.     His main issue is fatigue. No CP. +/- SOB. Light headedness and dizziness not an issue. No palpitations. No orthopnea or edema.     Pt recently udnerwent a PET stress ordered by EPS before dx of amyloidosis that was abnormal w a decrease in LVEF.     He currently tolerates aspirin 81 x 1, tafamidis 61x1, furosemide 80x1, epleronone 25x1. Feels much better off atenolol. Was previously on rosuvastatin high dose.       PHYSICAL EXAM:    Vitals:    11/25/24 0820   BP: 133/81   Pulse:    Resp:    Temp:        NAD, A+Ox3.  No jvd, no bruit.  RRR nml s1,s2. No murmurs. Significant ectopy.   CTA B no wheezes or crackles.  No edema.    LABS/STUDIES (imaging reviewed during clinic  visit):    November 2024 hemoglobin 12.5/MCV 88.Cr 1.6/BUN 36/GFR 42. Alb 4.0. October BNP 2129/NTpBNP 7200. May A1c 6 and TSH nml. 2023 /HDL 55//.     ECG Nov 2024 sinus rhythm with 1st AVB, no Qs, frequent PVCs.   Holter May 2024 SR w a 51% PVC burden.   TTE May 2023 Nml LV size and function.  Mild AI/mild MR.  CVP 3/PASP 25. January 2023 demonstrates normal LV size and systolic function.  LVH is present.  EF 65%.  RV enlargement with normal function.  Moderate AI, mild-to-moderate MR.  CVP 8 with estimated PASP of 39.  NST March 2023 LVEF 47% at rest and 55% with stress.  Equivocal perfusion abnormality that is fixed in the basal to distal inferior wall with bowel interference.  PET Stress November 2024 LVEF high 20s. TID. Small to moderate reversible inf/IL defect. PVCs noted on stress testing.  PYP Scan uptake ratio 1.8 c/w cardiac amyloidosis.  Carotid December 2023 carotid atherosclerosis doses with a patent right carotid endarterectomy patch.  Left ICA less than 50%.  Elevated ECA velocity with dense plaque at the bifurcation.  Vertebral flow antegrade bilaterally.  DL/PVR September 2022 demonstrates normal DL with unremarkable PVR waveforms bilaterally.    Abd us October 2024 AAA 3.3cm  MRI/MRA head and neck no acute abnormalities.  Mild chronic small-vessel changes.  Left ICA atherosclerosis doses with less than 50% stenosis.  Patent right CEA patch.  No evidence of intracranial large vessel occlusion or stenosis.    ASSESSMENT & PLAN:    1. Abnormal stress test    2. Heart failure with reduced ejection fraction    3. PVC (premature ventricular contraction)    4. ACC/AHA stage C heart failure with preserved ejection fraction        Orders Placed This Encounter    Vital signs    Verify informed consent, place on front of chart    Clip and Prep Right Radial, Neck, Right Femoral    Start or verify patency of one 18 g, left arm preferred prior to procedure    Place in Outpatient    Cardiac  catheterization    0.9% NaCl infusion       Pt with cardiac amyloidosis on tafamadis. Followed by HTS. Drop in EF on recent PET with some high risk findings. Plan for dx SCA/RHC. Case discussed with Dr. Martinez mayorga HTS and Dr. Claus mayorga EPS.     HF on furosemide 80x1 and epleronone 25x1. Has not tolerated betablocker therapy previously. Was on SGLT2i, but feels better off it.     High PVC burden. Following with EPS.     On rosuvastatin 20x1. No recent lipid profile. Previously pt deferred more intensive lipid lowering on top of statin tx.    PVD previously managed by vasc surgery for R CEA and small AAA. Unremarkable ABIs '22.    Karrie Sarabia MD

## 2024-11-25 NOTE — NURSING
Right radial vascband removed per hospital policy. No signs of bleeding or hematoma noted. Dressing in place. Dressing C/D/I. Patient and wife educated on right radial site care and limitations. Patient and wife verbalize understanding. Vital signs within normal limits. Safety measures in place. Plan of care ongoing.

## 2024-11-29 ENCOUNTER — TELEPHONE (OUTPATIENT)
Dept: CARDIOLOGY | Facility: CLINIC | Age: 84
End: 2024-11-29
Payer: MEDICARE

## 2024-12-02 ENCOUNTER — TELEPHONE (OUTPATIENT)
Dept: INTERNAL MEDICINE | Facility: CLINIC | Age: 84
End: 2024-12-02
Payer: MEDICARE

## 2024-12-02 NOTE — TELEPHONE ENCOUNTER
Spoke to patient, advised him he would need an urgent care visit here. He declined appt and he is also in [place]. I also advised him to be seen there, virtual appt not available if he is not in [place]

## 2024-12-02 NOTE — TELEPHONE ENCOUNTER
----- Message from Lisa sent at 12/2/2024  1:06 PM CST -----  Contact: Patient @ [phone]  [email or web address]     Patient is calling for Medical Advice regarding:  Symptoms: Runny Nose, Sinus Symptoms  Outcome: Schedule an appointment to be seen within 24 hours.  Reason: Caller denied all higher acuity questions    The caller rejected this outcome.    How long has patient had these symptoms:4 days    Pharmacy name and phone#:[id number] - [place], [place] - [address]    Patient wants a call back or thru [facility]:call     Comments:Out of town. Please call Rx to out state pharmacy     Please advise patient replies from provider may take up to 48 hours.

## 2024-12-06 ENCOUNTER — PATIENT MESSAGE (OUTPATIENT)
Dept: CARDIOLOGY | Facility: CLINIC | Age: 84
End: 2024-12-06
Payer: MEDICARE

## 2024-12-06 NOTE — TELEPHONE ENCOUNTER
84-year-old male with a history of heart failure with preserved ejection fraction, cardiac amyloidosis, CAD, PVCs, hypertension, hyperlipidemia, PVCs, CKD, PAD status post bilateral lower extremity PI, carotid stenosis s/p R CEA '21, GERD, squamous cell carcinoma of the skin.    Pt recently underwent elective cath after PET stress ordered for very high PVC burden in the setting of cardiac amyloidosis demonstrated a large defect with drop in EF. Cath demonstrated significant LM ds.    Pt on tamafadis, spironolactone, and furosemide with HF and cardiac amyloidosis. LM dx complicates present CV situation. Pt does not have CP, but does have a drop in EF w a very high PVC burden and cardiac amyloid of course.    Surgery not a reasonable option and pt agrees. He understands LM PCI carries and elevated risk of adverse outcomes as does medical management of LM disease. Dx of amyloidosis and long term prognosis complicates decision making. Pt wanted time to consider high risk LM PCI and will be returning to clinic on 12/17. He also has follow-up w Dr. Henriquez with HTS in late December. Asa and statin tx was restarted for the patient. He did not tolerate betablocker previously.

## 2024-12-17 ENCOUNTER — LAB VISIT (OUTPATIENT)
Dept: LAB | Facility: HOSPITAL | Age: 84
End: 2024-12-17
Attending: INTERNAL MEDICINE
Payer: MEDICARE

## 2024-12-17 ENCOUNTER — TELEPHONE (OUTPATIENT)
Dept: CARDIOLOGY | Facility: CLINIC | Age: 84
End: 2024-12-17

## 2024-12-17 ENCOUNTER — OFFICE VISIT (OUTPATIENT)
Dept: CARDIOLOGY | Facility: CLINIC | Age: 84
End: 2024-12-17
Payer: MEDICARE

## 2024-12-17 DIAGNOSIS — I70.0 AORTIC ATHEROSCLEROSIS: ICD-10-CM

## 2024-12-17 DIAGNOSIS — I50.20 HEART FAILURE WITH REDUCED EJECTION FRACTION: ICD-10-CM

## 2024-12-17 DIAGNOSIS — I65.23 CAROTID ATHEROSCLEROSIS, BILATERAL: ICD-10-CM

## 2024-12-17 DIAGNOSIS — I49.3 PVC (PREMATURE VENTRICULAR CONTRACTION): ICD-10-CM

## 2024-12-17 DIAGNOSIS — E85.4 CARDIAC AMYLOIDOSIS: ICD-10-CM

## 2024-12-17 DIAGNOSIS — I71.40 ABDOMINAL AORTIC ANEURYSM (AAA) WITHOUT RUPTURE, UNSPECIFIED PART: Primary | ICD-10-CM

## 2024-12-17 DIAGNOSIS — I10 PRIMARY HYPERTENSION: ICD-10-CM

## 2024-12-17 DIAGNOSIS — I25.118 CORONARY ARTERY DISEASE OF NATIVE ARTERY OF NATIVE HEART WITH STABLE ANGINA PECTORIS: ICD-10-CM

## 2024-12-17 DIAGNOSIS — I73.9 PAD (PERIPHERAL ARTERY DISEASE): ICD-10-CM

## 2024-12-17 DIAGNOSIS — I49.3 FREQUENT PVCS: ICD-10-CM

## 2024-12-17 DIAGNOSIS — I43 CARDIAC AMYLOIDOSIS: ICD-10-CM

## 2024-12-17 LAB
ALBUMIN SERPL BCP-MCNC: 3.6 G/DL (ref 3.5–5.2)
ALP SERPL-CCNC: 289 U/L (ref 40–150)
ALT SERPL W/O P-5'-P-CCNC: 21 U/L (ref 10–44)
ANION GAP SERPL CALC-SCNC: 9 MMOL/L (ref 8–16)
AST SERPL-CCNC: 17 U/L (ref 10–40)
BILIRUB SERPL-MCNC: 0.7 MG/DL (ref 0.1–1)
BNP SERPL-MCNC: 2113 PG/ML (ref 0–99)
BUN SERPL-MCNC: 49 MG/DL (ref 8–23)
CALCIUM SERPL-MCNC: 10.4 MG/DL (ref 8.7–10.5)
CHLORIDE SERPL-SCNC: 101 MMOL/L (ref 95–110)
CO2 SERPL-SCNC: 29 MMOL/L (ref 23–29)
CREAT SERPL-MCNC: 1.7 MG/DL (ref 0.5–1.4)
ERYTHROCYTE [DISTWIDTH] IN BLOOD BY AUTOMATED COUNT: 19.9 % (ref 11.5–14.5)
EST. GFR  (NO RACE VARIABLE): 39.5 ML/MIN/1.73 M^2
GLUCOSE SERPL-MCNC: 104 MG/DL (ref 70–110)
HCT VFR BLD AUTO: 42.3 % (ref 40–54)
HGB BLD-MCNC: 12.6 G/DL (ref 14–18)
MCH RBC QN AUTO: 26.9 PG (ref 27–31)
MCHC RBC AUTO-ENTMCNC: 29.8 G/DL (ref 32–36)
MCV RBC AUTO: 90 FL (ref 82–98)
PLATELET # BLD AUTO: 304 K/UL (ref 150–450)
PMV BLD AUTO: 11.3 FL (ref 9.2–12.9)
POTASSIUM SERPL-SCNC: 4.4 MMOL/L (ref 3.5–5.1)
PROT SERPL-MCNC: 7.3 G/DL (ref 6–8.4)
RBC # BLD AUTO: 4.68 M/UL (ref 4.6–6.2)
SODIUM SERPL-SCNC: 139 MMOL/L (ref 136–145)
WBC # BLD AUTO: 9.15 K/UL (ref 3.9–12.7)

## 2024-12-17 PROCEDURE — 3288F FALL RISK ASSESSMENT DOCD: CPT | Mod: CPTII,S$GLB,, | Performed by: INTERNAL MEDICINE

## 2024-12-17 PROCEDURE — 99999 PR PBB SHADOW E&M-EST. PATIENT-LVL III: CPT | Mod: PBBFAC,,, | Performed by: INTERNAL MEDICINE

## 2024-12-17 PROCEDURE — 1160F RVW MEDS BY RX/DR IN RCRD: CPT | Mod: CPTII,S$GLB,, | Performed by: INTERNAL MEDICINE

## 2024-12-17 PROCEDURE — 1101F PT FALLS ASSESS-DOCD LE1/YR: CPT | Mod: CPTII,S$GLB,, | Performed by: INTERNAL MEDICINE

## 2024-12-17 PROCEDURE — 1159F MED LIST DOCD IN RCRD: CPT | Mod: CPTII,S$GLB,, | Performed by: INTERNAL MEDICINE

## 2024-12-17 PROCEDURE — 36415 COLL VENOUS BLD VENIPUNCTURE: CPT | Performed by: INTERNAL MEDICINE

## 2024-12-17 PROCEDURE — 83880 ASSAY OF NATRIURETIC PEPTIDE: CPT | Performed by: INTERNAL MEDICINE

## 2024-12-17 PROCEDURE — 80053 COMPREHEN METABOLIC PANEL: CPT | Performed by: INTERNAL MEDICINE

## 2024-12-17 PROCEDURE — 99215 OFFICE O/P EST HI 40 MIN: CPT | Mod: S$GLB,,, | Performed by: INTERNAL MEDICINE

## 2024-12-17 PROCEDURE — 85027 COMPLETE CBC AUTOMATED: CPT | Performed by: INTERNAL MEDICINE

## 2024-12-17 RX ORDER — CLOPIDOGREL BISULFATE 75 MG/1
75 TABLET ORAL DAILY
Qty: 90 TABLET | Refills: 3 | Status: SHIPPED | OUTPATIENT
Start: 2024-12-17 | End: 2025-12-17

## 2024-12-17 RX ORDER — SODIUM CHLORIDE 9 MG/ML
INJECTION, SOLUTION INTRAVENOUS CONTINUOUS
OUTPATIENT
Start: 2024-12-17 | End: 2024-12-17

## 2024-12-17 NOTE — H&P (VIEW-ONLY)
HISTORY:    84-year-old male with a history of CAD, heart failure with preserved ejection fraction, cardiac amyloidosis, hypertension, hyperlipidemia, PVCs, CKD, PAD status post bilateral lower extremity PI, carotid stenosis s/p R CEA '21, GERD, squamous cell carcinoma of the skin presenting for follow-up.      Patient was initially evaluated by me in early January '23 with significant heart failure symptoms.  We tried outpatient management with diuretics and goal-directed medical therapy, but the patient required in-hospital diuresis with a positive response to inpatient diuresis and medicine adjustment. Lost 10-15 pounds initially that he maintained, monitoring weight and limiting fluid/salt intake. At that time patient favored conservative therapy as opposed to invasive evaluation and non-invasive assessment for infiltrative diseases.     In mid '24 he developed unintentional weight loss, decreased appetite, fatigue, and dizziness. PVCs were noted and holter demonstrated a remarkably high PVC burden. Pt additionally underwent a PYP scan confirming amyloidosis. Has since been evaluated by advanced heart failure at [facility] and [facility] and has been started on tafamadis with an improvement in symptoms compared to 6 months ago.     Pt additionally underwent a PET stress ordered by EPS before dx of amyloidosis that was abnormal w a decrease in LVEF. For this reason we performed a cardiac catheterization demonstrating 70-80% LM disease.     His main issue is fatigue. No CP. +/- SOB. Light headedness and dizziness not an issue. No palpitations. No orthopnea or edema.     He currently tolerates aspirin 81 x 1, tafamidis 61x1, furosemide 80x1, epleronone 25x1, atorvastatin 20x1. Feels much better off atenolol.     PHYSICAL EXAM:    Vitals:    12/17/24 1030   BP: (P) 137/62   Pulse: (!) (P) 52       NAD, A+Ox3.  No jvd, no bruit.  RRR nml s1,s2. No murmurs. Significant ectopy.   CTA B no wheezes or crackles.  No  edema.    LABS/STUDIES (imaging reviewed during clinic visit):    November 2024 hemoglobin 12.5/MCV 88.Cr 1.6/BUN 36/GFR 42. Alb 4.0. October BNP 2129/NTpBNP 7200. May A1c 6 and TSH nml. 2023 /HDL 55//.     ECG Nov 2024 sinus rhythm with 1st AVB, no Qs, frequent PVCs.   Holter May 2024 SR w a 51% PVC burden.   TTE May 2023 Nml LV size and function.  Mild AI/mild MR.  CVP 3/PASP 25. January 2023 demonstrates normal LV size and systolic function.  LVH is present.  EF 65%.  RV enlargement with normal function.  Moderate AI, mild-to-moderate MR.  CVP 8 with estimated PASP of 39.  NST March 2023 LVEF 47% at rest and 55% with stress.  Equivocal perfusion abnormality that is fixed in the basal to distal inferior wall with bowel interference.  PET Stress November 2024 LVEF high 20s. TID. Small to moderate reversible inf/IL defect. PVCs noted on stress testing.  PYP Scan uptake ratio 1.8 c/w cardiac amyloidosis.  Cardiac cath December 2024 Left dominant system with 70-80% dLM disease. Diffuse disease of a non-dominant RCA.   Carotid December 2023 carotid atherosclerosis doses with a patent right carotid endarterectomy patch.  Left ICA less than 50%.  Elevated ECA velocity with dense plaque at the bifurcation.  Vertebral flow antegrade bilaterally.  DL/PVR September 2022 demonstrates normal DL with unremarkable PVR waveforms bilaterally.    Abd us October 2024 AAA 3.3cm  MRI/MRA head and neck no acute abnormalities.  Mild chronic small-vessel changes.  Left ICA atherosclerosis doses with less than 50% stenosis.  Patent right CEA patch.  No evidence of intracranial large vessel occlusion or stenosis.    ASSESSMENT & PLAN:    1. Abdominal aortic aneurysm (AAA) without rupture, unspecified part    2. Aortic atherosclerosis    3. Cardiac amyloidosis    4. Carotid atherosclerosis, bilateral    5. Frequent PVCs    6. PAD (peripheral artery disease)    7. Primary hypertension    8. Heart failure with reduced  ejection fraction    9. Coronary artery disease of native artery of native heart with stable angina pectoris    10. PVC (premature ventricular contraction)          Orders Placed This Encounter    CBC Without Differential    Comprehensive Metabolic Panel    B-TYPE NATRIURETIC PEPTIDE    Echo    clopidogreL (PLAVIX) 75 mg tablet    Case Request-Cath Lab: Left heart cath, Stent, Drug Eluting, Single Vessel, Coronary, INSERTION, INTRA-AORTIC BALLOON PUMP       Pt recently underwent elective cath after PET stress ordered for very high PVC burden in the setting of cardiac amyloidosis demonstrated TID with a drop in EF. Cath demonstrated significant LM ds.    Pt on tamafadis, spironolactone, and furosemide with HF and cardiac amyloidosis. LM dx complicates present CV situation. Pt does not have CP, but does have a drop in EF w a very high PVC burden and cardiac amyloid of course. He has fatigue.     Surgery not a reasonable option and pt agrees that he would not pursue CABG. He is not interested in visiting with CTS. He understands LM PCI with a depressed LVEF carries an elevated risk of adverse outcomes as does medical management of LM disease. We discussed these risks at length including aniya-procedural death. Dx of amyloidosis and long term prognosis complicates decision making.     Considering all these factors pt is agreeable to high risk PCI of LM.  He also has follow-up w Dr. Henriquez with HTS in late December. We will discuss plan with him and Dr. Devlin to ensure they are no reasons not to proceed.     Tolerating asa 81x1. Start clopidogrel 75x1. Plan for high risk LM PCI via bilateral groin access and IABP support on January 3rd at [facility].     Has not tolerated betablocker tx previously.    Tolerating atorvastatin 20x1.     High PVC burden. Following with EPS. Has appointment in January.     PVD previously managed by [facility] surgery for R CEA and small AAA. Unremarkable ABIs '22.    Follow up in about 3 months (around  3/17/2025).    Karrie Sarabia MD

## 2024-12-17 NOTE — PROGRESS NOTES
HISTORY:    84-year-old male with a history of CAD, heart failure with preserved ejection fraction, cardiac amyloidosis, hypertension, hyperlipidemia, PVCs, CKD, PAD status post bilateral lower extremity PI, carotid stenosis s/p R CEA '21, GERD, squamous cell carcinoma of the skin presenting for follow-up.      Patient was initially evaluated by me in early January '23 with significant heart failure symptoms.  We tried outpatient management with diuretics and goal-directed medical therapy, but the patient required in-hospital diuresis with a positive response to inpatient diuresis and medicine adjustment. Lost 10-15 pounds initially that he maintained, monitoring weight and limiting fluid/salt intake. At that time patient favored conservative therapy as opposed to invasive evaluation and non-invasive assessment for infiltrative diseases.     In mid '24 he developed unintentional weight loss, decreased appetite, fatigue, and dizziness. PVCs were noted and holter demonstrated a remarkably high PVC burden. Pt additionally underwent a PYP scan confirming amyloidosis. Has since been evaluated by advanced heart failure at [facility] and has been started on tafamadis with an improvement in symptoms compared to 6 months ago.     Pt additionally underwent a PET stress ordered by EPS before dx of amyloidosis that was abnormal w a decrease in LVEF. For this reason we performed a cardiac catheterization demonstrating 70-80% LM disease.     His main issue is fatigue. No CP. +/- SOB. Light headedness and dizziness not an issue. No palpitations. No orthopnea or edema.     He currently tolerates aspirin 81 x 1, tafamidis 61x1, furosemide 80x1, epleronone 25x1, atorvastatin 20x1. Feels much better off atenolol.     PHYSICAL EXAM:    Vitals:    12/17/24 1030   BP: (P) 137/62   Pulse: (!) (P) 52       NAD, A+Ox3.  No jvd, no bruit.  RRR nml s1,s2. No murmurs. Significant ectopy.   CTA B no wheezes or crackles.  No  edema.    LABS/STUDIES (imaging reviewed during clinic visit):    November 2024 hemoglobin 12.5/MCV 88.Cr 1.6/BUN 36/GFR 42. Alb 4.0. October BNP 2129/NTpBNP 7200. May A1c 6 and TSH nml. 2023 /HDL 55//.     ECG Nov 2024 sinus rhythm with 1st AVB, no Qs, frequent PVCs.   Holter May 2024 SR w a 51% PVC burden.   TTE May 2023 Nml LV size and function.  Mild AI/mild MR.  CVP 3/PASP 25. January 2023 demonstrates normal LV size and systolic function.  LVH is present.  EF 65%.  RV enlargement with normal function.  Moderate AI, mild-to-moderate MR.  CVP 8 with estimated PASP of 39.  NST March 2023 LVEF 47% at rest and 55% with stress.  Equivocal perfusion abnormality that is fixed in the basal to distal inferior wall with bowel interference.  PET Stress November 2024 LVEF high 20s. TID. Small to moderate reversible inf/IL defect. PVCs noted on stress testing.  PYP Scan uptake ratio 1.8 c/w cardiac amyloidosis.  Cardiac cath December 2024 Left dominant system with 70-80% dLM disease. Diffuse disease of a non-dominant RCA.   Carotid December 2023 carotid atherosclerosis doses with a patent right carotid endarterectomy patch.  Left ICA less than 50%.  Elevated ECA velocity with dense plaque at the bifurcation.  Vertebral flow antegrade bilaterally.  DL/PVR September 2022 demonstrates normal DL with unremarkable PVR waveforms bilaterally.    Abd us October 2024 AAA 3.3cm  MRI/MRA head and neck no acute abnormalities.  Mild chronic small-vessel changes.  Left ICA atherosclerosis doses with less than 50% stenosis.  Patent right CEA patch.  No evidence of intracranial large vessel occlusion or stenosis.    ASSESSMENT & PLAN:    1. Abdominal aortic aneurysm (AAA) without rupture, unspecified part    2. Aortic atherosclerosis    3. Cardiac amyloidosis    4. Carotid atherosclerosis, bilateral    5. Frequent PVCs    6. PAD (peripheral artery disease)    7. Primary hypertension    8. Heart failure with reduced  ejection fraction    9. Coronary artery disease of native artery of native heart with stable angina pectoris    10. PVC (premature ventricular contraction)          Orders Placed This Encounter    CBC Without Differential    Comprehensive Metabolic Panel    B-TYPE NATRIURETIC PEPTIDE    Echo    clopidogreL (PLAVIX) 75 mg tablet    Case Request-Cath Lab: Left heart cath, Stent, Drug Eluting, Single Vessel, Coronary, INSERTION, INTRA-AORTIC BALLOON PUMP       Pt recently underwent elective cath after PET stress ordered for very high PVC burden in the setting of cardiac amyloidosis demonstrated TID with a drop in EF. Cath demonstrated significant LM ds.    Pt on tamafadis, spironolactone, and furosemide with HF and cardiac amyloidosis. LM dx complicates present CV situation. Pt does not have CP, but does have a drop in EF w a very high PVC burden and cardiac amyloid of course. He has fatigue.     Surgery not a reasonable option and pt agrees that he would not pursue CABG. He is not interested in visiting with CTS. He understands LM PCI with a depressed LVEF carries an elevated risk of adverse outcomes as does medical management of LM disease. We discussed these risks at length including aniya-procedural death. Dx of amyloidosis and long term prognosis complicates decision making.     Considering all these factors pt is agreeable to high risk PCI of LM.  He also has follow-up w Dr. Henriquez with HTS in late December. We will discuss plan with him and Dr. Devlin to ensure they are no reasons not to proceed.     Tolerating asa 81x1. Start clopidogrel 75x1. Plan for high risk LM PCI via bilateral groin access and [place]ABP [place] [facility]     Has not tolerated betablocker tx previously.    Tolerating atorvastatin 20x1.     High PVC burden. Following with EPS. Has appointment in January.     PVD previously managed by [facility] surgery for R CEA and small AAA. Unremarkable ABIs '22.    Follow up in about 3 months (around  3/17/2025).    Karrie Sarabia MD

## 2024-12-23 ENCOUNTER — TELEPHONE (OUTPATIENT)
Dept: CARDIOLOGY | Facility: CLINIC | Age: 84
End: 2024-12-23
Payer: MEDICARE

## 2024-12-23 NOTE — TELEPHONE ENCOUNTER
----- Message from Karissa sent at 12/23/2024  2:03 PM CST -----  Regarding: echo  Pt is calling to schedule an echo that he supposed to have done prior to his upcoming procedure and can be reached at [phone].    Thank you

## 2024-12-24 ENCOUNTER — HOSPITAL ENCOUNTER (OUTPATIENT)
Dept: CARDIOLOGY | Facility: HOSPITAL | Age: 84
Discharge: HOME | End: 2024-12-24
Attending: INTERNAL MEDICINE
Payer: MEDICARE

## 2024-12-24 VITALS
WEIGHT: 162 LBS | DIASTOLIC BLOOD PRESSURE: 70 MMHG | SYSTOLIC BLOOD PRESSURE: 128 MMHG | HEART RATE: 80 BPM | HEIGHT: 73 IN | BODY MASS INDEX: 21.47 KG/M2

## 2024-12-24 DIAGNOSIS — I50.20 HEART FAILURE WITH REDUCED EJECTION FRACTION: ICD-10-CM

## 2024-12-24 DIAGNOSIS — I25.118 CORONARY ARTERY DISEASE OF NATIVE ARTERY OF NATIVE HEART WITH STABLE ANGINA PECTORIS: ICD-10-CM

## 2024-12-24 LAB
ASCENDING AORTA: 3.27 CM
AV AREA BY CONTINUOUS VTI: 3.3 CM2
AV INDEX (PROSTH): 0.79
AV LVOT MEAN GRADIENT: 2 MMHG
AV LVOT PEAK GRADIENT: 3 MMHG
AV MEAN GRADIENT: 2.9 MMHG
AV PEAK GRADIENT: 5.8 MMHG
AV REGURGITATION PRESSURE HALF TIME: 648.83 MS
AV VALVE AREA BY VELOCITY RATIO: 3.1 CM²
AV VALVE AREA: 3.3 CM2
AV VELOCITY RATIO: 0.75
BSA FOR ECHO PROCEDURE: 1.95 M2
CV ECHO LV RWT: 0.44 CM
DOP CALC AO PEAK VEL: 1.2 M/S
DOP CALC AO VTI: 20.1 CM
DOP CALC LVOT AREA: 4.2 CM2
DOP CALC LVOT DIAMETER: 2.3 CM
DOP CALC LVOT PEAK VEL: 0.9 M/S
DOP CALC LVOT STROKE VOLUME: 65.6 CM3
DOP CALC RVOT AREA: 3.97 CM2
DOP CALC RVOT DIAMETER: 2.25 CM
DOP CALCLVOT PEAK VEL VTI: 15.8 CM
ECHO EF ESTIMATED: 50 %
ECHO LV POSTERIOR WALL: 1.1 CM (ref 0.6–1.1)
FRACTIONAL SHORTENING: 26 % (ref 28–44)
GLOBAL LONGITUIDAL STRAIN: 7.2 %
HR MV ECHO: 80 BPM
INTERVENTRICULAR SEPTUM: 1.1 CM (ref 0.6–1.1)
IVC DIAMETER: 2.16 CM
LA MAJOR: 5.68 CM
LA MINOR: 5.32 CM
LA WIDTH: 3.97 CM
LEFT ATRIUM SIZE: 4.86 CM
LEFT ATRIUM VOLUME INDEX MOD: 35.9 ML/M2
LEFT ATRIUM VOLUME INDEX: 45.7 ML/M2
LEFT ATRIUM VOLUME MOD: 70.66 ML
LEFT ATRIUM VOLUME: 90.1 CM3
LEFT INTERNAL DIMENSION IN SYSTOLE: 3.7 CM (ref 2.1–4)
LEFT VENTRICLE DIASTOLIC VOLUME INDEX: 58.89 ML/M2
LEFT VENTRICLE DIASTOLIC VOLUME: 116.01 ML
LEFT VENTRICLE MASS INDEX: 105.1 G/M2
LEFT VENTRICLE SYSTOLIC VOLUME INDEX: 29.6 ML/M2
LEFT VENTRICLE SYSTOLIC VOLUME: 58.22 ML
LEFT VENTRICULAR INTERNAL DIMENSION IN DIASTOLE: 5 CM (ref 3.5–6)
LEFT VENTRICULAR MASS: 207.1 G
MV A" WAVE DURATION": 91.34 MS
MV MEAN GRADIENT: 0 MMHG
OHS CV RV/LV RATIO: 0.78 CM
PISA TR MAX VEL: 3.15 M/S
PULM VEIN A" WAVE DURATION": 91.34 MS
PULM VEIN S/D RATIO: 0.76
PULMONIC VEIN PEAK A VELOCITY: 0.2 M/S
PV PEAK D VEL: 0.38 M/S
PV PEAK S VEL: 0.29 M/S
RA MAJOR: 5.05 CM
RA PRESSURE ESTIMATED: 3 MMHG
RA WIDTH: 3.71 CM
RIGHT ATRIAL AREA: 20.8 CM2
RIGHT VENTRICLE DIASTOLIC BASEL DIMENSION: 3.9 CM
RV TB RVSP: 6 MMHG
RV TISSUE DOPPLER FREE WALL SYSTOLIC VELOCITY 1 (APICAL 4 CHAMBER VIEW): 13.64 CM/S
SINUS: 3.2 CM
STJ: 3.63 CM
TDI LATERAL: 0.07 M/S
TDI SEPTAL: 0.04 M/S
TDI: 0.06 M/S
TR MAX PG: 40 MMHG
TRICUSPID ANNULAR PLANE SYSTOLIC EXCURSION: 1.82 CM
TV PEAK GRADIENT: 40 MMHG
TV REST PULMONARY ARTERY PRESSURE: 43 MMHG
Z-SCORE OF LEFT VENTRICULAR DIMENSION IN END DIASTOLE: -1.24
Z-SCORE OF LEFT VENTRICULAR DIMENSION IN END SYSTOLE: 0.48

## 2024-12-24 PROCEDURE — 93356 MYOCRD STRAIN IMG SPCKL TRCK: CPT

## 2024-12-24 PROCEDURE — 93356 MYOCRD STRAIN IMG SPCKL TRCK: CPT | Mod: ,,, | Performed by: INTERNAL MEDICINE

## 2024-12-24 PROCEDURE — 93306 TTE W/DOPPLER COMPLETE: CPT | Mod: 26,,, | Performed by: INTERNAL MEDICINE

## 2024-12-26 PROBLEM — R73.03 PREDIABETES: Status: ACTIVE | Noted: 2024-12-26

## 2024-12-27 ENCOUNTER — LAB VISIT (OUTPATIENT)
Dept: LAB | Facility: HOSPITAL | Age: 84
End: 2024-12-27
Attending: INTERNAL MEDICINE
Payer: MEDICARE

## 2024-12-27 ENCOUNTER — TELEPHONE (OUTPATIENT)
Dept: CARDIOLOGY | Facility: CLINIC | Age: 84
End: 2024-12-27
Payer: MEDICARE

## 2024-12-27 DIAGNOSIS — E85.4 CARDIAC AMYLOIDOSIS: ICD-10-CM

## 2024-12-27 DIAGNOSIS — I43 CARDIAC AMYLOIDOSIS: ICD-10-CM

## 2024-12-27 LAB
ANION GAP SERPL CALC-SCNC: 10 MMOL/L (ref 8–16)
BUN SERPL-MCNC: 42 MG/DL (ref 8–23)
CALCIUM SERPL-MCNC: 9.7 MG/DL (ref 8.7–10.5)
CHLORIDE SERPL-SCNC: 103 MMOL/L (ref 95–110)
CO2 SERPL-SCNC: 29 MMOL/L (ref 23–29)
CREAT SERPL-MCNC: 1.6 MG/DL (ref 0.5–1.4)
EST. GFR  (NO RACE VARIABLE): 42.5 ML/MIN/1.73 M^2
GLUCOSE SERPL-MCNC: 99 MG/DL (ref 70–110)
POTASSIUM SERPL-SCNC: 4.3 MMOL/L (ref 3.5–5.1)
SODIUM SERPL-SCNC: 142 MMOL/L (ref 136–145)

## 2024-12-27 PROCEDURE — 84484 ASSAY OF TROPONIN QUANT: CPT | Performed by: INTERNAL MEDICINE

## 2024-12-27 PROCEDURE — 80048 BASIC METABOLIC PNL TOTAL CA: CPT | Performed by: INTERNAL MEDICINE

## 2024-12-27 PROCEDURE — 83880 ASSAY OF NATRIURETIC PEPTIDE: CPT | Performed by: INTERNAL MEDICINE

## 2024-12-27 NOTE — TELEPHONE ENCOUNTER
Spoke with Pt about his procedure for the cath lab explained to the pt what location to go to the time to arrive and what time his appt is for . I advice the pt not to eat or drink before the procedure and to have someone to bring him and pick him up . This pt was called on 12/27

## 2024-12-28 LAB
NT-PROBNP SERPL IA-MCNC: 9893 PG/ML
TROPONIN T SERPL-MCNC: 109 NG/L

## 2024-12-30 ENCOUNTER — HOSPITAL ENCOUNTER (OUTPATIENT)
Dept: PULMONOLOGY | Facility: CLINIC | Age: 84
Discharge: HOME | End: 2024-12-30
Attending: INTERNAL MEDICINE
Payer: MEDICARE

## 2024-12-30 ENCOUNTER — OFFICE VISIT (OUTPATIENT)
Dept: TRANSPLANT | Facility: CLINIC | Age: 84
End: 2024-12-30
Attending: INTERNAL MEDICINE
Payer: MEDICARE

## 2024-12-30 ENCOUNTER — HOSPITAL ENCOUNTER (OUTPATIENT)
Dept: CARDIOLOGY | Facility: CLINIC | Age: 84
Discharge: HOME | End: 2024-12-30
Attending: INTERNAL MEDICINE
Payer: MEDICARE

## 2024-12-30 VITALS
HEIGHT: 73 IN | DIASTOLIC BLOOD PRESSURE: 67 MMHG | BODY MASS INDEX: 21.3 KG/M2 | HEIGHT: 73 IN | SYSTOLIC BLOOD PRESSURE: 133 MMHG | HEART RATE: 101 BPM | BODY MASS INDEX: 20.81 KG/M2 | WEIGHT: 160.69 LBS | WEIGHT: 157 LBS

## 2024-12-30 DIAGNOSIS — I43 CARDIAC AMYLOIDOSIS: ICD-10-CM

## 2024-12-30 DIAGNOSIS — E85.4 CARDIAC AMYLOIDOSIS: ICD-10-CM

## 2024-12-30 DIAGNOSIS — I43 CARDIAC AMYLOIDOSIS: Primary | ICD-10-CM

## 2024-12-30 DIAGNOSIS — I50.32 CHRONIC DIASTOLIC HEART FAILURE: ICD-10-CM

## 2024-12-30 DIAGNOSIS — E85.4 CARDIAC AMYLOIDOSIS: Primary | ICD-10-CM

## 2024-12-30 LAB
OHS QRS DURATION: 86 MS
OHS QTC CALCULATION: 450 MS

## 2024-12-30 PROCEDURE — 1159F MED LIST DOCD IN RCRD: CPT | Mod: CPTII,S$GLB,, | Performed by: INTERNAL MEDICINE

## 2024-12-30 PROCEDURE — 3075F SYST BP GE 130 - 139MM HG: CPT | Mod: CPTII,S$GLB,, | Performed by: INTERNAL MEDICINE

## 2024-12-30 PROCEDURE — 93010 ELECTROCARDIOGRAM REPORT: CPT | Mod: S$GLB,,, | Performed by: INTERNAL MEDICINE

## 2024-12-30 PROCEDURE — 93005 ELECTROCARDIOGRAM TRACING: CPT | Mod: S$GLB,,, | Performed by: INTERNAL MEDICINE

## 2024-12-30 PROCEDURE — 94618 PULMONARY STRESS TESTING: CPT | Mod: S$GLB,,, | Performed by: INTERNAL MEDICINE

## 2024-12-30 PROCEDURE — 3288F FALL RISK ASSESSMENT DOCD: CPT | Mod: CPTII,S$GLB,, | Performed by: INTERNAL MEDICINE

## 2024-12-30 PROCEDURE — 3078F DIAST BP <80 MM HG: CPT | Mod: CPTII,S$GLB,, | Performed by: INTERNAL MEDICINE

## 2024-12-30 PROCEDURE — 99214 OFFICE O/P EST MOD 30 MIN: CPT | Mod: S$GLB,,, | Performed by: INTERNAL MEDICINE

## 2024-12-30 PROCEDURE — 1101F PT FALLS ASSESS-DOCD LE1/YR: CPT | Mod: CPTII,S$GLB,, | Performed by: INTERNAL MEDICINE

## 2024-12-30 PROCEDURE — 99999 PR PBB SHADOW E&M-EST. PATIENT-LVL III: CPT | Mod: PBBFAC,,, | Performed by: INTERNAL MEDICINE

## 2024-12-30 PROCEDURE — 1126F AMNT PAIN NOTED NONE PRSNT: CPT | Mod: CPTII,S$GLB,, | Performed by: INTERNAL MEDICINE

## 2024-12-30 PROCEDURE — 1160F RVW MEDS BY RX/DR IN RCRD: CPT | Mod: CPTII,S$GLB,, | Performed by: INTERNAL MEDICINE

## 2024-12-30 NOTE — PATIENT INSTRUCTIONS
The more you do the more you will be able to do so be active and walk/exercise every day once given the okay post stent of left main

## 2024-12-30 NOTE — PROCEDURES
Gus Sabillon is a 84 y.o.   male patient, who presents for a 6 minute walk test ordered by MD Trevin.  The diagnosis is Amyloidosis.  The patient's BMI is 20.7 kg/m2.  Predicted distance (lower limit of normal) is 294.85 meters.      Test Results:    The test was completed without stopping.   The total time walked was 360 seconds.  During walking, the patient reported:  Dyspnea, Other (Comment) (stomach pain). The patient used no assistive devices  during testing.     12/30/2024---------Distance: 354.18 meters (1162 feet)     O2 Sat % Supplemental Oxygen Heart Rate Blood Pressure Frederick Scale   Pre-exercise  (Resting) 96 % Room Air 53 bpm 143/74 mmHg 1   During Exercise 92 % Room Air 110 bpm 135/82 mmHg 3   Post-exercise  (Recovery) 95 % Room Air  60 bpm   mmHg       Recovery Time: 144 seconds    Performing nurse/tech: TERESA Tejeda      PREVIOUS STUDY:   The patient had a previous study.  10/07/2024---------Distance: 320.04 meters (1050 feet)       O2 Sat % Supplemental Oxygen Heart Rate Blood Pressure Frederick Scale   Pre-exercise  (Resting) 98 % Room Air 85 bpm 127/64 mmHg 0.5   During Exercise 95 % Room Air 80 bpm 119/69 mmHg 3   Post-exercise  (Recovery) 99 % Room Air  84 bpm            CLINICAL INTERPRETATION:  Six minute walk distance is 354.18 meters (1162 feet) with moderate dyspnea.  During exercise, there was no significant desaturation while breathing room air.  Both blood pressure and heart rate remained stable with walking.  The patient reported non-pulmonary symptoms during exercise.  The patient did complete the study, walking 360 seconds of the 360 second test.  Since the previous study in 10/2024, exercise capacity may be somewhat improved.  Based upon age and body mass index, exercise capacity is normal.

## 2024-12-30 NOTE — Clinical Note
Return to clinic to see me with an echo 3 months after stent placed and would like to see him at that time.  Would also obtain 6 minute walk test, troponin T, NT proBNP, BNP and BMP

## 2024-12-31 ENCOUNTER — TELEPHONE (OUTPATIENT)
Dept: CARDIOLOGY | Facility: CLINIC | Age: 84
End: 2024-12-31
Payer: MEDICARE

## 2024-12-31 NOTE — PROGRESS NOTES
Subjective:   WT-TTR Amyloid (ag)  Vyndamax ordered per Farshad 7/17/24 7/9/24: Amyloid Consult with Dr. Yen, referred by Dr. Karrie Sarabia MD as his primary cardiologist. As part of his evaluation for HFpEF he underwent evaluation for amyloidosis. His SPEP/UPEP was WNL but FLC was not done. PYP scan on 6/24/24 was positive.    Plan:  1) TTR DNA buccal swab per [facility] done 7/9/24 - NEGATIVE patient notfied  2) FLC: done 7/9/24, Kappa mildly elevated 3.28, Lambda (2.30) & ratio (1.43) normal  3) urine/Serum CHARISMA negative monoclonal proteins; If FLC normal, proceed with Vyndamax  4) If TTR positive, refer for neuro consult/EMG with Dr. Javed  5) Pt complaining gynecomastia; changing his spironolactone to eplerenone.  6) We will have him return to our amyloid clinic after completing testing.     Patient ID:  Gus Sabillon is a 84 y.o. male who presents for follow-up of Cardiomyopathy    HPI:  83 yo WM with HFpEF, HTN, HLD, CKD, PAD status post bilateral lower extremity PI, carotid stenosis s/p R CEA '21, GERD, squamous cell carcinoma of the skin saw Dr. Yen who started Vyndamax for wt-TTR cardiac amyloidosis.  He returns today for f/u visit.        Is biggest complaint is marked fatigue.  He has some dyspnea on exertion and exertional fatigue at less than 1 block.  He sleeps on a couple of pillows without PND spells.  No symptomatic arrhythmia occasionally notes his heart beat.     He was recently found to have severe left main disease for which he is undergoing complex stenting procedure in 3 days.     At last visit he was under consideration for an amyloid depleter trial through a [facility] but he is not going to participate after all    Review of Systems   Constitutional: Positive for malaise/fatigue and weight loss (down 5 lb on my scale).   Cardiovascular:  Positive for dyspnea on exertion, leg swelling, orthopnea and palpitations (At times). Negative for paroxysmal nocturnal dyspnea.   Respiratory:   "Positive for shortness of breath.    Hematologic/Lymphatic: Bruises/bleeds easily (Bruising).   Musculoskeletal:  Positive for arthritis and back pain.   Neurological:  Negative for dizziness, focal weakness and light-headedness.        Objective:   Physical Exam  Constitutional:       General: He is not in acute distress.     Appearance: He is well-developed. He is ill-appearing. He is not toxic-appearing or diaphoretic.      Comments: /67   Pulse 101   Ht 6' 1" (1.854 m)   Wt 72.9 kg (160 lb 11.5 oz)   BMI 21.20 kg/m²    Last visit weight 165 lb   Chronically ill-appearing elderly gentleman in no acute distress   HENT:      Head: Normocephalic and atraumatic.   Eyes:      General: No scleral icterus.        Right eye: No discharge.         Left eye: No discharge.      Conjunctiva/sclera: Conjunctivae normal.   Neck:      Thyroid: No thyromegaly.      Vascular: JVD (venous pulsations extend 8 cm above the clavicle) present.      Trachea: No tracheal deviation.   Cardiovascular:      Rate and Rhythm: Tachycardia present. Rhythm irregular.      Heart sounds: Normal heart sounds. No murmur heard.     No gallop.   Pulmonary:      Effort: Pulmonary effort is normal.      Breath sounds: Normal breath sounds.   Abdominal:      General: Bowel sounds are normal. There is no distension.      Palpations: Abdomen is soft. There is no mass.      Tenderness: There is no abdominal tenderness. There is no guarding or rebound.   Musculoskeletal:         General: Swelling (0.5+ both ankles) present. No tenderness.   Skin:     General: Skin is warm and dry.      Findings: Bruising present.   Neurological:      General: No focal deficit present.      Mental Status: He is alert and oriented to person, place, and time. Mental status is at baseline.   Psychiatric:         Mood and Affect: Mood normal.         Behavior: Behavior normal.         Thought Content: Thought content normal.         Judgment: Judgment normal.      "   EKG obtained 12/30/2024 obtained at today's visit and reviewed by phone (left him a message) demonstrates sinus  rhythm with PVCs. I obtained this because of the extra beats   Wanted to be sure there was no atrial fibrillation.    Labs reviewed at today's visit 12/30/2024  Lab Results   Component Value Date     12/27/2024    K 4.3 12/27/2024     12/27/2024    CO2 29 12/27/2024    BUN 42 (H) 12/27/2024    CREATININE 1.6 (H) 12/27/2024    CALCIUM 9.7 12/27/2024    ANIONGAP 10 12/27/2024    ESTGFRAFRICA 54.1 (A) 06/02/2022    EGFRNONAA 46.8 (A) 06/02/2022     Lab Results   Component Value Date    BNP 2,113 (H) 12/17/2024    BNP 2,129 (H) 10/07/2024    BNP 2,474 (H) 09/26/2024    BNP 2,474 (H) 09/26/2024           Component Ref Range & Units   12/27/2024 2 mo ago   Troponin T <=15 ng/L 109 High  125 High  CM               Component Ref Range & Units  12/27/2024 2 mo ago 4 mo ago   NT-proBNP <=540 pg/mL 9893 High  7248 High  CM 9187 High  CM         12/30/2024 6 minute walk test reviewed at today's visit 354.41 m improved from 320 m October 7, 2024.  No desaturation    12/24/2024 ECHO    Left Ventricle: The left ventricle is mildly dilated. Mildly increased wall thickness. There is concentric hypertrophy. Moderate global hypokinesis present. There is moderately reduced systolic function with a visually estimated ejection fraction of 35 - 40%. Global longitudinal strain is -7.2% with apical sparing.    Left Atrium: Left atrium is moderately dilated.    Right Atrium: Right atrium is mildly dilated.    Aortic Valve: The aortic valve is a trileaflet valve. There is mild aortic valve sclerosis. There is mild aortic regurgitation.    Mitral Valve: There is moderate regurgitation.    Tricuspid Valve: There is mild to moderate regurgitation.    Pulmonary Artery: The estimated pulmonary artery systolic pressure is 43 mmHg.    IVC/SVC: Normal venous pressure at 3 mmHg.    11/25/2024 cardiac catheterization  Left  dominant system with 70-80% dLM disease.   Diffuse disease of a non-dominant RCA.     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     5/17/2024 24 hr Holter  The diary was properly completed.   Overall, the study was of good quality. The tape was adequate (0 days , 24 hours, 0 minutes).        There was an episode of felt swollen reported. The corresponding rhythm strips revealed the following: .     There was an episode of both arms, mouth hurting, dizzy reported. The corresponding rhythm strips revealed the following: .     There was an episode of mouth hurting reported. The corresponding rhythm strips revealed the following: .     There was an episode of fatigue reported. The corresponding rhythm strips revealed the following: .     Rhythm    Predominant Rhythm  Sinus tachycardia    Heart rates varied between 90 and 130 BPM with an average of 104 BPM.     Maximum heart rate recorded at: 16:36 CDT on day 1.     Minimum heart rate recorded at 23:01 CDT on day 1.   With a heart block type of first-degree.  Aberrant PACs noted.  43 beats polymorphic WCT with bigeminal pattern.  Ectopic atrial beats seen.       5/10/23 ECHO  The left ventricle is normal in size with normal systolic function.  The estimated ejection fraction is 60%.  Indeterminate left ventricular diastolic function.  Normal right ventricular size with normal right ventricular systolic function.  Mild left atrial enlargement.  Mild aortic regurgitation.  Mild mitral regurgitation.  Normal central venous pressure (3 mmHg).  The estimated PA systolic pressure is 25 mmHg.    6/24/2024 PYP scan    Study is strongly suggestive of ATTR cardiac amyloidosis with an uptake ratio of 1.8.    SPECT imaging shows diffuse myocardial uptake.    Visual grade corresponds to the Perugini defined grading scale where 3 corresponds to cardiac activity is clearly above bone.    Evaluation for AL amyloidosis by serum FLCs, serum, and urine  immunofixation is recommended in all patients undergoing 99mTc-PYP/DPD/HMDP scans for cardiac amyloidosis.    Results should be interpreted in the context of prior evaluation and referral to a hematologist or amyloidosis expert is recommended if either: (a) Recommended echo/CMR is strongly suggestive of cardiac amyloidosis and 99mTc-PYP/DPD/HMDP is not suggestive or equivocal and/or (b) FLCs are abnormal or equivocal.    6/13/2024 EKG  Sinus bradycardia with 1st degree A-V block with frequent and consecutive   Premature ventricular complexes   ST and T wave abnormality, consider lateral ischemia   Abnormal ECG   When compared with ECG of 10-MAY-2024 13:37,   Vent. rate has increased BY  54 BPM   T wave inversion more evident in Lateral leads   Confirmed by Sujata FIGUEROA, Jenifer ([id number]) on 6/13/2024 9:39:19 AM can you disabled me        Assessment:     1. Cardiac amyloidosis    2. Chronic diastolic heart failure      Plan:    Once he is cleared to participate in exercise program should do so after stent of left main as I explained that the more he does the more he will be able to do     No changes in the management of his wild-type TTR cardiac amyloidosis and chronic diastolic heart failure at this time though it is interesting his ejection fraction dropped I wonder if this is related to his left main.  Would repeat an echo 3 months after stent placed and would like to see him at that time.  Would also obtain 6 minute walk test, troponin T, NT proBNP, BNP and BMP

## 2025-01-02 ENCOUNTER — DOCUMENTATION ONLY (OUTPATIENT)
Dept: CARDIOLOGY | Facility: CLINIC | Age: 85
End: 2025-01-02
Payer: MEDICARE

## 2025-01-02 NOTE — PROGRESS NOTES
Called patient to touch base.  Plan for high-risk left main PCI via femoral access tomorrow.  We will consider measuring intracardiac pressures and HD support, understanding that patient has a depressed LVEF with an elevated BNP at baseline.    The patient is tolerating aspirin 81 x 1 clopidogrel 75 x 1 at this time.    His case has been discussed with HTS and EPS.  The long-term risk of left main disease has been discussed with the patient as has revascularization options and risks.  Patient understands he is at high risk for adverse CV events.  He is comfortable with current plan of care.

## 2025-01-03 ENCOUNTER — HOSPITAL ENCOUNTER (INPATIENT)
Facility: HOSPITAL | Age: 85
LOS: 1 days | Discharge: HOME | DRG: 322 | End: 2025-01-03
Attending: INTERNAL MEDICINE | Admitting: INTERNAL MEDICINE
Payer: MEDICARE

## 2025-01-03 ENCOUNTER — TELEPHONE (OUTPATIENT)
Dept: CARDIAC REHAB | Facility: CLINIC | Age: 85
End: 2025-01-03
Payer: MEDICARE

## 2025-01-03 VITALS
RESPIRATION RATE: 14 BRPM | OXYGEN SATURATION: 96 % | HEIGHT: 73 IN | SYSTOLIC BLOOD PRESSURE: 131 MMHG | DIASTOLIC BLOOD PRESSURE: 62 MMHG | HEART RATE: 85 BPM | BODY MASS INDEX: 20.54 KG/M2 | TEMPERATURE: 98 F | WEIGHT: 155 LBS

## 2025-01-03 DIAGNOSIS — I25.118 CORONARY ARTERY DISEASE OF NATIVE ARTERY OF NATIVE HEART WITH STABLE ANGINA PECTORIS: Primary | ICD-10-CM

## 2025-01-03 DIAGNOSIS — I50.32 CHRONIC DIASTOLIC HEART FAILURE: Primary | ICD-10-CM

## 2025-01-03 DIAGNOSIS — I49.3 PVC (PREMATURE VENTRICULAR CONTRACTION): ICD-10-CM

## 2025-01-03 DIAGNOSIS — I50.20 HEART FAILURE WITH REDUCED EJECTION FRACTION: ICD-10-CM

## 2025-01-03 DIAGNOSIS — I47.29 NONSUSTAINED VENTRICULAR TACHYCARDIA: ICD-10-CM

## 2025-01-03 DIAGNOSIS — R07.9 CHEST PAIN: ICD-10-CM

## 2025-01-03 PROBLEM — I25.110 CORONARY ARTERY DISEASE INVOLVING NATIVE CORONARY ARTERY WITH UNSTABLE ANGINA PECTORIS: Status: ACTIVE | Noted: 2025-01-03

## 2025-01-03 LAB
ABO + RH BLD: NORMAL
BLD GP AB SCN CELLS X3 SERPL QL: NORMAL
OHS QRS DURATION: 86 MS
OHS QTC CALCULATION: 464 MS
SPECIMEN OUTDATE: NORMAL

## 2025-01-03 PROCEDURE — 99152 MOD SED SAME PHYS/QHP 5/>YRS: CPT | Performed by: INTERNAL MEDICINE

## 2025-01-03 PROCEDURE — C9600 PERC DRUG-EL COR STENT SING: HCPCS | Mod: RC | Performed by: INTERNAL MEDICINE

## 2025-01-03 PROCEDURE — 85347 COAGULATION TIME ACTIVATED: CPT | Performed by: INTERNAL MEDICINE

## 2025-01-03 PROCEDURE — 027034Z DILATION OF CORONARY ARTERY, ONE ARTERY WITH DRUG-ELUTING INTRALUMINAL DEVICE, PERCUTANEOUS APPROACH: ICD-10-PCS | Performed by: INTERNAL MEDICINE

## 2025-01-03 PROCEDURE — C1725 CATH, TRANSLUMIN NON-LASER: HCPCS | Performed by: INTERNAL MEDICINE

## 2025-01-03 PROCEDURE — C1874 STENT, COATED/COV W/DEL SYS: HCPCS | Performed by: INTERNAL MEDICINE

## 2025-01-03 PROCEDURE — C1887 CATHETER, GUIDING: HCPCS | Performed by: INTERNAL MEDICINE

## 2025-01-03 PROCEDURE — 99153 MOD SED SAME PHYS/QHP EA: CPT | Performed by: INTERNAL MEDICINE

## 2025-01-03 PROCEDURE — 25000003 PHARM REV CODE 250: Performed by: STUDENT IN AN ORGANIZED HEALTH CARE EDUCATION/TRAINING PROGRAM

## 2025-01-03 PROCEDURE — 63600175 PHARM REV CODE 636 W HCPCS: Performed by: INTERNAL MEDICINE

## 2025-01-03 PROCEDURE — 99234 HOSP IP/OBS SM DT SF/LOW 45: CPT | Mod: 25,,, | Performed by: INTERNAL MEDICINE

## 2025-01-03 PROCEDURE — C1760 CLOSURE DEV, VASC: HCPCS | Performed by: INTERNAL MEDICINE

## 2025-01-03 PROCEDURE — 93456 R HRT CORONARY ARTERY ANGIO: CPT | Mod: 59 | Performed by: INTERNAL MEDICINE

## 2025-01-03 PROCEDURE — C1894 INTRO/SHEATH, NON-LASER: HCPCS | Performed by: INTERNAL MEDICINE

## 2025-01-03 PROCEDURE — 93010 ELECTROCARDIOGRAM REPORT: CPT | Mod: ,,, | Performed by: INTERNAL MEDICINE

## 2025-01-03 PROCEDURE — B2111ZZ FLUOROSCOPY OF MULTIPLE CORONARY ARTERIES USING LOW OSMOLAR CONTRAST: ICD-10-PCS | Performed by: INTERNAL MEDICINE

## 2025-01-03 PROCEDURE — 11000001 HC ACUTE MED/SURG PRIVATE ROOM

## 2025-01-03 PROCEDURE — 92928 PRQ TCAT PLMT NTRAC ST 1 LES: CPT | Mod: RC,,, | Performed by: INTERNAL MEDICINE

## 2025-01-03 PROCEDURE — 93005 ELECTROCARDIOGRAM TRACING: CPT

## 2025-01-03 PROCEDURE — C1769 GUIDE WIRE: HCPCS | Performed by: INTERNAL MEDICINE

## 2025-01-03 PROCEDURE — 86900 BLOOD TYPING SEROLOGIC ABO: CPT | Performed by: HOSPITALIST

## 2025-01-03 PROCEDURE — 25000003 PHARM REV CODE 250: Performed by: INTERNAL MEDICINE

## 2025-01-03 PROCEDURE — C1751 CATH, INF, PER/CENT/MIDLINE: HCPCS | Performed by: INTERNAL MEDICINE

## 2025-01-03 PROCEDURE — 99152 MOD SED SAME PHYS/QHP 5/>YRS: CPT | Mod: ,,, | Performed by: INTERNAL MEDICINE

## 2025-01-03 PROCEDURE — 93456 R HRT CORONARY ARTERY ANGIO: CPT | Mod: 26,59,51, | Performed by: INTERNAL MEDICINE

## 2025-01-03 PROCEDURE — 4A023N6 MEASUREMENT OF CARDIAC SAMPLING AND PRESSURE, RIGHT HEART, PERCUTANEOUS APPROACH: ICD-10-PCS | Performed by: INTERNAL MEDICINE

## 2025-01-03 PROCEDURE — 27201423 OPTIME MED/SURG SUP & DEVICES STERILE SUPPLY: Performed by: INTERNAL MEDICINE

## 2025-01-03 PROCEDURE — 25500020 PHARM REV CODE 255: Performed by: INTERNAL MEDICINE

## 2025-01-03 DEVICE — EVEROLIMUS-ELUTING PLATINUM CHROMIUM CORONARY STENT SYSTEM
Type: IMPLANTABLE DEVICE | Site: CORONARY | Status: FUNCTIONAL
Brand: SYNERGY™ XD

## 2025-01-03 RX ORDER — CLOPIDOGREL BISULFATE 75 MG/1
75 TABLET ORAL DAILY
Status: DISCONTINUED | OUTPATIENT
Start: 2025-01-03 | End: 2025-01-03 | Stop reason: HOSPADM

## 2025-01-03 RX ORDER — SODIUM CHLORIDE 9 MG/ML
INJECTION, SOLUTION INTRAVENOUS CONTINUOUS
Status: ACTIVE | OUTPATIENT
Start: 2025-01-03 | End: 2025-01-03

## 2025-01-03 RX ORDER — FENTANYL CITRATE 50 UG/ML
INJECTION, SOLUTION INTRAMUSCULAR; INTRAVENOUS
Status: DISCONTINUED | OUTPATIENT
Start: 2025-01-03 | End: 2025-01-03 | Stop reason: HOSPADM

## 2025-01-03 RX ORDER — ASPIRIN 81 MG/1
81 TABLET ORAL DAILY
Status: DISCONTINUED | OUTPATIENT
Start: 2025-01-04 | End: 2025-01-03

## 2025-01-03 RX ORDER — HEPARIN SOD,PORCINE/0.9 % NACL 1000/500ML
INTRAVENOUS SOLUTION INTRAVENOUS
Status: DISCONTINUED | OUTPATIENT
Start: 2025-01-03 | End: 2025-01-03 | Stop reason: HOSPADM

## 2025-01-03 RX ORDER — LIDOCAINE HYDROCHLORIDE 20 MG/ML
INJECTION, SOLUTION INFILTRATION; PERINEURAL
Status: DISCONTINUED | OUTPATIENT
Start: 2025-01-03 | End: 2025-01-03 | Stop reason: HOSPADM

## 2025-01-03 RX ORDER — ACETAMINOPHEN 325 MG/1
650 TABLET ORAL EVERY 4 HOURS PRN
Status: DISCONTINUED | OUTPATIENT
Start: 2025-01-03 | End: 2025-01-03 | Stop reason: HOSPADM

## 2025-01-03 RX ORDER — ASPIRIN 325 MG
TABLET ORAL
Status: DISCONTINUED | OUTPATIENT
Start: 2025-01-03 | End: 2025-01-03 | Stop reason: HOSPADM

## 2025-01-03 RX ORDER — CLOPIDOGREL BISULFATE 75 MG/1
75 TABLET ORAL DAILY
Status: DISCONTINUED | OUTPATIENT
Start: 2025-01-04 | End: 2025-01-03

## 2025-01-03 RX ORDER — METOPROLOL SUCCINATE 25 MG/1
25 TABLET, EXTENDED RELEASE ORAL DAILY
Status: DISCONTINUED | OUTPATIENT
Start: 2025-01-03 | End: 2025-01-03

## 2025-01-03 RX ORDER — ASPIRIN 81 MG/1
81 TABLET ORAL DAILY
Status: DISCONTINUED | OUTPATIENT
Start: 2025-01-03 | End: 2025-01-03 | Stop reason: HOSPADM

## 2025-01-03 RX ORDER — HEPARIN SODIUM 1000 [USP'U]/ML
INJECTION INTRAVENOUS; SUBCUTANEOUS
Status: DISCONTINUED | OUTPATIENT
Start: 2025-01-03 | End: 2025-01-03 | Stop reason: HOSPADM

## 2025-01-03 RX ORDER — SODIUM CHLORIDE 9 MG/ML
INJECTION, SOLUTION INTRAVENOUS CONTINUOUS
Status: DISCONTINUED | OUTPATIENT
Start: 2025-01-03 | End: 2025-01-03

## 2025-01-03 RX ORDER — ONDANSETRON 8 MG/1
8 TABLET, ORALLY DISINTEGRATING ORAL EVERY 8 HOURS PRN
Status: DISCONTINUED | OUTPATIENT
Start: 2025-01-03 | End: 2025-01-03 | Stop reason: HOSPADM

## 2025-01-03 RX ORDER — MIDAZOLAM HYDROCHLORIDE 1 MG/ML
INJECTION, SOLUTION INTRAMUSCULAR; INTRAVENOUS
Status: DISCONTINUED | OUTPATIENT
Start: 2025-01-03 | End: 2025-01-03 | Stop reason: HOSPADM

## 2025-01-03 RX ORDER — CLOPIDOGREL BISULFATE 300 MG/1
TABLET, FILM COATED ORAL
Status: DISCONTINUED | OUTPATIENT
Start: 2025-01-03 | End: 2025-01-03 | Stop reason: HOSPADM

## 2025-01-03 RX ADMIN — SODIUM CHLORIDE: 9 INJECTION, SOLUTION INTRAVENOUS at 09:01

## 2025-01-03 RX ADMIN — SODIUM CHLORIDE: 9 INJECTION, SOLUTION INTRAVENOUS at 06:01

## 2025-01-03 NOTE — NURSING
Pt brought to room 2 on SSCU to finish out recovery. Pt's vs taken and wnl. Pt is free from s/s of pain/distress. Pt's quincy groin puncture sites have gauze/tegaderm intact. Sites are free from s/s of bleeding. NS gtt of 150ml/hr running. Pt verbalized understanding of restrictions. Pt's wife, Dr. Osman, Kenneth, and Cornell at bedside. Mds aware of pt's chest pressure of 4/10. MD Osman ordered a Stat 12 lead EKG. Safety measures in place.     1020: MD Adler notified of pt's EKG being completed. MD ordered to lower Pt's NS gtt to 100ml/hr. Pt's chest pressure has lessoned and pt states that it resembles gas pressure. Pt able to tolerate drinking, eating, and urinating without issues. UO 400ml. Safety measures in place.

## 2025-01-03 NOTE — TELEPHONE ENCOUNTER
Information packet & letter regarding Phase II cardiac rehab was sent to patient.  Will contact patient in 2 weeks to see if interested.  Also, information letter sent to [facility].  Bel Brito RN  Cardiac Rehab Nurse

## 2025-01-03 NOTE — DISCHARGE INSTRUCTIONS
Groin site management, precautions, and restrictions:  Keep the groin site(s) clean and dry. You do not need to apply ointments or bandages to the area. Your bandages should have been removed the morning after your procedure. If they have not been removed, make sure to remove them.  If oozing from groin site occurs, apply pressure without letting up for 15 minutes and lay flat for 1 hour. If bleeding has resolved, you can continue to monitor. If the bleeding continues or there is significant swelling or pain in the groin area, please visit the nearest ER for evaluation and treatment. DO NOT STOP TAKING YOUR BLOOD THINNER UNLESS INSTRUCTED BY A PHYSICIAN.   Do not take baths or submerge your groin area or at least 1 week or when the puncture sites in your groin have completely healed  Do not lift anything over 10 lbs for the first week after your procedure, and avoid strenuous activity during this time to allow for the groin sites to heal. After 1 week, there are no activity restrictions.  Please contact the Interventional Cardiology clinic or go to the ER if you experience: severe chest pain, shortness of breath, bleeding or swelling of the groin sites, or any other concerns.

## 2025-01-03 NOTE — NURSING
Pt and pt's wife given discharge paperwork and education reiterated. All questions and concerns addressed. Dr. Cooper came to bedside to discuss plan of care. Pt's quincy groin puncture sites have gauze/tegaderm intact. Sites are free from s/s of bleeding. Pt able to drink, eat, walk, and use restroom without issues. Pt's iv and tele removed. Pt is waiting on wheelchair to be pushed out.

## 2025-01-03 NOTE — DISCHARGE SUMMARY
Discharge Summary  Interventional Cardiology      Admit Date: 1/3/2025    Discharge Date:  1/3/2025    Attending Physician: Karrie Sarabia MD    Discharge Physician: Fernando Cooper MD    Principal Diagnoses: <principal problem not specified>  Indication for Admission: ANGIOGRAM, CORONARY ARTERY, INSERTION, STENT, CORONARY ARTERY (N/A), INSERTION, CATHETER, RIGHT HEART (Right)    Discharged Condition: Good    Hospital Course:   Patient presented for outpatient coronary angiogram which went without complication. Coronary angiogram revealed LM disease . See full cath report in Epic for details. Hemostasis of patient's R CFA access site was achieved with Mynx closure device. R FCV & L CFA with manual pressure. Patient was monitored per post-cath protocol, and his R groin access site was c/d/i with no hematoma. Patient was able to ambulate without difficulty. He was feeling well and anticipating discharge home today.     Outpatient Plan:  - There were no medication changes    Diet: Cardiac diet    Activity: Ad tai, wound care instructions provided    Disposition: Home or Self Care    Discharge Medications:      Medication List        CONTINUE taking these medications      aspirin 81 MG EC tablet  Commonly known as: ECOTRIN  Take 1 tablet (81 mg total) by mouth once daily.     atorvastatin 20 MG tablet  Commonly known as: LIPITOR  Take 1 tablet (20 mg total) by mouth once daily.     clopidogreL 75 mg tablet  Commonly known as: PLAVIX  Take 1 tablet (75 mg total) by mouth once daily.     eplerenone 25 MG Tab  Commonly known as: INSPRA     fluorouraciL 5 % cream  Commonly known as: EFUDEX  AAA on left shin BID x 2-3 weeks. Stop if blistered, oozing, or bleeding. Use daily sun protection.     furosemide 80 MG tablet  Commonly known as: LASIX  Take 1 tablet (80 mg total) by mouth once daily. Tale afternoon dose if needed     ketoconazole 2 % cream  Commonly known as: NIZORAL  Apply topically 2 (two) times daily.     methocarbamoL  500 MG Tab  Commonly known as: ROBAXIN  Take 3 tablets (1,500 mg total) by mouth 3 (three) times daily as needed. Start by taking 2 tablets as needed, 3 times a day. If side effects minimal, can increase to 3 tablets 3 times a day.     omeprazole 20 MG capsule  Commonly known as: PRILOSEC  TAKE 1 CAPSULE BY MOUTH EVERY DAY IN THE MORNING     VYNDAMAX 61 mg Cap  Generic drug: tafamidis  Take 1 capsule (61 mg total) by mouth once daily.            Follow Up:

## 2025-01-03 NOTE — LETTER
January 3, 2025    Gus Sabillon  [address]  [place] [address]             [facility]  [address]  [place] [address]  Phone: [phone]                                  [facility]   [address]   [place] [address]  [phone]         [facility]   [address] [place] [address] [phone]         [facility]    [address]  [place] [address] [phone]   Re: Gus Sabillon  Clinic number: [id number]    Dear Mr. Sabillon:    You were recently admitted to an [facility] facility for cardiac (heart) problem.  Your physician has referred you to [facility].  Cardiac Rehab Phase 2 is an educational and exercise program, conducted in a outpatient setting, proven to help reduce your risk for recurrent heart events.    Cardiac rehab has two major parts:    1. Exercise training to help you achieve cardiovascular fitness while learning how to exercise safely and improve muscle strength and endurance.  Your exercise prescription will be based on the results of the cardiopulmonary stress test (CPX) which will be done before entering the program and at completion.  2. Education, counseling and training to help you understand your heart condition and find ways to reduce your risk of future heart problems.  The cardiac rehab team will help you learn how to cope with the stress of adjusting to a new lifestyle and to deal with your fears about the future.    Phase 2 is a 36-session program, meeting 3 times a week for 12 weeks.  Each session consists of an hour of exercise and half-hour dedicated to the educational topic of the day.  Class days vary per location.  Please contact your nearest facility for details.    Through cardiac rehab you will learn:  About your heart condition, medical therapies, and medication  Risk factors in y our lifestyle contributing  to heart disease  New strategies to modify your risk factors  About a healthy diet that can lower your blood cholesterol, control weight, help prevent or control high blood pressure, and diabetes  How to stop smoking  How to manage stress    If you are interested in getting started, call the [facility] of your choosing.     Sincerely,     [facility]

## 2025-01-03 NOTE — BRIEF OP NOTE
Brief Operative Note:    : Karrie Sarabia MD     Referring Physician: Karrie Sarabia     All Operators: Surgeon(s):  Chris Adler MD Vyas, Ankit, MD Grant, Arthur G. III, MD Sahai, Achal, MD Subramaniam, Venkat, MD     Preoperative Diagnosis: Heart failure with reduced ejection fraction [I50.20]  Coronary artery disease of native artery of native heart with stable angina pectoris [I25.118]  PVC (premature ventricular contraction) [I49.3]     Postop Diagnosis: Heart failure with reduced ejection fraction [I50.20]  Coronary artery disease of native artery of native heart with stable angina pectoris [I25.118]  PVC (premature ventricular contraction) [I49.3]    Treatments/Procedures: Procedure(s) (LRB):  ANGIOGRAM, CORONARY ARTERY  INSERTION, STENT, CORONARY ARTERY (N/A)  INSERTION, CATHETER, RIGHT HEART (Right)    Access: Right CFA, Left CFA, Right CFV    Findings:Severe coronary artery disease is present.     See catheterization report for full details.    Intervention: PCI to LM    See catheterization report for full details.    Closure device: Manual pressure, Mynx       Plan:  - Post cath protocol   - IVF @ 150 cc/hr x 4 hours  - Bed rest x 4 hours   - Continue  Aspirin 81 mg daily indefinitely  - Continue Clopidogrel for minimum 6 months, ideally up to 1 year  - Continue high intensity statin therapy (LDL goal < 70)  - Risk factor reduction (BP <130/80 mmHg, glycemic control, etc)  - Cardiac rehab referral  - Follow up with outpatient cardiologist    Estimated Blood loss: 20 cc    Specimens removed: No      Fernando Cooper MD  Cardiovascular Medicine Fellow - PGY IV  [facility]

## 2025-01-03 NOTE — INTERVAL H&P NOTE
The patient has been examined and the H&P has been reviewed:    I concur with the findings and no changes have occurred since H&P was written.    Procedure risks, benefits and alternative options discussed and understood by patient/family.    Plan for high-risk left main PCI via femoral access today.  We will consider measuring intracardiac pressures and HD support, understanding that patient has a depressed LVEF with an elevated BNP at baseline.     The patient is tolerating aspirin 81 x 1 clopidogrel 75 x 1 at this time.     His case has been discussed with HTS and EPS.  The long-term risk of left main disease has been discussed with the patient as has revascularization options and risks.  Patient understands he is at high risk for adverse CV events.  He is comfortable with current plan of care.      There are no hospital problems to display for this patient.

## 2025-01-06 LAB
POC ACTIVATED CLOTTING TIME K: 210 SEC (ref 74–137)
POC ACTIVATED CLOTTING TIME K: 273 SEC (ref 74–137)
SAMPLE: ABNORMAL
SAMPLE: ABNORMAL

## 2025-01-08 ENCOUNTER — OFFICE VISIT (OUTPATIENT)
Dept: DERMATOLOGY | Facility: CLINIC | Age: 85
End: 2025-01-08
Payer: MEDICARE

## 2025-01-08 ENCOUNTER — HOSPITAL ENCOUNTER (OUTPATIENT)
Dept: CARDIOLOGY | Facility: CLINIC | Age: 85
Discharge: HOME | End: 2025-01-08
Payer: MEDICARE

## 2025-01-08 ENCOUNTER — PATIENT OUTREACH (OUTPATIENT)
Dept: ADMINISTRATIVE | Facility: CLINIC | Age: 85
End: 2025-01-08
Payer: MEDICARE

## 2025-01-08 ENCOUNTER — OFFICE VISIT (OUTPATIENT)
Dept: ELECTROPHYSIOLOGY | Facility: CLINIC | Age: 85
End: 2025-01-08
Payer: MEDICARE

## 2025-01-08 VITALS
BODY MASS INDEX: 21.45 KG/M2 | WEIGHT: 161.81 LBS | HEART RATE: 92 BPM | HEIGHT: 73 IN | SYSTOLIC BLOOD PRESSURE: 128 MMHG | DIASTOLIC BLOOD PRESSURE: 64 MMHG

## 2025-01-08 DIAGNOSIS — I25.110 CORONARY ARTERY DISEASE INVOLVING NATIVE CORONARY ARTERY OF NATIVE HEART WITH UNSTABLE ANGINA PECTORIS: ICD-10-CM

## 2025-01-08 DIAGNOSIS — Z85.820 HISTORY OF MALIGNANT MELANOMA: ICD-10-CM

## 2025-01-08 DIAGNOSIS — E85.4 CARDIAC AMYLOIDOSIS: ICD-10-CM

## 2025-01-08 DIAGNOSIS — Z85.828 HISTORY OF SKIN CANCER: ICD-10-CM

## 2025-01-08 DIAGNOSIS — R00.1 BRADYCARDIA: ICD-10-CM

## 2025-01-08 DIAGNOSIS — D22.9 ATYPICAL NEVUS: Primary | ICD-10-CM

## 2025-01-08 DIAGNOSIS — Z85.828 HISTORY OF NONMELANOMA SKIN CANCER: ICD-10-CM

## 2025-01-08 DIAGNOSIS — I10 PRIMARY HYPERTENSION: ICD-10-CM

## 2025-01-08 DIAGNOSIS — I49.3 FREQUENT PVCS: ICD-10-CM

## 2025-01-08 DIAGNOSIS — N18.32 STAGE 3B CHRONIC KIDNEY DISEASE: ICD-10-CM

## 2025-01-08 DIAGNOSIS — I48.0 PAROXYSMAL ATRIAL FIBRILLATION: Primary | ICD-10-CM

## 2025-01-08 DIAGNOSIS — D22.9 MULTIPLE BENIGN NEVI: ICD-10-CM

## 2025-01-08 DIAGNOSIS — L81.4 LENTIGO: ICD-10-CM

## 2025-01-08 DIAGNOSIS — L21.9 SEBORRHEIC DERMATITIS: ICD-10-CM

## 2025-01-08 DIAGNOSIS — I50.20 HEART FAILURE WITH REDUCED EJECTION FRACTION: ICD-10-CM

## 2025-01-08 DIAGNOSIS — L57.0 ACTINIC KERATOSIS: ICD-10-CM

## 2025-01-08 DIAGNOSIS — D18.01 CHERRY ANGIOMA: ICD-10-CM

## 2025-01-08 DIAGNOSIS — I47.29 NONSUSTAINED VENTRICULAR TACHYCARDIA: Primary | ICD-10-CM

## 2025-01-08 DIAGNOSIS — Z12.83 SCREENING EXAM FOR SKIN CANCER: ICD-10-CM

## 2025-01-08 DIAGNOSIS — I43 CARDIAC AMYLOIDOSIS: ICD-10-CM

## 2025-01-08 DIAGNOSIS — I70.0 AORTIC ATHEROSCLEROSIS: ICD-10-CM

## 2025-01-08 DIAGNOSIS — L82.1 SEBORRHEIC KERATOSES: ICD-10-CM

## 2025-01-08 LAB
OHS QRS DURATION: 92 MS
OHS QTC CALCULATION: 469 MS

## 2025-01-08 PROCEDURE — 1101F PT FALLS ASSESS-DOCD LE1/YR: CPT | Mod: CPTII,S$GLB,, | Performed by: STUDENT IN AN ORGANIZED HEALTH CARE EDUCATION/TRAINING PROGRAM

## 2025-01-08 PROCEDURE — 99999 PR PBB SHADOW E&M-EST. PATIENT-LVL III: CPT | Mod: PBBFAC,,, | Performed by: INTERNAL MEDICINE

## 2025-01-08 PROCEDURE — G2211 COMPLEX E/M VISIT ADD ON: HCPCS | Mod: S$GLB,,, | Performed by: STUDENT IN AN ORGANIZED HEALTH CARE EDUCATION/TRAINING PROGRAM

## 2025-01-08 PROCEDURE — 3074F SYST BP LT 130 MM HG: CPT | Mod: CPTII,S$GLB,, | Performed by: INTERNAL MEDICINE

## 2025-01-08 PROCEDURE — 3288F FALL RISK ASSESSMENT DOCD: CPT | Mod: CPTII,S$GLB,, | Performed by: STUDENT IN AN ORGANIZED HEALTH CARE EDUCATION/TRAINING PROGRAM

## 2025-01-08 PROCEDURE — 1101F PT FALLS ASSESS-DOCD LE1/YR: CPT | Mod: CPTII,S$GLB,, | Performed by: INTERNAL MEDICINE

## 2025-01-08 PROCEDURE — 99999 PR PBB SHADOW E&M-EST. PATIENT-LVL III: CPT | Mod: PBBFAC,,, | Performed by: STUDENT IN AN ORGANIZED HEALTH CARE EDUCATION/TRAINING PROGRAM

## 2025-01-08 PROCEDURE — 3288F FALL RISK ASSESSMENT DOCD: CPT | Mod: CPTII,S$GLB,, | Performed by: INTERNAL MEDICINE

## 2025-01-08 PROCEDURE — 3078F DIAST BP <80 MM HG: CPT | Mod: CPTII,S$GLB,, | Performed by: INTERNAL MEDICINE

## 2025-01-08 PROCEDURE — 1126F AMNT PAIN NOTED NONE PRSNT: CPT | Mod: CPTII,S$GLB,, | Performed by: INTERNAL MEDICINE

## 2025-01-08 PROCEDURE — 1160F RVW MEDS BY RX/DR IN RCRD: CPT | Mod: CPTII,S$GLB,, | Performed by: INTERNAL MEDICINE

## 2025-01-08 PROCEDURE — 99213 OFFICE O/P EST LOW 20 MIN: CPT | Mod: S$GLB,,, | Performed by: STUDENT IN AN ORGANIZED HEALTH CARE EDUCATION/TRAINING PROGRAM

## 2025-01-08 PROCEDURE — 1160F RVW MEDS BY RX/DR IN RCRD: CPT | Mod: CPTII,S$GLB,, | Performed by: STUDENT IN AN ORGANIZED HEALTH CARE EDUCATION/TRAINING PROGRAM

## 2025-01-08 PROCEDURE — 99214 OFFICE O/P EST MOD 30 MIN: CPT | Mod: S$GLB,,, | Performed by: INTERNAL MEDICINE

## 2025-01-08 PROCEDURE — 1159F MED LIST DOCD IN RCRD: CPT | Mod: CPTII,S$GLB,, | Performed by: INTERNAL MEDICINE

## 2025-01-08 PROCEDURE — 1111F DSCHRG MED/CURRENT MED MERGE: CPT | Mod: CPTII,S$GLB,, | Performed by: INTERNAL MEDICINE

## 2025-01-08 PROCEDURE — 1126F AMNT PAIN NOTED NONE PRSNT: CPT | Mod: CPTII,S$GLB,, | Performed by: STUDENT IN AN ORGANIZED HEALTH CARE EDUCATION/TRAINING PROGRAM

## 2025-01-08 PROCEDURE — 1111F DSCHRG MED/CURRENT MED MERGE: CPT | Mod: CPTII,S$GLB,, | Performed by: STUDENT IN AN ORGANIZED HEALTH CARE EDUCATION/TRAINING PROGRAM

## 2025-01-08 PROCEDURE — 1159F MED LIST DOCD IN RCRD: CPT | Mod: CPTII,S$GLB,, | Performed by: STUDENT IN AN ORGANIZED HEALTH CARE EDUCATION/TRAINING PROGRAM

## 2025-01-08 PROCEDURE — 93005 ELECTROCARDIOGRAM TRACING: CPT | Mod: S$GLB,,, | Performed by: INTERNAL MEDICINE

## 2025-01-08 PROCEDURE — 93010 ELECTROCARDIOGRAM REPORT: CPT | Mod: S$GLB,,, | Performed by: INTERNAL MEDICINE

## 2025-01-08 NOTE — PROGRESS NOTES
Subjective:      Patient ID:  Gus Sabillon is a 84 y.o. 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    Pt new concern about adhesive reaction on mid chest from recent procedure      Problem List Items Addressed This Visit          Derm    Actinic keratosis       Oncology    History of skin cancer    Overview     melanoma on back in 2017. Melanoma only required excision for treatment   2020--right forearm KA  2020--Right hand SCC  2022--upper chest and right upper back, severely dysplastic nevus  5/2024  --right shin proxmial--SCC--s/p mohs  --right shin distal--SCC--s/p mohs  6/2024: right posterior arm--melanoma 0.4 mm, s/p excision by Dr. 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 No distress.   Neurological: He is alert and oriented to person, place, and time. He is not disoriented.   Psychiatric: He has a normal mood and affect.   Skin:   Areas Examined (abnormalities noted in diagram):   Scalp / Hair Palpated and Inspected  Head / Face Inspection Performed  Neck Inspection Performed  Chest / Axilla Inspection Performed  Abdomen Inspection Performed  Genitals / Buttocks / Groin Inspection Performed  Back Inspection Performed  RUE Inspected  LUE  Inspection Performed  RLE Inspected  LLE Inspection Performed  Nails and Digits Inspection Performed                             Diagram Legend     Erythematous scaling macule/papule c/w actinic keratosis       Vascular papule c/w angioma      Pigmented verrucoid papule/plaque c/w seborrheic keratosis      Yellow umbilicated papule c/w sebaceous hyperplasia      Irregularly shaped tan macule c/w lentigo     1-2 mm smooth white papules consistent with Milia      Movable subcutaneous cyst with punctum c/w epidermal inclusion cyst      Subcutaneous movable cyst c/w pilar cyst      Firm pink to brown papule c/w dermatofibroma      Pedunculated fleshy papule(s) c/w skin tag(s)      Evenly pigmented macule c/w junctional nevus     Mildly variegated pigmented, slightly irregular-bordered macule c/w mildly atypical nevus      Flesh colored to evenly pigmented papule c/w intradermal nevus       Pink pearly papule/plaque c/w basal cell carcinoma      Erythematous hyperkeratotic cursted plaque c/w SCC      Surgical scar with no sign of skin cancer recurrence      Open and closed comedones      Inflammatory papules and pustules      Verrucoid papule consistent consistent with wart     Erythematous eczematous patches and plaques     Dystrophic onycholytic nail with subungual debris c/w onychomycosis     Umbilicated papule    Erythematous-base heme-crusted tan verrucoid plaque consistent with inflamed seborrheic keratosis     Erythematous Silvery Scaling Plaque c/w Psoriasis     See annotation      Assessment / Plan:        Atypical nevus  Patient has had several atypical pigmented lesions noted on prior exams. Recommended biopsy to r/o melanoma. Patient voiced understanding but declined biopsy due to his age. States he only wants things biopsied that are definitively melanoma. I explained that the point of the biopsy is to confirm diagnosis and we cannot always be sure without a biopsy but highest suspicion is for a dysplastic  nevus     Actinic keratosis  - scattered AKs as per exam. Offered LN2. Patient prefers efudex, which he has at home. Apply bid x 2 weeks to marked areas    He uses efudex to legs often for porokeratoses and AKs    Lentigo  [place] angioma  Seborrheic keratoses  Multiple benign nevi  Reassurance given to patient. No treatment is necessary.   Treatment of benign, asymptomatic lesions may be considered cosmetic.    History of nonmelanoma skin cancer  History of malignant melanoma  Screening exam for skin cancer  Area of previous melanoma and NMSC examined. Site well healed with no signs of recurrence.    Total body skin examination performed today including at least 12 points as noted in physical examination. No lesions suspicious for malignancy noted.    Recommend daily sun protection/avoidance, use of at least SPF 30, broad spectrum sunscreen (OTC drug), skin self examinations, and routine physician surveillance to optimize early detection               Follow up in about 3 months (around 4/8/2025) for TBSE.

## 2025-01-08 NOTE — PATIENT INSTRUCTIONS
Sunscreen Guidelines  Sunscreen protects your skin by absorbing and reflecting ultraviolet rays. All sunscreens have a sun protection factor (SPF) rating that indicates how long a sunscreen will remain effective on the skin.    Why protect your skin?  The sun's rays are composed of many different wavelengths, including UVA, UVB, and visible light that each affect the skin differently.    UVB: sunburn, photoaging, skin cancer (melanoma, basal cell, and squamous cell carcinomas) and modulation of the skin's immune system.    UVA: similar to above but thought to contribute more to aging; at the same dose of UVB it is less powerful however the sun has 10-20 times the levels of UVA as compared with UVB.  Visible light: implicated in causing unwanted darkening of skin, such as melasma and post-inflammatory hyperpigmentation in darker skin types     If I have dark skin, do I need to worry about the sun?    More darkly pigmented skin is more protected against UV-induced skin cancer, sunburn, and photoaging, though may still suffer from sun-related conditions, including melasma, hyperpigmentation, and other dark spots.    Sun avoidance  As a general rule, stay out of the sun as much as possible between 10 a.m. - 4 p.m.    Download the EPA UV index kev to track the UV index by hour in your zip code.      Which sunscreen should I choose?  The best sunscreen to use varies by individual. The one that feels best on your skin and fits your lifestyle will be the one you will likely use most regularly.   Active ingredients of sunscreen vary by , and may be a chemical (such as avobenzone or oxybenzone) or physical agent (such as zinc oxide or titanium dioxide). I recommend a physical agent.  A water-resistant sunscreen is one that maintains the SPF level after 40 minutes of water exposure. A very water-resistant sunscreen maintains the SPF level after 80 minutes of water exposure.    Sunscreen: this is the last layer in  "sun protection   Be generous: 1 shot glass of sunscreen for your body, ½ teaspoon for your face/neck  Reapply every 2 hours  Broad spectrum (provides UVA/UVB protection), SPF 50 or above  Avoid spray sunscreens: less effective and have been found to contain benzene (carcinogen)    Sun protective clothing  Although sunscreen helps minimize sun damage, no sunscreen completely blocks all wavelengths of UV light. Wearing sun protective clothing such as hats, rashguards or swim shirts, and long sleeves and/or pants, as well as avoiding sun exposure from 10 a.m. to 4 p.m. will help protect your skin from overexposure and minimize sun damage. Seek shade.  Long sleeved clothing, hats, and sunglasses: makes sun protection easier, more effective, and can even be more affordable, since sunscreen needs to be reapplied frequently.    Solumbra (www.sunprectautions.Service[employer])  KloudCatch (www.[employer])  Coolibar (www.[employer].ServiceFrame)  Land's end (www.[employer])  Hats from [employer] (www.helenkaminski.com)    My Favorite Sunscreens:  Physical blockers: Can have a "white case" but in general are more effective  - Face: CeraVe tinted mineral sunscreen, Bare Minerals complexion rescue (20 shades), Elta MD (UV elements, UV physical, UV restore, etc), Tizo ultra zinc tinted, Cetaphil Sheer Mineral Face Liquid Sunscreen  - Body: Blue Lizard, Neutrogena Sheer Zinc, Eucerin Daily Protection, Aveeno Baby   "

## 2025-01-08 NOTE — PROGRESS NOTES
"  C3 nurse spoke with Gus Sabillon for a TCC post hospital discharge follow up call. Nurse offered to scheduled TCC hospital follow-up appointment. The patient declined a sooner PCP HOSFU appointment at this time stating, "the appointment I have fits my schedule better."     "

## 2025-01-08 NOTE — PROGRESS NOTES
Subjective   Patient ID:  Gus Sabillon is a 84 y.o. male who presents for evaluation of Congestive Heart Failure and PVCs    Referring Cardiologist: Karrie Sarabia MD  Primary Care Physician: DO RAJEEV Rollins  Prior Hx:  I had the pleasure of seeing Mr. Sabillon today in our electrophysiology clinic in follow-up for his PVCs and heart failure. As you are aware he is a pleasant 84 year-old man with hypertension, frequent PVCs/nonsustained VT, peripheral arterial disease, and CKD stage 3. He was first seen by Dr. Sarabia in 1/2023 for heart failure symptoms. His LVEF was preserved at that time. Ultimately required admission for IV diuresis. He was doing well until this year when he began having weight loss, dizziness, light-headedness, and fatigue. He was bradycardic and his atenolol was stopped. A holter monitor noted very frequent PVCs and nonsustained VT with a 51.5% PVC burden (polyfocal) and nsVT runs up to 43 beats in duration. He was rx amiodarone and referred for evaluation. He is undergoing work-up for amyloidosis. Per conversations with Dr. Sarabia, the patient has preferred conservative management. He did not start amiodarone as he was concerned with the side effects. He felt miserable at our initial visit in June of 2024.    Nuclear stress 3/2023: equivocal (either inferior wall ischemia/infarct or bowel artifact)    I reviewed available ECGs in EPIC which show sinus rhythm (at times sinus bradycardia with rates in the 30s) and PVCs (some inferiorly directed, another RB with concordance and superior axis)    At our visit in June of 2024 we discussed his PVCs. At that time his LVEF was preserved and he had 2 predominant foci (outflow tract and inferior LV). Amio or multaq were only options due to CKD. He preferred to not take medications. He was more inclined to PVC mapping/ablation. We discussed first getting a PET stress test and completing amyloid work-up. If all were negative then would  proceed with PVC mapping/ablation.    Interim Hx:  Mr. Sabillon returns for follow-up. In the interim, he was diagnosed with cardiac amyloidosis and had an abnormal PET stress test. Coronary angiography with Dr. Sarabia noted significant LMCA stenosis. Repeat ECHO noted LVEF of 35-40%. He underwent staged PCI 1/3/2025. He returns for follow-up. He reports he feels great. He has no current complaints.    My interpretation of today's in-clinic ECG is sinus rhythm with PVCs (PVC 1: LB, V5 transition, inferior rightward axis; PVC 2: RB, V5, inf axis).    Review of Systems   Constitutional: Negative for fever and malaise/fatigue.   HENT:  Negative for congestion and sore throat.    Eyes:  Negative for blurred vision and visual disturbance.   Cardiovascular:  Negative for chest pain, dyspnea on exertion, irregular heartbeat, near-syncope, palpitations and syncope.   Respiratory:  Negative for cough and shortness of breath.    Hematologic/Lymphatic: Negative for bleeding problem. Does not bruise/bleed easily.   Skin: Negative.    Musculoskeletal: Negative.    Gastrointestinal:  Negative for bloating, abdominal pain, hematochezia and melena.   Neurological:  Negative for focal weakness and weakness.   Psychiatric/Behavioral: Negative.            Objective     Physical Exam  Vitals reviewed.   Constitutional:       General: He is not in acute distress.     Appearance: He is well-developed. He is not diaphoretic.   HENT:      Head: Normocephalic and atraumatic.   Eyes:      General:         Right eye: No discharge.         Left eye: No discharge.      Conjunctiva/sclera: Conjunctivae normal.   Cardiovascular:      Rate and Rhythm: Normal rate and regular rhythm. Frequent Extrasystoles are present.     Heart sounds: No murmur heard.     No friction rub. No gallop.   Pulmonary:      Effort: Pulmonary effort is normal. No respiratory distress.      Breath sounds: Normal breath sounds. No wheezing or rales.   Abdominal:       General: Bowel sounds are normal. There is no distension.      Palpations: Abdomen is soft.      Tenderness: There is no abdominal tenderness.   Musculoskeletal:      Cervical back: Neck supple.   Skin:     General: Skin is warm and dry.   Neurological:      Mental Status: He is alert and oriented to person, place, and time.   Psychiatric:         Behavior: Behavior normal.         Thought Content: Thought content normal.         Judgment: Judgment normal.            Assessment and Plan     1. Nonsustained ventricular tachycardia    2. Heart failure with reduced ejection fraction    3. Frequent PVCs    4. Primary hypertension    5. Coronary artery disease involving native coronary artery of native heart with unstable angina pectoris    6. Cardiac amyloidosis    7. Bradycardia    8. Aortic atherosclerosis    9. Stage 3b chronic kidney disease        Plan:  In summary, Mr. Sabillon is a pleasant 84 year-old man with hypertension, frequent PVCs/nsVT, peripheral arterial disease, and CKD stage 3 now diagnosed with cardiac amyloidosis and LM coronary artery disease s/p PCI. EF recently noted to be 35-40%. He has been intolerant to beta-blockers in the past. Discussed I am less inclined to mapping/ablation of polyfocal PVCs in setting of cardiac amyloidosis. No syncope since his beta blocker was stopped over a year ago. He would like to see if his LV function improves post-PCI. I began the discussion of possible ICD (cardiac amyloidosis, nsVT, EF 35-40%). He indicated he is less inclined to ICD but would want to learn more. He meets with Dr. Zhong in March. [place] on ECG and rhythm strip appears much less than 50%.     RTC in 3 months    Thank you for allowing me to participate in the care of this patient. Please do not hesitate to call me with any questions or concerns.    Barney Devlin MD, PhD  Cardiac Electrophysiology

## 2025-01-09 ENCOUNTER — OFFICE VISIT (OUTPATIENT)
Dept: NEUROLOGY | Facility: CLINIC | Age: 85
End: 2025-01-09
Payer: MEDICARE

## 2025-01-09 VITALS — DIASTOLIC BLOOD PRESSURE: 81 MMHG | SYSTOLIC BLOOD PRESSURE: 148 MMHG | HEART RATE: 63 BPM

## 2025-01-09 DIAGNOSIS — E85.1: ICD-10-CM

## 2025-01-09 DIAGNOSIS — G63: ICD-10-CM

## 2025-01-09 DIAGNOSIS — I43 SENILE CARDIAC AMYLOIDOSIS: Primary | ICD-10-CM

## 2025-01-09 DIAGNOSIS — E85.4 SENILE CARDIAC AMYLOIDOSIS: Primary | ICD-10-CM

## 2025-01-09 DIAGNOSIS — I50.20 HEART FAILURE WITH REDUCED EJECTION FRACTION: Primary | ICD-10-CM

## 2025-01-09 PROCEDURE — 99999 PR PBB SHADOW E&M-EST. PATIENT-LVL III: CPT | Mod: PBBFAC,GC,,

## 2025-01-09 NOTE — PROGRESS NOTES
"  [facility] - NEUROLOGY 7TH [place] [facility], [place]    Date: 1/9/25  Patient Name: Gus Sabillon   MRN: [id number]   PCP: Emory Beltran  Referring Provider: No ref. provider found    Assessment:   INTERVAL 1/9/2025:  Stent placed, improvement, Back pain has improved as well, and less frequent, and thus we have planned to discontinue methocarbamol.    PYP positive cardiac scan on Tafamidis for further evaluation.  Plan is to do NCS/EMG for involvement of nervous system as well as for lumbosacral radiculopathy.  Skin biopsy can be a potential option in future looking for amyloid deposits.    Plan:   -- EMG / NCS  3 limbs pending  -- Plans to discontinue methocarbamol 1,500 mg TID PRN for back pain  -- RTC in 3 months    Problem List Items Addressed This Visit    None  Visit Diagnoses       Senile cardiac amyloidosis    -  Primary    Amyloid neuropathies              01/09/2025  Gerson Wynne MD, [facility] [place]  Neurology resident, PGY-4  Department of Neurology  [facility]        Subjective:        HPI:   Mr. Gus Sabillon is a 84 y.o. male presenting for neurologic evaluation and back pain in the setting of newly diagnosed amyloidosis in 6/2024. He is currently on tafamidis (vyndamax), lasix, and eplerenone. He is currently followed by cardiology by Dr. Zhong. He has been referred by Dr. Zhong for neurology evaluation and possible EMG.     Patient reports back "pressure / compressive" pain in the R lower lumbar area, that began in 6/2024. He has also has lost 20 pounds in the past two years.  In July, he had some difficulty with bowel movement and requires straining. He describes his back pain as "floating". It has slightly worsened over the past few months but denies drastic worsening.    He reports the pain lasts 1-2 hours at a time. Not improved by changes in position. Initially lying down would worsen the pain but now he has felt a bit better when " lying down. No temporal pattern noted.     He denies any focal weakness or loss of sensation. He reports reduced spontaneous functioning of bowel movements as well as swallowing. No changes in perianal or groin sensation. Patient's back pain appears to be related to spasmodic and intermittent.    In terms of amyloidosis, no current indication or evidence of neurological involvement. MRI brain showed some possible microbleed on SWI that may be artefactual. Patient counseled on avoiding blood thinners or falls and maintaining hypertension control.    PAST MEDICAL HISTORY:  Past Medical History:   Diagnosis Date    Carotid artery disease     CHF (congestive heart failure)     CKD (chronic kidney disease) stage 3, GFR 30-59 ml/min     Dyslipidemia     GERD (gastroesophageal reflux disease)     Hx of melanoma excision     Hypertension     Melanoma     PAD (peripheral artery disease)     SCC (squamous cell carcinoma) 05/01/2024    Right shin distal    Squamous cell carcinoma of skin 05/01/2024    Right shin proximal       PAST SURGICAL HISTORY:  Past Surgical History:   Procedure Laterality Date    ARTHROSCOPIC DEBRIDEMENT OF SHOULDER Right 02/27/2020    Procedure: EXTENSIVE DEBRIDEMENT, SHOULDER, ARTHROSCOPIC;  Surgeon: Staci Childress MD;  Location: [facility] OR;  Service: Orthopedics;  Laterality: Right;    ARTHROSCOPIC DEBRIDEMENT OF SHOULDER Left 06/21/2022    Procedure: EXTENSIVE DEBRIDEMENT, SHOULDER, ARTHROSCOPIC;  Surgeon: Staci Childress MD;  Location: [facility] OR;  Service: Orthopedics;  Laterality: Left;    ARTHROSCOPIC REPAIR OF ROTATOR CUFF OF SHOULDER Right 02/27/2020    Procedure: REPAIR, ROTATOR CUFF, ARTHROSCOPIC;  Surgeon: Staci Childress MD;  Location: [facility] OR;  Service: Orthopedics;  Laterality: Right;    ARTHROSCOPIC REPAIR OF ROTATOR CUFF OF SHOULDER Left 06/21/2022    Procedure: REPAIR, ROTATOR CUFF, ARTHROSCOPIC WITH CUFF MEND;  Surgeon: Staci Childress MD;  Location: [facility] OR;  Service: Orthopedics;  Laterality: Left;     ARTHROSCOPY OF SHOULDER WITH DECOMPRESSION OF SUBACROMIAL SPACE Right 02/27/2020    Procedure: ARTHROSCOPY, SHOULDER, WITH SUBACROMIAL SPACE DECOMPRESSION;  Surgeon: Staci Childress MD;  Location: [facility] OR;  Service: Orthopedics;  Laterality: Right;    ARTHROSCOPY OF SHOULDER WITH DECOMPRESSION OF SUBACROMIAL SPACE Left 06/21/2022    Procedure: ARTHROSCOPY, SHOULDER, WITH SUBACROMIAL  DECOMPRESSION;  Surgeon: Staci Childress MD;  Location: [facility] OR;  Service: Orthopedics;  Laterality: Left;    ARTHROSCOPY OF SHOULDER WITH REMOVAL OF DISTAL CLAVICLE Left 06/21/2022    Procedure: ARTHROSCOPY, SHOULDER, WITH DISTAL CLAVICLE EXCISION;  Surgeon: Staci Childress MD;  Location: [facility] OR;  Service: Orthopedics;  Laterality: Left;    ARTHROSCOPY,SHOULDER,WITH BICEPS TENODESIS Left 06/21/2022    Procedure: ARTHROSCOPY,SHOULDER,WITH BICEPS TENODESIS;  Surgeon: Staci Childress MD;  Location: [facility] OR;  Service: Orthopedics;  Laterality: Left;    BLEPHAROPLASTY      CAROTID ENDARTERECTOMY Right 10/15/2021    Procedure: ENDARTERECTOMY-CAROTID;  Surgeon: Imer Farooq MD;  Location: [facility] OR 2ND FLR;  Service: Peripheral Vascular;  Laterality: Right;  AWAKE CERVICAL BLOCK    CATARACT EXTRACTION, BILATERAL      CORONARY ANGIOGRAPHY N/A 11/25/2024    Procedure: ANGIOGRAM, CORONARY ARTERY;  Surgeon: Karrie Sarabia MD;  Location: [facility] CATH LAB;  Service: Cardiology;  Laterality: N/A;    CORONARY ANGIOGRAPHY  1/3/2025    Procedure: ANGIOGRAM, CORONARY ARTERY;  Surgeon: Karrie Sarabia MD;  Location: [facility] CATH LAB;  Service: Cardiology;;    CORONARY STENT PLACEMENT N/A 1/3/2025    Procedure: INSERTION, STENT, CORONARY ARTERY;  Surgeon: Karrie Sarabia MD;  Location: [facility] CATH LAB;  Service: Cardiology;  Laterality: N/A;    COSMETIC SURGERY      ESOPHAGOGASTRODUODENOSCOPY N/A 10/17/2024    Procedure: EGD (ESOPHAGOGASTRODUODENOSCOPY);  Surgeon: Davon Rucker MD;  Location: [facility] ENDO (2ND FLR);  Service: Endoscopy;  Laterality: N/A;  Ref   [id number]  10/8 r/s from  on 10/15 to main campus due to cardiac hx, portal,precall complete-st  10/10-pre call complete-pt having sutures removed 11am at [place]-moved to 2nd floor for earlier arrival time-tb    EXCISION OF BURSA Left 06/21/2022    Procedure: BURSECTOMY;  Surgeon: Staci Childress MD;  Location: [facility] OR;  Service: Orthopedics;  Laterality: Left;    EYE SURGERY Bilateral     cataract removal    FIXATION OF TENDON Right 02/27/2020    Procedure: FIXATION, TENDON, Biceps Tenodesis;  Surgeon: Staci Childress MD;  Location: [facility] OR;  Service: Orthopedics;  Laterality: Right;  FIXATION, TENDON, Biceps Tenodesis    OPEN REDUCTION AND INTERNAL FIXATION (ORIF) OF FRACTURE OF PROXIMAL HUMERUS Right 02/27/2020    Procedure: ORIF, FRACTURE, GREATER TUBEROSITY;  Surgeon: Staci Childress MD;  Location: [facility] OR;  Service: Orthopedics;  Laterality: Right;    ORIF HUMERUS FRACTURE Left 06/21/2022    Procedure: ORIF, FRACTURE, HUMERUS, GREATER TUBEROSITY;  Surgeon: Staci Childress MD;  Location: [facility] OR;  Service: Orthopedics;  Laterality: Left;    RIGHT HEART CATHETERIZATION Right 1/3/2025    Procedure: INSERTION, CATHETER, RIGHT HEART;  Surgeon: Karrie Sarabia MD;  Location: [facility] CATH LAB;  Service: Cardiology;  Laterality: Right;    SHOULDER ARTHROSCOPY Left 06/21/2022    Procedure: ARTHROSCOPY, SHOULDER WITH LABRAL REPAIR;  Surgeon: Staci Childress MD;  Location: [facility] OR;  Service: Orthopedics;  Laterality: Left;    STENT, DRUG ELUTING, SINGLE VESSEL, CORONARY  1/3/2025    Procedure: Stent, Drug Eluting, Single Vessel, Coronary;  Surgeon: Karrie Sarabia MD;  Location: [facility] CATH LAB;  Service: Cardiology;;    TONSILLECTOMY      VASECTOMY         CURRENT MEDS:  Current Outpatient Medications   Medication Sig Dispense Refill    aspirin (ECOTRIN) 81 MG EC tablet Take 1 tablet (81 mg total) by mouth once daily.      atorvastatin (LIPITOR) 20 MG tablet Take 1 tablet (20 mg total) by mouth once daily. 90 tablet 3    clopidogreL  (PLAVIX) 75 mg tablet Take 1 tablet (75 mg total) by mouth once daily. 90 tablet 3    eplerenone (INSPRA) 25 MG Tab Take 25 mg by mouth once daily.      fluorouraciL (EFUDEX) 5 % cream AAA on left shin BID x 2-3 weeks. Stop if blistered, oozing, or bleeding. Use daily sun protection. 40 g 1    furosemide (LASIX) 80 MG tablet Take 1 tablet (80 mg total) by mouth once daily. Tale afternoon dose if needed 60 tablet 11    ketoconazole (NIZORAL) 2 % cream Apply topically 2 (two) times daily. (Patient taking differently: Apply topically as needed.) 30 g 2    methocarbamoL (ROBAXIN) 500 MG Tab Take 3 tablets (1,500 mg total) by mouth 3 (three) times daily as needed. Start by taking 2 tablets as needed, 3 times a day. If side effects minimal, can increase to 3 tablets 3 times a day. 120 tablet 11    omeprazole (PRILOSEC) 20 MG capsule TAKE 1 CAPSULE BY MOUTH EVERY DAY IN THE MORNING 90 capsule 0    tafamidis (VYNDAMAX) 61 mg Cap Take 1 capsule (61 mg total) by mouth once daily. 30 capsule 11     No current facility-administered medications for this visit.       ALLERGIES:  Review of patient's allergies indicates:   Allergen Reactions    Adhesive Hives, Itching and Blisters    Clindamycin Nausea And Vomiting    Penicillins Hives       FAMILY HISTORY:  Family History   Problem Relation Name Age of Onset    Diabetes Mother      Hyperlipidemia Mother      Heart disease Father      Cancer Sister          ? colon or uterine    Colon cancer Sister      No Known Problems Brother Rishabh     No Known Problems Brother Lee     No Known Problems Daughter      No Known Problems Son         SOCIAL HISTORY:  Social History     Tobacco Use    Smoking status: Former     Current packs/day: 0.00     Average packs/day: 2.0 packs/day for 15.0 years (30.0 ttl pk-yrs)     Types: Cigarettes     Start date:      Quit date:      Years since quittin.0     Passive exposure: Never    Smokeless tobacco: Never   Substance Use Topics     Alcohol use: Yes     Alcohol/week: 14.0 standard drinks of alcohol     Types: 14 Glasses of wine per week     Comment: 2 glasses of wine daily    Drug use: Never       Review of Systems:  12 system review of systems is negative except for the symptoms mentioned in HPI.        Objective:     Vitals:    01/09/25 1009   BP: (!) 148/81   BP Location: Right arm   Patient Position: Sitting   Pulse: 63     General: NAD, well nourished   Eyes: no tearing, discharge, no erythema   ENT: moist mucous membranes of the oral cavity, nares patent    Neck: Supple, full range of motion  Cardiovascular: Warm and well perfused, pulses equal and symmetrical  Lungs: Normal work of breathing, normal chest wall excursions  Skin: No rash, lesions, or breakdown on exposed skin  Psychiatry: Mood and affect are appropriate   Abdomen: soft, non tender, non distended  Extremeties: No cyanosis, clubbing or edema.    Neurological Exam:  MENTAL STATUS  Level of consciousness: alert  Orientation: oriented to person, place, and time  Attention normal. Concentration normal.  Speech: normal    CRANIAL NERVES  CN II: Visual fields full to confrontation  CN III, IV, VI: PERRL, EOMI  CN V: Facial sensation intact  CN VII: Facial expression symmetric and full  CN VIII: Hearing intact to finger rub  CN IX, X: Symmetric palate elevation. Phonation normal  CN XI: Shoulder shrug and head turn intact bilaterally  CN XII: Tongue midline with normal movements, no atrophy    MOTOR EXAM  Muscle bulk: normal  Muscle tone: normal  Pronator drift: absent    Strength - Upper Extremities   Arm abduction Elbow flexion Elbow extension Wrist flexion Wrist extension Finger abduction   Right 5/5 5/5 5/5 5/5 5/5 5/5   Left 5/5 5/5 5/5 5/5 5/5 5/5     Strength - Lower Extremities   Hip flexion Knee flexion Knee extension Dorsiflexion Plantarflexion   Right 5/5 5/5 5/5 5/5 5/5   Left 5/5 5/5 5/5 5/5 5/5       REFLEXES   Biceps Triceps Brachioradialis Patellar Achilles   Right  +2 +2 +2 +2 +2   Left +2 +2 +2 +2 +2     Toes down bilaterally    SENSORY EXAM  Light touch: intact in all 4 extremities  Vibration: decreased to ankles bilaterally    COORDINATION  Finger to nose: normal  Tremor: none

## 2025-01-17 ENCOUNTER — TELEPHONE (OUTPATIENT)
Dept: CARDIAC REHAB | Facility: CLINIC | Age: 85
End: 2025-01-17
Payer: MEDICARE

## 2025-01-30 ENCOUNTER — TELEPHONE (OUTPATIENT)
Facility: CLINIC | Age: 85
End: 2025-01-30
Payer: MEDICARE

## 2025-01-30 NOTE — TELEPHONE ENCOUNTER
Spoke to patient to schedule appointment (EMG) on march 11 at 1:30pm. Patient verbalized agreement

## 2025-02-07 DIAGNOSIS — K21.9 GASTROESOPHAGEAL REFLUX DISEASE, UNSPECIFIED WHETHER ESOPHAGITIS PRESENT: ICD-10-CM

## 2025-02-07 RX ORDER — OMEPRAZOLE 20 MG/1
20 CAPSULE, DELAYED RELEASE ORAL EVERY MORNING
Qty: 90 CAPSULE | Refills: 0 | OUTPATIENT
Start: 2025-02-07

## 2025-02-28 ENCOUNTER — OFFICE VISIT (OUTPATIENT)
Dept: CARDIOLOGY | Facility: CLINIC | Age: 85
End: 2025-02-28
Payer: MEDICARE

## 2025-02-28 VITALS
SYSTOLIC BLOOD PRESSURE: 149 MMHG | HEIGHT: 73 IN | WEIGHT: 163.13 LBS | DIASTOLIC BLOOD PRESSURE: 56 MMHG | HEART RATE: 51 BPM | BODY MASS INDEX: 21.62 KG/M2

## 2025-02-28 DIAGNOSIS — I70.0 AORTIC ATHEROSCLEROSIS: ICD-10-CM

## 2025-02-28 DIAGNOSIS — I65.23 CAROTID ATHEROSCLEROSIS, BILATERAL: ICD-10-CM

## 2025-02-28 DIAGNOSIS — E78.2 MIXED HYPERLIPIDEMIA: ICD-10-CM

## 2025-02-28 DIAGNOSIS — I73.9 PAD (PERIPHERAL ARTERY DISEASE): ICD-10-CM

## 2025-02-28 DIAGNOSIS — E85.4 CARDIAC AMYLOIDOSIS: ICD-10-CM

## 2025-02-28 DIAGNOSIS — I25.10 CORONARY ARTERY DISEASE INVOLVING NATIVE CORONARY ARTERY OF NATIVE HEART WITHOUT ANGINA PECTORIS: ICD-10-CM

## 2025-02-28 DIAGNOSIS — I50.32 CHRONIC DIASTOLIC HEART FAILURE: ICD-10-CM

## 2025-02-28 DIAGNOSIS — N18.32 STAGE 3B CHRONIC KIDNEY DISEASE: ICD-10-CM

## 2025-02-28 DIAGNOSIS — I10 PRIMARY HYPERTENSION: ICD-10-CM

## 2025-02-28 DIAGNOSIS — I43 CARDIAC AMYLOIDOSIS: ICD-10-CM

## 2025-02-28 DIAGNOSIS — I47.29 NONSUSTAINED VENTRICULAR TACHYCARDIA: ICD-10-CM

## 2025-02-28 DIAGNOSIS — Z98.890 S/P CAROTID ENDARTERECTOMY: ICD-10-CM

## 2025-02-28 DIAGNOSIS — I71.40 ABDOMINAL AORTIC ANEURYSM (AAA) WITHOUT RUPTURE, UNSPECIFIED PART: Primary | ICD-10-CM

## 2025-02-28 PROCEDURE — 99999 PR PBB SHADOW E&M-EST. PATIENT-LVL III: CPT | Mod: PBBFAC,,, | Performed by: INTERNAL MEDICINE

## 2025-02-28 NOTE — PROGRESS NOTES
HISTORY:    84-year-old male with a history of CAD status post left main PCI '25, heart failure with preserved ejection fraction, cardiac amyloidosis, hypertension, hyperlipidemia, PVCs, CKD, PAD status post bilateral lower extremity PI, carotid stenosis s/p R CEA '21, GERD, squamous cell carcinoma of the skin presenting for follow-up.      Patient was initially evaluated by me in early January '23 with significant heart failure symptoms.  We tried outpatient management with diuretics and goal-directed medical therapy, but the patient required in-hospital diuresis with a positive response to inpatient diuresis and medicine adjustment. Lost 10-15 pounds initially that he maintained, monitoring weight and limiting fluid/salt intake. At that time patient favored conservative therapy as opposed to invasive evaluation and non-invasive assessment for infiltrative diseases.     In mid '24 he developed unintentional weight loss, decreased appetite, fatigue, and dizziness. PVCs were noted and holter demonstrated a remarkably high PVC burden. Pt additionally underwent a PYP scan confirming amyloidosis. Has since been evaluated by advanced heart failure at [facility] and [facility] and has been started on tafamadis with an improvement in symptoms compared to time of diagnosis.    Pt additionally underwent a PET stress ordered by EPS before dx of amyloidosis that was abnormal w a decrease in LVEF. For this reason we performed a cardiac catheterization demonstrating 70-80% LM disease.  He later underwent staged left main PCI in early '25.     Post PCI he feels much better. Exercise capacity increased. Fatigue mostly resolved. Breathing improved. CP never an issue.    Occasional light headedness and dizziness, not limiting. Occasional palpitations. No orthopnea or edema.     Traveling back and forth to [place] without issue.     He currently tolerates aspirin 81 x 1, clopidogrel 75 x 1, tafamidis 61x1, furosemide 80x1, epleronone  25x1, atorvastatin 20x1. Feels much better off atenolol.     PHYSICAL EXAM:    Vitals:    02/28/25 0908   BP: (!) 149/56   Pulse: (!) 51     NAD, A+Ox3.  No jvd, no bruit.  RRR nml s1,s2. No murmurs. Ectopy noted.   CTA B no wheezes or crackles.  No edema.    LABS/STUDIES (imaging reviewed during clinic visit):    December 2024 hemoglobin 12.6/MCV 90.  Creatinine 1.6/BUN 42.  Albumin 3.6.  BNP 2000/NT proBNP 9800.  November 2024 hemoglobin 12.5/MCV 88.Cr 1.6/BUN 36/GFR 42. Alb 4.0. October BNP 2129/NTpBNP 7200. May A1c 6 and TSH nml. 2023 /HDL 55//.     ECG January 2025 sinus rhythm with 1st AVB, no Qs, frequent PVCs.   Holter May 2024 SR w a 51% PVC burden.   TTE May 2023 Nml LV size and function.  Mild AI/mild MR.  CVP 3/PASP 25. January 2023 demonstrates normal LV size and systolic function.  LVH is present.  EF 65%.  RV enlargement with normal function.  Moderate AI, mild-to-moderate MR.  CVP 8 with estimated PASP of 39.  NST March 2023 LVEF 47% at rest and 55% with stress.  Equivocal perfusion abnormality that is fixed in the basal to distal inferior wall with bowel interference.  PET Stress November 2024 LVEF high 20s. TID. Small to moderate reversible inf/IL defect. PVCs noted on stress testing.  PYP Scan uptake ratio 1.8 c/w cardiac amyloidosis.  Cardiac cath December 2024 Left dominant system with 70-80% dLM disease. Diffuse disease of a non-dominant RCA.  January 2025 RHC demonstrated normal RAP/mPAP. Severe LM disease s/p successful PCI w DESx1 (3.5x20mm w post dil to 4.0 in prox to mid stent). LCX jailed with STEFANIA 3 flow and no significant ostial disease.   Carotid December 2023 carotid atherosclerosis doses with a patent right carotid endarterectomy patch.  Left ICA less than 50%.  Elevated ECA velocity with dense plaque at the bifurcation.  Vertebral flow antegrade bilaterally.  DL/PVR September 2022 demonstrates normal DL with unremarkable PVR waveforms bilaterally.    Abd us  October 2024 AAA 3.3cm  MRI/MRA head and neck 2024 no acute abnormalities.  Mild chronic small-vessel changes.  Left ICA atherosclerosis doses with less than 50% stenosis.  Patent right CEA patch.  No evidence of intracranial large vessel occlusion or stenosis.    ASSESSMENT & PLAN:    1. Abdominal aortic aneurysm (AAA) without rupture, unspecified part    2. Aortic atherosclerosis    3. Cardiac amyloidosis    4. Carotid atherosclerosis, bilateral    5. Chronic diastolic heart failure    6. Coronary artery disease involving native coronary artery of native heart without angina pectoris    7. Nonsustained ventricular tachycardia    8. Mixed hyperlipidemia    9. PAD (peripheral artery disease)    10. Primary hypertension    11. S/P carotid endarterectomy    12. Stage 3b chronic kidney disease                 CAD s/p LM PCI w improvement in how he feels. Films reviewed in depth with pt.  Cont asa 81x1 and clopidogrel 75x1.     RHC w normal intracardiac pressures despite low EF on PET. Follow-up TTE in March.    Cont tamafadis, spironolactone, and furosemide for HFrEF and cardiac amyloidosis.     PVCs being managed conservatively by EPS at this time.     Tolerating atorvastatin 20x1.     Bps reasonable. Usually controlled.    PVD previously managed by vasc surgery for R CEA and small AAA. Unremarkable ABIs '22.    Follow up in about 6 months (around 8/28/2025).    Karrie Sarabia MD

## 2025-03-06 DIAGNOSIS — I25.118 CORONARY ARTERY DISEASE OF NATIVE ARTERY OF NATIVE HEART WITH STABLE ANGINA PECTORIS: ICD-10-CM

## 2025-03-06 RX ORDER — CLOPIDOGREL BISULFATE 75 MG/1
75 TABLET ORAL DAILY
Qty: 90 TABLET | Refills: 3 | Status: SHIPPED | OUTPATIENT
Start: 2025-03-06 | End: 2026-03-06

## 2025-03-11 ENCOUNTER — PROCEDURE VISIT (OUTPATIENT)
Facility: CLINIC | Age: 85
End: 2025-03-11
Payer: MEDICARE

## 2025-03-11 DIAGNOSIS — E85.4 SENILE CARDIAC AMYLOIDOSIS: ICD-10-CM

## 2025-03-11 DIAGNOSIS — I43 SENILE CARDIAC AMYLOIDOSIS: ICD-10-CM

## 2025-03-11 DIAGNOSIS — E85.1: ICD-10-CM

## 2025-03-11 DIAGNOSIS — G63: ICD-10-CM

## 2025-03-11 DIAGNOSIS — G60.8 SENSORY POLYNEUROPATHY: ICD-10-CM

## 2025-03-11 DIAGNOSIS — N18.32 STAGE 3B CHRONIC KIDNEY DISEASE: Primary | ICD-10-CM

## 2025-03-11 PROCEDURE — 95885 MUSC TST DONE W/NERV TST LIM: CPT | Mod: S$GLB,,, | Performed by: PSYCHIATRY & NEUROLOGY

## 2025-03-11 PROCEDURE — 95913 NRV CNDJ TEST 13/> STUDIES: CPT | Mod: S$GLB,,, | Performed by: PSYCHIATRY & NEUROLOGY

## 2025-03-11 NOTE — PROCEDURES
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      Relevant medical diagnoses:    Heart failure secondary to amyloidosis.    Prediabetes   Chronic kidney disease stage 3     Gus Sabillon [id number] 3/11/2025 2:02 PM       History and Examination:    83-year-old male with chronic heart failure secondary to amyloidosis for evaluation of multisystemic involvement.  On examination mildly reduced vibration up to ankle but normal strength and deep tendon reflexes.      Technical Difficulties:    None.      Interpretation:     Abnormal study.  There is electrodiagnostic evidence of mild sensory predominant polyneuropathy and bilateral mild median mononeuropathy (carpal tunnel syndrome) at wrist.  There is no evidence of right lumbosacral radiculopathy. The differential diagnosis for sensory polyneuropathy may include vitamin deficiency, immune mediated, paraneoplastic, toxic/metabolic neuropathy.    Gigi House MD, FRCPE, FCPS, CHCQM  Neuromuscular Consultant  [facility]    Gus Sabillon [id number] 3/11/2025 2:02 PM                  Sensory NCS      Nerve / Sites Rec. Site Segments Onset Lat Peak Lat Onset Jere Temp. Amp Distance      ms ms m/s °C µV mm   L Median - Dig II (Antidromic)      Wrist Index Wrist - Index 3.75 4.95 37.3 32.7 19.1 140   R Median - Dig II (Antidromic)      Wrist Index Wrist - Index 3.44 4.79 43.6 33.8 11.0 150   L Ulnar - Dig V (Antidromic)      Wrist Dig V Wrist - Dig V 2.81 3.70 42.7 32.9 14.5 120   R Ulnar - Dig V  (Antidromic)      Wrist Dig V Wrist - Dig V 2.50 3.18 48.0 34.4 6.6 120   L Radial - Superficial (Antidromic)      Forearm Wrist Forearm - Wrist 1.51 2.34 59.6 32.9 33.7 90   L Sural - (Antidromic)      Calf Ankle Calf - Ankle NR NR NR 32.7    R Sural - (Antidromic)      Calf Ankle Calf - Ankle NR NR NR 32.4    L Median, Ulnar - Transcarpal comparison      Median Palm Wrist Median Palm - Wrist 2.55 3.23 31.3 32.7 25.3 80      Ulnar Palm Wrist Ulnar Palm - Wrist 1.82 2.66 43.9 32.5 13.9 80     Median Palm - Ulnar Palm             Motor NCS      Nerve / Sites Muscle Segments Latency Velocity Amplitude Temp. Dur. Distance      ms m/s mV °C ms mm   L Median - APB      Wrist APB Wrist - APB 4.19  4.9 32.5 6.9 80      Elbow APB Elbow - Wrist 9.25 51 4.8 32.5 7.0 260   R Median - APB      Wrist APB Wrist - APB 4.50  6.0 34.1 7.1 80      Elbow APB Elbow - Wrist 9.69 50 5.6 34.1 7.4 260   L Ulnar - ADM      Wrist ADM Wrist - ADM 2.90  9.7 32.4 7.0 80      B.Elbow ADM B.Elbow - Wrist 7.71 53 8.6 32.5 6.8 255      A.Elbow ADM A.Elbow - B.Elbow 9.83 45 8.0 32.5 6.9 95     A.Elbow - Wrist    32.5     R Ulnar - ADM      Wrist ADM Wrist - ADM 2.67  7.1 34.3 5.9 80      B.Elbow ADM B.Elbow - Wrist 6.98 51 6.7 34.3 6.1 220      A.Elbow ADM A.Elbow - B.Elbow 8.98 45 6.6 34.3 6.3 90     A.Elbow - Wrist    34.3     L Peroneal - EDB      Ankle EDB Ankle - EDB 4.63  0.9 32.7 6.6 80      B. Fib Head EDB B. Fib Head - Ankle 14.40 36 0.8 32.6 8.0 355      A. Fib Head EDB A. Fib Head - B. Fib Head 17.02 38 0.8 32.6 7.8 100   R Peroneal - EDB      Ankle EDB Ankle - EDB 3.98  2.0 32.4 6.4 80      B. Fib Head EDB B. Fib Head - Ankle 13.73 36 1.6 32.4 8.2 350      A. Fib Head EDB A. Fib Head - B. Fib Head 15.63 50 1.6 32.4 8.2 95   L Tibial - AH      Ankle AH Ankle - AH 4.73  4.4 32.6 6.9 80   R Tibial - AH      Ankle AH Ankle - AH 5.23  4.7 32.2 7.2 80       F  Wave      Nerve F Latency M Latency F - M Lat    ms ms ms   L Median -  APB 32.6 4.4 28.1   L Ulnar - ADM 25.5 3.4 22.1   L Peroneal - EDB 59.9 5.5 54.4   L Tibial - AH 62.2 5.2 57.1   R Peroneal - EDB 66.3 4.4 61.8   R Tibial - AH 64.0 5.4 58.6   R Median - APB 32.2 4.6 27.7   R Ulnar - ADM 23.6 4.9 18.6       EMG Summary Table     Spontaneous Recruitment MUAP   Muscle Nerve Roots IA Fib PSW Fasc Other Pattern Amp Dur. PPP   R. Tibialis anterior Deep peroneal (Fibular) L4-L5 N None None None N N N N N   R. Gastrocnemius Tibial S1-S2 1+ None None None N N N N N   R. Quadriceps Femoral L2-L4 N None None None N N N N N   L. Quadriceps Femoral L2-L4 N None None None N N N N N   L. Tibialis anterior Deep peroneal (Fibular) L4-L5 N None None None N N N N N   R. L5 paraspinal Spinal L5- N None None None N N N N N

## 2025-03-24 ENCOUNTER — LAB VISIT (OUTPATIENT)
Dept: LAB | Facility: HOSPITAL | Age: 85
End: 2025-03-24
Attending: INTERNAL MEDICINE
Payer: MEDICARE

## 2025-03-24 DIAGNOSIS — I43 CARDIAC AMYLOIDOSIS: ICD-10-CM

## 2025-03-24 DIAGNOSIS — I50.32 CHRONIC DIASTOLIC HEART FAILURE: ICD-10-CM

## 2025-03-24 DIAGNOSIS — E85.4 CARDIAC AMYLOIDOSIS: ICD-10-CM

## 2025-03-24 LAB
ANION GAP (OHS): 16 MMOL/L (ref 8–16)
BNP SERPL-MCNC: 970 PG/ML (ref 0–99)
BUN SERPL-MCNC: 38 MG/DL (ref 8–23)
CALCIUM SERPL-MCNC: 9.9 MG/DL (ref 8.7–10.5)
CHLORIDE SERPL-SCNC: 102 MMOL/L (ref 95–110)
CO2 SERPL-SCNC: 23 MMOL/L (ref 23–29)
CREAT SERPL-MCNC: 1.5 MG/DL (ref 0.5–1.4)
GFR SERPLBLD CREATININE-BSD FMLA CKD-EPI: 46 ML/MIN/1.73/M2
GLUCOSE SERPL-MCNC: 90 MG/DL (ref 70–110)
POTASSIUM SERPL-SCNC: 4.1 MMOL/L (ref 3.5–5.1)
SODIUM SERPL-SCNC: 141 MMOL/L (ref 136–145)

## 2025-03-24 PROCEDURE — 83880 ASSAY OF NATRIURETIC PEPTIDE: CPT

## 2025-03-24 PROCEDURE — 36415 COLL VENOUS BLD VENIPUNCTURE: CPT

## 2025-03-24 PROCEDURE — 82310 ASSAY OF CALCIUM: CPT

## 2025-03-24 PROCEDURE — 84484 ASSAY OF TROPONIN QUANT: CPT

## 2025-03-25 ENCOUNTER — TELEPHONE (OUTPATIENT)
Dept: TRANSPLANT | Facility: CLINIC | Age: 85
End: 2025-03-25
Payer: MEDICARE

## 2025-03-25 LAB — NT-PROBNP SERPL IA-MCNC: 6745 PG/ML

## 2025-03-26 LAB — TROPONIN T SERPL HS-MCNC: 87 NG/L

## 2025-03-27 ENCOUNTER — OFFICE VISIT (OUTPATIENT)
Dept: TRANSPLANT | Facility: CLINIC | Age: 85
End: 2025-03-27
Attending: INTERNAL MEDICINE
Payer: MEDICARE

## 2025-03-27 ENCOUNTER — HOSPITAL ENCOUNTER (OUTPATIENT)
Dept: CARDIOLOGY | Facility: HOSPITAL | Age: 85
Discharge: HOME | End: 2025-03-27
Attending: INTERNAL MEDICINE
Payer: MEDICARE

## 2025-03-27 ENCOUNTER — HOSPITAL ENCOUNTER (OUTPATIENT)
Dept: PULMONOLOGY | Facility: CLINIC | Age: 85
Discharge: HOME | End: 2025-03-27
Attending: INTERNAL MEDICINE
Payer: MEDICARE

## 2025-03-27 VITALS
WEIGHT: 161.81 LBS | WEIGHT: 156 LBS | HEART RATE: 72 BPM | BODY MASS INDEX: 20.67 KG/M2 | DIASTOLIC BLOOD PRESSURE: 60 MMHG | BODY MASS INDEX: 21.45 KG/M2 | SYSTOLIC BLOOD PRESSURE: 141 MMHG | HEIGHT: 73 IN | HEIGHT: 73 IN

## 2025-03-27 VITALS
WEIGHT: 163 LBS | HEIGHT: 73 IN | SYSTOLIC BLOOD PRESSURE: 129 MMHG | HEART RATE: 92 BPM | DIASTOLIC BLOOD PRESSURE: 72 MMHG | BODY MASS INDEX: 21.6 KG/M2

## 2025-03-27 DIAGNOSIS — N18.32 STAGE 3B CHRONIC KIDNEY DISEASE: ICD-10-CM

## 2025-03-27 DIAGNOSIS — I71.43 INFRARENAL ABDOMINAL AORTIC ANEURYSM (AAA) WITHOUT RUPTURE: ICD-10-CM

## 2025-03-27 DIAGNOSIS — Z98.890 S/P CAROTID ENDARTERECTOMY: ICD-10-CM

## 2025-03-27 DIAGNOSIS — E85.4 CARDIAC AMYLOIDOSIS: ICD-10-CM

## 2025-03-27 DIAGNOSIS — I70.0 AORTIC ATHEROSCLEROSIS: ICD-10-CM

## 2025-03-27 DIAGNOSIS — I50.42 CHRONIC COMBINED SYSTOLIC AND DIASTOLIC CONGESTIVE HEART FAILURE: ICD-10-CM

## 2025-03-27 DIAGNOSIS — I50.20 HEART FAILURE WITH REDUCED EJECTION FRACTION: ICD-10-CM

## 2025-03-27 DIAGNOSIS — I43 CARDIAC AMYLOIDOSIS: ICD-10-CM

## 2025-03-27 DIAGNOSIS — I43 SENILE CARDIAC AMYLOIDOSIS: Primary | ICD-10-CM

## 2025-03-27 DIAGNOSIS — E85.4 SENILE CARDIAC AMYLOIDOSIS: Primary | ICD-10-CM

## 2025-03-27 DIAGNOSIS — E85.82 WILD-TYPE TRANSTHYRETIN-RELATED (ATTR) AMYLOIDOSIS: ICD-10-CM

## 2025-03-27 LAB
ASCENDING AORTA: 3.5 CM
AV AREA BY CONTINUOUS VTI: 2.1 CM2
AV INDEX (PROSTH): 0.42
AV LVOT MEAN GRADIENT: 1 MMHG
AV LVOT PEAK GRADIENT: 2 MMHG
AV MEAN GRADIENT: 4 MMHG
AV PEAK GRADIENT: 7 MMHG
AV REGURGITATION PRESSURE HALF TIME: 507 MS
AV VALVE AREA BY VELOCITY RATIO: 2 CM²
AV VALVE AREA: 1.6 CM²
AV VELOCITY RATIO: 0.54
BSA FOR ECHO PROCEDURE: 1.95 M2
CV ECHO LV RWT: 0.58 CM
DOP CALC AO PEAK VEL: 1.3 M/S
DOP CALC AO VTI: 23.8 CM
DOP CALC LVOT AREA: 3.8 CM2
DOP CALC LVOT DIAMETER: 2.2 CM
DOP CALC LVOT PEAK VEL: 0.7 M/S
DOP CALC LVOT STROKE VOLUME: 38 CM3
DOP CALCLVOT PEAK VEL VTI: 10 CM
E WAVE DECELERATION TIME: 223 MS
E/A RATIO: 1.86
E/E' RATIO: 12 M/S
ECHO EF ESTIMATED: 36 %
ECHO LV POSTERIOR WALL: 1.4 CM (ref 0.6–1.1)
FRACTIONAL SHORTENING: 16.7 % (ref 28–44)
GLOBAL LONGITUIDAL STRAIN: 7.8 %
INTERVENTRICULAR SEPTUM: 1.5 CM (ref 0.6–1.1)
IVC DIAMETER: 1.56 CM
LA MAJOR: 6 CM
LA MINOR: 6 CM
LA WIDTH: 4.2 CM
LEFT ATRIUM SIZE: 5.2 CM
LEFT ATRIUM VOLUME INDEX MOD: 51 ML/M2
LEFT ATRIUM VOLUME INDEX: 57 ML/M2
LEFT ATRIUM VOLUME MOD: 100 ML
LEFT ATRIUM VOLUME: 111 CM3
LEFT INTERNAL DIMENSION IN SYSTOLE: 4 CM (ref 2.1–4)
LEFT VENTRICLE DIASTOLIC VOLUME INDEX: 54.82 ML/M2
LEFT VENTRICLE DIASTOLIC VOLUME: 108 ML
LEFT VENTRICLE MASS INDEX: 146.4 G/M2
LEFT VENTRICLE SYSTOLIC VOLUME INDEX: 35 ML/M2
LEFT VENTRICLE SYSTOLIC VOLUME: 69 ML
LEFT VENTRICULAR INTERNAL DIMENSION IN DIASTOLE: 4.8 CM (ref 3.5–6)
LEFT VENTRICULAR MASS: 288.4 G
LV LATERAL E/E' RATIO: 11.2 M/S
LV SEPTAL E/E' RATIO: 13.4 M/S
MV A" WAVE DURATION": 97.05 MS
MV PEAK A VEL: 0.36 M/S
MV PEAK E VEL: 0.67 M/S
OHS CV RV/LV RATIO: 0.85 CM
OHS LV EJECTION FRACTION SIMPSONS BIPLANE MOD: 31 %
PULM VEIN A" WAVE DURATION": 97.05 MS
PULM VEIN S/D RATIO: 1.62
PULMONIC VEIN PEAK A VELOCITY: 0.3 M/S
PV PEAK D VEL: 0.26 M/S
PV PEAK S VEL: 0.42 M/S
QEF: 27 %
RA MAJOR: 5.79 CM
RA PRESSURE ESTIMATED: 3 MMHG
RA WIDTH: 4.3 CM
RIGHT ATRIAL AREA: 21.6 CM2
RIGHT VENTRICLE 3D EJECTION FRACTION: 28 %
RIGHT VENTRICLE DIASTOLIC BASEL DIMENSION: 4.1 CM
RIGHT VENTRICLE FREE WALL STRAIN: 10 %
RV FRACTIONAL AREA CHANGE: 18 %
RV TISSUE DOPPLER FREE WALL SYSTOLIC VELOCITY 1 (APICAL 4 CHAMBER VIEW): 10.01 CM/S
SINUS: 4 CM
STJ: 3.1 CM
TASV: 10 CM/S
TDI LATERAL: 0.06 M/S
TDI SEPTAL: 0.05 M/S
TDI: 0.06 M/S
TRICUSPID ANNULAR PLANE SYSTOLIC EXCURSION: 1.6 CM
TV REGURGITANT VOLUME: 0.7 CC
Z-SCORE OF LEFT VENTRICULAR DIMENSION IN END DIASTOLE: -1.65
Z-SCORE OF LEFT VENTRICULAR DIMENSION IN END SYSTOLE: 1.1

## 2025-03-27 PROCEDURE — 3288F FALL RISK ASSESSMENT DOCD: CPT | Mod: CPTII,S$GLB,, | Performed by: INTERNAL MEDICINE

## 2025-03-27 PROCEDURE — 1160F RVW MEDS BY RX/DR IN RCRD: CPT | Mod: CPTII,S$GLB,, | Performed by: INTERNAL MEDICINE

## 2025-03-27 PROCEDURE — 1159F MED LIST DOCD IN RCRD: CPT | Mod: CPTII,S$GLB,, | Performed by: INTERNAL MEDICINE

## 2025-03-27 PROCEDURE — 93306 TTE W/DOPPLER COMPLETE: CPT

## 2025-03-27 PROCEDURE — 99999 PR PBB SHADOW E&M-EST. PATIENT-LVL III: CPT | Mod: PBBFAC,,, | Performed by: INTERNAL MEDICINE

## 2025-03-27 PROCEDURE — 1126F AMNT PAIN NOTED NONE PRSNT: CPT | Mod: CPTII,S$GLB,, | Performed by: INTERNAL MEDICINE

## 2025-03-27 PROCEDURE — 1101F PT FALLS ASSESS-DOCD LE1/YR: CPT | Mod: CPTII,S$GLB,, | Performed by: INTERNAL MEDICINE

## 2025-03-27 PROCEDURE — 99214 OFFICE O/P EST MOD 30 MIN: CPT | Mod: S$GLB,,, | Performed by: INTERNAL MEDICINE

## 2025-03-27 PROCEDURE — 3078F DIAST BP <80 MM HG: CPT | Mod: CPTII,S$GLB,, | Performed by: INTERNAL MEDICINE

## 2025-03-27 PROCEDURE — 3077F SYST BP >= 140 MM HG: CPT | Mod: CPTII,S$GLB,, | Performed by: INTERNAL MEDICINE

## 2025-03-27 NOTE — PROCEDURES
Gus Sabillon is a 84 y.o.   male patient, who presents for a 6 minute walk test ordered by MD Trevin.  The diagnosis is Amyloidosis; Cardiomyopathy.  The patient's BMI is 20.6 kg/m2.  Predicted distance (lower limit of normal) is 295.41 meters.      Test Results:    The test was completed without stopping.  The total time walked was 360 seconds.  During walking, the patient reported:  Calf Tightness.  The patient used no assistive devices during testing.     03/27/2025---------Distance: 365.76 meters (1200 feet)     O2 Sat % Supplemental Oxygen Heart Rate Blood Pressure Frederick Scale   Pre-exercise  (Resting) 99 % Room Air 95 bpm 134/68 mmHg 0   During Exercise 98 % Room Air 105 bpm 138/47 mmHg 1   Post-exercise  (Recovery) 98 % Room Air  67 bpm       Recovery Time: 144 seconds    Performing nurse/tech: Quoc FREEMAN      PREVIOUS STUDY:   12/30/2024---------Distance: 354.18 meters (1162 feet)      O2 Sat % Supplemental Oxygen Heart Rate Blood Pressure Frederick Scale   Pre-exercise  (Resting) 96 % Room Air 53 bpm 143/74 mmHg 1   During Exercise 92 % Room Air 110 bpm 135/82 mmHg 3   Post-exercise  (Recovery) 95 % Room Air  60 bpm   mmHg        CLINICAL INTERPRETATION:  Six minute walk distance is 365.76 meters (1200 feet) with very light dyspnea.  During exercise, there was no significant desaturation while breathing room air.  Both blood pressure and heart rate remained stable with walking.  The patient reported non-pulmonary symptoms during exercise.  Since the previous study in December 2024, exercise capacity is unchanged.  Based upon age and body mass index, exercise capacity is normal.

## 2025-03-30 PROBLEM — I50.42 CHRONIC COMBINED SYSTOLIC AND DIASTOLIC CONGESTIVE HEART FAILURE: Status: ACTIVE | Noted: 2023-01-11

## 2025-03-31 NOTE — PROGRESS NOTES
Subjective:   WT-TTR Amyloid (ag)  Vyndamax ordered per Farshad 7/17/24 7/9/24: Amyloid Consult with Dr. Yen, referred by Dr. Karrie Sarabia MD as his primary cardiologist. As part of his evaluation for HFpEF he underwent evaluation for amyloidosis. His SPEP/UPEP was WNL but FLC was not done. PYP scan on 6/24/24 was positive.  TTR DNA buccal swab per [facility] done 7/9/24 - NEGATIVE.  FLC: done 7/9/24, Kappa mildly elevated 3.28, Lambda (2.30) & ratio     Patient ID:  Gus Sabillon is a 84 y.o. male who presents for follow-up of Congestive Heart Failure    HPI:  83 yo WM with HFpEF, HTN, HLD, CKD, PAD status post bilateral lower extremity PI, carotid stenosis s/p R CEA '21, GERD, squamous cell carcinoma of the skin saw Dr. Yen who started Vyndamax for wt-TTR cardiac amyloidosis.  According to the chart he has an abdominal aortic aneurysm though he denies being aware of that diagnosis in his not had any follow-up.  He does have known peripheral arterial disease with prior stents in both legs.    He returns today for f/u visit.   He reports that he can walk 1 mi over 25 minutes without shortness breath which is an improvement following a stent of his left main performed January 3, 2025.  He has some calf discomfort consistent with claudication that is slows him down.  He has no history suggestive of angina.  He is not limited by shortness breath.  Denies orthopnea or PND.     He did look into a clinical trial targeted against amyloid within the myocardium but was not a candidate.  Study was at [facility].    Social history:  Former smoker.  Drinks alcohol, 1-2 glasses of wine per day.  Denies any recreational drug use.      Review of Systems   Constitutional: Positive for malaise/fatigue. Negative for decreased appetite and weight loss.   Cardiovascular:  Positive for leg swelling (At times). Negative for chest pain, dyspnea on exertion, orthopnea, palpitations and paroxysmal nocturnal dyspnea.  "  Hematologic/Lymphatic: Bruises/bleeds easily (Bruising).   Musculoskeletal:  Positive for arthritis and back pain.   Gastrointestinal:  Positive for change in bowel habit (He notes loose stools but denies iban diarrhea or constipation.). Negative for bloating and anorexia.   Neurological:  Positive for dizziness and light-headedness. Negative for focal weakness.        Objective:   Physical Exam  Constitutional:       General: He is not in acute distress.     Appearance: He is well-developed. He is not ill-appearing, toxic-appearing or diaphoretic.      Comments: BP (!) 141/60 (BP Location: Left arm, Patient Position: Sitting)   Pulse 72   Ht 6' 1" (1.854 m)   Wt 73.4 kg (161 lb 13.1 oz)   BMI 21.35 kg/m²    Last visit weight 165 lb    thin elderly gentleman in no acute distress   HENT:      Head: Normocephalic and atraumatic.   Eyes:      General: No scleral icterus.        Right eye: No discharge.         Left eye: No discharge.      Conjunctiva/sclera: Conjunctivae normal.   Neck:      Thyroid: No thyromegaly.      Vascular: No JVD (at the clavicle).      Trachea: No tracheal deviation.   Cardiovascular:      Rate and Rhythm: Normal rate. Rhythm irregular.      Heart sounds: Normal heart sounds. No murmur heard.     No gallop.      Comments:  Isolated extra beats  Pulmonary:      Effort: Pulmonary effort is normal.      Breath sounds: Normal breath sounds.   Abdominal:      General: Bowel sounds are normal. There is no distension.      Palpations: Abdomen is soft. There is no mass.      Tenderness: There is no abdominal tenderness. There is no guarding or rebound.   Musculoskeletal:         General: Swelling (0.5+ both ankles) present. No tenderness.   Skin:     General: Skin is warm and dry.      Findings: Bruising present.   Neurological:      General: No focal deficit present.      Mental Status: He is alert and oriented to person, place, and time. Mental status is at baseline.   Psychiatric:         " Mood and Affect: Mood normal.         Behavior: Behavior normal.         Thought Content: Thought content normal.         Judgment: Judgment normal.       3/27/2025 ECHO    Left Ventricle: The left ventricle is normal in size. Increased ventricular mass. Increased wall thickness. There is severe concentric hypertrophy. Severe global hypokinesis present. There is severely reduced systolic function with a visually estimated ejection fraction of 25 - 30%. Quantitated ejection fraction is 31%. 3D EF 27%. Global longitudinal strain is -7.8%. Global longitudinal strain is reduced and pattern is suggestive of inflitrative CMP ( Amyloidosis). Clinical correlation is required. Stroke volume 38cc/beat, C.O. 3.8L/min.  C.I. 1.9L.min/m2. There is indeterminate diastolic function. Elevated left ventricular filling pressure.    Right Ventricle: The right ventricular cavity is at the upper limits of normal in size. There is hypertrophy. Right ventricle wall motion has global hypokinesis. Systolic function is moderately reduced. Right ventriuclar ejection fraction 3D is 28%. Right ventricular systolic function fractional area change is 18.00%.    Left Atrium: Severely dilated measuring 51 mL/m2    Right Atrium: Right atrium is mildly dilated.    Aortic Valve: The aortic valve is a trileaflet valve. There is mild aortic valve sclerosis. Mildly calcified noncoronary cusp. There is mild annular calcification present. There is mild aortic regurgitation.    Mitral Valve: There is mild bileaflet sclerosis. There is mild regurgitation.    Pulmonic Valve: There is mild regurgitation with a centrally directed jet.    IVC/SVC: Normal venous pressure at 3 mmHg.    3/27/2025 6 MWT  366 m without desaturation in a Frederick scale 0 prior to exercise and 1 following exercise    Lab Results   Component Value Date     (H) 03/24/2025    BNP 2,113 (H) 12/17/2024    BNP 2,129 (H) 10/07/2024     Lab Results   Component Value Date      03/24/2025    K 4.1 03/24/2025     03/24/2025    CO2 23 03/24/2025    BUN 38 (H) 03/24/2025    CREATININE 1.5 (H) 03/24/2025    CALCIUM 9.9 03/24/2025    ANIONGAP 16 03/24/2025    ESTGFRAFRICA 54.1 (A) 06/02/2022    EGFRNONAA 46.8 (A) 06/02/2022       Ref Range & Units (hover) 3/24/2025   NT-Pro BNP, S 6745 High     Troponin T 87 High      10/9/2024 Aorta u/s   AAA measuring 3.27 cm in diameter.     5/17/2024 24 hr Holter  The diary was properly completed.   Overall, the study was of good quality. The tape was adequate (0 days , 24 hours, 0 minutes).        There was an episode of felt swollen reported. The corresponding rhythm strips revealed the following: .     There was an episode of both arms, mouth hurting, dizzy reported. The corresponding rhythm strips revealed the following: .     There was an episode of mouth hurting reported. The corresponding rhythm strips revealed the following: .     There was an episode of fatigue reported. The corresponding rhythm strips revealed the following: .     Rhythm    Predominant Rhythm  Sinus tachycardia    Heart rates varied between 90 and 130 BPM with an average of 104 BPM.     Maximum heart rate recorded at: 16:36 CDT on day 1.     Minimum heart rate recorded at 23:01 CDT on day 1.   With a heart block type of first-degree.  Aberrant PACs noted.  43 beats polymorphic WCT with bigeminal pattern.  Ectopic atrial beats seen.       5/10/23 ECHO  The left ventricle is normal in size with normal systolic function.  The estimated ejection fraction is 60%.  Indeterminate left ventricular diastolic function.  Normal right ventricular size with normal right ventricular systolic function.  Mild left atrial enlargement.  Mild aortic regurgitation.  Mild mitral regurgitation.  Normal central venous pressure (3 mmHg).  The estimated PA systolic pressure is 25 mmHg.    6/24/2024 PYP scan    Study is strongly suggestive of ATTR cardiac amyloidosis with an uptake ratio of 1.8.     SPECT imaging shows diffuse myocardial uptake.    Visual grade corresponds to the Perugini defined grading scale where 3 corresponds to cardiac activity is clearly above bone.    Evaluation for AL amyloidosis by serum FLCs, serum, and urine immunofixation is recommended in all patients undergoing 99mTc-PYP/DPD/HMDP scans for cardiac amyloidosis.    Results should be interpreted in the context of prior evaluation and referral to a hematologist or amyloidosis expert is recommended if either: (a) Recommended echo/CMR is strongly suggestive of cardiac amyloidosis and 99mTc-PYP/DPD/HMDP is not suggestive or equivocal and/or (b) FLCs are abnormal or equivocal.    6/13/2024 EKG  Sinus bradycardia with 1st degree A-V block with frequent and consecutive   Premature ventricular complexes   ST and T wave abnormality, consider lateral ischemia   Abnormal ECG   When compared with ECG of 10-MAY-2024 13:37,   Vent. rate has increased BY  54 BPM   T wave inversion more evident in Lateral leads   Confirmed by Sujata FIGUEROA, Jenifer ([id number]) on 6/13/2024 9:39:19 AM can you disabled me        Assessment:     1. Senile cardiac amyloidosis    2. Infrarenal abdominal aortic aneurysm (AAA) without rupture    3. Wild-type transthyretin-related (ATTR) amyloidosis    4. Aortic atherosclerosis    5. S/P carotid endarterectomy    6. Chronic combined systolic and diastolic congestive heart failure    7. Stage 3b chronic kidney disease        Plan:   Ultrasound of aorta at 6 months and if stable then repeat yearly    He is doing very well making me believe that his dyspnea on exertion was related to his ischemic heart disease as it did not improve to this degree stopping the beta-blocker,  only after left main stent    RTC 6 months with f/u aortic u/s as this is first u/s that I can find and also trop T, NT proBNP, BMP

## 2025-04-10 ENCOUNTER — PATIENT OUTREACH (OUTPATIENT)
Dept: ADMINISTRATIVE | Facility: HOSPITAL | Age: 85
End: 2025-04-10
Payer: MEDICARE

## 2025-05-21 DIAGNOSIS — E85.82 WILD-TYPE TRANSTHYRETIN-RELATED (ATTR) AMYLOIDOSIS: ICD-10-CM

## 2025-05-23 RX ORDER — EPLERENONE 25 MG/1
25 TABLET ORAL
Qty: 90 TABLET | Refills: 3 | Status: SHIPPED | OUTPATIENT
Start: 2025-05-23

## 2025-08-21 ENCOUNTER — TELEPHONE (OUTPATIENT)
Dept: TRANSPLANT | Facility: CLINIC | Age: 85
End: 2025-08-21

## 2025-08-21 DIAGNOSIS — E85.82 WILD-TYPE TRANSTHYRETIN-RELATED (ATTR) AMYLOIDOSIS: Primary | ICD-10-CM

## (undated) DEVICE — BLADE SHAVER 4.5 6/BX

## (undated) DEVICE — SHEATH PINNACLE INTRO .035 4FR

## (undated) DEVICE — SEE MEDLINE ITEM 146420

## (undated) DEVICE — PAD SHOULDER CARE POLAR

## (undated) DEVICE — DRAPE STERI INSTRUMENT 1018

## (undated) DEVICE — APPLICATOR CHLORAPREP ORN 26ML

## (undated) DEVICE — CATH 16FR URETHRL RED RUB

## (undated) DEVICE — BLADE SCALP OPHTL BEVEL STR

## (undated) DEVICE — DRESSING TELFA STRL 4X3 LF

## (undated) DEVICE — SUT MCRYL PLUS 4-0 PS2 27IN

## (undated) DEVICE — SPIKE SHORT LG BORE 1-WAY 2IN

## (undated) DEVICE — SYR IRRIGATION BULB STER 60ML

## (undated) DEVICE — ELECTRODE REM PLYHSV RETURN 9

## (undated) DEVICE — DEVICE MYNX GRIP 6/7F

## (undated) DEVICE — TIGER LINK

## (undated) DEVICE — PAD ELECTRODE STER 1.5X3

## (undated) DEVICE — GLOVE BIOGEL SKINSENSE PI 7.0

## (undated) DEVICE — SUT 2-0 12-18IN SILK

## (undated) DEVICE — GAUZE SPONGE 4X4 12PLY

## (undated) DEVICE — DRESSING XEROFORM FOIL PK 1X8

## (undated) DEVICE — UNDERGLOVES BIOGEL PI SIZE 7.5

## (undated) DEVICE — DRESSING TRANS 4X4 TEGADERM

## (undated) DEVICE — KIT CUSTOM MANIFOLD

## (undated) DEVICE — PROBE ARTHO ENERGY 90 DEG

## (undated) DEVICE — SOL IRR NACL .9% 3000ML

## (undated) DEVICE — GAUZE SPONGE 4'X4 12 PLY

## (undated) DEVICE — SUPPORT SLING SHOT II MEDIUM

## (undated) DEVICE — CLOSURE SKIN STERI STRIP 1/2X4

## (undated) DEVICE — SUT 4-0 12-18IN SILK BLACK

## (undated) DEVICE — GUIDEWIRE RUNTHROUGH EF 180CM

## (undated) DEVICE — GAUZE SPONGE PEANUT STRL

## (undated) DEVICE — Device

## (undated) DEVICE — SUT 3-0 12-18IN SILK

## (undated) DEVICE — PAD ABD 8X10 STERILE

## (undated) DEVICE — SHAVER ULTRAFFR 4.2MM

## (undated) DEVICE — GOWN SMART IMP BREATHABLE XXLG

## (undated) DEVICE — SEE MEDLINE ITEM 157117

## (undated) DEVICE — INSERTS STEALTH FIBRA SIZE 1.

## (undated) DEVICE — SEE MEDLINE ITEM 146313

## (undated) DEVICE — INFLATOR ENCORE 26 BLLN INFL

## (undated) DEVICE — PAD DERMAPROX THCK 11X15X1IN

## (undated) DEVICE — INSERTS STEALTH FIBRA SIZE 2

## (undated) DEVICE — COVER PROBE US 5.5X58L NON LTX

## (undated) DEVICE — DRAPE STERI U-SHAPED 47X51IN

## (undated) DEVICE — SEE MEDLINE ITEM 146417

## (undated) DEVICE — SUT 1 36IN PDS II VIO MONO

## (undated) DEVICE — CANNULA TRIPLEDAM 8.25MM 7CM

## (undated) DEVICE — GLOVE SURGEON SYN PF SZ 9

## (undated) DEVICE — SUT 2-0 VICRYL / SH (J417)

## (undated) DEVICE — HEMOSTAT VASC BAND REG 24CM

## (undated) DEVICE — SEE MEDLINE ITEM 156911

## (undated) DEVICE — SEE MEDLINE ITEM 157194

## (undated) DEVICE — TUBE SET INFLOW/OUTFLOW

## (undated) DEVICE — KIT GLIDESHEATH SLEND 6FR 10CM

## (undated) DEVICE — SUT VICRYL PLUS 0 CT1 18IN

## (undated) DEVICE — GRAFT SPREADER

## (undated) DEVICE — SOL 9P NACL IRR PIC IL

## (undated) DEVICE — NDL SCORPIAN SUTURE PASSER

## (undated) DEVICE — KIT MICROINTRO 4F .018X40X7CM

## (undated) DEVICE — TAPE MEDIPORE 4IN X 2YDS

## (undated) DEVICE — SYR 10CC LUER LOCK

## (undated) DEVICE — SPONGE DERMACEA 4X4IN 12PLY

## (undated) DEVICE — TRAY CATH LAB OMC

## (undated) DEVICE — GLOVE ORTHO PF SZ 8.5

## (undated) DEVICE — KIT SHOULDER POSITIONER SPIDER

## (undated) DEVICE — SUT ETHILON 2-0 BLK PS-2

## (undated) DEVICE — CATH RADIAL TIG 6FR 4.0 110CM

## (undated) DEVICE — BNDG COFLEX FOAM LF2 ST 4X5YD

## (undated) DEVICE — NDL 18GA X1 1/2 REG BEVEL

## (undated) DEVICE — SHEATH INTRODUCER 6FR 11CM

## (undated) DEVICE — ADHESIVE MASTISOL VIAL 48/BX

## (undated) DEVICE — LOOP VESSEL BLUE MAXI

## (undated) DEVICE — BNDG COFLEX FOAM LF2 ST 6X5YD

## (undated) DEVICE — SEE MEDLINE ITEM 157131

## (undated) DEVICE — GUIDEWIRE EMERALD 150CM PTFE

## (undated) DEVICE — CANNULA ARTHRO NON THREAD

## (undated) DEVICE — CLIP MED TICALL

## (undated) DEVICE — TRANSDUCER ADULT DISP

## (undated) DEVICE — GUIDE LAUNCHER 6FR JL 4.0

## (undated) DEVICE — SWABSTICK BENZOIN 4 IN

## (undated) DEVICE — GUIDE WIRE BMW 014 X190

## (undated) DEVICE — SHEATH INTRODUCER 7FR 11CM

## (undated) DEVICE — DRAPE INCISE IOBAN 2 23X17IN

## (undated) DEVICE — CLOSURE SKIN 1X5 STERI-STRIP

## (undated) DEVICE — TOWEL OR XRAY WHITE 17X26IN

## (undated) DEVICE — SUT VICRYL 3-0 27 SH

## (undated) DEVICE — OMNIPAQUE CONTRAST 350MG/100ML

## (undated) DEVICE — SET DECANTER MEDICHOICE

## (undated) DEVICE — CATH NC EMERGE MR 4X12MM

## (undated) DEVICE — CATH SWAN GANZ STND 7FR

## (undated) DEVICE — PACK DRAPE UNIVERSAL CONVERTOR

## (undated) DEVICE — NDL SPINAL 18GX3.5 SPINOCAN

## (undated) DEVICE — DRAPE ANGIO BRACH 38X44IN

## (undated) DEVICE — KIT EVACUATOR FLAT DRAIN 100CC

## (undated) DEVICE — SEE MEDLINE ITEM 152530

## (undated) DEVICE — POSITIONER IV ARMBOARD FOAM

## (undated) DEVICE — PAD DEFIB CADENCE ADULT R2

## (undated) DEVICE — SYR B-D DISP CONTROL 10CC100/C

## (undated) DEVICE — GLOVE BIOGEL ECLIPSE SZ 9

## (undated) DEVICE — SUT PROLENE 6-0 BV-1 30IN

## (undated) DEVICE — SNAP CAP 18 DOME COVERS

## (undated) DEVICE — SUT FIBERWIRE 0 38 W/NDL

## (undated) DEVICE — PAD ABDOMINAL 5X9 STERILE

## (undated) DEVICE — NDL HYPO REG 25G X 1 1/2

## (undated) DEVICE — OMNIPAQUE 350MG 150ML VIAL

## (undated) DEVICE — DRAPE STERI-DRAPE 1000 17X11IN

## (undated) DEVICE — SEE MEDLINE ITEM 153688

## (undated) DEVICE — TRAY FOLEY 16FR INFECTION CONT

## (undated) DEVICE — DRG DOC 8.0 X 85MM

## (undated) DEVICE — HEMOSTAT SURGICEL 4X8IN

## (undated) DEVICE — SEE MEDLINE ITEM 152529

## (undated) DEVICE — CONNECTOR TUBING STR 5 IN 1

## (undated) DEVICE — SEE MEDLINE ITEM 146347

## (undated) DEVICE — TOWEL OR DISP STRL BLUE 4/PK

## (undated) DEVICE — CANNULA DRY DOC 7 X 85

## (undated) DEVICE — BUTTON PASSPORT CANN 2X4CM

## (undated) DEVICE — DRESSING XEROFORM 1X8IN

## (undated) DEVICE — TUBING SUC UNIV W/CONN 12FT

## (undated) DEVICE — SUT PROLENE 6-0 BV-1

## (undated) DEVICE — CANNULA VESSEL

## (undated) DEVICE — SPONGE LAP 18X18 PREWASHED

## (undated) DEVICE — KIT COPILOT VALVE HEMO TOOL

## (undated) DEVICE — CATH EMERGE MR 15 X 2.50